# Patient Record
Sex: FEMALE | Race: WHITE | NOT HISPANIC OR LATINO | Employment: OTHER | URBAN - NONMETROPOLITAN AREA
[De-identification: names, ages, dates, MRNs, and addresses within clinical notes are randomized per-mention and may not be internally consistent; named-entity substitution may affect disease eponyms.]

---

## 2017-02-21 ENCOUNTER — OFFICE VISIT (OUTPATIENT)
Dept: CARDIOLOGY | Facility: CLINIC | Age: 77
End: 2017-02-21

## 2017-02-21 VITALS
HEART RATE: 73 BPM | WEIGHT: 175.7 LBS | SYSTOLIC BLOOD PRESSURE: 148 MMHG | DIASTOLIC BLOOD PRESSURE: 81 MMHG | HEIGHT: 64 IN | OXYGEN SATURATION: 97 % | BODY MASS INDEX: 30 KG/M2

## 2017-02-21 DIAGNOSIS — I20.8 ANGINA EFFORT: ICD-10-CM

## 2017-02-21 DIAGNOSIS — I48.0 PAROXYSMAL ATRIAL FIBRILLATION (HCC): Primary | ICD-10-CM

## 2017-02-21 DIAGNOSIS — I25.118 CORONARY ARTERY DISEASE OF NATIVE ARTERY OF NATIVE HEART WITH STABLE ANGINA PECTORIS (HCC): ICD-10-CM

## 2017-02-21 DIAGNOSIS — R07.2 PRECORDIAL PAIN: ICD-10-CM

## 2017-02-21 DIAGNOSIS — I10 ESSENTIAL HYPERTENSION: ICD-10-CM

## 2017-02-21 PROCEDURE — 99214 OFFICE O/P EST MOD 30 MIN: CPT | Performed by: INTERNAL MEDICINE

## 2017-02-21 NOTE — PROGRESS NOTES
Subjective   Anette Dunham is a 76 y.o. female.     Chief Complaint   Patient presents with   • Follow-up   • Atrial Fibrillation       History of Present Illness     Mrs. Dunham is a pleasant 76-year-old woman who presents for follow-up of atrial fibrillation. She apparently had been followed for many years by cardiology for an irregular heart rhythm but had never been formally diagnosed with atrial fibrillation. She stopped seeing her cardiologist some years ago. She had been started on Eliquis and was diagnosed with atrial fibrillation. She seemed to be significantly symptomatic from the atrial fibrillation and as such, she was also placed on amiodarone with an anticipation of DC cardioversion.  She then reverted to sinus rhythm. Having done so, she stated that most of her symptoms improved dramatically.  She states that overall she continues to have her palpitations but no sustained arrhythmias.  She does not monitor her blood pressure and heart rate on a daily basis.  She does note that over the past 6 months she's had an increasingly difficult time with physical activity.  Specifically, she notes that if for example she walks up stairs she becomes relatively dyspneic.  She denies any clear angina or anginal-like symptoms.  However, she does note that over the past month she believes that she's had a flu where she has recurrent episodes of a vague tightness sensation in her chest that is not related to physical activity and seems to be relatively persistent.  She does state that she was told long time ago that she did have a blockage in the bottom of her heart.     The following portions of the patient's history were reviewed and updated as appropriate: allergies, current medications, past family history, past medical history, past social history, past surgical history and problem list.    Review of Systems   Constitutional: Positive for appetite change. Negative for activity change and fatigue.   HENT:  Negative for congestion and tinnitus.    Eyes: Negative for visual disturbance.   Respiratory: Positive for cough, chest tightness and shortness of breath.    Cardiovascular: Positive for chest pain and palpitations. Negative for leg swelling.   Gastrointestinal: Negative for blood in stool, nausea and vomiting.   Endocrine: Positive for cold intolerance. Negative for heat intolerance and polyuria.   Genitourinary: Negative for dysuria.   Musculoskeletal: Negative for myalgias and neck pain.   Skin: Negative for rash.   Neurological: Positive for weakness and light-headedness. Negative for dizziness and syncope.   Hematological: Bruises/bleeds easily.   Psychiatric/Behavioral: Positive for confusion and sleep disturbance.       Objective   Physical Exam   Constitutional: She is oriented to person, place, and time. She appears well-developed and well-nourished. No distress.   HENT:   Head: Normocephalic and atraumatic.   Nose: Nose normal.   Mouth/Throat: Oropharynx is clear and moist.   Eyes: Conjunctivae and EOM are normal. Right eye exhibits no discharge. Left eye exhibits no discharge. No scleral icterus.   Neck: Normal range of motion. No hepatojugular reflux and no JVD present. Carotid bruit is not present. No tracheal deviation present.   Cardiovascular: Normal rate, regular rhythm, S1 normal, S2 normal and intact distal pulses.  PMI is not displaced.  Exam reveals no gallop, no S3, no S4 and no friction rub.    No murmur heard.  Pulmonary/Chest: Effort normal and breath sounds normal. No accessory muscle usage. No respiratory distress. She has no wheezes. She has no rales. She exhibits no tenderness.   Abdominal: Soft. Bowel sounds are normal. She exhibits no distension. There is no tenderness.   Musculoskeletal: Normal range of motion. She exhibits no edema or tenderness.       Vascular Status -  Her exam exhibits no right foot edema. Her exam exhibits no left foot edema.  Neurological: She is alert and  oriented to person, place, and time. No cranial nerve deficit. Coordination normal.   Skin: Skin is warm and dry. She is not diaphoretic.   Nursing note and vitals reviewed.      Procedures    Assessment/Plan     Atrial Fibrillation At this point in time she has converted to sinus rhythm and seems to be significantly better. Additionally, she has tolerated her medications without difficulty. For now, I will continue her amiodarone at 200 mg daily. I have asked her to maintain a blood pressure and heart rate diary.  I have also asked her to obtain liver function tests as well as TSH and CBC.    Chest Pain/CAD?  In regard to her questionable history of exertional fatigue and vague chest tightness as well as her questionable history of known coronary artery disease I am concerned that she may be having anginal symptoms.  As such, I have asked her to obtain a stress Cardiolite test.      Hypertension. For now I have continued her medication as is. I have however asked her to maintain a blood pressure diary. I do not suspect that her blood pressure or heart rate are contributing to her episodes of dizziness    In short, it is been a pleasure to participate in Mrs. Dunham's care, I look forward to seeing her again in 6 weeks.

## 2017-03-07 ENCOUNTER — HOSPITAL ENCOUNTER (OUTPATIENT)
Dept: NUCLEAR MEDICINE | Facility: HOSPITAL | Age: 77
Discharge: HOME | End: 2017-03-07
Attending: INTERNAL MEDICINE

## 2017-03-07 ENCOUNTER — HOSPITAL ENCOUNTER (OUTPATIENT)
Dept: CARDIOLOGY | Facility: HOSPITAL | Age: 77
Discharge: HOME | End: 2017-03-07
Attending: INTERNAL MEDICINE

## 2017-03-07 DIAGNOSIS — I25.118 CORONARY ARTERY DISEASE OF NATIVE ARTERY OF NATIVE HEART WITH STABLE ANGINA PECTORIS (HCC): ICD-10-CM

## 2017-03-07 DIAGNOSIS — I20.8 ANGINA EFFORT: ICD-10-CM

## 2017-03-07 DIAGNOSIS — I48.0 PAROXYSMAL ATRIAL FIBRILLATION (HCC): ICD-10-CM

## 2017-03-07 LAB
BH CV NUCLEAR PRIOR STUDY: 3
BH CV STRESS BP STAGE 1: NORMAL
BH CV STRESS DURATION MIN STAGE 1: 4
BH CV STRESS DURATION SEC STAGE 1: 30
BH CV STRESS GRADE STAGE 1: 10
BH CV STRESS HR STAGE 1: 136
BH CV STRESS METS STAGE 1: 5
BH CV STRESS PROTOCOL 1: NORMAL
BH CV STRESS RECOVERY BP: NORMAL MMHG
BH CV STRESS RECOVERY HR: 79 BPM
BH CV STRESS SPEED STAGE 1: 1.7
BH CV STRESS STAGE 1: 1
LV EF NUC BP: 81 %
MAXIMAL PREDICTED HEART RATE: 143 BPM
PERCENT MAX PREDICTED HR: 95.1 %
STRESS BASELINE BP: NORMAL MMHG
STRESS BASELINE HR: 66 BPM
STRESS PERCENT HR: 112 %
STRESS POST ESTIMATED WORKLOAD: 4.6 METS
STRESS POST EXERCISE DUR MIN: 4 MIN
STRESS POST EXERCISE DUR SEC: 30 SEC
STRESS POST PEAK BP: NORMAL MMHG
STRESS POST PEAK HR: 136 BPM
STRESS TARGET HR: 122 BPM

## 2017-03-07 PROCEDURE — A9500 TC99M SESTAMIBI: HCPCS | Performed by: INTERNAL MEDICINE

## 2017-03-07 PROCEDURE — 93017 CV STRESS TEST TRACING ONLY: CPT

## 2017-03-07 PROCEDURE — 0 TECHNETIUM SESTAMIBI: Performed by: INTERNAL MEDICINE

## 2017-03-07 PROCEDURE — 78452 HT MUSCLE IMAGE SPECT MULT: CPT

## 2017-03-07 PROCEDURE — 78452 HT MUSCLE IMAGE SPECT MULT: CPT | Performed by: INTERNAL MEDICINE

## 2017-03-07 PROCEDURE — 93018 CV STRESS TEST I&R ONLY: CPT | Performed by: INTERNAL MEDICINE

## 2017-03-07 RX ADMIN — Medication 1 DOSE: at 08:00

## 2017-03-07 RX ADMIN — Medication 1 DOSE: at 09:30

## 2017-03-14 ENCOUNTER — TELEPHONE (OUTPATIENT)
Dept: CARDIOLOGY | Facility: CLINIC | Age: 77
End: 2017-03-14

## 2017-03-14 NOTE — TELEPHONE ENCOUNTER
----- Message from Dipak Lr MD sent at 3/10/2017  7:02 PM EST -----  Let her know her stress test was normal    sdf

## 2017-03-20 ENCOUNTER — TELEPHONE (OUTPATIENT)
Dept: CARDIOLOGY | Facility: CLINIC | Age: 77
End: 2017-03-20

## 2017-03-23 ENCOUNTER — TELEPHONE (OUTPATIENT)
Dept: CARDIOLOGY | Facility: CLINIC | Age: 77
End: 2017-03-23

## 2017-03-23 NOTE — TELEPHONE ENCOUNTER
----- Message from Dipak Lr MD sent at 3/10/2017  7:02 PM EST -----  Let her know her stress test was normal    sdf      PT RETURNED MY PHONE CALL.  I MADE HER AWARE THAT PER DR. LR HER STRESS TEST WAS NORMAL.    PT AWARE & UNDERSTANDS.   CSMA

## 2017-05-10 RX ORDER — AMIODARONE HYDROCHLORIDE 200 MG/1
200 TABLET ORAL 2 TIMES DAILY
Qty: 60 TABLET | Refills: 5 | Status: SHIPPED | OUTPATIENT
Start: 2017-05-10 | End: 2017-12-15

## 2017-05-30 ENCOUNTER — TELEPHONE (OUTPATIENT)
Dept: CARDIOLOGY | Facility: CLINIC | Age: 77
End: 2017-05-30

## 2017-05-31 ENCOUNTER — LAB (OUTPATIENT)
Dept: LAB | Facility: HOSPITAL | Age: 77
End: 2017-05-31
Attending: INTERNAL MEDICINE

## 2017-05-31 ENCOUNTER — APPOINTMENT (OUTPATIENT)
Dept: LAB | Facility: HOSPITAL | Age: 77
End: 2017-05-31
Attending: INTERNAL MEDICINE

## 2017-05-31 ENCOUNTER — OFFICE VISIT (OUTPATIENT)
Dept: CARDIOLOGY | Facility: CLINIC | Age: 77
End: 2017-05-31

## 2017-05-31 VITALS
SYSTOLIC BLOOD PRESSURE: 134 MMHG | BODY MASS INDEX: 30.05 KG/M2 | WEIGHT: 176 LBS | HEART RATE: 72 BPM | HEIGHT: 64 IN | DIASTOLIC BLOOD PRESSURE: 78 MMHG | OXYGEN SATURATION: 96 %

## 2017-05-31 DIAGNOSIS — I25.118 CORONARY ARTERY DISEASE OF NATIVE ARTERY OF NATIVE HEART WITH STABLE ANGINA PECTORIS (HCC): ICD-10-CM

## 2017-05-31 DIAGNOSIS — I10 ESSENTIAL HYPERTENSION: ICD-10-CM

## 2017-05-31 DIAGNOSIS — I48.0 PAROXYSMAL A-FIB (HCC): Primary | ICD-10-CM

## 2017-05-31 DIAGNOSIS — I20.8 ANGINA EFFORT: ICD-10-CM

## 2017-05-31 DIAGNOSIS — R07.2 PRECORDIAL PAIN: ICD-10-CM

## 2017-05-31 DIAGNOSIS — I48.0 PAROXYSMAL ATRIAL FIBRILLATION (HCC): ICD-10-CM

## 2017-05-31 LAB
ALBUMIN SERPL-MCNC: 4.2 G/DL (ref 3.4–4.8)
ALBUMIN/GLOB SERPL: 1 G/DL (ref 1.5–2.5)
ALP SERPL-CCNC: 57 U/L (ref 35–104)
ALT SERPL W P-5'-P-CCNC: 15 U/L (ref 10–36)
ANION GAP SERPL CALCULATED.3IONS-SCNC: 8.4 MMOL/L (ref 3.6–11.2)
AST SERPL-CCNC: 25 U/L (ref 10–30)
BILIRUB SERPL-MCNC: 0.3 MG/DL (ref 0.2–1.8)
BUN BLD-MCNC: 21 MG/DL (ref 7–21)
BUN/CREAT SERPL: 18.9 (ref 7–25)
CALCIUM SPEC-SCNC: 9.5 MG/DL (ref 7.7–10)
CHLORIDE SERPL-SCNC: 107 MMOL/L (ref 99–112)
CO2 SERPL-SCNC: 27.6 MMOL/L (ref 24.3–31.9)
CREAT BLD-MCNC: 1.11 MG/DL (ref 0.43–1.29)
DEPRECATED FTI SERPL-MCNC: 2.9 TBI
DEPRECATED RDW RBC AUTO: 46.2 FL (ref 37–54)
ERYTHROCYTE [DISTWIDTH] IN BLOOD BY AUTOMATED COUNT: 14.1 % (ref 11.5–14.5)
GFR SERPL CREATININE-BSD FRML MDRD: 48 ML/MIN/1.73
GLOBULIN UR ELPH-MCNC: 4.2 GM/DL
GLUCOSE BLD-MCNC: 100 MG/DL (ref 70–110)
HCT VFR BLD AUTO: 41.7 % (ref 37–47)
HGB BLD-MCNC: 13.7 G/DL (ref 12–16)
MCH RBC QN AUTO: 30.6 PG (ref 27–33)
MCHC RBC AUTO-ENTMCNC: 32.9 G/DL (ref 33–37)
MCV RBC AUTO: 93.3 FL (ref 80–94)
OSMOLALITY SERPL CALC.SUM OF ELEC: 288 MOSM/KG (ref 273–305)
PLATELET # BLD AUTO: 238 10*3/MM3 (ref 130–400)
PMV BLD AUTO: 10.4 FL (ref 6–10)
POTASSIUM BLD-SCNC: 4.3 MMOL/L (ref 3.5–5.3)
PROT SERPL-MCNC: 8.4 G/DL (ref 6–8)
RBC # BLD AUTO: 4.47 10*6/MM3 (ref 4.2–5.4)
SODIUM BLD-SCNC: 143 MMOL/L (ref 135–153)
T3RU NFR SERPL: 34.2 % (ref 22.5–37)
T4 SERPL-MCNC: 8.6 MCG/DL (ref 4.5–10.9)
TSH SERPL DL<=0.05 MIU/L-ACNC: 3.37 MIU/ML (ref 0.55–4.78)
WBC NRBC COR # BLD: 7.18 10*3/MM3 (ref 4.5–12.5)

## 2017-05-31 PROCEDURE — 99214 OFFICE O/P EST MOD 30 MIN: CPT | Performed by: INTERNAL MEDICINE

## 2017-05-31 PROCEDURE — 84479 ASSAY OF THYROID (T3 OR T4): CPT | Performed by: INTERNAL MEDICINE

## 2017-05-31 PROCEDURE — 80053 COMPREHEN METABOLIC PANEL: CPT | Performed by: INTERNAL MEDICINE

## 2017-05-31 PROCEDURE — 84443 ASSAY THYROID STIM HORMONE: CPT | Performed by: INTERNAL MEDICINE

## 2017-05-31 PROCEDURE — 84436 ASSAY OF TOTAL THYROXINE: CPT | Performed by: INTERNAL MEDICINE

## 2017-05-31 PROCEDURE — 36415 COLL VENOUS BLD VENIPUNCTURE: CPT

## 2017-05-31 PROCEDURE — 85027 COMPLETE CBC AUTOMATED: CPT | Performed by: INTERNAL MEDICINE

## 2017-06-07 ENCOUNTER — TELEPHONE (OUTPATIENT)
Dept: CARDIOLOGY | Facility: CLINIC | Age: 77
End: 2017-06-07

## 2017-06-07 NOTE — TELEPHONE ENCOUNTER
----- Message from Dipak Lr MD sent at 6/5/2017  4:58 PM EDT -----  Let her know her thyroid panel was normal    sdf      CALLED PT TO MAKE HER AWARE THAT PER DR. LR HER THYROID PANEL & CBC WAS NORMAL.    PT AWARE & UNDERSTANDS.    CSMA

## 2017-11-27 ENCOUNTER — OFFICE VISIT (OUTPATIENT)
Dept: CARDIOLOGY | Facility: CLINIC | Age: 77
End: 2017-11-27

## 2017-11-27 VITALS
DIASTOLIC BLOOD PRESSURE: 84 MMHG | SYSTOLIC BLOOD PRESSURE: 122 MMHG | OXYGEN SATURATION: 94 % | HEART RATE: 91 BPM | BODY MASS INDEX: 27.79 KG/M2 | HEIGHT: 64 IN | WEIGHT: 162.8 LBS

## 2017-11-27 DIAGNOSIS — I48.0 PAROXYSMAL ATRIAL FIBRILLATION (HCC): ICD-10-CM

## 2017-11-27 DIAGNOSIS — I10 ESSENTIAL HYPERTENSION: Primary | ICD-10-CM

## 2017-11-27 DIAGNOSIS — R07.2 PRECORDIAL PAIN: ICD-10-CM

## 2017-11-27 PROCEDURE — 99213 OFFICE O/P EST LOW 20 MIN: CPT | Performed by: INTERNAL MEDICINE

## 2017-11-27 RX ORDER — ALBUTEROL SULFATE 2.5 MG/3ML
2.5 SOLUTION RESPIRATORY (INHALATION) EVERY 4 HOURS PRN
COMMUNITY
End: 2018-05-27

## 2017-11-27 RX ORDER — ALENDRONATE SODIUM 70 MG/1
70 TABLET ORAL
COMMUNITY

## 2017-11-27 NOTE — PROGRESS NOTES
[facility] CARDIOLOGY  [address]  [place] [address]  Phone: [phone]  Fax: [phone]    11/27/2017    Chief Complaint: Routine followup    History:   Anette Dunham is a 77 y.o. female seen in followup,  Occasional palpitatons. Particularly at night feels like racing. Taking amiodarone and Eliquis without side effects. Nml nulcear ST and no chest pain.  LVEF 50-70% on echo and stress.    Past Medical History:   Diagnosis Date   • Arrhythmia        Past Social History:  Social History     Social History   • Marital status:      Spouse name: N/A   • Number of children: N/A   • Years of education: N/A     Social History Main Topics   • Smoking status: Current Every Day Smoker     Packs/day: 0.25     Years: 15.00     Types: Cigarettes   • Smokeless tobacco: Current User     Types: Snuff      Comment: snuff for 60 years    • Alcohol use No   • Drug use: No   • Sexual activity: Defer     Other Topics Concern   • None     Social History Narrative       Past Family History:  Family History   Problem Relation Age of Onset   • Heart disease Father        Review of Systems:   Please see HPI  Constitution: No chills, no rigors, no unexplained weight loss or weight gain  Eyes:  No diplopia, no blurred vision, no loss of vision, conjunctiva is pink and sclera is anicteric  ENT:  No tinnitus, no otorrhea, no epistaxis, no sore throat   Respiratory: No cough, no hemoptysis  Cardiovascular: see HPI  Gastrointestinal: No nausea, no vomiting, no hematemesis, no diarrhea or constipation, no melena  Genitourinary: No frequency of dysuria no hematuria  Integument: No pruritis and  no skin rash  Hematologic / Lymphatic: No excessive bleeding, easy bruising, fatigue, lymphadenopathy and petechiae  Musculoskeletal: No joint pain, joint stiffness, joint swelling, muscle pain, muscle weakness and neck pain  Neurological: No dizziness, headaches, light headedness, seizures and  vertigo  Endocrine: No frequent urination and nocturia, temperature intolerance, weight gain, unintended and weight loss, unintended      Current Outpatient Prescriptions on File Prior to Visit   Medication Sig Dispense Refill   • amiodarone (PACERONE) 200 MG tablet Take 1 tablet by mouth 2 (Two) Times a Day. (Patient taking differently: Take 200 mg by mouth Daily.) 60 tablet 5   • apixaban (ELIQUIS) 5 MG tablet tablet Take 1 tablet by mouth 2 (two) times a day. 60 tablet 5   • gabapentin (NEURONTIN) 300 MG capsule Take 600 mg by mouth 3 (Three) Times a Day.     • levothyroxine (SYNTHROID, LEVOTHROID) 25 MCG tablet Take 25 mcg by mouth daily.     • LORazepam (ATIVAN) 1 MG tablet Take 1 mg by mouth every 8 (eight) hours as needed for anxiety (1/2-1 TID PRN).     • ranitidine (ZANTAC) 300 MG capsule Take 300 mg by mouth 2 (two) times a day.     • meclizine (ANTIVERT) 12.5 MG tablet Take 12.5 mg by mouth 3 (three) times a day as needed for dizziness.       No current facility-administered medications on file prior to visit.        No Known Allergies    Objective:  Vitals:    11/27/17 1106   BP: 122/84   Pulse: 91   SpO2: 94%     Comfortable NAD  PERRL, conjunctiva clear  Neck supple, no JVD or thyromegaly appreciated  S1/S2 RRR, no m/r/g  Lungs CTA B, normal effort  Abdomen S/NT/ND (+) BS, no HSM appreciated  Extremities warm, no clubbing, cyanosis, or edema  Normal gait  No visible or palpable skin lesions  A/Ox4, mood and affect appropriate  Pulse exam: Leif's:    DATA:       Results for orders placed during the hospital encounter of 08/17/16   Adult transthoracic echo complete    Narrative · All left ventricular wall segments contract normally.  · Left ventricular function is normal. Estimated EF = 50%.  · Left atrial cavity size is mild-to-moderately dilated.          Results for orders placed during the hospital encounter of 03/07/17   Stress Test With Myocardial Perfusion (1 Day)    Narrative · Findings  consistent with an abnormal ECG stress test.  · Left ventricular ejection fraction is hyperdynamic (Calculated EF >   70%).  · Myocardial perfusion imaging indicates a normal myocardial perfusion   study with no evidence of ischemia.  · Impressions are consistent with a low risk study.          Results for orders placed during the hospital encounter of 03/07/17   Stress Test With Myocardial Perfusion (1 Day)    Narrative · Findings consistent with an abnormal ECG stress test.  · Left ventricular ejection fraction is hyperdynamic (Calculated EF >   70%).  · Myocardial perfusion imaging indicates a normal myocardial perfusion   study with no evidence of ischemia.  · Impressions are consistent with a low risk study.                 A/P:Afib: appears to have returned and is fairly asymtpomatic. Amiodarone does not appear to be preserving NSR. Will stop. Continue Eliquis.  Start lopressor 25mg BID.    F/u 2 weeks.    Thank you for allowing me to participate in the care of Anette Dunham. Feel free to contact me directly with any further questions or concerns.

## 2017-12-15 ENCOUNTER — OFFICE VISIT (OUTPATIENT)
Dept: CARDIOLOGY | Facility: CLINIC | Age: 77
End: 2017-12-15

## 2017-12-15 VITALS
HEART RATE: 65 BPM | OXYGEN SATURATION: 92 % | SYSTOLIC BLOOD PRESSURE: 129 MMHG | BODY MASS INDEX: 27.76 KG/M2 | HEIGHT: 64 IN | DIASTOLIC BLOOD PRESSURE: 73 MMHG | WEIGHT: 162.6 LBS

## 2017-12-15 DIAGNOSIS — I48.0 PAROXYSMAL ATRIAL FIBRILLATION (HCC): ICD-10-CM

## 2017-12-15 DIAGNOSIS — I10 ESSENTIAL HYPERTENSION: Primary | ICD-10-CM

## 2017-12-15 PROCEDURE — 99212 OFFICE O/P EST SF 10 MIN: CPT | Performed by: INTERNAL MEDICINE

## 2017-12-15 RX ORDER — GABAPENTIN 600 MG/1
600 TABLET ORAL 3 TIMES DAILY
Status: ON HOLD | COMMUNITY
End: 2018-09-05

## 2017-12-15 NOTE — PROGRESS NOTES
[facility] CARDIOLOGY  [address]  [place] [address]  Phone: [phone]  Fax: [phone]    12/15/2017    Chief Complaint: Routine followup of pAF. Stopped amiodarone and started metoprolol 25 bid. Inititally felt lethargic and SOB and now feels adjusted to it and feels better.  Still taking Eliquis. Some palpitations at night but able to sleep.    History:   Anette Dunham is a 77 y.o. female seen in followup,     Past Medical History:   Diagnosis Date   • Arrhythmia        Past Social History:  Social History     Social History   • Marital status:      Spouse name: N/A   • Number of children: N/A   • Years of education: N/A     Social History Main Topics   • Smoking status: Current Every Day Smoker     Packs/day: 0.25     Years: 15.00     Types: Cigarettes   • Smokeless tobacco: Current User     Types: Snuff      Comment: snuff for 60 years    • Alcohol use No   • Drug use: No   • Sexual activity: Defer     Other Topics Concern   • None     Social History Narrative       Past Family History:  Family History   Problem Relation Age of Onset   • Heart disease Father        Review of Systems:   Please see HPI  Constitution: No chills, no rigors, no unexplained weight loss or weight gain  Eyes:  No diplopia, no blurred vision, no loss of vision, conjunctiva is pink and sclera is anicteric  ENT:  No tinnitus, no otorrhea, no epistaxis, no sore throat   Respiratory: No cough, no hemoptysis  Cardiovascular: see HPI  Gastrointestinal: No nausea, no vomiting, no hematemesis, no diarrhea or constipation, no melena  Genitourinary: No frequency of dysuria no hematuria  Integument: No pruritis and  no skin rash  Hematologic / Lymphatic: No excessive bleeding, easy bruising, fatigue, lymphadenopathy and petechiae  Musculoskeletal: No joint pain, joint stiffness, joint swelling, muscle pain, muscle weakness and neck pain  Neurological: No dizziness, headaches, light headedness,  seizures and vertigo  Endocrine: No frequent urination and nocturia, temperature intolerance, weight gain, unintended and weight loss, unintended      Current Outpatient Prescriptions on File Prior to Visit   Medication Sig Dispense Refill   • albuterol (PROVENTIL) (2.5 MG/3ML) 0.083% nebulizer solution Take 2.5 mg by nebulization Every 4 (Four) Hours As Needed for Wheezing.     • alendronate (FOSAMAX) 70 MG tablet Take 70 mg by mouth Every 7 (Seven) Days.     • amiodarone (PACERONE) 200 MG tablet Take 1 tablet by mouth 2 (Two) Times a Day. (Patient taking differently: Take 200 mg by mouth Daily.) 60 tablet 5   • apixaban (ELIQUIS) 5 MG tablet tablet Take 1 tablet by mouth 2 (two) times a day. 60 tablet 5   • levothyroxine (SYNTHROID, LEVOTHROID) 25 MCG tablet Take 25 mcg by mouth daily.     • LORazepam (ATIVAN) 1 MG tablet Take 1 mg by mouth every 8 (eight) hours as needed for anxiety (1/2-1 TID PRN).     • metoprolol tartrate (LOPRESSOR) 25 MG tablet Take 1 tablet by mouth 2 (Two) Times a Day. 180 tablet 3   • ranitidine (ZANTAC) 300 MG capsule Take 300 mg by mouth 2 (two) times a day.     • meclizine (ANTIVERT) 12.5 MG tablet Take 12.5 mg by mouth 3 (three) times a day as needed for dizziness.     • [DISCONTINUED] gabapentin (NEURONTIN) 300 MG capsule Take 600 mg by mouth 3 (Three) Times a Day.       No current facility-administered medications on file prior to visit.        No Known Allergies    Objective:  Vitals:    12/15/17 1113   BP: 129/73   Pulse: 65   SpO2: 92%     Comfortable NAD  PERRL, conjunctiva clear  Neck supple, no JVD or thyromegaly appreciated  S1/S2 RRR, no m/r/g  Lungs CTA B, normal effort  Abdomen S/NT/ND (+) BS, no HSM appreciated  Extremities warm, no clubbing, cyanosis, or edema  Normal gait  No visible or palpable skin lesions  A/Ox4, mood and affect appropriate  Pulse exam: Leif's:    DATA:       Results for orders placed during the hospital encounter of 08/17/16   Adult transthoracic  echo complete    Narrative · All left ventricular wall segments contract normally.  · Left ventricular function is normal. Estimated EF = 50%.  · Left atrial cavity size is mild-to-moderately dilated.          Results for orders placed during the hospital encounter of 03/07/17   Stress Test With Myocardial Perfusion (1 Day)    Narrative · Findings consistent with an abnormal ECG stress test.  · Left ventricular ejection fraction is hyperdynamic (Calculated EF >   70%).  · Myocardial perfusion imaging indicates a normal myocardial perfusion   study with no evidence of ischemia.  · Impressions are consistent with a low risk study.          Results for orders placed during the hospital encounter of 03/07/17   Stress Test With Myocardial Perfusion (1 Day)    Narrative · Findings consistent with an abnormal ECG stress test.  · Left ventricular ejection fraction is hyperdynamic (Calculated EF >   70%).  · Myocardial perfusion imaging indicates a normal myocardial perfusion   study with no evidence of ischemia.  · Impressions are consistent with a low risk study.                 A/P:pAF: feels regular and possible back in NSR HR 60. Continue ELiquis. F/u 4 weeks.    Thank you for allowing me to participate in the care of Anette Dunham. Feel free to contact me directly with any further questions or concerns.

## 2018-01-19 ENCOUNTER — OFFICE VISIT (OUTPATIENT)
Dept: CARDIOLOGY | Facility: CLINIC | Age: 78
End: 2018-01-19

## 2018-01-19 VITALS
OXYGEN SATURATION: 95 % | HEART RATE: 60 BPM | HEIGHT: 64 IN | WEIGHT: 160.4 LBS | DIASTOLIC BLOOD PRESSURE: 81 MMHG | SYSTOLIC BLOOD PRESSURE: 154 MMHG | BODY MASS INDEX: 27.39 KG/M2

## 2018-01-19 DIAGNOSIS — I48.0 PAROXYSMAL ATRIAL FIBRILLATION (HCC): ICD-10-CM

## 2018-01-19 DIAGNOSIS — I10 ESSENTIAL HYPERTENSION: Primary | ICD-10-CM

## 2018-01-19 PROCEDURE — 99213 OFFICE O/P EST LOW 20 MIN: CPT | Performed by: INTERNAL MEDICINE

## 2018-01-19 NOTE — PROGRESS NOTES
[facility] CARDIOLOGY  [address]  [place] [address]  Phone: [phone]  Fax: [phone]    01/19/2018    Chief Complaint: Routine followup    History:   Anette Dunham is a 77 y.o. female seen in followup, no new symptoms. Occasionally heart palpitations. Noticed mostly when in bed. Not losing sleep.  Taking medications well. No bleeding. Occ bruising.    Past Medical History:   Diagnosis Date   • Arrhythmia        Past Social History:  Social History     Social History   • Marital status:      Spouse name: N/A   • Number of children: N/A   • Years of education: N/A     Social History Main Topics   • Smoking status: Current Every Day Smoker     Packs/day: 0.25     Years: 15.00     Types: Cigarettes   • Smokeless tobacco: Current User     Types: Snuff      Comment: snuff for 60 years    • Alcohol use No   • Drug use: No   • Sexual activity: Defer     Other Topics Concern   • None     Social History Narrative       Past Family History:  Family History   Problem Relation Age of Onset   • Heart disease Father        Review of Systems:   Please see HPI  Constitution: No chills, no rigors, no unexplained weight loss or weight gain  Eyes:  No diplopia, no blurred vision, no loss of vision, conjunctiva is pink and sclera is anicteric  ENT:  No tinnitus, no otorrhea, no epistaxis, no sore throat   Respiratory: No cough, no hemoptysis  Cardiovascular: see HPI  Gastrointestinal: No nausea, no vomiting, no hematemesis, no diarrhea or constipation, no melena  Genitourinary: No frequency of dysuria no hematuria  Integument: No pruritis and  no skin rash  Hematologic / Lymphatic: No excessive bleeding, easy bruising, fatigue, lymphadenopathy and petechiae  Musculoskeletal: No joint pain, joint stiffness, joint swelling, muscle pain, muscle weakness and neck pain  Neurological: No dizziness, headaches, light headedness, seizures and vertigo  Endocrine: No frequent urination and  nocturia, temperature intolerance, weight gain, unintended and weight loss, unintended      Current Outpatient Prescriptions on File Prior to Visit   Medication Sig Dispense Refill   • albuterol (PROVENTIL) (2.5 MG/3ML) 0.083% nebulizer solution Take 2.5 mg by nebulization Every 4 (Four) Hours As Needed for Wheezing.     • alendronate (FOSAMAX) 70 MG tablet Take 70 mg by mouth Every 7 (Seven) Days.     • apixaban (ELIQUIS) 5 MG tablet tablet Take 1 tablet by mouth 2 (two) times a day. 60 tablet 5   • gabapentin (NEURONTIN) 600 MG tablet Take 600 mg by mouth 3 (Three) Times a Day.     • levothyroxine (SYNTHROID, LEVOTHROID) 25 MCG tablet Take 25 mcg by mouth daily.     • LORazepam (ATIVAN) 1 MG tablet Take 1 mg by mouth every 8 (eight) hours as needed for anxiety (1/2-1 TID PRN).     • metoprolol tartrate (LOPRESSOR) 25 MG tablet Take 1 tablet by mouth 2 (Two) Times a Day. 180 tablet 3   • ranitidine (ZANTAC) 300 MG capsule Take 300 mg by mouth 2 (two) times a day.     • meclizine (ANTIVERT) 12.5 MG tablet Take 12.5 mg by mouth 3 (three) times a day as needed for dizziness.       No current facility-administered medications on file prior to visit.        No Known Allergies    Objective:  Vitals:    01/19/18 1108   BP: 154/81   Pulse: 60   SpO2: 95%     Comfortable NAD  PERRL, conjunctiva clear  Neck supple, no JVD or thyromegaly appreciated  Extremities warm, no clubbing, cyanosis, or edema  Normal gait  No visible or palpable skin lesions  A/Ox4, mood and affect appropriate      DATA:       Results for orders placed during the hospital encounter of 08/17/16   Adult transthoracic echo complete    Narrative · All left ventricular wall segments contract normally.  · Left ventricular function is normal. Estimated EF = 50%.  · Left atrial cavity size is mild-to-moderately dilated.          Results for orders placed during the hospital encounter of 03/07/17   Stress Test With Myocardial Perfusion (1 Day)    Narrative ·  Findings consistent with an abnormal ECG stress test.  · Left ventricular ejection fraction is hyperdynamic (Calculated EF >   70%).  · Myocardial perfusion imaging indicates a normal myocardial perfusion   study with no evidence of ischemia.  · Impressions are consistent with a low risk study.          Results for orders placed during the hospital encounter of 03/07/17   Stress Test With Myocardial Perfusion (1 Day)    Narrative · Findings consistent with an abnormal ECG stress test.  · Left ventricular ejection fraction is hyperdynamic (Calculated EF >   70%).  · Myocardial perfusion imaging indicates a normal myocardial perfusion   study with no evidence of ischemia.  · Impressions are consistent with a low risk study.               I personally viewed and interpreted the patient's EKG:      A/P: pAF: Continue Eliquis and beta blocker. BP is isolated elevated was normal last visit. Will follow.    F/u 3-4 months.    Thank you for allowing me to participate in the care of Anette Dunham. Feel free to contact me directly with any further questions or concerns.

## 2018-04-20 ENCOUNTER — OFFICE VISIT (OUTPATIENT)
Dept: CARDIOLOGY | Facility: CLINIC | Age: 78
End: 2018-04-20

## 2018-04-20 VITALS
BODY MASS INDEX: 26.75 KG/M2 | HEIGHT: 63 IN | SYSTOLIC BLOOD PRESSURE: 156 MMHG | WEIGHT: 151 LBS | HEART RATE: 65 BPM | DIASTOLIC BLOOD PRESSURE: 80 MMHG | OXYGEN SATURATION: 96 %

## 2018-04-20 DIAGNOSIS — I10 ESSENTIAL HYPERTENSION: Primary | ICD-10-CM

## 2018-04-20 DIAGNOSIS — I48.0 PAROXYSMAL ATRIAL FIBRILLATION (HCC): ICD-10-CM

## 2018-04-20 PROCEDURE — 99213 OFFICE O/P EST LOW 20 MIN: CPT | Performed by: INTERNAL MEDICINE

## 2018-04-20 RX ORDER — PANTOPRAZOLE SODIUM 40 MG/1
40 TABLET, DELAYED RELEASE ORAL DAILY
COMMUNITY
End: 2018-05-27

## 2018-04-20 RX ORDER — FEXOFENADINE HCL 180 MG/1
180 TABLET ORAL DAILY
COMMUNITY

## 2018-04-20 RX ORDER — AMLODIPINE BESYLATE 5 MG/1
2.5 TABLET ORAL DAILY
Qty: 30 TABLET | Refills: 11 | Status: SHIPPED | OUTPATIENT
Start: 2018-04-20 | End: 2018-04-20 | Stop reason: SDUPTHER

## 2018-04-20 RX ORDER — AMLODIPINE BESYLATE 2.5 MG/1
2.5 TABLET ORAL DAILY
Qty: 30 TABLET | Refills: 11 | Status: SHIPPED | OUTPATIENT
Start: 2018-04-20 | End: 2018-06-08 | Stop reason: HOSPADM

## 2018-04-20 NOTE — PROGRESS NOTES
[facility] CARDIOLOGY  [address]  [place] [address]  Phone: [phone]  Fax: [phone]    04/20/2018    Chief Complaint: Routine followup of , Afib    History:   Anette Dunham is a 78 y.o. female seen in followup, pt seen in follow up for palpitations and PAF.  Recently had thyroid replacement increased. Pt some more tiredness. No chest discomfort but some SOB.  Nml ST and echo last year.          A/P: pAF: Continue Eliquis and beta blocker. BP is isolated elevated was normal last visit. Will follow.    Past Medical History:   Diagnosis Date   • Arrhythmia        Past Social History:  Social History     Social History   • Marital status:      Social History Main Topics   • Smoking status: Former Smoker     Packs/day: 0.25     Years: 15.00     Types: Cigarettes   • Smokeless tobacco: Current User     Types: Snuff      Comment: snuff for 60 years    • Alcohol use No   • Drug use: No   • Sexual activity: Defer     Other Topics Concern   • Not on file       Past Family History:  Family History   Problem Relation Age of Onset   • Heart disease Father        Review of Systems:   Please see HPI  Constitution: No chills, no rigors, no unexplained weight loss or weight gain  Eyes:  No diplopia, no blurred vision, no loss of vision, conjunctiva is pink and sclera is anicteric  ENT:  No tinnitus, no otorrhea, no epistaxis, no sore throat   Respiratory: No cough, no hemoptysis  Cardiovascular: see HPI  Gastrointestinal: No nausea, no vomiting, no hematemesis, no diarrhea or constipation, no melena  Genitourinary: No frequency of dysuria no hematuria  Integument: No pruritis and  no skin rash  Hematologic / Lymphatic: No excessive bleeding, easy bruising, fatigue, lymphadenopathy and petechiae  Musculoskeletal: No joint pain, joint stiffness, joint swelling, muscle pain, muscle weakness and neck pain  Neurological: No dizziness, headaches, light headedness, seizures and  vertigo  Endocrine: No frequent urination and nocturia, temperature intolerance, weight gain, unintended and weight loss, unintended      Current Outpatient Prescriptions on File Prior to Visit   Medication Sig Dispense Refill   • albuterol (PROVENTIL) (2.5 MG/3ML) 0.083% nebulizer solution Take 2.5 mg by nebulization Every 4 (Four) Hours As Needed for Wheezing.     • alendronate (FOSAMAX) 70 MG tablet Take 70 mg by mouth Every 7 (Seven) Days.     • apixaban (ELIQUIS) 5 MG tablet tablet Take 1 tablet by mouth 2 (two) times a day. 60 tablet 5   • gabapentin (NEURONTIN) 600 MG tablet Take 600 mg by mouth 3 (Three) Times a Day.     • levothyroxine (SYNTHROID, LEVOTHROID) 25 MCG tablet Take 50 mcg by mouth Daily.     • LORazepam (ATIVAN) 1 MG tablet Take 1 mg by mouth every 8 (eight) hours as needed for anxiety (1/2-1 TID PRN).     • meclizine (ANTIVERT) 12.5 MG tablet Take 12.5 mg by mouth 3 (three) times a day as needed for dizziness.     • metoprolol tartrate (LOPRESSOR) 25 MG tablet Take 1 tablet by mouth 2 (Two) Times a Day. 180 tablet 3   • ranitidine (ZANTAC) 300 MG capsule Take 300 mg by mouth 2 (two) times a day.       No current facility-administered medications on file prior to visit.        No Known Allergies    Objective:  Vitals:    04/20/18 1040   BP: 156/80   Pulse: 65   SpO2: 96%     Comfortable NAD  PERRL, conjunctiva clear  Neck supple, no JVD or thyromegaly appreciated  S1/S2 RRR, no m/r/g  Lungs CTA B, normal effort  Abdomen S/NT/ND (+) BS, no HSM appreciated  Extremities warm, no clubbing, cyanosis, or edema  Normal gait  No visible or palpable skin lesions  A/Ox4, mood and affect appropriate  Pulse exam: Leif's:    DATA:       Results for orders placed during the hospital encounter of 08/17/16   Adult transthoracic echo complete    Narrative · All left ventricular wall segments contract normally.  · Left ventricular function is normal. Estimated EF = 50%.  · Left atrial cavity size is  mild-to-moderately dilated.          Results for orders placed during the hospital encounter of 03/07/17   Stress Test With Myocardial Perfusion (1 Day)    Narrative · Findings consistent with an abnormal ECG stress test.  · Left ventricular ejection fraction is hyperdynamic (Calculated EF >   70%).  · Myocardial perfusion imaging indicates a normal myocardial perfusion   study with no evidence of ischemia.  · Impressions are consistent with a low risk study.          Results for orders placed during the hospital encounter of 03/07/17   Stress Test With Myocardial Perfusion (1 Day)    Narrative · Findings consistent with an abnormal ECG stress test.  · Left ventricular ejection fraction is hyperdynamic (Calculated EF >   70%).  · Myocardial perfusion imaging indicates a normal myocardial perfusion   study with no evidence of ischemia.  · Impressions are consistent with a low risk study.                     A/P:  PAF: continue Eliquis and metorpolol. Some palpitations but HR normal today.    HTN: add norvasc 2.5 mg today. Hx LAE possible hypertensive heart disease.    F/u 3 months.    Thank you for allowing me to participate in the care of Anette Dunham. Feel free to contact me directly with any further questions or concerns.

## 2018-05-27 ENCOUNTER — HOSPITAL ENCOUNTER (INPATIENT)
Facility: HOSPITAL | Age: 78
LOS: 5 days | Discharge: NURSING, REHAB OR LONG-TERM CARE | End: 2018-06-01
Attending: FAMILY MEDICINE | Admitting: INTERNAL MEDICINE

## 2018-05-27 ENCOUNTER — APPOINTMENT (OUTPATIENT)
Dept: CT IMAGING | Facility: HOSPITAL | Age: 78
End: 2018-05-27

## 2018-05-27 ENCOUNTER — APPOINTMENT (OUTPATIENT)
Dept: GENERAL RADIOLOGY | Facility: HOSPITAL | Age: 78
End: 2018-05-27

## 2018-05-27 DIAGNOSIS — S72.002A CLOSED LEFT HIP FRACTURE, INITIAL ENCOUNTER (HCC): Primary | ICD-10-CM

## 2018-05-27 PROBLEM — Z72.0 TOBACCO ABUSE: Status: ACTIVE | Noted: 2018-05-27

## 2018-05-27 LAB
ALBUMIN SERPL-MCNC: 4.3 G/DL (ref 3.4–4.8)
ALBUMIN/GLOB SERPL: 1 G/DL (ref 1.5–2.5)
ALP SERPL-CCNC: 67 U/L (ref 35–104)
ALT SERPL W P-5'-P-CCNC: 22 U/L (ref 10–36)
ANION GAP SERPL CALCULATED.3IONS-SCNC: 4.6 MMOL/L (ref 3.6–11.2)
APTT PPP: 34.6 SECONDS (ref 23.8–36.1)
AST SERPL-CCNC: 27 U/L (ref 10–30)
BASOPHILS # BLD AUTO: 0.04 10*3/MM3 (ref 0–0.3)
BASOPHILS NFR BLD AUTO: 0.6 % (ref 0–2)
BILIRUB SERPL-MCNC: 0.5 MG/DL (ref 0.2–1.8)
BUN BLD-MCNC: 24 MG/DL (ref 7–21)
BUN/CREAT SERPL: 18.5 (ref 7–25)
CALCIUM SPEC-SCNC: 9.1 MG/DL (ref 7.7–10)
CHLORIDE SERPL-SCNC: 103 MMOL/L (ref 99–112)
CO2 SERPL-SCNC: 26.4 MMOL/L (ref 24.3–31.9)
CREAT BLD-MCNC: 1.3 MG/DL (ref 0.43–1.29)
CRP SERPL-MCNC: <0.5 MG/DL (ref 0–0.99)
DEPRECATED RDW RBC AUTO: 45.1 FL (ref 37–54)
EOSINOPHIL # BLD AUTO: 0.34 10*3/MM3 (ref 0–0.7)
EOSINOPHIL NFR BLD AUTO: 4.8 % (ref 0–7)
ERYTHROCYTE [DISTWIDTH] IN BLOOD BY AUTOMATED COUNT: 13.7 % (ref 11.5–14.5)
GFR SERPL CREATININE-BSD FRML MDRD: 40 ML/MIN/1.73
GLOBULIN UR ELPH-MCNC: 4.2 GM/DL
GLUCOSE BLD-MCNC: 111 MG/DL (ref 70–110)
GLUCOSE BLDC GLUCOMTR-MCNC: 112 MG/DL (ref 70–130)
GLUCOSE BLDC GLUCOMTR-MCNC: 113 MG/DL (ref 70–130)
GLUCOSE BLDC GLUCOMTR-MCNC: 118 MG/DL (ref 70–130)
GLUCOSE BLDC GLUCOMTR-MCNC: 129 MG/DL (ref 70–130)
HCT VFR BLD AUTO: 38.5 % (ref 37–47)
HGB BLD-MCNC: 12.8 G/DL (ref 12–16)
HOLD SPECIMEN: NORMAL
HOLD SPECIMEN: NORMAL
IMM GRANULOCYTES # BLD: 0.03 10*3/MM3 (ref 0–0.03)
IMM GRANULOCYTES NFR BLD: 0.4 % (ref 0–0.5)
INR PPP: 1.2 (ref 0.9–1.1)
LYMPHOCYTES # BLD AUTO: 1.27 10*3/MM3 (ref 1–3)
LYMPHOCYTES NFR BLD AUTO: 17.8 % (ref 16–46)
MCH RBC QN AUTO: 31.3 PG (ref 27–33)
MCHC RBC AUTO-ENTMCNC: 33.2 G/DL (ref 33–37)
MCV RBC AUTO: 94.1 FL (ref 80–94)
MONOCYTES # BLD AUTO: 0.84 10*3/MM3 (ref 0.1–0.9)
MONOCYTES NFR BLD AUTO: 11.8 % (ref 0–12)
NEUTROPHILS # BLD AUTO: 4.6 10*3/MM3 (ref 1.4–6.5)
NEUTROPHILS NFR BLD AUTO: 64.6 % (ref 40–75)
OSMOLALITY SERPL CALC.SUM OF ELEC: 273 MOSM/KG (ref 273–305)
PLATELET # BLD AUTO: 260 10*3/MM3 (ref 130–400)
PMV BLD AUTO: 10.6 FL (ref 6–10)
POTASSIUM BLD-SCNC: 4.1 MMOL/L (ref 3.5–5.3)
PROT SERPL-MCNC: 8.5 G/DL (ref 6–8)
PROTHROMBIN TIME: 15.5 SECONDS (ref 11–15.4)
RBC # BLD AUTO: 4.09 10*6/MM3 (ref 4.2–5.4)
SODIUM BLD-SCNC: 134 MMOL/L (ref 135–153)
WBC NRBC COR # BLD: 7.12 10*3/MM3 (ref 4.5–12.5)
WHOLE BLOOD HOLD SPECIMEN: NORMAL
WHOLE BLOOD HOLD SPECIMEN: NORMAL

## 2018-05-27 PROCEDURE — 25010000002 MORPHINE PER 10 MG: Performed by: FAMILY MEDICINE

## 2018-05-27 PROCEDURE — 25010000002 MORPHINE PER 10 MG: Performed by: INTERNAL MEDICINE

## 2018-05-27 PROCEDURE — 85730 THROMBOPLASTIN TIME PARTIAL: CPT | Performed by: NURSE PRACTITIONER

## 2018-05-27 PROCEDURE — 85610 PROTHROMBIN TIME: CPT | Performed by: NURSE PRACTITIONER

## 2018-05-27 PROCEDURE — 25010000002 ONDANSETRON PER 1 MG: Performed by: FAMILY MEDICINE

## 2018-05-27 PROCEDURE — 86140 C-REACTIVE PROTEIN: CPT | Performed by: INTERNAL MEDICINE

## 2018-05-27 PROCEDURE — 99284 EMERGENCY DEPT VISIT MOD MDM: CPT

## 2018-05-27 PROCEDURE — 73560 X-RAY EXAM OF KNEE 1 OR 2: CPT

## 2018-05-27 PROCEDURE — 71045 X-RAY EXAM CHEST 1 VIEW: CPT | Performed by: RADIOLOGY

## 2018-05-27 PROCEDURE — 70450 CT HEAD/BRAIN W/O DYE: CPT

## 2018-05-27 PROCEDURE — 85025 COMPLETE CBC W/AUTO DIFF WBC: CPT | Performed by: NURSE PRACTITIONER

## 2018-05-27 PROCEDURE — 99223 1ST HOSP IP/OBS HIGH 75: CPT | Performed by: PHYSICIAN ASSISTANT

## 2018-05-27 PROCEDURE — 80053 COMPREHEN METABOLIC PANEL: CPT | Performed by: NURSE PRACTITIONER

## 2018-05-27 PROCEDURE — 70450 CT HEAD/BRAIN W/O DYE: CPT | Performed by: RADIOLOGY

## 2018-05-27 PROCEDURE — 25010000002 HYDROMORPHONE PER 4 MG

## 2018-05-27 PROCEDURE — 73560 X-RAY EXAM OF KNEE 1 OR 2: CPT | Performed by: RADIOLOGY

## 2018-05-27 PROCEDURE — 82962 GLUCOSE BLOOD TEST: CPT

## 2018-05-27 PROCEDURE — 71045 X-RAY EXAM CHEST 1 VIEW: CPT

## 2018-05-27 PROCEDURE — 93010 ELECTROCARDIOGRAM REPORT: CPT | Performed by: INTERNAL MEDICINE

## 2018-05-27 PROCEDURE — 93005 ELECTROCARDIOGRAM TRACING: CPT | Performed by: NURSE PRACTITIONER

## 2018-05-27 PROCEDURE — 73502 X-RAY EXAM HIP UNI 2-3 VIEWS: CPT | Performed by: RADIOLOGY

## 2018-05-27 PROCEDURE — 73502 X-RAY EXAM HIP UNI 2-3 VIEWS: CPT

## 2018-05-27 PROCEDURE — 25010000002 KETOROLAC TROMETHAMINE PER 15 MG: Performed by: INTERNAL MEDICINE

## 2018-05-27 RX ORDER — MORPHINE SULFATE 2 MG/ML
2 INJECTION, SOLUTION INTRAMUSCULAR; INTRAVENOUS EVERY 4 HOURS PRN
Status: DISCONTINUED | OUTPATIENT
Start: 2018-05-27 | End: 2018-06-01 | Stop reason: HOSPADM

## 2018-05-27 RX ORDER — ONDANSETRON 2 MG/ML
4 INJECTION INTRAMUSCULAR; INTRAVENOUS ONCE
Status: COMPLETED | OUTPATIENT
Start: 2018-05-27 | End: 2018-05-27

## 2018-05-27 RX ORDER — HYDROMORPHONE HCL 110MG/55ML
PATIENT CONTROLLED ANALGESIA SYRINGE INTRAVENOUS
Status: COMPLETED
Start: 2018-05-27 | End: 2018-05-27

## 2018-05-27 RX ORDER — DOCUSATE SODIUM 100 MG/1
100 CAPSULE, LIQUID FILLED ORAL DAILY
COMMUNITY
End: 2018-06-08 | Stop reason: HOSPADM

## 2018-05-27 RX ORDER — LEVOTHYROXINE SODIUM 0.05 MG/1
50 TABLET ORAL DAILY
COMMUNITY

## 2018-05-27 RX ORDER — NITROGLYCERIN 0.4 MG/1
0.4 TABLET SUBLINGUAL
Status: DISCONTINUED | OUTPATIENT
Start: 2018-05-27 | End: 2018-06-01 | Stop reason: HOSPADM

## 2018-05-27 RX ORDER — SODIUM CHLORIDE 0.9 % (FLUSH) 0.9 %
1-10 SYRINGE (ML) INJECTION AS NEEDED
Status: DISCONTINUED | OUTPATIENT
Start: 2018-05-27 | End: 2018-06-01 | Stop reason: HOSPADM

## 2018-05-27 RX ORDER — CETIRIZINE HYDROCHLORIDE 10 MG/1
5 TABLET ORAL DAILY
Status: DISCONTINUED | OUTPATIENT
Start: 2018-05-27 | End: 2018-06-01 | Stop reason: HOSPADM

## 2018-05-27 RX ORDER — KETOROLAC TROMETHAMINE 30 MG/ML
15 INJECTION, SOLUTION INTRAMUSCULAR; INTRAVENOUS EVERY 6 HOURS PRN
Status: DISCONTINUED | OUTPATIENT
Start: 2018-05-27 | End: 2018-05-27

## 2018-05-27 RX ORDER — KETOROLAC TROMETHAMINE 30 MG/ML
15 INJECTION, SOLUTION INTRAMUSCULAR; INTRAVENOUS EVERY 6 HOURS PRN
Status: COMPLETED | OUTPATIENT
Start: 2018-05-27 | End: 2018-05-29

## 2018-05-27 RX ORDER — GABAPENTIN 300 MG/1
600 CAPSULE ORAL EVERY 8 HOURS SCHEDULED
Status: DISCONTINUED | OUTPATIENT
Start: 2018-05-27 | End: 2018-06-01 | Stop reason: HOSPADM

## 2018-05-27 RX ORDER — LEVOTHYROXINE SODIUM 0.05 MG/1
50 TABLET ORAL DAILY
Status: DISCONTINUED | OUTPATIENT
Start: 2018-05-27 | End: 2018-06-01 | Stop reason: HOSPADM

## 2018-05-27 RX ORDER — OXYCODONE HYDROCHLORIDE AND ACETAMINOPHEN 5; 325 MG/1; MG/1
1 TABLET ORAL EVERY 6 HOURS PRN
Status: DISCONTINUED | OUTPATIENT
Start: 2018-05-27 | End: 2018-06-01 | Stop reason: HOSPADM

## 2018-05-27 RX ORDER — MORPHINE SULFATE 2 MG/ML
2 INJECTION, SOLUTION INTRAMUSCULAR; INTRAVENOUS ONCE
Status: COMPLETED | OUTPATIENT
Start: 2018-05-27 | End: 2018-05-27

## 2018-05-27 RX ORDER — AMLODIPINE BESYLATE 5 MG/1
2.5 TABLET ORAL DAILY
Status: CANCELLED | OUTPATIENT
Start: 2018-05-27

## 2018-05-27 RX ORDER — HYDROMORPHONE HYDROCHLORIDE 1 MG/ML
0.5 INJECTION, SOLUTION INTRAMUSCULAR; INTRAVENOUS; SUBCUTANEOUS
Status: DISCONTINUED | OUTPATIENT
Start: 2018-05-27 | End: 2018-06-01 | Stop reason: HOSPADM

## 2018-05-27 RX ORDER — SODIUM CHLORIDE 0.9 % (FLUSH) 0.9 %
10 SYRINGE (ML) INJECTION AS NEEDED
Status: DISCONTINUED | OUTPATIENT
Start: 2018-05-27 | End: 2018-06-01 | Stop reason: HOSPADM

## 2018-05-27 RX ORDER — DOCUSATE SODIUM 100 MG/1
100 CAPSULE, LIQUID FILLED ORAL DAILY
Status: DISCONTINUED | OUTPATIENT
Start: 2018-05-27 | End: 2018-06-01 | Stop reason: HOSPADM

## 2018-05-27 RX ORDER — FAMOTIDINE 20 MG/1
40 TABLET, FILM COATED ORAL DAILY
Status: DISCONTINUED | OUTPATIENT
Start: 2018-05-27 | End: 2018-06-01 | Stop reason: HOSPADM

## 2018-05-27 RX ORDER — SODIUM CHLORIDE 9 MG/ML
75 INJECTION, SOLUTION INTRAVENOUS CONTINUOUS
Status: DISCONTINUED | OUTPATIENT
Start: 2018-05-27 | End: 2018-05-31

## 2018-05-27 RX ORDER — LORAZEPAM 1 MG/1
1 TABLET ORAL EVERY 8 HOURS PRN
Status: DISCONTINUED | OUTPATIENT
Start: 2018-05-27 | End: 2018-06-01 | Stop reason: HOSPADM

## 2018-05-27 RX ORDER — SENNA PLUS 8.6 MG/1
1 TABLET ORAL DAILY
Status: DISCONTINUED | OUTPATIENT
Start: 2018-05-27 | End: 2018-06-01 | Stop reason: HOSPADM

## 2018-05-27 RX ADMIN — GABAPENTIN 600 MG: 300 CAPSULE ORAL at 13:49

## 2018-05-27 RX ADMIN — MORPHINE SULFATE 2 MG: 2 INJECTION, SOLUTION INTRAMUSCULAR; INTRAVENOUS at 04:41

## 2018-05-27 RX ADMIN — METOPROLOL TARTRATE 25 MG: 25 TABLET, FILM COATED ORAL at 20:46

## 2018-05-27 RX ADMIN — GABAPENTIN 600 MG: 300 CAPSULE ORAL at 20:46

## 2018-05-27 RX ADMIN — Medication 1 TABLET: at 13:48

## 2018-05-27 RX ADMIN — MORPHINE SULFATE 2 MG: 2 INJECTION, SOLUTION INTRAMUSCULAR; INTRAVENOUS at 20:46

## 2018-05-27 RX ADMIN — HYDROMORPHONE HYDROCHLORIDE 0.5 MG: 2 INJECTION INTRAMUSCULAR; INTRAVENOUS; SUBCUTANEOUS at 22:59

## 2018-05-27 RX ADMIN — KETOROLAC TROMETHAMINE 15 MG: 30 INJECTION, SOLUTION INTRAMUSCULAR; INTRAVENOUS at 15:56

## 2018-05-27 RX ADMIN — SODIUM CHLORIDE 75 ML/HR: 9 INJECTION, SOLUTION INTRAVENOUS at 11:20

## 2018-05-27 RX ADMIN — DOCUSATE SODIUM 100 MG: 100 CAPSULE, LIQUID FILLED ORAL at 11:18

## 2018-05-27 RX ADMIN — MORPHINE SULFATE 3 MG: 4 INJECTION, SOLUTION INTRAMUSCULAR; INTRAVENOUS at 05:52

## 2018-05-27 RX ADMIN — METOPROLOL TARTRATE 25 MG: 25 TABLET, FILM COATED ORAL at 11:18

## 2018-05-27 RX ADMIN — NICOTINE 1 PATCH: 7 PATCH, EXTENDED RELEASE TRANSDERMAL at 11:19

## 2018-05-27 RX ADMIN — OXYCODONE HYDROCHLORIDE AND ACETAMINOPHEN 1 TABLET: 5; 325 TABLET ORAL at 11:13

## 2018-05-27 RX ADMIN — Medication 1 TABLET: at 20:46

## 2018-05-27 RX ADMIN — MORPHINE SULFATE 2 MG: 2 INJECTION, SOLUTION INTRAMUSCULAR; INTRAVENOUS at 13:42

## 2018-05-27 RX ADMIN — CETIRIZINE HYDROCHLORIDE 5 MG: 10 TABLET ORAL at 11:19

## 2018-05-27 RX ADMIN — SODIUM CHLORIDE 75 ML/HR: 9 INJECTION, SOLUTION INTRAVENOUS at 22:59

## 2018-05-27 RX ADMIN — FAMOTIDINE 40 MG: 20 TABLET, FILM COATED ORAL at 11:16

## 2018-05-27 RX ADMIN — GABAPENTIN 600 MG: 300 CAPSULE ORAL at 11:19

## 2018-05-27 RX ADMIN — LEVOTHYROXINE SODIUM 50 MCG: 50 TABLET ORAL at 11:18

## 2018-05-27 RX ADMIN — ONDANSETRON 4 MG: 2 INJECTION, SOLUTION INTRAMUSCULAR; INTRAVENOUS at 04:41

## 2018-05-27 RX ADMIN — OXYCODONE HYDROCHLORIDE AND ACETAMINOPHEN 1 TABLET: 5; 325 TABLET ORAL at 17:34

## 2018-05-27 RX ADMIN — SENNOSIDES 1 TABLET: 8.6 TABLET, FILM COATED ORAL at 11:15

## 2018-05-28 LAB
ANION GAP SERPL CALCULATED.3IONS-SCNC: 3.1 MMOL/L (ref 3.6–11.2)
BASOPHILS # BLD AUTO: 0.03 10*3/MM3 (ref 0–0.3)
BASOPHILS NFR BLD AUTO: 0.4 % (ref 0–2)
BUN BLD-MCNC: 15 MG/DL (ref 7–21)
BUN/CREAT SERPL: 13.6 (ref 7–25)
CALCIUM SPEC-SCNC: 8.6 MG/DL (ref 7.7–10)
CHLORIDE SERPL-SCNC: 107 MMOL/L (ref 99–112)
CO2 SERPL-SCNC: 24.9 MMOL/L (ref 24.3–31.9)
CREAT BLD-MCNC: 1.1 MG/DL (ref 0.43–1.29)
DEPRECATED RDW RBC AUTO: 44.1 FL (ref 37–54)
EOSINOPHIL # BLD AUTO: 0.55 10*3/MM3 (ref 0–0.7)
EOSINOPHIL NFR BLD AUTO: 7.2 % (ref 0–7)
ERYTHROCYTE [DISTWIDTH] IN BLOOD BY AUTOMATED COUNT: 13.4 % (ref 11.5–14.5)
GFR SERPL CREATININE-BSD FRML MDRD: 48 ML/MIN/1.73
GLUCOSE BLD-MCNC: 97 MG/DL (ref 70–110)
GLUCOSE BLDC GLUCOMTR-MCNC: 107 MG/DL (ref 70–130)
GLUCOSE BLDC GLUCOMTR-MCNC: 159 MG/DL (ref 70–130)
GLUCOSE BLDC GLUCOMTR-MCNC: 96 MG/DL (ref 70–130)
HCT VFR BLD AUTO: 35.1 % (ref 37–47)
HGB BLD-MCNC: 11.6 G/DL (ref 12–16)
IMM GRANULOCYTES # BLD: 0.01 10*3/MM3 (ref 0–0.03)
IMM GRANULOCYTES NFR BLD: 0.1 % (ref 0–0.5)
LYMPHOCYTES # BLD AUTO: 1.9 10*3/MM3 (ref 1–3)
LYMPHOCYTES NFR BLD AUTO: 24.7 % (ref 16–46)
MCH RBC QN AUTO: 31.2 PG (ref 27–33)
MCHC RBC AUTO-ENTMCNC: 33 G/DL (ref 33–37)
MCV RBC AUTO: 94.4 FL (ref 80–94)
MONOCYTES # BLD AUTO: 0.87 10*3/MM3 (ref 0.1–0.9)
MONOCYTES NFR BLD AUTO: 11.3 % (ref 0–12)
NEUTROPHILS # BLD AUTO: 4.33 10*3/MM3 (ref 1.4–6.5)
NEUTROPHILS NFR BLD AUTO: 56.3 % (ref 40–75)
OSMOLALITY SERPL CALC.SUM OF ELEC: 270.8 MOSM/KG (ref 273–305)
PLATELET # BLD AUTO: 195 10*3/MM3 (ref 130–400)
PMV BLD AUTO: 10.1 FL (ref 6–10)
POTASSIUM BLD-SCNC: 4 MMOL/L (ref 3.5–5.3)
RBC # BLD AUTO: 3.72 10*6/MM3 (ref 4.2–5.4)
SODIUM BLD-SCNC: 135 MMOL/L (ref 135–153)
WBC NRBC COR # BLD: 7.69 10*3/MM3 (ref 4.5–12.5)

## 2018-05-28 PROCEDURE — 80048 BASIC METABOLIC PNL TOTAL CA: CPT | Performed by: INTERNAL MEDICINE

## 2018-05-28 PROCEDURE — 82962 GLUCOSE BLOOD TEST: CPT

## 2018-05-28 PROCEDURE — 25010000002 MORPHINE PER 10 MG: Performed by: INTERNAL MEDICINE

## 2018-05-28 PROCEDURE — 94799 UNLISTED PULMONARY SVC/PX: CPT

## 2018-05-28 PROCEDURE — 25010000002 KETOROLAC TROMETHAMINE PER 15 MG: Performed by: INTERNAL MEDICINE

## 2018-05-28 PROCEDURE — 99232 SBSQ HOSP IP/OBS MODERATE 35: CPT | Performed by: INTERNAL MEDICINE

## 2018-05-28 PROCEDURE — 25010000002 HYDROMORPHONE PER 4 MG

## 2018-05-28 PROCEDURE — 85025 COMPLETE CBC W/AUTO DIFF WBC: CPT | Performed by: INTERNAL MEDICINE

## 2018-05-28 RX ORDER — HYDROMORPHONE HCL 110MG/55ML
PATIENT CONTROLLED ANALGESIA SYRINGE INTRAVENOUS
Status: COMPLETED
Start: 2018-05-28 | End: 2018-05-28

## 2018-05-28 RX ORDER — HYDROMORPHONE HCL 110MG/55ML
PATIENT CONTROLLED ANALGESIA SYRINGE INTRAVENOUS
Status: DISCONTINUED
Start: 2018-05-28 | End: 2018-05-28 | Stop reason: WASHOUT

## 2018-05-28 RX ADMIN — METOPROLOL TARTRATE 25 MG: 25 TABLET, FILM COATED ORAL at 20:50

## 2018-05-28 RX ADMIN — SENNOSIDES 1 TABLET: 8.6 TABLET, FILM COATED ORAL at 08:39

## 2018-05-28 RX ADMIN — HYDROMORPHONE HYDROCHLORIDE 2 MG: 2 INJECTION INTRAMUSCULAR; INTRAVENOUS; SUBCUTANEOUS at 11:02

## 2018-05-28 RX ADMIN — OXYCODONE HYDROCHLORIDE AND ACETAMINOPHEN 1 TABLET: 5; 325 TABLET ORAL at 20:50

## 2018-05-28 RX ADMIN — Medication 1 TABLET: at 20:50

## 2018-05-28 RX ADMIN — FAMOTIDINE 40 MG: 20 TABLET, FILM COATED ORAL at 08:39

## 2018-05-28 RX ADMIN — METOPROLOL TARTRATE 25 MG: 25 TABLET, FILM COATED ORAL at 08:39

## 2018-05-28 RX ADMIN — GABAPENTIN 600 MG: 300 CAPSULE ORAL at 20:50

## 2018-05-28 RX ADMIN — MORPHINE SULFATE 2 MG: 2 INJECTION, SOLUTION INTRAMUSCULAR; INTRAVENOUS at 04:20

## 2018-05-28 RX ADMIN — OXYCODONE HYDROCHLORIDE AND ACETAMINOPHEN 1 TABLET: 5; 325 TABLET ORAL at 14:53

## 2018-05-28 RX ADMIN — DOCUSATE SODIUM 100 MG: 100 CAPSULE, LIQUID FILLED ORAL at 08:39

## 2018-05-28 RX ADMIN — CETIRIZINE HYDROCHLORIDE 5 MG: 10 TABLET ORAL at 08:39

## 2018-05-28 RX ADMIN — Medication 1 TABLET: at 08:40

## 2018-05-28 RX ADMIN — GABAPENTIN 600 MG: 300 CAPSULE ORAL at 13:16

## 2018-05-28 RX ADMIN — HYDROMORPHONE HYDROCHLORIDE 0.5 MG: 2 INJECTION INTRAMUSCULAR; INTRAVENOUS; SUBCUTANEOUS at 05:42

## 2018-05-28 RX ADMIN — SODIUM CHLORIDE 75 ML/HR: 9 INJECTION, SOLUTION INTRAVENOUS at 11:04

## 2018-05-28 RX ADMIN — LORAZEPAM 1 MG: 1 TABLET ORAL at 20:50

## 2018-05-28 RX ADMIN — NICOTINE 1 PATCH: 7 PATCH, EXTENDED RELEASE TRANSDERMAL at 09:00

## 2018-05-28 RX ADMIN — KETOROLAC TROMETHAMINE 15 MG: 30 INJECTION, SOLUTION INTRAMUSCULAR; INTRAVENOUS at 18:20

## 2018-05-28 RX ADMIN — KETOROLAC TROMETHAMINE 15 MG: 30 INJECTION, SOLUTION INTRAMUSCULAR; INTRAVENOUS at 08:40

## 2018-05-28 RX ADMIN — LEVOTHYROXINE SODIUM 50 MCG: 50 TABLET ORAL at 08:39

## 2018-05-29 ENCOUNTER — ANESTHESIA EVENT (OUTPATIENT)
Dept: PERIOP | Facility: HOSPITAL | Age: 78
End: 2018-05-29

## 2018-05-29 ENCOUNTER — APPOINTMENT (OUTPATIENT)
Dept: GENERAL RADIOLOGY | Facility: HOSPITAL | Age: 78
End: 2018-05-29

## 2018-05-29 ENCOUNTER — ANESTHESIA (OUTPATIENT)
Dept: PERIOP | Facility: HOSPITAL | Age: 78
End: 2018-05-29

## 2018-05-29 PROCEDURE — 99232 SBSQ HOSP IP/OBS MODERATE 35: CPT | Performed by: INTERNAL MEDICINE

## 2018-05-29 PROCEDURE — 25010000002 HYDROMORPHONE PER 4 MG: Performed by: NURSE ANESTHETIST, CERTIFIED REGISTERED

## 2018-05-29 PROCEDURE — 72170 X-RAY EXAM OF PELVIS: CPT

## 2018-05-29 PROCEDURE — C1776 JOINT DEVICE (IMPLANTABLE): HCPCS | Performed by: ORTHOPAEDIC SURGERY

## 2018-05-29 PROCEDURE — 25010000002 FENTANYL CITRATE (PF) 100 MCG/2ML SOLUTION: Performed by: NURSE ANESTHETIST, CERTIFIED REGISTERED

## 2018-05-29 PROCEDURE — 87040 BLOOD CULTURE FOR BACTERIA: CPT | Performed by: PHYSICIAN ASSISTANT

## 2018-05-29 PROCEDURE — 0SRS0JA REPLACEMENT OF LEFT HIP JOINT, FEMORAL SURFACE WITH SYNTHETIC SUBSTITUTE, UNCEMENTED, OPEN APPROACH: ICD-10-PCS | Performed by: ORTHOPAEDIC SURGERY

## 2018-05-29 PROCEDURE — 25010000002 ONDANSETRON PER 1 MG: Performed by: NURSE ANESTHETIST, CERTIFIED REGISTERED

## 2018-05-29 PROCEDURE — 25010000002 CEFTRIAXONE: Performed by: INTERNAL MEDICINE

## 2018-05-29 PROCEDURE — 25010000002 PROPOFOL 10 MG/ML EMULSION: Performed by: NURSE ANESTHETIST, CERTIFIED REGISTERED

## 2018-05-29 PROCEDURE — 25010000002 KETOROLAC TROMETHAMINE PER 15 MG: Performed by: INTERNAL MEDICINE

## 2018-05-29 PROCEDURE — 25010000002 MORPHINE PER 10 MG: Performed by: NURSE ANESTHETIST, CERTIFIED REGISTERED

## 2018-05-29 PROCEDURE — 72170 X-RAY EXAM OF PELVIS: CPT | Performed by: RADIOLOGY

## 2018-05-29 PROCEDURE — 25010000002 MORPHINE PER 10 MG: Performed by: INTERNAL MEDICINE

## 2018-05-29 PROCEDURE — 94799 UNLISTED PULMONARY SVC/PX: CPT

## 2018-05-29 DEVICE — SELF CENTERING BI-POLAR HEAD 28MM ID 47MM OD
Type: IMPLANTABLE DEVICE | Site: HIP | Status: FUNCTIONAL
Brand: SELF CENTERING

## 2018-05-29 DEVICE — TRI-LOCK BPS FEMORAL STEM 12/14 TAPER TRI-LOCK BPS W/GRIPTION SIZE 4 STD 103MM
Type: IMPLANTABLE DEVICE | Site: HIP | Status: FUNCTIONAL
Brand: TRI-LOCK GRIPTION

## 2018-05-29 DEVICE — PRT HIP BIPOL PRIM DEPUY 9525109/9525107: Type: IMPLANTABLE DEVICE | Site: HIP | Status: FUNCTIONAL

## 2018-05-29 DEVICE — ARTICUL/EZE FEMORAL HEAD DIAMETER 28MM +5 12/14 TAPER
Type: IMPLANTABLE DEVICE | Site: HIP | Status: FUNCTIONAL
Brand: ARTICUL/EZE

## 2018-05-29 RX ORDER — ONDANSETRON 2 MG/ML
4 INJECTION INTRAMUSCULAR; INTRAVENOUS ONCE AS NEEDED
Status: DISCONTINUED | OUTPATIENT
Start: 2018-05-29 | End: 2018-05-29 | Stop reason: HOSPADM

## 2018-05-29 RX ORDER — IPRATROPIUM BROMIDE AND ALBUTEROL SULFATE 2.5; .5 MG/3ML; MG/3ML
3 SOLUTION RESPIRATORY (INHALATION) ONCE AS NEEDED
Status: DISCONTINUED | OUTPATIENT
Start: 2018-05-29 | End: 2018-05-29 | Stop reason: HOSPADM

## 2018-05-29 RX ORDER — SODIUM CHLORIDE 0.9 % (FLUSH) 0.9 %
1-10 SYRINGE (ML) INJECTION AS NEEDED
Status: DISCONTINUED | OUTPATIENT
Start: 2018-05-29 | End: 2018-05-29 | Stop reason: HOSPADM

## 2018-05-29 RX ORDER — ONDANSETRON 2 MG/ML
INJECTION INTRAMUSCULAR; INTRAVENOUS AS NEEDED
Status: DISCONTINUED | OUTPATIENT
Start: 2018-05-29 | End: 2018-05-29 | Stop reason: SURG

## 2018-05-29 RX ORDER — SODIUM CHLORIDE, SODIUM LACTATE, POTASSIUM CHLORIDE, CALCIUM CHLORIDE 600; 310; 30; 20 MG/100ML; MG/100ML; MG/100ML; MG/100ML
125 INJECTION, SOLUTION INTRAVENOUS CONTINUOUS
Status: DISCONTINUED | OUTPATIENT
Start: 2018-05-29 | End: 2018-05-29

## 2018-05-29 RX ORDER — FENTANYL CITRATE 50 UG/ML
INJECTION, SOLUTION INTRAMUSCULAR; INTRAVENOUS AS NEEDED
Status: DISCONTINUED | OUTPATIENT
Start: 2018-05-29 | End: 2018-05-29 | Stop reason: SURG

## 2018-05-29 RX ORDER — FENTANYL CITRATE 50 UG/ML
100 INJECTION, SOLUTION INTRAMUSCULAR; INTRAVENOUS
Status: DISCONTINUED | OUTPATIENT
Start: 2018-05-29 | End: 2018-05-31

## 2018-05-29 RX ORDER — FAMOTIDINE 10 MG/ML
INJECTION, SOLUTION INTRAVENOUS AS NEEDED
Status: DISCONTINUED | OUTPATIENT
Start: 2018-05-29 | End: 2018-05-29 | Stop reason: SURG

## 2018-05-29 RX ORDER — MORPHINE SULFATE 1 MG/ML
INJECTION, SOLUTION EPIDURAL; INTRATHECAL; INTRAVENOUS AS NEEDED
Status: DISCONTINUED | OUTPATIENT
Start: 2018-05-29 | End: 2018-05-29 | Stop reason: SURG

## 2018-05-29 RX ORDER — HYDROMORPHONE HCL 110MG/55ML
PATIENT CONTROLLED ANALGESIA SYRINGE INTRAVENOUS AS NEEDED
Status: DISCONTINUED | OUTPATIENT
Start: 2018-05-29 | End: 2018-05-29 | Stop reason: SURG

## 2018-05-29 RX ORDER — ACETAMINOPHEN 325 MG/1
650 TABLET ORAL ONCE
Status: COMPLETED | OUTPATIENT
Start: 2018-05-29 | End: 2018-05-29

## 2018-05-29 RX ORDER — MAGNESIUM HYDROXIDE 1200 MG/15ML
LIQUID ORAL AS NEEDED
Status: DISCONTINUED | OUTPATIENT
Start: 2018-05-29 | End: 2018-05-29 | Stop reason: HOSPADM

## 2018-05-29 RX ORDER — PROPOFOL 10 MG/ML
VIAL (ML) INTRAVENOUS AS NEEDED
Status: DISCONTINUED | OUTPATIENT
Start: 2018-05-29 | End: 2018-05-29 | Stop reason: SURG

## 2018-05-29 RX ADMIN — LORAZEPAM 1 MG: 1 TABLET ORAL at 19:37

## 2018-05-29 RX ADMIN — HYDROMORPHONE HYDROCHLORIDE 1 MG: 2 INJECTION, SOLUTION INTRAMUSCULAR; INTRAVENOUS; SUBCUTANEOUS at 13:40

## 2018-05-29 RX ADMIN — DOXYCYCLINE 100 MG: 100 INJECTION, POWDER, LYOPHILIZED, FOR SOLUTION INTRAVENOUS at 20:53

## 2018-05-29 RX ADMIN — LEVOTHYROXINE SODIUM 50 MCG: 50 TABLET ORAL at 08:36

## 2018-05-29 RX ADMIN — SENNOSIDES 1 TABLET: 8.6 TABLET, FILM COATED ORAL at 14:37

## 2018-05-29 RX ADMIN — SODIUM CHLORIDE 75 ML/HR: 9 INJECTION, SOLUTION INTRAVENOUS at 03:00

## 2018-05-29 RX ADMIN — DOCUSATE SODIUM 100 MG: 100 CAPSULE, LIQUID FILLED ORAL at 14:37

## 2018-05-29 RX ADMIN — SODIUM CHLORIDE, POTASSIUM CHLORIDE, SODIUM LACTATE AND CALCIUM CHLORIDE: 600; 310; 30; 20 INJECTION, SOLUTION INTRAVENOUS at 11:35

## 2018-05-29 RX ADMIN — Medication 1 TABLET: at 08:34

## 2018-05-29 RX ADMIN — PROPOFOL 70 MG: 10 INJECTION, EMULSION INTRAVENOUS at 11:43

## 2018-05-29 RX ADMIN — METOPROLOL TARTRATE 25 MG: 25 TABLET, FILM COATED ORAL at 08:36

## 2018-05-29 RX ADMIN — FENTANYL CITRATE 50 MCG: 50 INJECTION INTRAMUSCULAR; INTRAVENOUS at 11:52

## 2018-05-29 RX ADMIN — ACETAMINOPHEN 650 MG: 325 TABLET, FILM COATED ORAL at 19:37

## 2018-05-29 RX ADMIN — Medication 10 ML: at 19:38

## 2018-05-29 RX ADMIN — ONDANSETRON 4 MG: 2 INJECTION, SOLUTION INTRAMUSCULAR; INTRAVENOUS at 13:17

## 2018-05-29 RX ADMIN — MORPHINE SULFATE 3 MG: 1 INJECTION, SOLUTION EPIDURAL; INTRATHECAL; INTRAVENOUS at 12:18

## 2018-05-29 RX ADMIN — MORPHINE SULFATE 1 MG: 1 INJECTION, SOLUTION EPIDURAL; INTRATHECAL; INTRAVENOUS at 12:28

## 2018-05-29 RX ADMIN — MORPHINE SULFATE 2 MG: 2 INJECTION, SOLUTION INTRAMUSCULAR; INTRAVENOUS at 19:38

## 2018-05-29 RX ADMIN — MORPHINE SULFATE 2 MG: 2 INJECTION, SOLUTION INTRAMUSCULAR; INTRAVENOUS at 06:26

## 2018-05-29 RX ADMIN — Medication 1 TABLET: at 17:23

## 2018-05-29 RX ADMIN — GABAPENTIN 600 MG: 300 CAPSULE ORAL at 14:39

## 2018-05-29 RX ADMIN — CETIRIZINE HYDROCHLORIDE 5 MG: 10 TABLET ORAL at 14:38

## 2018-05-29 RX ADMIN — FENTANYL CITRATE 100 MCG: 50 INJECTION INTRAMUSCULAR; INTRAVENOUS at 13:45

## 2018-05-29 RX ADMIN — SODIUM CHLORIDE, POTASSIUM CHLORIDE, SODIUM LACTATE AND CALCIUM CHLORIDE: 600; 310; 30; 20 INJECTION, SOLUTION INTRAVENOUS at 13:17

## 2018-05-29 RX ADMIN — CEFTRIAXONE 1 G: 1 INJECTION, POWDER, FOR SOLUTION INTRAMUSCULAR; INTRAVENOUS at 20:50

## 2018-05-29 RX ADMIN — FAMOTIDINE 20 MG: 10 INJECTION, SOLUTION INTRAVENOUS at 13:17

## 2018-05-29 RX ADMIN — FAMOTIDINE 40 MG: 20 TABLET, FILM COATED ORAL at 14:38

## 2018-05-29 RX ADMIN — FENTANYL CITRATE 50 MCG: 50 INJECTION INTRAMUSCULAR; INTRAVENOUS at 12:01

## 2018-05-29 RX ADMIN — KETOROLAC TROMETHAMINE 15 MG: 30 INJECTION, SOLUTION INTRAMUSCULAR; INTRAVENOUS at 08:43

## 2018-05-29 RX ADMIN — NICOTINE 1 PATCH: 7 PATCH, EXTENDED RELEASE TRANSDERMAL at 14:39

## 2018-05-29 RX ADMIN — CEFAZOLIN 1 G: 1 INJECTION, POWDER, FOR SOLUTION INTRAMUSCULAR; INTRAVENOUS; PARENTERAL at 11:41

## 2018-05-29 NOTE — ANESTHESIA PROCEDURE NOTES
Airway  Urgency: elective    Date/Time: 5/29/2018 12:01 PM  Airway not difficult    General Information and Staff    Patient location during procedure: OR  Anesthesiologist: JONATHAN RAMIREZ  CRNA: MAYRA FULLER    Indications and Patient Condition  Indications for airway management: airway protection    Preoxygenated: yes  Mask difficulty assessment: 0 - not attempted    Final Airway Details  Final airway type: supraglottic airway      Successful airway: unique  Size 4    Number of attempts at approach: 1    Additional Comments  No trauma or change to dentition

## 2018-05-29 NOTE — ANESTHESIA PREPROCEDURE EVALUATION
Anesthesia Evaluation     Patient summary reviewed and Nursing notes reviewed   no history of anesthetic complications:  NPO Solid Status: > 8 hours  NPO Liquid Status: > 8 hours           Airway   Mallampati: II  TM distance: >3 FB  Neck ROM: full  no difficulty expected  Dental - normal exam   (+) edentulous    Pulmonary - normal exam   (+) a smoker Former,   (-) asthma  Cardiovascular - normal exam  Exercise tolerance: good (4-7 METS)    NYHA Classification: II  PT is on anticoagulation therapy  Patient on routine beta blocker and Beta blocker given within 24 hours of surgery    (+) hypertension, CAD, dysrhythmias Atrial Fib,   (-) past MI, angina      Neuro/Psych  (+) psychiatric history Anxiety,     (-) seizures  GI/Hepatic/Renal/Endo    (+)  GERD,  hypothyroidism,   (-) liver disease, no renal disease, diabetes    Musculoskeletal (-) negative ROS    Abdominal  - normal exam    Bowel sounds: normal.   Substance History - negative use     OB/GYN negative ob/gyn ROS         Other - negative ROS                     Anesthesia Plan    ASA 3     general     intravenous induction   Anesthetic plan and risks discussed with patient.  Use of blood products discussed with patient  Consented to blood products.

## 2018-05-29 NOTE — ANESTHESIA POSTPROCEDURE EVALUATION
Patient: Anette Dunham    Procedure Summary     Date:  05/29/18 Room / Location:  [facility] OR 01 / [facility] OR    Anesthesia Start:  1139 Anesthesia Stop:  1324    Procedure:  LEFT HIP BIPOLAR HEMIARTHROPLASTY (Left Hip) Diagnosis:       Closed left hip fracture, initial encounter      (Closed left hip fracture, initial encounter [S72.002A])    Surgeon:  Trevon Arriaga MD Provider:  Martin Doshi MD    Anesthesia Type:  general ASA Status:  3          Anesthesia Type: general  Last vitals  BP   140/84 (05/29/18 1325)   Temp   98 °F (36.7 °C) (05/29/18 1325)   Pulse   68 (05/29/18 1325)   Resp   12 (05/29/18 1325)     SpO2   97 % (05/29/18 1325)     Post Anesthesia Care and Evaluation    Patient location during evaluation: PACU  Patient participation: complete - patient participated  Level of consciousness: awake and alert  Pain score: 1  Pain management: adequate  Airway patency: patent  Anesthetic complications: No anesthetic complications  PONV Status: controlled  Cardiovascular status: acceptable  Respiratory status: acceptable  Hydration status: acceptable

## 2018-05-30 ENCOUNTER — APPOINTMENT (OUTPATIENT)
Dept: GENERAL RADIOLOGY | Facility: HOSPITAL | Age: 78
End: 2018-05-30

## 2018-05-30 LAB
ALBUMIN SERPL-MCNC: 3 G/DL (ref 3.4–4.8)
ALBUMIN/GLOB SERPL: 1 G/DL (ref 1.5–2.5)
ALP SERPL-CCNC: 49 U/L (ref 35–104)
ALT SERPL W P-5'-P-CCNC: 19 U/L (ref 10–36)
ANION GAP SERPL CALCULATED.3IONS-SCNC: 5 MMOL/L (ref 3.6–11.2)
AST SERPL-CCNC: 38 U/L (ref 10–30)
BASOPHILS # BLD AUTO: 0.04 10*3/MM3 (ref 0–0.3)
BASOPHILS NFR BLD AUTO: 0.4 % (ref 0–2)
BILIRUB SERPL-MCNC: 0.3 MG/DL (ref 0.2–1.8)
BUN BLD-MCNC: 15 MG/DL (ref 7–21)
BUN/CREAT SERPL: 12.8 (ref 7–25)
CALCIUM SPEC-SCNC: 7.7 MG/DL (ref 7.7–10)
CHLORIDE SERPL-SCNC: 106 MMOL/L (ref 99–112)
CO2 SERPL-SCNC: 25 MMOL/L (ref 24.3–31.9)
CREAT BLD-MCNC: 1.17 MG/DL (ref 0.43–1.29)
DEPRECATED RDW RBC AUTO: 44.8 FL (ref 37–54)
EOSINOPHIL # BLD AUTO: 0.44 10*3/MM3 (ref 0–0.7)
EOSINOPHIL NFR BLD AUTO: 4.3 % (ref 0–7)
ERYTHROCYTE [DISTWIDTH] IN BLOOD BY AUTOMATED COUNT: 13.5 % (ref 11.5–14.5)
GFR SERPL CREATININE-BSD FRML MDRD: 45 ML/MIN/1.73
GLOBULIN UR ELPH-MCNC: 3.1 GM/DL
GLUCOSE BLD-MCNC: 131 MG/DL (ref 70–110)
HCT VFR BLD AUTO: 30.6 % (ref 37–47)
HGB BLD-MCNC: 9.8 G/DL (ref 12–16)
IMM GRANULOCYTES # BLD: 0.02 10*3/MM3 (ref 0–0.03)
IMM GRANULOCYTES NFR BLD: 0.2 % (ref 0–0.5)
LYMPHOCYTES # BLD AUTO: 2.82 10*3/MM3 (ref 1–3)
LYMPHOCYTES NFR BLD AUTO: 27.6 % (ref 16–46)
M PNEUMO IGM SER QL: NEGATIVE
MCH RBC QN AUTO: 30.9 PG (ref 27–33)
MCHC RBC AUTO-ENTMCNC: 32 G/DL (ref 33–37)
MCV RBC AUTO: 96.5 FL (ref 80–94)
MONOCYTES # BLD AUTO: 1.54 10*3/MM3 (ref 0.1–0.9)
MONOCYTES NFR BLD AUTO: 15.1 % (ref 0–12)
NEUTROPHILS # BLD AUTO: 5.37 10*3/MM3 (ref 1.4–6.5)
NEUTROPHILS NFR BLD AUTO: 52.4 % (ref 40–75)
OSMOLALITY SERPL CALC.SUM OF ELEC: 274.6 MOSM/KG (ref 273–305)
PLATELET # BLD AUTO: 182 10*3/MM3 (ref 130–400)
PMV BLD AUTO: 10.5 FL (ref 6–10)
POTASSIUM BLD-SCNC: 4.3 MMOL/L (ref 3.5–5.3)
PROT SERPL-MCNC: 6.1 G/DL (ref 6–8)
RBC # BLD AUTO: 3.17 10*6/MM3 (ref 4.2–5.4)
SODIUM BLD-SCNC: 136 MMOL/L (ref 135–153)
WBC NRBC COR # BLD: 10.23 10*3/MM3 (ref 4.5–12.5)

## 2018-05-30 PROCEDURE — 93010 ELECTROCARDIOGRAM REPORT: CPT | Performed by: INTERNAL MEDICINE

## 2018-05-30 PROCEDURE — G8979 MOBILITY GOAL STATUS: HCPCS

## 2018-05-30 PROCEDURE — 71045 X-RAY EXAM CHEST 1 VIEW: CPT | Performed by: RADIOLOGY

## 2018-05-30 PROCEDURE — 97110 THERAPEUTIC EXERCISES: CPT

## 2018-05-30 PROCEDURE — 93005 ELECTROCARDIOGRAM TRACING: CPT | Performed by: INTERNAL MEDICINE

## 2018-05-30 PROCEDURE — 86738 MYCOPLASMA ANTIBODY: CPT | Performed by: INTERNAL MEDICINE

## 2018-05-30 PROCEDURE — 71045 X-RAY EXAM CHEST 1 VIEW: CPT

## 2018-05-30 PROCEDURE — 99232 SBSQ HOSP IP/OBS MODERATE 35: CPT | Performed by: INTERNAL MEDICINE

## 2018-05-30 PROCEDURE — G8978 MOBILITY CURRENT STATUS: HCPCS

## 2018-05-30 PROCEDURE — 25010000002 CEFTRIAXONE: Performed by: INTERNAL MEDICINE

## 2018-05-30 PROCEDURE — 80053 COMPREHEN METABOLIC PANEL: CPT | Performed by: INTERNAL MEDICINE

## 2018-05-30 PROCEDURE — 97163 PT EVAL HIGH COMPLEX 45 MIN: CPT

## 2018-05-30 PROCEDURE — 97116 GAIT TRAINING THERAPY: CPT

## 2018-05-30 PROCEDURE — 25010000002 MORPHINE PER 10 MG: Performed by: INTERNAL MEDICINE

## 2018-05-30 PROCEDURE — 94799 UNLISTED PULMONARY SVC/PX: CPT

## 2018-05-30 PROCEDURE — 85025 COMPLETE CBC W/AUTO DIFF WBC: CPT | Performed by: INTERNAL MEDICINE

## 2018-05-30 RX ORDER — ACETAMINOPHEN 325 MG/1
650 TABLET ORAL EVERY 6 HOURS PRN
Status: DISCONTINUED | OUTPATIENT
Start: 2018-05-30 | End: 2018-06-01 | Stop reason: HOSPADM

## 2018-05-30 RX ORDER — L.ACID,PARA/B.BIFIDUM/S.THERM 8B CELL
1 CAPSULE ORAL DAILY
Status: DISCONTINUED | OUTPATIENT
Start: 2018-05-30 | End: 2018-06-01 | Stop reason: HOSPADM

## 2018-05-30 RX ADMIN — DOCUSATE SODIUM 100 MG: 100 CAPSULE, LIQUID FILLED ORAL at 09:32

## 2018-05-30 RX ADMIN — CETIRIZINE HYDROCHLORIDE 5 MG: 10 TABLET ORAL at 09:31

## 2018-05-30 RX ADMIN — LEVOTHYROXINE SODIUM 50 MCG: 50 TABLET ORAL at 09:30

## 2018-05-30 RX ADMIN — OXYCODONE HYDROCHLORIDE AND ACETAMINOPHEN 1 TABLET: 5; 325 TABLET ORAL at 09:40

## 2018-05-30 RX ADMIN — OXYCODONE HYDROCHLORIDE AND ACETAMINOPHEN 1 TABLET: 5; 325 TABLET ORAL at 16:09

## 2018-05-30 RX ADMIN — Medication 1 TABLET: at 09:30

## 2018-05-30 RX ADMIN — ACETAMINOPHEN 650 MG: 325 TABLET, FILM COATED ORAL at 20:18

## 2018-05-30 RX ADMIN — DOXYCYCLINE 100 MG: 100 INJECTION, POWDER, LYOPHILIZED, FOR SOLUTION INTRAVENOUS at 09:32

## 2018-05-30 RX ADMIN — ACETAMINOPHEN 650 MG: 325 TABLET, FILM COATED ORAL at 03:02

## 2018-05-30 RX ADMIN — Medication 1 TABLET: at 16:12

## 2018-05-30 RX ADMIN — MORPHINE SULFATE 2 MG: 2 INJECTION, SOLUTION INTRAMUSCULAR; INTRAVENOUS at 00:14

## 2018-05-30 RX ADMIN — METOPROLOL TARTRATE 25 MG: 25 TABLET, FILM COATED ORAL at 09:32

## 2018-05-30 RX ADMIN — GABAPENTIN 600 MG: 300 CAPSULE ORAL at 20:18

## 2018-05-30 RX ADMIN — APIXABAN 5 MG: 5 TABLET, FILM COATED ORAL at 09:32

## 2018-05-30 RX ADMIN — NICOTINE 1 PATCH: 7 PATCH, EXTENDED RELEASE TRANSDERMAL at 09:33

## 2018-05-30 RX ADMIN — Medication 1 CAPSULE: at 16:29

## 2018-05-30 RX ADMIN — APIXABAN 5 MG: 5 TABLET, FILM COATED ORAL at 20:18

## 2018-05-30 RX ADMIN — SODIUM CHLORIDE 75 ML/HR: 9 INJECTION, SOLUTION INTRAVENOUS at 00:14

## 2018-05-30 RX ADMIN — DOXYCYCLINE 100 MG: 100 INJECTION, POWDER, LYOPHILIZED, FOR SOLUTION INTRAVENOUS at 20:17

## 2018-05-30 RX ADMIN — MORPHINE SULFATE 2 MG: 2 INJECTION, SOLUTION INTRAMUSCULAR; INTRAVENOUS at 20:18

## 2018-05-30 RX ADMIN — SODIUM CHLORIDE 75 ML/HR: 9 INJECTION, SOLUTION INTRAVENOUS at 15:35

## 2018-05-30 RX ADMIN — GABAPENTIN 600 MG: 300 CAPSULE ORAL at 05:29

## 2018-05-30 RX ADMIN — CEFTRIAXONE 1 G: 1 INJECTION, POWDER, FOR SOLUTION INTRAMUSCULAR; INTRAVENOUS at 20:18

## 2018-05-30 RX ADMIN — SENNOSIDES 1 TABLET: 8.6 TABLET, FILM COATED ORAL at 09:32

## 2018-05-30 RX ADMIN — GABAPENTIN 600 MG: 300 CAPSULE ORAL at 16:09

## 2018-05-30 RX ADMIN — METOPROLOL TARTRATE 25 MG: 25 TABLET, FILM COATED ORAL at 20:18

## 2018-05-30 RX ADMIN — FAMOTIDINE 40 MG: 20 TABLET, FILM COATED ORAL at 09:32

## 2018-05-31 LAB
ALBUMIN SERPL-MCNC: 2.8 G/DL (ref 3.4–4.8)
ALBUMIN/GLOB SERPL: 0.9 G/DL (ref 1.5–2.5)
ALP SERPL-CCNC: 46 U/L (ref 35–104)
ALT SERPL W P-5'-P-CCNC: 16 U/L (ref 10–36)
ANION GAP SERPL CALCULATED.3IONS-SCNC: 3.3 MMOL/L (ref 3.6–11.2)
AST SERPL-CCNC: 36 U/L (ref 10–30)
BASOPHILS # BLD AUTO: 0.06 10*3/MM3 (ref 0–0.3)
BASOPHILS NFR BLD AUTO: 0.6 % (ref 0–2)
BILIRUB SERPL-MCNC: 0.3 MG/DL (ref 0.2–1.8)
BUN BLD-MCNC: 9 MG/DL (ref 7–21)
BUN/CREAT SERPL: 9.4 (ref 7–25)
CALCIUM SPEC-SCNC: 7.8 MG/DL (ref 7.7–10)
CHLORIDE SERPL-SCNC: 111 MMOL/L (ref 99–112)
CO2 SERPL-SCNC: 23.7 MMOL/L (ref 24.3–31.9)
CREAT BLD-MCNC: 0.96 MG/DL (ref 0.43–1.29)
CRP SERPL-MCNC: 10.48 MG/DL (ref 0–0.99)
DEPRECATED RDW RBC AUTO: 44.3 FL (ref 37–54)
EOSINOPHIL # BLD AUTO: 0.59 10*3/MM3 (ref 0–0.7)
EOSINOPHIL NFR BLD AUTO: 5.9 % (ref 0–7)
ERYTHROCYTE [DISTWIDTH] IN BLOOD BY AUTOMATED COUNT: 13.3 % (ref 11.5–14.5)
GFR SERPL CREATININE-BSD FRML MDRD: 56 ML/MIN/1.73
GLOBULIN UR ELPH-MCNC: 3.1 GM/DL
GLUCOSE BLD-MCNC: 101 MG/DL (ref 70–110)
HCT VFR BLD AUTO: 28 % (ref 37–47)
HGB BLD-MCNC: 8.9 G/DL (ref 12–16)
IMM GRANULOCYTES # BLD: 0.02 10*3/MM3 (ref 0–0.03)
IMM GRANULOCYTES NFR BLD: 0.2 % (ref 0–0.5)
L PNEUMO1 AG UR QL IA: NEGATIVE
LYMPHOCYTES # BLD AUTO: 2.76 10*3/MM3 (ref 1–3)
LYMPHOCYTES NFR BLD AUTO: 27.8 % (ref 16–46)
MCH RBC QN AUTO: 30.7 PG (ref 27–33)
MCHC RBC AUTO-ENTMCNC: 31.8 G/DL (ref 33–37)
MCV RBC AUTO: 96.6 FL (ref 80–94)
MONOCYTES # BLD AUTO: 1.91 10*3/MM3 (ref 0.1–0.9)
MONOCYTES NFR BLD AUTO: 19.2 % (ref 0–12)
NEUTROPHILS # BLD AUTO: 4.59 10*3/MM3 (ref 1.4–6.5)
NEUTROPHILS NFR BLD AUTO: 46.3 % (ref 40–75)
OSMOLALITY SERPL CALC.SUM OF ELEC: 274.5 MOSM/KG (ref 273–305)
PLATELET # BLD AUTO: 191 10*3/MM3 (ref 130–400)
PMV BLD AUTO: 10.7 FL (ref 6–10)
POTASSIUM BLD-SCNC: 3.6 MMOL/L (ref 3.5–5.3)
PROT SERPL-MCNC: 5.9 G/DL (ref 6–8)
RBC # BLD AUTO: 2.9 10*6/MM3 (ref 4.2–5.4)
SODIUM BLD-SCNC: 138 MMOL/L (ref 135–153)
WBC NRBC COR # BLD: 9.93 10*3/MM3 (ref 4.5–12.5)

## 2018-05-31 PROCEDURE — 97530 THERAPEUTIC ACTIVITIES: CPT

## 2018-05-31 PROCEDURE — 94799 UNLISTED PULMONARY SVC/PX: CPT

## 2018-05-31 PROCEDURE — 85025 COMPLETE CBC W/AUTO DIFF WBC: CPT | Performed by: INTERNAL MEDICINE

## 2018-05-31 PROCEDURE — 87899 AGENT NOS ASSAY W/OPTIC: CPT | Performed by: INTERNAL MEDICINE

## 2018-05-31 PROCEDURE — 99232 SBSQ HOSP IP/OBS MODERATE 35: CPT | Performed by: INTERNAL MEDICINE

## 2018-05-31 PROCEDURE — 25010000002 CEFTRIAXONE: Performed by: INTERNAL MEDICINE

## 2018-05-31 PROCEDURE — 80053 COMPREHEN METABOLIC PANEL: CPT | Performed by: INTERNAL MEDICINE

## 2018-05-31 PROCEDURE — 86140 C-REACTIVE PROTEIN: CPT | Performed by: INTERNAL MEDICINE

## 2018-05-31 PROCEDURE — 97116 GAIT TRAINING THERAPY: CPT

## 2018-05-31 RX ORDER — POTASSIUM CHLORIDE 20 MEQ/1
40 TABLET, EXTENDED RELEASE ORAL ONCE
Status: COMPLETED | OUTPATIENT
Start: 2018-05-31 | End: 2018-05-31

## 2018-05-31 RX ADMIN — GABAPENTIN 600 MG: 300 CAPSULE ORAL at 14:19

## 2018-05-31 RX ADMIN — LORAZEPAM 1 MG: 1 TABLET ORAL at 20:01

## 2018-05-31 RX ADMIN — Medication 1 CAPSULE: at 08:58

## 2018-05-31 RX ADMIN — CETIRIZINE HYDROCHLORIDE 5 MG: 10 TABLET ORAL at 08:58

## 2018-05-31 RX ADMIN — METOPROLOL TARTRATE 25 MG: 25 TABLET, FILM COATED ORAL at 08:59

## 2018-05-31 RX ADMIN — SENNOSIDES 1 TABLET: 8.6 TABLET, FILM COATED ORAL at 08:58

## 2018-05-31 RX ADMIN — CEFTRIAXONE 1 G: 1 INJECTION, POWDER, FOR SOLUTION INTRAMUSCULAR; INTRAVENOUS at 20:05

## 2018-05-31 RX ADMIN — GABAPENTIN 600 MG: 300 CAPSULE ORAL at 05:14

## 2018-05-31 RX ADMIN — FAMOTIDINE 40 MG: 20 TABLET, FILM COATED ORAL at 08:59

## 2018-05-31 RX ADMIN — LEVOTHYROXINE SODIUM 50 MCG: 50 TABLET ORAL at 08:58

## 2018-05-31 RX ADMIN — OXYCODONE HYDROCHLORIDE AND ACETAMINOPHEN 1 TABLET: 5; 325 TABLET ORAL at 08:58

## 2018-05-31 RX ADMIN — GABAPENTIN 600 MG: 300 CAPSULE ORAL at 20:01

## 2018-05-31 RX ADMIN — DOXYCYCLINE 100 MG: 100 INJECTION, POWDER, LYOPHILIZED, FOR SOLUTION INTRAVENOUS at 20:07

## 2018-05-31 RX ADMIN — Medication 1 TABLET: at 18:08

## 2018-05-31 RX ADMIN — DOCUSATE SODIUM 100 MG: 100 CAPSULE, LIQUID FILLED ORAL at 08:59

## 2018-05-31 RX ADMIN — SODIUM CHLORIDE 75 ML/HR: 9 INJECTION, SOLUTION INTRAVENOUS at 12:21

## 2018-05-31 RX ADMIN — DOXYCYCLINE 100 MG: 100 INJECTION, POWDER, LYOPHILIZED, FOR SOLUTION INTRAVENOUS at 11:24

## 2018-05-31 RX ADMIN — SODIUM CHLORIDE 75 ML/HR: 9 INJECTION, SOLUTION INTRAVENOUS at 05:16

## 2018-05-31 RX ADMIN — Medication 1 TABLET: at 08:59

## 2018-05-31 RX ADMIN — APIXABAN 5 MG: 5 TABLET, FILM COATED ORAL at 08:58

## 2018-05-31 RX ADMIN — METOPROLOL TARTRATE 25 MG: 25 TABLET, FILM COATED ORAL at 20:01

## 2018-05-31 RX ADMIN — APIXABAN 5 MG: 5 TABLET, FILM COATED ORAL at 20:01

## 2018-05-31 RX ADMIN — NICOTINE 1 PATCH: 7 PATCH, EXTENDED RELEASE TRANSDERMAL at 09:25

## 2018-05-31 RX ADMIN — OXYCODONE HYDROCHLORIDE AND ACETAMINOPHEN 1 TABLET: 5; 325 TABLET ORAL at 20:00

## 2018-05-31 RX ADMIN — POTASSIUM CHLORIDE 40 MEQ: 1500 TABLET, EXTENDED RELEASE ORAL at 18:08

## 2018-05-31 RX ADMIN — LORAZEPAM 1 MG: 1 TABLET ORAL at 08:59

## 2018-06-01 ENCOUNTER — APPOINTMENT (OUTPATIENT)
Dept: GENERAL RADIOLOGY | Facility: HOSPITAL | Age: 78
End: 2018-06-01

## 2018-06-01 ENCOUNTER — HOSPITAL ENCOUNTER (INPATIENT)
Facility: HOSPITAL | Age: 78
LOS: 7 days | Discharge: HOME HEALTH | End: 2018-06-08
Attending: FAMILY MEDICINE | Admitting: FAMILY MEDICINE

## 2018-06-01 VITALS
OXYGEN SATURATION: 95 % | HEART RATE: 90 BPM | DIASTOLIC BLOOD PRESSURE: 74 MMHG | TEMPERATURE: 98.1 F | RESPIRATION RATE: 18 BRPM | HEIGHT: 63 IN | WEIGHT: 166.3 LBS | SYSTOLIC BLOOD PRESSURE: 125 MMHG | BODY MASS INDEX: 29.46 KG/M2

## 2018-06-01 DIAGNOSIS — S72.002D CLOSED FRACTURE OF LEFT HIP WITH ROUTINE HEALING, SUBSEQUENT ENCOUNTER: Primary | ICD-10-CM

## 2018-06-01 PROBLEM — S72.002A CLOSED LEFT HIP FRACTURE (HCC): Status: ACTIVE | Noted: 2018-06-01

## 2018-06-01 LAB
ANION GAP SERPL CALCULATED.3IONS-SCNC: 4.9 MMOL/L (ref 3.6–11.2)
BASOPHILS # BLD AUTO: 0.07 10*3/MM3 (ref 0–0.3)
BASOPHILS NFR BLD AUTO: 0.7 % (ref 0–2)
BUN BLD-MCNC: 13 MG/DL (ref 7–21)
BUN/CREAT SERPL: 15.1 (ref 7–25)
CALCIUM SPEC-SCNC: 7.6 MG/DL (ref 7.7–10)
CHLORIDE SERPL-SCNC: 110 MMOL/L (ref 99–112)
CO2 SERPL-SCNC: 22.1 MMOL/L (ref 24.3–31.9)
CREAT BLD-MCNC: 0.86 MG/DL (ref 0.43–1.29)
CRP SERPL-MCNC: 9.94 MG/DL (ref 0–0.99)
DEPRECATED RDW RBC AUTO: 44.2 FL (ref 37–54)
EOSINOPHIL # BLD AUTO: 0.6 10*3/MM3 (ref 0–0.7)
EOSINOPHIL NFR BLD AUTO: 5.8 % (ref 0–7)
ERYTHROCYTE [DISTWIDTH] IN BLOOD BY AUTOMATED COUNT: 13.2 % (ref 11.5–14.5)
GFR SERPL CREATININE-BSD FRML MDRD: 64 ML/MIN/1.73
GLUCOSE BLD-MCNC: 92 MG/DL (ref 70–110)
HCT VFR BLD AUTO: 28.2 % (ref 37–47)
HGB BLD-MCNC: 9.1 G/DL (ref 12–16)
IMM GRANULOCYTES # BLD: 0.04 10*3/MM3 (ref 0–0.03)
IMM GRANULOCYTES NFR BLD: 0.4 % (ref 0–0.5)
LYMPHOCYTES # BLD AUTO: 3.12 10*3/MM3 (ref 1–3)
LYMPHOCYTES NFR BLD AUTO: 30.4 % (ref 16–46)
MCH RBC QN AUTO: 31.1 PG (ref 27–33)
MCHC RBC AUTO-ENTMCNC: 32.3 G/DL (ref 33–37)
MCV RBC AUTO: 96.2 FL (ref 80–94)
MONOCYTES # BLD AUTO: 1.43 10*3/MM3 (ref 0.1–0.9)
MONOCYTES NFR BLD AUTO: 13.9 % (ref 0–12)
NEUTROPHILS # BLD AUTO: 5.01 10*3/MM3 (ref 1.4–6.5)
NEUTROPHILS NFR BLD AUTO: 48.8 % (ref 40–75)
OSMOLALITY SERPL CALC.SUM OF ELEC: 273.6 MOSM/KG (ref 273–305)
PLATELET # BLD AUTO: 205 10*3/MM3 (ref 130–400)
PMV BLD AUTO: 10.6 FL (ref 6–10)
POTASSIUM BLD-SCNC: 3.9 MMOL/L (ref 3.5–5.3)
RBC # BLD AUTO: 2.93 10*6/MM3 (ref 4.2–5.4)
SODIUM BLD-SCNC: 137 MMOL/L (ref 135–153)
WBC NRBC COR # BLD: 10.27 10*3/MM3 (ref 4.5–12.5)

## 2018-06-01 PROCEDURE — 99239 HOSP IP/OBS DSCHRG MGMT >30: CPT | Performed by: INTERNAL MEDICINE

## 2018-06-01 PROCEDURE — 71045 X-RAY EXAM CHEST 1 VIEW: CPT | Performed by: RADIOLOGY

## 2018-06-01 PROCEDURE — 86140 C-REACTIVE PROTEIN: CPT | Performed by: INTERNAL MEDICINE

## 2018-06-01 PROCEDURE — 80048 BASIC METABOLIC PNL TOTAL CA: CPT | Performed by: INTERNAL MEDICINE

## 2018-06-01 PROCEDURE — 25010000002 CEFTRIAXONE: Performed by: FAMILY MEDICINE

## 2018-06-01 PROCEDURE — 71045 X-RAY EXAM CHEST 1 VIEW: CPT

## 2018-06-01 PROCEDURE — 85025 COMPLETE CBC W/AUTO DIFF WBC: CPT | Performed by: INTERNAL MEDICINE

## 2018-06-01 PROCEDURE — 94799 UNLISTED PULMONARY SVC/PX: CPT

## 2018-06-01 RX ORDER — ONDANSETRON 2 MG/ML
4 INJECTION INTRAMUSCULAR; INTRAVENOUS EVERY 6 HOURS PRN
Status: DISCONTINUED | OUTPATIENT
Start: 2018-06-01 | End: 2018-06-08 | Stop reason: HOSPADM

## 2018-06-01 RX ORDER — ACETAMINOPHEN 325 MG/1
650 TABLET ORAL EVERY 6 HOURS PRN
Status: DISCONTINUED | OUTPATIENT
Start: 2018-06-01 | End: 2018-06-08 | Stop reason: HOSPADM

## 2018-06-01 RX ORDER — BISACODYL 10 MG
10 SUPPOSITORY, RECTAL RECTAL DAILY PRN
Status: DISCONTINUED | OUTPATIENT
Start: 2018-06-01 | End: 2018-06-08 | Stop reason: HOSPADM

## 2018-06-01 RX ORDER — OXYCODONE HYDROCHLORIDE AND ACETAMINOPHEN 5; 325 MG/1; MG/1
1 TABLET ORAL EVERY 6 HOURS PRN
Status: DISCONTINUED | OUTPATIENT
Start: 2018-06-01 | End: 2018-06-08 | Stop reason: HOSPADM

## 2018-06-01 RX ORDER — L.ACID,PARA/B.BIFIDUM/S.THERM 8B CELL
1 CAPSULE ORAL DAILY
Status: DISCONTINUED | OUTPATIENT
Start: 2018-06-02 | End: 2018-06-08 | Stop reason: HOSPADM

## 2018-06-01 RX ORDER — ACETAMINOPHEN 325 MG/1
650 TABLET ORAL EVERY 4 HOURS PRN
Status: DISCONTINUED | OUTPATIENT
Start: 2018-06-01 | End: 2018-06-08 | Stop reason: HOSPADM

## 2018-06-01 RX ORDER — GABAPENTIN 300 MG/1
600 CAPSULE ORAL EVERY 8 HOURS SCHEDULED
Status: DISCONTINUED | OUTPATIENT
Start: 2018-06-01 | End: 2018-06-08 | Stop reason: HOSPADM

## 2018-06-01 RX ORDER — LORAZEPAM 1 MG/1
1 TABLET ORAL EVERY 8 HOURS PRN
Status: CANCELLED | OUTPATIENT
Start: 2018-06-01

## 2018-06-01 RX ORDER — NITROGLYCERIN 0.4 MG/1
0.4 TABLET SUBLINGUAL
Status: DISCONTINUED | OUTPATIENT
Start: 2018-06-01 | End: 2018-06-08 | Stop reason: HOSPADM

## 2018-06-01 RX ORDER — SODIUM CHLORIDE 0.9 % (FLUSH) 0.9 %
1-10 SYRINGE (ML) INJECTION AS NEEDED
Status: CANCELLED | OUTPATIENT
Start: 2018-06-01

## 2018-06-01 RX ORDER — DOCUSATE SODIUM 100 MG/1
100 CAPSULE, LIQUID FILLED ORAL DAILY
Status: DISCONTINUED | OUTPATIENT
Start: 2018-06-02 | End: 2018-06-04

## 2018-06-01 RX ORDER — ACETAMINOPHEN 325 MG/1
650 TABLET ORAL EVERY 6 HOURS PRN
Status: CANCELLED | OUTPATIENT
Start: 2018-06-01

## 2018-06-01 RX ORDER — L.ACID,PARA/B.BIFIDUM/S.THERM 8B CELL
1 CAPSULE ORAL DAILY
Status: CANCELLED | OUTPATIENT
Start: 2018-06-01

## 2018-06-01 RX ORDER — LEVOTHYROXINE SODIUM 0.05 MG/1
50 TABLET ORAL DAILY
Status: DISCONTINUED | OUTPATIENT
Start: 2018-06-02 | End: 2018-06-08 | Stop reason: HOSPADM

## 2018-06-01 RX ORDER — NITROGLYCERIN 0.4 MG/1
0.4 TABLET SUBLINGUAL
Status: CANCELLED | OUTPATIENT
Start: 2018-06-01

## 2018-06-01 RX ORDER — CETIRIZINE HYDROCHLORIDE 10 MG/1
5 TABLET ORAL DAILY
Status: DISCONTINUED | OUTPATIENT
Start: 2018-06-02 | End: 2018-06-08 | Stop reason: HOSPADM

## 2018-06-01 RX ORDER — ONDANSETRON 4 MG/1
4 TABLET, ORALLY DISINTEGRATING ORAL EVERY 6 HOURS PRN
Status: DISCONTINUED | OUTPATIENT
Start: 2018-06-01 | End: 2018-06-08 | Stop reason: HOSPADM

## 2018-06-01 RX ORDER — SENNA PLUS 8.6 MG/1
1 TABLET ORAL DAILY
Status: CANCELLED | OUTPATIENT
Start: 2018-06-01

## 2018-06-01 RX ORDER — CETIRIZINE HYDROCHLORIDE 10 MG/1
5 TABLET ORAL DAILY
Status: CANCELLED | OUTPATIENT
Start: 2018-06-01

## 2018-06-01 RX ORDER — SODIUM CHLORIDE 0.9 % (FLUSH) 0.9 %
10 SYRINGE (ML) INJECTION AS NEEDED
Status: CANCELLED | OUTPATIENT
Start: 2018-06-01

## 2018-06-01 RX ORDER — LORAZEPAM 1 MG/1
1 TABLET ORAL EVERY 8 HOURS PRN
Status: DISCONTINUED | OUTPATIENT
Start: 2018-06-01 | End: 2018-06-08 | Stop reason: HOSPADM

## 2018-06-01 RX ORDER — OXYCODONE HYDROCHLORIDE AND ACETAMINOPHEN 5; 325 MG/1; MG/1
1 TABLET ORAL EVERY 6 HOURS PRN
Status: CANCELLED | OUTPATIENT
Start: 2018-06-01 | End: 2018-06-06

## 2018-06-01 RX ORDER — DOCUSATE SODIUM 100 MG/1
100 CAPSULE, LIQUID FILLED ORAL DAILY
Status: CANCELLED | OUTPATIENT
Start: 2018-06-01

## 2018-06-01 RX ORDER — SENNA PLUS 8.6 MG/1
1 TABLET ORAL DAILY
Status: DISCONTINUED | OUTPATIENT
Start: 2018-06-02 | End: 2018-06-04

## 2018-06-01 RX ORDER — SODIUM CHLORIDE 0.9 % (FLUSH) 0.9 %
1-10 SYRINGE (ML) INJECTION AS NEEDED
Status: DISCONTINUED | OUTPATIENT
Start: 2018-06-01 | End: 2018-06-08 | Stop reason: HOSPADM

## 2018-06-01 RX ORDER — ONDANSETRON 4 MG/1
4 TABLET, FILM COATED ORAL EVERY 6 HOURS PRN
Status: DISCONTINUED | OUTPATIENT
Start: 2018-06-01 | End: 2018-06-08 | Stop reason: HOSPADM

## 2018-06-01 RX ORDER — FAMOTIDINE 20 MG/1
40 TABLET, FILM COATED ORAL DAILY
Status: DISCONTINUED | OUTPATIENT
Start: 2018-06-02 | End: 2018-06-08 | Stop reason: HOSPADM

## 2018-06-01 RX ORDER — SODIUM CHLORIDE 0.9 % (FLUSH) 0.9 %
10 SYRINGE (ML) INJECTION AS NEEDED
Status: DISCONTINUED | OUTPATIENT
Start: 2018-06-01 | End: 2018-06-08 | Stop reason: HOSPADM

## 2018-06-01 RX ORDER — FAMOTIDINE 20 MG/1
40 TABLET, FILM COATED ORAL DAILY
Status: CANCELLED | OUTPATIENT
Start: 2018-06-01

## 2018-06-01 RX ORDER — GABAPENTIN 300 MG/1
600 CAPSULE ORAL EVERY 8 HOURS SCHEDULED
Status: CANCELLED | OUTPATIENT
Start: 2018-06-01

## 2018-06-01 RX ORDER — LEVOTHYROXINE SODIUM 0.05 MG/1
50 TABLET ORAL DAILY
Status: CANCELLED | OUTPATIENT
Start: 2018-06-01

## 2018-06-01 RX ADMIN — OXYCODONE HYDROCHLORIDE AND ACETAMINOPHEN 1 TABLET: 5; 325 TABLET ORAL at 21:06

## 2018-06-01 RX ADMIN — DOXYCYCLINE 100 MG: 100 INJECTION, POWDER, LYOPHILIZED, FOR SOLUTION INTRAVENOUS at 21:06

## 2018-06-01 RX ADMIN — NICOTINE 1 PATCH: 7 PATCH, EXTENDED RELEASE TRANSDERMAL at 08:46

## 2018-06-01 RX ADMIN — APIXABAN 5 MG: 5 TABLET, FILM COATED ORAL at 08:45

## 2018-06-01 RX ADMIN — FAMOTIDINE 40 MG: 20 TABLET, FILM COATED ORAL at 08:44

## 2018-06-01 RX ADMIN — APIXABAN 5 MG: 5 TABLET, FILM COATED ORAL at 20:28

## 2018-06-01 RX ADMIN — SENNOSIDES 1 TABLET: 8.6 TABLET, FILM COATED ORAL at 08:44

## 2018-06-01 RX ADMIN — OXYCODONE HYDROCHLORIDE AND ACETAMINOPHEN 1 TABLET: 5; 325 TABLET ORAL at 14:10

## 2018-06-01 RX ADMIN — Medication 1 TABLET: at 08:44

## 2018-06-01 RX ADMIN — Medication 1 CAPSULE: at 08:45

## 2018-06-01 RX ADMIN — DOCUSATE SODIUM 100 MG: 100 CAPSULE, LIQUID FILLED ORAL at 08:44

## 2018-06-01 RX ADMIN — GABAPENTIN 600 MG: 300 CAPSULE ORAL at 21:06

## 2018-06-01 RX ADMIN — Medication 1 TABLET: at 17:26

## 2018-06-01 RX ADMIN — CEFTRIAXONE 1 G: 1 INJECTION, POWDER, FOR SOLUTION INTRAMUSCULAR; INTRAVENOUS at 20:28

## 2018-06-01 RX ADMIN — GABAPENTIN 600 MG: 300 CAPSULE ORAL at 05:40

## 2018-06-01 RX ADMIN — LORAZEPAM 1 MG: 1 TABLET ORAL at 21:07

## 2018-06-01 RX ADMIN — METOPROLOL TARTRATE 25 MG: 25 TABLET, FILM COATED ORAL at 08:44

## 2018-06-01 RX ADMIN — CETIRIZINE HYDROCHLORIDE 5 MG: 10 TABLET ORAL at 08:44

## 2018-06-01 RX ADMIN — METOPROLOL TARTRATE 25 MG: 25 TABLET, FILM COATED ORAL at 20:28

## 2018-06-01 RX ADMIN — LEVOTHYROXINE SODIUM 50 MCG: 50 TABLET ORAL at 08:44

## 2018-06-01 RX ADMIN — GABAPENTIN 600 MG: 300 CAPSULE ORAL at 14:05

## 2018-06-01 RX ADMIN — OXYCODONE HYDROCHLORIDE AND ACETAMINOPHEN 1 TABLET: 5; 325 TABLET ORAL at 05:40

## 2018-06-01 RX ADMIN — DOXYCYCLINE 100 MG: 100 INJECTION, POWDER, LYOPHILIZED, FOR SOLUTION INTRAVENOUS at 08:45

## 2018-06-01 NOTE — PROGRESS NOTES
Patient Assessment Instrument  Quality Indicators - Admission    Section B. Hearing, Speech Vision  Expression of Ideas and Wants: Exhibits some difficulty with expressing needs  and ideas (e.g., some words or finishing thoughts) or speech is not clear.  Understanding Verbal Content: Usually Understands: Understands most  conversations, but misses some part/intent of message. Requires cues at times to  understand.    Section C. Cognitive Patterns      Section [id number]. Prior Functioning      Section DD5986. Prior Device Use      Section PS8504. Self Care Performance      Section LC7707. Self Care Discharge Goals      Section FT1800. Mobility Performance      Section EZ7004. Mobility Discharge Goals      Section H. Bladder and Bowel  Bladder Continence: Stress incontinence only  Bowel Continence: Always continent (no documented incontinence).    Section I. Active Diagnosis      Section J. Health Conditions      Section K. Swallowing/Nutritional Status      Section M. Skin Conditions      Section O. Special Treatments, Procedures, and Programs      OPTIONAL BRANCH FOR TRACKING FALLS  Fall(s) During Shift:    Signed by: Adriana Carr, Nurse

## 2018-06-01 NOTE — PROGRESS NOTES
Discharge Planning Assessment  [facility]     Patient Name: Anette Dunham  MRN: [id number]  Today's Date: 6/1/2018    Admit Date: 5/27/2018          Discharge Needs Assessment    No documentation.           Discharge Plan     Row Name 06/01/18 1002       Plan    Final Discharge Disposition Code 62 - inpatient rehab facility    Final Note Pt discharged to [facility] on this date        Destination     No service coordination in this encounter.      Durable Medical Equipment     No service coordination in this encounter.      Dialysis/Infusion     No service coordination in this encounter.      Home Medical Care     No service coordination in this encounter.      Social Care     No service coordination in this encounter.        Expected Discharge Date and Time     Expected Discharge Date Expected Discharge Time    Jun 1, 2018               Demographic Summary    No documentation.           Functional Status    No documentation.           Psychosocial    No documentation.           Abuse/Neglect    No documentation.           Legal    No documentation.           Substance Abuse    No documentation.           Patient Forms    No documentation.         Audrey Bravo

## 2018-06-01 NOTE — SIGNIFICANT NOTE
18 1615   Living Environment   Lives With child(willy), adult   Name(s) of Who Lives With Patient Eduardo Dunham-son   Current Living Arrangements home/apartment/condo   Primary Care Provided by self   Provides Primary Care For no one   Family Caregiver if Needed child(willy), adult   Family Caregiver Names Eduardo Dunham and daughters Vielka Patel, Peri Chaparro, Lacy Soto.     Quality of Family Relationships involved;helpful;supportive   Able to Return to Prior Arrangements yes   Living Arrangement Comments SS spoke to pt who states being a .  Pt lives with son Eduardo Dunham.  Pt has six children living and two children are .  Children are Eduardo Dunham, Vielka Patel, Peri Chaparro, Gonsalo Dunham, Lacy Soto and Benoit Dunham.  Pt receives Social Security income, Medicare A,B,D, [employer] and food stamps.  Pt does not have POA or advance directive.      Resource/Environmental Concerns   Resource/Environmental Concerns none   Transition Planning   Patient/Family Anticipates Transition to home with family   Patient/Family Anticipated Services at Transition durable medical equipment;home health care   Transportation Anticipated family or friend will provide   Discharge Needs Assessment   Concerns to be Addressed adjustment to diagnosis/illness   Equipment Currently Used at Home cane, straight;shower chair   Outpatient/Agency/Support Group Needs (Pt did not receive home health or outpatient therapy prior to admission.)   Discharge Coordination/Progress Pt plans to return home with son at discharge.   Daughters will assist after work and weekends with cooking, housekeeping and care.  Pt has good family support.  Pt was independent with ADL's and driving prior to admission.  SS will follow and assist with discharge planning.

## 2018-06-01 NOTE — PLAN OF CARE
Problem: Fall Risk (Adult)  Goal: Identify Related Risk Factors and Signs and Symptoms  Outcome: Ongoing (interventions implemented as appropriate)    Goal: Absence of Fall  Outcome: Ongoing (interventions implemented as appropriate)      Problem: Functional Mobility Impairment (IRF) (Adult)  Goal: Optimal/Safe Level of [place] with Mobility  Outcome: Ongoing (interventions implemented as appropriate)      Problem: Fractured Hip (Adult)  Goal: Signs and Symptoms of Listed Potential Problems Will be Absent, Minimized or Managed (Fractured Hip)  Outcome: Ongoing (interventions implemented as appropriate)      Problem: Skin Injury Risk (Adult)  Goal: Identify Related Risk Factors and Signs and Symptoms  Outcome: Ongoing (interventions implemented as appropriate)    Goal: Skin Health and Integrity  Outcome: Ongoing (interventions implemented as appropriate)

## 2018-06-01 NOTE — PLAN OF CARE
Problem: Fall Risk (Adult)  Goal: Identify Related Risk Factors and Signs and Symptoms  Outcome: Ongoing (interventions implemented as appropriate)      Problem: Skin Injury Risk (Adult)  Goal: Identify Related Risk Factors and Signs and Symptoms  Outcome: Ongoing (interventions implemented as appropriate)      Problem: Patient Care Overview  Goal: Individualization and Mutuality  Outcome: Ongoing (interventions implemented as appropriate)

## 2018-06-01 NOTE — H&P
Chief complaint Left hip fracture    Subjective     Patient is a 78 y.o. female presents to acute inpatient rehabilitation in transfer from [facility] where she was admitted with a left hip fracture.  She had a fall in her home and sustained a left femoral neck fracture requiring a left hip hemiarthroplasty on May 29 with Dr. Arriaga.  She has history of paroxysmal atrial fibrillation was monitored on telemetry.  Eliquis was initiated postoperatively for anticoagulation due to paroxysmal atrial fibrillation.  She developed pneumonia postoperatively with cough and is placed on doxycycline and Rocephin.  She had a low-grade fever.  She was monitored for that.  She states her breathing is improved and her cough is less severe.  She denies any fevers or chills.  She states that she is improving somewhat with her mobility but still somewhat limited with her balance and mobility.  At her baseline she is completely functionally independent with no assistive device needs whatsoever.  She lives with a son.  Mini FIM premix and screening assessment based on PT and OT evaluations at the referring facility reveal the following preadmission FIM scores: Eating 5, grooming 4, bathing 2, upper body dressing 5, lower body dressing 1, toileting 2, transfers to bed chair and wheelchair score 2.  Toilet transfer score 2.  Locomotion scores 1.  Bladder management 5, bowel management 5, comprehension 6, expression 7, social interaction 6, problem solving 6 and memory 6.  I have reviewed the preadmission screening evaluation form and the document is current and up-to-date with no change required.     Review of Systems   On review of systems the patient denies the following unless noted above:     Constitutional:  Fevers, chills, weight change, fatigue     Eyes: Vision changes, headache, double vision, loss of vision     ENT: Runny nose, nose bleeds, ringing in ears, pain with swallowing, sore  throat     Cardiovascular: Chest pains, palpitations, PND, orthopnea     Respiratory: Cough, wheezing, SOA, hemoptysis     GI:  Abdominal pain, diarrhea, constipation, change in stool caliber,    Rectal bleeding, vomiting or nausea     : Difficulty voiding, dysuria, hematuria     Musculoskeletal: She continues to have some pain in her left hip and states the pain medication is working pretty well to control it.  No radiation of the pain.  It is limiting her mobility.     Skin: Rash, itching, easy bruisability     Neurological: Unilateral weakness, new onset numbness, speech difficulties     Psychiatric: Sadness, tearfulness, feelings of hopelessness, racing thoughts     Endocrine:  Heat or cold intolerance, mood swings, polydipsia, polyphagia,    recent hypoglycemia      History  Past Medical History:   Diagnosis Date   • Anxiety    • Arrhythmia    • Coronary artery disease    • Disease of thyroid gland    • Dysfunctional gallbladder    • GERD (gastroesophageal reflux disease)    • Hypertension    • Osteoporosis      Past Surgical History:   Procedure Laterality Date   • ABDOMINAL SURGERY     • CHOLECYSTECTOMY OPEN     • HIP HEMIARTHROPLASTY Left 5/29/2018    Procedure: LEFT HIP BIPOLAR HEMIARTHROPLASTY;  Surgeon: Trevon Arriaga MD;  [facility];  Service: Orthopedics     Family History   Problem Relation Age of Onset   • Heart disease Father      Social History   Substance Use Topics   • Smoking status: Current Every Day Smoker     Packs/day: 0.25     Years: 15.00     Types: Cigarettes   • Smokeless tobacco: Current User     Types: Snuff      Comment: snuff for 60 years    • Alcohol use No     Prescriptions Prior to Admission   Medication Sig Dispense Refill Last Dose   • alendronate (FOSAMAX) 70 MG tablet Take 70 mg by mouth Every 7 (Seven) Days. PTA: Pt takes each Wednesday   Unknown at Unknown time   • amLODIPine (NORVASC) 2.5 MG tablet Take 1 tablet by mouth Daily. 30 tablet 11 Unknown at Unknown time    • apixaban (ELIQUIS) 5 MG tablet tablet Take 1 tablet by mouth 2 (two) times a day. 60 tablet 5 Unknown at Unknown time   • docusate sodium (COLACE) 100 MG capsule Take 100 mg by mouth Daily.   Unknown at Unknown time   • fexofenadine (ALLEGRA) 180 MG tablet Take 180 mg by mouth Daily.   Unknown at Unknown time   • gabapentin (NEURONTIN) 600 MG tablet Take 600 mg by mouth 3 (Three) Times a Day.   Unknown at Unknown time   • levothyroxine (SYNTHROID, LEVOTHROID) 50 MCG tablet Take 50 mcg by mouth Daily.   Unknown at Unknown time   • LORazepam (ATIVAN) 1 MG tablet Take 1 mg by mouth Every 8 (Eight) Hours As Needed for Anxiety (1).   Unknown at Unknown time   • metoprolol tartrate (LOPRESSOR) 25 MG tablet Take 1 tablet by mouth 2 (Two) Times a Day. 180 tablet 3 Unknown at Unknown time   • ranitidine (ZANTAC) 300 MG capsule Take 300 mg by mouth 2 (two) times a day.   Unknown at Unknown time   • Sennosides 8.6 MG capsule Take 8.6 mg by mouth Daily.   Unknown at Unknown time     Allergies:  Patient has no known allergies.    Scheduled Meds:  apixaban 5 mg Oral Q12H   calcium carb-cholecalciferol 1 tablet Oral BID With Meals   ceftriaxone 1 g Intravenous Q24H   [START ON 6/2/2018] cetirizine 5 mg Oral Daily   [START ON 6/2/2018] docusate sodium 100 mg Oral Daily   doxycycline 100 mg Intravenous Q12H   [START ON 6/2/2018] famotidine 40 mg Oral Daily   gabapentin 600 mg Oral Q8H   [START ON 6/2/2018] lactobacillus acidophilus 1 capsule Oral Daily   [START ON 6/2/2018] levothyroxine 50 mcg Oral Daily   metoprolol tartrate 25 mg Oral Q12H   [START ON 6/2/2018] nicotine 1 patch Transdermal Daily   [START ON 6/2/2018] senna 1 tablet Oral Daily     Continuous Infusions:   PRN Meds:.•  acetaminophen  •  acetaminophen  •  bisacodyl  •  LORazepam  •  magnesium hydroxide  •  nitroglycerin  •  ondansetron **OR** ondansetron ODT **OR** ondansetron  •  oxyCODONE-acetaminophen  •  sodium chloride  •  sodium chloride          Objective  "    Vital Signs    /56 (BP Location: Right arm, Patient Position: Lying)   Pulse 70   Temp 98.2 °F (36.8 °C) (Oral)   Resp 20   Ht 160 cm (62.99\")   Wt 70.3 kg (155 lb)   SpO2 94%   BMI 27.46 kg/m²         Physical Exam:   General Appearance: alert, pleasant, appears stated age, interactive and   cooperative   Head: normocephalic, without obvious abnormality and atraumatic   Eyes: lids and lashes normal, conjunctivae and sclerae normal, no icterus, no   pallor, corneas clear and PERRLA   Ears: ears appear intact with no abnormalities noted   Nose: nares normal, septum midline, mucosa normal and no drainage   Throat: no oral lesions, no thrush, oral mucosa moist and oopharynx normal   Neck: no adenopathy, supple, trachea midline, no thyromegaly, no carotid bruit   and no JVD   Back: no kyphosis present, no scoliosis present, no skin lesions, erythema, or   scars, no tenderness to percussion or palpation and range of motion normal   Lungs: clear to auscultation, respirations regular, respirations even and    respirations unlabored. No accessory muscle use.    Heart:: regular rhythm & normal rate, normal S1, S2, no murmur, no gallop, no   rub and no click.  Chest wall with no abnormalities observed. PMI nondisplaced   Abdomen: normal bowel sounds in all quadrants, no masses, no hepatomegaly,   no splenomegaly, soft non-tender, no guarding and no rebound tenderness   Extremities: no edema, no cyanosis, no redness, no tenderness, no clubbing   Musculoskeletal: joints with full range of motion and joints, no effusion.  Pedal   pulses palpable and equal bilaterally   Skin: no bleeding, bruising or rash and no lesions noted   Lymph Nodes: no palpable adenopathy   Neurologic: Mental Status orientated to person, place, time and situation.    Speech is intelligible, Nonlabored.  Alertness alert and awake and mood/affect   normal, Cranial Nerves 2 - 12 grossly intact as examined   Sensory intact in BLE and " BUE.   Motor strength  LUE is 5/5 proximally, 5/5 distally      RUE is 5/5 proximally, 5/5 distally      LLE is 3/5 @ hip flexors, 3/5 quads as well as       4/5 dorsiflexion / plantar flexion      RLE is 5/5 @ hip flexors, quads as well as        dosriflexion / plantar flexion   Reflexes: Right:  2+ biceps, 2+ brachioradialis      2+ patella, 2+ achilles     Left: 2+ biceps, 2+ brachioradialis      2+ patella, 2+ achilles    Results Review:   Lab Results (last 24 hours)     ** No results found for the last 24 hours. **        Imaging Results (last 24 hours)     ** No results found for the last 24 hours. **          Assessment/Plan     Left femoral neck fracture status post hemiarthroplasty  Paroxysmal atrial fibrillation  Pneumonia  Hypertension  Postoperative anemia with hemoglobin of 9.1    This patient will be admitted to acute inpatient rehabilitation.  The patient is going to require intensive treatment with physical therapy and occupational therapy.  The patient will require PT and OT treatments at least 90 minutes daily per modality 5 days per week.  Patient will require physical therapy for lower extremity strengthening, balance, endurance, progressing gait, functional mobility and improving safety.  The patient will require occupational therapy treatment for ADL retraining, functional transfers, functional mobility and self-care training.    Eliquis twice a day for anticoagulation    Rocephin and doxycycline due to pneumonia.    I'm going to discontinue nicotine patch but she is not asked he smoked since January    Metoprolol for rate control and blood pressure management    At this point his plans disposition is to home with family to assist with intent to 14 days at a supervision to contact guard assist level of functioning      Samuel Duane Kreis, MD  06/01/18  11:26 AM

## 2018-06-01 NOTE — DISCHARGE SUMMARY
Date of Admission: 5/27/2018    Date of Discharge: 6/1/2018     PCP: JUNIE Wahl    Admission Diagnosis:   Please see admission H&P    Discharge Diagnosis:   Left hip fracture s/p mechanical fall  PAF, currently in sinus rhythm  Acute renal insufficiency  Sepsis   Bilateral pneumonia, community acquired  Essential HTN  Anemia  Tobacco abuse    Procedures Performed:  5/29/18: Left hip bipolar hemiarthroplasty      Consults:   Consults     No orders found from 4/28/2018 to 5/28/2018.            History of Present Illness:  Anette Dunham is a 78 y.o. female who presented to [facility] ED with CC of left hip pain s/p a fall at home. Please see admission H&P for complete details.     In the ED, workup revealed a lift hip fracture, mild hyponatremia, acute renal insufficiency and possible pneumonia on CXR.        Hospital Course  Anette Dunham was admitted to the telemetry floor for further evaluation and treatment. She was started on maintenance IVF's. Her home Eliquis was held in anticipation of possible surgery. Antibiotics were held upon admission as she was afebrile with normal WBC. Orthopedic surgery was consulted for further input.     Once Eliquis had been held for 48 hours she was taken to the OR where she underwent left hip bipolar hemiarthroplasty. She tolerated the procedure well and returned to the telemetry floor. Orthopedic surgery resumed her home Eliquis POD#1. She was monitored on telemetry and maintained sinus rhythm. Her H&H trended downward postoperatively but she did not require a transfusion. Her renal function improved and IVF's were stopped. Postoperatively, she did develop a fever. Repeat CXR again revealed bilateral airspace disease and she was placed on IV antibiotics including Rocephin and Doxycycline. Her fever trend improved, WBC remained stable and CRP began improving as well. Repeat CXR on the day of discharge appeared much improved.     PT/OT were consulted postoperatively and began  working with her daily. She had been independent at home prior to admission and felt to be a good candidate for inpatient rehab. Initially she was resistant to this option and preferred to return home with home health. However, after further discussion she became agreeable and  worked to arrange placement. Ms. Dunham was seen and examined with HARRY Thomas on 6/1/18. She remained hemodynamically stable with stable routine AM labs. Placement was arranged for inpatient rehab at [facility] and arrangements were made for discharge.         Condition on Discharge:  Stable    Vital Signs  Vitals:    06/01/18 0615   BP: 125/74   Pulse: 90   Resp: 18   Temp: 98.1 °F (36.7 °C)   SpO2: 95%       Physical Exam:  General:    Awake, alert, in no acute distress   Heart:      Normal S1 and S2. Regular rate and rhythm. No significant murmur, rubs or gallops appreciated.   Lungs:     Respirations regular, even and unlabored. Lungs clear to auscultation B/L. No wheezes, rales or rhonchi.   Abdomen:   Soft and nontender. No guarding, rebound tenderness or  organomegaly noted. Bowel sounds present x 4.   Extremities:  No clubbing, cyanosis or edema noted. Moves UE and LE equally B/L.     Discharge Disposition:   Inpatient rehab at [facility]      Discharge Medications:   Anette Dunham   Home Medication Instructions BAR:[id number]    Printed on:06/01/18 0813   Medication Information                      alendronate (FOSAMAX) 70 MG tablet  Take 70 mg by mouth Every 7 (Seven) Days. PTA: Pt takes each Wednesday             amLODIPine (NORVASC) 2.5 MG tablet  Take 1 tablet by mouth Daily.             apixaban (ELIQUIS) 5 MG tablet tablet  Take 1 tablet by mouth 2 (two) times a day.             docusate sodium (COLACE) 100 MG capsule  Take 100 mg by mouth Daily.             fexofenadine (ALLEGRA) 180 MG tablet  Take 180 mg by mouth Daily.             gabapentin (NEURONTIN) 600 MG tablet  Take 600 mg by mouth 3 (Three) Times a Day.              levothyroxine (SYNTHROID, LEVOTHROID) 50 MCG tablet  Take 50 mcg by mouth Daily.             LORazepam (ATIVAN) 1 MG tablet  Take 1 mg by mouth Every 8 (Eight) Hours As Needed for Anxiety (1).             metoprolol tartrate (LOPRESSOR) 25 MG tablet  Take 1 tablet by mouth 2 (Two) Times a Day.             ranitidine (ZANTAC) 300 MG capsule  Take 300 mg by mouth 2 (two) times a day.             Sennosides 8.6 MG capsule  Take 8.6 mg by mouth Daily.                   Discharge Diet:   Dietary Orders     Start     Ordered    05/29/18 1420  Diet Regular; Cardiac  Diet Effective Now     Question Answer Comment   Diet Texture / Consistency Regular    Common Modifiers Cardiac        05/29/18 1419          Activity at Discharge:  activity as tolerated    Follow-up Appointments:  Follow-up Information     JUNIE Wahl .    Specialty:  Family Medicine  Contact information:  [address]  [address] [address] [phone]                       Test Results Pending at Discharge:  None     Robert Rivas DO  06/01/18  8:13 AM      Time: Greater than 30 minutes spent on this discharge.

## 2018-06-02 LAB
ALBUMIN SERPL-MCNC: 3.2 G/DL (ref 3.4–4.8)
ALBUMIN/GLOB SERPL: 1 G/DL (ref 1.5–2.5)
ALP SERPL-CCNC: 50 U/L (ref 35–104)
ALT SERPL W P-5'-P-CCNC: 21 U/L (ref 10–36)
ANION GAP SERPL CALCULATED.3IONS-SCNC: 7.3 MMOL/L (ref 3.6–11.2)
AST SERPL-CCNC: 33 U/L (ref 10–30)
BASOPHILS # BLD AUTO: 0.06 10*3/MM3 (ref 0–0.3)
BASOPHILS NFR BLD AUTO: 0.7 % (ref 0–2)
BILIRUB SERPL-MCNC: 0.5 MG/DL (ref 0.2–1.8)
BUN BLD-MCNC: 12 MG/DL (ref 7–21)
BUN/CREAT SERPL: 14.3 (ref 7–25)
CALCIUM SPEC-SCNC: 8.8 MG/DL (ref 7.7–10)
CHLORIDE SERPL-SCNC: 109 MMOL/L (ref 99–112)
CO2 SERPL-SCNC: 22.7 MMOL/L (ref 24.3–31.9)
CREAT BLD-MCNC: 0.84 MG/DL (ref 0.43–1.29)
DEPRECATED RDW RBC AUTO: 43.2 FL (ref 37–54)
EOSINOPHIL # BLD AUTO: 0.82 10*3/MM3 (ref 0–0.7)
EOSINOPHIL NFR BLD AUTO: 9.4 % (ref 0–7)
ERYTHROCYTE [DISTWIDTH] IN BLOOD BY AUTOMATED COUNT: 13.1 % (ref 11.5–14.5)
GFR SERPL CREATININE-BSD FRML MDRD: 65 ML/MIN/1.73
GLOBULIN UR ELPH-MCNC: 3.3 GM/DL
GLUCOSE BLD-MCNC: 88 MG/DL (ref 70–110)
HCT VFR BLD AUTO: 28.2 % (ref 37–47)
HGB BLD-MCNC: 9.3 G/DL (ref 12–16)
IMM GRANULOCYTES # BLD: 0.03 10*3/MM3 (ref 0–0.03)
IMM GRANULOCYTES NFR BLD: 0.3 % (ref 0–0.5)
LYMPHOCYTES # BLD AUTO: 2.61 10*3/MM3 (ref 1–3)
LYMPHOCYTES NFR BLD AUTO: 29.9 % (ref 16–46)
MCH RBC QN AUTO: 31.3 PG (ref 27–33)
MCHC RBC AUTO-ENTMCNC: 33 G/DL (ref 33–37)
MCV RBC AUTO: 94.9 FL (ref 80–94)
MONOCYTES # BLD AUTO: 1.21 10*3/MM3 (ref 0.1–0.9)
MONOCYTES NFR BLD AUTO: 13.8 % (ref 0–12)
NEUTROPHILS # BLD AUTO: 4.01 10*3/MM3 (ref 1.4–6.5)
NEUTROPHILS NFR BLD AUTO: 45.9 % (ref 40–75)
OSMOLALITY SERPL CALC.SUM OF ELEC: 276.7 MOSM/KG (ref 273–305)
PLATELET # BLD AUTO: 255 10*3/MM3 (ref 130–400)
PMV BLD AUTO: 10.3 FL (ref 6–10)
POTASSIUM BLD-SCNC: 3.7 MMOL/L (ref 3.5–5.3)
PROT SERPL-MCNC: 6.5 G/DL (ref 6–8)
RBC # BLD AUTO: 2.97 10*6/MM3 (ref 4.2–5.4)
SODIUM BLD-SCNC: 139 MMOL/L (ref 135–153)
WBC NRBC COR # BLD: 8.74 10*3/MM3 (ref 4.5–12.5)

## 2018-06-02 PROCEDURE — 97167 OT EVAL HIGH COMPLEX 60 MIN: CPT

## 2018-06-02 PROCEDURE — 97162 PT EVAL MOD COMPLEX 30 MIN: CPT

## 2018-06-02 PROCEDURE — 85025 COMPLETE CBC W/AUTO DIFF WBC: CPT | Performed by: FAMILY MEDICINE

## 2018-06-02 PROCEDURE — 97530 THERAPEUTIC ACTIVITIES: CPT

## 2018-06-02 PROCEDURE — 80053 COMPREHEN METABOLIC PANEL: CPT | Performed by: FAMILY MEDICINE

## 2018-06-02 PROCEDURE — 25010000002 CEFTRIAXONE: Performed by: FAMILY MEDICINE

## 2018-06-02 PROCEDURE — 97110 THERAPEUTIC EXERCISES: CPT

## 2018-06-02 PROCEDURE — 97116 GAIT TRAINING THERAPY: CPT

## 2018-06-02 RX ADMIN — OXYCODONE HYDROCHLORIDE AND ACETAMINOPHEN 1 TABLET: 5; 325 TABLET ORAL at 11:52

## 2018-06-02 RX ADMIN — OXYCODONE HYDROCHLORIDE AND ACETAMINOPHEN 1 TABLET: 5; 325 TABLET ORAL at 04:46

## 2018-06-02 RX ADMIN — GABAPENTIN 600 MG: 300 CAPSULE ORAL at 05:00

## 2018-06-02 RX ADMIN — FAMOTIDINE 40 MG: 20 TABLET, FILM COATED ORAL at 08:21

## 2018-06-02 RX ADMIN — GABAPENTIN 600 MG: 300 CAPSULE ORAL at 14:02

## 2018-06-02 RX ADMIN — LORAZEPAM 1 MG: 1 TABLET ORAL at 21:07

## 2018-06-02 RX ADMIN — MAGNESIUM HYDROXIDE 10 ML: 2400 SUSPENSION ORAL at 17:31

## 2018-06-02 RX ADMIN — APIXABAN 5 MG: 5 TABLET, FILM COATED ORAL at 20:15

## 2018-06-02 RX ADMIN — LEVOTHYROXINE SODIUM 50 MCG: 50 TABLET ORAL at 08:22

## 2018-06-02 RX ADMIN — Medication 1 TABLET: at 08:21

## 2018-06-02 RX ADMIN — Medication 1 CAPSULE: at 08:21

## 2018-06-02 RX ADMIN — APIXABAN 5 MG: 5 TABLET, FILM COATED ORAL at 08:21

## 2018-06-02 RX ADMIN — DOXYCYCLINE 100 MG: 100 INJECTION, POWDER, LYOPHILIZED, FOR SOLUTION INTRAVENOUS at 21:07

## 2018-06-02 RX ADMIN — SENNOSIDES 1 TABLET: 8.6 TABLET, FILM COATED ORAL at 08:21

## 2018-06-02 RX ADMIN — METOPROLOL TARTRATE 25 MG: 25 TABLET, FILM COATED ORAL at 08:21

## 2018-06-02 RX ADMIN — CETIRIZINE HYDROCHLORIDE 5 MG: 10 TABLET ORAL at 08:21

## 2018-06-02 RX ADMIN — Medication 1 TABLET: at 17:31

## 2018-06-02 RX ADMIN — NICOTINE 1 PATCH: 7 PATCH TRANSDERMAL at 11:52

## 2018-06-02 RX ADMIN — CEFTRIAXONE 1 G: 1 INJECTION, POWDER, FOR SOLUTION INTRAMUSCULAR; INTRAVENOUS at 20:15

## 2018-06-02 RX ADMIN — METOPROLOL TARTRATE 25 MG: 25 TABLET, FILM COATED ORAL at 20:15

## 2018-06-02 RX ADMIN — BISACODYL 10 MG: 10 SUPPOSITORY RECTAL at 22:35

## 2018-06-02 RX ADMIN — OXYCODONE HYDROCHLORIDE AND ACETAMINOPHEN 1 TABLET: 5; 325 TABLET ORAL at 21:08

## 2018-06-02 RX ADMIN — DOXYCYCLINE 100 MG: 100 INJECTION, POWDER, LYOPHILIZED, FOR SOLUTION INTRAVENOUS at 08:20

## 2018-06-02 RX ADMIN — DOCUSATE SODIUM 100 MG: 100 CAPSULE, LIQUID FILLED ORAL at 08:21

## 2018-06-02 RX ADMIN — GABAPENTIN 600 MG: 300 CAPSULE ORAL at 21:07

## 2018-06-02 NOTE — PROGRESS NOTES
Inpatient Rehabilitation Functional Measures Assessment    Functional Measures  DONNY Eating:  DONNY Grooming:  DONNY Bathing:  DONNY Upper Body Dressing:  DONNY Lower Body Dressing:  DONNY Toileting:    DONNY Bladder Management  Level of Assistance:  Frequency/Number of Accidents this Shift:    DONNY Bowel Management  Level of Assistance:  Frequency/Number of Accidents this Shift:    DONNY Bed/Chair/Wheelchair Transfer:  DONNY Toilet Transfer:  DONNY Tub/Shower Transfer:    Previously Documented Mode of Locomotion at Discharge:  DONNY Expected Mode of Locomotion at Discharge:  DONNY Walk/Wheelchair:  DONNY Stairs:    DONNY Comprehension:  Both ( auditory and visual) modes of comprehension are used  equally. Comprehension Score = 6, Modified [place].  Patient comprehends  complex/abstract information in their primary language, requiring: Additional  time.  DONNY Expression:  Both ( vocal and non-vocal) modes of expression are used  equally. Expression Score = 6,  Modified Independent. Patient expresses  complex/abstract information in their primary language, requiring:  DONNY Social Interaction:  Social Interaction Score = 6, Modified Independent.  Patient is modified independent for social interaction, requiring:  DONNY Problem Solving:  Problem Solving Score = 6, Modified [place].  Patient  makes appropriate decisions in order to solve complex problems, but requires  extra time.  DONNY Memory:  Memory Score = 6, Modified [place].  Patient is modified  independent for memory, requiring: Requires additional time.    Therapy Mode Minutes  Occupational Therapy:  Physical Therapy:  Speech Language Pathology:    Discharge Functional Goals:    Signed by: Nurse Sallie

## 2018-06-02 NOTE — PLAN OF CARE
Problem: Patient Care Overview  Goal: Plan of Care Review  Outcome: Ongoing (interventions implemented as appropriate)      Problem: Fall Risk (Adult)  Goal: Identify Related Risk Factors and Signs and Symptoms  Outcome: Ongoing (interventions implemented as appropriate)    Goal: Absence of Fall  Outcome: Ongoing (interventions implemented as appropriate)      Problem: Functional Mobility Impairment (IRF) (Adult)  Goal: Optimal/Safe Level of [place] with Mobility  Outcome: Ongoing (interventions implemented as appropriate)      Problem: Fractured Hip (Adult)  Goal: Signs and Symptoms of Listed Potential Problems Will be Absent, Minimized or Managed (Fractured Hip)  Outcome: Ongoing (interventions implemented as appropriate)      Problem: Skin Injury Risk (Adult)  Goal: Identify Related Risk Factors and Signs and Symptoms  Outcome: Ongoing (interventions implemented as appropriate)    Goal: Skin Health and Integrity  Outcome: Ongoing (interventions implemented as appropriate)

## 2018-06-02 NOTE — PROGRESS NOTES
Inpatient Rehabilitation Functional Measures Assessment and Plan of Care    Plan of Care  Self Care    [OT] Dressing (Lower)(Active)  Current Status(06/02/2018): MaxA  Weekly Goal(06/12/2018): ModA  Discharge Goal: [place]    [OT] Toileting(Active)  Current Status(06/02/2018): ModA  Weekly Goal(06/12/2018): [place]  Discharge Goal: SBA    Functional Measures  DONNY Eating:  Eating Score = 6. Patient is modified independent for eating,  requiring:  DONNY Grooming: Grooming Score = 5. Patient is supervision/set-up for grooming,  requiring: Initial preparation. No assistive devices were required.  DONNY Bathing:  Patient took sponge bath. Patient requires no physical assistance  for washing, rinsing, and drying the right arm. Patient requires no physical  assistance for washing, rinsing, and drying the left arm. Patient requires no  physical assistance for washing, rinsing, and drying the chest. Patient requires  no physical assistance for washing, rinsing, and drying the abdomen. Patient  requires no physical assistance for washing, rinsing, and drying the perineal  area. Patient requires moderate assistance for washing, rinsing, or drying the  buttocks. Patient requires no physical assistance for washing, rinsing, and  drying the right upper leg. Patient requires no physical assistance for washing,  rinsing, and drying the left upper leg. Patient requires moderate assistance for  washing, rinsing, or drying the right lower leg, including the foot. Patient  requires maximal assistance for washing, rinsing, or drying the left lower leg,  including the foot. Patient performs 70 % of bathing tasks. Bathing Score = 3,  Moderate Assistance. Patient requires the following assistive device(s): Grab  bar/arm rest to maintain balance.  DONNY Upper Body Dressing:  Upper Body Dressing Score = 5. Patient is supervision  for upper body dressing, requiring: Gathering/setting out clothes. Stand by  assistance. No assistive devices were  required.  DONNY Lower Body Dressing:  Gathering clothes was not observed for this patient.  Wearing underwear or an undergarment was not observed for this patient. Patient  requires moderate/considerable physical assistance for threading the right leg  through the pants/skirt. Patient requires maximal/significant physical  assistance for holding clothing and/or threading the left leg through the  pants/skirt. Patient requires minimal/incidental physical assistance for pulling  pants/skirt over hips and adjusting fasteners. Patient requires  moderate/considerable physical assistance for donning and/or doffing right sock.  Patient requires total assistance for holding clothing and/or donning and/or  doffing left sock. Donning and/or doffing right shoe was not observed for this  patient. Donning and/or doffing left shoe was not observed for this patient.  Patient performs 40 % of lower body dressing tasks. Lower Body Dressing Score =  2, Maximal Assistance. No assistive devices were required.  DONNY Toileting:  Patient requires no physical assistance for adjusting clothing  before using a toilet, commode, bedpan, or urinal. Patient requires minimal  assistance for hygiene. Patient requires minimal assistance for adjusting  clothing after using a toilet, commode, bedpan, or urinal. Patient performs 50 %  of toileting tasks. Toileting Score = 3, Moderate Assistance. Patient requires  the following assistive device(s): Arm rest of a specialized seat. Grab bar.    DONNY Bladder Management  Level of Assistance:  Frequency/Number of Accidents this Shift:    DONNY Bowel Management  Level of Assistance:  Frequency/Number of Accidents this Shift:    DONNY Bed/Chair/Wheelchair Transfer:  Bed/chair/wheelchair Transfer Score = 4.  Patient performs 75% or more of effort and minimal assistance (little/incidental  help/lifting of one limb/steadying) for transferring to and from the  bed/chair/wheelchair, requiring: Patient requires the  following assistive  device(s): Walker.  DONNY Toilet Transfer:  Toilet Transfer Score = 4.  Patient performs 75% or more  of effort and minimal assistance (little/incidental help/steadying) for  transferring to and from the toilet/commode, requiring: Patient requires the  following assistive device(s): Safety frame/over the toilet. Grab bars.  DONNY Tub/Shower Transfer:  Activity was not observed.    Previously Documented Mode of Locomotion at Discharge:  [facility] Expected Mode of Locomotion at Discharge:  [facility] Walk/Wheelchair:  [facility] Stairs:    [facility] Comprehension:  [facility] Expression:  [facility] Social Interaction:  [facility] Problem Solving:  DONNY Memory:    Therapy Mode Minutes  Occupational Therapy: Individual: 90 minutes.  Physical Therapy:  Speech Language Pathology:    Discharge Functional Goals:  Eatin  Groomin  Bathing; 4  Upper Body Dressin  Lower Body Dressin  Toiletin  Tub/Shower: 4  Toilet Transfers: 5    Signed by: Trini Castro Occupational Therapist

## 2018-06-02 NOTE — THERAPY EVALUATION
Inpatient Rehabilitation - Physical Therapy Initial Evaluation        [facility]     Patient Name: Anette Dunham  : 1940  MRN: [id number]    Today's Date: 2018                    Admit Date: 2018      Visit Dx: No diagnosis found.    Patient Active Problem List   Diagnosis   • Essential hypertension   • Chest pain   • Paroxysmal atrial fibrillation   • Closed left hip fracture, initial encounter   • Tobacco abuse   • Closed left hip fracture       Past Medical History:   Diagnosis Date   • Anxiety    • Arrhythmia    • Coronary artery disease    • Disease of thyroid gland    • Dysfunctional gallbladder    • GERD (gastroesophageal reflux disease)    • Hypertension    • Osteoporosis        Past Surgical History:   Procedure Laterality Date   • ABDOMINAL SURGERY     • CHOLECYSTECTOMY OPEN     • HIP HEMIARTHROPLASTY Left 2018    Procedure: LEFT HIP BIPOLAR HEMIARTHROPLASTY;  Surgeon: Trevon Arriaga MD;  Location: [facility];  Service: Orthopedics          PT ASSESSMENT (last 72 hours)      IRF Physical Therapy Evaluation     Row Name 18 1036          Evaluation/Treatment Time and Intent    Subjective Information no complaints   agrees to therapy  -LB     Document Type evaluation  -LB     Mode of Treatment physical therapy  -LB     Patient/Family Observations She was in the w/c. Had an IV for part of treatment.  -LB     Row Name 18 1036          Relationship/Environment    Lives With child(willy), adult   son  -     Row Name 18 1036          Cognition/Psychosocial- PT/OT    Affect/Mental Status (Cognitive) WFL  -LB     Follows Commands (Cognition) WFL  -LB     Personal Safety Interventions gait belt;nonskid shoes/slippers when out of bed  -LB     Row Name 18 1036          Mobility    Left Lower Extremity (Weight-bearing Status) weight-bearing as tolerated (WBAT)  -     Row Name 18 1036          Bed Mobility Assessment/Treatment    Bed Mobility Assessment/Treatment  supine-sit;sit-supine;supine-sit-supine  -LB     Supine-Sit-Supine [place] (Bed Mobility) minimum assist (75% patient effort);moderate assist (50% patient effort);verbal cues;nonverbal cues (demo/gesture)   assist to lift left leg  -LB     Row Name 06/02/18 1036          Transfer Assessment/Treatment    Transfer Assessment/Treatment bed-chair transfer;chair-bed transfer;sit-stand transfer;stand-sit transfer  -LB     Bed-Chair [place] (Transfers) contact guard  -LB     Chair-Bed [place] (Transfers) contact guard  -LB     Assistive Device (Bed-Chair Transfers) walker, front-wheeled  -LB     Sit-Stand [place] (Transfers) contact guard  -LB     Stand-Sit [place] (Transfers) contact guard  -LB     Row Name 06/02/18 1036          Chair-Bed Transfer    Assistive Device (Chair-Bed Transfers) walker, front-wheeled  -LB     Row Name 06/02/18 1036          Sit-Stand Transfer    Assistive Device (Sit-Stand Transfers) walker, front-wheeled  -LB     Row Name 06/02/18 1036          Stand-Sit Transfer    Assistive Device (Stand-Sit Transfers) walker, front-wheeled  -LB     Row Name 06/02/18 1036          Gait/Stairs Assessment/Training    [place] Level (Gait) contact guard  -LB     Assistive Device (Gait) walker, front-wheeled  -LB     Distance in Feet (Gait) 50' 80'  -LB     Pattern (Gait) step-to  -LB     Deviations/Abnormal Patterns (Gait) antalgic;gait speed decreased;stride length decreased  -LB     Row Name 06/02/18 1036          Safety Issues, Functional Mobility    Impairments Affecting Function (Mobility) balance;endurance/activity tolerance;pain;strength  -LB     Comment, Safety Issues/Impairments (Mobility) fall risk, left hip precautions  -LB     Row Name 06/02/18 1036          General ROM    GENERAL ROM COMMENTS LLE full AAROM  -LB     Row Name 06/02/18 1036          MMT (Manual Muscle Testing)    Comment, General Manual Muscle Testing (MMT) Assessment LLE 2-3/5  -LB     Row Name 06/02/18  1036          Pain Scale: Numbers Pre/Post-Treatment    Pain Location - Side Left  -LB     Pain Location hip  -LB     Row Name 06/02/18 1036          Pain Scale: FACES Pre/Post-Treatment    Pain: FACES Scale, Pretreatment 4-->hurts little more  -LB     Pain: FACES Scale, Post-Treatment 4-->hurts little more  -LB     Row Name 06/02/18 1036          Therapeutic Exercise    Therapeutic Exercise seated, lower extremities;standing, lower extremities;supine, lower extremities  -LB     Row Name 06/02/18 1036          Lower Extremity Standing Therapeutic Exercise    Performed, Standing Lower Extremity (Therapeutic Exercise) heel/toe raises;mini-squats  -LB     Device, Standing Lower Extremity (Therapeutic Exercise) parallel bars  -LB     Expected Outcomes, Standing Lower Extremity (Therapeutic Exercise) improve functional tolerance, household activity;improve performance, gait skills;improve performance, transfer skills  -LB     Restrictions, Standing Lower Extremity (Therapeutic Exercise) left hip precautions  -LB     Row Name 06/02/18 1036          Lower Extremity Seated Therapeutic Exercise    Performed, Seated Lower Extremity (Therapeutic Exercise) hip flexion/extension;hip abduction/adduction;knee flexion/extension;ankle dorsiflexion/plantarflexion  -LB     Expected Outcomes, Seated Lower Extremity (Therapeutic Exercise) improve functional tolerance, household activity;improve performance, gait skills;improve performance, transfer skills  -LB     Restrictions, Seated Lower Extremity (Therapeutic Exercise) left hip precautions  -LB     Sets/Reps Detail, Seated Lower Extremity (Therapeutic Exercise) 15 reps  -LB     Row Name 06/02/18 1036          Lower Extremity Supine Therapeutic Exercise    Performed, Supine Lower Extremity (Therapeutic Exercise) heel slides;ankle pumps;gluteal sets;quadriceps sets;SAQ (short arc quad) over bolster;hip abduction/adduction  -LB     Expected Outcomes, Supine Lower Extremity (Therapeutic  Exercise) improve functional tolerance, household activity;improve performance, gait skills;improve performance, transfer skills  -LB     Restrictions, Supine Lower Extremity (Therapeutic Exercise) left hip precautions  -LB     Sets/Reps Detail, Supine Lower Extremity (Therapeutic Exercise) 15 reps  -LB     Row Name             Wound 05/27/18 0425 Right posterior;lower arm skin tear    Wound - Properties Group Date first assessed: 05/27/18  -TF Time first assessed: 0425  -TF Present On Admission : yes  -TF Side: Right  -TF Orientation: posterior;lower  -TF Location: arm  -TF Type: skin tear  -TF    Row Name             Wound 05/29/18 1222 Left hip incision    Wound - Properties Group Date first assessed: 05/29/18  -CE Time first assessed: 1222  -CE Side: Left  -CE Location: hip  -CE Type: incision  -CE    Row Name 06/02/18 1036          PT Clinical Impression    General Observations of Patient s/p L THR due to a fall  -LB     Patient's Goals For Discharge return home  -LB     Plan For Care Reviewed: Physical Therapy patient voices agreement with PT plan for care  -LB     Rehab Potential/Prognosis (PT Eval) good, to achieve stated therapy goals  -LB     Frequency of Treatment (PT Eval) 5 times per week  -LB     Estimated Length of Stay, Weeks (PT Eval) 1 week;2 weeks  -LB     Problem List (PT Eval) strength decreased;impaired balance;pain  -LB     Existing Precautions/Restrictions fall;left;hip   left hip precautions  -LB     Anticipated Equipment Needs at Discharge (PT Eval) front wheeled walker  -LB     Expected Discharge Disposition (PT Eval) home with home health care  -LB     Row Name 06/02/18 1036          IRF PT Goals    Bed Mobility Goal Selection (PT-IRF) bed mobility, PT goal 1;bed mobility, PT goal 2  -LB     Transfer Goal Selection (PT-IRF) transfers, PT goal 1  -LB     Gait (Walking Locomotion) Goal Selection (PT-IRF) gait, PT goal 1  -LB     Row Name 06/02/18 1036          Bed Mobility Goal 1 (PT-IRF)     Activity/Assistive Device (Bed Mobility Goal 1, PT-IRF) sit to supine/supine to sit  -LB     [place] (Bed Mobility Goal 1, PT-IRF) contact guard assist  -LB     Time Frame (Bed Mobility Goal 1, PT-IRF) short term goal (STG);1 week  -LB     Row Name 06/02/18 1036          Bed Mobility Goal 2 (PT-IRF)    Activity/Assistive Device (Bed Mobility Goal 2, PT-IRF) sit to supine/supine to sit  -LB     [place] (Bed Mobility Goal 2, PT-IRF) supervision required  -LB     Time Frame (Bed Mobility Goal 2, PT-IRF) long term goal (LTG);by discharge  -LB     Row Name 06/02/18 1036          Transfer Goal 1 (PT-IRF)    Activity/Assistive Device (Transfer Goal 1, PT-IRF) sit-to-stand/stand-to-sit;bed-to-chair/chair-to-bed  -LB     [place] (Transfer Goal 1, PT-IRF) supervision required  -LB     Time Frame (Transfer Goal 1, PT-IRF) by discharge  -LB     Row Name 06/02/18 1036          Gait/Walking Locomotion Goal 1 (PT-IRF)    Activity/Assistive Device (Gait/Walking Locomotion Goal 1, PT-IRF) walker, rolling  -LB     Gait/Walking Locomotion Distance Goal 1 (PT-IRF) 300  -LB     [place] (Gait/Walking Locomotion Goal 1, PT-IRF) supervision required  -LB     Time Frame (Gait/Walking Locomotion Goal 1, PT-IRF) by discharge  -LB     Row Name 06/02/18 1036          Positioning and Restraints    Pre-Treatment Position sitting in chair/recliner  -LB     In Wheelchair call light within reach;encouraged to call for assist  -LB       User Key  (r) = Recorded By, (t) = Taken By, (c) = Cosigned By    Initials Name Provider Type    LB Noris Wooten, PT Physical Therapist    THO Vigil, RN Registered Nurse    TF Bisi Moreau, HARRY Registered Nurse                  PT Recommendation and Plan    Planned Therapy Interventions (PT Eval): balance training, bed mobility training, gait training, patient/family education, strengthening, transfer training  Frequency of Treatment (PT Eval): 5 times per  week    Anticipated Equipment Needs at Discharge (PT Eval): front wheeled walker                 Outcome Measures     Row Name 05/31/18 1400 05/30/18 1600 05/30/18 1100       How much difficulty does the patient currently have...    Turning from your back to your side while in flat bed without using bedrails? 3  -CT 3  -KB 3  -CT    Standing up from a chair using your arms (e.g., wheelchair, bedside chair)? 3  -CT 3  -KB 3  -CT    Moving from lying on back to sitting on the side of a flat bed without bedrails? 3  -CT 3  -KB 3  -CT       How much help from another person do you currently need...    Moving to and from a bed to a chair (including a wheelchair)? 3  -CT 3  -KB 3  -CT    Climbing 3-5 steps with a railing? 2  -CT 2  -KB 2  -CT    To walk in hospital room? 2  -CT 2  -KB 2  -CT    AM-PAC 6 Clicks Score 16  -CT 16  -KB 16  -CT       Functional Assessment    Outcome Measure Options AM-PAC 6 Clicks Basic Mobility (PT)  -CT AM-PAC 6 Clicks Basic Mobility (PT)  -KB AM-PAC 6 Clicks Basic Mobility (PT)  -CT      User Key  (r) = Recorded By, (t) = Taken By, (c) = Cosigned By    Initials Name Provider Type    CT Genet Li PT Physical Therapist    JOSIANE Avilez PTA Physical Therapy Assistant               Time Calculation:           PT Charges     Row Name 06/02/18 1052             Time Calculation    Start Time 0910  -LB      Stop Time 1040  -LB      Time Calculation (min) 90 min  -LB      PT Received On 06/02/18  -LB      PT Goal Re-Cert Due Date 06/08/18  -LB        User Key  (r) = Recorded By, (t) = Taken By, (c) = Cosigned By    Initials Name Provider Type    LB Noris Wooten, LATONIA Physical Therapist            Therapy Charges for Today     Code Description Service Date Service Provider Modifiers Qty    79646462631 HC PT EVAL MOD COMPLEXITY 1 6/2/2018 Noris Wooten, PT GP 1    81504478930 HC GAIT TRAINING EA 15 MIN 6/2/2018 Noris Wooten, PT GP 1    88653144320 HC PT THER PROC EA 15  MIN 6/2/2018 Noris Wooten, PT GP 4    [id number]  PT THER SUPP EA 15 MIN 6/2/2018 Noris Wooten, PT GP 2                   Noris Wooten, PT  6/2/2018

## 2018-06-02 NOTE — PLAN OF CARE
Problem: Patient Care Overview  Goal: Plan of Care Review  Outcome: Ongoing (interventions implemented as appropriate)      Problem: Fall Risk (Adult)  Goal: Identify Related Risk Factors and Signs and Symptoms  Outcome: Ongoing (interventions implemented as appropriate)    Goal: Absence of Fall  Outcome: Ongoing (interventions implemented as appropriate)      Problem: Functional Mobility Impairment (IRF) (Adult)  Goal: Optimal/Safe Level of [place] with Mobility  Outcome: Ongoing (interventions implemented as appropriate)      Problem: Fractured Hip (Adult)  Goal: Signs and Symptoms of Listed Potential Problems Will be Absent, Minimized or Managed (Fractured Hip)  Outcome: Ongoing (interventions implemented as appropriate)      Problem: Skin Injury Risk (Adult)  Goal: Identify Related Risk Factors and Signs and Symptoms  Outcome: Ongoing (interventions implemented as appropriate)    Goal: Skin Health and Integrity  Outcome: Ongoing (interventions implemented as appropriate)

## 2018-06-02 NOTE — PROGRESS NOTES
Inpatient Rehabilitation Functional Measures Assessment    Functional Measures  DONNY Eating:  DONNY Grooming: Grooming Score = 5. Patient is supervision/set-up for grooming,  requiring: Stand by assistance. Setting out grooming equipment. Initial  preparation. No assistive devices were required.  DONNY Bathing:  Patient took sponge bath. Patient requires no physical assistance  for washing, rinsing, and drying the right arm. Patient requires no physical  assistance for washing, rinsing, and drying the left arm. Patient requires no  physical assistance for washing, rinsing, and drying the chest. Patient requires  no physical assistance for washing, rinsing, and drying the abdomen. Patient  requires no physical assistance for washing, rinsing, and drying the perineal  area. Patient requires total assistance for washing, rinsing, or drying the  buttocks. Patient requires no physical assistance for washing, rinsing, and  drying the right upper leg. Patient requires no physical assistance for washing,  rinsing, and drying the left upper leg. Patient requires total assistance for  washing, rinsing, or drying the right lower leg, including the foot. Patient  requires total assistance for washing, rinsing, or drying the left lower leg,  including the foot. Patient performs 70 % of bathing tasks. Bathing Score = 3,  Moderate Assistance. Patient requires the following assistive device(s): Grab  bar/arm rest to maintain balance.  DONNY Upper Body Dressing:  Upper Body Dressing was not observed this shift  because patient dressed/undressed in pajamas/gown only. .  DONNY Lower Body Dressing:  Lower Body Dressing was not observed this shift  because patient dressed/undressed in pajamas/gown only.  DONNY Toileting:  Toileting Score = 4.  Patient requires minimal assistance for  toileting, such as steadying for balance while cleansing or adjusting clothes.  Patient requires the following assistive device(s): Grab bar.    DONNY Bladder  Management  Level of Assistance:  Bladder Score = 1. Patient performs less than 25% of tasks  and requires total assist or assist of two for use of bedpan in bladder  management.  Frequency/Number of Accidents this Shift:  Bladder accidents this shift:  0 .  Patient has not had an accident, but used a device/medication this shift  requiring: bedpan .    DONNY Bowel Management  Level of Assistance: Activity was not observed.  Frequency/Number of Accidents this Shift:    DONNY Bed/Chair/Wheelchair Transfer:  Activity was not observed.  DONNY Toilet Transfer:  Toilet Transfer Score = 3.  Patient performs 50-74% of  effort and requires moderate assistance (some lifting) for transferring to and  from the toilet/commode. Patient requires the following assistive device(s):  Walker. Grab bars. Safety frame/over the toilet.  DONNY Tub/Shower Transfer:  Activity was not observed.    Previously Documented Mode of Locomotion at Discharge:  [facility] Expected Mode of Locomotion at Discharge:  DONNY Walk/Wheelchair:  DONNY Stairs:    [facility] Comprehension:  DONNY Expression:  [facility] Social Interaction:  [facility] Problem Solving:  DONNY Memory:    Therapy Mode Minutes  Occupational Therapy:  Physical Therapy:  Speech Language Pathology:    Discharge Functional Goals:    Signed by: PARTH Leon

## 2018-06-02 NOTE — PAYOR COMM NOTE
"CONTACT:  TANO LIM RN, BSN  UTILIZATION MANAGEMENT DEPT.  [facility]  [address] [facility], [address]  PHONE:  [phone]  FAX: [phone]    PATIENT DISCHARGED TO REHAB ON 6/1/18. STILL AWAITING INPATIENT AUTHORIZATION. MEDICARE AB PRIMARY, WELLCARE SECONDARY.    Claudette De Santiago  (78 y.o. Female)     Date of Birth Social Security Number Address Home Phone MRN    [id number]  [id number]  [facility] [address] [phone] [id number]    [place] Marital Status          Unknown        Admission Date Admission Type Admitting Provider Attending Provider Department, Room/Bed    5/27/18 Emergency Beni Santana MD  [address], [address]/    Discharge Date Discharge Disposition Discharge Destination        6/1/2018 Rehab Facility or Unit ([facility] [place])              Attending Provider:  (none)   Allergies:  No Known Allergies    Isolation:  None   Infection:  None   Code Status:  FULL    Ht:  160 cm (63\")   Wt:  75.4 kg (166 lb 4.8 oz)    Admission Cmt:  None   Principal Problem:  Closed left hip fracture, initial encounter [S72.002A]                 Active Insurance as of 5/27/2018     Primary Coverage     Payor Plan Insurance Group Employer/Plan Group    MEDICARE MEDICARE A & B      Payor Plan Address Payor Plan Phone Number Effective From Effective To    [address] [phone] 3/1/2004     [facility] [id number]       Subscriber Name Subscriber Birth Date Member ID       CLAUDETTE DE SANTIAGO 1940 [id number]           Secondary Coverage     Payor Plan Insurance Group Employer/Plan Group    [facility] MEDICAID      Payor Plan Address Payor Plan Phone Number Effective From Effective To    [address] [address] [phone] 8/16/2016     [facility] [id number]       Subscriber Name Subscriber Birth Date Member ID       CLAUDETTE DE SANTIAGO 1940 [id number]                 Emergency Contacts      (Rel.) Home Phone Work Phone Mobile Phone    Vielka Patel " (Daughter) [phone] -- --               History & Physical      NISA Bingham at 2018  8:35 AM     Attestation signed by Anirudh Collins MD at 2018 11:04 AM    AKHIL: Due to the patient's history of fragility fracture, there is reason to suspect that she might have age-related osteoporosis. She will probably need a Dexa scan as outpatient for confirmation. Meanwhile, we will commence vitamin D/calcium/and possibly a biphosphonate therapy, if available.                        [facility] HOSPITALIST HISTORY AND PHYSICAL    Patient Identification:  Name:  Anette Dunham  Age:  78 y.o.  Sex:  female  :  1940  MRN:  [id number]   Visit Number:  [id number]  Primary Care Physician:  JUNIE Wahl  Subjective     Chief complaint:   Chief Complaint   Patient presents with   • Fall     History of presenting illness:   Patient is a 78 y.o. female with past medical history significant for paroxsymal atrial fibrillation, chronic anticoagulation with Eliquis, and essential hypertension. She presented to the emergency department of [facility] on 18 after suffering a fall in her home. She awoke in the middle of the night and got up to the go the restroom. When she was in the restroom she lost balance and fell backwards. Denies any dizziness, near syncope, or actual syncopal episode. She is unsure if she hit her head or not, but reports some mild tenderness of the back of her head. No unilateral weakness, slurred speech, or altered mental status. No bleeding issues noted. She developed left hip/leg pain and therefore presented to the ED for further evaluation and therapy. She states that she falls fairly frequently and feels that it is secondary to poor balance. She has a walker at home, but doesn't use it. She denies any other recent issues. No fever, chills, chest pains or discomfort, nausea, vomiting, or pedal edema. She has some complaints of seasonal  allergies with postnasal drip and slight cough. She also has chronic constipation.     Upon arrival to the ED, vitals were /68, pulse 72, RR 18, SpO2 96%, and temperature 98.3F. X-ray revealed a closed left hip fracture. EKG revealed a sinus rhythm with a 1st AV block and possible old septal infarct. Patient was admitted for pain management and orthopedic evaluation.     Patient has been admitted to the telemetry unit of [facility].   ---------------------------------------------------------------------------------------------------------------------  Review of Systems   Constitutional: Negative for chills, diaphoresis, fatigue and fever.   HENT: Positive for congestion, postnasal drip and rhinorrhea. Negative for sneezing.    Respiratory: Positive for cough. Negative for shortness of breath.    Cardiovascular: Negative for chest pain, palpitations and leg swelling.   Gastrointestinal: Negative for abdominal distention, abdominal pain, anal bleeding, blood in stool, constipation, diarrhea, nausea and vomiting.   Genitourinary: Positive for frequency and urgency. Negative for difficulty urinating and hematuria.   Musculoskeletal:        Left hip pain.    Skin: Negative for color change, pallor, rash and wound.   Neurological: Negative for dizziness, syncope, weakness and headaches.        Difficulty with balance.    Psychiatric/Behavioral: Negative for behavioral problems and confusion.   --------------------------------------------------------------------------------------------------------------------   Past Medical History:   Diagnosis Date   • Anxiety    • Arrhythmia    • Coronary artery disease    • Disease of thyroid gland    • GERD (gastroesophageal reflux disease)    • Hypertension    • Osteoporosis      History reviewed. No pertinent surgical history.  Family History   Problem Relation Age of Onset   • Heart disease Father      Social History     Social History   • Marital status:      Social History  Main Topics   • Smoking status: Current Every Day Smoker     Packs/day: 0.25     Years: 15.00     Types: Cigarettes   • Smokeless tobacco: Current User     Types: Snuff      Comment: snuff for 60 years    • Alcohol use No   • Drug use: No   • Sexual activity: Defer     Other Topics Concern   • Not on file   ---------------------------------------------------------------------------------------------------------------------  Allergies:  Patient has no known allergies.  ---------------------------------------------------------------------------------------------------------------------  Prior to Admission Medications     Prescriptions Last Dose Informant Patient Reported? Taking?    alendronate (FOSAMAX) 70 MG tablet 5/23/2018 Medication Bottle Yes Yes    Take 70 mg by mouth Every 7 (Seven) Days. PTA: Pt takes each Wednesday    amLODIPine (NORVASC) 2.5 MG tablet 5/26/2018 Medication Bottle No Yes    Take 1 tablet by mouth Daily.    apixaban (ELIQUIS) 5 MG tablet tablet 5/26/2018 Medication Bottle No Yes    Take 1 tablet by mouth 2 (two) times a day.    docusate sodium (COLACE) 100 MG capsule 5/26/2018 Medication Bottle Yes Yes    Take 100 mg by mouth Daily.    fexofenadine (ALLEGRA) 180 MG tablet 5/26/2018 Medication Bottle Yes Yes    Take 180 mg by mouth Daily.    gabapentin (NEURONTIN) 600 MG tablet 5/26/2018 Medication Bottle Yes Yes    Take 600 mg by mouth 3 (Three) Times a Day.    levothyroxine (SYNTHROID, LEVOTHROID) 50 MCG tablet 5/26/2018 Medication Bottle Yes Yes    Take 50 mcg by mouth Daily.    LORazepam (ATIVAN) 1 MG tablet 5/26/2018 Medication Bottle Yes Yes    Take 1 mg by mouth Every 8 (Eight) Hours As Needed for Anxiety (1).    metoprolol tartrate (LOPRESSOR) 25 MG tablet 5/26/2018 Medication Bottle No Yes    Take 1 tablet by mouth 2 (Two) Times a Day.    ranitidine (ZANTAC) 300 MG capsule 5/26/2018 Medication Bottle Yes Yes    Take 300 mg by mouth 2 (two) times a day.    Sennosides 8.6 MG capsule  5/26/2018 Medication Bottle Yes Yes    Take 8.6 mg by mouth Daily.      ---------------------------------------------------------------------------------------------------------------------  Objective     \[facility]\"" Meds:    cetirizine 5 mg Oral Daily   docusate sodium 100 mg Oral Daily   famotidine 40 mg Oral Daily   gabapentin 600 mg Oral Q8H   levothyroxine 50 mcg Oral Daily   metoprolol tartrate 25 mg Oral Q12H   senna 1 tablet Oral Daily   --------------------------------------------------------------------------------------------------------------------- Vital Signs:  Temp:  [98.1 °F (36.7 °C)-98.3 °F (36.8 °C)] 98.1 °F (36.7 °C)  Heart Rate:  [72-96] 96  Resp:  [18] 18  BP: (107-147)/(45-68) 107/45  No data found.    SpO2 Percentage    05/27/18 0422 05/27/18 0634   SpO2: 96% 97%     SpO2:  [96 %-97 %] 97 %  on   ;   Device (Oxygen Therapy): room air    Body mass index is 27.46 kg/m².  Wt Readings from Last 3 Encounters:   05/27/18 70.3 kg (155 lb)   04/20/18 68.5 kg (151 lb)   01/19/18 72.8 kg (160 lb 6.4 oz)    --------------------------------------------------------------------------------------------------------------------   Physical Exam:  Constitutional:  Well-developed and well-nourished.  No respiratory distress. Resting comfortably.   HENT:  Head: Normocephalic and atraumatic.  Mouth:  Moist mucous membranes.    Eyes:  Conjunctivae and EOM are normal.  Pupils are equal, round, and reactive to light.  No scleral icterus.  Neck:  Neck supple.  No JVD present.    Cardiovascular:  Normal rate, regular rhythm and normal heart sounds with no murmur.  Pulmonary/Chest:  No respiratory distress, no wheezes, no crackles, with normal breath sounds and good air movement.  Abdominal:  Soft.  Bowel sounds are normal.  No distension and no tenderness.   Musculoskeletal: Left lower extremity is externally rotated. She is able to move left foot and toes. No abnormality of the right lower extremity  appreciated.   Neurological:  Alert and oriented to person, place, and time.  No cranial nerve deficit.  No tongue deviation.  No facial droop.  No slurred speech. Moving all 4 extremities.   Skin:  Skin is warm and dry.  No rash noted.  No pallor.   Psychiatric:  Normal mood and affect.  Behavior is normal.  Judgment and thought content normal.   Peripheral vascular:  No edema and good pedal pulses bilaterally.   Genitourinary: No aranda catheter.   ---------------------------------------------------------------------------------------------------------------------  EKG:  Pending cardiology interpretation. Per my read, sinus rhythm at 72 BPM with a 1st degree AV block and possible old septal infarct. QTc 446ms.      Telemetry: Sinus rhythm in the 80s with frequent PACs.     I have personally reviewed the EKG/Telemetry strip  ---------------------------------------------------------------------------------------------------------------------              Results from last 7 days  Lab Units 05/27/18  0449   WBC 10*3/mm3 7.12   HEMOGLOBIN g/dL 12.8   HEMATOCRIT % 38.5   MCV fL 94.1*   MCHC g/dL 33.2   PLATELETS 10*3/mm3 260   INR  1.20*       Results from last 7 days  Lab Units 05/27/18  0449   SODIUM mmol/L 134*   POTASSIUM mmol/L 4.1   CHLORIDE mmol/L 103   CO2 mmol/L 26.4   BUN mg/dL 24*   CREATININE mg/dL 1.30*   EGFR IF NONAFRICN AM mL/min/1.73 40*   CALCIUM mg/dL 9.1   GLUCOSE mg/dL 111*   ALBUMIN g/dL 4.30   BILIRUBIN mg/dL 0.5   ALK PHOS U/L 67   AST (SGOT) U/L 27   ALT (SGPT) U/L 22   Estimated Creatinine Clearance: 33 mL/min (A) (by C-G formula based on SCr of 1.3 mg/dL (H)).     Glucose   Date/Time Value Ref Range Status   05/27/2018 0627 113 70 - 130 mg/dL Final     Lab Results   Component Value Date    TSH 3.373 05/31/2017   I have personally reviewed the above laboratory results.    ---------------------------------------------------------------------------------------------------------------------  Imaging Results (last 7 days)     Procedure Component Value Units Date/Time    CT Head Without Contrast [[id number]] Collected:  05/27/18 0926     Updated:  05/27/18 0928    Narrative:          EXAMINATION: CT HEAD WO CONTRAST-      CLINICAL INDICATION:     fall, eliquis     TECHNIQUE: Contiguous axial CT images of the head were obtained without  contrast administration.      Radiation dose reduction techniques were utilized per ALARA protocol.  Automated exposure control was initiated through either or StyleShare or  DoseRight software packages by  protocol.       1147.5 mGy.cm     COMPARISON:  None.       FINDINGS: Generalized cerebral atrophy is present. There is no mass  effect, midline displacement, or hydrocephalus. There are patchy areas  of decreased density within the periventricular white matter which  likely reflect chronic small vessel ischemic changes. There is no CT  evidence of acute infarct or hemorrhage. Bone windows reveal no osseous  abnormalities or fractures.        Impression:       Atrophy and chronic small vessel ischemic change, but there  are no acute intracranial abnormalities identified.         This report was finalized on 5/27/2018 9:26 AM by Dr. Sae Montoya MD.       XR Hip With or Without Pelvis 2 - 3 View Left [[id number]] Collected:  05/27/18 0925     Updated:  05/27/18 0928    Narrative:       EXAMINATION: XR HIP W OR WO PELVIS 2-3 VIEW LEFT-      CLINICAL INDICATION:  left hip pain, fall      TECHNIQUE: X-rays of the     hip in two views. One frontal view of the  pelvis.     COMPARISON: None      FINDINGS: Moderately angulated left femoral neck subcapital fracture.       Impression:       Moderately angulated left femoral neck subcapital fracture.     This report was finalized on 5/27/2018 9:25 AM by Dr. Sae Montoya MD.       XR Chest 1 View  [[id number]] Collected:  05/27/18 0924     Updated:  05/27/18 0927    Narrative:       EXAMINATION: XR CHEST 1 VW-      CLINICAL INDICATION:     presurgical; S72.002A-Fracture of unspecified  part of neck of left femur, initial encounter for closed fracture     TECHNIQUE:  XR CHEST 1 VW-      COMPARISON: NONE      FINDINGS:   PATCHY BILATERAL AIRSPACE DISEASE.  Heart and mediastinum contours are unremarkable.  No pleural effusion.  No pneumothorax.   Bony and soft tissue structures are unremarkable.       Impression:       PATCHY BILATERAL AIRSPACE DISEASE.     This report was finalized on 5/27/2018 9:25 AM by Dr. Sae Montoya MD.         I have personally reviewed the above radiology results.   ---------------------------------------------------------------------------------------------------------------------  Assessment & Plan    -Acute closed left hip fracture  -Paroxsymal atrial fibrillation, following with Dr. Sellers as an outpatient, currently in a sinus rhythm   -Chronic anticoagulation with Eliquis  -Acute renal insufficiency with creatinine 1.30, up from 1.11 in 05/2017, recent baseline unknown  -Patchy airspace disease noted on CXR with no previous available for comparison   -Mild hyponatremia   -Essential hypertension, controlled  -Tobacco abuse (0.25ppd smoker and snuff)    Hold Eliquis in anticipation of surgery. Her last dose was at about 9:30 PM last evening. Eliquis will be held for 48 hours prior to surgery, which will likely be Tuesday, 05/29/18. Consult orthopedics for surgical correction of the left hip fracture. Will give her a nicotine patch. Will go ahead and let her have a diet today. Patchy bilateral airspace disease noted on CXR. No prior CXR available for comparison. WBC count normal. She does complain of some productive cough. Check CRP.     Hold nephrotoxic medications. Will give light fluids and monitor renal function and sodium closely. Repeat BMP in AM.     DVT Prophylaxis: Patient  had Eliquis last evening at 9:30PM. Place marielena/scud on the right lower extremity for now.     CODE STATUS: FULL, discussed with the patient with family members at bedside.     The patient is considered to be a high risk patient due to: advanced age, anticoagulation.     I have discussed the patient's assessment and plan with patient and family at bedside.    NISA Huynh acting as scribe for Dr. Anirudh Collins MD.  05/27/18  9:38 AM  ---------------------------------------------------------------------------------------------------------------------       Electronically signed by Anirudh Collins MD at 5/27/2018 11:04 AM          Emergency Department Notes      Kaitlin Whitehead RN at 5/27/2018  4:26 AM        Orange falls risk band applied to Pt left arm at this time.      Kaitlin Whitehead RN  05/27/18 0428      Electronically signed by Kaitlin Whitehead RN at 5/27/2018  4:28 AM     Bisi Moreau RN at 5/27/2018  4:37 AM        Fall risk band applied to left wrist     Bisi Moreau RN  05/27/18 0438      Electronically signed by Bisi Moreau RN at 5/27/2018  4:38 AM     JUNIE Ahn at 5/27/2018  4:45 AM     Attestation signed by Annamaria Higgins DO at 5/27/2018  7:28 PM          For this patient encounter, I reviewed the NP or PA documentation, treatment plan, and medical decision making. Annamaria Higgins DO 5/27/2018 7:28 PM                  Subjective     History provided by:  Patient   used: No    Fall   Mechanism of injury: fall    Injury location:  Pelvis  Pelvic injury location:  L hip  Incident location:  Home  Time since incident:  30 minutes  Arrived directly from scene: yes    Fall:     Fall occurred:  In the bathroom    Impact surface:  Hard floor    Point of impact:  Buttocks  Suspicion of alcohol use: no    Suspicion of drug use: no    Tetanus status:  Unknown  Associated symptoms: no abdominal pain, no blindness, no difficulty  breathing, no headaches, no nausea, no neck pain and no seizures    Risk factors: anticoagulation therapy        Review of Systems   Constitutional: Negative.    HENT: Negative.    Eyes: Negative.  Negative for blindness.   Respiratory: Negative.    Cardiovascular: Negative.    Gastrointestinal: Negative.  Negative for abdominal pain and nausea.   Endocrine: Negative.    Genitourinary: Negative.    Musculoskeletal: Positive for gait problem. Negative for neck pain.   Skin: Negative.    Allergic/Immunologic: Negative.    Neurological: Negative for seizures and headaches.   Hematological: Negative.    Psychiatric/Behavioral: Negative.    All other systems reviewed and are negative.      Past Medical History:   Diagnosis Date   • Anxiety    • Arrhythmia    • Coronary artery disease    • Disease of thyroid gland    • GERD (gastroesophageal reflux disease)    • Hypertension    • Osteoporosis        No Known Allergies    History reviewed. No pertinent surgical history.    Family History   Problem Relation Age of Onset   • Heart disease Father        Social History     Social History   • Marital status:      Social History Main Topics   • Smoking status: Former Smoker     Packs/day: 0.25     Years: 15.00     Types: Cigarettes   • Smokeless tobacco: Current User     Types: Snuff      Comment: snuff for 60 years    • Alcohol use No   • Drug use: No   • Sexual activity: Defer     Other Topics Concern   • Not on file           Objective   Physical Exam   Constitutional: She is oriented to person, place, and time. She appears well-developed and well-nourished.   HENT:   Head: Normocephalic and atraumatic.   Eyes: EOM are normal. Pupils are equal, round, and reactive to light.   Neck: Normal range of motion. Neck supple.   Cardiovascular: Normal rate, regular rhythm, normal heart sounds and intact distal pulses.    Pulmonary/Chest: Effort normal and breath sounds normal.   Abdominal: Soft. Bowel sounds are normal.    Musculoskeletal: She exhibits tenderness.   Left hip pain and limited ROM    Neurological: She is alert and oriented to person, place, and time.   Skin: Skin is warm and dry. Capillary refill takes less than 2 seconds.   Psychiatric: She has a normal mood and affect. Her behavior is normal. Judgment and thought content normal.   Nursing note and vitals reviewed.      Procedures          ED Course  ED Course as of May 27 0554   Sun May 27, 2018   0535 Femoral neck fracture, left XR Hip With or Without Pelvis 2 - 3 View Left [KK]   0541 Unenhanced head CT exam demonstrates no acute extra axial or intracerebral hemorrhage.  CT Head Without Contrast [KK]      ED Course User Index  [KK] Venita Robbins, APRN                  MDM  Number of Diagnoses or Management Options  Closed left hip fracture, initial encounter: new and requires workup     Amount and/or Complexity of Data Reviewed  Clinical lab tests: ordered and reviewed  Tests in the medicine section of CPT®:  reviewed and ordered    Risk of Complications, Morbidity, and/or Mortality  Presenting problems: moderate  Diagnostic procedures: moderate  Management options: moderate    Patient Progress  Patient progress: improved        Final diagnoses:   Closed left hip fracture, initial encounter            Venita Robbins, JUNIE  05/27/18 0553       Venita Robbins, JUNIE  05/27/18 0554      Electronically signed by Annamaria Higgins DO at 5/27/2018  7:28 PM     Yusuf Farr at 5/27/2018  5:42 AM        ekg performed by tech @ 0541. Given to Dr. Ronaldo Farr  05/27/18 0543      Electronically signed by Yusuf Farr at 5/27/2018  5:43 AM       Discharge Summary     No notes of this type exist for this encounter.

## 2018-06-02 NOTE — THERAPY EVALUATION
Inpatient Rehabilitation - Occupational Therapy Initial Evaluation     Wang     Patient Name: Anette Dunham  : 1940  MRN: [id number]    Today's Date: 2018                 Admit Date: 2018       No diagnosis found.    Patient Active Problem List   Diagnosis   • Essential hypertension   • Chest pain   • Paroxysmal atrial fibrillation   • Closed left hip fracture, initial encounter   • Tobacco abuse   • Closed left hip fracture       Past Medical History:   Diagnosis Date   • Anxiety    • Arrhythmia    • Coronary artery disease    • Disease of thyroid gland    • Dysfunctional gallbladder    • GERD (gastroesophageal reflux disease)    • Hypertension    • Osteoporosis        Past Surgical History:   Procedure Laterality Date   • ABDOMINAL SURGERY     • CHOLECYSTECTOMY OPEN     • HIP HEMIARTHROPLASTY Left 2018    Procedure: LEFT HIP BIPOLAR HEMIARTHROPLASTY;  Surgeon: Trevon Arriaga MD;  Location: [facility];  Service: Orthopedics            IRF OT ASSESSMENT FLOWSHEET (last 72 hours)      IRF Occupational Therapy Evaluation     Row Name 18 0939                   Evaluation/Treatment Time and Intent    Subjective Information no complaints   agreeable to therapy. Pt tolerated therapy well w/rest breaks.  -        Document Type evaluation;therapy note (daily note)  -        Mode of Treatment occupational therapy  -        Row Name 18 0939                   Cognition/Psychosocial- PT/OT    Affect/Mental Status (Cognitive) WFL  -        Orientation Status (Cognition) oriented x 4  -        Follows Commands (Cognition) WFL  -        Row Name 18 0939                   Mobility    Left Lower Extremity (Weight-bearing Status) weight-bearing as tolerated (WBAT)  -        Row Name 18 0939                   Transfer Assessment/Treatment    Transfer Assessment/Treatment --  -        Bed-Chair [place] (Transfers) minimum assist (75% patient effort);verbal cues  -         Assistive Device (Bed-Chair Transfers) walker, front-wheeled  -        [place] Level (Toilet Transfer) minimum assist (75% patient effort);verbal cues  -        Assistive Device (Toilet Transfer) grab bars/safety frame  -        Row Name 06/02/18 0939                   Safety Issues, Functional Mobility    Safety Issues Affecting Function (Mobility) safety precaution awareness  -        Comment, Safety Issues/Impairments (Mobility) Precautions:  Fall risk, THP, decreased skin integrity  -        Row Name 06/02/18 0939                   Bathing Assessment/Treatment    Comment (Bathing) ModA  -        Row Name 06/02/18 0939                   Upper Body Dressing Assessment/Treatment    Comment (Upper Body Dressing) set up/ supervision  -        Row Name 06/02/18 0939                   Lower Body Dressing Assessment/Treatment    Comment (Lower Body Dressing) MaxA  -        Row Name 06/02/18 0939                   Grooming Assessment/Treatment    Comment (Grooming) set up  -        Row Name 06/02/18 0939                   Toileting Assessment/Treatment    Assistive Device Use (Toileting) grab bar/safety frame  -        Comment (Toileting) ModA  -        Row Name 06/02/18 0939                   Self-Feeding Assessment/Treatment    [place] Level (Self-Feeding) conditional independence  -        Row Name 06/02/18 0939                   General ROM    GENERAL ROM COMMENTS BUE AROM- WFL  -        Row Name 06/02/18 0939                   MMT (Manual Muscle Testing)    Comment, General Manual Muscle Testing (MMT) Assessment BUE - 4-/5 to 4/5  -        Row Name 06/02/18 0939                   Vision Assessment/Intervention    Visual Impairment/Limitations corrective lenses full time  -        Row Name 06/02/18 0939                                         Row Name 06/02/18 0939                   Upper Extremity Seated Therapeutic Exercise    Device, Seated Upper Extremity (Therapeutic  Exercise) restorator/arm bike   3 mins X 3, 0 resistance;  dowel ex-10 reps X 2, 0 lbs  -        Exercise Type, Seated Upper Extremity (Therapeutic Exercise) AROM (active range of motion)   BUE ther ex/act, bilat coord ex  -        Expected Outcomes, Seated Upper Extremity (Therapeutic Exercise) improve functional tolerance, self-care activity;improve performance, BADLs  -        Row Name 06/02/18 0954                   OT Clinical Impression    General Observations of Patient left displaced femoral neck fx s/p hemiarthroplasty  -        Patient's Goals For Discharge return home;return to all previous roles/activities  -        Rehab Potential/Prognosis: Occupational Therapy good, to achieve stated therapy goals  -        Frequency of Treatment (OT Eval) 5 times per week  -        Estimated Length of Stay (OT Eval) --   5-7 days  -        Problem List: Occupational Therapy strength decreased;impaired balance   impaired ADL's, fxal mobility -        Existing Precautions/Restrictions fall;hip   decreased skin integrity  -        Planned Therapy Interventions (OT Eval) activity tolerance training;adaptive equipment training;BADL retraining;functional balance retraining;IADL retraining;occupation/activity based interventions;patient/caregiver education/training;strengthening exercise;transfer/mobility retraining  -        Row Name 06/02/18 8660                   IRF OT Goals    Transfer Goal Selection (OT-IRF) transfers, OT goal 1  -        Bathing Goal Selection (OT-IRF) bathing, OT goal 1  -        LB Dressing Goal Selection (OT-IRF) LB dressing, OT goal 1;LB dressing, OT goal 2  -        Toileting Goal Selection (OT-IRF) toileting, OT goal 1;toileting, OT goal 2  -        Additional Documentation Transfer Goal Selection (OT-IRF) (Row);Toileting Goal Selection (OT-IRF) (Row)  -        Row Name 06/02/18 1457                   Transfer Goal 1 (OT-IRF)    Activity/Assistive Device  (Transfer Goal 1, OT-IRF) toilet  -CJ        [place] Level (Transfer Goal 1, OT-IRF) standby assist  -CJ        Time Frame (Transfer Goal 1, OT-IRF) long term goal (LTG);by discharge  -        Row Name 06/02/18 0939                   Bathing Goal 1 (OT-IRF)    [place] Level (Bathing Goal 1, OT-IRF) minimum assist (75% or more patient effort)  -CJ        Time Frame (Bathing Goal 1, OT-IRF) long term goal (LTG);by discharge  -        Row Name 06/02/18 0939                   LB Dressing Goal 1 (OT-IRF)    [place] (LB Dressing Goal 1, OT-IRF) minimum assist (75% or more patient effort)  -CJ        Time Frame (LB Dressing Goal 1, OT-IRF) short term goal (STG);5 - 7 days  -        Row Name 06/02/18 0939                   LB Dressing Goal 2 (OT-IRF)    [place] (LB Dressing Goal 2, OT-IRF) contact guard assist  -CJ        Time Frame (LB Dressing Goal 2, OT-IRF) long term goal (LTG);by discharge  -        Row Name 06/02/18 0939                   Toileting Goal 1 (OT-IRF)    [place] Level (Toileting Goal 1, OT-IRF) minimum assist (75% or more patient effort)  -CJ        Time Frame (Toileting Goal 1, OT-IRF) short term goal (STG);5 - 7 days  -        Row Name 06/02/18 0939                   Toileting Goal 2 (OT-IRF)    [place] Level (Toileting Goal 2, OT-IRF) standby assist  -CJ        Time Frame (Toileting Goal 2, OT-IRF) long term goal (LTG);by discharge  -        Row Name 06/02/18 0939                   Positioning and Restraints    Post Treatment Position wheelchair  -CJ        In Wheelchair with PT  -CJ          User Key  (r) = Recorded By, (t) = Taken By, (c) = Cosigned By    Initials Name Effective Dates     Trini Castro, OT 04/03/18 -              Occupational Therapy Education     Title: PT OT SLP Therapies (Active)     Topic: Occupational Therapy (Active)     Point: ADL training (Done)     Description: Instruct learner(s) on proper safety adaptation and remediation  techniques during self care or transfers.   Instruct in proper use of assistive devices.   Learning Progress Summary     Learner Status Readiness Method Response Comment Documented by    Patient Done Acceptance MAI GREEN JOVAN,NR   06/02/18 0953          Point: Precautions (Done)     Description: Instruct learner(s) on prescribed precautions during self-care and functional transfers.   Learning Progress Summary     Learner Status Readiness Method Response Comment Documented by    Patient Done Acceptance MAI GREEN,NR   06/02/18 0953                      User Key     Initials Effective Dates Name Provider Type Discipline     04/03/18 -  Trini Castro OT Occupational Therapist OT                    OT Recommendation and Plan    Planned Therapy Interventions (OT Eval): activity tolerance training, adaptive equipment training, BADL retraining, functional balance retraining, IADL retraining, occupation/activity based interventions, patient/caregiver education/training, strengthening exercise, transfer/mobility retraining                    Outcome Measures     Row Name 05/31/18 1400 05/30/18 1600 05/30/18 1100       How much difficulty does the patient currently have...    Turning from your back to your side while in flat bed without using bedrails? 3  -CT 3  -KB 3  -CT    Standing up from a chair using your arms (e.g., wheelchair, bedside chair)? 3  -CT 3  -KB 3  -CT    Moving from lying on back to sitting on the side of a flat bed without bedrails? 3  -CT 3  -KB 3  -CT       How much help from another person do you currently need...    Moving to and from a bed to a chair (including a wheelchair)? 3  -CT 3  -KB 3  -CT    Climbing 3-5 steps with a railing? 2  -CT 2  -KB 2  -CT    To walk in hospital room? 2  -CT 2  -KB 2  -CT    AM-PAC 6 Clicks Score 16  -CT 16  -KB 16  -CT       Functional Assessment    Outcome Measure Options AM-PAC 6 Clicks Basic Mobility (PT)  -CT AM-PAC 6 Clicks Basic Mobility (PT)  -KB AM-PAC 6 Clicks  Basic Mobility (PT)  -CT      User Key  (r) = Recorded By, (t) = Taken By, (c) = Cosigned By    Initials Name Provider Type    CT Genet Li, PT Physical Therapist    JOSIANE Avilez PTA Physical Therapy Assistant            Time Calculation:     OT Start Time: 0740  OT Stop Time: 0910  OT Time Calculation (min): 90 min  OT Non-Billable Time (min): 45 min      Therapy Charges for Today     Code Description Service Date Service Provider Modifiers Qty    41057328870 HC OT EVAL HIGH COMPLEXITY 3 2018 Trini Castro, OT GO 1    58859927454 HC OT THERAPEUTIC ACT EA 15 MIN 2018 Trini Castro, OT GO 3                   Trini Castro OT  2018 and Inpatient Rehabilitation - Occupational Therapy Treatment Note     Wang     Patient Name: Anette Dunham  : 1940  MRN: [id number]    Today's Date: 2018                 Admit Date: 2018      Visit Dx:  No diagnosis found.    Patient Active Problem List   Diagnosis   • Essential hypertension   • Chest pain   • Paroxysmal atrial fibrillation   • Closed left hip fracture, initial encounter   • Tobacco abuse   • Closed left hip fracture         Therapy Treatment        Wound 18 0425 Right posterior;lower arm skin tear (Active)   Dressing Appearance intact;dry 2018  7:10 PM   Closure Adhesive bandage;Liquid skin adhesive 2018  7:10 PM   Base dressing in place, unable to visualize 2018  7:10 PM       Wound 18 1222 Left hip incision (Active)   Dressing Appearance dry;intact;no drainage 2018  7:10 PM   Drainage Amount none 2018  7:10 PM         OT Recommendation and Plan    Planned Therapy Interventions (OT Eval): activity tolerance training, adaptive equipment training, BADL retraining, functional balance retraining, IADL retraining, occupation/activity based interventions, patient/caregiver education/training, strengthening exercise, transfer/mobility retraining                  OT IRF GOALS     Row Name 18  0939             Transfer Goal 1 (OT-IRF)    Activity/Assistive Device (Transfer Goal 1, OT-IRF) toilet  -CJ      [place] Level (Transfer Goal 1, OT-IRF) standby assist  -CJ      Time Frame (Transfer Goal 1, OT-IRF) long term goal (LTG);by discharge  -CJ         Bathing Goal 1 (OT-IRF)    [place] Level (Bathing Goal 1, OT-IRF) minimum assist (75% or more patient effort)  -CJ      Time Frame (Bathing Goal 1, OT-IRF) long term goal (LTG);by discharge  -CJ         LB Dressing Goal 1 (OT-IRF)    [place] (LB Dressing Goal 1, OT-IRF) minimum assist (75% or more patient effort)  -CJ      Time Frame (LB Dressing Goal 1, OT-IRF) short term goal (STG);5 - 7 days  -CJ         LB Dressing Goal 2 (OT-IRF)    [place] (LB Dressing Goal 2, OT-IRF) contact guard assist  -CJ      Time Frame (LB Dressing Goal 2, OT-IRF) long term goal (LTG);by discharge  -CJ         Toileting Goal 1 (OT-IRF)    [place] Level (Toileting Goal 1, OT-IRF) minimum assist (75% or more patient effort)  -CJ      Time Frame (Toileting Goal 1, OT-IRF) short term goal (STG);5 - 7 days  -CJ         Toileting Goal 2 (OT-IRF)    [place] Level (Toileting Goal 2, OT-IRF) standby assist  -CJ      Time Frame (Toileting Goal 2, OT-IRF) long term goal (LTG);by discharge  -CJ        User Key  (r) = Recorded By, (t) = Taken By, (c) = Cosigned By    Initials Name Provider Type    YULY Castro OT Occupational Therapist                Outcome Measures     Row Name 05/31/18 1400 05/30/18 1600 05/30/18 1100       How much difficulty does the patient currently have...    Turning from your back to your side while in flat bed without using bedrails? 3  -CT 3  -KB 3  -CT    Standing up from a chair using your arms (e.g., wheelchair, bedside chair)? 3  -CT 3  -KB 3  -CT    Moving from lying on back to sitting on the side of a flat bed without bedrails? 3  -CT 3  -KB 3  -CT       How much help from another person do you currently need...    Moving  to and from a bed to a chair (including a wheelchair)? 3  -CT 3  -KB 3  -CT    Climbing 3-5 steps with a railing? 2  -CT 2  -KB 2  -CT    To walk in hospital room? 2  -CT 2  -KB 2  -CT    AM-PAC 6 Clicks Score 16  -CT 16  -KB 16  -CT       Functional Assessment    Outcome Measure Options AM-PAC 6 Clicks Basic Mobility (PT)  -CT AM-PAC 6 Clicks Basic Mobility (PT)  -KB AM-PAC 6 Clicks Basic Mobility (PT)  -CT      User Key  (r) = Recorded By, (t) = Taken By, (c) = Cosigned By    Initials Name Provider Type    CT Genet Li, PT Physical Therapist    JOSIANE Avilez PTA Physical Therapy Assistant             Time Calculation:           Time Calculation- OT     Row Name 06/02/18 0953             Time Calculation- OT    OT Start Time 0740  -      OT Stop Time 0910  -      OT Time Calculation (min) 90 min  -      Total Timed Code Minutes- OT 90 minute(s)  -      OT Non-Billable Time (min) 45 min  -        User Key  (r) = Recorded By, (t) = Taken By, (c) = Cosigned By    Initials Name Provider Type     Trini Castro OT Occupational Therapist             Therapy Charges for Today     Code Description Service Date Service Provider Modifiers Qty    62487044996  OT EVAL HIGH COMPLEXITY 3 6/2/2018 Trini Castro OT GO 1    [id number]  OT THERAPEUTIC ACT EA 15 MIN 6/2/2018 Trini Castro OT GO 3                   Trini Castro OT  6/2/2018

## 2018-06-02 NOTE — PLAN OF CARE
Problem: Fall Risk (Adult)  Goal: Identify Related Risk Factors and Signs and Symptoms  Outcome: Ongoing (interventions implemented as appropriate)    Goal: Absence of Fall  Outcome: Ongoing (interventions implemented as appropriate)      Problem: Functional Mobility Impairment (IRF) (Adult)  Goal: Optimal/Safe Level of [place] with Mobility  Outcome: Ongoing (interventions implemented as appropriate)      Problem: Fractured Hip (Adult)  Goal: Signs and Symptoms of Listed Potential Problems Will be Absent, Minimized or Managed (Fractured Hip)  Outcome: Ongoing (interventions implemented as appropriate)      Problem: Skin Injury Risk (Adult)  Goal: Identify Related Risk Factors and Signs and Symptoms  Outcome: Ongoing (interventions implemented as appropriate)    Goal: Skin Health and Integrity  Outcome: Ongoing (interventions implemented as appropriate)

## 2018-06-02 NOTE — PROGRESS NOTES
Inpatient Rehabilitation Functional Measures Assessment    Functional Measures  DONNY Eating:  Eating Score = 5. Patient is supervision/set-up for eating,  requiring: Opening containers. No assistive devices were required.  DONNY Grooming: Grooming Score = 7.  Patient is completely independent for  grooming.  There are no activity limitations.  DONNY Bathing:  Activity was not observed.  DONNY Upper Body Dressing:  Patient requires moderate/considerable physical  assistance for gathering clothes. Wearing a bra or undershirt was not observed  for this patient. Patient requires moderate/considerable physical assistance for  threading the right arm through the garment (shirt/sweater). Patient requires  moderate/considerable physical assistance for threading the left arm through the  garment (shirt/sweater). Patient requires moderate/considerable physical  assistance for pulling an over-head-garment over head or pulling  front-fastening-garment around back. Patient requires moderate/considerable  physical assistance for pulling an over-head-garment down the trunk or  adjusting/fastening together a front-fastening-garment. Patient performs 60 % of  upper body dressing tasks. Upper Body Dressing Score = 3, Moderate Assistance.  No assistive devices were required.  DONNY Lower Body Dressing:  Patient requires moderate/considerable physical  assistance for gathering clothes. Wearing underwear or an undergarment was not  observed for this patient. Patient requires moderate/considerable physical  assistance for threading the right leg through the pants/skirt. Patient requires  moderate/considerable physical assistance for threading the left leg through the  pants/skirt. Patient requires moderate/considerable physical assistance for  pulling pants/skirt over hips and adjusting fasteners. Patient requires  moderate/considerable physical assistance for donning and/or doffing right sock.  Patient requires moderate/considerable physical  assistance for donning and/or  doffing left sock. Patient requires moderate/considerable physical assistance  for donning and/or doffing right shoe. Patient requires moderate/considerable  physical assistance for donning and/or doffing left shoe. Patient performs 50 %  of lower body dressing tasks. Lower Body Dressing Score = 3, Moderate  Assistance. No assistive devices were required.  DONNY Toileting:  Patient requires moderate assistance for adjusting clothing  before using a toilet, commode, bedpan, or urinal. Patient requires moderate  assistance for hygiene. Patient requires moderate assistance for adjusting  clothing after using a toilet, commode, bedpan, or urinal. Patient performs 0 -  24% of toileting tasks.  Toileting Score = 1, Total Assistance. No assistive  devices were required.    DONNY Bladder Management  Level of Assistance:  Bladder Score = 3. Patient performs 50-74% of tasks and  requires moderate assistance for bladder management. [place] provides some assist  to roll or bridge to place OR remove bedpan.  Frequency/Number of Accidents this Shift:  Bladder accidents this shift:  0 .  Patient has not had an accident, but used a device/medication this shift  requiring: bedpan .    DONNY Bowel Management  Level of Assistance: Activity was not observed.  Frequency/Number of Accidents this Shift: Bowel accidents this shift: 0 .  Patient has not had an accident this shift.    DONNY Bed/Chair/Wheelchair Transfer:  Bed/chair/wheelchair Transfer Score = 3.  Patient performs 50-74% of effort and requires moderate assistance (some  lifting) for transferring to and from the bed/chair/wheelchair. Patient requires  the following assistive device(s): Elevated head of bed. Elevated bed/surface.    DONNY Toilet Transfer:  Toilet Transfer Score = 3.  Patient performs 50-74% of  effort and requires moderate assistance (some lifting) for transferring to and  from the toilet/commode. Patient requires the following assistive  device(s):  Grab bars. Safety frame/over the toilet. Specialized seat.  DONNY Tub/Shower Transfer:  Activity was not observed.    Previously Documented Mode of Locomotion at Discharge:  [facility] Expected Mode of Locomotion at Discharge:  [facility] Walk/Wheelchair:  DONNY Stairs:    DONNY Comprehension:  DONNY Expression:  DONNY Social Interaction:  [facility] Problem Solving:  [facility] Memory:    Therapy Mode Minutes  Occupational Therapy:  Physical Therapy:  Speech Language Pathology:    Discharge Functional Goals:    Signed by: PARTH Haines

## 2018-06-02 NOTE — PROGRESS NOTES
Inpatient Rehabilitation Functional Measures Assessment and Plan of Care    Plan of Care      Functional Measures  DONNY Eating:  DONNY Grooming:  DONNY Bathing:  DONNY Upper Body Dressing:  DONNY Lower Body Dressing:  DONNY Toileting:    DONNY Bladder Management  Level of Assistance:  Frequency/Number of Accidents this Shift:    DONNY Bowel Management  Level of Assistance:  Frequency/Number of Accidents this Shift:    DONNY Bed/Chair/Wheelchair Transfer:  Bed/chair/wheelchair Transfer Score = 3.  Patient performs 50-74% of effort and requires moderate assistance (some  lifting) for transferring to and from the bed/chair/wheelchair. Patient requires  the following assistive device(s): Walker.  DONNY Toilet Transfer:  DONNY Tub/Shower Transfer:    Previously Documented Mode of Locomotion at Discharge:  DONNY Expected Mode of Locomotion at Discharge: The expected mode of most  frequently used locomotion, at discharge, is expected to be walking.  DONNY Walk/Wheelchair:  WHEELCHAIR OBSERVATION   Wheelchair did not occur.    WALK OBSERVATION   Walk Distance Scale = 2.  Distance walked is 50 -149 feet. Walk Score = 2.  Patient performs 75% or more of effort and requires minimal assistance.  Incidental assistance, contact guard or steadying was provided. Patient walked a  distance of 80 feet. Patient requires the following assistive device(s): Rolling  walker. CGA  DONNY Stairs:  Stairs did not occur.    DONNY Comprehension:  DONNY Expression:  DONNY Social Interaction:  DONNY Problem Solving:  DONNY Memory:    Therapy Mode Minutes  Occupational Therapy:  Physical Therapy: Individual: 90 minutes.  Speech Language Pathology:    Discharge Functional Goals:  Bed, Chair, Wheelchair Transfers: 5  Walk: 5    Signed by: Noris Wooten, Physical Therapist

## 2018-06-02 NOTE — PROGRESS NOTES
Patient Assessment Instrument  Quality Indicators - Admission    Section B. Hearing, Speech Vision      Section C. Cognitive Patterns      Section [id number]. Prior Functioning      Section [id number]. Prior Device Use      Section [id number]. Self Care Performance      Section CF4732. Self Care Discharge Goals      Section HY2607. Mobility Performance     Roll Left and Right: [place] does less than half the effort. [place] lifts, holds  or supports trunk or limbs but provides less than half the effort.   Sit to Lying: [place] does less than half the effort. [place] lifts, holds or  supports trunk or limbs but provides less than half the effort.   Lying to Sitting on Side of Bed: [place] does less than half the effort. [place]  lifts, holds or supports trunk or limbs but provides less than half the effort.   Sit to Stand: [place] provides verbal cues or touching/steadying assistance as  patient completes activity.   Chair/Bed to Chair Transfer: [place] provides verbal cues or touching/steadying  assistance as patient completes activity.   Toilet Transfer Not attempted due to medical or safety concerns.   Car Transfer: Not attempted due to medical or safety concerns.    Uses Wheelchair/Scooter: No    Patient Walks: Yes   Walk 10 Feet: [place] provides verbal cues or touching/steadying assistance as  patient completes activity.   Walk 50 Feet With 2 Turns: [place] provides verbal cues or touching/steadying  assistance as patient completes activity.   Walk 150 Feet: Not attempted due to medical or safety concerns.   Walking 10 Feet on Uneven Surfaces: Not attempted due to medical or safety  concerns.   1 Step Over Curb or Up/Down Stair: Not attempted due to medical or safety  concerns.   4 Steps Up and Down, With/Without Rail: Not attempted due to medical or safety  concerns.   12 Steps Up and Down, With/Without Rail: Not attempted due to medical or safety  concerns.   Picking up an Object: Not attempted due to medical or safety  concerns.    Section AT9085. Mobility Discharge Goals     Sit to Stand: [place] provides verbal cues or touching/steadying assistance as  patient completes activity.   Chair/Bed to Chair Transfer: [place] provides verbal cues or touching/steadying  assistance as patient completes activity.   Walk Discharge Goals:   Walk 150 Feet: [place] provides verbal cues or touching/steadying assistance as  patient completes activity.    Section H. Bladder and Bowel      Section I. Active Diagnosis      Section J. Health Conditions      Section K. Swallowing/Nutritional Status      Section M. Skin Conditions      Section O. Special Treatments, Procedures, and Programs      OPTIONAL BRANCH FOR TRACKING FALLS  Fall(s) During Shift:    Signed by: Noris Wooten, Physical Therapist

## 2018-06-02 NOTE — PROGRESS NOTES
Rehabilitation Nursing  Inpatient Rehabilitation Plan of Care Note    Plan of Care  Copy from POC  Safety    Potential for Injury (Active)  Current Status (6/1/2018 10:30:00 AM): potential for falls  Weekly Goal: no falls this week  Discharge Goal: no falls by discharge    Body Systems    Integumentary (Active)  Current Status (6/1/2018 10:30:00 AM): potential for skin breakdown  Weekly Goal: no skin breakdown this week  Discharge Goal: no skin breakdown by discharge    Signed by: Adriana Carr, Nurse

## 2018-06-02 NOTE — PROGRESS NOTES
Inpatient Rehabilitation Functional Measures Assessment    Functional Measures  DONNY Eating:  DONNY Grooming:  DONNY Bathing:  DONNY Upper Body Dressing:  DONNY Lower Body Dressing:  DONNY Toileting:    DONNY Bladder Management  Level of Assistance:  Frequency/Number of Accidents this Shift:    DONNY Bowel Management  Level of Assistance:  Frequency/Number of Accidents this Shift:    DONNY Bed/Chair/Wheelchair Transfer:  DONNY Toilet Transfer:  DONNY Tub/Shower Transfer:    Previously Documented Mode of Locomotion at Discharge:  DONNY Expected Mode of Locomotion at Discharge:  DONNY Walk/Wheelchair:  DONNY Stairs:    DONNY Comprehension:  Both ( auditory and visual) modes of comprehension are used  equally. Comprehension Score = 6, Modified [place].  Patient comprehends  complex/abstract information in their primary language, requiring: Additional  time.  DONNY Expression:  Vocal expression is the usual mode. Expression Score = 6,  Modified Independent.  Patient expresses complex/abstract information in their  primary language, requiring: Additional time.  DONNY Social Interaction:  Social Interaction Score = 6, Modified Independent.  Patient is modified independent for social interaction, requiring: Requires  additional time.  DONNY Problem Solving:  Problem Solving Score = 6, Modified [place].  Patient  makes appropriate decisions in order to solve complex problems, but requires  extra time.  DONNY Memory:  Memory Score = 6, Modified [place].  Patient is modified  independent for memory, requiring: Requires additional time.    Therapy Mode Minutes  Occupational Therapy:  Physical Therapy:  Speech Language Pathology:    Discharge Functional Goals:    Signed by: Adriana Carr, Nurse

## 2018-06-02 NOTE — PROGRESS NOTES
Patient Assessment Instrument  Quality Indicators - Admission    Section B. Hearing, Speech Vision      Section C. Cognitive Patterns      Section [id number]. Prior Functioning      Section [id number]. Prior Device Use      Section [id number]. Self Care Performance     Eating: Patient completed the activities by him/herself with no assistance from  a helper.   Oral Hygiene: [place] sets up or cleans up; patient completes activity. [place]  assists only prior to or following the activity.   Toileting Hygiene: [place] does less than half the effort. [place] lifts, holds  or supports trunk or limbs but provides less than half the effort.   Shower/Bathe Self: [place] does less than half the effort. [place] lifts, holds  or supports trunk or limbs but provides less than half the effort.   Upper Body Dressing: [place] provides verbal cues or touching/steadying  assistance as patient completes activity.   Lower Body Dressing: [place] does more than half the effort. [place] lifts or  holds trunk or limbs and provides more than half the effort.   Putting On/Taking Off Footwear: [place] does more than half the effort. [place]  lifts or holds trunk or limbs and provides more than half the effort.    Section FL7279. Self Care Discharge Goals     Eating: Patient completed the activities by him/herself with no assistance from  a helper.   Oral Hygiene: [place] sets up or cleans up; patient completes activity. [place]  assists only prior to or following the activity.   Toileting Hygiene: [place] provides verbal cues or touching/steadying assistance  as patient completes activity.   Shower/Bathe Self: [place] provides verbal cues or touching/steadying assistance  as patient completes activity.   Upper Body Dressing: [place] sets up or cleans up; patient completes activity.  [place] assists only prior to or following the activity.   Lower Body Dressing: [place] provides verbal cues or touching/steadying  assistance as patient completes activity.   Putting  On/Taking Off Footwear: [place] provides verbal cues or  touching/steadying assistance as patient completes activity.    Section CN5584. Mobility Performance      Section RH9127. Mobility Discharge Goals      Section H. Bladder and Bowel      Section I. Active Diagnosis      Section J. Health Conditions      Section K. Swallowing/Nutritional Status      Section M. Skin Conditions      Section O. Special Treatments, Procedures, and Programs      OPTIONAL BRANCH FOR TRACKING FALLS  Fall(s) During Shift:    Signed by: Trini Castro, Occupational Therapist

## 2018-06-02 NOTE — PROGRESS NOTES
Inpatient Rehabilitation Functional Measures Assessment    Functional Measures  DONNY Eating:  DONNY Grooming:  DONNY Bathing:  DONNY Upper Body Dressing:  DONNY Lower Body Dressing:  DONNY Toileting:    DONNY Bladder Management  Level of Assistance:  Frequency/Number of Accidents this Shift:    DONNY Bowel Management  Level of Assistance:  Frequency/Number of Accidents this Shift:    DONNY Bed/Chair/Wheelchair Transfer:  DONNY Toilet Transfer:  DONNY Tub/Shower Transfer:    Previously Documented Mode of Locomotion at Discharge:  DONNY Expected Mode of Locomotion at Discharge:  DONNY Walk/Wheelchair:  DONNY Stairs:    DONNY Comprehension:  Both ( auditory and visual) modes of comprehension are used  equally. Comprehension Score = 6, Modified [place].  Patient comprehends  complex/abstract information in their primary language, requiring: Glasses.  Additional time.  DONNY Expression:  Both ( vocal and non-vocal) modes of expression are used  equally. Expression Score = 6,  Modified Independent. Patient expresses  complex/abstract information in their primary language, requiring: Additional  time.  DONNY Social Interaction:  Social Interaction Score = 6, Modified [place].  Patient is modified independent for social interaction, requiring: Requires  additional time.  DONNY Problem Solving:  Problem Solving Score = 6, Modified [place].  Patient  makes appropriate decisions in order to solve complex problems, but requires  extra time.  DONNY Memory:  Memory Score = 6, Modified [place].  Patient is modified  independent for memory, requiring: Requires additional time.    Therapy Mode Minutes  Occupational Therapy:  Physical Therapy:  Speech Language Pathology:    Discharge Functional Goals:    Signed by: Nurse Sallie

## 2018-06-03 LAB
BACTERIA SPEC AEROBE CULT: NORMAL
BACTERIA SPEC AEROBE CULT: NORMAL

## 2018-06-03 PROCEDURE — 94799 UNLISTED PULMONARY SVC/PX: CPT

## 2018-06-03 PROCEDURE — 25010000002 CEFTRIAXONE: Performed by: FAMILY MEDICINE

## 2018-06-03 RX ADMIN — DOXYCYCLINE 100 MG: 100 INJECTION, POWDER, LYOPHILIZED, FOR SOLUTION INTRAVENOUS at 08:01

## 2018-06-03 RX ADMIN — FAMOTIDINE 40 MG: 20 TABLET, FILM COATED ORAL at 08:00

## 2018-06-03 RX ADMIN — Medication 1 TABLET: at 08:01

## 2018-06-03 RX ADMIN — CEFTRIAXONE 1 G: 1 INJECTION, POWDER, FOR SOLUTION INTRAMUSCULAR; INTRAVENOUS at 20:02

## 2018-06-03 RX ADMIN — OXYCODONE HYDROCHLORIDE AND ACETAMINOPHEN 1 TABLET: 5; 325 TABLET ORAL at 18:13

## 2018-06-03 RX ADMIN — APIXABAN 5 MG: 5 TABLET, FILM COATED ORAL at 08:00

## 2018-06-03 RX ADMIN — APIXABAN 5 MG: 5 TABLET, FILM COATED ORAL at 20:03

## 2018-06-03 RX ADMIN — GABAPENTIN 600 MG: 300 CAPSULE ORAL at 21:11

## 2018-06-03 RX ADMIN — LEVOTHYROXINE SODIUM 50 MCG: 50 TABLET ORAL at 08:00

## 2018-06-03 RX ADMIN — MAGNESIUM HYDROXIDE 10 ML: 2400 SUSPENSION ORAL at 17:10

## 2018-06-03 RX ADMIN — OXYCODONE HYDROCHLORIDE AND ACETAMINOPHEN 1 TABLET: 5; 325 TABLET ORAL at 05:58

## 2018-06-03 RX ADMIN — NICOTINE 1 PATCH: 7 PATCH TRANSDERMAL at 08:01

## 2018-06-03 RX ADMIN — ACETAMINOPHEN 650 MG: 325 TABLET, FILM COATED ORAL at 21:10

## 2018-06-03 RX ADMIN — DOCUSATE SODIUM 100 MG: 100 CAPSULE, LIQUID FILLED ORAL at 08:00

## 2018-06-03 RX ADMIN — METOPROLOL TARTRATE 25 MG: 25 TABLET, FILM COATED ORAL at 08:00

## 2018-06-03 RX ADMIN — GABAPENTIN 600 MG: 300 CAPSULE ORAL at 05:58

## 2018-06-03 RX ADMIN — CETIRIZINE HYDROCHLORIDE 5 MG: 10 TABLET ORAL at 08:00

## 2018-06-03 RX ADMIN — SENNOSIDES 1 TABLET: 8.6 TABLET, FILM COATED ORAL at 08:00

## 2018-06-03 RX ADMIN — DOXYCYCLINE 100 MG: 100 INJECTION, POWDER, LYOPHILIZED, FOR SOLUTION INTRAVENOUS at 21:10

## 2018-06-03 RX ADMIN — Medication 1 CAPSULE: at 08:00

## 2018-06-03 RX ADMIN — LORAZEPAM 1 MG: 1 TABLET ORAL at 21:11

## 2018-06-03 RX ADMIN — GABAPENTIN 600 MG: 300 CAPSULE ORAL at 15:39

## 2018-06-03 RX ADMIN — Medication 1 TABLET: at 17:03

## 2018-06-03 RX ADMIN — METOPROLOL TARTRATE 25 MG: 25 TABLET, FILM COATED ORAL at 20:02

## 2018-06-03 NOTE — PROGRESS NOTES
Inpatient Rehabilitation Functional Measures Assessment    Functional Measures  DONNY Eating:  Eating Score = 5. Patient is supervision/set-up for eating,  requiring: No assistive devices were required.  DONNY Grooming:  DONNY Bathing:  DONNY Upper Body Dressing:  DONNY Lower Body Dressing:  DONNY Toileting:  Patient requires moderate assistance for adjusting clothing  before using a toilet, commode, bedpan, or urinal. Patient requires moderate  assistance for hygiene. Patient requires moderate assistance for adjusting  clothing after using a toilet, commode, bedpan, or urinal. Patient performs 0 -  24% of toileting tasks.  Toileting Score = 1, Total Assistance. Patient requires  the following assistive device(s): Grab bar.    DONNY Bladder Management  Level of Assistance:  Bladder Score = 5.  Patient is supervision/set-up for  bladder management, requiring: No assistive devices were required.  Frequency/Number of Accidents this Shift:  Bladder accidents this shift:  0 .  Patient has not had an accident, but used a device/medication this shift  requiring: bedpan .    DONNY Bowel Management  Level of Assistance: Bowel Score = 7.  Patient is completely independent for  bowel management.  Patient did not have bowel movement.  No  medication/intervention was provided.  Frequency/Number of Accidents this Shift: Bowel accidents this shift: 0 .  Patient has not had an accident this shift.    DONNY Bed/Chair/Wheelchair Transfer:  Bed/chair/wheelchair Transfer Score = 3.  Patient performs 50-74% of effort and requires moderate assistance (some  lifting) for transferring to and from the bed/chair/wheelchair. No assistive  devices were required.  DONNY Toilet Transfer:  Activity was not observed.  DONNY Tub/Shower Transfer:  Activity was not observed.    Previously Documented Mode of Locomotion at Discharge:  DONNY Expected Mode of Locomotion at Discharge:  DONNY Walk/Wheelchair:  DONNY Stairs:    DONNY Comprehension:  DONNY Expression:  DONNY Social  Interaction:  DONNY Problem Solving:  DONNY Memory:    Therapy Mode Minutes  Occupational Therapy:  Physical Therapy:  Speech Language Pathology:    Discharge Functional Goals:    Signed by: PARTH Penn

## 2018-06-03 NOTE — PLAN OF CARE
Problem: Patient Care Overview  Goal: Plan of Care Review  Outcome: Ongoing (interventions implemented as appropriate)      Problem: Fall Risk (Adult)  Goal: Identify Related Risk Factors and Signs and Symptoms  Outcome: Ongoing (interventions implemented as appropriate)   06/02/18 1957 06/03/18 0921   Fall Risk (Adult)   Related Risk Factors (Fall Risk) --  age-related changes;fatigue/slow reaction;gait/mobility problems;history of falls;slippery/uneven surfaces;environment unfamiliar   Signs and Symptoms (Fall Risk) presence of risk factors --      Goal: Absence of Fall  Outcome: Ongoing (interventions implemented as appropriate)      Problem: Functional Mobility Impairment (IRF) (Adult)  Goal: Optimal/Safe Level of [place] with Mobility  Outcome: Ongoing (interventions implemented as appropriate)      Problem: Skin Injury Risk (Adult)  Goal: Identify Related Risk Factors and Signs and Symptoms  Outcome: Ongoing (interventions implemented as appropriate)   06/03/18 0921   Skin Injury Risk (Adult)   Related Risk Factors (Skin Injury Risk) advanced age;mobility impaired;hospitalization prolonged     Goal: Skin Health and Integrity  Outcome: Ongoing (interventions implemented as appropriate)

## 2018-06-03 NOTE — PROGRESS NOTES
Inpatient Rehabilitation Functional Measures Assessment    Functional Measures  DONNY Eating:  DONNY Grooming: Grooming Score = 5. Patient is supervision/set-up for grooming,  requiring: Setting out grooming equipment. Initial preparation. No assistive  devices were required.  DONNY Bathing:  Patient took sponge bath. Bathing Score = 5.  Patient is  supervision/set-up for bathing, requiring: Setting out bathing equipment.  Preparing the water. Preparing washing materials. No assistive devices were  required.  DONNY Upper Body Dressing:  Upper Body Dressing was not observed this shift  because patient dressed/undressed in pajamas/gown only. .  DONNY Lower Body Dressing:  Lower Body Dressing was not observed this shift  because patient dressed/undressed in pajamas/gown only.  DONNY Toileting:  Toileting Score = 4.  Patient requires minimal assistance for  toileting, such as steadying for balance while cleansing or adjusting clothes.  Patient requires the following assistive device(s): Grab bar.    DONNY Bladder Management  Level of Assistance:  Bladder Score = 1. Patient performs less than 25% of tasks  and requires total assist or assist of two for use of bedpan in bladder  management.  Frequency/Number of Accidents this Shift:  Bladder accidents this shift:  0 .  Patient has not had an accident, but used a device/medication this shift  requiring: bedpan .    DONNY Bowel Management  Level of Assistance: Bowel Score = 5.  Patient is supervision/set-up for bowel  management, requiring: Stand by assistance. Patient requires the following  assistive device(s):  Bedpan.  Frequency/Number of Accidents this Shift: Bowel accidents this shift: 0 .  Patient has not had an accident, but used a device/medication this shift  requiring: bedpan .    DONNY Bed/Chair/Wheelchair Transfer:  Bed/chair/wheelchair Transfer Score = 3.  Patient performs 50-74% of effort and requires moderate assistance (some  lifting)  for transferring to and from the  bed/chair/wheelchair, including  assist lifting both legs. Patient requires the following assistive device(s):  Bed rails. Grab bars. Arm rest.  DONNY Toilet Transfer:  Toilet Transfer Score = 3.  Patient performs 50-74% of  effort and requires moderate assistance (some lifting) for transferring to and  from the toilet/commode. Patient requires the following assistive device(s):  Walker. Grab bars. Safety frame/over the toilet.  DONNY Tub/Shower Transfer:  Activity was not observed.    Previously Documented Mode of Locomotion at Discharge:  DONNY Expected Mode of Locomotion at Discharge:  DONNY Walk/Wheelchair:  DONNY Stairs:    DONNY Comprehension:  DONNY Expression:  DONNY Social Interaction:  DONNY Problem Solving:  DONNY Memory:    Therapy Mode Minutes  Occupational Therapy:  Physical Therapy:  Speech Language Pathology:    Discharge Functional Goals:    Signed by: PARTH Leon

## 2018-06-03 NOTE — PROGRESS NOTES
Inpatient Rehabilitation Functional Measures Assessment    Functional Measures  DONNY Eating:  DONNY Grooming:  DONNY Bathing:  DONNY Upper Body Dressing:  DONNY Lower Body Dressing:  DONNY Toileting:    DONNY Bladder Management  Level of Assistance:  Frequency/Number of Accidents this Shift:    DONNY Bowel Management  Level of Assistance:  Frequency/Number of Accidents this Shift:    DONNY Bed/Chair/Wheelchair Transfer:  DONNY Toilet Transfer:  DONNY Tub/Shower Transfer:    Previously Documented Mode of Locomotion at Discharge:  DONNY Expected Mode of Locomotion at Discharge:  DONNY Walk/Wheelchair:  DONNY Stairs:    DONNY Comprehension:  Auditory comprehension is the usual mode. Comprehension  Score = 6, Modified [place].  Patient comprehends complex/abstract  information in their primary language, requiring: Additional time.  DONNY Expression:  Vocal expression is the usual mode. Expression Score = 6,  Modified [place].  Patient expresses complex/abstract information in their  primary language, requiring: Additional time.  DONNY Social Interaction:  Social Interaction Score = 6, Modified [place].  Patient is modified independent for social interaction, requiring: Requires  additional time.  DONNY Problem Solving:  Problem Solving Score = 6, Modified [place].  Patient  makes appropriate decisions in order to solve complex problems, but requires  extra time.  DONNY Memory:  Memory Score = 6, Modified [place].  Patient is modified  independent for memory, requiring: Requires additional time.    Therapy Mode Minutes  Occupational Therapy:  Physical Therapy:  Speech Language Pathology:    Discharge Functional Goals:    Signed by: Jessie Alvarez RN

## 2018-06-03 NOTE — PROGRESS NOTES
Physical Medicine and Rehabilitation  Inpatient Rehabilitation Interdisciplinary Plan of Care    Demographics            Age: 78Y            Gender: Female    Admission Date: 6/1/2018 9:48:00 AM  Rehabilitation Diagnosis:    Plan of Care    Updated (if changes indicated)  No changes to plan.    Based on the patient's medical and functional status, their prognosis and  expected level of functional improvement is: Good    Interdisciplinary Problem/Goals/Status  Mobility    [PT] Bed/Chair/Wheelchair (Active)  Current Status (6/2/2018 12:00:00 AM): min-mod A bed mob  Weekly Goal: CGA  Discharge Goal: Sup    [PT] Walk (Active)  Current Status (6/2/2018 12:00:00 AM): amb 80' RW CGA  Weekly Goal: amb 150' RW SBA  Discharge Goal: amb 300' RW Sup    Self Care    [OT] Dressing (Lower) (Active)  Current Status (6/2/2018 7:40:00 AM): MaxA  Weekly Goal: ModA  Discharge Goal: [place]    [OT] Toileting (Active)  Current Status (6/2/2018 7:40:00 AM): ModA  Weekly Goal: [place]  Discharge Goal: SBA    Safety    [RN] Potential for Injury (Active)  Current Status (6/1/2018 10:30:00 AM): potential for falls  Weekly Goal: no falls this week  Discharge Goal: no falls by discharge    Body Systems    [RN] Integumentary (Active)  Current Status (6/1/2018 10:30:00 AM): potential for skin breakdown  Weekly Goal: no skin breakdown this week  Discharge Goal: no skin breakdown by discharge    Medical Problems    Left femoral neck fracture status post hemiarthroplasty  Paroxysmal atrial fibrillation  Pneumonia  Hypertension  Postoperative anemia with hemoglobin of 9.1  ?  ?  Eliquis twice a day for anticoagulation  ?  Rocephin and doxycycline due to pneumonia.  ?  I'm going to discontinue nicotine patch but she is not asked he smoked since  January  ?  Metoprolol for rate control and blood pressure management    Comments:    Signed by: Gerard Gallegos, Physician

## 2018-06-04 PROCEDURE — 97535 SELF CARE MNGMENT TRAINING: CPT

## 2018-06-04 PROCEDURE — 97110 THERAPEUTIC EXERCISES: CPT

## 2018-06-04 PROCEDURE — 25010000002 CEFTRIAXONE: Performed by: FAMILY MEDICINE

## 2018-06-04 PROCEDURE — 94799 UNLISTED PULMONARY SVC/PX: CPT

## 2018-06-04 PROCEDURE — 97116 GAIT TRAINING THERAPY: CPT

## 2018-06-04 PROCEDURE — 97530 THERAPEUTIC ACTIVITIES: CPT

## 2018-06-04 RX ORDER — SENNA AND DOCUSATE SODIUM 50; 8.6 MG/1; MG/1
2 TABLET, FILM COATED ORAL 2 TIMES DAILY
Status: DISCONTINUED | OUTPATIENT
Start: 2018-06-04 | End: 2018-06-08 | Stop reason: HOSPADM

## 2018-06-04 RX ADMIN — DOXYCYCLINE 100 MG: 100 INJECTION, POWDER, LYOPHILIZED, FOR SOLUTION INTRAVENOUS at 08:36

## 2018-06-04 RX ADMIN — OXYCODONE HYDROCHLORIDE AND ACETAMINOPHEN 1 TABLET: 5; 325 TABLET ORAL at 11:57

## 2018-06-04 RX ADMIN — DOCUSATE SODIUM AND SENNOSIDES 2 TABLET: 8.6; 5 TABLET, FILM COATED ORAL at 20:11

## 2018-06-04 RX ADMIN — DOXYCYCLINE 100 MG: 100 INJECTION, POWDER, LYOPHILIZED, FOR SOLUTION INTRAVENOUS at 20:34

## 2018-06-04 RX ADMIN — OXYCODONE HYDROCHLORIDE AND ACETAMINOPHEN 1 TABLET: 5; 325 TABLET ORAL at 20:10

## 2018-06-04 RX ADMIN — FAMOTIDINE 40 MG: 20 TABLET, FILM COATED ORAL at 08:32

## 2018-06-04 RX ADMIN — GABAPENTIN 600 MG: 300 CAPSULE ORAL at 13:38

## 2018-06-04 RX ADMIN — CEFTRIAXONE 1 G: 1 INJECTION, POWDER, FOR SOLUTION INTRAMUSCULAR; INTRAVENOUS at 19:34

## 2018-06-04 RX ADMIN — Medication 1 TABLET: at 08:32

## 2018-06-04 RX ADMIN — GABAPENTIN 600 MG: 300 CAPSULE ORAL at 06:12

## 2018-06-04 RX ADMIN — APIXABAN 5 MG: 5 TABLET, FILM COATED ORAL at 20:11

## 2018-06-04 RX ADMIN — METOPROLOL TARTRATE 25 MG: 25 TABLET, FILM COATED ORAL at 08:32

## 2018-06-04 RX ADMIN — Medication 1 TABLET: at 17:05

## 2018-06-04 RX ADMIN — SENNOSIDES 1 TABLET: 8.6 TABLET, FILM COATED ORAL at 08:31

## 2018-06-04 RX ADMIN — DOCUSATE SODIUM 100 MG: 100 CAPSULE, LIQUID FILLED ORAL at 08:32

## 2018-06-04 RX ADMIN — LEVOTHYROXINE SODIUM 50 MCG: 50 TABLET ORAL at 08:32

## 2018-06-04 RX ADMIN — OXYCODONE HYDROCHLORIDE AND ACETAMINOPHEN 1 TABLET: 5; 325 TABLET ORAL at 06:12

## 2018-06-04 RX ADMIN — GABAPENTIN 600 MG: 300 CAPSULE ORAL at 21:09

## 2018-06-04 RX ADMIN — APIXABAN 5 MG: 5 TABLET, FILM COATED ORAL at 08:32

## 2018-06-04 RX ADMIN — LORAZEPAM 1 MG: 1 TABLET ORAL at 20:10

## 2018-06-04 RX ADMIN — CETIRIZINE HYDROCHLORIDE 5 MG: 10 TABLET ORAL at 08:32

## 2018-06-04 RX ADMIN — NICOTINE 1 PATCH: 7 PATCH TRANSDERMAL at 08:33

## 2018-06-04 RX ADMIN — METOPROLOL TARTRATE 25 MG: 25 TABLET, FILM COATED ORAL at 20:10

## 2018-06-04 RX ADMIN — Medication 1 CAPSULE: at 08:32

## 2018-06-04 NOTE — PLAN OF CARE
Problem: Patient Care Overview  Goal: Plan of Care Review  Outcome: Ongoing (interventions implemented as appropriate)      Problem: Fall Risk (Adult)  Goal: Absence of Fall  Outcome: Ongoing (interventions implemented as appropriate)      Problem: Functional Mobility Impairment (IRF) (Adult)  Goal: Optimal/Safe Level of [place] with Mobility  Outcome: Ongoing (interventions implemented as appropriate)      Problem: Fractured Hip (Adult)  Goal: Signs and Symptoms of Listed Potential Problems Will be Absent, Minimized or Managed (Fractured Hip)  Outcome: Ongoing (interventions implemented as appropriate)      Problem: Skin Injury Risk (Adult)  Goal: Skin Health and Integrity  Outcome: Ongoing (interventions implemented as appropriate)

## 2018-06-04 NOTE — PLAN OF CARE
Problem: Patient Care Overview  Goal: Plan of Care Review  Outcome: Ongoing (interventions implemented as appropriate)   06/04/18 0921   Patient Care Overview   IRF Plan of Care Review progress ongoing, continue   Progress, Functional Goals demonstrating adequate progress   Coping/Psychosocial   Plan of Care Reviewed With patient       Problem: Fall Risk (Adult)  Goal: Absence of Fall  Outcome: Ongoing (interventions implemented as appropriate)      Problem: Functional Mobility Impairment (IRF) (Adult)  Goal: Optimal/Safe Level of [place] with Mobility  Outcome: Ongoing (interventions implemented as appropriate)      Problem: Fractured Hip (Adult)  Goal: Signs and Symptoms of Listed Potential Problems Will be Absent, Minimized or Managed (Fractured Hip)  Outcome: Ongoing (interventions implemented as appropriate)      Problem: Skin Injury Risk (Adult)  Goal: Skin Health and Integrity  Outcome: Ongoing (interventions implemented as appropriate)

## 2018-06-04 NOTE — PROGRESS NOTES
Inpatient Rehabilitation Functional Measures Assessment and Plan of Care    Plan of Care      Functional Measures  DONNY Eating:  DONNY Grooming:  DONNY Bathing:  DONNY Upper Body Dressing:  DONNY Lower Body Dressing:  DONNY Toileting:    DONNY Bladder Management  Level of Assistance:  Frequency/Number of Accidents this Shift:    DONNY Bowel Management  Level of Assistance:  Frequency/Number of Accidents this Shift:    DONNY Bed/Chair/Wheelchair Transfer:  Bed/chair/wheelchair Transfer Score = 4.  Patient performs 75% or more of effort and minimal assistance (little/incidental  help/lifting of one limb/steadying) for transferring to and from the  bed/chair/wheelchair, requiring: Contact guard. Patient requires the following  assistive device(s): Walker.  DONNY Toilet Transfer:  DONNY Tub/Shower Transfer:    Previously Documented Mode of Locomotion at Discharge:  DONNY Expected Mode of Locomotion at Discharge:  DONNY Walk/Wheelchair:  WHEELCHAIR OBSERVATION   Wheelchair did not occur.    WALK OBSERVATION   Walk Distance Scale = 2.  Distance walked is 50 -149 feet. Walk Score = 2.  Patient performs 75% or more of effort and requires minimal assistance.  Incidental assistance, contact guard or steadying was provided. Patient walked a  distance of 90 feet. Patient requires the following assistive device(s): Rolling  walker.  DONNY Stairs:  Stairs did not occur.    DONNY Comprehension:  DONNY Expression:  DONNY Social Interaction:  DONNY Problem Solving:  DONNY Memory:    Therapy Mode Minutes  Occupational Therapy:  Physical Therapy: Individual: 90 minutes.  Speech Language Pathology:    Discharge Functional Goals:    Signed by: Noris Wooten, Physical Therapist

## 2018-06-04 NOTE — PROGRESS NOTES
Inpatient Rehabilitation Functional Measures Assessment and Plan of Care    Plan of Care    Safety    Potential for Injury (Active)  Current Status (6/1/2018 10:30:00 AM): potential for falls  Weekly Goal: no falls this week  Discharge Goal: no falls by discharge    Body Systems    Integumentary (Active)  Current Status (6/1/2018 10:30:00 AM): potential for skin breakdown  Weekly Goal: no skin breakdown this week  Discharge Goal: no skin breakdown by discharge  Functional Measures  DONNY Eating:  DONNY Grooming:  DONNY Bathing:  DONNY Upper Body Dressing:  DONNY Lower Body Dressing:  DONNY Toileting:    DONNY Bladder Management  Level of Assistance:  Frequency/Number of Accidents this Shift:    DONNY Bowel Management  Level of Assistance:  Frequency/Number of Accidents this Shift:    DONNY Bed/Chair/Wheelchair Transfer:  DONNY Toilet Transfer:  DONNY Tub/Shower Transfer:    Previously Documented Mode of Locomotion at Discharge:  DONNY Expected Mode of Locomotion at Discharge:  DONNY Walk/Wheelchair:  DONNY Stairs:    DONNY Comprehension:  Auditory comprehension is the usual mode. Comprehension  Score = 6, Modified [place].  Patient comprehends complex/abstract  information in their primary language, requiring: Additional time.  DONNY Expression:  Vocal expression is the usual mode. Expression Score = 6,  Modified Independent.  Patient expresses complex/abstract information in their  primary language, requiring: Additional time.  DONNY Social Interaction:  Social Interaction Score = 6, Modified Independent.  Patient is modified independent for social interaction, requiring: Requires  additional time.  DONNY Problem Solving:  Problem Solving Score = 6, Modified [place].  Patient  makes appropriate decisions in order to solve complex problems, but requires  extra time.  DONNY Memory:  Memory Score = 6, Modified [place].  Patient is modified  independent for memory, requiring: Requires additional time.    Therapy Mode Minutes  Occupational  Therapy:  Physical Therapy:  Speech Language Pathology:    Discharge Functional Goals:    Signed by: Michelle Arevalo RN

## 2018-06-04 NOTE — THERAPY TREATMENT NOTE
Inpatient Rehabilitation - Physical Therapy Treatment Note  KATIE Piña     Patient Name: Anette Dunham  : 1940  MRN: [id number]    Today's Date: 2018                 Admit Date: 2018      Visit Dx:    No diagnosis found.    Patient Active Problem List   Diagnosis   • Essential hypertension   • Chest pain   • Paroxysmal atrial fibrillation   • Closed left hip fracture, initial encounter   • Tobacco abuse   • Closed left hip fracture       Therapy Treatment          IRF Treatment Summary     Row Name 18 1228             Evaluation/Treatment Time and Intent    Subjective Information --   agrees to therapy  -LB      Document Type therapy note (daily note)  -LB      Mode of Treatment physical therapy  -LB      Recorded by [LB] Noris Wooten PT      Row Name 18 1228             Cognition/Psychosocial- PT/OT    Affect/Mental Status (Cognitive) WFL  -LB      Follows Commands (Cognition) WFL  -LB      Recorded by [LB] Noris Wooten PT      Row Name 18 1228             Mobility    Left Lower Extremity (Weight-bearing Status) weight-bearing as tolerated (WBAT)  -LB      Recorded by [LB] Noris Wooten PT      Row Name 18 1228             Bed Mobility Assessment/Treatment    Bed Mobility Assessment/Treatment supine-sit;sit-supine;supine-sit-supine  -LB      Supine-Sit-Supine [place] (Bed Mobility) verbal cues;nonverbal cues (demo/gesture);contact guard  -LB      Recorded by [LB] Noris Wooten PT      Row Name 18 1228             Transfer Assessment/Treatment    Transfer Assessment/Treatment bed-chair transfer;chair-bed transfer;sit-stand transfer;stand-sit transfer  -LB      Bed-Chair [place] (Transfers) contact guard  -LB      Chair-Bed [place] (Transfers) contact guard  -LB      Assistive Device (Bed-Chair Transfers) walker, front-wheeled  -LB      Sit-Stand [place] (Transfers) contact guard  -LB      Stand-Sit [place]  (Transfers) contact guard  -LB      Recorded by [LB] Noris Wooten, PT      Row Name 06/04/18 1228             Chair-Bed Transfer    Assistive Device (Chair-Bed Transfers) walker, front-wheeled  -LB      Recorded by [LB] Noris Wooten, PT      Row Name 06/04/18 1228             Sit-Stand Transfer    Assistive Device (Sit-Stand Transfers) walker, front-wheeled  -LB      Recorded by [LB] Noris Wooten, PT      Row Name 06/04/18 1228             Stand-Sit Transfer    Assistive Device (Stand-Sit Transfers) walker, front-wheeled  -LB      Recorded by [LB] Noris Wooten, PT      Row Name 06/04/18 1228             Gait/Stairs Assessment/Training    [place] Level (Gait) contact guard  -LB      Assistive Device (Gait) walker, front-wheeled  -LB      Distance in Feet (Gait) 80' 70' 90' 60'  -LB      Pattern (Gait) step-to  -LB      Deviations/Abnormal Patterns (Gait) antalgic;gait speed decreased;stride length decreased  -LB      Recorded by [LB] Noris Wooten, PT      Row Name 06/04/18 1228             Safety Issues, Functional Mobility    Impairments Affecting Function (Mobility) balance;endurance/activity tolerance;pain;strength  -LB      Comment, Safety Issues/Impairments (Mobility) fall risk, left hip precautions  -LB      Recorded by [LB] Noris Wooten, PT      Row Name 06/04/18 1228             Pain Assessment    Additional Documentation Pain Scale: FACES Pre/Post-Treatment (Group);Pain Scale: Numbers Pre/Post-Treatment (Group)  -LB      Recorded by [LB] Noris Wooten, PT      Row Name 06/04/18 1228             Pain Scale: Numbers Pre/Post-Treatment    Pain Location - Side Left  -LB      Pain Location hip  -LB      Recorded by [LB] Noris Wooten, PT      Row Name 06/04/18 1228             Pain Scale: FACES Pre/Post-Treatment    Pain: FACES Scale, Pretreatment 4-->hurts little more  -LB      Pain: FACES Scale, Post-Treatment 4-->hurts little more  -LB       Recorded by [LB] Noris Wooten PT      Row Name 06/04/18 1228             Lower Extremity Seated Therapeutic Exercise    Performed, Seated Lower Extremity (Therapeutic Exercise) hip flexion/extension;hip abduction/adduction;knee flexion/extension;ankle dorsiflexion/plantarflexion  -LB      Sets/Reps Detail, Seated Lower Extremity (Therapeutic Exercise) 20 reps  -LB      Recorded by [LB] Noris Wooten PT      Row Name 06/04/18 1228             Lower Extremity Supine Therapeutic Exercise    Performed, Supine Lower Extremity (Therapeutic Exercise) heel slides;ankle pumps;gluteal sets;quadriceps sets;SAQ (short arc quad) over bolster;hip abduction/adduction  -LB      Sets/Reps Detail, Supine Lower Extremity (Therapeutic Exercise) 20 reps 2 sets  -LB      Recorded by [LB] Noris Wooten PT      Row Name 06/04/18 1228             Positioning and Restraints    Pre-Treatment Position sitting in chair/recliner  -LB      In Wheelchair --   with rec. therapy  -LB      Recorded by [LB] Noris Wooten PT        User Key  (r) = Recorded By, (t) = Taken By, (c) = Cosigned By    Initials Name Effective Dates    LB Noris Wooten PT 03/14/16 -           Wound 05/27/18 0425 Right posterior;lower arm skin tear (Active)   Dressing Appearance intact;dry 6/3/2018  7:05 PM   Closure Open to air 6/4/2018  8:00 AM   Base scab;dry 6/3/2018  7:05 PM   Drainage Amount none 6/3/2018  7:05 PM       Wound 05/29/18 1222 Left hip incision (Active)   Dressing Appearance dry;intact;no drainage 6/4/2018  8:00 AM   Closure Liquid skin adhesive 6/3/2018  7:05 PM   Drainage Amount none 6/3/2018  7:05 PM       Physical Therapy Education     Title: PT OT SLP Therapies (Active)     Topic: Physical Therapy (Done)     Point: Mobility training (Done)    Learning Progress Summary     Learner Status Readiness Method Response Comment Documented by    Patient Done Acceptance EALICIA NR LB 06/04/18 1232     Done Acceptance ALICIA GREEN  VU,NR  LB 06/02/18 1052          Point: Home exercise program (Done)    Learning Progress Summary     Learner Status Readiness Method Response Comment Documented by    Patient Done Acceptance E,TB VU,NR  LB 06/04/18 1232     Done Acceptance E,TB VU,NR  LB 06/02/18 1052          Point: Body mechanics (Done)    Learning Progress Summary     Learner Status Readiness Method Response Comment Documented by    Patient Done Acceptance E,TB VU,NR  LB 06/04/18 1232     Done Acceptance E,TB VU,NR  LB 06/02/18 1052          Point: Precautions (Done)    Learning Progress Summary     Learner Status Readiness Method Response Comment Documented by    Patient Done Acceptance E,TB VU,NR  LB 06/04/18 1232     Done Acceptance E,TB VU,NR  LB 06/02/18 1052                      User Key     Initials Effective Dates Name Provider Type Discipline     03/14/16 -  Noris Wooten PT Physical Therapist PT                  PT Recommendation and Plan    Anticipated Equipment Needs at Discharge (PT Eval): front wheeled walker  Planned Therapy Interventions (PT Eval): balance training, bed mobility training, gait training, patient/family education, strengthening, transfer training  Frequency of Treatment (PT Eval): 5 times per week    Plan of Care Reviewed With: patient  IRF Plan of Care Review: progress ongoing, continue  Progress, Functional Goals: demonstrating adequate progress                   Time Calculation:           PT Charges     Row Name 06/04/18 1233             Time Calculation    Start Time 0915  -LB      Stop Time 1045  -LB      Time Calculation (min) 90 min  -LB      PT Received On 06/04/18  -LB        User Key  (r) = Recorded By, (t) = Taken By, (c) = Cosigned By    Initials Name Provider Type     Noris Wooten, LATONIA Physical Therapist            Therapy Charges for Today     Code Description Service Date Service Provider Modifiers Qty    68258417270 HC GAIT TRAINING EA 15 MIN 6/4/2018 Noris Wooten, PT GP  2    15968062027 HC PT THER PROC EA 15 MIN 6/4/2018 Noris Wooten, PT GP 4    [id number] HC PT THER SUPP EA 15 MIN 6/4/2018 Noris Wooten, PT GP 2                   Noris Wooten, PT  6/4/2018

## 2018-06-04 NOTE — PROGRESS NOTES
Patient Assessment Instrument  Quality Indicators - Admission    Section B. Hearing, Speech Vision      Section C. Cognitive Patterns      Section [id number]. Prior Functioning      Section QI7401. Prior Device Use  Patient does not use manual or motorized wheelchair or scooter, mechanical lift,  walker, or an orthotic/prosthesis.    Section [id number]. Self Care Performance      Section PO2021. Self Care Discharge Goals      Section YU0625. Mobility Performance      Section XS4047. Mobility Discharge Goals      Section H. Bladder and Bowel      Section I. Active Diagnosis      Section J. Health Conditions      Section K. Swallowing/Nutritional Status      Section M. Skin Conditions      Section O. Special Treatments, Procedures, and Programs      OPTIONAL BRANCH FOR TRACKING FALLS  Fall(s) During Shift:    Signed by: PAYTON Jameson

## 2018-06-04 NOTE — PROGRESS NOTES
[facility] CM[facility] Coordinator  Inpatient Rehabilitation Admission    Ethnic Group: White.  Marital Status:  Marital Status: .    IRF Admission Date:  06/01/2018  Admission Class: Initial Rehab.  Admit From:  [facility]    Pre-Hospital Living: Home. Pre-Hospital Living  With: (2) Family/Relatives.    Payment Sources: Primary: Medicare Fee for Service  Secondary: Not Listed.  Impairment Group: 08.11 Status Post Unilateral Hip Fracture  Date of Onset of Impairment: 05/27/2018    Etiologic Diagnosis Code(s):  Rank Code      Description  1    S72.002A  Fracture of unspecified part of neck of left                 femur, initial encounter for closed fracture    Comorbidities:      Height on Admission: 63 inches.  Weight on Admission: 155 pounds.    Are there any arthritis conditions recorded for Impairment Group, Etiologic  Diagnosis, or Comorbid Conditions that meet all of the regulatory requirements  for IRF classification (in 42 .29(b)(2)(x), (xi), and xii))?    DONNY Bladder Accidents:  6 - Accidents.    Patient used medications/device this  shift.  6/1/2018 10:44:00 AM  Bladder Score = 1.  Five (5) or more  bladder accidents.  DONNY Bowel Accident: 0 -Accidents.  Patient used medications/device 4 days prior  to admission. Patient used medications/device this shift.  6/1/2018 10:44:00 AM  Bowel Score = 6. Patient has no accidents, but uses a device/medications.  medication    Signed by: Karlie Patel, Supervisor

## 2018-06-04 NOTE — PROGRESS NOTES
Inpatient Rehabilitation Functional Measures Assessment and Plan of Care    Plan of Care  Self Care    [OT] Dressing (Lower)(Active)  Current Status(06/04/2018): [place]  Weekly Goal(06/12/2018): CGA  Discharge Goal: SBA    [OT] Toileting(Active)  Current Status(06/04/2018): CGA  Weekly Goal(06/12/2018): SBA  Discharge Goal: SBA    Performed Intervention(s)  ADL re-trng, AE trng, ther ex/act, pt / family education    Functional Measures  DONNY Eating:  DONNY Grooming: Grooming Score = 5. Patient is supervision/set-up for grooming,  requiring: Initial preparation. No assistive devices were required.  DONNY Bathing:  Patient took sponge bath. Patient requires no physical assistance  for washing, rinsing, and drying the right arm. Patient requires no physical  assistance for washing, rinsing, and drying the left arm. Patient requires no  physical assistance for washing, rinsing, and drying the chest. Patient requires  no physical assistance for washing, rinsing, and drying the abdomen. Patient  requires no physical assistance for washing, rinsing, and drying the perineal  area. Patient requires no physical assistance for washing, rinsing, and drying  the buttocks. Patient requires no physical assistance for washing, rinsing, and  drying the right upper leg. Patient requires no physical assistance for washing,  rinsing, and drying the left upper leg. Patient requires no physical assistance  for washing, rinsing, and drying the right lower leg, including the foot.  Patient requires minimal assistance for washing, rinsing, or drying the left  lower leg, including the foot. Patient performs 90 % of bathing tasks. Bathing  Score = 4, Minimal Assistance. Patient requires the following assistive  device(s): Long handled sponge. Grab bar/arm rest to maintain balance.  DONNY Upper Body Dressing:  Upper Body Dressing Score = 5. Patient is supervision  for upper body dressing, requiring: Gathering/setting out clothes. No assistive  devices  were required.  [facility] Lower Body Dressing:  Gathering clothes was not observed for this patient.  Wearing underwear or an undergarment was not observed for this patient. Patient  requires no physical assistance for donning and/or doffing pants/skirt threading  right leg. Patient requires no physical assistance for donning and/or doffing  pants/skirt threading left leg. Patient requires no physical assistance for  donning and/or doffing pants/skirt over hips and adjusting fastener. Patient  requires no physical assistance for donning and/or doffing right sock. Patient  requires minimal/incidental physical assistance for donning and/or doffing left  sock. Donning and/or doffing right shoe was not observed for this patient.  Donning and/or doffing left shoe was not observed for this patient. Patient  performs 95 % of lower body dressing tasks. Lower Body Dressing Score = 4,  Minimal Assistance. Patient requires the following assistive device(s): Reacher.  Sock aid.  DONNY Toileting:  Toileting Score = 4.  Patient requires minimal assistance for  toileting, such as steadying for balance while cleansing or adjusting clothes.  Patient requires the following assistive device(s): Arm rest of a specialized  seat. Grab bar.    DONNY Bladder Management  Level of Assistance:  Frequency/Number of Accidents this Shift:    DONNY Bowel Management  Level of Assistance:  Frequency/Number of Accidents this Shift:    DONNY Bed/Chair/Wheelchair Transfer:  [facility] Toilet Transfer:  Toilet Transfer Score = 4.  Patient performs 75% or more  of effort and minimal assistance (little/incidental help/steadying) for  transferring to and from the toilet/commode, requiring: Contact guard. Patient  requires the following assistive device(s): Safety frame/over the toilet. Grab  bars.  DONNY Tub/Shower Transfer:    Previously Documented Mode of Locomotion at Discharge:  [facility] Expected Mode of Locomotion at Discharge:  [facility] Walk/Wheelchair:  [facility] Stairs:    [facility]  Comprehension:  DONNY Expression:  DONNY Social Interaction:  DONNY Problem Solving:  DONNY Memory:    Therapy Mode Minutes  Occupational Therapy: Individual: 90 minutes.  Physical Therapy:  Speech Language Pathology:    Discharge Functional Goals:    Signed by: Trini Castro Occupational Therapist

## 2018-06-04 NOTE — PROGRESS NOTES
Patient Assessment Instrument  Quality Indicators - Admission    Section B. Hearing, Speech Vision      Section C. Cognitive Patterns  Brief Interview for Mental Status (BIMS) was conducted.  Repetition of Three Words: Three words  Temporal Orientation Year: Correct  Temporal Orientation Month: Accurate within 5 days  Temporal Orientation Day of Week: Correct  Recall Socks: Yes, no cue required  Recall Blue: Yes, no cue required  Recall Bed: Yes, no cue required  BIMS SUMMARY SCORE: 15 Cognitively intact Patient was able to complete the Brief  Interview for Mental Status    Section [id number]. Prior Functioning    Self Care: Patient completed the activities by him/herself, with or without an  assistive device, with no assistance from a helper.  Indoor Mobility: Patient completed the activities by him/herself, with or  without an assistive device, with no assistance from a helper.  Stairs: Patient completed the activities by him/herself, with or without an  assistive device, with no assistance from a helper.  Functional Cognition: Patient completed the activities by him/herself, with or  without an assistive device, with no assistance from a helper.    Section [id number]. Prior Device Use      Section OO2897. Self Care Performance      Section XE9533. Self Care Discharge Goals      Section EZ8850. Mobility Performance      Section ZT1093. Mobility Discharge Goals      Section H. Bladder and Bowel      Section I. Active Diagnosis  Comorbidities and Co-existing Conditions:   Patient does not have PAD, PVD, or  Diabetes Mellitus    Section J. Health Conditions  Patient has had two or more falls, or a fall with injury, in the past year.  Patient has had major surgery during the 100 days prior to admission.    Section K. Swallowing/Nutritional Status  Regular food (solids and liquids swallowed safely without supervision or  modified food or liquid consistency).    Section M. Skin Conditions  Unhealed Pressure Ulcer(s) at Stage 1  or Higher on Admission:  No.    Section O. Special Treatments, Procedures, and Programs  Patient did not receive total parenteral nutrition treatment at the time of  admission.    OPTIONAL BRANCH FOR TRACKING FALLS  Fall(s) During Shift: No falls.    Signed by: Karlie Patel, Supervisor

## 2018-06-04 NOTE — PROGRESS NOTES
Inpatient Rehabilitation Functional Measures Assessment    Functional Measures  DONNY Eating:  DONNY Grooming:  DONNY Bathing:  DONNY Upper Body Dressing:  DONNY Lower Body Dressing:  DONNY Toileting:  Toileting Score = 4.  Patient requires minimal assistance for  toileting, such as steadying for balance while cleansing or adjusting clothes.  Patient requires the following assistive device(s): Grab bar.    DONNY Bladder Management  Level of Assistance:  Bladder Score = 1. Patient performs less than 25% of tasks  and requires total assist or assist of two for use of bedpan in bladder  management.  Frequency/Number of Accidents this Shift:  Bladder accidents this shift:  1 .    DONNY Bowel Management  Level of Assistance: Bowel Score = 5.  Patient is supervision/set-up for bowel  management, requiring: Stand by assistance. No assistive devices were required.    Frequency/Number of Accidents this Shift: Bowel accidents this shift: 0 .  Patient has not had an accident this shift.    DONNY Bed/Chair/Wheelchair Transfer:  Activity was not observed.  DONNY Toilet Transfer:  Toilet Transfer Score = 3.  Patient performs 50-74% of  effort and requires moderate assistance (some lifting) for transferring to and  from the toilet/commode. Patient requires the following assistive device(s):  Grab bars. Safety frame/over the toilet.  DONNY Tub/Shower Transfer:  Activity was not observed.    Previously Documented Mode of Locomotion at Discharge:  DONNY Expected Mode of Locomotion at Discharge:  DONNY Walk/Wheelchair:  DONNY Stairs:    DONNY Comprehension:  DONNY Expression:  DONNY Social Interaction:  DONNY Problem Solving:  DONNY Memory:    Therapy Mode Minutes  Occupational Therapy:  Physical Therapy:  Speech Language Pathology:    Discharge Functional Goals:    Signed by: PARTH Leon

## 2018-06-04 NOTE — PROGRESS NOTES
Rehabilitation Nursing  Inpatient Rehabilitation Functional Measures Assessment and Plan of Care    Plan of Care/Interventions  Copy from Alysa    Potential for Injury (Active)  Current Status (6/1/2018 10:30:00 AM): potential for falls  Weekly Goal: no falls this week  Discharge Goal: no falls by discharge    Body Systems    Integumentary (Active)  Current Status (6/1/2018 10:30:00 AM): potential for skin breakdown  Weekly Goal: no skin breakdown this week  Discharge Goal: no skin breakdown by discharge    Functional Measures  DONNY Eating:  DONNY Grooming:  DONNY Bathing:  DONNY Upper Body Dressing:  DONNY Lower Body Dressing:  DONNY Toileting:    DONNY Bladder Management  Level of Assistance:  Frequency/Number of Accidents this Shift:    DONNY Bowel Management  Level of Assistance:  Frequency/Number of Accidents this Shift:    DONNY Bed/Chair/Wheelchair Transfer:  DONNY Toilet Transfer:  DONNY Tub/Shower Transfer:    Previously Documented Mode of Locomotion at Discharge:  DONNY Expected Mode of Locomotion at Discharge:  DONNY Walk/Wheelchair:  DONNY Stairs:    DONNY Comprehension:  Both ( auditory and visual) modes of comprehension are used  equally. Comprehension Score = 6, Modified [place].  Patient comprehends  complex/abstract information in their primary language, requiring: Additional  time.  DONNY Expression:  Both ( vocal and non-vocal) modes of expression are used  equally. Expression Score = 6,  Modified Independent. Patient expresses  complex/abstract information in their primary language, requiring:  DONNY Social Interaction:  Social Interaction Score = 6, Modified Independent.  Patient is modified independent for social interaction, requiring: Requires  additional time.  DONNY Problem Solving:  Problem Solving Score = 6, Modified [place].  Patient  makes appropriate decisions in order to solve complex problems, but requires  extra time.  DONNY Memory:  Memory Score = 6, Modified [place].  Patient is  modified  independent for memory, requiring: Requires additional time.    Signed by: Destiney Pedroza, Nurse

## 2018-06-04 NOTE — PLAN OF CARE
Problem: Patient Care Overview  Goal: Plan of Care Review   06/04/18 1416   Patient Care Overview   IRF Plan of Care Review progress ongoing, continue   Coping/Psychosocial   Plan of Care Reviewed With patient   OTHER   Outcome Summary Pt tolerated tx sessions well with rest breaks. Continue POC.

## 2018-06-04 NOTE — PROGRESS NOTES
"     LOS: 3 days     Chief Complaint:  Left hip fracture    Subjective     Interval History:     She complains of constipation.  He doesn't feel like she is having good bowel movements.  No abdominal discomfort.  She also complains of tingling      Objective     Vital Signs  /60   Pulse 95   Temp 99.8 °F (37.7 °C) (Oral)   Resp 16   Ht 160 cm (62.99\")   Wt 70.3 kg (155 lb)   SpO2 95%   BMI 27.46 kg/m²   Intake & Output (last day)       06/03 0701 - 06/04 0700 06/04 0701 - 06/05 0700    P.O. 1080 480    IV Piggyback 200     Total Intake(mL/kg) 1280 (18.2) 480 (6.8)    Net +1280 +480          Unmeasured Urine Occurrence 8 x 5 x    Unmeasured Stool Occurrence 2 x 1 x            Physical Exam:     General Appearance:    Alert, cooperative, in no acute distress   Head:    Normocephalic, without obvious abnormality, atraumatic   Eyes:            Lids and lashes normal, conjunctivae and sclerae normal, no   icterus, no pallor, corneas clear, PERRLA   Ears:    Ears appear intact with no abnormalities noted       Neck:   No adenopathy, supple, trachea midline, no thyromegaly, no   carotid bruit, no JVD   Lungs:     Clear to auscultation,respirations regular, even and                  unlabored    Heart:    Regular rhythm and normal rate, normal S1 and S2, no            murmur, no gallop, no rub, no click   Chest Wall:    No abnormalities observed   Abdomen:     Normal bowel sounds, no masses, no organomegaly, soft        non-tender, non-distended, no guarding, no rebound                tenderness   Extremities:   Moves all extremities With left hip flexor strength 3 out of 5, no edema, no cyanosis, no redness   Pulses:   Pulses palpable and equal bilaterally   Skin:   No bleeding, bruising or rash       Neurologic:   Cranial nerves 2 - 12 grossly intact, sensation intact, DTR       present and equal bilaterally        Results Review:    Lab Results   Component Value Date    WBC 8.74 06/02/2018    HGB 9.3 (L) " 06/02/2018    HCT 28.2 (L) 06/02/2018    MCV 94.9 (H) 06/02/2018     06/02/2018       Lab Results   Component Value Date    GLUCOSE 88 06/02/2018    BUN 12 06/02/2018    CREATININE 0.84 06/02/2018    EGFRIFNONA 65 06/02/2018    BCR 14.3 06/02/2018     06/02/2018    K 3.7 06/02/2018     06/02/2018    CO2 22.7 (L) 06/02/2018    CALCIUM 8.8 06/02/2018    ALBUMIN 3.20 (L) 06/02/2018    LABIL2 1.0 (L) 06/02/2018    AST 33 (H) 06/02/2018    ALT 21 06/02/2018     Lab Results   Component Value Date    INR 1.20 (H) 05/27/2018       No results found for: POCGLU       Medication Review:     Current Facility-Administered Medications:   •  acetaminophen (TYLENOL) tablet 650 mg, 650 mg, Oral, Q4H PRN, Samuel Duane Kreis, MD, 650 mg at 06/03/18 2110  •  acetaminophen (TYLENOL) tablet 650 mg, 650 mg, Oral, Q6H PRN, Samuel Duane Kreis, MD  •  apixaban (ELIQUIS) tablet 5 mg, 5 mg, Oral, Q12H, Samuel Duane Kreis, MD, 5 mg at 06/04/18 0832  •  bisacodyl (DULCOLAX) suppository 10 mg, 10 mg, Rectal, Daily PRN, Samuel Duane Kreis, MD, 10 mg at 06/02/18 2235  •  calcium carb-cholecalciferol 600-800 MG-UNIT tablet 1 tablet, 1 tablet, Oral, BID With Meals, Samuel Duane Kreis, MD, 1 tablet at 06/04/18 0832  •  cefTRIAXone (ROCEPHIN) 1 g/100 mL 0.9% NS (MBP), 1 g, Intravenous, Q24H, Samuel Duane Kreis, MD, 1 g at 06/03/18 2002  •  cetirizine (zyrTEC) tablet 5 mg, 5 mg, Oral, Daily, Samuel Duane Kreis, MD, 5 mg at 06/04/18 0832  •  docusate sodium (COLACE) capsule 100 mg, 100 mg, Oral, Daily, Samuel Duane Kreis, MD, 100 mg at 06/04/18 0832  •  doxycycline (VIBRAMYCIN) 100 mg/100 mL 0.9% NS MBP, 100 mg, Intravenous, Q12H, Samuel Duane Kreis, MD, 100 mg at 06/04/18 0836  •  famotidine (PEPCID) tablet 40 mg, 40 mg, Oral, Daily, Samuel Duane Kreis, MD, 40 mg at 06/04/18 0832  •  gabapentin (NEURONTIN) capsule 600 mg, 600 mg, Oral, Q8H, Samuel Duane Kreis, MD, 600 mg at 06/04/18 1338  •  lactobacillus acidophilus (RISAQUAD)  capsule 1 capsule, 1 capsule, Oral, Daily, Samuel Duane Kreis, MD, 1 capsule at 06/04/18 0832  •  levothyroxine (SYNTHROID, LEVOTHROID) tablet 50 mcg, 50 mcg, Oral, Daily, Samuel Duane Kreis, MD, 50 mcg at 06/04/18 0832  •  LORazepam (ATIVAN) tablet 1 mg, 1 mg, Oral, Q8H PRN, Samuel Duane Kreis, MD, 1 mg at 06/03/18 2111  •  magnesium hydroxide (MILK OF MAGNESIA) suspension 2400 mg/10mL 10 mL, 10 mL, Oral, Daily PRN, Samuel Duane Kreis, MD, 10 mL at 06/03/18 1710  •  metoprolol tartrate (LOPRESSOR) tablet 25 mg, 25 mg, Oral, Q12H, Samuel Duane Kreis, MD, 25 mg at 06/04/18 0832  •  nicotine (NICODERM CQ) 7 MG/24HR patch 1 patch, 1 patch, Transdermal, Daily, Samuel Duane Kreis, MD, 1 patch at 06/04/18 0833  •  nitroglycerin (NITROSTAT) SL tablet 0.4 mg, 0.4 mg, Sublingual, Q5 Min PRN, Samuel Duane Kreis, MD  •  ondansetron (ZOFRAN) tablet 4 mg, 4 mg, Oral, Q6H PRN **OR** ondansetron ODT (ZOFRAN-ODT) disintegrating tablet 4 mg, 4 mg, Oral, Q6H PRN **OR** ondansetron (ZOFRAN) injection 4 mg, 4 mg, Intravenous, Q6H PRN, Samuel Duane Kreis, MD  •  oxyCODONE-acetaminophen (PERCOCET) 5-325 MG per tablet 1 tablet, 1 tablet, Oral, Q6H PRN, Samuel Duane Kreis, MD, 1 tablet at 06/04/18 1157  •  senna (SENOKOT) tablet 1 tablet, 1 tablet, Oral, Daily, Samuel Duane Kreis, MD, 1 tablet at 06/04/18 0831  •  sodium chloride 0.9 % flush 1-10 mL, 1-10 mL, Intravenous, PRN, Samuel Duane Kreis, MD  •  sodium chloride 0.9 % flush 10 mL, 10 mL, Intravenous, PRN, Samuel Duane Kreis, MD      Assessment/Plan     Left femoral neck fracture status post hemiarthroplasty  Paroxysmal atrial fibrillation  Pneumonia  Hypertension  Postoperative anemia with hemoglobin of 9.1     PT / OT ongoing     Eliquis twice a day for anticoagulation     Rocephin and doxycycline due to pneumonia.     I'm going to discontinue nicotine patch but she is not asked he smoked since January     Metoprolol for rate control and blood pressure management    CBC, BMP, CRP  in AM      Samuel Duane Kreis, MD  06/04/18  2:04 PM

## 2018-06-04 NOTE — PROGRESS NOTES
Inpatient Rehabilitation Functional Measures Assessment    Functional Measures  DONNY Eating:  Eating Score = 5. Patient is supervision/set-up for eating,  requiring: Opening containers. No assistive devices were required.  DONNY Grooming:  DONNY Bathing:  DONNY Upper Body Dressing:  DONNY Lower Body Dressing:  DONNY Toileting:  Toileting Score = 5.  Patient is supervision/set-up for  toileting, requiring: Patient requires the following assistive device(s): Grab  bar.    DONNY Bladder Management  Level of Assistance:  Bladder Score = 5.  Patient is supervision/set-up for  bladder management, requiring: No assistive devices were required.  Frequency/Number of Accidents this Shift:  Bladder accidents this shift:  0 .  Patient has not had an accident this shift.    DONNY Bowel Management  Level of Assistance: Bowel Score = 7.  Patient is completely independent for  bowel management.  Patient did not have bowel movement.  No  medication/intervention was provided.  Frequency/Number of Accidents this Shift: Bowel accidents this shift: 0 .  Patient has not had an accident this shift.    DONNY Bed/Chair/Wheelchair Transfer:  Bed/chair/wheelchair Transfer Score = 3.  Patient performs 50-74% of effort and requires moderate assistance (some  lifting) for transferring to and from the bed/chair/wheelchair. No assistive  devices were required.  DONNY Toilet Transfer:  Toilet Transfer Score = 3.  Patient performs 50-74% of  effort and requires moderate assistance (some lifting) for transferring to and  from the toilet/commode. Patient requires the following assistive device(s):  Grab bars.  DONNY Tub/Shower Transfer:  Activity was not observed.    Previously Documented Mode of Locomotion at Discharge:  [facility] Expected Mode of Locomotion at Discharge:  DONNY Walk/Wheelchair:  DONNY Stairs:    DONNY Comprehension:  DONNY Expression:  DONNY Social Interaction:  DONNY Problem Solving:  DONNY Memory:    Therapy Mode Minutes  Occupational Therapy:  Physical Therapy:  Speech  Language Pathology:    Discharge Functional Goals:    Signed by: PARTH Penn

## 2018-06-04 NOTE — THERAPY TREATMENT NOTE
Inpatient Rehabilitation - Occupational Therapy Treatment Note    KATIE Piña     Patient Name: Anette Dunham  : 1940  MRN: [id number]    Today's Date: 2018                 Admit Date: 2018      Visit Dx:  No diagnosis found.    Patient Active Problem List   Diagnosis   • Essential hypertension   • Chest pain   • Paroxysmal atrial fibrillation   • Closed left hip fracture, initial encounter   • Tobacco abuse   • Closed left hip fracture         Therapy Treatment          IRF Treatment Summary     Row Name 18 1403 18 1228          Evaluation/Treatment Time and Intent    Subjective Information no complaints   agreeable to therapy  -CJ --   agrees to therapy  -LB     Document Type therapy note (daily note)  -CJ therapy note (daily note)  -LB     Mode of Treatment occupational therapy  -CJ physical therapy  -LB     Recorded by [CJ] Trini Castro OT [LB] Noris Wooten PT     Row Name 18 1403 18 1228          Cognition/Psychosocial- PT/OT    Affect/Mental Status (Cognitive) WFL  -CJ WFL  -LB     Follows Commands (Cognition) WFL  -CJ WFL  -LB     Recorded by [CJ] Trini Castro OT [LB] Noris Wooten, PT     Row Name 18 1228             Mobility    Left Lower Extremity (Weight-bearing Status) weight-bearing as tolerated (WBAT)  -CJ      Recorded by [CJ] Trini Castro OT      Row Name 18 1228             Bed Mobility Assessment/Treatment    Bed Mobility Assessment/Treatment supine-sit;sit-supine;supine-sit-supine  -LB      Supine-Sit-Supine [place] (Bed Mobility) verbal cues;nonverbal cues (demo/gesture);contact guard  -LB      Recorded by [LB] Noris Wooten PT      Row Name 18 1403 18 1228          Transfer Assessment/Treatment    Transfer Assessment/Treatment  -- bed-chair transfer;chair-bed transfer;sit-stand transfer;stand-sit transfer  -LB     Bed-Chair [place] (Transfers)  -- contact guard  -LB     Chair-Bed  [place] (Transfers)  -- contact guard  -LB     Assistive Device (Bed-Chair Transfers)  -- walker, front-wheeled  -LB     Sit-Stand [place] (Transfers)  -- contact guard  -LB     Stand-Sit [place] (Transfers)  -- contact guard  -LB     [place] Level (Toilet Transfer) contact guard;verbal cues  -CJ  --     Assistive Device (Toilet Transfer) grab bars/safety frame  -CJ  --     Recorded by [CJ] Trini Castro, OT [LB] Noris Wooten, PT     Row Name 06/04/18 1228             Chair-Bed Transfer    Assistive Device (Chair-Bed Transfers) walker, front-wheeled  -LB      Recorded by [LB] Noris Wooten, PT      Row Name 06/04/18 1228             Sit-Stand Transfer    Assistive Device (Sit-Stand Transfers) walker, front-wheeled  -LB      Recorded by [LB] Noris Wooten, PT      Row Name 06/04/18 1228             Stand-Sit Transfer    Assistive Device (Stand-Sit Transfers) walker, front-wheeled  -LB      Recorded by [LB] Noris Wooten, PT      Row Name 06/04/18 1228             Gait/Stairs Assessment/Training    [place] Level (Gait) contact guard  -LB      Assistive Device (Gait) walker, front-wheeled  -LB      Distance in Feet (Gait) 80' 70' 90' 60'  -LB      Pattern (Gait) step-to  -LB      Deviations/Abnormal Patterns (Gait) antalgic;gait speed decreased;stride length decreased  -LB      Recorded by [LB] Noris Wooten, PT      Row Name 06/04/18 1403 06/04/18 1228          Safety Issues, Functional Mobility    Safety Issues Affecting Function (Mobility) safety precaution awareness  -CJ  --     Impairments Affecting Function (Mobility)  -- balance;endurance/activity tolerance;pain;strength  -LB     Comment, Safety Issues/Impairments (Mobility) Precautions:  Fall risk, THP, decreased skin integrity  -CJ fall risk, left hip precautions  -LB     Recorded by [CJ] Trini Castro, OT [LB] Noris Wooten, PT     Row Name 06/04/18 1403             Bathing  Assessment/Treatment    Bathing [place] Level minimum assist (75% patient effort);verbal cues  -CJ      Assistive Device (Bathing) long-handled sponge  -CJ      Recorded by [CJ] Trini Castro, OT      Row Name 06/04/18 1403             Upper Body Dressing Assessment/Treatment    Upper Body Dressing Task set up assistance  -CJ      Recorded by [CJ] Trini Castro, OT      Row Name 06/04/18 1403             Lower Body Dressing Assessment/Treatment    Lower Body Dressing [place] Level minimum assist (75% patient effort);verbal cues  -CJ      Lower Body Dressing Dressing Device reacher;sock-aid  -CJ      Recorded by [CJ] Trini Castro, OT      Row Name 06/04/18 1403             Grooming Assessment/Treatment    Grooming [place] Level set up  -CJ      Recorded by [CJ] Trini Castro, OT      Row Name 06/04/18 1403             Toileting Assessment/Treatment    Toileting [place] Level contact guard assist;verbal cues  -CJ      Assistive Device Use (Toileting) grab bar/safety frame  -CJ      Recorded by [CJ] Trini Castro, OT      Row Name 06/04/18 1228             Pain Assessment    Additional Documentation Pain Scale: FACES Pre/Post-Treatment (Group);Pain Scale: Numbers Pre/Post-Treatment (Group)  -LB      Recorded by [LB] Noris Wooten, PT      Row Name 06/04/18 1228             Pain Scale: Numbers Pre/Post-Treatment    Pain Location - Side Left  -LB      Pain Location hip  -LB      Recorded by [LB] Noris Wooten, PT      Row Name 06/04/18 1228             Pain Scale: FACES Pre/Post-Treatment    Pain: FACES Scale, Pretreatment 4-->hurts little more  -LB      Pain: FACES Scale, Post-Treatment 4-->hurts little more  -LB      Recorded by [LB] Noris Wooten, PT      Row Name 06/04/18 1403             Upper Extremity Seated Therapeutic Exercise    Device, Seated Upper Extremity (Therapeutic Exercise) restorator/arm bike   3 mins X 4, 0 resistance;  dowel ex-20 reps X 2, 0 lbs   -CJ      Exercise Type, Seated Upper Extremity (Therapeutic Exercise) AROM (active range of motion)   BUE ther ex/act, bilat coord ex  -CJ      Recorded by [CJ] Trini Castro OT      Row Name 06/04/18 1228             Lower Extremity Seated Therapeutic Exercise    Performed, Seated Lower Extremity (Therapeutic Exercise) hip flexion/extension;hip abduction/adduction;knee flexion/extension;ankle dorsiflexion/plantarflexion  -LB      Sets/Reps Detail, Seated Lower Extremity (Therapeutic Exercise) 20 reps  -LB      Recorded by [LB] Noris Wooten PT      Row Name 06/04/18 1228             Lower Extremity Supine Therapeutic Exercise    Performed, Supine Lower Extremity (Therapeutic Exercise) heel slides;ankle pumps;gluteal sets;quadriceps sets;SAQ (short arc quad) over bolster;hip abduction/adduction  -LB      Sets/Reps Detail, Supine Lower Extremity (Therapeutic Exercise) 20 reps 2 sets  -LB      Recorded by [LB] Noris Wooten PT      Row Name 06/04/18 1403 06/04/18 1228          Positioning and Restraints    Pre-Treatment Position  -- sitting in chair/recliner  -LB     Post Treatment Position wheelchair  -CJ  --     In Wheelchair with PT;sitting;call light within reach;encouraged to call for assist   with PT- post first tx;  in room- post second tx  -CJ --   with rec. therapy  -LB     Recorded by [CJ] Trini Castro, OT [LB] Noris Wooten PT       User Key  (r) = Recorded By, (t) = Taken By, (c) = Cosigned By    Initials Name Effective Dates    LB Noris Wooten, PT 03/14/16 -     CJ Trini Castro OT 04/03/18 -           Wound 05/27/18 0425 Right posterior;lower arm skin tear (Active)   Dressing Appearance intact;dry 6/3/2018  7:05 PM   Closure Open to air 6/4/2018  8:00 AM   Base scab;dry 6/3/2018  7:05 PM   Drainage Amount none 6/3/2018  7:05 PM       Wound 05/29/18 1222 Left hip incision (Active)   Dressing Appearance dry;intact;no drainage 6/4/2018  8:00 AM   Closure Liquid skin  adhesive 6/3/2018  7:05 PM   Drainage Amount none 6/3/2018  7:05 PM         OT Recommendation and Plan    Planned Therapy Interventions (OT Eval): activity tolerance training, adaptive equipment training, BADL retraining, functional balance retraining, IADL retraining, occupation/activity based interventions, patient/caregiver education/training, strengthening exercise, transfer/mobility retraining    Plan of Care Review  Plan of Care Reviewed With: patient  Plan of Care Reviewed With: patient  IRF Plan of Care Review: progress ongoing, continue  Outcome Summary: Pt tolerated tx sessions well with rest breaks.  Continue POC.          OT IRF GOALS     Row Name 06/02/18 0939             Transfer Goal 1 (OT-IRF)    Activity/Assistive Device (Transfer Goal 1, OT-IRF) toilet  -CJ      [place] Level (Transfer Goal 1, OT-IRF) standby assist  -CJ      Time Frame (Transfer Goal 1, OT-IRF) long term goal (LTG);by discharge  -CJ         Bathing Goal 1 (OT-IRF)    [place] Level (Bathing Goal 1, OT-IRF) minimum assist (75% or more patient effort)  -CJ      Time Frame (Bathing Goal 1, OT-IRF) long term goal (LTG);by discharge  -CJ         LB Dressing Goal 1 (OT-IRF)    [place] (LB Dressing Goal 1, OT-IRF) minimum assist (75% or more patient effort)  -CJ      Time Frame (LB Dressing Goal 1, OT-IRF) short term goal (STG);5 - 7 days  -CJ         LB Dressing Goal 2 (OT-IRF)    [place] (LB Dressing Goal 2, OT-IRF) contact guard assist  -CJ      Time Frame (LB Dressing Goal 2, OT-IRF) long term goal (LTG);by discharge  -CJ         Toileting Goal 1 (OT-IRF)    [place] Level (Toileting Goal 1, OT-IRF) minimum assist (75% or more patient effort)  -CJ      Time Frame (Toileting Goal 1, OT-IRF) short term goal (STG);5 - 7 days  -CJ         Toileting Goal 2 (OT-IRF)    [place] Level (Toileting Goal 2, OT-IRF) standby assist  -CJ      Time Frame (Toileting Goal 2, OT-IRF) long term goal (LTG);by discharge  -CJ         User Key  (r) = Recorded By, (t) = Taken By, (c) = Cosigned By    Initials Name Provider Type     Trini Castro OT Occupational Therapist                 Time Calculation:           Time Calculation- OT     Row Name 06/04/18 1417 06/04/18 0755          Time Calculation- OT    OT Start Time 1125  - 0755  -     OT Stop Time 1135  - 0915  -     OT Time Calculation (min) 10 min  - 80 min  -     Total Timed Code Minutes- OT 10 minute(s)  - 80 minute(s)  -     OT Non-Billable Time (min)  -- 20 min  -       User Key  (r) = Recorded By, (t) = Taken By, (c) = Cosigned By    Initials Name Provider Type     Trini Castro OT Occupational Therapist             Therapy Charges for Today     Code Description Service Date Service Provider Modifiers Qty    [id number]  OT SELF CARE/MGMT/TRAIN EA 15 MIN 6/4/2018 Trini Castro OT GO 3    [id number]  OT THERAPEUTIC ACT EA 15 MIN 6/4/2018 Trini Castro OT GO 3                   Trini Castro OT  6/4/2018

## 2018-06-04 NOTE — PLAN OF CARE
Problem: Patient Care Overview  Goal: Plan of Care Review   06/04/18 1232   Patient Care Overview   IRF Plan of Care Review progress ongoing, continue   Progress, Functional Goals demonstrating adequate progress   Coping/Psychosocial   Plan of Care Reviewed With patient

## 2018-06-04 NOTE — PROGRESS NOTES
Case Management  Inpatient Rehabilitation Plan of Care and Discharge Plan Note    Rehabilitation Diagnosis:  s/p left hip hemiarthroplasty  Date of Onset:  5-27-18    Medical Summary:  s/p left hip hemiarthroplasty, left displaced femoral neck  fracture  PMH:  anemia, afib, HTN, anxiety, CAD, osteoporosis, hypothyroidism,  cholecystectomy    Plan of Care      Expected Intensity:  Average of 3 hours of therapy 5 days/week.  Interdisciplinary Team:  Interdisciplinary Team: Medical Supervision and 24 Hour Rehabilitation Nursing.,  Physical Therapy:, Occupational Therapy:, Social Work, Therapeutic Recreation.  Physical Therapy Intensity/Duration: PT 1.5 hours per day/5 days per week  Occupational Therapy Intensity/Duration: OT 1.5 hours per day/5 days per week  Estimated Length of Stay/Anticipated Discharge Date: 10-14 days  Anticipated Discharge Destination:  Anticipated discharge destination from inpatient rehabilitation is community  discharge with assistance. Pt plans to return home with son at discharge.  Daughters and son will assist pt at home.      Based on the patient's medical and functional status, their prognosis and  expected level of functional improvement is:  Good    Signed by: PAYTON Jameson

## 2018-06-05 LAB
ANION GAP SERPL CALCULATED.3IONS-SCNC: 3.6 MMOL/L (ref 3.6–11.2)
BASOPHILS # BLD AUTO: 0.07 10*3/MM3 (ref 0–0.3)
BASOPHILS NFR BLD AUTO: 0.9 % (ref 0–2)
BUN BLD-MCNC: 23 MG/DL (ref 7–21)
BUN/CREAT SERPL: 25.8 (ref 7–25)
CALCIUM SPEC-SCNC: 8.3 MG/DL (ref 7.7–10)
CHLORIDE SERPL-SCNC: 108 MMOL/L (ref 99–112)
CO2 SERPL-SCNC: 25.4 MMOL/L (ref 24.3–31.9)
CREAT BLD-MCNC: 0.89 MG/DL (ref 0.43–1.29)
CRP SERPL-MCNC: 2.34 MG/DL (ref 0–0.99)
DEPRECATED RDW RBC AUTO: 44.7 FL (ref 37–54)
EOSINOPHIL # BLD AUTO: 0.79 10*3/MM3 (ref 0–0.7)
EOSINOPHIL NFR BLD AUTO: 9.9 % (ref 0–7)
ERYTHROCYTE [DISTWIDTH] IN BLOOD BY AUTOMATED COUNT: 13.7 % (ref 11.5–14.5)
GFR SERPL CREATININE-BSD FRML MDRD: 61 ML/MIN/1.73
GLUCOSE BLD-MCNC: 84 MG/DL (ref 70–110)
HCT VFR BLD AUTO: 28.1 % (ref 37–47)
HGB BLD-MCNC: 9.1 G/DL (ref 12–16)
IMM GRANULOCYTES # BLD: 0.09 10*3/MM3 (ref 0–0.03)
IMM GRANULOCYTES NFR BLD: 1.1 % (ref 0–0.5)
LYMPHOCYTES # BLD AUTO: 2.27 10*3/MM3 (ref 1–3)
LYMPHOCYTES NFR BLD AUTO: 28.6 % (ref 16–46)
MCH RBC QN AUTO: 31.1 PG (ref 27–33)
MCHC RBC AUTO-ENTMCNC: 32.4 G/DL (ref 33–37)
MCV RBC AUTO: 95.9 FL (ref 80–94)
MONOCYTES # BLD AUTO: 1.2 10*3/MM3 (ref 0.1–0.9)
MONOCYTES NFR BLD AUTO: 15.1 % (ref 0–12)
NEUTROPHILS # BLD AUTO: 3.52 10*3/MM3 (ref 1.4–6.5)
NEUTROPHILS NFR BLD AUTO: 44.4 % (ref 40–75)
OSMOLALITY SERPL CALC.SUM OF ELEC: 276.7 MOSM/KG (ref 273–305)
PLATELET # BLD AUTO: 338 10*3/MM3 (ref 130–400)
PMV BLD AUTO: 10.4 FL (ref 6–10)
POTASSIUM BLD-SCNC: 4.3 MMOL/L (ref 3.5–5.3)
RBC # BLD AUTO: 2.93 10*6/MM3 (ref 4.2–5.4)
SODIUM BLD-SCNC: 137 MMOL/L (ref 135–153)
WBC NRBC COR # BLD: 7.94 10*3/MM3 (ref 4.5–12.5)

## 2018-06-05 PROCEDURE — 85025 COMPLETE CBC W/AUTO DIFF WBC: CPT | Performed by: FAMILY MEDICINE

## 2018-06-05 PROCEDURE — 80048 BASIC METABOLIC PNL TOTAL CA: CPT | Performed by: FAMILY MEDICINE

## 2018-06-05 PROCEDURE — 86140 C-REACTIVE PROTEIN: CPT | Performed by: FAMILY MEDICINE

## 2018-06-05 PROCEDURE — 97530 THERAPEUTIC ACTIVITIES: CPT

## 2018-06-05 PROCEDURE — 97116 GAIT TRAINING THERAPY: CPT

## 2018-06-05 PROCEDURE — 97110 THERAPEUTIC EXERCISES: CPT

## 2018-06-05 PROCEDURE — 94799 UNLISTED PULMONARY SVC/PX: CPT

## 2018-06-05 RX ORDER — ROPINIROLE 1 MG/1
1 TABLET, FILM COATED ORAL NIGHTLY
Status: DISCONTINUED | OUTPATIENT
Start: 2018-06-05 | End: 2018-06-08 | Stop reason: HOSPADM

## 2018-06-05 RX ORDER — DOXYCYCLINE 100 MG/1
100 CAPSULE ORAL EVERY 12 HOURS SCHEDULED
Status: DISCONTINUED | OUTPATIENT
Start: 2018-06-05 | End: 2018-06-08 | Stop reason: HOSPADM

## 2018-06-05 RX ADMIN — DOXYCYCLINE 100 MG: 100 CAPSULE ORAL at 21:08

## 2018-06-05 RX ADMIN — OXYCODONE HYDROCHLORIDE AND ACETAMINOPHEN 1 TABLET: 5; 325 TABLET ORAL at 08:42

## 2018-06-05 RX ADMIN — GABAPENTIN 600 MG: 300 CAPSULE ORAL at 05:07

## 2018-06-05 RX ADMIN — Medication 1 TABLET: at 08:43

## 2018-06-05 RX ADMIN — METOPROLOL TARTRATE 25 MG: 25 TABLET, FILM COATED ORAL at 08:43

## 2018-06-05 RX ADMIN — LEVOTHYROXINE SODIUM 50 MCG: 50 TABLET ORAL at 08:43

## 2018-06-05 RX ADMIN — LORAZEPAM 1 MG: 1 TABLET ORAL at 21:08

## 2018-06-05 RX ADMIN — DOCUSATE SODIUM AND SENNOSIDES 2 TABLET: 8.6; 5 TABLET, FILM COATED ORAL at 21:08

## 2018-06-05 RX ADMIN — METOPROLOL TARTRATE 25 MG: 25 TABLET, FILM COATED ORAL at 21:08

## 2018-06-05 RX ADMIN — DOCUSATE SODIUM AND SENNOSIDES 2 TABLET: 8.6; 5 TABLET, FILM COATED ORAL at 08:42

## 2018-06-05 RX ADMIN — OXYCODONE HYDROCHLORIDE AND ACETAMINOPHEN 1 TABLET: 5; 325 TABLET ORAL at 13:43

## 2018-06-05 RX ADMIN — CETIRIZINE HYDROCHLORIDE 5 MG: 10 TABLET ORAL at 08:43

## 2018-06-05 RX ADMIN — APIXABAN 5 MG: 5 TABLET, FILM COATED ORAL at 08:43

## 2018-06-05 RX ADMIN — DOXYCYCLINE 100 MG: 100 CAPSULE ORAL at 11:40

## 2018-06-05 RX ADMIN — APIXABAN 5 MG: 5 TABLET, FILM COATED ORAL at 21:08

## 2018-06-05 RX ADMIN — Medication 1 TABLET: at 17:15

## 2018-06-05 RX ADMIN — GABAPENTIN 600 MG: 300 CAPSULE ORAL at 13:43

## 2018-06-05 RX ADMIN — OXYCODONE HYDROCHLORIDE AND ACETAMINOPHEN 1 TABLET: 5; 325 TABLET ORAL at 03:08

## 2018-06-05 RX ADMIN — ROPINIROLE HYDROCHLORIDE 1 MG: 1 TABLET, FILM COATED ORAL at 21:08

## 2018-06-05 RX ADMIN — NICOTINE 1 PATCH: 7 PATCH TRANSDERMAL at 08:43

## 2018-06-05 RX ADMIN — FAMOTIDINE 40 MG: 20 TABLET, FILM COATED ORAL at 08:43

## 2018-06-05 RX ADMIN — GABAPENTIN 600 MG: 300 CAPSULE ORAL at 21:08

## 2018-06-05 RX ADMIN — Medication 1 CAPSULE: at 08:42

## 2018-06-05 NOTE — PROGRESS NOTES
[facility] CM[facility] Coordinator  Inpatient Rehabilitation Admission    Ethnic Group:  Marital Status:    IRF Admission Date:  06/01/2018  Admission Class:  Admit From:    Pre-Hospital Living:    Payment Sources:  Impairment Group:  Date of Onset of Impairment:    Etiologic Diagnosis Code(s):  Rank Code      Description    1    S72.002A  Fracture of unspecified part of neck of left                 femur, initial encounter for closed fracture  1    S72.002A  Fracture of unspecified part of neck of left                 femur, initial encounter for closed fracture    Comorbidities:  Rank Code      Description    1    F41.9     Anxiety disorder, unspecified  2    I25.10    Atherosclerotic heart disease of native                 coronary artery without angina pectoris  3    K21.9     Gastro-esophageal reflux disease without                 esophagitis  4    I10       Essential (primary) hypertension  5    M81.0     Age-related osteoporosis without current                 pathological fracture  6    Z90.49    Acquired absence of other specified parts of                 digestive tract  7    F17.210   Nicotine dependence, cigarettes, uncomplicated  8    Z79.01    Long term (current) use of anticoagulants  9    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  No    DONNY Bladder Accidents:   - Accidents.    DONNY Bowel Accident:  -Accidents.    Signed by: Chasidy Glass, Supervisor

## 2018-06-05 NOTE — PLAN OF CARE
Problem: Patient Care Overview  Goal: Plan of Care Review  Outcome: Ongoing (interventions implemented as appropriate)      Problem: Fall Risk (Adult)  Goal: Absence of Fall  Outcome: Ongoing (interventions implemented as appropriate)      Problem: Functional Mobility Impairment (IRF) (Adult)  Goal: Optimal/Safe Level of [place] with Mobility  Outcome: Ongoing (interventions implemented as appropriate)      Problem: Fractured Hip (Adult)  Goal: Signs and Symptoms of Listed Potential Problems Will be Absent, Minimized or Managed (Fractured Hip)  Outcome: Ongoing (interventions implemented as appropriate)      Problem: Skin Injury Risk (Adult)  Goal: Skin Health and Integrity  Outcome: Ongoing (interventions implemented as appropriate)      Problem: Wound (Includes Pressure Injury) (Adult)  Goal: Signs and Symptoms of Listed Potential Problems Will be Absent, Minimized or Managed (Wound)  Outcome: Ongoing (interventions implemented as appropriate)

## 2018-06-05 NOTE — PROGRESS NOTES
Inpatient Rehabilitation Plan of Care Note    Plan of Care    Signed by: Noris Wooten, Physical Therapist

## 2018-06-05 NOTE — PLAN OF CARE
Problem: Patient Care Overview  Goal: Plan of Care Review  Outcome: Ongoing (interventions implemented as appropriate)   06/05/18 0914   Patient Care Overview   IRF Plan of Care Review progress ongoing, continue   Progress, Functional Goals demonstrating adequate progress   Coping/Psychosocial   Plan of Care Reviewed With patient       Problem: Fall Risk (Adult)  Goal: Absence of Fall  Outcome: Ongoing (interventions implemented as appropriate)      Problem: Functional Mobility Impairment (IRF) (Adult)  Goal: Optimal/Safe Level of [place] with Mobility  Outcome: Ongoing (interventions implemented as appropriate)      Problem: Fractured Hip (Adult)  Goal: Signs and Symptoms of Listed Potential Problems Will be Absent, Minimized or Managed (Fractured Hip)  Outcome: Ongoing (interventions implemented as appropriate)      Problem: Skin Injury Risk (Adult)  Goal: Skin Health and Integrity  Outcome: Ongoing (interventions implemented as appropriate)      Problem: Wound (Includes Pressure Injury) (Adult)  Goal: Signs and Symptoms of Listed Potential Problems Will be Absent, Minimized or Managed (Wound)  Outcome: Ongoing (interventions implemented as appropriate)

## 2018-06-05 NOTE — PROGRESS NOTES
Inpatient Rehabilitation Functional Measures Assessment    Functional Measures  DONNY Eating:  DONNY Grooming:  DONNY Bathing:  DONNY Upper Body Dressing:  DONNY Lower Body Dressing:  DONNY Toileting:    DONNY Bladder Management  Level of Assistance:  Frequency/Number of Accidents this Shift:    DONNY Bowel Management  Level of Assistance:  Frequency/Number of Accidents this Shift:    DONNY Bed/Chair/Wheelchair Transfer:  DONNY Toilet Transfer:  DONNY Tub/Shower Transfer:    Previously Documented Mode of Locomotion at Discharge:  DONNY Expected Mode of Locomotion at Discharge:  DONNY Walk/Wheelchair:  DONNY Stairs:    DONNY Comprehension:  Auditory comprehension is the usual mode. Comprehension  Score = 6, Modified [place].  Patient comprehends complex/abstract  information in their primary language, requiring:  DONNY Expression:  Vocal expression is the usual mode. Expression Score = 6,  Modified Independent.  Patient expresses complex/abstract information in their  primary language, requiring:  DONNY Social Interaction:  Social Interaction Score = 6, Modified Independent.  Patient is modified independent for social interaction, requiring:  DONNY Problem Solving:  Problem Solving Score = 6, Modified [place].  Patient  makes appropriate decisions in order to solve complex problems, but requires  extra time.  DONNY Memory:  Memory Score = 6, Modified [place].  Patient is modified  independent for memory, requiring:    Therapy Mode Minutes  Occupational Therapy:  Physical Therapy:  Speech Language Pathology:    Discharge Functional Goals:    Signed by: Nurse Luan

## 2018-06-05 NOTE — PROGRESS NOTES
Inpatient Rehabilitation Functional Measures Assessment    Functional Measures  DONNY Eating:  DONNY Grooming: Grooming Score = 5. Patient is supervision/set-up for grooming,  requiring: Setting out grooming equipment. Stand by assistance. No assistive  devices were required.  DONNY Bathing:  DONNY Upper Body Dressing:  DONNY Lower Body Dressing:  DONNY Toileting:    DONNY Bladder Management  Level of Assistance:  Frequency/Number of Accidents this Shift:    DONNY Bowel Management  Level of Assistance:  Frequency/Number of Accidents this Shift:    DONNY Bed/Chair/Wheelchair Transfer:  DONNY Toilet Transfer:  DONNY Tub/Shower Transfer:    Previously Documented Mode of Locomotion at Discharge:  [facility] Expected Mode of Locomotion at Discharge:  DONNY Walk/Wheelchair:  DONNY Stairs:    DONNY Comprehension:  DONNY Expression:  DONNY Social Interaction:  DONNY Problem Solving:  DONNY Memory:    Therapy Mode Minutes  Occupational Therapy: Individual: 90 minutes.  Physical Therapy:  Speech Language Pathology:    Discharge Functional Goals:    Signed by: Bernarda Velasquez OT

## 2018-06-05 NOTE — THERAPY TREATMENT NOTE
Inpatient Rehabilitation - Occupational Therapy Treatment Note     [facility]     Patient Name: Anette Dunham  : 1940  MRN: [id number]    Today's Date: 2018                 Admit Date: 2018      Visit Dx:  No diagnosis found.    Patient Active Problem List   Diagnosis   • Essential hypertension   • Chest pain   • Paroxysmal atrial fibrillation   • Closed left hip fracture, initial encounter   • Tobacco abuse   • Closed left hip fracture         Therapy Treatment          IRF Treatment Summary     Row Name 18 1215             Evaluation/Treatment Time and Intent    Subjective Information no complaints  -      Document Type therapy note (daily note)  -      Mode of Treatment occupational therapy;individual therapy  -      Patient/Family Observations Pt agreeable to treatment at this time.  -KH      Recorded by [MASTER] Bernarda Velasquez OT      Row Name 18 1215             Cognition/Psychosocial- PT/OT    Affect/Mental Status (Cognitive) WFL  -      Orientation Status (Cognition) oriented x 4  -KH      Follows Commands (Cognition) WFL  -      Personal Safety Interventions fall prevention program maintained;gait belt;nonskid shoes/slippers when out of bed  -KH      Recorded by [KH] Bernarda Velasquez OT      Row Name 18 1215             Upper Extremity Seated Therapeutic Exercise    Device, Seated Upper Extremity (Therapeutic Exercise) restorator/arm bike   3 mins x 3; 0 resistance; BUE Wrist rolls x 2  -KH      Exercise Type, Seated Upper Extremity (Therapeutic Exercise) AROM (active range of motion)   BUE TherEx/Act; BUE Coordination; BUE FMC/GMC  -KH      Expected Outcomes, Seated Upper Extremity (Therapeutic Exercise) improve functional tolerance, self-care activity;improve performance, BADLs  -KH      Recorded by [KH] Bernarda Velasquez OT      Row Name 18 1215             Positioning and Restraints    Pre-Treatment Position sitting in  chair/recliner  -KH      Post Treatment Position wheelchair  -KH      In Wheelchair sitting;with PT  -KH      Recorded by [KH] Bernarda Velasquez OT        User Key  (r) = Recorded By, (t) = Taken By, (c) = Cosigned By    Initials Name Effective Dates    KH Bernarda Velasquez, OT 04/17/18 -           Wound 05/27/18 0425 Right posterior;lower arm skin tear (Active)   Closure Open to air 6/5/2018  7:30 AM   Base dry;scab 6/4/2018  7:05 PM   Drainage Amount none 6/4/2018  7:05 PM   Care, Wound other (see comments) 6/4/2018  7:05 PM   Dressing Care, Wound open to air 6/4/2018  7:05 PM       Wound 05/29/18 1222 Left hip incision (Active)   Dressing Appearance dry;intact 6/5/2018  7:30 AM   Drainage Amount none 6/4/2018  7:05 PM   Care, Wound other (see comments) 6/4/2018  7:05 PM         OT Recommendation and Plan         Plan of Care Review  Plan of Care Reviewed With: patient  Plan of Care Reviewed With: patient  IRF Plan of Care Review: progress ongoing, continue  Progress, Functional Goals: demonstrating adequate progress  Outcome Summary: Pt tolerated treatment well this date. Continues to demo improvement in activity tolerance and endurance at this time. Skilled OT to continue current POC.          OT IRF GOALS     Row Name 06/02/18 0939             Transfer Goal 1 (OT-IRF)    Activity/Assistive Device (Transfer Goal 1, OT-IRF) toilet  -CJ      [place] Level (Transfer Goal 1, OT-IRF) standby assist  -CJ      Time Frame (Transfer Goal 1, OT-IRF) long term goal (LTG);by discharge  -CJ         Bathing Goal 1 (OT-IRF)    [place] Level (Bathing Goal 1, OT-IRF) minimum assist (75% or more patient effort)  -CJ      Time Frame (Bathing Goal 1, OT-IRF) long term goal (LTG);by discharge  -CJ         LB Dressing Goal 1 (OT-IRF)    [place] (LB Dressing Goal 1, OT-IRF) minimum assist (75% or more patient effort)  -CJ      Time Frame (LB Dressing Goal 1, OT-IRF) short term goal (STG);5 - 7 days  -CJ          LB Dressing Goal 2 (OT-IRF)    [place] (LB Dressing Goal 2, OT-IRF) contact guard assist  -CJ      Time Frame (LB Dressing Goal 2, OT-IRF) long term goal (LTG);by discharge  -CJ         Toileting Goal 1 (OT-IRF)    [place] Level (Toileting Goal 1, OT-IRF) minimum assist (75% or more patient effort)  -CJ      Time Frame (Toileting Goal 1, OT-IRF) short term goal (STG);5 - 7 days  -CJ         Toileting Goal 2 (OT-IRF)    [place] Level (Toileting Goal 2, OT-IRF) standby assist  -CJ      Time Frame (Toileting Goal 2, OT-IRF) long term goal (LTG);by discharge  -CJ        User Key  (r) = Recorded By, (t) = Taken By, (c) = Cosigned By    Initials Name Provider Type    YULY Castro OT Occupational Therapist                 Time Calculation:           Time Calculation- OT     Row Name 06/05/18 0745             Time Calculation- OT    OT Start Time 0745  -      OT Stop Time 0915  -      OT Time Calculation (min) 90 min  -      Total Timed Code Minutes- OT 90 minute(s)  -      OT Non-Billable Time (min) 10 min  -        User Key  (r) = Recorded By, (t) = Taken By, (c) = Cosigned By    Initials Name Provider Type     Bernarda Velasquez, OT Occupational Therapist             Therapy Charges for Today     Code Description Service Date Service Provider Modifiers Qty    72079002516 HC OT THER SUPP EA 15 MIN 6/5/2018 Bernarda Velasquez OT GO 1    73532993362 HC OT THERAPEUTIC ACT EA 15 MIN 6/5/2018 Bernarda Velasquez OT GO 6                   Bernarda Velasquez OT  6/5/2018

## 2018-06-05 NOTE — PROGRESS NOTES
Rehabilitation Nursing  Inpatient Rehabilitation Plan of Care Note    Plan of Care  Copy from POCSafety    Potential for Injury (Active)  Current Status (6/1/2018 10:30:00 AM): potential for falls  Weekly Goal: no falls this week  Discharge Goal: no falls by discharge    Body Systems    Integumentary (Active)  Current Status (6/1/2018 10:30:00 AM): potential for skin breakdown  Weekly Goal: no skin breakdown this week  Discharge Goal: no skin breakdown by discharge    Signed by: Xin John Nurse

## 2018-06-05 NOTE — PROGRESS NOTES
Rehabilitation Nursing  Inpatient Rehabilitation Plan of Care Note    Plan of Care  Copy from POCSafety    Potential for Injury (Active)  Current Status (6/1/2018 10:30:00 AM): potential for falls  Weekly Goal: no falls this week  Discharge Goal: no falls by discharge    Body Systems    Integumentary (Active)  Current Status (6/1/2018 10:30:00 AM): potential for skin breakdown  Weekly Goal: no skin breakdown this week  Discharge Goal: no skin breakdown by discharge    Signed by: Tiffany Adams Nurse

## 2018-06-05 NOTE — PROGRESS NOTES
"     LOS: 4 days     Chief Complaint:  Left hip fracture    Subjective     Interval History:     This patient is seen this morning by me.  She is doing better overall.  Her breathing is good.  No productive cough, fevers, chills.  She feels like her mobility is showing continuous and significant improvement.  Her endurance, mobility and ambulation are improving.  She continues on IV doxycycline and Rocephin.  CRP is down to 2.34.  White blood count today is 7.9 with a hemoglobin of 9.1.      Objective     Vital Signs  /67 (BP Location: Right arm, Patient Position: Sitting)   Pulse 91   Temp 99.2 °F (37.3 °C) (Oral)   Resp 18   Ht 160 cm (62.99\")   Wt 70.3 kg (155 lb)   SpO2 95%   BMI 27.46 kg/m²   Intake & Output (last day)       06/04 0701 - 06/05 0700 06/05 0701 - 06/06 0700    P.O. 1440 240    IV Piggyback 200     Total Intake(mL/kg) 1640 (23.3) 240 (3.4)    Net +1640 +240          Unmeasured Urine Occurrence 14 x 1 x    Unmeasured Stool Occurrence 3 x             Physical Exam:     General Appearance:    Alert, cooperative, in no acute distress   Head:    Normocephalic, without obvious abnormality, atraumatic   Eyes:            Lids and lashes normal, conjunctivae and sclerae normal, no   icterus, no pallor, corneas clear, PERRLA   Ears:    Ears appear intact with no abnormalities noted       Neck:   No adenopathy, supple, trachea midline, no thyromegaly, no   carotid bruit, no JVD   Lungs:     Clear to auscultation,respirations regular, even and                  unlabored    Heart:    Regular rhythm and normal rate, normal S1 and S2, no            murmur, no gallop, no rub, no click   Chest Wall:    No abnormalities observed   Abdomen:     Normal bowel sounds, no masses, no organomegaly, soft        non-tender, non-distended, no guarding, no rebound                tenderness   Extremities:   Moves all extremities With left hip flexor strength 3 out of 5, no edema, no cyanosis, no redness   Pulses:  "  Pulses palpable and equal bilaterally   Skin:   No bleeding, bruising or rash       Neurologic:   Cranial nerves 2 - 12 grossly intact, sensation intact, DTR       present and equal bilaterally        Results Review:    Lab Results   Component Value Date    WBC 7.94 06/05/2018    HGB 9.1 (L) 06/05/2018    HCT 28.1 (L) 06/05/2018    MCV 95.9 (H) 06/05/2018     06/05/2018       Lab Results   Component Value Date    GLUCOSE 84 06/05/2018    BUN 23 (H) 06/05/2018    CREATININE 0.89 06/05/2018    EGFRIFNONA 61 06/05/2018    BCR 25.8 (H) 06/05/2018     06/05/2018    K 4.3 06/05/2018     06/05/2018    CO2 25.4 06/05/2018    CALCIUM 8.3 06/05/2018    ALBUMIN 3.20 (L) 06/02/2018    LABIL2 1.0 (L) 06/02/2018    AST 33 (H) 06/02/2018    ALT 21 06/02/2018     Lab Results   Component Value Date    INR 1.20 (H) 05/27/2018       No results found for: POCGLU       Medication Review:     Current Facility-Administered Medications:   •  acetaminophen (TYLENOL) tablet 650 mg, 650 mg, Oral, Q4H PRN, Samuel Duane Kreis, MD, 650 mg at 06/03/18 2110  •  acetaminophen (TYLENOL) tablet 650 mg, 650 mg, Oral, Q6H PRN, Samuel Duane Kreis, MD  •  apixaban (ELIQUIS) tablet 5 mg, 5 mg, Oral, Q12H, Samuel Duane Kreis, MD, 5 mg at 06/05/18 0843  •  bisacodyl (DULCOLAX) suppository 10 mg, 10 mg, Rectal, Daily PRN, Samuel Duane Kreis, MD, 10 mg at 06/02/18 2235  •  calcium carb-cholecalciferol 600-800 MG-UNIT tablet 1 tablet, 1 tablet, Oral, BID With Meals, Samuel Duane Kreis, MD, 1 tablet at 06/05/18 0843  •  cefTRIAXone (ROCEPHIN) 1 g/100 mL 0.9% NS (MBP), 1 g, Intravenous, Q24H, Samuel Duane Kreis, MD, Last Rate: 200 mL/hr at 06/04/18 1934, 1 g at 06/04/18 1934  •  cetirizine (zyrTEC) tablet 5 mg, 5 mg, Oral, Daily, Samuel Duane Kreis, MD, 5 mg at 06/05/18 0843  •  doxycycline (VIBRAMYCIN) 100 mg/100 mL 0.9% NS MBP, 100 mg, Intravenous, Q12H, Samuel Duane Kreis, MD, Last Rate: 100 mL/hr at 06/04/18 2034, 100 mg at 06/04/18  2034  •  famotidine (PEPCID) tablet 40 mg, 40 mg, Oral, Daily, Samuel Duane Kreis, MD, 40 mg at 06/05/18 0843  •  gabapentin (NEURONTIN) capsule 600 mg, 600 mg, Oral, Q8H, Samuel Duane Kreis, MD, 600 mg at 06/05/18 0507  •  lactobacillus acidophilus (RISAQUAD) capsule 1 capsule, 1 capsule, Oral, Daily, Samuel Duane Kreis, MD, 1 capsule at 06/05/18 0842  •  levothyroxine (SYNTHROID, LEVOTHROID) tablet 50 mcg, 50 mcg, Oral, Daily, Samuel Duane Kreis, MD, 50 mcg at 06/05/18 0843  •  LORazepam (ATIVAN) tablet 1 mg, 1 mg, Oral, Q8H PRN, Samuel Duane Kreis, MD, 1 mg at 06/04/18 2010  •  magnesium hydroxide (MILK OF MAGNESIA) suspension 2400 mg/10mL 10 mL, 10 mL, Oral, Daily PRN, Samuel Duane Kreis, MD, 10 mL at 06/03/18 1710  •  metoprolol tartrate (LOPRESSOR) tablet 25 mg, 25 mg, Oral, Q12H, Samuel Duane Kreis, MD, 25 mg at 06/05/18 0843  •  nicotine (NICODERM CQ) 7 MG/24HR patch 1 patch, 1 patch, Transdermal, Daily, Samuel Duane Kreis, MD, 1 patch at 06/05/18 0843  •  nitroglycerin (NITROSTAT) SL tablet 0.4 mg, 0.4 mg, Sublingual, Q5 Min PRN, Samuel Duane Kreis, MD  •  ondansetron (ZOFRAN) tablet 4 mg, 4 mg, Oral, Q6H PRN **OR** ondansetron ODT (ZOFRAN-ODT) disintegrating tablet 4 mg, 4 mg, Oral, Q6H PRN **OR** ondansetron (ZOFRAN) injection 4 mg, 4 mg, Intravenous, Q6H PRN, Samuel Duane Kreis, MD  •  oxyCODONE-acetaminophen (PERCOCET) 5-325 MG per tablet 1 tablet, 1 tablet, Oral, Q6H PRN, Samuel Duane Kreis, MD, 1 tablet at 06/05/18 0842  •  rOPINIRole (REQUIP) tablet 1 mg, 1 mg, Oral, Nightly, Samuel Duane Kreis, MD  •  sennosides-docusate sodium (SENOKOT-S) 8.6-50 MG tablet 2 tablet, 2 tablet, Oral, BID, Samuel Duane Kreis, MD, 2 tablet at 06/05/18 0842  •  sodium chloride 0.9 % flush 1-10 mL, 1-10 mL, Intravenous, PRN, Samuel Duane Kreis, MD  •  sodium chloride 0.9 % flush 10 mL, 10 mL, Intravenous, PRN, Samuel Duane Kreis, MD      Assessment/Plan     Left femoral neck fracture status post  hemiarthroplasty  Paroxysmal atrial fibrillation  Pneumonia  Hypertension  Postoperative anemia with hemoglobin stable     PT / OT ongoing     Eliquis twice a day for anticoagulation     Discontinue IV Rocephin and doxycycline and placed on by mouth doxycycline ×5 days     I'm going to discontinue nicotine patch but she is not asked he smoked since January     Metoprolol for rate control and blood pressure management        Samuel Duane Kreis, MD  06/05/18  10:29 AM

## 2018-06-05 NOTE — PROGRESS NOTES
Inpatient Rehabilitation Functional Measures Assessment    Functional Measures  DONNY Eating:  DONNY Grooming:  DONNY Bathing:  DONNY Upper Body Dressing:  DONNY Lower Body Dressing:  DONNY Toileting:    DONNY Bladder Management  Level of Assistance:  Frequency/Number of Accidents this Shift:    DONNY Bowel Management  Level of Assistance:  Frequency/Number of Accidents this Shift:    DONNY Bed/Chair/Wheelchair Transfer:  DONNY Toilet Transfer:  DONNY Tub/Shower Transfer:    Previously Documented Mode of Locomotion at Discharge:  DONNY Expected Mode of Locomotion at Discharge:  DONNY Walk/Wheelchair:  DONNY Stairs:    DONNY Comprehension:  Auditory comprehension is the usual mode. Comprehension  Score = 6, Modified [place].  Patient comprehends complex/abstract  information in their primary language, requiring: Additional time.  DONNY Expression:  Vocal expression is the usual mode. Expression Score = 6,  Modified Independent.  Patient expresses complex/abstract information in their  primary language, requiring: Additional time.  DONNY Social Interaction:  Social Interaction Score = 6, Modified Independent.  Patient is modified independent for social interaction, requiring: Requires  additional time.  DONNY Problem Solving:  Problem Solving Score = 6, Modified [place].  Patient  makes appropriate decisions in order to solve complex problems, but requires  extra time.  DONNY Memory:  Memory Score = 6, Modified [place].  Patient is modified  independent for memory, requiring: Requires additional time.    Therapy Mode Minutes  Occupational Therapy:  Physical Therapy:  Speech Language Pathology:    Discharge Functional Goals:    Signed by: Michelle Arevalo RN

## 2018-06-05 NOTE — SIGNIFICANT NOTE
06/05/18 1030   Plan   Plan Team conference held today.  Spoke to pt about how she is progressing in therapy, plans for discharge on 6-8-18 and recommendations for home health, rolling walker and bedside commode.  Pt plans to return home with son Eduardo Dunham at discharge.  Daughters will assist pt at home.  [facility] and [place] preferred.  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  Pt says she will not get paid for last week and this week due to hip fx.  Pt says there is nothing that can be done about this issue.  Left message for daughter Vielka [phone].   Patient/Family in Agreement with Plan yes

## 2018-06-05 NOTE — PROGRESS NOTES
Inpatient Rehabilitation Functional Measures Assessment    Functional Measures  DONNY Eating:  DONNY Grooming: Grooming Score = 5. Patient is supervision/set-up for grooming,  requiring: Setting out grooming equipment. Initial preparation. No assistive  devices were required.  DONNY Bathing:  Patient took sponge bath. Bathing Score = 5.  Patient is  supervision/set-up for bathing, requiring: Preparing the water. Preparing  washing materials. Setting out bathing equipment. No assistive devices were  required.  DONNY Upper Body Dressing:  Upper Body Dressing was not observed this shift  because patient dressed/undressed in pajamas/gown only. .  DONNY Lower Body Dressing:  Lower Body Dressing was not observed this shift  because patient dressed/undressed in pajamas/gown only.  DONNY Toileting:    DONNY Bladder Management  Level of Assistance:  Bladder Score = 5.  Patient is supervision/set-up for  bladder management, requiring: No assistive devices were required.  Frequency/Number of Accidents this Shift:  Bladder accidents this shift:  0 .  Patient has not had an accident this shift.    DONNY Bowel Management  Level of Assistance: Bowel Score = 7.  Patient is completely independent for  bowel management.  Patient did not have bowel movement.  No  medication/intervention was provided.  Frequency/Number of Accidents this Shift: Bowel accidents this shift: 0 .  Patient has not had an accident this shift.    DONNY Bed/Chair/Wheelchair Transfer:  Bed/chair/wheelchair Transfer Score = 4.  Patient performs 75% or more of effort and minimal assistance (little/incidental  help/lifting of one limb/steadying) for transferring to and from the  bed/chair/wheelchair, requiring: Steadying. Patient requires the following  assistive device(s): Seating system of wheelchair. Grab bars.  DONNY Toilet Transfer:  Toilet Transfer Score = 4.  Patient performs 75% or more  of effort and minimal assistance (little/incidental help/steadying) for  transferring to  and from the toilet/commode, requiring: Steadying. Patient  requires the following assistive device(s): Safety frame/over the toilet. Grab  bars.  DONNY Tub/Shower Transfer:    Previously Documented Mode of Locomotion at Discharge:  [facility] Expected Mode of Locomotion at Discharge:  [facility] Walk/Wheelchair:  DONNY Stairs:    DONNY Comprehension:  DONNY Expression:  DONNY Social Interaction:  [facility] Problem Solving:  DONNY Memory:    Therapy Mode Minutes  Occupational Therapy:  Physical Therapy:  Speech Language Pathology:    Discharge Functional Goals:    Signed by: PARTH Mcdonald

## 2018-06-05 NOTE — PLAN OF CARE
Problem: Patient Care Overview  Goal: Plan of Care Review  Outcome: Ongoing (interventions implemented as appropriate)      Problem: Fall Risk (Adult)  Goal: Identify Related Risk Factors and Signs and Symptoms  Outcome: Outcome(s) achieved Date Met: 06/04/18    Goal: Absence of Fall  Outcome: Ongoing (interventions implemented as appropriate)      Problem: Functional Mobility Impairment (IRF) (Adult)  Goal: Optimal/Safe Level of [place] with Mobility  Outcome: Ongoing (interventions implemented as appropriate)      Problem: Skin Injury Risk (Adult)  Goal: Identify Related Risk Factors and Signs and Symptoms  Outcome: Outcome(s) achieved Date Met: 06/04/18    Goal: Skin Health and Integrity  Outcome: Ongoing (interventions implemented as appropriate)      Problem: Wound (Includes Pressure Injury) (Adult)  Goal: Signs and Symptoms of Listed Potential Problems Will be Absent, Minimized or Managed (Wound)  Outcome: Ongoing (interventions implemented as appropriate)

## 2018-06-05 NOTE — PLAN OF CARE
Problem: Patient Care Overview  Goal: Plan of Care Review  Outcome: Ongoing (interventions implemented as appropriate)   06/05/18 0712   Patient Care Overview   IRF Plan of Care Review progress ongoing, continue   Progress, Functional Goals demonstrating adequate progress   Coping/Psychosocial   Plan of Care Reviewed With patient   OTHER   Outcome Summary Pt tolerated treatment well this date. Continues to demo improvement in activity tolerance and endurance at this time. Skilled OT to continue current POC.

## 2018-06-05 NOTE — PROGRESS NOTES
Inpatient Rehabilitation Functional Measures Assessment    Functional Measures  DONNY Eating:  DONNY Grooming:  DONNY Bathing:  DONNY Upper Body Dressing:  DONNY Lower Body Dressing:  DONNY Toileting:    DONNY Bladder Management  Level of Assistance:  Frequency/Number of Accidents this Shift:    DONNY Bowel Management  Level of Assistance:  Frequency/Number of Accidents this Shift:    DONNY Bed/Chair/Wheelchair Transfer:  Bed/chair/wheelchair Transfer Score = 4.  Patient performs 75% or more of effort and minimal assistance (little/incidental  help/lifting of one limb/steadying) for transferring to and from the  bed/chair/wheelchair, requiring: Contact guard. Patient requires the following  assistive device(s): Walker.  DONNY Toilet Transfer:  DONNY Tub/Shower Transfer:    Previously Documented Mode of Locomotion at Discharge:  DONNY Expected Mode of Locomotion at Discharge:  DONNY Walk/Wheelchair:  WHEELCHAIR OBSERVATION   Wheelchair did not occur.    WALK OBSERVATION   Walk Distance Scale = 3.  Distance walked is greater than 150 feet. Walk Score  = 5.  Patient requires supervision or set up for walking. Patient walked a  distance of  340 feet. Patient requires the following assistive device(s):  Rolling walker.  DONNY Stairs:  Stairs did not occur.    DONNY Comprehension:  DONNY Expression:  DONNY Social Interaction:  DONNY Problem Solving:  DONNY Memory:    Therapy Mode Minutes  Occupational Therapy:  Physical Therapy: Individual: 90 minutes.  Speech Language Pathology:    Discharge Functional Goals:    Signed by: Noris Wooten Physical Therapist

## 2018-06-05 NOTE — PROGRESS NOTES
Inpatient Rehabilitation Functional Measures Assessment    Functional Measures  DONNY Eating:  DONNY Grooming:  DONNY Bathing:  DONNY Upper Body Dressing:  DONNY Lower Body Dressing:  DONNY Toileting:    DONNY Bladder Management  Level of Assistance:  Frequency/Number of Accidents this Shift:    DONNY Bowel Management  Level of Assistance:  Frequency/Number of Accidents this Shift:    DONNY Bed/Chair/Wheelchair Transfer:  DONNY Toilet Transfer:  DONNY Tub/Shower Transfer:    Previously Documented Mode of Locomotion at Discharge:  DONNY Expected Mode of Locomotion at Discharge:  DONNY Walk/Wheelchair:  DONNY Stairs:    DONNY Comprehension:  Both ( auditory and visual) modes of comprehension are used  equally. Comprehension Score = 6, Modified [place].  Patient comprehends  complex/abstract information in their primary language, requiring: Glasses.  DONNY Expression:  Vocal expression is the usual mode. Expression Score = 7,  Independent.  Patient expresses complex/abstract information in their primary  language.  Patient is completely independent for vocal expression.  There are no  activity limitations.  DONNY Social Interaction:  Social Interaction Score = 7, Independent. Patient is  completely independent for social interaction.  There are no activity  limitations.  DONNY Problem Solving:  Problem Solving Score = 7, Independent.  Patient makes  appropriate decisions in order to solve complex problems.  Patient is completely  independent for problem solving.  There are no activity limitations.  DONNY Memory:  Memory Score = 7, Independent.  Patient is completely independent  for memory.  There are no activity limitations.    Therapy Mode Minutes  Occupational Therapy:  Physical Therapy:  Speech Language Pathology:    Discharge Functional Goals:    Signed by: Nurse Paxton

## 2018-06-05 NOTE — SIGNIFICANT NOTE
06/05/18 1130   Plan   Plan SS received call from daughter Vielka Patel.  Informed daughter how pt is doing in therapy and plans for discharge home on 6-8-18 with home health services, rolling walker and bedside commode.  Daughter states she will be staying with pt to provide assistance and other daughters will also be helping mostly on the weekend.  Pt will have 24 hour help at home from family.  Daughter will come to rehab for pt's discharge after her MD appointments; she will provide transportation for pt.     Patient/Family in Agreement with Plan yes

## 2018-06-05 NOTE — PLAN OF CARE
Problem: Patient Care Overview  Goal: Plan of Care Review   06/05/18 1653   Patient Care Overview   IRF Plan of Care Review progress ongoing, continue   Progress, Functional Goals demonstrating adequate progress   Coping/Psychosocial   Plan of Care Reviewed With patient

## 2018-06-06 PROCEDURE — 94799 UNLISTED PULMONARY SVC/PX: CPT

## 2018-06-06 PROCEDURE — 97116 GAIT TRAINING THERAPY: CPT

## 2018-06-06 PROCEDURE — 97535 SELF CARE MNGMENT TRAINING: CPT

## 2018-06-06 PROCEDURE — 97110 THERAPEUTIC EXERCISES: CPT

## 2018-06-06 PROCEDURE — 97530 THERAPEUTIC ACTIVITIES: CPT

## 2018-06-06 RX ADMIN — LORAZEPAM 1 MG: 1 TABLET ORAL at 20:44

## 2018-06-06 RX ADMIN — NICOTINE 1 PATCH: 7 PATCH TRANSDERMAL at 08:59

## 2018-06-06 RX ADMIN — Medication 1 TABLET: at 17:23

## 2018-06-06 RX ADMIN — OXYCODONE HYDROCHLORIDE AND ACETAMINOPHEN 1 TABLET: 5; 325 TABLET ORAL at 08:58

## 2018-06-06 RX ADMIN — Medication 1 CAPSULE: at 09:00

## 2018-06-06 RX ADMIN — APIXABAN 5 MG: 5 TABLET, FILM COATED ORAL at 20:43

## 2018-06-06 RX ADMIN — DOCUSATE SODIUM AND SENNOSIDES 2 TABLET: 8.6; 5 TABLET, FILM COATED ORAL at 08:55

## 2018-06-06 RX ADMIN — DOCUSATE SODIUM AND SENNOSIDES 2 TABLET: 8.6; 5 TABLET, FILM COATED ORAL at 20:43

## 2018-06-06 RX ADMIN — APIXABAN 5 MG: 5 TABLET, FILM COATED ORAL at 08:57

## 2018-06-06 RX ADMIN — MAGNESIUM HYDROXIDE 10 ML: 2400 SUSPENSION ORAL at 17:24

## 2018-06-06 RX ADMIN — DOXYCYCLINE 100 MG: 100 CAPSULE ORAL at 08:57

## 2018-06-06 RX ADMIN — GABAPENTIN 600 MG: 300 CAPSULE ORAL at 13:02

## 2018-06-06 RX ADMIN — CETIRIZINE HYDROCHLORIDE 5 MG: 10 TABLET ORAL at 08:56

## 2018-06-06 RX ADMIN — GABAPENTIN 600 MG: 300 CAPSULE ORAL at 21:01

## 2018-06-06 RX ADMIN — METOPROLOL TARTRATE 25 MG: 25 TABLET, FILM COATED ORAL at 08:55

## 2018-06-06 RX ADMIN — Medication 1 TABLET: at 08:56

## 2018-06-06 RX ADMIN — DOXYCYCLINE 100 MG: 100 CAPSULE ORAL at 20:44

## 2018-06-06 RX ADMIN — LEVOTHYROXINE SODIUM 50 MCG: 50 TABLET ORAL at 08:56

## 2018-06-06 RX ADMIN — METOPROLOL TARTRATE 25 MG: 25 TABLET, FILM COATED ORAL at 20:43

## 2018-06-06 RX ADMIN — GABAPENTIN 600 MG: 300 CAPSULE ORAL at 06:00

## 2018-06-06 RX ADMIN — ROPINIROLE HYDROCHLORIDE 1 MG: 1 TABLET, FILM COATED ORAL at 20:44

## 2018-06-06 RX ADMIN — FAMOTIDINE 40 MG: 20 TABLET, FILM COATED ORAL at 08:55

## 2018-06-06 NOTE — DISCHARGE INSTR - OTHER ORDERS
[facility] [phone] for PT/OT 2-3 x week x 8 weeks and nursing evaluation.    [facility] [phone] for rolling walker and bedside commode.

## 2018-06-06 NOTE — PROGRESS NOTES
Inpatient Rehabilitation Functional Measures Assessment    Functional Measures  DONNY Eating:  DONNY Grooming:  DONNY Bathing:  DONNY Upper Body Dressing:  DONNY Lower Body Dressing:  DONNY Toileting:    DONNY Bladder Management  Level of Assistance:  Frequency/Number of Accidents this Shift:    DONNY Bowel Management  Level of Assistance:  Frequency/Number of Accidents this Shift:    DONNY Bed/Chair/Wheelchair Transfer:  DONNY Toilet Transfer:  DONNY Tub/Shower Transfer:    Previously Documented Mode of Locomotion at Discharge:  DONNY Expected Mode of Locomotion at Discharge:  DONNY Walk/Wheelchair:  DONNY Stairs:    DONNY Comprehension:  Both ( auditory and visual) modes of comprehension are used  equally. Comprehension Score = 6, Modified [place].  Patient comprehends  complex/abstract information in their primary language, requiring: Additional  time.  DONNY Expression:  Vocal expression is the usual mode. Expression Score = 6,  Modified Independent.  Patient expresses complex/abstract information in their  primary language, requiring: Additional time.  DONNY Social Interaction:  Social Interaction Score = 6, Modified Independent.  Patient is modified independent for social interaction, requiring: Requires  additional time.  DONNY Problem Solving:  Problem Solving Score = 6, Modified [place].  Patient  makes appropriate decisions in order to solve complex problems, but requires  extra time.  DONNY Memory:  Memory Score = 6, Modified [place].  Patient is modified  independent for memory, requiring: Requires additional time.    Therapy Mode Minutes  Occupational Therapy:  Physical Therapy:  Speech Language Pathology:    Discharge Functional Goals:    Signed by: Adriana Carr, Nurse

## 2018-06-06 NOTE — THERAPY TREATMENT NOTE
Inpatient Rehabilitation - Occupational Therapy Treatment Note     [facility]     Patient Name: Anette Dunham  : 1940  MRN: [id number]    Today's Date: 2018                 Admit Date: 2018      Visit Dx:  No diagnosis found.    Patient Active Problem List   Diagnosis   • Essential hypertension   • Chest pain   • Paroxysmal atrial fibrillation   • Closed left hip fracture, initial encounter   • Tobacco abuse   • Closed left hip fracture         Therapy Treatment          IRF Treatment Summary     Row Name 18 1417             Evaluation/Treatment Time and Intent    Subjective Information no complaints   agreeable to therapy  -CJ      Document Type therapy note (daily note)  -CJ      Mode of Treatment occupational therapy  -CJ      Recorded by [CJ] Trini Castro OT      Row Name 18 141             Cognition/Psychosocial- PT/OT    Affect/Mental Status (Cognitive) WFL  -CJ      Recorded by [CJ] Trini Castro OT      Row Name 18 141             Transfer Assessment/Treatment    Bed-Chair [place] (Transfers) stand by assist;contact guard;verbal cues  -CJ      Assistive Device (Bed-Chair Transfers) walker, front-wheeled  -CJ      [place] Level (Toilet Transfer) stand by assist;contact guard;verbal cues  -CJ      Assistive Device (Toilet Transfer) grab bars/safety frame  -CJ      Recorded by [CJ] Trini Castro OT      Row Name 18 141             Safety Issues, Functional Mobility    Safety Issues Affecting Function (Mobility) safety precaution awareness  -CJ      Comment, Safety Issues/Impairments (Mobility) Precautions:  Fall risk, THP, decreased skin integrity  -CJ      Recorded by [CJ] Trini Castro OT      Row Name 18 141             Bathing Assessment/Treatment    Bathing [place] Level minimum assist (75% patient effort);verbal cues  -CJ      Assistive Device (Bathing) long-handled sponge  -CJ      Recorded by [CJ] RON Jensen  Name 06/06/18 1417             Upper Body Dressing Assessment/Treatment    Upper Body Dressing Task set up assistance  -CJ      Recorded by [CJ] Trini Castro OT      Row Name 06/06/18 1417             Lower Body Dressing Assessment/Treatment    Lower Body Dressing [place] Level contact guard assist;verbal cues  -CJ      Lower Body Dressing Dressing Device reacher;sock-aid  -CJ      Recorded by [CJ] Trini Castro OT      Row Name 06/06/18 1417             Grooming Assessment/Treatment    Grooming [place] Level set up  -CJ      Recorded by [CJ] Trini Castro OT      Row Name 06/06/18 1417             Toileting Assessment/Treatment    Toileting [place] Level contact guard assist;verbal cues  -CJ      Assistive Device Use (Toileting) grab bar/safety frame  -CJ      Recorded by [CJ] Trini Castro OT      Row Name 06/06/18 1417             Upper Extremity Seated Therapeutic Exercise    Device, Seated Upper Extremity (Therapeutic Exercise) restorator/arm bike   4 mins X 4, 0 resistance  -CJ      Exercise Type, Seated Upper Extremity (Therapeutic Exercise) AROM (active range of motion)   BUE ther ex/act, bilat coord ex, hand exerciser  -CJ      Expected Outcomes, Seated Upper Extremity (Therapeutic Exercise) improve functional tolerance, self-care activity;improve performance, BADLs  -CJ      Recorded by [CJ] Trini Castro OT      Row Name 06/06/18 1417             Positioning and Restraints    Post Treatment Position wheelchair  -CJ      In Wheelchair with PT  -CJ      Recorded by [CJ] Trini Castro OT        User Key  (r) = Recorded By, (t) = Taken By, (c) = Cosigned By    Initials Name Effective Dates    CJ Trini Castro OT 04/03/18 -           Wound 05/27/18 0425 Right posterior;lower arm skin tear (Active)   Dressing Appearance intact;dry 6/6/2018  7:30 AM       Wound 05/29/18 1222 Left hip incision (Active)   Dressing Appearance dry;intact 6/6/2018  7:30 AM   Closure Liquid skin  adhesive 6/6/2018  7:30 AM   Drainage Amount none 6/5/2018  8:00 PM         OT Recommendation and Plan    Planned Therapy Interventions (OT Eval): activity tolerance training, adaptive equipment training, BADL retraining, functional balance retraining, IADL retraining, occupation/activity based interventions, patient/caregiver education/training, strengthening exercise, transfer/mobility retraining    Plan of Care Review  Plan of Care Reviewed With: patient  Plan of Care Reviewed With: patient  IRF Plan of Care Review: progress ongoing, continue  Outcome Summary: Pt tolerated tx session well with rest breaks.  Continue POC.          OT IRF GOALS     Row Name 06/02/18 0939             Transfer Goal 1 (OT-IRF)    Activity/Assistive Device (Transfer Goal 1, OT-IRF) toilet  -CJ      [place] Level (Transfer Goal 1, OT-IRF) standby assist  -CJ      Time Frame (Transfer Goal 1, OT-IRF) long term goal (LTG);by discharge  -CJ         Bathing Goal 1 (OT-IRF)    [place] Level (Bathing Goal 1, OT-IRF) minimum assist (75% or more patient effort)  -CJ      Time Frame (Bathing Goal 1, OT-IRF) long term goal (LTG);by discharge  -CJ         LB Dressing Goal 1 (OT-IRF)    [place] (LB Dressing Goal 1, OT-IRF) minimum assist (75% or more patient effort)  -CJ      Time Frame (LB Dressing Goal 1, OT-IRF) short term goal (STG);5 - 7 days  -CJ         LB Dressing Goal 2 (OT-IRF)    [place] (LB Dressing Goal 2, OT-IRF) contact guard assist  -CJ      Time Frame (LB Dressing Goal 2, OT-IRF) long term goal (LTG);by discharge  -CJ         Toileting Goal 1 (OT-IRF)    [place] Level (Toileting Goal 1, OT-IRF) minimum assist (75% or more patient effort)  -CJ      Time Frame (Toileting Goal 1, OT-IRF) short term goal (STG);5 - 7 days  -CJ         Toileting Goal 2 (OT-IRF)    [place] Level (Toileting Goal 2, OT-IRF) standby assist  -CJ      Time Frame (Toileting Goal 2, OT-IRF) long term goal (LTG);by discharge  -CJ         User Key  (r) = Recorded By, (t) = Taken By, (c) = Cosigned By    Initials Name Provider Type     Trini Castro OT Occupational Therapist                 Time Calculation:           Time Calculation- OT     Row Name 06/06/18 1423             Time Calculation- OT    OT Start Time 0750  -      OT Stop Time 0920  -      OT Time Calculation (min) 90 min  -      Total Timed Code Minutes- OT 90 minute(s)  -      OT Non-Billable Time (min) 20 min  -        User Key  (r) = Recorded By, (t) = Taken By, (c) = Cosigned By    Initials Name Provider Type     Trini Castro OT Occupational Therapist             Therapy Charges for Today     Code Description Service Date Service Provider Modifiers Qty    [id number]  OT SELF CARE/MGMT/TRAIN EA 15 MIN 6/6/2018 Trini Castro OT GO 3    [id number]  OT THERAPEUTIC ACT EA 15 MIN 6/6/2018 Trini Castro OT GO 3                   Trini Castro OT  6/6/2018

## 2018-06-06 NOTE — SIGNIFICANT NOTE
06/06/18 1444   Plan   Plan SS contacted [facility] [phone] per preference per Paulette with discharge on 6-8-18 and orders for rolling walker and bedside commode.  Faxed face sheet, H&P, PT/OT notes and DWO to [phone].  DME will be delivered to rehab on 6-8-18 after 10:00 am.  Contacted [facility] [phone] per preference per Micaela with referral, discharge on 6-8-18 and orders for Pt 2-3 x wk x 8 wks for strengthening, endurance, gait training, transfer training, balance, therapeutic exercise, bed mobility, home safety, OT 2-3 x wk x 8 wks for ADL re-training, home safety, functional mobility, home management and nursing evaluation.  Faxed HH referral with orders, face sheet, H&P, MD progress notes, labs, PT and OT notes to  [phone].  HH to be contacted at discharge.

## 2018-06-06 NOTE — PLAN OF CARE
Problem: Patient Care Overview  Goal: Plan of Care Review   06/06/18 1709   Patient Care Overview   IRF Plan of Care Review progress ongoing, continue   Progress, Functional Goals demonstrating adequate progress   Coping/Psychosocial   Plan of Care Reviewed With patient   OTHER   Outcome Summary continue POC

## 2018-06-06 NOTE — PLAN OF CARE
Problem: Patient Care Overview  Goal: Plan of Care Review  Outcome: Ongoing (interventions implemented as appropriate)      Problem: Functional Mobility Impairment (IRF) (Adult)  Goal: Optimal/Safe Level of [place] with Mobility  Outcome: Ongoing (interventions implemented as appropriate)      Problem: Fractured Hip (Adult)  Goal: Signs and Symptoms of Listed Potential Problems Will be Absent, Minimized or Managed (Fractured Hip)  Outcome: Ongoing (interventions implemented as appropriate)      Problem: Skin Injury Risk (Adult)  Goal: Skin Health and Integrity  Outcome: Ongoing (interventions implemented as appropriate)      Problem: Wound (Includes Pressure Injury) (Adult)  Goal: Signs and Symptoms of Listed Potential Problems Will be Absent, Minimized or Managed (Wound)  Outcome: Ongoing (interventions implemented as appropriate)

## 2018-06-06 NOTE — PROGRESS NOTES
Inpatient Rehabilitation Functional Measures Assessment    Functional Measures  DONNY Eating:  DONNY Grooming: Grooming Score = 5. Patient is supervision/set-up for grooming,  requiring: Initial preparation. No assistive devices were required.  DONNY Bathing:  Patient took sponge bath. Patient requires no physical assistance  for washing, rinsing, and drying the right arm. Patient requires no physical  assistance for washing, rinsing, and drying the left arm. Patient requires no  physical assistance for washing, rinsing, and drying the chest. Patient requires  no physical assistance for washing, rinsing, and drying the abdomen. Patient  requires no physical assistance for washing, rinsing, and drying the perineal  area. Patient requires no physical assistance for washing, rinsing, and drying  the buttocks. Patient requires no physical assistance for washing, rinsing, and  drying the right upper leg. Patient requires no physical assistance for washing,  rinsing, and drying the left upper leg. Patient requires no physical assistance  for washing, rinsing, and drying the right lower leg, including the foot.  Patient requires minimal assistance for washing, rinsing, or drying the left  lower leg, including the foot. Patient performs 90 % of bathing tasks. Bathing  Score = 4, Minimal Assistance. Patient requires the following assistive  device(s): Long handled sponge. Grab bar/arm rest to maintain balance.  DONNY Upper Body Dressing:  Upper Body Dressing Score = 5. Patient is supervision  for upper body dressing, requiring: Gathering/setting out clothes. No assistive  devices were required.  DONNY Lower Body Dressing:  Lower Body Dressing Score = 4. Patient requires  minimal assistance for lower body dressing, requiring incidental help only (such  as task initiation or assist with buttons/zips/snaps). Patient requires the  following assistive device(s): Reacher. Sock aid.  DONNY Toileting:  Toileting Score = 4.  Patient requires  minimal assistance for  toileting, such as steadying for balance while cleansing or adjusting clothes.  Patient requires the following assistive device(s): Arm rest of a specialized  seat. Grab bar.    DONNY Bladder Management  Level of Assistance:  Frequency/Number of Accidents this Shift:    DONNY Bowel Management  Level of Assistance:  Frequency/Number of Accidents this Shift:    DONNY Bed/Chair/Wheelchair Transfer:  Bed/chair/wheelchair Transfer Score = 4.  Patient performs 75% or more of effort and minimal assistance (little/incidental  help/lifting of one limb/steadying) for transferring to and from the  bed/chair/wheelchair, requiring: Contact guard. Patient requires the following  assistive device(s): Walker.  DONNY Toilet Transfer:  Toilet Transfer Score = 4.  Patient performs 75% or more  of effort and minimal assistance (little/incidental help/steadying) for  transferring to and from the toilet/commode, requiring: Contact guard. Patient  requires the following assistive device(s): Safety frame/over the toilet. Grab  bars.  DONNY Tub/Shower Transfer:    Previously Documented Mode of Locomotion at Discharge:  DONNY Expected Mode of Locomotion at Discharge:  DONNY Walk/Wheelchair:  DONNY Stairs:    DONNY Comprehension:  DONNY Expression:  DONNY Social Interaction:  DONNY Problem Solving:  DONNY Memory:    Therapy Mode Minutes  Occupational Therapy: Individual: 90 minutes.  Physical Therapy:  Speech Language Pathology:    Discharge Functional Goals:    Signed by: Trini Castro Occupational Therapist

## 2018-06-06 NOTE — PROGRESS NOTES
Inpatient Rehabilitation Functional Measures Assessment    Functional Measures  DONNY Eating:  DONNY Grooming:  DONNY Bathing:  DONNY Upper Body Dressing:  DONNY Lower Body Dressing:  DONNY Toileting:    DONNY Bladder Management  Level of Assistance:  Frequency/Number of Accidents this Shift:    DONNY Bowel Management  Level of Assistance:  Frequency/Number of Accidents this Shift:    DONNY Bed/Chair/Wheelchair Transfer:  DONNY Toilet Transfer:  DONNY Tub/Shower Transfer:    Previously Documented Mode of Locomotion at Discharge:  DONNY Expected Mode of Locomotion at Discharge:  DONNY Walk/Wheelchair:  DONNY Stairs:    DONNY Comprehension:  Auditory comprehension is the usual mode. Comprehension  Score = 6, Modified [place].  Patient comprehends complex/abstract  information in their primary language, requiring:  DONNY Expression:  Vocal expression is the usual mode. Expression Score = 6,  Modified [place].  Patient expresses complex/abstract information in their  primary language, requiring:  DONNY Social Interaction:  Social Interaction Score = 6, Modified [place].  Patient is modified independent for social interaction, requiring:  DONNY Problem Solving:  Problem Solving Score = 6, Modified [place].  Patient  makes appropriate decisions in order to solve complex problems, but requires  extra time.  DONNY Memory:  Memory Score = 6, Modified [place].  Patient is modified  independent for memory, requiring:    Therapy Mode Minutes  Occupational Therapy:  Physical Therapy:  Speech Language Pathology:    Discharge Functional Goals:    Signed by: Nurse Luan

## 2018-06-06 NOTE — PROGRESS NOTES
Inpatient Rehabilitation Functional Measures Assessment    Functional Measures  DONNY Eating:  Eating Score = 5. Patient is supervision/set-up for eating,  requiring: Opening containers. No assistive devices were required.  DONNY Grooming:  DONNY Bathing:  DONNY Upper Body Dressing:  DONNY Lower Body Dressing:  DONNY Toileting:  Toileting Score = 4.  Patient requires minimal assistance for  toileting, such as steadying for balance while cleansing or adjusting clothes.  Patient requires the following assistive device(s): Grab bar. Arm rest of a  specialized seat.    DONNY Bladder Management  Level of Assistance:  Bladder Score = 5.  Patient is supervision/set-up for  bladder management, requiring: Setting out equipment. Stand by assistance.  Patient requires the following assistive device(s):  Urinal. Absorbent pads.  Frequency/Number of Accidents this Shift:  Bladder accidents this shift:  0 .  Patient has not had an accident, but used a device/medication this shift  requiring: pads . 1    DONNY Bowel Management  Level of Assistance:  Frequency/Number of Accidents this Shift: Bowel accidents this shift: 0 .  Patient has not had an accident this shift. 0    DONNY Bed/Chair/Wheelchair Transfer:  Bed/chair/wheelchair Transfer Score = 3.  Patient performs 50-74% of effort and requires moderate assistance (some  lifting) for transferring to and from the bed/chair/wheelchair. Patient requires  the following assistive device(s): Walker. Bed rails. Elevated bed/surface. Arm  rest. Seating system of wheelchair.  DONNY Toilet Transfer:  Toilet Transfer Score = 3.  Patient performs 50-74% of  effort and requires moderate assistance (some lifting) for transferring to and  from the toilet/commode. Patient requires the following assistive device(s):  Walker. Grab bars. Safety frame/over the toilet. Specialized seat.  DONNY Tub/Shower Transfer:    Previously Documented Mode of Locomotion at Discharge:  [facility] Expected Mode of Locomotion at Discharge:  [facility]  Walk/Wheelchair:  DONNY Stairs:    DONNY Comprehension:  DONNY Expression:  DONNY Social Interaction:  DONNY Problem Solving:  DONNY Memory:    Therapy Mode Minutes  Occupational Therapy:  Physical Therapy:  Speech Language Pathology:    Discharge Functional Goals:    Signed by: PARTH Barakat

## 2018-06-06 NOTE — PLAN OF CARE
Problem: Patient Care Overview  Goal: Plan of Care Review   06/06/18 1422   Patient Care Overview   IRF Plan of Care Review progress ongoing, continue   Coping/Psychosocial   Plan of Care Reviewed With patient   OTHER   Outcome Summary Pt tolerated tx session well with rest breaks. Continue POC.

## 2018-06-06 NOTE — PAYOR COMM NOTE
"  [facility]  NPI: [id number]    Utilization Review   Contact:Lizzy Kellogg MSN, APRN, FNP- BC  Phone: [phone]  Fax: [phone]    [employer]/Attn: Ellyn  Inpatient Auth Req/secondary to medicare  Second request   REF: [id number]  DX: S72.002A  Claudette De Santiago  (78 y.o. Female)     Date of Birth Social Security Number Address Home Phone MRN    [id number]  [id number]  [facility] [address] [phone] [id number]    [employer] Marital Status          Unknown        Admission Date Admission Type Admitting Provider Attending Provider Department, Room/Bed    5/27/18 Emergency Robert Rivas DO  [address], [address]/    Discharge Date Discharge Disposition Discharge Destination        6/1/2018 Rehab Facility or Unit (DC - External)              Attending Provider:  (none)   Allergies:  No Known Allergies    Isolation:  None   Infection:  None   Code Status:  FULL    Ht:  160 cm (63\")   Wt:  75.4 kg (166 lb 4.8 oz)    Admission Cmt:  None   Principal Problem:  Closed left hip fracture, initial encounter [S72.002A]                 Active Insurance as of 5/27/2018     Primary Coverage     Payor Plan Insurance Group Employer/Plan Group    MEDICARE MEDICARE A & B      Payor Plan Address Payor Plan Phone Number Effective From Effective To    [address] [phone] 3/1/2004     [facility] [id number]       Subscriber Name Subscriber Birth Date Member ID       CLAUDETTE DE SANTIAGO 1940 [id number]           Secondary Coverage     Payor Plan Insurance Group Employer/Plan Group    [employer] [place] [employer] [place]ICA[place]      Payor Plan Address Payor Plan Phone Number Effective From Effective To    [address] [address] [phone] 8/16/2016     [facility] [id number]       Subscriber Name Subscriber Birth Date Member ID       CLAUDETTE DE SANTIAGO 1940 [id number]                 Emergency Contacts      (Rel.) Home Phone Work Phone Mobile Phone    Vielka Patel (Daughter) [phone] -- -- "

## 2018-06-06 NOTE — PROGRESS NOTES
Inpatient Rehabilitation Functional Measures Assessment    Functional Measures  DONNY Eating:  DONNY Grooming:  DONNY Bathing:  DONNY Upper Body Dressing:  DONNY Lower Body Dressing:  DONNY Toileting:    DONNY Bladder Management  Level of Assistance:  Frequency/Number of Accidents this Shift:    DONNY Bowel Management  Level of Assistance:  Frequency/Number of Accidents this Shift:    DONNY Bed/Chair/Wheelchair Transfer:  Bed/chair/wheelchair Transfer Score = 5.  Patient is supervision/set-up for transferring to and from the  bed/chair/wheelchair, requiring: Stand by assistance. Patient requires the  following assistive device(s): Walker.  DONNY Toilet Transfer:  Toilet Transfer did not occur. .  DONNY Tub/Shower Transfer:  Activity was not observed.    Previously Documented Mode of Locomotion at Discharge: Walk  DONNY Expected Mode of Locomotion at Discharge: No change to the current  documented expected, most frequently used mode of locomotion at discharge  DONNY Walk/Wheelchair:  WHEELCHAIR OBSERVATION   Wheelchair did not occur.    WALK OBSERVATION   Walk Distance Scale = 3.  Distance walked is greater than 150 feet. Walk Score  = 5.  Patient requires supervision or set up for walking. Stand by assistance.  Verbal cuing, prompting, or instructing for: Patient walked a distance of  170  feet. Patient requires the following assistive device(s): Rolling walker.  DONNY Stairs:  Stairs Score = 4.  Incidental help/contact guard/steadying was  provided. Patient performs 75% or more of effort and requires minimal  assistance. Patient negotiated 15 stairs. Patient requires the following  assistive device(s): Handrail(s).    DONNY Comprehension:  DONNY Expression:  DONNY Social Interaction:  DONNY Problem Solving:  DONNY Memory:    Therapy Mode Minutes  Occupational Therapy:  Physical Therapy: Individual: 90 minutes.  Speech Language Pathology:    Discharge Functional Goals:    Signed by: Lisa Lopez Physical Therapist

## 2018-06-06 NOTE — PROGRESS NOTES
Inpatient Rehabilitation Functional Measures Assessment    Functional Measures  DONNY Eating:  DONNY Grooming:  DONNY Bathing:  DONNY Upper Body Dressing:  DONNY Lower Body Dressing:  DONNY Toileting:  Toileting Score = 4.  Patient requires minimal assistance for  toileting, such as steadying for balance while cleansing or adjusting clothes.  Patient requires the following assistive device(s): Grab bar. Arm rest of a  specialized seat.    DONNY Bladder Management  Level of Assistance:  Bladder Score = 5.  Patient is supervision/set-up for  bladder management, requiring: No assistive devices were required.  Frequency/Number of Accidents this Shift:    DONNY Bowel Management  Level of Assistance: Bowel Score = 7.  Patient is completely independent for  bowel management.  Patient did not have bowel movement.  No  medication/intervention was provided.  Frequency/Number of Accidents this Shift:    DONNY Bed/Chair/Wheelchair Transfer:  Bed/chair/wheelchair Transfer Score = 4.  Patient performs 75% or more of effort and minimal assistance (little/incidental  help/lifting of one limb/steadying) for transferring to and from the  bed/chair/wheelchair, requiring: Steadying. Patient requires the following  assistive device(s): Seating system of wheelchair. Grab bars.  DONNY Toilet Transfer:  Toilet Transfer Score = 4.  Patient performs 75% or more  of effort and minimal assistance (little/incidental help/steadying) for  transferring to and from the toilet/commode, requiring: Steadying. Patient  requires the following assistive device(s): Safety frame/over the toilet. Grab  bars.  DONNY Tub/Shower Transfer:    Previously Documented Mode of Locomotion at Discharge:  DONNY Expected Mode of Locomotion at Discharge:  DONNY Walk/Wheelchair:  DONNY Stairs:    DONNY Comprehension:  DONNY Expression:  DONNY Social Interaction:  DONNY Problem Solving:  DONNY Memory:    Therapy Mode Minutes  Occupational Therapy:  Physical Therapy:  Speech Language Pathology:    Discharge  Functional Goals:    Signed by: Neema Richardson, SRNA

## 2018-06-06 NOTE — THERAPY TREATMENT NOTE
Inpatient Rehabilitation - Physical Therapy Treatment Note   [facility]     Patient Name: Anette Dunham  : 1940  MRN: [id number]    Today's Date: 2018                 Admit Date: 2018      Visit Dx:    No diagnosis found.    Patient Active Problem List   Diagnosis   • Essential hypertension   • Chest pain   • Paroxysmal atrial fibrillation   • Closed left hip fracture, initial encounter   • Tobacco abuse   • Closed left hip fracture       Therapy Treatment          IRF Treatment Summary     Row Name 18 1417             Evaluation/Treatment Time and Intent    Subjective Information no complaints   agreeable to therapy  -      Document Type therapy note (daily note)  -      Mode of Treatment occupational therapy  -CJ      Recorded by [CJ] Trini Castro OT      Row Name 18 1417             Cognition/Psychosocial- PT/OT    Affect/Mental Status (Cognitive) WFL  -CJ      Recorded by [CJ] Trini Castro OT      Row Name 18 1417             Transfer Assessment/Treatment    Bed-Chair [place] (Transfers) stand by assist;contact guard;verbal cues  -      Assistive Device (Bed-Chair Transfers) walker, front-wheeled  -CJ      [place] Level (Toilet Transfer) stand by assist;contact guard;verbal cues  -      Assistive Device (Toilet Transfer) grab bars/safety frame  -CJ      Recorded by [CJ] Trini Castro OT      Row Name 18 1648             Gait/Stairs Assessment/Training    Negotiation (Stairs) stairs independence;stairs assistive device;handrail location;number of steps;ascending technique;descending technique;maintains weight-bearing status  -AG      [place] Level (Stairs) verbal cues;nonverbal cues (demo/gesture);contact guard  -AG      Handrail Location (Stairs) both sides  -AG      Number of Steps (Stairs) 15  -AG      Ascending Technique (Stairs) step-to-step   combination of step-over, step-to  -AG      Descending Technique (Stairs) step-to-step  -AG       Stairs, Safety Issues balance decreased during turns;sequencing ability decreased  -AG      Stairs, Impairments strength decreased;impaired balance;pain  -AG      Recorded by [AG] Lisa Loepz, PT      Row Name 06/06/18 1417             Safety Issues, Functional Mobility    Safety Issues Affecting Function (Mobility) safety precaution awareness  -CJ      Comment, Safety Issues/Impairments (Mobility) Precautions:  Fall risk, THP, decreased skin integrity  -CJ      Recorded by [CJ] Trini Castro, OT      Row Name 06/06/18 1417             Bathing Assessment/Treatment    Bathing [place] Level minimum assist (75% patient effort);verbal cues  -CJ      Assistive Device (Bathing) long-handled sponge  -CJ      Recorded by [CJ] Trini Castro, OT      Row Name 06/06/18 1417             Upper Body Dressing Assessment/Treatment    Upper Body Dressing Task set up assistance  -CJ      Recorded by [CJ] Trini Castro, OT      Row Name 06/06/18 1417             Lower Body Dressing Assessment/Treatment    Lower Body Dressing [place] contact guard assist;verbal cues  -CJ      Lower Body Dressing Dressing Device reacher;sock-aid  -CJ      Recorded by [CJ] Trini Castro, OT      Row Name 06/06/18 1417             Grooming Assessment/Treatment    Grooming [place] set up  -CJ      Recorded by [CJ] Trini Castro, OT      Row Name 06/06/18 1417             Toileting Assessment/Treatment    Toileting [place] contact guard assist;verbal cues  -CJ      Assistive Device Use (Toileting) grab bar/safety frame  -CJ      Recorded by [CJ] Trini Castro, OT      Row Name 06/06/18 1417             Upper Extremity Seated Therapeutic Exercise    Device, Seated Upper Extremity (Therapeutic Exercise) restorator/arm bike   4 mins X 4, 0 resistance  -CJ      Exercise Type, Seated Upper Extremity (Therapeutic Exercise) AROM (active range of motion)   BUE ther ex/act, bilat coord ex, hand exerciser  -CJ       Expected Outcomes, Seated Upper Extremity (Therapeutic Exercise) improve functional tolerance, self-care activity;improve performance, BADLs  -CJ      Recorded by [CJ] Trini Castro, OT      Row Name 06/06/18 1417             Positioning and Restraints    Post Treatment Position wheelchair  -CJ      In Wheelchair with PT  -CJ      Recorded by [CJ] Trini Castro, OT      Row Name 06/06/18 1648             Daily Summary of Progress (PT)    Functional Goal Overall Progress: Physical Therapy progressing toward functional goals as expected  -AG      Recorded by [AG] Lisa Lopez, PT        User Key  (r) = Recorded By, (t) = Taken By, (c) = Cosigned By    Initials Name Effective Dates    AG Lisa Lopez, PT 04/03/18 -     CJ Trini Castro, OT 04/03/18 -           Wound 05/27/18 0425 Right posterior;lower arm skin tear (Active)   Dressing Appearance intact;dry 6/6/2018  7:30 AM       Wound 05/29/18 1222 Left hip incision (Active)   Dressing Appearance dry;intact 6/6/2018  7:30 AM   Closure Liquid skin adhesive 6/6/2018  7:30 AM   Drainage Amount none 6/5/2018  8:00 PM       Physical Therapy Education     Title: PT OT SLP Therapies (Active)     Topic: Physical Therapy (Done)     Point: Mobility training (Done)    Learning Progress Summary     Learner Status Readiness Method Response Comment Documented by    Patient Done Acceptance E,D VU,NR  AG 06/06/18 1707     Done Acceptance E,TB VU,NR  LB 06/05/18 1652     Done Acceptance E,TB VU,NR  LB 06/04/18 1232     Done Acceptance E,TB VU,NR  LB 06/02/18 1052          Point: Home exercise program (Done)    Learning Progress Summary     Learner Status Readiness Method Response Comment Documented by    Patient Done Acceptance E,D VU,NR  AG 06/06/18 1707     Done Acceptance E,TB VU,NR  LB 06/05/18 1652     Done Acceptance E,TB VU,NR  LB 06/04/18 1232     Done Acceptance E,TB VU,NR  LB 06/02/18 1052          Point: Body mechanics (Done)    Learning Progress Summary      Learner Status Readiness Method Response Comment Documented by    Patient Done Acceptance E,D VU,NR  AG 06/06/18 1707     Done Acceptance E,TB VU,NR  LB 06/05/18 1652     Done Acceptance E,TB VU,NR  LB 06/04/18 1232     Done Acceptance E,TB VU,NR  LB 06/02/18 1052          Point: Precautions (Done)    Learning Progress Summary     Learner Status Readiness Method Response Comment Documented by    Patient Done Acceptance E,D VU,NR  AG 06/06/18 1707     Done Acceptance E,TB VU,NR  LB 06/05/18 1652     Done Acceptance E,TB VU,NR  LB 06/04/18 1232     Done Acceptance E,TB VU,NR  LB 06/02/18 1052                      User Key     Initials Effective Dates Name Provider Type Discipline     03/14/16 -  Noris Wooten, PT Physical Therapist PT     04/03/18 -  Lisa Lpoez, PT Physical Therapist PT                  PT Recommendation and Plan         Plan of Care Reviewed With: patient  Daily Summary of Progress (PT)  Functional Goal Overall Progress: Physical Therapy: progressing toward functional goals as expected  IRF Plan of Care Review: progress ongoing, continue  Progress, Functional Goals: demonstrating adequate progress  Outcome Summary: continue POC                   Time Calculation:           PT Charges     Row Name 06/06/18 1709 06/06/18 1708          Time Calculation    Start Time 1535  -AG 0920  -     Stop Time 1620  -AG 1005  -AG     Time Calculation (min) 45 min  -AG 45 min  -AG     PT Received On  -- 06/06/18  -     PT - Next Appointment  -- 06/07/18  -     PT Goal Re-Cert Due Date  -- 06/08/18  -       User Key  (r) = Recorded By, (t) = Taken By, (c) = Cosigned By    Initials Name Provider Type    AG Lisa Lopez, PT Physical Therapist            Therapy Charges for Today     Code Description Service Date Service Provider Modifiers Qty    57228770787 HC GAIT TRAINING EA 15 MIN 6/6/2018 Lisa Lopez, PT GP 2    00443483107 HC PT THER PROC EA 15 MIN 6/6/2018 Lisa Lopez, PT GP 4     [id number]  PT THER SUPP EA 15 MIN 6/6/2018 Lisa Lopez, PT GP 2                   Lisa Lopez, PT  6/6/2018

## 2018-06-06 NOTE — PLAN OF CARE
Problem: Fall Risk (Adult)  Goal: Absence of Fall  Outcome: Ongoing (interventions implemented as appropriate)   06/06/18 0756   Fall Risk (Adult)   Absence of Fall making progress toward outcome       Problem: Functional Mobility Impairment (IRF) (Adult)  Goal: Optimal/Safe Level of [place] with Mobility  Outcome: Ongoing (interventions implemented as appropriate)      Problem: Fractured Hip (Adult)  Goal: Signs and Symptoms of Listed Potential Problems Will be Absent, Minimized or Managed (Fractured Hip)  Outcome: Ongoing (interventions implemented as appropriate)      Problem: Skin Injury Risk (Adult)  Goal: Skin Health and Integrity  Outcome: Ongoing (interventions implemented as appropriate)      Problem: Wound (Includes Pressure Injury) (Adult)  Goal: Signs and Symptoms of Listed Potential Problems Will be Absent, Minimized or Managed (Wound)  Outcome: Ongoing (interventions implemented as appropriate)

## 2018-06-07 PROCEDURE — 94799 UNLISTED PULMONARY SVC/PX: CPT

## 2018-06-07 PROCEDURE — 97535 SELF CARE MNGMENT TRAINING: CPT

## 2018-06-07 PROCEDURE — 97116 GAIT TRAINING THERAPY: CPT

## 2018-06-07 PROCEDURE — 97110 THERAPEUTIC EXERCISES: CPT

## 2018-06-07 PROCEDURE — 97530 THERAPEUTIC ACTIVITIES: CPT

## 2018-06-07 RX ADMIN — OXYCODONE HYDROCHLORIDE AND ACETAMINOPHEN 1 TABLET: 5; 325 TABLET ORAL at 09:00

## 2018-06-07 RX ADMIN — DOCUSATE SODIUM AND SENNOSIDES 2 TABLET: 8.6; 5 TABLET, FILM COATED ORAL at 09:00

## 2018-06-07 RX ADMIN — OXYCODONE HYDROCHLORIDE AND ACETAMINOPHEN 1 TABLET: 5; 325 TABLET ORAL at 17:13

## 2018-06-07 RX ADMIN — Medication 1 TABLET: at 09:00

## 2018-06-07 RX ADMIN — METOPROLOL TARTRATE 25 MG: 25 TABLET, FILM COATED ORAL at 21:10

## 2018-06-07 RX ADMIN — DOXYCYCLINE 100 MG: 100 CAPSULE ORAL at 09:00

## 2018-06-07 RX ADMIN — CETIRIZINE HYDROCHLORIDE 5 MG: 10 TABLET ORAL at 09:00

## 2018-06-07 RX ADMIN — FAMOTIDINE 40 MG: 20 TABLET, FILM COATED ORAL at 09:00

## 2018-06-07 RX ADMIN — GABAPENTIN 600 MG: 300 CAPSULE ORAL at 13:43

## 2018-06-07 RX ADMIN — LEVOTHYROXINE SODIUM 50 MCG: 50 TABLET ORAL at 09:00

## 2018-06-07 RX ADMIN — Medication 1 TABLET: at 17:09

## 2018-06-07 RX ADMIN — DOXYCYCLINE 100 MG: 100 CAPSULE ORAL at 21:10

## 2018-06-07 RX ADMIN — MAGNESIUM HYDROXIDE 10 ML: 2400 SUSPENSION ORAL at 21:09

## 2018-06-07 RX ADMIN — METOPROLOL TARTRATE 25 MG: 25 TABLET, FILM COATED ORAL at 09:00

## 2018-06-07 RX ADMIN — Medication 1 CAPSULE: at 09:00

## 2018-06-07 RX ADMIN — GABAPENTIN 600 MG: 300 CAPSULE ORAL at 05:17

## 2018-06-07 RX ADMIN — APIXABAN 5 MG: 5 TABLET, FILM COATED ORAL at 21:10

## 2018-06-07 RX ADMIN — APIXABAN 5 MG: 5 TABLET, FILM COATED ORAL at 09:00

## 2018-06-07 RX ADMIN — NICOTINE 1 PATCH: 7 PATCH TRANSDERMAL at 09:00

## 2018-06-07 RX ADMIN — DOCUSATE SODIUM AND SENNOSIDES 2 TABLET: 8.6; 5 TABLET, FILM COATED ORAL at 21:10

## 2018-06-07 RX ADMIN — GABAPENTIN 600 MG: 300 CAPSULE ORAL at 21:10

## 2018-06-07 RX ADMIN — ROPINIROLE HYDROCHLORIDE 1 MG: 1 TABLET, FILM COATED ORAL at 21:10

## 2018-06-07 RX ADMIN — LORAZEPAM 1 MG: 1 TABLET ORAL at 21:10

## 2018-06-07 NOTE — PLAN OF CARE
Problem: Functional Mobility Impairment (IRF) (Adult)  Goal: Optimal/Safe Level of [place] with Mobility  Outcome: Ongoing (interventions implemented as appropriate)   06/07/18 0748   Functional Mobility Impairment (IRF) (Adult)   Optimal/Safe Level of [place] with Mobility progressing to functional independence       Problem: Fractured Hip (Adult)  Goal: Signs and Symptoms of Listed Potential Problems Will be Absent, Minimized or Managed (Fractured Hip)  Outcome: Ongoing (interventions implemented as appropriate)      Problem: Skin Injury Risk (Adult)  Goal: Skin Health and Integrity  Outcome: Ongoing (interventions implemented as appropriate)      Problem: Wound (Includes Pressure Injury) (Adult)  Goal: Signs and Symptoms of Listed Potential Problems Will be Absent, Minimized or Managed (Wound)  Outcome: Ongoing (interventions implemented as appropriate)

## 2018-06-07 NOTE — THERAPY TREATMENT NOTE
Inpatient Rehabilitation - Physical Therapy Treatment Note  KATIE Piña     Patient Name: Anette Dunham  : 1940  MRN: [id number]    Today's Date: 2018                 Admit Date: 2018      Visit Dx:    No diagnosis found.    Patient Active Problem List   Diagnosis   • Essential hypertension   • Chest pain   • Paroxysmal atrial fibrillation   • Closed left hip fracture, initial encounter   • Tobacco abuse   • Closed left hip fracture       Therapy Treatment          IRF Treatment Summary     Row Name 18 1704 18 140          Evaluation/Treatment Time and Intent    Subjective Information  -- no complaints   agreeable to therapy  -CJ     Document Type therapy note (daily note)  -LB therapy note (daily note)  -CJ     Mode of Treatment physical therapy  -LB occupational therapy  -CJ     Recorded by [LB] Noris Wooten, PT [CJ] Trini Castro OT     Row Name 18 1704 18 140          Cognition/Psychosocial- PT/OT    Affect/Mental Status (Cognitive) WFL  -LB WFL  -CJ     Follows Commands (Cognition) WFL  -LB  --     Recorded by [LB] Noris Wooten, PT [CJ] Trini Castro, OT     Row Name 18 170             Mobility    Left Lower Extremity (Weight-bearing Status) weight-bearing as tolerated (WBAT)  -LB      Recorded by [LB] Noris Wooten PT      Row Name 18 170             Bed Mobility Assessment/Treatment    Bed Mobility Assessment/Treatment supine-sit;sit-supine;supine-sit-supine  -LB      Supine-Sit-Supine [place] (Bed Mobility) verbal cues;nonverbal cues (demo/gesture)   SBA  -LB      Recorded by [LB] Noris Wooten PT      Row Name 18 1704 18 1405          Transfer Assessment/Treatment    Transfer Assessment/Treatment bed-chair transfer;chair-bed transfer;sit-stand transfer;stand-sit transfer  -LB  --     Bed-Chair [place] (Transfers) stand by assist  -LB stand by assist;verbal cues  -CJ     Chair-Bed [place]  (Transfers) stand by assist  -LB stand by assist;verbal cues  -CJ     Assistive Device (Bed-Chair Transfers) walker, front-wheeled  -LB walker, front-wheeled  -CJ     Sit-Stand [place] (Transfers) stand by assist  -LB  --     Stand-Sit [place] (Transfers) stand by assist  -LB  --     [place] Level (Toilet Transfer)  -- stand by assist;verbal cues  -CJ     Assistive Device (Toilet Transfer)  -- grab bars/safety frame  -CJ     Recorded by [LB] Noris Wooten, PT [CJ] Trini Castro, OT     Row Name 06/07/18 1704 06/07/18 1405          Chair-Bed Transfer    Assistive Device (Chair-Bed Transfers) walker, front-wheeled  -LB walker, front-wheeled  -CJ     Recorded by [LB] Noris Wooten, PT [CJ] Trini Castro, OT     Row Name 06/07/18 1704             Sit-Stand Transfer    Assistive Device (Sit-Stand Transfers) walker, front-wheeled  -LB      Recorded by [LB] Noris Wooten, PT      Row Name 06/07/18 1704             Stand-Sit Transfer    Assistive Device (Stand-Sit Transfers) walker, front-wheeled  -LB      Recorded by [LB] Noris Wooten, PT      Row Name 06/07/18 1704             Gait/Stairs Assessment/Training    [place] Level (Gait) stand by assist  -LB      Assistive Device (Gait) walker, front-wheeled  -LB      Distance in Feet (Gait) am-340', pm-170' x2  -LB      Deviations/Abnormal Patterns (Gait) antalgic;gait speed decreased;stride length decreased  -LB      Bilateral Gait Deviations forward flexed posture  -LB      Recorded by [LB] Noris Wooten, PT      Row Name 06/07/18 1704 06/07/18 1405          Safety Issues, Functional Mobility    Safety Issues Affecting Function (Mobility)  -- safety precaution awareness  -CJ     Impairments Affecting Function (Mobility) balance;endurance/activity tolerance;pain;strength  -LB  --     Comment, Safety Issues/Impairments (Mobility) fall risk, left hip precautions  -LB Precautions:  Fall risk, THP, decreased skin  inegrity  -CJ     Recorded by [LB] Noris Wooten, PT [CJ] Trini Castro, OT     Row Name 06/07/18 1405             Bathing Assessment/Treatment    Bathing [place] Level contact guard assist;verbal cues  -CJ      Assistive Device (Bathing) long-handled sponge  -CJ      Recorded by [CJ] Trini Castro, OT      Row Name 06/07/18 1405             Upper Body Dressing Assessment/Treatment    Upper Body Dressing Task set up assistance  -CJ      Recorded by [CJ] Trini Castro, OT      Row Name 06/07/18 1405             Lower Body Dressing Assessment/Treatment    Lower Body Dressing [place] Level contact guard assist;minimum assist (75% patient effort);verbal cues;don;pants/bottoms;socks;shoes/slippers  -CJ      Lower Body Dressing Dressing Device reacher;sock-aid  -CJ      Recorded by [CJ] Trini Castro, OT      Row Name 06/07/18 1405             Grooming Assessment/Treatment    Grooming [place] Level set up  -CJ      Recorded by [CJ] Trini Castro, OT      Row Name 06/07/18 1405             Toileting Assessment/Treatment    Toileting [place] Level standby assist;verbal cues  -CJ      Assistive Device Use (Toileting) grab bar/safety frame  -CJ      Recorded by [CJ] Trini Castro, OT      Row Name 06/07/18 1704             Pain Scale: Numbers Pre/Post-Treatment    Pain Location - Side Left  -LB      Pain Location hip  -LB      Pre/Post Treatment Pain Comment repositioned, rest  -LB      Recorded by [LB] Noris Wooten, PT      Row Name 06/07/18 1704             Pain Scale: FACES Pre/Post-Treatment    Pain: FACES Scale, Pretreatment 4-->hurts little more  -LB      Pain: FACES Scale, Post-Treatment 4-->hurts little more  -LB      Recorded by [LB] Noris Wooten, PT      Row Name 06/07/18 1704             Therapeutic Exercise    Therapeutic Exercise aerobic exercise  -LB      Recorded by [LB] Noris Wooten, PT      Row Name 06/07/18 1405             Upper Extremity Seated  Therapeutic Exercise    Device, Seated Upper Extremity (Therapeutic Exercise) restorator/arm bike   4 mins X 4, 0 resistance;  rickshaw-10 lbs, 20 reps X 5  -CJ      Exercise Type, Seated Upper Extremity (Therapeutic Exercise) AROM (active range of motion)   BUE ther ex/act, bilat coord ex, dowel ex-20 reps X 2, 0 lbs  -CJ      Expected Outcomes, Seated Upper Extremity (Therapeutic Exercise) improve functional tolerance, self-care activity;improve performance, BADLs  -CJ      Recorded by [CJ] Trini Castro OT      Row Name 06/07/18 1704             Aerobic Exercise Activity    Exercise Performed (Aerobic, Therapeutic Exercise) recumbent stationary bike  -LB      Expected Outcome (Aerobic, Therapeutic Exercise) improve functional tolerance, household activity;improve functional stability;improve performance, gait skills;improve performance, transfer skills;strengthen, facilitate independent active range of motion  -LB      Restrictions (Aerobic, Therapeutic Exercise) left hip precautions. she was positioned to maintain hip precautions.  -LB      Time (Aerobic, Therapeutic Exercise) 6   level 1.0  -LB      Recorded by [LB] Noris Wooten PT      Row Name 06/07/18 1704             Lower Extremity Seated Therapeutic Exercise    Performed, Seated Lower Extremity (Therapeutic Exercise) hip flexion/extension;hip abduction/adduction;knee flexion/extension;ankle dorsiflexion/plantarflexion  -LB      Device, Seated Lower Extremity (Therapeutic Exercise) elastic bands/tubing;small ball   2# ankle weights  -LB      Restrictions, Seated Lower Extremity (Therapeutic Exercise) left hip precautions  -LB      Sets/Reps Detail, Seated Lower Extremity (Therapeutic Exercise) 25 reps, 2 sets  -LB      Recorded by [LB] Noris Wooten PT      Row Name 06/07/18 1704             Lower Extremity Supine Therapeutic Exercise    Performed, Supine Lower Extremity (Therapeutic Exercise) heel slides;ankle pumps;gluteal  sets;quadriceps sets;SAQ (short arc quad) over bolster;hip abduction/adduction  -LB      Restrictions, Supine Lower Extremity (Therapeutic Exercise) left hip precautions  -LB      Sets/Reps Detail, Supine Lower Extremity (Therapeutic Exercise) 25 reps, 2 sets  -LB      Recorded by [LB] Noris Wooten PT      Row Name 06/07/18 1704 06/07/18 1405          Positioning and Restraints    Pre-Treatment Position sitting in chair/recliner  -LB  --     Post Treatment Position  -- wheelchair  -CJ     In Wheelchair call light within reach;encouraged to call for assist  -LB with PT;sitting;call light within reach;encouraged to call for assist   with PT- post am tx;  in room- post pm tx  -CJ     Recorded by [LB] Noris Wooten PT [CJ] Trini Castro OT       User Key  (r) = Recorded By, (t) = Taken By, (c) = Cosigned By    Initials Name Effective Dates    LB Noris Wooten, LATONIA 03/14/16 -     CJ Trini Castro OT 04/03/18 -           Wound 05/27/18 0425 Right posterior;lower arm skin tear (Active)   Dressing Appearance intact;dry 6/7/2018  7:20 AM   Drainage Amount none 6/6/2018  7:45 PM   Dressing Care, Wound dressing changed 6/6/2018  7:45 PM       Wound 05/29/18 1222 Left hip incision (Active)   Dressing Appearance dry;intact 6/7/2018  7:20 AM   Closure Liquid skin adhesive 6/7/2018  7:20 AM   Drainage Amount none 6/7/2018  7:20 AM       Physical Therapy Education     Title: PT OT SLP Therapies (Active)     Topic: Physical Therapy (Done)     Point: Mobility training (Done)    Learning Progress Summary     Learner Status Readiness Method Response Comment Documented by    Patient Done Acceptance E,ALICIA VU,NR  LB 06/07/18 1709     Done Acceptance E,MAI VUNR  AG 06/06/18 1707     Done Acceptance ALICIA GREEN VU,NR  LB 06/05/18 1652     Done Acceptance ETB VU,NR  LB 06/04/18 1232     Done Acceptance EALICIA VU,NR  LB 06/02/18 1052          Point: Home exercise program (Done)    Learning Progress Summary     Learner  Status Readiness Method Response Comment Documented by    Patient Done Acceptance E,TB VU,NR  LB 06/07/18 1709     Done Acceptance E,D VU,NR  AG 06/06/18 1707     Done Acceptance E,TB VU,NR  LB 06/05/18 1652     Done Acceptance E,TB VU,NR  LB 06/04/18 1232     Done Acceptance E,TB VU,NR  LB 06/02/18 1052          Point: Body mechanics (Done)    Learning Progress Summary     Learner Status Readiness Method Response Comment Documented by    Patient Done Acceptance E,TB VU,NR  LB 06/07/18 1709     Done Acceptance E,D VU,NR  AG 06/06/18 1707     Done Acceptance E,TB VU,NR  LB 06/05/18 1652     Done Acceptance E,TB VU,NR  LB 06/04/18 1232     Done Acceptance E,TB VU,NR  LB 06/02/18 1052          Point: Precautions (Done)    Learning Progress Summary     Learner Status Readiness Method Response Comment Documented by    Patient Done Acceptance E,TB VU,NR  LB 06/07/18 1709     Done Acceptance E,D VU,NR  AG 06/06/18 1707     Done Acceptance E,TB VU,NR   06/05/18 1652     Done Acceptance E,TB VU,NR   06/04/18 1232     Done Acceptance E,TB VU,NR   06/02/18 1052                      User Key     Initials Effective Dates Name Provider Type Discipline     03/14/16 -  Noris Wooten, PT Physical Therapist PT     04/03/18 -  Lisa Lopez, PT Physical Therapist PT                  PT Recommendation and Plan    Anticipated Equipment Needs at Discharge (PT Eval): front wheeled walker  Planned Therapy Interventions (PT Eval): balance training, bed mobility training, gait training, patient/family education, strengthening, transfer training  Frequency of Treatment (PT Eval): 5 times per week    Plan of Care Reviewed With: patient  IRF Plan of Care Review: discharge pending  Progress, Functional Goals: preparing for discharge                   Time Calculation:           PT Charges     Row Name 06/07/18 1712             Time Calculation    Start Time 0915  -LB      Stop Time 1045  -LB      Time Calculation (min) 90 min   -LB      PT Received On 06/07/18  -LB        User Key  (r) = Recorded By, (t) = Taken By, (c) = Cosigned By    Initials Name Provider Type    LATHA Wooten, PT Physical Therapist            Therapy Charges for Today     Code Description Service Date Service Provider Modifiers Qty    [id number]  GAIT TRAINING EA 15 MIN 6/7/2018 Noris Wooten, PT GP 2    [id number]  PT THER PROC EA 15 MIN 6/7/2018 Noris Wooten, PT GP 4    [id number]  PT THER SUPP EA 15 MIN 6/7/2018 Noris Wooten, PT GP 2                   Noris Wooten, PT  6/7/2018

## 2018-06-07 NOTE — PROGRESS NOTES
Inpatient Rehabilitation Functional Measures Assessment    Functional Measures  DONNY Eating:  DONNY Grooming:  DONNY Bathing:  DONNY Upper Body Dressing:  DONNY Lower Body Dressing:  DONNY Toileting:  Toileting Score = 4.  Patient requires minimal assistance for  toileting, such as steadying for balance while cleansing or adjusting clothes.  Patient requires the following assistive device(s): Grab bar.    DONNY Bladder Management  Level of Assistance:  Bladder Score = 5.  Patient is supervision/set-up for  bladder management, requiring: No assistive devices were required.  Frequency/Number of Accidents this Shift:  Bladder accidents this shift:  0 .  Patient has not had an accident this shift.    DONNY Bowel Management  Level of Assistance: Bowel Score = 7.  Patient is completely independent for  bowel management.  Patient did not have bowel movement.  No  medication/intervention was provided.  Frequency/Number of Accidents this Shift: Bowel accidents this shift: 0 .  Patient has not had an accident this shift.    DONNY Bed/Chair/Wheelchair Transfer:  Bed/chair/wheelchair Transfer Score = 4.  Patient performs 75% or more of effort and minimal assistance (little/incidental  help/lifting of one limb/steadying) for transferring to and from the  bed/chair/wheelchair, requiring: Steadying. Patient requires the following  assistive device(s): Seating system of wheelchair. Grab bars.  DONNY Toilet Transfer:  Toilet Transfer Score = 4.  Patient performs 75% or more  of effort and minimal assistance (little/incidental help/steadying) for  transferring to and from the toilet/commode, requiring: Steadying. Patient  requires the following assistive device(s): Safety frame/over the toilet. Grab  bars.  DONNY Tub/Shower Transfer:    Previously Documented Mode of Locomotion at Discharge:  DONNY Expected Mode of Locomotion at Discharge:  DONNY Walk/Wheelchair:  DONNY Stairs:    DONNY Comprehension:  DONNY Expression:  DONNY Social Interaction:  DONNY Problem  Solving:  DONNY Memory:    Therapy Mode Minutes  Occupational Therapy:  Physical Therapy:  Speech Language Pathology:    Discharge Functional Goals:    Signed by: PARTH Mcdonald

## 2018-06-07 NOTE — PROGRESS NOTES
Inpatient Rehabilitation Functional Measures Assessment    Functional Measures  DONNY Eating:  Eating Score = 5. Patient is supervision/set-up for eating,  requiring: Opening containers. No assistive devices were required.  DONNY Grooming:  DONNY Bathing:  DONNY Upper Body Dressing:  DONNY Lower Body Dressing:  DONNY Toileting:  Toileting Score = 4.  Patient requires minimal assistance for  toileting, such as steadying for balance while cleansing or adjusting clothes.  Patient requires the following assistive device(s): Grab bar. Arm rest of a  specialized seat.    DONNY Bladder Management  Level of Assistance:  Bladder Score = 5.  Patient is supervision/set-up for  bladder management, requiring: Setting out equipment. Patient requires the  following assistive device(s):  Absorbent pads.  Frequency/Number of Accidents this Shift:  Bladder accidents this shift:  1 . 1    DONNY Bowel Management  Level of Assistance:  Frequency/Number of Accidents this Shift: Bowel accidents this shift: 0 .  Patient has not had an accident this shift. 0    DONNY Bed/Chair/Wheelchair Transfer:  Bed/chair/wheelchair Transfer Score = 3.  Patient performs 50-74% of effort and requires moderate assistance (some  lifting) for transferring to and from the bed/chair/wheelchair. Patient requires  the following assistive device(s): Walker. Bed rails. Elevated bed/surface. Arm  rest. Seating system of wheelchair.  DONNY Toilet Transfer:  Toilet Transfer Score = 3.  Patient performs 50-74% of  effort and requires moderate assistance (some lifting) for transferring to and  from the toilet/commode. Patient requires the following assistive device(s):  Walker. Grab bars. Safety frame/over the toilet. Specialized seat.  DONNY Tub/Shower Transfer:    Previously Documented Mode of Locomotion at Discharge:  DONNY Expected Mode of Locomotion at Discharge:  DONNY Walk/Wheelchair:  DONNY Stairs:    DONNY Comprehension:  DONNY Expression:  DONNY Social Interaction:  DONNY Problem Solving:  DONNY  Memory:    Therapy Mode Minutes  Occupational Therapy:  Physical Therapy:  Speech Language Pathology:    Discharge Functional Goals:    Signed by: PARTH Barakat

## 2018-06-07 NOTE — PROGRESS NOTES
Inpatient Rehabilitation Functional Measures Assessment and Plan of Care    Plan of Care   Safety    Potential for Injury (Active)  Current Status (6/1/2018 10:30:00 AM): potential for falls  Weekly Goal: no falls this week  Discharge Goal: no falls by discharge    Body Systems    Integumentary (Active)  Current Status (6/1/2018 10:30:00 AM): potential for skin breakdown  Weekly Goal: no skin breakdown this week  Discharge Goal: no skin breakdown by discharge    Functional Measures  DONNY Eating:  DONNY Grooming:  DONNY Bathing:  DONNY Upper Body Dressing:  DONNY Lower Body Dressing:  DONNY Toileting:    DONNY Bladder Management  Level of Assistance:  Frequency/Number of Accidents this Shift:    DONNY Bowel Management  Level of Assistance:  Frequency/Number of Accidents this Shift:    DONNY Bed/Chair/Wheelchair Transfer:  DONNY Toilet Transfer:  DONNY Tub/Shower Transfer:    Previously Documented Mode of Locomotion at Discharge:  DONNY Expected Mode of Locomotion at Discharge:  DONNY Walk/Wheelchair:  DONNY Stairs:    DONNY Comprehension:  Both ( auditory and visual) modes of comprehension are used  equally. Comprehension Score = 6, Modified [place].  Patient comprehends  complex/abstract information in their primary language, requiring: Additional  time.  DONNY Expression:  Both ( vocal and non-vocal) modes of expression are used  equally. Expression Score = 6,  Modified Independent. Patient expresses  complex/abstract information in their primary language, requiring: Additional  time.  DONNY Social Interaction:  Social Interaction Score = 6, Modified Independent.  Patient is modified independent for social interaction, requiring: Requires  additional time.  DONNY Problem Solving:  Problem Solving Score = 6, Modified [place].  Patient  makes appropriate decisions in order to solve complex problems, but requires  extra time.  DONNY Memory:  Memory Score = 6, Modified [place].  Patient is modified  independent for memory, requiring:  Requires additional time.    Therapy Mode Minutes  Occupational Therapy:  Physical Therapy:  Speech Language Pathology:    Discharge Functional Goals:    Signed by: Nurse Sallie

## 2018-06-07 NOTE — THERAPY TREATMENT NOTE
Inpatient Rehabilitation - Occupational Therapy Treatment Note     [facility]     Patient Name: Anette Dunham  : 1940  MRN: [id number]    Today's Date: 2018                 Admit Date: 2018      Visit Dx:  No diagnosis found.    Patient Active Problem List   Diagnosis   • Essential hypertension   • Chest pain   • Paroxysmal atrial fibrillation   • Closed left hip fracture, initial encounter   • Tobacco abuse   • Closed left hip fracture         Therapy Treatment          IRF Treatment Summary     Row Name 18 1405             Evaluation/Treatment Time and Intent    Subjective Information no complaints   agreeable to therapy  -CJ      Document Type therapy note (daily note)  -CJ      Mode of Treatment occupational therapy  -CJ      Recorded by [CJ] Trini Castro OT      Row Name 18 1405             Cognition/Psychosocial- PT/OT    Affect/Mental Status (Cognitive) WFL  -CJ      Recorded by [CJ] Trini Castro OT      Row Name 18 1405             Transfer Assessment/Treatment    Bed-Chair [place] (Transfers) stand by assist;verbal cues  -CJ      Chair-Bed [place] (Transfers) stand by assist;verbal cues  -CJ      Assistive Device (Bed-Chair Transfers) walker, front-wheeled  -CJ      [place] Level (Toilet Transfer) stand by assist;verbal cues  -CJ      Assistive Device (Toilet Transfer) grab bars/safety frame  -CJ      Recorded by [CJ] Trini Castro OT      Row Name 18 1405             Chair-Bed Transfer    Assistive Device (Chair-Bed Transfers) walker, front-wheeled  -CJ      Recorded by [CJ] Trini Castro OT      Row Name 18 1405             Safety Issues, Functional Mobility    Safety Issues Affecting Function (Mobility) safety precaution awareness  -CJ      Comment, Safety Issues/Impairments (Mobility) Precautions:  Fall risk, THP, decreased skin inegrity  -CJ      Recorded by [CJ] Trini Castro OT      Row Name 18 1405             Bathing  Assessment/Treatment    Bathing [place] contact guard assist;verbal cues  -CJ      Assistive Device (Bathing) long-handled sponge  -CJ      Recorded by [CJ] Trini Castro, OT      Row Name 06/07/18 1405             Upper Body Dressing Assessment/Treatment    Upper Body Dressing Task set up assistance  -CJ      Recorded by [CJ] Trini Castro, OT      Row Name 06/07/18 1405             Lower Body Dressing Assessment/Treatment    Lower Body Dressing [place] contact guard assist;minimum assist (75% patient effort);verbal cues;don;pants/bottoms;socks;shoes/slippers  -CJ      Lower Body Dressing Dressing Device reacher;sock-aid  -CJ      Recorded by [CJ] Trini Castro, OT      Row Name 06/07/18 1405             Grooming Assessment/Treatment    Grooming [place] set up  -CJ      Recorded by [CJ] Trini Castro, OT      Row Name 06/07/18 1405             Toileting Assessment/Treatment    Toileting [place] standby assist;verbal cues  -CJ      Assistive Device Use (Toileting) grab bar/safety frame  -CJ      Recorded by [CJ] Trini Castro, OT      Row Name 06/07/18 1405             Upper Extremity Seated Therapeutic Exercise    Device, Seated Upper Extremity (Therapeutic Exercise) restorator/arm bike   4 mins X 4, 0 resistance;  rickshaw-10 lbs, 20 reps X 5  -CJ      Exercise Type, Seated Upper Extremity (Therapeutic Exercise) AROM (active range of motion)   BUE ther ex/act, bilat coord ex, dowel ex-20 reps X 2, 0 lbs  -CJ      Expected Outcomes, Seated Upper Extremity (Therapeutic Exercise) improve functional tolerance, self-care activity;improve performance, BADLs  -CJ      Recorded by [CJ] Trini Castro, OT      Row Name 06/07/18 1405             Positioning and Restraints    Post Treatment Position wheelchair  -CJ      In Wheelchair with PT;sitting;call light within reach;encouraged to call for assist   with PT- post am tx;  in room- post pm tx  -CJ      Recorded by  [CJ] Trini Castro OT        User Key  (r) = Recorded By, (t) = Taken By, (c) = Cosigned By    Initials Name Effective Dates    CJ Trini Castro, OT 04/03/18 -           Wound 05/27/18 0425 Right posterior;lower arm skin tear (Active)   Dressing Appearance intact;dry 6/7/2018  7:20 AM   Drainage Amount none 6/6/2018  7:45 PM   Dressing Care, Wound dressing changed 6/6/2018  7:45 PM       Wound 05/29/18 1222 Left hip incision (Active)   Dressing Appearance dry;intact 6/7/2018  7:20 AM   Closure Liquid skin adhesive 6/7/2018  7:20 AM   Drainage Amount none 6/7/2018  7:20 AM         OT Recommendation and Plan    Planned Therapy Interventions (OT Eval): activity tolerance training, adaptive equipment training, BADL retraining, functional balance retraining, IADL retraining, occupation/activity based interventions, patient/caregiver education/training, strengthening exercise, transfer/mobility retraining    Plan of Care Review  Plan of Care Reviewed With: patient  Plan of Care Reviewed With: patient  IRF Plan of Care Review: progress ongoing, continue  Outcome Summary: Pt tolerated tx sessions well with rest breaks.  Continue POC.          OT IRF GOALS     Row Name 06/02/18 0939             Transfer Goal 1 (OT-IRF)    Activity/Assistive Device (Transfer Goal 1, OT-IRF) toilet  -CJ      [place] Level (Transfer Goal 1, OT-IRF) standby assist  -CJ      Time Frame (Transfer Goal 1, OT-IRF) long term goal (LTG);by discharge  -CJ         Bathing Goal 1 (OT-IRF)    [place] Level (Bathing Goal 1, OT-IRF) minimum assist (75% or more patient effort)  -CJ      Time Frame (Bathing Goal 1, OT-IRF) long term goal (LTG);by discharge  -         LB Dressing Goal 1 (OT-IRF)    [place] (LB Dressing Goal 1, OT-IRF) minimum assist (75% or more patient effort)  -CJ      Time Frame (LB Dressing Goal 1, OT-IRF) short term goal (STG);5 - 7 days  -         LB Dressing Goal 2 (OT-IRF)    [place] (LB Dressing Goal 2,  OT-IRF) contact guard assist  -CJ      Time Frame (LB Dressing Goal 2, OT-IRF) long term goal (LTG);by discharge  -CJ         Toileting Goal 1 (OT-IRF)    [place] Level (Toileting Goal 1, OT-IRF) minimum assist (75% or more patient effort)  -CJ      Time Frame (Toileting Goal 1, OT-IRF) short term goal (STG);5 - 7 days  -CJ         Toileting Goal 2 (OT-IRF)    [place] Level (Toileting Goal 2, OT-IRF) standby assist  -CJ      Time Frame (Toileting Goal 2, OT-IRF) long term goal (LTG);by discharge  -        User Key  (r) = Recorded By, (t) = Taken By, (c) = Cosigned By    Initials Name Provider Type    YULY Castro OT Occupational Therapist                 Time Calculation:           Time Calculation- OT     Row Name 06/07/18 1414 06/07/18 0805          Time Calculation- OT    OT Start Time 1300  - 0805  -     OT Stop Time 1330  - 0915  -     OT Time Calculation (min) 30 min  - 70 min  -     Total Timed Code Minutes- OT 30 minute(s)  -CJ 70 minute(s)  -     OT Non-Billable Time (min)  -- 20 min  -       User Key  (r) = Recorded By, (t) = Taken By, (c) = Cosigned By    Initials Name Provider Type    YULY Castro OT Occupational Therapist             Therapy Charges for Today     Code Description Service Date Service Provider Modifiers Qty    70856193011 HC OT SELF CARE/MGMT/TRAIN EA 15 MIN 6/6/2018 Trini Castro OT GO 3    95521989761 HC OT THERAPEUTIC ACT EA 15 MIN 6/6/2018 Trini Castro OT GO 3    96220289465 HC OT SELF CARE/MGMT/TRAIN EA 15 MIN 6/7/2018 Trini Castro OT GO 3    93683541377 HC OT THERAPEUTIC ACT EA 15 MIN 6/7/2018 Trini Castro OT GO 4                   Trini Castro OT  6/7/2018

## 2018-06-07 NOTE — PLAN OF CARE
Problem: Patient Care Overview  Goal: Plan of Care Review   06/07/18 1413   Patient Care Overview   IRF Plan of Care Review progress ongoing, continue   Coping/Psychosocial   Plan of Care Reviewed With patient   OTHER   Outcome Summary Pt tolerated tx sessions well with rest breaks. Continue POC.

## 2018-06-07 NOTE — DISCHARGE INSTR - APPOINTMENTS
MS. DE SANTIAGO HAS AN APPOINTMENT TO SEE TANA RICE ON: 06-19 @ 10:00  [phone]    MS. DE SANTIAGO HAS AN APPOINTMENT TO SEE DR. ENGEL ON: 06-14 @ 2:00  [phone]

## 2018-06-07 NOTE — PROGRESS NOTES
Inpatient Rehabilitation Functional Measures Assessment    Functional Measures  DONNY Eating:  DONNY Grooming:  DONNY Bathing:  DONNY Upper Body Dressing:  DONNY Lower Body Dressing:  DONNY Toileting:    DONNY Bladder Management  Level of Assistance:  Frequency/Number of Accidents this Shift:    DONNY Bowel Management  Level of Assistance:  Frequency/Number of Accidents this Shift:    DONNY Bed/Chair/Wheelchair Transfer:  Bed/chair/wheelchair Transfer Score = 5.  Patient is supervision/set-up for transferring to and from the  bed/chair/wheelchair, requiring: Patient requires the following assistive  device(s): Walker.  DONNY Toilet Transfer:  DONNY Tub/Shower Transfer:    Previously Documented Mode of Locomotion at Discharge:  DONNY Expected Mode of Locomotion at Discharge:  DONNY Walk/Wheelchair:  WHEELCHAIR OBSERVATION   Wheelchair did not occur.    WALK OBSERVATION   Walk Distance Scale = 3.  Distance walked is greater than 150 feet. Walk Score  = 5.  Patient requires supervision or set up for walking. Patient walked a  distance of  340 feet. Patient requires the following assistive device(s):  Rolling walker.  DONNY Stairs:  Stairs did not occur.    DONNY Comprehension:  DONNY Expression:  DONNY Social Interaction:  DONNY Problem Solving:  DONNY Memory:    Therapy Mode Minutes  Occupational Therapy:  Physical Therapy: Individual: 90 minutes.  Speech Language Pathology:    Discharge Functional Goals:    Signed by: Noris Wooten, Physical Therapist

## 2018-06-07 NOTE — PLAN OF CARE
Problem: Patient Care Overview  Goal: Individualization and Mutuality  Outcome: Ongoing (interventions implemented as appropriate)      Problem: Fractured Hip (Adult)  Goal: Signs and Symptoms of Listed Potential Problems Will be Absent, Minimized or Managed (Fractured Hip)  Outcome: Ongoing (interventions implemented as appropriate)      Problem: Skin Injury Risk (Adult)  Goal: Skin Health and Integrity  Outcome: Ongoing (interventions implemented as appropriate)      Problem: Wound (Includes Pressure Injury) (Adult)  Goal: Signs and Symptoms of Listed Potential Problems Will be Absent, Minimized or Managed (Wound)  Outcome: Ongoing (interventions implemented as appropriate)

## 2018-06-07 NOTE — PROGRESS NOTES
Inpatient Rehabilitation Functional Measures Assessment    Functional Measures  DONNY Eating:  DONNY Grooming: Grooming Score = 5. Patient is supervision/set-up for grooming,  requiring: Initial preparation. No assistive devices were required.  DONNY Bathing:  Patient took sponge bath. Bathing Score = 4.  Patient requires  minimal assistance for bathing, requiring steadying for balance only. Patient  requires the following assistive device(s): Grab bar/arm rest to maintain  balance.  DONNY Upper Body Dressing:  Upper Body Dressing Score = 5. Patient is supervision  for upper body dressing, requiring: Gathering/setting out clothes. No assistive  devices were required.  DONNY Lower Body Dressing:  Gathering clothes was not observed for this patient.  Wearing underwear or an undergarment was not observed for this patient. Patient  requires no physical assistance for donning and/or doffing pants/skirt threading  right leg. Patient requires no physical assistance for donning and/or doffing  pants/skirt threading left leg. Patient requires no physical assistance for  donning and/or doffing pants/skirt over hips and adjusting fastener. Patient  requires no physical assistance for donning and/or doffing right sock. Patient  requires no physical assistance for donning and/or doffing left sock. Patient  requires no physical assistance for donning and/or doffing right shoe. Patient  requires minimal/incidental physical assistance for donning and/or doffing left  shoe. Patient performs 96.43 % of lower body dressing tasks. Lower Body Dressing  Score = 4, Minimal Assistance. Patient requires the following assistive  device(s): Reacher. Sock aid.  DONNY Toileting:  Toileting Score = 5.  Patient is supervision/set-up for  toileting, requiring: Stand by assistance. Verbal cuing, prompting, or  instructing. Patient requires the following assistive device(s): Arm rest of a  specialized seat. Grab bar.    DONNY Bladder Management  Level of  Assistance:  Frequency/Number of Accidents this Shift:    DONNY Bowel Management  Level of Assistance:  Frequency/Number of Accidents this Shift:    DONNY Bed/Chair/Wheelchair Transfer:  Bed/chair/wheelchair Transfer Score = 5.  Patient is supervision/set-up for transferring to and from the  bed/chair/wheelchair, requiring: Set up (positioning equipment, lock brakes  and/or adjust foot rest). Stand by assistance. Verbal cuing, prompting, or  instructing. Patient requires the following assistive device(s): Walker.  DONNY Toilet Transfer:  Toilet Transfer Score = 5.  Patient is supervision/set-up  for transferring to and from the toilet/commode, requiring: Set up (positioning  equipment, lock brakes and/or adjust foot rests). Stand by assistance. Verbal  cuing, prompting, or instructing. Patient requires the following assistive  device(s): Safety frame/over the toilet. Grab bars.  DONNY Tub/Shower Transfer:    Previously Documented Mode of Locomotion at Discharge:  DONNY Expected Mode of Locomotion at Discharge:  DONNY Walk/Wheelchair:  DONNY Stairs:    DONNY Comprehension:  DONNY Expression:  DONNY Social Interaction:  DONNY Problem Solving:  DONNY Memory:    Therapy Mode Minutes  Occupational Therapy: Individual: 100 minutes.  Physical Therapy:  Speech Language Pathology:    Discharge Functional Goals:    Signed by: Trini Castro Occupational Therapist

## 2018-06-07 NOTE — PROGRESS NOTES
Inpatient Rehabilitation Functional Measures Assessment    Functional Measures  DONNY Eating:  DONNY Grooming:  DONNY Bathing:  DONNY Upper Body Dressing:  DONNY Lower Body Dressing:  DONNY Toileting:    DONNY Bladder Management  Level of Assistance:  Frequency/Number of Accidents this Shift:    DONNY Bowel Management  Level of Assistance:  Frequency/Number of Accidents this Shift:    DONNY Bed/Chair/Wheelchair Transfer:  DONNY Toilet Transfer:  DONNY Tub/Shower Transfer:    Previously Documented Mode of Locomotion at Discharge:  DONNY Expected Mode of Locomotion at Discharge:  DONNY Walk/Wheelchair:  DONNY Stairs:    DONNY Comprehension:  Both ( auditory and visual) modes of comprehension are used  equally. Comprehension Score = 6, Modified [place].  Patient comprehends  complex/abstract information in their primary language, requiring: Additional  time.  DONNY Expression:  Vocal expression is the usual mode. Expression Score = 6,  Modified [place].  Patient expresses complex/abstract information in their  primary language, requiring: Additional time.  DONNY Social Interaction:  Social Interaction Score = 6, Modified Independent.  Patient is modified independent for social interaction, requiring: Requires  additional time.  DONNY Problem Solving:  Problem Solving Score = 6, Modified [place].  Patient  makes appropriate decisions in order to solve complex problems, but requires  extra time.  DONNY Memory:  Memory Score = 6, Modified [place].  Patient is modified  independent for memory, requiring: Requires additional time.    Therapy Mode Minutes  Occupational Therapy:  Physical Therapy:  Speech Language Pathology:    Discharge Functional Goals:    Signed by: Adriana Carr, Nurse

## 2018-06-07 NOTE — PLAN OF CARE
Problem: Patient Care Overview  Goal: Plan of Care Review   06/07/18 1711   Patient Care Overview   IRF Plan of Care Review discharge pending   Progress, Functional Goals preparing for discharge   Coping/Psychosocial   Plan of Care Reviewed With patient

## 2018-06-08 VITALS
OXYGEN SATURATION: 96 % | HEIGHT: 63 IN | DIASTOLIC BLOOD PRESSURE: 73 MMHG | TEMPERATURE: 97.6 F | WEIGHT: 155 LBS | SYSTOLIC BLOOD PRESSURE: 128 MMHG | HEART RATE: 81 BPM | RESPIRATION RATE: 16 BRPM | BODY MASS INDEX: 27.46 KG/M2

## 2018-06-08 PROCEDURE — 97110 THERAPEUTIC EXERCISES: CPT

## 2018-06-08 PROCEDURE — 97535 SELF CARE MNGMENT TRAINING: CPT

## 2018-06-08 PROCEDURE — 97530 THERAPEUTIC ACTIVITIES: CPT

## 2018-06-08 PROCEDURE — 94799 UNLISTED PULMONARY SVC/PX: CPT

## 2018-06-08 RX ORDER — SENNA AND DOCUSATE SODIUM 50; 8.6 MG/1; MG/1
2 TABLET, FILM COATED ORAL 2 TIMES DAILY
Qty: 120 TABLET | Refills: 0 | Status: SHIPPED | OUTPATIENT
Start: 2018-06-08

## 2018-06-08 RX ORDER — ROPINIROLE 1 MG/1
1 TABLET, FILM COATED ORAL NIGHTLY
Qty: 30 TABLET | Refills: 0 | Status: ON HOLD | OUTPATIENT
Start: 2018-06-08 | End: 2018-09-05

## 2018-06-08 RX ORDER — DOXYCYCLINE 100 MG/1
100 CAPSULE ORAL EVERY 12 HOURS SCHEDULED
Qty: 3 CAPSULE | Refills: 0 | Status: SHIPPED | OUTPATIENT
Start: 2018-06-08 | End: 2018-06-10

## 2018-06-08 RX ORDER — OXYCODONE HYDROCHLORIDE AND ACETAMINOPHEN 5; 325 MG/1; MG/1
1 TABLET ORAL EVERY 8 HOURS PRN
Qty: 15 TABLET | Refills: 0 | Status: SHIPPED | OUTPATIENT
Start: 2018-06-08 | End: 2018-06-09

## 2018-06-08 RX ADMIN — FAMOTIDINE 40 MG: 20 TABLET, FILM COATED ORAL at 09:07

## 2018-06-08 RX ADMIN — Medication 1 TABLET: at 09:08

## 2018-06-08 RX ADMIN — GABAPENTIN 600 MG: 300 CAPSULE ORAL at 06:04

## 2018-06-08 RX ADMIN — OXYCODONE HYDROCHLORIDE AND ACETAMINOPHEN 1 TABLET: 5; 325 TABLET ORAL at 06:04

## 2018-06-08 RX ADMIN — CETIRIZINE HYDROCHLORIDE 5 MG: 10 TABLET ORAL at 09:08

## 2018-06-08 RX ADMIN — NICOTINE 1 PATCH: 7 PATCH TRANSDERMAL at 09:08

## 2018-06-08 RX ADMIN — Medication 1 CAPSULE: at 09:07

## 2018-06-08 RX ADMIN — LEVOTHYROXINE SODIUM 50 MCG: 50 TABLET ORAL at 09:07

## 2018-06-08 RX ADMIN — DOXYCYCLINE 100 MG: 100 CAPSULE ORAL at 09:08

## 2018-06-08 RX ADMIN — DOCUSATE SODIUM AND SENNOSIDES 2 TABLET: 8.6; 5 TABLET, FILM COATED ORAL at 09:09

## 2018-06-08 RX ADMIN — APIXABAN 5 MG: 5 TABLET, FILM COATED ORAL at 09:08

## 2018-06-08 RX ADMIN — METOPROLOL TARTRATE 25 MG: 25 TABLET, FILM COATED ORAL at 09:07

## 2018-06-08 NOTE — PROGRESS NOTES
Inpatient Rehabilitation Functional Measures Assessment    Functional Measures  DONNY Eating:  DONNY Grooming:  DONNY Bathing:  DONNY Upper Body Dressing:  DONNY Lower Body Dressing:  DONNY Toileting:    DONNY Bladder Management  Level of Assistance:  Frequency/Number of Accidents this Shift:    DONNY Bowel Management  Level of Assistance:  Frequency/Number of Accidents this Shift:    DONNY Bed/Chair/Wheelchair Transfer:  DONNY Toilet Transfer:  DONNY Tub/Shower Transfer:    Previously Documented Mode of Locomotion at Discharge:  DONNY Expected Mode of Locomotion at Discharge:  DONNY Walk/Wheelchair:  DONNY Stairs:    DONNY Comprehension:  Both ( auditory and visual) modes of comprehension are used  equally. Comprehension Score = 6, Modified [place].  Patient comprehends  complex/abstract information in their primary language, requiring: Additional  time.  DONNY Expression:  Vocal expression is the usual mode. Expression Score = 6,  Modified [place].  Patient expresses complex/abstract information in their  primary language, requiring: Additional time.  DONNY Social Interaction:  Social Interaction Score = 6, Modified [place].  Patient is modified independent for social interaction, requiring: Requires  additional time.  DONNY Problem Solving:  Problem Solving Score = 6, Modified [place].  Patient  makes appropriate decisions in order to solve complex problems, but requires  extra time.  DONNY Memory:  Memory Score = 6, Modified [place].  Patient is modified  independent for memory, requiring: Requires additional time.    Therapy Mode Minutes  Occupational Therapy:  Physical Therapy:  Speech Language Pathology:    Discharge Functional Goals:    Signed by: Adriana Carr, Nurse

## 2018-06-08 NOTE — THERAPY DISCHARGE NOTE
Inpatient Rehabilitation - Occupational Therapy Treatment Note/Discharge   [facility]     Patient Name: Anette Dunham  : 1940  MRN: [id number]  Today's Date: 2018               Admit Date: 2018    Visit Dx:     ICD-10-CM ICD-9-CM   1. Closed fracture of left hip with routine healing, subsequent encounter S72.002D V54.13     Patient Active Problem List   Diagnosis   • Essential hypertension   • Chest pain   • Paroxysmal atrial fibrillation   • Closed left hip fracture, initial encounter   • Tobacco abuse   • Closed left hip fracture       Therapy Treatment        IRF Treatment Summary     Row Name 18 1318             Evaluation/Treatment Time and Intent    Subjective Information no complaints  -CJ      Document Type discharge evaluation/summary  -CJ      Mode of Treatment occupational therapy  -CJ      Patient/Family Observations Education completed with pt and family re:  ADL status, safety, AE, DME, THP, home program, 24 hr supervision, precautions and D/C concerns.  Verbalized understanding.  -CJ      Recorded by [CJ] Trini Castro OT      Row Name 18 1318             Cognition/Psychosocial- PT/OT    Affect/Mental Status (Cognitive) WFL  -CJ      Orientation Status (Cognition) oriented x 3  -CJ      Follows Commands (Cognition) WFL  -CJ      Recorded by [CJ] Trini Castro OT      Row Name 18 1318             Transfer Assessment/Treatment    [place] Level (Toilet Transfer) stand by assist;verbal cues  -CJ      Assistive Device (Toilet Transfer) grab bars/safety frame  -CJ      Recorded by [CJ] Trini Castro OT      Row Name 18 1318             Safety Issues, Functional Mobility    Safety Issues Affecting Function (Mobility) safety precaution awareness  -CJ      Comment, Safety Issues/Impairments (Mobility) Precautions:  Fall risk, THP, decreased skin integrity  -CJ      Recorded by [CJ] Trini Castro OT      Row Name 18 1318             Bathing  Assessment/Treatment    Assistive Device (Bathing) long-handled sponge  -CJ      Comment (Bathing) CGA  -CJ      Recorded by [CJ] Trini Castro, OT      Row Name 06/08/18 1318             Upper Body Dressing Assessment/Treatment    Comment (Upper Body Dressing) set up  -CJ      Recorded by [CJ] Trini Castro, OT      Row Name 06/08/18 1318             Lower Body Dressing Assessment/Treatment    Lower Body Dressing Dressing Device reacher;sock-aid  -CJ      Comment (Lower Body Dressing) CGA/[place]  -CJ      Recorded by [CJ] Trini Castro, OT      Row Name 06/08/18 1318             Grooming Assessment/Treatment    Comment (Grooming) set up  -CJ      Recorded by [CJ] Trini Castro, OT      Row Name 06/08/18 1318             Toileting Assessment/Treatment    Assistive Device Use (Toileting) grab bar/safety frame  -CJ      Comment (Toileting) SBA  -CJ      Recorded by [CJ] Trini Castro, OT      Row Name 06/08/18 1318             Self-Feeding Assessment/Treatment    [place] Level (Self-Feeding) conditional independence  -CJ      Recorded by [CJ] Trini Castro, OT      Row Name 06/08/18 1318             General ROM    GENERAL ROM COMMENTS BUE AROM- WFL  -CJ      Recorded by [CJ] Trini Castro, OT      Row Name 06/08/18 1318             MMT (Manual Muscle Testing)    Comment, General Manual Muscle Testing (MMT) Assessment BUE - 4/5 to 4+/5  -CJ      Recorded by [CJ] Trini Castro, OT      Row Name 06/08/18 1318             Vision Assessment/Intervention    Visual Impairment/Limitations corrective lenses full time  -CJ      Recorded by [CJ] Trini Castro, OT      Row Name 06/08/18 1318             Positioning and Restraints    Post Treatment Position wheelchair  -CJ      In Wheelchair sitting;call light within reach;encouraged to call for assist;with family/caregiver  -CJ      Recorded by [CJ] Trini Castro OT        User Key  (r) = Recorded By, (t) = Taken By, (c) = Cosigned By    Initials Name  Effective Dates    CJ Trini Castro, OT 04/03/18 -           Wound 05/29/18 1222 Left hip incision (Active)   Dressing Appearance dry;intact;no drainage 6/8/2018  7:30 AM   Closure Liquid skin adhesive 6/8/2018  7:30 AM   Drainage Amount none 6/8/2018  7:30 AM               OT IRF GOALS     Row Name 06/08/18 1241 06/02/18 0939          Transfer Goal 1 (OT-IRF)    Activity/Assistive Device (Transfer Goal 1, OT-IRF)  -- toilet  -CJ     [place] Level (Transfer Goal 1, OT-IRF)  -- standby assist  -CJ     Time Frame (Transfer Goal 1, OT-IRF)  -- long term goal (LTG);by discharge  -CJ     Progress/Outcomes (Transfer Goal 1, OT-IRF) goal met  -CJ  --        Bathing Goal 1 (OT-IRF)    [place] Level (Bathing Goal 1, OT-IRF)  -- minimum assist (75% or more patient effort)  -CJ     Time Frame (Bathing Goal 1, OT-IRF)  -- long term goal (LTG);by discharge  -CJ     Progress/Outcomes (Bathing Goal 1, OT-IRF) goal met  -CJ  --        LB Dressing Goal 1 (OT-IRF)    [place] (LB Dressing Goal 1, OT-IRF)  -- minimum assist (75% or more patient effort)  -CJ     Time Frame (LB Dressing Goal 1, OT-IRF)  -- short term goal (STG);5 - 7 days  -CJ     Progress/Outcomes (LB Dressing Goal 1, OT-IRF) goal met  -CJ  --        LB Dressing Goal 2 (OT-IRF)    [place] (LB Dressing Goal 2, OT-IRF)  -- contact guard assist  -CJ     Time Frame (LB Dressing Goal 2, OT-IRF)  -- long term goal (LTG);by discharge  -CJ     Progress/Outcomes (LB Dressing Goal 2, OT-IRF) goal partially met  -CJ  --        Toileting Goal 1 (OT-IRF)    [place] Level (Toileting Goal 1, OT-IRF)  -- minimum assist (75% or more patient effort)  -CJ     Time Frame (Toileting Goal 1, OT-IRF)  -- short term goal (STG);5 - 7 days  -CJ     Progress/Outcomes (Toileting Goal 1, OT-IRF) goal met  -CJ  --        Toileting Goal 2 (OT-IRF)    [place] Level (Toileting Goal 2, OT-IRF)  -- standby assist  -CJ     Time Frame (Toileting Goal 2, OT-IRF)  -- long term  goal (LTG);by discharge  -     Progress/Outcomes (Toileting Goal 2, OT-IRF) goal met  -  --       User Key  (r) = Recorded By, (t) = Taken By, (c) = Cosigned By    Initials Name Provider Type    YULY Castro OT Occupational Therapist          Occupational Therapy Education     Title: PT OT SLP Therapies (Done)     Topic: Occupational Therapy (Resolved)     Point: ADL training (Resolved)     Description: Instruct learner(s) on proper safety adaptation and remediation techniques during self care or transfers.   Instruct in proper use of assistive devices.   Learning Progress Summary     Learner Status Readiness Method Response Comment Documented by    Patient Done Acceptance E,D,H VU   06/08/18 1240     Done Acceptance E,D VU,NR   06/07/18 1413     Done Acceptance E,D VU,NR   06/06/18 1422     Done Acceptance E VU   06/05/18 1219     Done Acceptance E,D VU,NR   06/04/18 1415     Done Acceptance E,D VU,NR   06/02/18 0953    Family Done Acceptance E,D,H VU   06/08/18 1240          Point: Home exercise program (Resolved)     Description: Instruct learner(s) on appropriate technique for monitoring, assisting and/or progressing therapeutic exercises/activities.   Learning Progress Summary     Learner Status Readiness Method Response Comment Documented by    Patient Done Acceptance E,D,H VU   06/08/18 1240    Family Done Acceptance E,D,H VU   06/08/18 1240          Point: Precautions (Resolved)     Description: Instruct learner(s) on prescribed precautions during self-care and functional transfers.   Learning Progress Summary     Learner Status Readiness Method Response Comment Documented by    Patient Done Acceptance E,D,H VU   06/08/18 1240     Done Acceptance E,D VU,NR   06/07/18 1413     Done Acceptance E,D VU,NR   06/06/18 1422     Done Acceptance E VU   06/05/18 1219     Done Acceptance E,D VU,NR   06/04/18 1415     Done Acceptance E,D VU,NR   06/02/18 0953    Family Done  Acceptance E,D,H VU   06/08/18 1240                      User Key     Initials Effective Dates Name Provider Type Discipline     04/03/18 -  Trini Castro, OT Occupational Therapist OT     04/17/18 -  Bernarda Velasquez OT Occupational Therapist OT                  OT Recommendation and Plan  Anticipated Discharge Disposition (OT): home with home health, home with 24/7 care  Planned Therapy Interventions (OT Eval): activity tolerance training, adaptive equipment training, BADL retraining, functional balance retraining, IADL retraining, occupation/activity based interventions, patient/caregiver education/training, strengthening exercise, transfer/mobility retraining  Plan of Care Review  Plan of Care Reviewed With: patient           Time Calculation:          Time Calculation- OT     Row Name 06/08/18 1326             Time Calculation- OT    Total Timed Code Minutes- OT 25 minute(s)  -      OT Non-Billable Time (min) 35 min  -        User Key  (r) = Recorded By, (t) = Taken By, (c) = Cosigned By    Initials Name Provider Type     Trini Castro OT Occupational Therapist          Therapy Charges for Today     Code Description Service Date Service Provider Modifiers Qty    89775233507 HC OT SELF CARE/MGMT/TRAIN EA 15 MIN 6/7/2018 Trini Castro OT GO 3    11633027221 HC OT THERAPEUTIC ACT EA 15 MIN 6/7/2018 Trini Castro OT GO 4    82608664639 HC OT SELF CARE/MGMT/TRAIN EA 15 MIN 6/8/2018 Trini Castro OT GO 2               OT Discharge Summary  Anticipated Discharge Disposition (OT): home with home health, home with 24/7 care  Reason for Discharge: Discharge from facility  Discharge Destination: Home with home health, Home with assist    Trini Castro OT  6/8/2018

## 2018-06-08 NOTE — PROGRESS NOTES
Inpatient Rehabilitation Functional Measures Assessment    Functional Measures  DONNY Eating:  Eating Score = 6. Patient is modified independent for eating,  requiring:  DONNY Grooming: Grooming Score = 5. Patient is supervision/set-up for grooming,  requiring: Initial preparation. No assistive devices were required.  DONNY Bathing:  Patient took sponge bath. Bathing Score = 4.  Patient requires  minimal assistance for bathing, requiring steadying for balance only. Patient  requires the following assistive device(s): Grab bar/arm rest to maintain  balance.  DONNY Upper Body Dressing:  Upper Body Dressing Score = 5. Patient is supervision  for upper body dressing, requiring: Gathering/setting out clothes. No assistive  devices were required.  DONNY Lower Body Dressing:  Gathering clothes was not observed for this patient.  Wearing underwear or an undergarment was not observed for this patient. Patient  requires no physical assistance for donning and/or doffing pants/skirt threading  right leg. Patient requires no physical assistance for donning and/or doffing  pants/skirt threading left leg. Patient requires no physical assistance for  donning and/or doffing pants/skirt over hips and adjusting fastener. Patient  requires no physical assistance for donning and/or doffing right sock. Patient  requires no physical assistance for donning and/or doffing left sock. Patient  requires no physical assistance for donning and/or doffing right shoe. Patient  requires minimal/incidental physical assistance for donning and/or doffing left  shoe. Patient performs 96.43 % of lower body dressing tasks. Lower Body Dressing  Score = 4, Minimal Assistance. No assistive devices were required.  DONNY Toileting:  Toileting Score = 5.  Patient is supervision/set-up for  toileting, requiring: Stand by assistance. Verbal cuing, prompting, or  instructing. Patient requires the following assistive device(s): Arm rest of a  specialized seat. Grab  bar.    DONNY Bladder Management  Level of Assistance:  Frequency/Number of Accidents this Shift:    DONNY Bowel Management  Level of Assistance:  Frequency/Number of Accidents this Shift:    DONNY Bed/Chair/Wheelchair Transfer:  DONNY Toilet Transfer:  Toilet Transfer Score = 5.  Patient is supervision/set-up  for transferring to and from the toilet/commode, requiring: Set up (positioning  equipment, lock brakes and/or adjust foot rests). Stand by assistance. Verbal  cuing, prompting, or instructing. Patient requires the following assistive  device(s): Safety frame/over the toilet. Grab bars.  DONNY Tub/Shower Transfer:  Shower Transfer Score = 4. Patient performs 75% or  more of effort and minimal assistance (little/incidental help/lifting of one  limb/steadying) for transferring to and from the shower, requiring: Contact  guard. Patient requires the following assistive device(s): Shower chair. Grab  bars.    Previously Documented Mode of Locomotion at Discharge:  DONNY Expected Mode of Locomotion at Discharge:  DONNY Walk/Wheelchair:  DONNY Stairs:    DONNY Comprehension:  DONNY Expression:  DONNY Social Interaction:  DONNY Problem Solving:  DONNY Memory:    Therapy Mode Minutes  Occupational Therapy: Individual: 25 minutes.  Physical Therapy:  Speech Language Pathology:    Discharge Functional Goals:    Signed by: Trini Castro Occupational Therapist

## 2018-06-08 NOTE — PROGRESS NOTES
Patient Assessment Instrument  Quality Indicators - Discharge    Section GG. Self-Care Performance      Section GG. Mobility Performance     Roll Left and Right: [place] provides verbal cues or touching/steadying  assistance as patient completes activity.   Sit to Lying: [place] provides verbal cues or touching/steadying assistance as  patient completes activity.   Lying to Sitting on Side of Bed: [place] provides verbal cues or  touching/steadying assistance as patient completes activity.   Sit to Stand: [place] provides verbal cues or touching/steadying assistance as  patient completes activity.   Chair/Bed to Chair Transfer: [place] provides verbal cues or touching/steadying  assistance as patient completes activity.   Toilet Transfer [place] provides verbal cues or touching/steadying assistance as  patient completes activity.   Car Transfer: Not attempted due to medical or safety concerns.    Patient Walks:  Yes   Walk 10 Feet: [place] provides verbal cues or touching/steadying assistance as  patient completes activity.   Walk 50 Feet With 2 Turns: [place] provides verbal cues or touching/steadying  assistance as patient completes activity.   Walk 150 Feet: [place] provides verbal cues or touching/steadying assistance as  patient completes activity.   Walking 10 Feet on Uneven Surfaces: Not attempted due to medical or safety  concerns.   1 Step Over Curb or Up/Down Stair: Not attempted due to medical or safety  concerns.   4 Steps Up and Down, With/Without Rail: Not attempted due to medical or safety  concerns.   12 Steps Up and Down, With/Without Rail: Not attempted due to medical or safety  concerns.   Picking up an Object: Not attempted due to medical or safety concerns.     Uses Wheelchair/Scooter: No    Section J. Health Conditions Discharge      Section M. Skin Conditions Discharge      . Current Number of Unhealed Pressure Ulcers      . Worsening in Pressure Ulcer Status Since Admission      .  Healed Pressure Ulcer(s)  (Voluntary)      Section O. Special Treatments, Procedures, and Programs Discharge  . Influenza Vaccine - Discharge      Date Influenza Vaccine Received (enter date ONLY if received in this unit;  otherwise, skip this entry)      OPTIONAL BRANCH FOR UNPLANNED DISCHARGES  Unplanned Discharge:    Signed by: Anastasiya Toussaint PTA

## 2018-06-08 NOTE — PROGRESS NOTES
Patient Assessment Instrument  Quality Indicators - Discharge    Section GG. Self-Care Performance      Section GG. Mobility Performance      Section J. Health Conditions Discharge      Section M. Skin Conditions Discharge      . Current Number of Unhealed Pressure Ulcers      . Worsening in Pressure Ulcer Status Since Admission      . Healed Pressure Ulcer(s)  (Voluntary)      Section O. Special Treatments, Procedures, and Programs Discharge  . Influenza Vaccine - Discharge  Received in this facility for this year's influenza vaccination season:  No.  Influenza Vaccine Not Received Due To: Received outside of this facility.    Date Influenza Vaccine Received (enter date ONLY if received in this unit;  otherwise, skip this entry)      OPTIONAL BRANCH FOR UNPLANNED DISCHARGES  Unplanned Discharge: No    Signed by: Karlie Patel, Supervisor

## 2018-06-08 NOTE — SIGNIFICANT NOTE
06/08/18 1431   PT Discharge Summary   Reason for Discharge Discharge from facility   Outcomes Achieved Able to achieve all goals within established timeline   Discharge Destination Home with assist;Home with home health

## 2018-06-08 NOTE — SIGNIFICANT NOTE
06/08/18 1205   Plan   Plan Contacted [facility]  [phone] per Theresa Caruso about discharge.  HH will start care on 6-9-18.  Faxed discharge summary with face to face to  [phone].  Spoke to pt, son, and daughter about discharge today with  services.  Pt will return home with son at discharge.  Daughter and family will provide 24 hour assistance/supervision.  Daughter will provide transportation home.     Patient/Family in Agreement with Plan yes

## 2018-06-08 NOTE — PROGRESS NOTES
Patient Assessment Instrument  Quality Indicators - Discharge    Section GG. Self-Care Performance      Section GG. Mobility Performance      Section J. Health Conditions Discharge  Fall(s) Since Admission:  No    Section M. Skin Conditions Discharge  Unhealed Pressure Ulcer(s) at Stage 1 or Higher:  No    . Current Number of Unhealed Pressure Ulcers      . Worsening in Pressure Ulcer Status Since Admission      . Healed Pressure Ulcer(s)  (Voluntary)    Number of Healed Stage 1: 0  Number of Healed Stage 2: 0  Number of Healed Stage 3: 0  Number of Healed Stage 4: 0    Section O. Special Treatments, Procedures, and Programs Discharge  . Influenza Vaccine - Discharge      Date Influenza Vaccine Received (enter date ONLY if received in this unit;  otherwise, skip this entry)      OPTIONAL BRANCH FOR UNPLANNED DISCHARGES  Unplanned Discharge:    Signed by: Adriana Carr Nurse

## 2018-06-08 NOTE — PROGRESS NOTES
Patient Assessment Instrument  Quality Indicators - Discharge    Section GG. Self-Care Performance     Eating: Patient completed the activities by him/herself with no assistance from  a helper.   Oral Hygiene: [place] sets up or cleans up; patient completes activity. [place]  assists only prior to or following the activity.   Toileting Hygiene: : [place] provides verbal cues or touching/steadying  assistance as patient completes activity.   Shower/Bathe Self: [place] provides verbal cues or touching/steadying assistance  as patient completes activity.   Upper Body Dressing: [place] sets up or cleans up; patient completes activity.  [place] assists only prior to or following the activity.   Lower Body Dressing: [place] provides verbal cues or touching/steadying  assistance as patient completes activity.   Putting On/Taking Off Footwear: [place] provides verbal cues or  touching/steadying assistance as patient completes activity.    Section GG. Mobility Performance      Section J. Health Conditions Discharge      Section M. Skin Conditions Discharge      . Current Number of Unhealed Pressure Ulcers      . Worsening in Pressure Ulcer Status Since Admission      . Healed Pressure Ulcer(s)  (Voluntary)      Section O. Special Treatments, Procedures, and Programs Discharge  . Influenza Vaccine - Discharge      Date Influenza Vaccine Received (enter date ONLY if received in this unit;  otherwise, skip this entry)      OPTIONAL BRANCH FOR UNPLANNED DISCHARGES  Unplanned Discharge:    Signed by: Trini Castro Occupational Therapist

## 2018-06-08 NOTE — PLAN OF CARE
Problem: Patient Care Overview  Goal: Plan of Care Review  Outcome: Outcome(s) achieved Date Met: 06/08/18 06/08/18 1430   Patient Care Overview   IRF Plan of Care Review discharged   Coping/Psychosocial   Plan of Care Reviewed With patient;family   OTHER   Outcome Summary Patient discharged from physical rehab this date & education completed as documented in flowsheet.

## 2018-06-08 NOTE — THERAPY DISCHARGE NOTE
Inpatient Rehabilitation - Physical Therapy Treatment Note/Discharge  KATIE Piña     Patient Name: Anette Dunham  : 1940  MRN: [id number]  Today's Date: 2018             Admit Date: 2018    Visit Dx:    ICD-10-CM ICD-9-CM   1. Closed fracture of left hip with routine healing, subsequent encounter S72.002D V54.13     Patient Active Problem List   Diagnosis   • Essential hypertension   • Chest pain   • Paroxysmal atrial fibrillation   • Closed left hip fracture, initial encounter   • Tobacco abuse   • Closed left hip fracture       Physical Therapy Education     Title: PT OT SLP Therapies (Resolved)     Topic: Physical Therapy (Resolved)     Point: Mobility training (Resolved)    Learning Progress Summary     Learner Status Readiness Method Response Comment Documented by    Patient Done Acceptance E,D,H VU  LL 18 1428     Done Acceptance E,TB VU,NR   18 1709     Done Acceptance E,D VU,NR   18 1707     Done Acceptance E,TB VU,NR   18 1652     Done Acceptance E,TB VU,NR   18 1232     Done Acceptance E,TB VU,NR   18 1052    Family Done Acceptance E,D,H VU   18 1428          Point: Home exercise program (Resolved)    Learning Progress Summary     Learner Status Readiness Method Response Comment Documented by    Patient Done Acceptance E,D,H VU  LL 18 1428     Done Acceptance E,TB VU,NR   18 1709     Done Acceptance E,D VU,NR  AG 18 1707     Done Acceptance E,TB VU,NR   18 1652     Done Acceptance E,TB VU,NR   18 1232     Done Acceptance E,TB VU,NR   18 1052    Family Done Acceptance E,D,H VU  LL 18 1428          Point: Body mechanics (Resolved)    Learning Progress Summary     Learner Status Readiness Method Response Comment Documented by    Patient Done Acceptance E,D,H VU  LL 18 1428     Done Acceptance E,TB VU,NR   18 1709     Done Acceptance E,D VU,NR   18 1707     Done  Acceptance E,TB VU,NR  LB 06/05/18 1652     Done Acceptance E,TB VU,NR  LB 06/04/18 1232     Done Acceptance E,TB VU,NR  LB 06/02/18 1052    Family Done Acceptance E,D,H VU  LL 06/08/18 1428          Point: Precautions (Resolved)    Learning Progress Summary     Learner Status Readiness Method Response Comment Documented by    Patient Done Acceptance E,D,H VU  LL 06/08/18 1428     Done Acceptance E,TB VU,NR  LB 06/07/18 1709     Done Acceptance E,D VU,NR  AG 06/06/18 1707     Done Acceptance E,TB VU,NR  LB 06/05/18 1652     Done Acceptance E,TB VU,NR  LB 06/04/18 1232     Done Acceptance E,TB VU,NR  LB 06/02/18 1052    Family Done Acceptance E,D,H VU  LL 06/08/18 1428                      User Key     Initials Effective Dates Name Provider Type Discipline     03/14/16 -  Noris Wooten, PT Physical Therapist PT     04/03/18 -  Lisa Lopez, PT Physical Therapist PT     05/02/16 -  Anastasiya Toussaint PTA Physical Therapy Assistant PT                    PT IRF GOALS     Row Name 06/08/18 1400             Bed Mobility Goal 1 (PT-IRF)    Progress/Outcomes (Bed Mobility Goal 1, PT-IRF) goal met  -LL         Bed Mobility Goal 2 (PT-IRF)    Progress/Outcomes (Bed Mobility Goal 2, PT-IRF) goal met  -LL         Transfer Goal 1 (PT-IRF)    Progress/Outcomes (Transfer Goal 1, PT-IRF) goal met  -LL         Gait/Walking Locomotion Goal 1 (PT-IRF)    Progress/Outcomes (Gait/Walking Locomotion Goal 1, PT-IRF) goal met  -LL        User Key  (r) = Recorded By, (t) = Taken By, (c) = Cosigned By    Initials Name Provider Type    LL Anastasiya Toussaint PTA Physical Therapy Assistant          Therapy Treatment          IRF Treatment Summary     Row Name 06/08/18 1400 06/08/18 1318          Evaluation/Treatment Time and Intent    Subjective Information no complaints  -LL no complaints  -CJ     Document Type discharge treatment  -LL discharge evaluation/summary  -CJ     Mode of Treatment physical therapy  -LL occupational therapy   -CJ     Patient/Family Observations Patient discharged from physical rehab to home with assist of family & home health referral.  Education completed with patient, son & daughter regarding HEP, home safety, patient/caregiver safety, proper use of gait belt, adjustment of RW & hip precautions.  Written materials issued & no questions/concerns voiced.    - Education completed with pt and family re:  ADL status, safety, AE, DME, THP, home program, 24 hr supervision, precautions and D/C concerns.  Verbalized understanding.  -CJ     Recorded by [LL] Anastasiya Toussaint PTA [CJ] Trini Castro, OT     Row Name 06/08/18 1400 06/08/18 1318          Cognition/Psychosocial- PT/OT    Affect/Mental Status (Cognitive) WFL  -LL WFL  -CJ     Orientation Status (Cognition) oriented x 3  -LL oriented x 3  -CJ     Follows Commands (Cognition) WFL  -LL WFL  -CJ     Personal Safety Interventions fall prevention program maintained;gait belt;nonskid shoes/slippers when out of bed  -  --     Recorded by [LL] Anastasiya Toussaint PTA [CJ] Trini Castro, OT     Row Name 06/08/18 1318             Transfer Assessment/Treatment    [place] Level (Toilet Transfer) stand by assist;verbal cues  -      Assistive Device (Toilet Transfer) grab bars/safety frame  -CJ      Recorded by [CJ] Trini Castro OT      Row Name 06/08/18 1400 06/08/18 1318          Safety Issues, Functional Mobility    Safety Issues Affecting Function (Mobility) safety precaution awareness  - safety precaution awareness  -     Comment, Safety Issues/Impairments (Mobility) Precautions: Fall risk, THP, decreased skin integrity  - Precautions:  Fall risk, THP, decreased skin integrity  -CJ     Recorded by [LL] Anastasiya Toussaint PTA [CJ] Trini Castro, OT     Row Name 06/08/18 1318             Bathing Assessment/Treatment    Assistive Device (Bathing) long-handled sponge  -CJ      Comment (Bathing) CGA  -CJ      Recorded by [CJ] Trini Castro OT      Row Name  06/08/18 1318             Upper Body Dressing Assessment/Treatment    Comment (Upper Body Dressing) set up  -CJ      Recorded by [CJ] Trini Castro, OT      Row Name 06/08/18 1318             Lower Body Dressing Assessment/Treatment    Lower Body Dressing Dressing Device reacher;sock-aid  -CJ      Comment (Lower Body Dressing) CGA/[place]  -CJ      Recorded by [CJ] Trini Castro, OT      Row Name 06/08/18 1318             Grooming Assessment/Treatment    Comment (Grooming) set up  -CJ      Recorded by [CJ] Trini Castro, OT      Row Name 06/08/18 1318             Toileting Assessment/Treatment    Assistive Device Use (Toileting) grab bar/safety frame  -CJ      Comment (Toileting) SBA  -CJ      Recorded by [CJ] Trini Castro, OT      Row Name 06/08/18 1318             Self-Feeding Assessment/Treatment    [place] Level (Self-Feeding) conditional independence  -CJ      Recorded by [CJ] Trini Castro, OT      Row Name 06/08/18 1318             General ROM    GENERAL ROM COMMENTS BUE AROM- WFL  -CJ      Recorded by [CJ] Trini Castro, OT      Row Name 06/08/18 1318             MMT (Manual Muscle Testing)    Comment, General Manual Muscle Testing (MMT) Assessment BUE - 4/5 to 4+/5  -CJ      Recorded by [CJ] Trini Castro, OT      Row Name 06/08/18 1318             Vision Assessment/Intervention    Visual Impairment/Limitations corrective lenses full time  -CJ      Recorded by [CJ] Trini Castro, OT      Row Name 06/08/18 1400             Lower Extremity Seated Therapeutic Exercise    Comment, Seated Lower Extremity (Therapeutic Exercise) HEP reviewed & written materials issued; theraband issued  -LL      Recorded by [LL] Anastasiya Toussaint PTA      Row Name 06/08/18 1400 06/08/18 1318          Positioning and Restraints    Pre-Treatment Position --   WC in room with family present  -LL  --     Post Treatment Position wheelchair  -LL wheelchair  -CJ     In Wheelchair sitting;call light within  reach;encouraged to call for assist;with family/caregiver  -LL sitting;call light within reach;encouraged to call for assist;with family/caregiver  -CJ     Recorded by [LL] Anastasiya Toussaint PTA [CJ] Trini Castro OT       User Key  (r) = Recorded By, (t) = Taken By, (c) = Cosigned By    Initials Name Effective Dates     Trini Castro OT 04/03/18 -     LL Anastasiya Toussaint PTA 05/02/16 -           PT Recommendation and Plan  Planned Therapy Interventions (PT Eval): balance training, bed mobility training, gait training, patient/family education, strengthening, transfer training  Plan of Care Reviewed With: patient, family         Time Calculation:         PT Charges     Row Name 06/08/18 1431             Time Calculation    Start Time --   25 min session  -LL        User Key  (r) = Recorded By, (t) = Taken By, (c) = Cosigned By    Initials Name Provider Type     Anastasiya Toussaint PTA Physical Therapy Assistant          Therapy Charges for Today     Code Description Service Date Service Provider Modifiers Qty    28365503600  PT THERAPEUTIC ACT EA 15 MIN 6/8/2018 Anastasiya Toussaint PTA GP 1    80032312233 HC PT THER PROC EA 15 MIN 6/8/2018 Anastasiya Toussaint PTA GP 1               PT Discharge Summary  Reason for Discharge: Discharge from facility  Outcomes Achieved: Able to achieve all goals within established timeline  Discharge Destination: Home with assist, Home with home health    Robin Toussaint PTA  6/8/2018

## 2018-06-08 NOTE — PROGRESS NOTES
Inpatient Rehabilitation Functional Measures Assessment    Functional Measures  DONNY Eating:  DONNY Grooming:  DONNY Bathing:  DONNY Upper Body Dressing:  DONNY Lower Body Dressing:  DONNY Toileting:    DONNY Bladder Management  Level of Assistance:  Frequency/Number of Accidents this Shift:    DONNY Bowel Management  Level of Assistance:  Frequency/Number of Accidents this Shift:    DONNY Bed/Chair/Wheelchair Transfer:  Bed/chair/wheelchair Transfer did not occur.  .  DONNY Toilet Transfer:  DONNY Tub/Shower Transfer:    Previously Documented Mode of Locomotion at Discharge:  DONNY Expected Mode of Locomotion at Discharge:  DONNY Walk/Wheelchair:  WHEELCHAIR OBSERVATION   Wheelchair did not occur.    WALK OBSERVATION   Walking did not occur.  DONNY Stairs:  Stairs did not occur.    DONNY Comprehension:  DONNY Expression:  DONNY Social Interaction:  DONNY Problem Solving:  DONNY Memory:    Therapy Mode Minutes  Occupational Therapy:  Physical Therapy: Individual: 25 minutes.  Speech Language Pathology:    Discharge Functional Goals:    Signed by: Anastasiya Toussaint PTA

## 2018-06-08 NOTE — PROGRESS NOTES
Rehabilitation Nursing  Inpatient Rehabilitation Plan of Care Note    Plan of Care  Copy from POC  Safety    Potential for Injury (Resolved)  Current Status (6/1/2018 10:30:00 AM): potential for falls  Weekly Goal: no falls this week  Discharge Goal: no falls by discharge    Body Systems    Integumentary (Resolved)  Current Status (6/1/2018 10:30:00 AM): potential for skin breakdown  Weekly Goal: no skin breakdown this week  Discharge Goal: no skin breakdown by discharge    Signed by: Adriana Carr, Nurse

## 2018-06-08 NOTE — DISCHARGE SUMMARY
Date of Admission: 6/1/2018  Date of Discharge:  6/8/2018    Discharge Diagnosis:   Left femoral neck fracture status post hemiarthroplasty  Paroxysmal atrial fibrillation  Pneumonia  Hypertension  Postoperative anemia with hemoglobin stable    Hospital Course  Patient is a 78 y.o. female presented with diagnoses as listed above.  During the hospitalization she participated well with PT and OT.  Heart rate was well-controlled throughout the hospital course and by time of discharge she was doing better and felt appropriate for discharge to home    She will require home health services at discharge with physical therapy, occupational therapy and nursing    DME of discharge include rolling walker and bedside commode.  She is unable to use regular toilet due to hip fracture and for safety purposes requires bedside commode.      Pertinent Test Results:   Lab Results   Component Value Date    WBC 7.94 06/05/2018    HGB 9.1 (L) 06/05/2018    HCT 28.1 (L) 06/05/2018    MCV 95.9 (H) 06/05/2018     06/05/2018     Lab Results   Component Value Date    GLUCOSE 84 06/05/2018    CALCIUM 8.3 06/05/2018     06/05/2018    K 4.3 06/05/2018    CO2 25.4 06/05/2018     06/05/2018    BUN 23 (H) 06/05/2018    CREATININE 0.89 06/05/2018    EGFRIFNONA 61 06/05/2018    BCR 25.8 (H) 06/05/2018    ANIONGAP 3.6 06/05/2018     Lab Results   Component Value Date    ALT 21 06/02/2018    AST 33 (H) 06/02/2018       Lab Results   Component Value Date    INR 1.20 (H) 05/27/2018                                                    Physical Exam:     General Appearance:    Alert, cooperative, in no acute distress   Head:    Normocephalic, without obvious abnormality, atraumatic   Eyes:            Lids and lashes normal, conjunctivae and sclerae normal, no   icterus, no pallor, corneas clear, PERRLA   Ears:    Ears appear intact with no abnormalities noted   Throat:   No oral lesions, no thrush, oral mucosa moist   Neck:   No  adenopathy, supple, trachea midline, no thyromegaly, no   carotid bruit, no JVD   Back:     No kyphosis present, no scoliosis present, no skin lesions,      erythema or scars, no tenderness to percussion or                   palpation,   range of motion normal   Lungs:     Clear to auscultation,respirations regular, even and                  unlabored    Heart:    Regular rhythm and normal rate, normal S1 and S2, no            murmur, no gallop, no rub, no click   Chest Wall:    No abnormalities observed   Abdomen:     Normal bowel sounds, no masses, no organomegaly, soft        non-tender, non-distended, no guarding, no rebound                tenderness   Rectal:   Deferred   Extremities:   Moves all extremities well, no edema, no cyanosis, no             redness   Pulses:   Pulses palpable and equal bilaterally   Skin:   No bleeding, bruising or rash   Lymph nodes:   No palpable adenopathy   Neurologic:   Cranial nerves 2 - 12 grossly intact, sensation intact, DTR       present and equal bilaterally       Discharge Disposition  Home or Self Care    Discharge Medications   Anette Dunham   [place] Medication Instructions BAR:[id number]    Printed on:06/08/18 4593   Medication Information                      alendronate (FOSAMAX) 70 MG tablet  Take 70 mg by mouth Every 7 (Seven) Days. PTA: Pt takes each Wednesday             apixaban (ELIQUIS) 5 MG tablet tablet  Take 1 tablet by mouth 2 (two) times a day.             calcium carb-cholecalciferol 600-800 MG-UNIT tablet  Take 1 tablet by mouth 2 (Two) Times a Day With Meals.             doxycycline (MONODOX) 100 MG capsule  Take 1 capsule by mouth Every 12 (Twelve) Hours for 3 doses.             fexofenadine (ALLEGRA) 180 MG tablet  Take 180 mg by mouth Daily.             gabapentin (NEURONTIN) 600 MG tablet  Take 600 mg by mouth 3 (Three) Times a Day.             levothyroxine (SYNTHROID, LEVOTHROID) 50 MCG tablet  Take 50 mcg by mouth Daily.             LORazepam  (ATIVAN) 1 MG tablet  Take 1 mg by mouth Every 8 (Eight) Hours As Needed for Anxiety (1).             metoprolol tartrate (LOPRESSOR) 25 MG tablet  Take 1 tablet by mouth 2 (Two) Times a Day.             oxyCODONE-acetaminophen (PERCOCET) 5-325 MG per tablet  Take 1 tablet by mouth Every 8 (Eight) Hours As Needed for Moderate Pain  for up to 1 day.             ranitidine (ZANTAC) 300 MG capsule  Take 300 mg by mouth 2 (two) times a day.             rOPINIRole (REQUIP) 1 MG tablet  Take 1 tablet by mouth Every Night. Take 1 hour before bedtime.             sennosides-docusate sodium (SENOKOT-S) 8.6-50 MG tablet  Take 2 tablets by mouth 2 (Two) Times a Day.                   Discharge Diet:    Diet Orders (active)     Start     Ordered    06/02/18 1800  Dietary Nutrition Supplements Ensure Enlive  2 Times Daily     Comments:  Lunch and dinner    06/02/18 1344    06/01/18 1126  Diet Regular; Cardiac  Diet Effective Now      06/01/18 1125          Follow-up Appointments  Your Scheduled Appointments     MS. DE SANTIAGO HAS AN APPOINTMENT TO SEE TANA RICE ON: 06-19 @ 10:00  [phone]    MS. DE SANTIAGO HAS AN APPOINTMENT TO SEE DR. ENGEL ON: 06-14 @ 2:00  [phone]               Additional Instructions for the Follow-ups that You Need to Schedule     Ambulatory Referral to Home Health    As directed      Face to Face Visit Date:  6/8/2018    Follow-up Provider for Plan of Care?:  I treated the patient in an acute care facility and will not continue treatment after discharge.    Follow-up Provider:  TANA RICE [[id number]]    Reason/Clinical Findings:  left hip fracture    Describe mobility limitations that make leaving home difficult:  impaired gait, balance and mobility    Nursing/Therapeutic Services Requested:  Physical Therapy Occupational Therapy Skilled Nursing    Skilled nursing orders:  Other    Frequency:  1 Week 1                Samuel Duane Kreis, MD  06/08/18  11:35 AM

## 2018-06-08 NOTE — SIGNIFICANT NOTE
06/08/18 1205   Plan   Plan Contacted [facility]  [phone] per Theresa Caruso about discharge.   will start care on 6-9-18.  Faxed discharge summary with face to face to  [phone].  Spoke to pt, son, and daughter about discharge today with  services.  Pt will return home with son at discharge.  Daughter and family will provide 24 hour assistance/supervision.  Daughter will provide transportation home.     Patient/Family in Agreement with Plan yes   Final Discharge Disposition Code 06 - home with home health care

## 2018-06-08 NOTE — PROGRESS NOTES
Inpatient Rehabilitation Functional Measures Assessment    Functional Measures  DONNY Eating:  Eating Score = 5. Patient is supervision/set-up for eating,  requiring: Opening containers. No assistive devices were required.  DONNY Grooming:  DONNY Bathing:  DONNY Upper Body Dressing:  Patient requires total assistance for gathering  clothes. Patient requires moderate/considerable physical assistance for  threading the right arm through the undergarment. Patient requires  moderate/considerable physical assistance for threading the left arm through the  undergarment. Patient requires moderate/considerable physical assistance for  adjusting undergarment around body and fastening or placing undershirt overhead.  Patient requires moderate/considerable physical assistance for pulling the  undergarment into place or pulling undershirt down over the trunk. Patient  requires moderate/considerable physical assistance for threading the right arm  through the garment (shirt/sweater). Patient requires moderate/considerable  physical assistance for threading the left arm through the garment  (shirt/sweater). Patient requires moderate/considerable physical assistance for  pulling an over-head-garment over head or pulling front-fastening-garment around  back. Patient requires moderate/considerable physical assistance for pulling an  over-head-garment down the trunk or adjusting/fastening together a  front-fastening-garment. Patient performs 55.56 % of upper body dressing tasks.  Upper Body Dressing Score = 3, Moderate Assistance. No assistive devices were  required.  DONNY Lower Body Dressing:  Patient requires total assistance for gathering  clothes. Patient requires maximal/significant physical assistance for holding  clothing and/or threading the right leg through the undergarment. Patient  requires maximal/significant physical assistance for holding clothing and/or  threading the left leg through the undergarment. Patient  requires  moderate/considerable physical assistance for pulling undergarment over hips and  adjusting fasteners. Patient requires moderate/considerable physical assistance  for threading the right leg through the pants/skirt. Patient requires  maximal/significant physical assistance for holding clothing and/or threading  the left leg through the pants/skirt. Patient requires moderate/considerable  physical assistance for pulling pants/skirt over hips and adjusting fasteners.  Patient requires maximal/significant physical assistance for holding clothing  and/or donning and/or doffing right sock. Patient requires maximal/significant  physical assistance for holding clothing and/or donning and/or doffing left  sock. Donning and/or doffing right shoe was not observed for this patient.  Donning and/or doffing left shoe was not observed for this patient. Patient  performs 30.56 % of lower body dressing tasks. Lower Body Dressing Score = 2,  Maximal Assistance. Patient requires the following assistive device(s): Reacher.  Sock aid.  DONNY Toileting:  Toileting Score = 4.  Patient requires minimal assistance for  toileting, such as steadying for balance while cleansing or adjusting clothes.  Patient requires the following assistive device(s): Grab bar. Arm rest of a  specialized seat.    DONNY Bladder Management  Level of Assistance:  Bladder Score = 5.  Patient is supervision/set-up for  bladder management, requiring: Setting out equipment. Stand by assistance.  Patient requires the following assistive device(s):  Absorbent pads.  Frequency/Number of Accidents this Shift:  Bladder accidents this shift:  1 . 1    DONNY Bowel Management  Level of Assistance:  Frequency/Number of Accidents this Shift: Bowel accidents this shift: 0 .  Patient has not had an accident this shift.    DONNY Bed/Chair/Wheelchair Transfer:  Bed/chair/wheelchair Transfer Score = 3.  Patient performs 50-74% of effort and requires moderate assistance  (some  lifting) for transferring to and from the bed/chair/wheelchair. Patient requires  the following assistive device(s): Walker. Bed rails. Elevated bed/surface. Arm  rest. Seating system of wheelchair.  DONNY Toilet Transfer:  Toilet Transfer Score = 3.  Patient performs 50-74% of  effort and requires moderate assistance (some lifting) for transferring to and  from the toilet/commode. Patient requires the following assistive device(s):  Walker. Grab bars. Safety frame/over the toilet. Specialized seat.  DONNY Tub/Shower Transfer:    Previously Documented Mode of Locomotion at Discharge:  DONNY Expected Mode of Locomotion at Discharge:  DONNY Walk/Wheelchair:  DONNY Stairs:    DONNY Comprehension:  DONNY Expression:  DONNY Social Interaction:  DONNY Problem Solving:  DONNY Memory:    Therapy Mode Minutes  Occupational Therapy:  Physical Therapy:  Speech Language Pathology:    Discharge Functional Goals:    Signed by: PARTH Barakat

## 2018-06-08 NOTE — PROGRESS NOTES
Inpatient Rehabilitation Functional Measures Assessment    Functional Measures  DONNY Eating:  DONNY Grooming:  DONNY Bathing:  DONNY Upper Body Dressing:  DONNY Lower Body Dressing:  DONNY Toileting:  Toileting Score = 5.  Patient is supervision/set-up for  toileting, requiring: Stand by assistance. Patient requires the following  assistive device(s): Grab bar.    DONNY Bladder Management  Level of Assistance:  Bladder Score = 5.  Patient is supervision/set-up for  bladder management, requiring: Stand by assistance. No assistive devices were  required.  Frequency/Number of Accidents this Shift:  Bladder accidents this shift:  0 .  Patient has not had an accident this shift.    DONNY Bowel Management  Level of Assistance: Activity was not observed.  Frequency/Number of Accidents this Shift:    DONNY Bed/Chair/Wheelchair Transfer:  Bed/chair/wheelchair Transfer Score = 5.  Patient is supervision/set-up for transferring to and from the  bed/chair/wheelchair, requiring: Stand by assistance. Set up (positioning  equipment, lock brakes and/or adjust foot rest). Patient requires the following  assistive device(s): Bed rails. Grab bars. Arm rest.  DONNY Toilet Transfer:  Toilet Transfer Score = 5.  Patient is supervision/set-up  for transferring to and from the toilet/commode, requiring: Stand by assistance.  Patient requires the following assistive device(s): Walker. Grab bars. Safety  frame/over the toilet.  DONNY Tub/Shower Transfer:  Activity was not observed.    Previously Documented Mode of Locomotion at Discharge:  DONNY Expected Mode of Locomotion at Discharge:  DONNY Walk/Wheelchair:  DONNY Stairs:    DONNY Comprehension:  DONNY Expression:  DONNY Social Interaction:  DONNY Problem Solving:  DONNY Memory:    Therapy Mode Minutes  Occupational Therapy:  Physical Therapy:  Speech Language Pathology:    Discharge Functional Goals:    Signed by: PARTH Leon

## 2018-06-08 NOTE — PROGRESS NOTES
Case Management  Inpatient Rehabilitation Team Conference    Conference Date/Time: 6/5/2018 5:43:21 AM    Team Conference Attendees:  MD Mone Mckeon SW Jessica Bill, RN,   HARRY Gutierrez, PT  Smiley Lemons OT    Demographics            Age: 78Y            Gender: Female    Admission Date: 6/1/2018 9:48:00 AM  Rehabilitation Diagnosis:  s/p left hip hemiarthroplasty  Comorbidities:      Plan of Care  Anticipated Discharge Date/Estimated Length of Stay: 10-14 days  Anticipated Discharge Destination: Community discharge with assistance  Discharge Plan : Pt plans to return home with son at discharge.  Daughters and  son will assist pt at home.  Medical Necessity Expected Level Rationale: Good  Intensity and Duration: an average of 3 hours/5 days per week  Medical Supervision and 24 Hour Rehab Nursing: x  Physical Therapy: x  PT Intensity/Duration: PT 1.5 hours per day/5 days per week  Occupational Therapy: x  OT Intensity/Duration: OT 1.5 hours per day/5 days per week  Social Work: x  Therapeutic Recreation: x  Updated (if changes indicated)    Anticipated Discharge Date/Estimated Length of Stay:   6-8-18      Discharge Plan of Care: Home Health Services Physical Therapy strengthening,  endurance, gait training, transfer training, balance, therapeutic exercise, bed  mobility, home safety 2-3 times per week for 8 weeks Occupational Therapy ADL  re-training, home safety, functional mobility,  home management 2-3 times per  week for 8 weeks Nursing   evaluation   Ambulatory: walker with wheels Commodes: Bedside commode    Based on the patient's medical and functional status, their prognosis and  expected level of functional improvement is: Good      Interdisciplinary Problem/Goals/Status  Body Systems    [RN] Integumentary(Active)  Current Status(06/01/2018): potential for skin breakdown  Weekly Goal(06/29/2018): no skin breakdown this week  Discharge Goal: no skin  breakdown by discharge        Mobility    [PT] Bed/Chair/Wheelchair(Active)  Current Status(06/05/2018): CGA  Weekly Goal(06/12/2018): CGA  Discharge Goal: Sup    [PT] Walk(Active)  Current Status(06/05/2018): amb 90' RW CGA  Weekly Goal(06/12/2018): amb 150' RW SBA  Discharge Goal: amb 300' RW Sup        Safety    [RN] Potential for Injury(Active)  Current Status(06/01/2018): potential for falls  Weekly Goal(06/29/2018): no falls this week  Discharge Goal: no falls by discharge        Self Care    [OT] Dressing (Lower)(Active)  Current Status(06/04/2018): Nestor  Weekly Goal(06/12/2018): CGA  Discharge Goal: SBA    [OT] Toileting(Active)  Current Status(06/04/2018): CGA  Weekly Goal(06/12/2018): SBA  Discharge Goal: SBA    Comments: Pt plans to return home with family providing assistance.    Signed by: PAYTON Jameson    Physician CoSigned By: Gerard Gallegos 06/08/2018 12:36:56

## 2018-06-13 NOTE — PROGRESS NOTES
PPS CMG Coordinator  Inpatient Rehabilitation Discharge    Mode of Locomotion: Walking.    Discharge Against Medical Advice:  No.  Discharge Information  Patient Discharged Alive:  Yes  Discharge Destination/Living Setting: Home with Home Health Services  Diagnosis for Interruption/Death:    Impairment Group: 08.11 Status Post Unilateral Hip Fracture    Comorbidities: Rank Code      Description      1    F41.9     Anxiety disorder, unspecified  2    I25.10    Atherosclerotic heart disease of native                 coronary artery without angina pectoris  3    K21.9     Gastro-esophageal reflux disease without                 esophagitis  4    I10       Essential (primary) hypertension  5    M81.0     Age-related osteoporosis without current                 pathological fracture  6    Z90.49    Acquired absence of other specified parts of                 digestive tract  7    F17.210   Nicotine dependence, cigarettes, uncomplicated  8    Z79.01    Long term (current) use of anticoagulants  9    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  Patient used medications/device this  shift.  6/6/2018 9:38:00 AM  Bladder Score = 3.  Three (3) bladder accidents.  DONNY Bowel Accident: 0  - Accidents.  Patient used medications/device this shift.   6/3/2018 4:26:00 AM  Bowel Score = 6. Patient has no accidents, but uses a device/medications.    Signed by: Chasidy Glass, Supervisor

## 2018-09-05 ENCOUNTER — APPOINTMENT (OUTPATIENT)
Dept: GENERAL RADIOLOGY | Facility: HOSPITAL | Age: 78
End: 2018-09-05

## 2018-09-05 ENCOUNTER — APPOINTMENT (OUTPATIENT)
Dept: ULTRASOUND IMAGING | Facility: HOSPITAL | Age: 78
End: 2018-09-05

## 2018-09-05 ENCOUNTER — HOSPITAL ENCOUNTER (INPATIENT)
Facility: HOSPITAL | Age: 78
LOS: 5 days | Discharge: HOME | End: 2018-09-10
Attending: EMERGENCY MEDICINE | Admitting: INTERNAL MEDICINE

## 2018-09-05 ENCOUNTER — APPOINTMENT (OUTPATIENT)
Dept: CT IMAGING | Facility: HOSPITAL | Age: 78
End: 2018-09-05

## 2018-09-05 DIAGNOSIS — I48.91 ATRIAL FIBRILLATION WITH RVR (HCC): Primary | ICD-10-CM

## 2018-09-05 DIAGNOSIS — J69.0 ASPIRATION PNEUMONIA OF BOTH LUNGS, UNSPECIFIED ASPIRATION PNEUMONIA TYPE, UNSPECIFIED PART OF LUNG (HCC): ICD-10-CM

## 2018-09-05 DIAGNOSIS — I10 ESSENTIAL HYPERTENSION: ICD-10-CM

## 2018-09-05 DIAGNOSIS — R53.1 WEAKNESS: ICD-10-CM

## 2018-09-05 DIAGNOSIS — R53.1 GENERALIZED WEAKNESS: ICD-10-CM

## 2018-09-05 DIAGNOSIS — J18.9 PNEUMONIA OF BOTH LOWER LOBES DUE TO INFECTIOUS ORGANISM: ICD-10-CM

## 2018-09-05 DIAGNOSIS — R79.89 ELEVATED LFTS: ICD-10-CM

## 2018-09-05 LAB
A-A DO2: 41 MMHG (ref 0–300)
ALBUMIN SERPL-MCNC: 3.6 G/DL (ref 3.4–4.8)
ALBUMIN/GLOB SERPL: 0.8 G/DL (ref 1.5–2.5)
ALP SERPL-CCNC: 72 U/L (ref 35–104)
ALT SERPL W P-5'-P-CCNC: 39 U/L (ref 10–36)
ANION GAP SERPL CALCULATED.3IONS-SCNC: 10.6 MMOL/L (ref 3.6–11.2)
ARTERIAL PATENCY WRIST A: ABNORMAL
AST SERPL-CCNC: 57 U/L (ref 10–30)
ATMOSPHERIC PRESS: 732 MMHG
BACTERIA UR QL AUTO: ABNORMAL /HPF
BASE EXCESS BLDA CALC-SCNC: -2.3 MMOL/L
BASOPHILS # BLD AUTO: 0.1 10*3/MM3 (ref 0–0.3)
BASOPHILS NFR BLD AUTO: 1.4 % (ref 0–2)
BDY SITE: ABNORMAL
BILIRUB SERPL-MCNC: 0.3 MG/DL (ref 0.2–1.8)
BILIRUB UR QL STRIP: ABNORMAL
BODY TEMPERATURE: 98.6 C
BUN BLD-MCNC: 20 MG/DL (ref 7–21)
BUN/CREAT SERPL: 14.5 (ref 7–25)
CALCIUM SPEC-SCNC: 9.1 MG/DL (ref 7.7–10)
CHLORIDE SERPL-SCNC: 104 MMOL/L (ref 99–112)
CK MB SERPL-CCNC: <0.18 NG/ML (ref 0–5)
CK MB SERPL-CCNC: <0.18 NG/ML (ref 0–5)
CK MB SERPL-RTO: NORMAL % (ref 0–3)
CK MB SERPL-RTO: NORMAL % (ref 0–3)
CK SERPL-CCNC: 23 U/L (ref 24–173)
CK SERPL-CCNC: 25 U/L (ref 24–173)
CLARITY UR: CLEAR
CO2 SERPL-SCNC: 18.4 MMOL/L (ref 24.3–31.9)
COHGB MFR BLD: 1.3 % (ref 0–5)
COLOR UR: ABNORMAL
CORTIS SERPL-MCNC: 32 MCG/DL
CREAT BLD-MCNC: 1.38 MG/DL (ref 0.43–1.29)
CRP SERPL-MCNC: 4.88 MG/DL (ref 0–0.99)
D-LACTATE SERPL-SCNC: 1.4 MMOL/L (ref 0.5–2)
DEPRECATED RDW RBC AUTO: 42.9 FL (ref 37–54)
EOSINOPHIL # BLD AUTO: 0 10*3/MM3 (ref 0–0.7)
EOSINOPHIL NFR BLD AUTO: 0 % (ref 0–7)
ERYTHROCYTE [DISTWIDTH] IN BLOOD BY AUTOMATED COUNT: 13.4 % (ref 11.5–14.5)
FLUAV AG NPH QL: NEGATIVE
FLUBV AG NPH QL IA: NEGATIVE
GFR SERPL CREATININE-BSD FRML MDRD: 37 ML/MIN/1.73
GLOBULIN UR ELPH-MCNC: 4.3 GM/DL
GLUCOSE BLD-MCNC: 138 MG/DL (ref 70–110)
GLUCOSE UR STRIP-MCNC: NEGATIVE MG/DL
HAV IGM SERPL QL IA: NORMAL
HBV CORE IGM SERPL QL IA: NORMAL
HBV SURFACE AG SERPL QL IA: NORMAL
HCO3 BLDA-SCNC: 19.5 MMOL/L (ref 22–26)
HCT VFR BLD AUTO: 37.4 % (ref 37–47)
HCT VFR BLD CALC: 38 % (ref 37–47)
HCV AB SER DONR QL: NORMAL
HGB BLD-MCNC: 12.6 G/DL (ref 12–16)
HGB BLDA-MCNC: 13 G/DL (ref 12–16)
HGB UR QL STRIP.AUTO: NEGATIVE
HOLD SPECIMEN: NORMAL
HOLD SPECIMEN: NORMAL
HOROWITZ INDEX BLD+IHG-RTO: 21 %
HYALINE CASTS UR QL AUTO: ABNORMAL /LPF
IMM GRANULOCYTES # BLD: 0.02 10*3/MM3 (ref 0–0.03)
IMM GRANULOCYTES NFR BLD: 0.3 % (ref 0–0.5)
KETONES UR QL STRIP: ABNORMAL
LEUKOCYTE ESTERASE UR QL STRIP.AUTO: ABNORMAL
LYMPHOCYTES # BLD AUTO: 2.62 10*3/MM3 (ref 1–3)
LYMPHOCYTES NFR BLD AUTO: 36.4 % (ref 16–46)
MCH RBC QN AUTO: 29.1 PG (ref 27–33)
MCHC RBC AUTO-ENTMCNC: 33.7 G/DL (ref 33–37)
MCV RBC AUTO: 86.4 FL (ref 80–94)
METHGB BLD QL: 0.4 % (ref 0–3)
MODALITY: ABNORMAL
MONOCYTES # BLD AUTO: 0.98 10*3/MM3 (ref 0.1–0.9)
MONOCYTES NFR BLD AUTO: 13.6 % (ref 0–12)
NEUTROPHILS # BLD AUTO: 3.47 10*3/MM3 (ref 1.4–6.5)
NEUTROPHILS NFR BLD AUTO: 48.3 % (ref 40–75)
NITRITE UR QL STRIP: NEGATIVE
OSMOLALITY SERPL CALC.SUM OF ELEC: 271.2 MOSM/KG (ref 273–305)
OXYHGB MFR BLDV: 92.9 % (ref 85–100)
PCO2 BLDA: 25.7 MM HG (ref 35–45)
PH BLDA: 7.5 PH UNITS (ref 7.35–7.45)
PH UR STRIP.AUTO: 5.5 [PH] (ref 5–8)
PLATELET # BLD AUTO: 184 10*3/MM3 (ref 130–400)
PMV BLD AUTO: 12 FL (ref 6–10)
PO2 BLDA: 72.1 MM HG (ref 80–100)
POTASSIUM BLD-SCNC: 3.8 MMOL/L (ref 3.5–5.3)
PROT SERPL-MCNC: 7.9 G/DL (ref 6–8)
PROT UR QL STRIP: ABNORMAL
RBC # BLD AUTO: 4.33 10*6/MM3 (ref 4.2–5.4)
RBC # UR: ABNORMAL /HPF
REF LAB TEST METHOD: ABNORMAL
SAO2 % BLDCOA: 94.5 % (ref 90–100)
SODIUM BLD-SCNC: 133 MMOL/L (ref 135–153)
SP GR UR STRIP: 1.02 (ref 1–1.03)
SQUAMOUS #/AREA URNS HPF: ABNORMAL /HPF
TROPONIN I SERPL-MCNC: 0.01 NG/ML
TROPONIN I SERPL-MCNC: 0.02 NG/ML
TROPONIN I SERPL-MCNC: 0.02 NG/ML
TSH SERPL DL<=0.05 MIU/L-ACNC: 0.96 MIU/ML (ref 0.55–4.78)
TSH SERPL DL<=0.05 MIU/L-ACNC: 1.46 MIU/ML (ref 0.55–4.78)
UROBILINOGEN UR QL STRIP: ABNORMAL
WBC NRBC COR # BLD: 7.19 10*3/MM3 (ref 4.5–12.5)
WBC UR QL AUTO: ABNORMAL /HPF
WHOLE BLOOD HOLD SPECIMEN: NORMAL
WHOLE BLOOD HOLD SPECIMEN: NORMAL

## 2018-09-05 PROCEDURE — 93005 ELECTROCARDIOGRAM TRACING: CPT | Performed by: EMERGENCY MEDICINE

## 2018-09-05 PROCEDURE — 80053 COMPREHEN METABOLIC PANEL: CPT | Performed by: EMERGENCY MEDICINE

## 2018-09-05 PROCEDURE — 99285 EMERGENCY DEPT VISIT HI MDM: CPT

## 2018-09-05 PROCEDURE — 36600 WITHDRAWAL OF ARTERIAL BLOOD: CPT | Performed by: EMERGENCY MEDICINE

## 2018-09-05 PROCEDURE — 82375 ASSAY CARBOXYHB QUANT: CPT | Performed by: EMERGENCY MEDICINE

## 2018-09-05 PROCEDURE — 86738 MYCOPLASMA ANTIBODY: CPT | Performed by: INTERNAL MEDICINE

## 2018-09-05 PROCEDURE — 85025 COMPLETE CBC W/AUTO DIFF WBC: CPT | Performed by: EMERGENCY MEDICINE

## 2018-09-05 PROCEDURE — 99222 1ST HOSP IP/OBS MODERATE 55: CPT | Performed by: NURSE PRACTITIONER

## 2018-09-05 PROCEDURE — 80074 ACUTE HEPATITIS PANEL: CPT | Performed by: PHYSICIAN ASSISTANT

## 2018-09-05 PROCEDURE — 84443 ASSAY THYROID STIM HORMONE: CPT | Performed by: PHYSICIAN ASSISTANT

## 2018-09-05 PROCEDURE — 82805 BLOOD GASES W/O2 SATURATION: CPT | Performed by: EMERGENCY MEDICINE

## 2018-09-05 PROCEDURE — 94640 AIRWAY INHALATION TREATMENT: CPT

## 2018-09-05 PROCEDURE — 71045 X-RAY EXAM CHEST 1 VIEW: CPT | Performed by: RADIOLOGY

## 2018-09-05 PROCEDURE — 84484 ASSAY OF TROPONIN QUANT: CPT | Performed by: EMERGENCY MEDICINE

## 2018-09-05 PROCEDURE — 83605 ASSAY OF LACTIC ACID: CPT | Performed by: PHYSICIAN ASSISTANT

## 2018-09-05 PROCEDURE — 71045 X-RAY EXAM CHEST 1 VIEW: CPT

## 2018-09-05 PROCEDURE — 82553 CREATINE MB FRACTION: CPT | Performed by: PHYSICIAN ASSISTANT

## 2018-09-05 PROCEDURE — 87804 INFLUENZA ASSAY W/OPTIC: CPT | Performed by: EMERGENCY MEDICINE

## 2018-09-05 PROCEDURE — 93010 ELECTROCARDIOGRAM REPORT: CPT | Performed by: INTERNAL MEDICINE

## 2018-09-05 PROCEDURE — 82550 ASSAY OF CK (CPK): CPT | Performed by: PHYSICIAN ASSISTANT

## 2018-09-05 PROCEDURE — 86140 C-REACTIVE PROTEIN: CPT | Performed by: PHYSICIAN ASSISTANT

## 2018-09-05 PROCEDURE — 82533 TOTAL CORTISOL: CPT | Performed by: PHYSICIAN ASSISTANT

## 2018-09-05 PROCEDURE — 84443 ASSAY THYROID STIM HORMONE: CPT | Performed by: INTERNAL MEDICINE

## 2018-09-05 PROCEDURE — 94799 UNLISTED PULMONARY SVC/PX: CPT

## 2018-09-05 PROCEDURE — P9612 CATHETERIZE FOR URINE SPEC: HCPCS

## 2018-09-05 PROCEDURE — 70450 CT HEAD/BRAIN W/O DYE: CPT | Performed by: RADIOLOGY

## 2018-09-05 PROCEDURE — 81001 URINALYSIS AUTO W/SCOPE: CPT | Performed by: EMERGENCY MEDICINE

## 2018-09-05 PROCEDURE — 70450 CT HEAD/BRAIN W/O DYE: CPT

## 2018-09-05 PROCEDURE — 84484 ASSAY OF TROPONIN QUANT: CPT | Performed by: PHYSICIAN ASSISTANT

## 2018-09-05 PROCEDURE — 83050 HGB METHEMOGLOBIN QUAN: CPT | Performed by: EMERGENCY MEDICINE

## 2018-09-05 PROCEDURE — 87040 BLOOD CULTURE FOR BACTERIA: CPT | Performed by: EMERGENCY MEDICINE

## 2018-09-05 PROCEDURE — 99223 1ST HOSP IP/OBS HIGH 75: CPT | Performed by: INTERNAL MEDICINE

## 2018-09-05 RX ORDER — ALBUTEROL SULFATE 2.5 MG/3ML
2.5 SOLUTION RESPIRATORY (INHALATION) EVERY 6 HOURS PRN
Status: CANCELLED | OUTPATIENT
Start: 2018-09-05

## 2018-09-05 RX ORDER — SENNA AND DOCUSATE SODIUM 50; 8.6 MG/1; MG/1
2 TABLET, FILM COATED ORAL 2 TIMES DAILY
Status: DISCONTINUED | OUTPATIENT
Start: 2018-09-05 | End: 2018-09-10 | Stop reason: HOSPADM

## 2018-09-05 RX ORDER — TRAMADOL HYDROCHLORIDE 50 MG/1
50 TABLET ORAL EVERY 8 HOURS PRN
Status: CANCELLED | OUTPATIENT
Start: 2018-09-05

## 2018-09-05 RX ORDER — SODIUM CHLORIDE 9 MG/ML
75 INJECTION, SOLUTION INTRAVENOUS CONTINUOUS
Status: DISCONTINUED | OUTPATIENT
Start: 2018-09-05 | End: 2018-09-08

## 2018-09-05 RX ORDER — FAMOTIDINE 20 MG/1
40 TABLET, FILM COATED ORAL DAILY
Status: DISCONTINUED | OUTPATIENT
Start: 2018-09-05 | End: 2018-09-10 | Stop reason: HOSPADM

## 2018-09-05 RX ORDER — FAMOTIDINE 20 MG/1
40 TABLET, FILM COATED ORAL 2 TIMES DAILY
Status: CANCELLED | OUTPATIENT
Start: 2018-09-05

## 2018-09-05 RX ORDER — IBUPROFEN 800 MG/1
800 TABLET ORAL 2 TIMES DAILY PRN
COMMUNITY
End: 2018-09-10 | Stop reason: HOSPADM

## 2018-09-05 RX ORDER — LORAZEPAM 1 MG/1
1 TABLET ORAL EVERY 8 HOURS PRN
Status: DISCONTINUED | OUTPATIENT
Start: 2018-09-05 | End: 2018-09-10 | Stop reason: HOSPADM

## 2018-09-05 RX ORDER — CETIRIZINE HYDROCHLORIDE 10 MG/1
5 TABLET ORAL DAILY
Status: DISCONTINUED | OUTPATIENT
Start: 2018-09-05 | End: 2018-09-10 | Stop reason: HOSPADM

## 2018-09-05 RX ORDER — SODIUM CHLORIDE 0.9 % (FLUSH) 0.9 %
1-10 SYRINGE (ML) INJECTION AS NEEDED
Status: DISCONTINUED | OUTPATIENT
Start: 2018-09-05 | End: 2018-09-10 | Stop reason: HOSPADM

## 2018-09-05 RX ORDER — IBUPROFEN 800 MG/1
800 TABLET ORAL 2 TIMES DAILY PRN
Status: CANCELLED | OUTPATIENT
Start: 2018-09-05

## 2018-09-05 RX ORDER — LEVOTHYROXINE SODIUM 0.05 MG/1
50 TABLET ORAL DAILY
Status: DISCONTINUED | OUTPATIENT
Start: 2018-09-06 | End: 2018-09-10 | Stop reason: HOSPADM

## 2018-09-05 RX ORDER — GABAPENTIN 400 MG/1
400 CAPSULE ORAL 2 TIMES DAILY
Status: DISCONTINUED | OUTPATIENT
Start: 2018-09-05 | End: 2018-09-10 | Stop reason: HOSPADM

## 2018-09-05 RX ORDER — ALBUTEROL SULFATE 90 UG/1
2 AEROSOL, METERED RESPIRATORY (INHALATION) EVERY 6 HOURS PRN
COMMUNITY

## 2018-09-05 RX ORDER — SENNA AND DOCUSATE SODIUM 50; 8.6 MG/1; MG/1
2 TABLET, FILM COATED ORAL 2 TIMES DAILY
Status: CANCELLED | OUTPATIENT
Start: 2018-09-05

## 2018-09-05 RX ORDER — ROPINIROLE 1 MG/1
1 TABLET, FILM COATED ORAL NIGHTLY PRN
Status: DISCONTINUED | OUTPATIENT
Start: 2018-09-05 | End: 2018-09-10 | Stop reason: HOSPADM

## 2018-09-05 RX ORDER — LEVOTHYROXINE SODIUM 0.05 MG/1
50 TABLET ORAL DAILY
Status: CANCELLED | OUTPATIENT
Start: 2018-09-06

## 2018-09-05 RX ORDER — CETIRIZINE HYDROCHLORIDE 10 MG/1
10 TABLET ORAL DAILY
Status: CANCELLED | OUTPATIENT
Start: 2018-09-05

## 2018-09-05 RX ORDER — SODIUM CHLORIDE 0.9 % (FLUSH) 0.9 %
10 SYRINGE (ML) INJECTION AS NEEDED
Status: DISCONTINUED | OUTPATIENT
Start: 2018-09-05 | End: 2018-09-10 | Stop reason: HOSPADM

## 2018-09-05 RX ORDER — ROPINIROLE 1 MG/1
1 TABLET, FILM COATED ORAL NIGHTLY PRN
Status: CANCELLED | OUTPATIENT
Start: 2018-09-05

## 2018-09-05 RX ORDER — ALBUTEROL SULFATE 2.5 MG/3ML
2.5 SOLUTION RESPIRATORY (INHALATION) EVERY 6 HOURS PRN
Status: DISCONTINUED | OUTPATIENT
Start: 2018-09-05 | End: 2018-09-10 | Stop reason: HOSPADM

## 2018-09-05 RX ORDER — TRAMADOL HYDROCHLORIDE 50 MG/1
50 TABLET ORAL EVERY 8 HOURS PRN
COMMUNITY

## 2018-09-05 RX ORDER — ROPINIROLE 1 MG/1
1 TABLET, FILM COATED ORAL NIGHTLY PRN
COMMUNITY

## 2018-09-05 RX ORDER — GABAPENTIN 400 MG/1
CAPSULE ORAL
Status: COMPLETED
Start: 2018-09-05 | End: 2018-09-06

## 2018-09-05 RX ORDER — GABAPENTIN 400 MG/1
800 CAPSULE ORAL EVERY 8 HOURS SCHEDULED
Status: CANCELLED | OUTPATIENT
Start: 2018-09-05

## 2018-09-05 RX ORDER — TRAMADOL HYDROCHLORIDE 50 MG/1
50 TABLET ORAL EVERY 8 HOURS PRN
Status: DISCONTINUED | OUTPATIENT
Start: 2018-09-05 | End: 2018-09-10 | Stop reason: HOSPADM

## 2018-09-05 RX ORDER — LORAZEPAM 1 MG/1
1 TABLET ORAL EVERY 8 HOURS PRN
Status: CANCELLED | OUTPATIENT
Start: 2018-09-05

## 2018-09-05 RX ORDER — GABAPENTIN 800 MG/1
800 TABLET ORAL 3 TIMES DAILY
COMMUNITY
End: 2018-09-10 | Stop reason: HOSPADM

## 2018-09-05 RX ADMIN — FAMOTIDINE 40 MG: 20 TABLET, FILM COATED ORAL at 20:20

## 2018-09-05 RX ADMIN — APIXABAN 5 MG: 5 TABLET, FILM COATED ORAL at 20:19

## 2018-09-05 RX ADMIN — SENNOSIDES AND DOCUSATE SODIUM 2 TABLET: 8.6; 5 TABLET ORAL at 20:19

## 2018-09-05 RX ADMIN — DILTIAZEM HYDROCHLORIDE 5 MG/HR: 5 INJECTION INTRAVENOUS at 23:47

## 2018-09-05 RX ADMIN — SODIUM CHLORIDE 75 ML/HR: 9 INJECTION, SOLUTION INTRAVENOUS at 16:49

## 2018-09-05 RX ADMIN — GABAPENTIN 400 MG: 400 CAPSULE ORAL at 20:21

## 2018-09-05 RX ADMIN — SODIUM CHLORIDE 500 ML: 9 INJECTION, SOLUTION INTRAVENOUS at 11:04

## 2018-09-05 RX ADMIN — METOPROLOL TARTRATE 25 MG: 25 TABLET, FILM COATED ORAL at 20:19

## 2018-09-05 RX ADMIN — Medication 1 TABLET: at 20:20

## 2018-09-05 RX ADMIN — ALBUTEROL SULFATE 2.5 MG: 2.5 SOLUTION RESPIRATORY (INHALATION) at 18:53

## 2018-09-05 RX ADMIN — CETIRIZINE HYDROCHLORIDE 5 MG: 10 TABLET, FILM COATED ORAL at 20:20

## 2018-09-05 RX ADMIN — DILTIAZEM HYDROCHLORIDE 5 MG/HR: 5 INJECTION INTRAVENOUS at 10:09

## 2018-09-05 RX ADMIN — SODIUM CHLORIDE 500 ML: 9 INJECTION, SOLUTION INTRAVENOUS at 15:11

## 2018-09-05 RX ADMIN — DILTIAZEM HYDROCHLORIDE 5 MG/HR: 5 INJECTION INTRAVENOUS at 16:48

## 2018-09-05 NOTE — PLAN OF CARE
Problem: Patient Care Overview  Goal: Plan of Care Review  Outcome: Ongoing (interventions implemented as appropriate)    Goal: Individualization and Mutuality  Outcome: Ongoing (interventions implemented as appropriate)    Goal: Discharge Needs Assessment  Outcome: Ongoing (interventions implemented as appropriate)    Goal: Interprofessional Rounds/Family Conf  Outcome: Ongoing (interventions implemented as appropriate)      Problem: Fall Risk (Adult)  Goal: Identify Related Risk Factors and Signs and Symptoms  Outcome: Ongoing (interventions implemented as appropriate)    Goal: Absence of Fall  Outcome: Ongoing (interventions implemented as appropriate)      Problem: Skin Injury Risk (Adult)  Goal: Identify Related Risk Factors and Signs and Symptoms  Outcome: Ongoing (interventions implemented as appropriate)    Goal: Skin Health and Integrity  Outcome: Ongoing (interventions implemented as appropriate)      Problem: Arrhythmia/Dysrhythmia (Symptomatic) (Adult)  Goal: Signs and Symptoms of Listed Potential Problems Will be Absent, Minimized or Managed (Arrhythmia/Dysrhythmia)  Outcome: Ongoing (interventions implemented as appropriate)

## 2018-09-05 NOTE — H&P
"    [facility] Medicine Services  History & Physical    Patient Identification:  Name:  Anette Dunham  Age:  78 y.o.  Sex:  female  :  1940  MRN:  [id number]   Visit Number:  [id number]  Primary Care Physician:  Juliann Santana APRN    I have seen the patient in conjunction with Ely Tijerina PA-C and I agree with the following statements:    Subjective      Assisted by: Adriana MCDONALD      Chief complaint: Weakness, palpitations    History of presenting illness:      Anette Dunham is a 78 y.o. female presented to the ED at this facility on this date with weakness and recent falls.  She was found upon presentation to be in atrial fibrillation with RVR.  She has known history of atrial fibrillation with chronic anticoagulation with Eliquis.  She takes Metoprolol 25 mg BID for her heart rate.  She did not take her dose this a.m.  She states yesterday she lost her balance and fell through a walker.  The only pain she is having from this fall is a very mild intensity \"soreness\" in character when she pushes on her chest.  She's had no associated cough or fever.  She reports that she was weak and her \"daughter's baby come to the hospital\".  Son-in-law who is here states that she was having trouble getting up and she was unstable in her gait.  She reports she has been feeling weak since Saturday (18) throughout her body, though worse in her lower extremities. She has sustained a few falls since that time but denies syncopal episodes or bruising.  She reports she has felt her heart intermittent beating rapidly since that time.  She denies chest pain during my examination.  She reports some dyspnea with heart beating out of her chest.  In the ED, she was found to have atrial fibrillation with RVR in the 150s with initially borderline blood pressures in the low 100s to high 90s systolic and 50s-70s diastolic.  She received a 20mg bolus of cardizem followed by cardizem gtt at 5mg/hr with " improvement of rate into the 90-100s during my bedside evaluation.   Mrs. Dunham denies any recent fever, chills, dysuria, or hematuria. She did cough once during examination but reports it to be new and only since getting cold in her ED room.   She denies nausea, vomiting, diarrhea.  She has been admitted to PCU for further evaluation and treatment.     Mrs. Dunham's primary cardiologist, per her account, is Dr. Sellers.      ---------------------------------------------------------------------------------------------------------------------   Review of Systems   Constitutional: Positive for fatigue. Negative for activity change, appetite change, chills and fever.   HENT: Negative for congestion and drooling.    Eyes: Negative for pain and redness.   Respiratory: Positive for shortness of breath. Negative for cough and wheezing.    Cardiovascular: Positive for palpitations. Negative for chest pain and leg swelling.   Gastrointestinal: Negative for abdominal distention, diarrhea and nausea.   Endocrine: Negative for cold intolerance and heat intolerance.   Genitourinary: Negative for difficulty urinating and dyspareunia.   Musculoskeletal: Negative for arthralgias and gait problem.   Skin: Negative for color change and pallor.   Allergic/Immunologic: Negative for environmental allergies and food allergies.   Neurological: Negative for dizziness and headaches.   Psychiatric/Behavioral: Negative for agitation and confusion.      ---------------------------------------------------------------------------------------------------------------------   Past Medical History:   Diagnosis Date   • Anxiety    • Arrhythmia    • Coronary artery disease    • Disease of thyroid gland    • Dysfunctional gallbladder    • GERD (gastroesophageal reflux disease)    • Hypertension    • Osteoporosis      Past Surgical History:   Procedure Laterality Date   • ABDOMINAL SURGERY     • CHOLECYSTECTOMY OPEN     • HIP HEMIARTHROPLASTY Left  5/29/2018    Procedure: LEFT HIP BIPOLAR HEMIARTHROPLASTY;  Surgeon: Trevon Arriaga MD;  Location: [facility];  Service: Orthopedics     Family History   Problem Relation Age of Onset   • Heart disease Father      Social History     Social History   • Marital status:      Social History Main Topics   • Smoking status: Current Every Day Smoker     Packs/day: 0.25     Years: 15.00     Types: Cigarettes   • Smokeless tobacco: Current User     Types: Snuff      Comment: snuff for 60 years    • Alcohol use No   • Drug use: No   • Sexual activity: Defer     Other Topics Concern   • Not on file     ---------------------------------------------------------------------------------------------------------------------   Allergies:  Patient has no known allergies.  ---------------------------------------------------------------------------------------------------------------------   Home medications:    Medications below are reported home medications pulling from within the system; at this time, these medications have not been reconciled unless otherwise specified and are in the verification process for further verifcation as current home medications.    (Not in a hospital admission)    Hospital Scheduled Meds:    sodium chloride 500 mL Intravenous Once       diltiaZEM 5 mg/hr Last Rate: 5 mg/hr (09/05/18 1401)       Current listed hospital scheduled medications may not yet reflect those currently placed in orders that are signed and held awaiting patient's arrival to floor.   ---------------------------------------------------------------------------------------------------------------------     Objective     Vital Signs:  Temp:  [98.4 °F (36.9 °C)] 98.4 °F (36.9 °C)  Heart Rate:  [] 102  Resp:  [8-18] 16  BP: ()/(55-77) 103/65  1    09/05/18  0930   Weight: 68.5 kg (151 lb)     Body mass index is 25.92  kg/m².  ---------------------------------------------------------------------------------------------------------------------       Physical Exam    Physical Exam:    Constitutional: Awake, alert, well-nourished, well-developed, nontoxic  HEENT: Normocephalic, atraumatic. PERRLA, EOMI, sclerae anicteric, conjunctivae without injection, mucous membranes moist, no oropharyngeal erythema appreciated.    Neck: Supple. No JVD appreciated.  No carotid bruits heard  Pulmonary: Clear to auscultation bilaterally, nonlabored respirations, no wheezing appreciated.   CV: Mildly tachycardic Irregularly, irregular rhythm. Normal s1/s2 with no murmur appreciated.  Current rate in the low 100s  Abdominal: Soft, No distension or tenderness appreciated. Bowel sounds appreciated in all four quadrants, no guarding or rebound  Musculoskeletal: No erythema or swelling to joints of upper and lower extremities   Extremities: No clubbing, cyanosis, or edema  Vascular: 3+ DP/PT pulses bilaterally, warm extremities  Skin: Skin is warm and dry. No truncal or extremity rash on limited exam  Neuro: Alert and oriented to person, place, and time. Strength symmetric in all extremities, speech clear, sensation intact to fine touch throughout.  No slurred speech.  No facial droop.    Psych: Appropriate mood and affect.  Judgement and though content appropriate.       ---------------------------------------------------------------------------------------------------------------------  EKG:          Telemetry:  Afib in the 90s-100s    I have personally looked at both the EKG and the telemetry strips.  JJ traore RVR, reviewed ---------------------------------------------------------------------------------------------------------------------     Results from last 7 days  Lab Units 09/05/18  1010   WBC 10*3/mm3 7.19   HEMOGLOBIN g/dL 12.6   HEMATOCRIT % 37.4   MCV fL 86.4   MCHC g/dL 33.7   PLATELETS 10*3/mm3 184       Results from last 7 days  Lab Units  09/05/18  1055   PH, ARTERIAL pH units 7.497*   PO2 ART mm Hg 72.1*   PCO2, ARTERIAL mm Hg 25.7*   HCO3 ART mmol/L 19.5*       Results from last 7 days  Lab Units 09/05/18  1010   SODIUM mmol/L 133*   POTASSIUM mmol/L 3.8   CHLORIDE mmol/L 104   CO2 mmol/L 18.4*   BUN mg/dL 20   CREATININE mg/dL 1.38*   EGFR IF NONAFRICN AM mL/min/1.73 37*   CALCIUM mg/dL 9.1   GLUCOSE mg/dL 138*   ALBUMIN g/dL 3.60   BILIRUBIN mg/dL 0.3   ALK PHOS U/L 72   AST (SGOT) U/L 57*   ALT (SGPT) U/L 39*   Estimated Creatinine Clearance: 31.4 mL/min (A) (by C-G formula based on SCr of 1.38 mg/dL (H)).  No results found for: AMMONIA    Results from last 7 days  Lab Units 09/05/18  1010   TROPONIN I ng/mL 0.020         No results found for: HGBA1C  Lab Results   Component Value Date    TSH 3.373 05/31/2017     No results found for: PREGTESTUR, PREGSERUM, HCG, HCGQUANT  Pain Management Panel     There is no flowsheet data to display.                        ---------------------------------------------------------------------------------------------------------------------  Imaging Results (last 7 days)     Procedure Component Value Units Date/Time    XR Chest 1 View [[id number]] Collected:  09/05/18 1134     Updated:  09/05/18 1204    Narrative:       EXAMINATION:  XR CHEST 1 VW-      CLINICAL INDICATION:     Short of breath     TECHNIQUE:  XR CHEST 1 VW-       COMPARISON: 6/1/2018      FINDINGS:   Coarsened interstitial markings.   Heart size is stable.   No pneumothorax.   Probably tiny pleural effusions.   Bony and soft tissue structures are unremarkable.            Impression:       Stable radiographic appearance of the chest.     This report was finalized on 9/5/2018 11:35 AM by Dr. Sae Montoya MD.             Cultures:       Last echocardiogram:  Results for orders placed during the hospital encounter of 08/17/16   Adult transthoracic echo complete    Narrative · All left ventricular wall segments contract normally.  · Left ventricular  function is normal. Estimated EF = 50%.  · Left atrial cavity size is mild-to-moderately dilated.          CHADS-VASc Risk Assessment            4       Total Score        1 Hypertension    2 Age >/= 75    1 Sex: Female              Chest x-ray have reviewed and show some chronic type findings but nothing acute, CT without acute findings as well.  This is pending radiology report.  ---------------------------------------------------------------------------------------------------------------------  Assessment / Plan       Assessment and Plan:    -Atrial fibrillation with RVR:  Cardizem gtt has been ordered with titration and holding for monitoring of systolic BP.  Given borderline blood pressures in ED with fall to the 80s/50s during my examination, I did order another 500ml bolus fo NS to be followed by NS at 75cc/hr. I discussed 500ml bolus with ED RN Mary.  Last EF was 50% in 2016 per review of prior echocardiogram report. Cardiology consultation has been placed in addition to new echocardiogram.  Serial cardiac isoenzymes have been ordered.  TSH is ordered and pending.  [id number] a minimum of 4.   Continue home Eliquis for now with fall precautions.  There was a thought per old note by  possibility of cardioversion but she was started at that point on amiodarone and did convert to sinus without cardioversion.  Patient also has a stress test that is in our records March 2017 without significant findings.    -Hypotension, possibly iatrogenic in combination with a rapid rate:  Initially blood pressures were borderline low and then worsened following Cardizem bolus and gtt.   Repeat bolus of IV NS followed by continuous NS has been ordered as outlined above. Will closely monitor.  WBC unremarkable. Blood cultures obtained in ED. Will add lactic acid and C-RP.  Urinalysis and CXR are unremarkable.  Random cortisol level has been added.  Mrs. Dnuham tells me that prior blood pressure medications (she  is unsure of which medication) have been discontinued int he past 2/2 to hypotension.  Upon review of prior admission for hip fracture and following discharge from rehab, this appears to be low dose Amlodipine that was previously discontinued.       -Acute kidney injury: Will continue to hydrate and follow creatinine. Will hold nephrotoxic medications. Mild hypotension could likely further worsen GRISELDA.     -Dehydration: Will continue to hydrate.      -Recent falls:  Possibly 2/2 to dehydration and atrial fibrillation with RVR; however, Mrs. Dunham was also experiencing falls in June 2018 when admitted with mechanical fall with hip fracture. CT head has been ordered in addition to US carotid for further evaluation.   Fall precautions have been ordered. Home medication regimen does previously contain sedating medication of Ativan 1mg TID.      -Hypo osmolar hyponatremia: NS as previously outlined within this document.  Will follow sodium leves daily for now unless she has a significant drop.      -Mild transaminitis: Acute hepatitis panel added.  Will continue home atorvastatin upon reconciliation availability.      -Falls and functional decline, once patient is improved with her blood pressure atrial fibrillation we'll begin physical therapy again.  Patient had a CT of her head done because she is on anticoagulant and with this fall.  Pending official radiology report I see significant atrophy but no acute disease.    -Hypothyroidism, adequately replaced     -DVT prophylaxis:Eliquis will serve  -GI prophylaxis: Will add Pepcid 20mg daily.   -Activity: Fall precautions.  Bedrest at present 2/2 to Afib with RVR and borderline hypotension    -Expected length of stay: INPATIENT status due to the need for care which can only be reasonably provided in an hospital setting such as aggressive/expedited ancillary services and/or consultation services, the necessity for IV medications, close physician monitoring and/or the  possible need for procedures.  In such, I feel patient’s risk for adverse outcomes and need for care warrant INPATIENT evaluation and predict the patient’s care encounter to likely last beyond 2 midnights.    (Cortisol level 32 not suggestive of adrenal insufficiency)      Mrs. Dunham is high risk 2/2 to atrial fibrillation with RVR requiring cardizem gtt with close monitoring     Ely Tijerina PA-C  09/05/18  3:21 PM  ---------------------------------------------------------------------------------------------------------------------

## 2018-09-05 NOTE — ED PROVIDER NOTES
Subjective   78-year-old white female presents with weakness.  Patient describes generalized weakness and leg weakness.  She states that she's had 3 falls in the last week.  She hit her chest on her walker and her last fall 2 days ago and complains of some right-sided anterior chest soreness.  She says her heart has been racing over the past 4 days.  She has a history of atrial fibrillation and is on file course.  She says she has been compliant with her meds.  She also describes loss of appetite, shortness of breath and is unclear whether she's had a subjective fever.  She denied any nausea, vomiting, diaphoresis, dysuria or other complaints.            Review of Systems   All other systems reviewed and are negative.      Past Medical History:   Diagnosis Date   • Anxiety    • Arrhythmia    • Coronary artery disease    • Disease of thyroid gland    • Dysfunctional gallbladder    • GERD (gastroesophageal reflux disease)    • Hypertension    • Osteoporosis        No Known Allergies    Past Surgical History:   Procedure Laterality Date   • ABDOMINAL SURGERY     • CHOLECYSTECTOMY OPEN     • HIP HEMIARTHROPLASTY Left 5/29/2018    Procedure: LEFT HIP BIPOLAR HEMIARTHROPLASTY;  Surgeon: Trevon Arriaga MD;  Location: [facility];  Service: Orthopedics       Family History   Problem Relation Age of Onset   • Heart disease Father        Social History     Social History   • Marital status:      Social History Main Topics   • Smoking status: Current Every Day Smoker     Packs/day: 0.25     Years: 15.00     Types: Cigarettes   • Smokeless tobacco: Current User     Types: Snuff      Comment: snuff for 60 years    • Alcohol use No   • Drug use: No   • Sexual activity: Defer     Other Topics Concern   • Not on file           Objective   Physical Exam   Constitutional: She is oriented to person, place, and time. She appears well-developed and well-nourished.   HENT:   Head: Normocephalic and atraumatic.   Cardiovascular:  Normal rate, regular rhythm and normal heart sounds.  Exam reveals no gallop and no friction rub.    No murmur heard.  Pulmonary/Chest: Effort normal and breath sounds normal. No respiratory distress. She has no wheezes. She has no rales. She exhibits tenderness (Mild right pectoral.).   Abdominal: Soft. Bowel sounds are normal. She exhibits distension. There is tenderness.   Musculoskeletal: Normal range of motion. She exhibits no edema.   Neurological: She is alert and oriented to person, place, and time. She has normal strength.   Skin: Skin is warm and dry.   Psychiatric: She has a normal mood and affect.   Nursing note and vitals reviewed.      Procedures  Results for orders placed or performed during the hospital encounter of 09/05/18   Influenza Antigen, Rapid - Swab, Nasopharynx   Result Value Ref Range    Influenza A Ag, EIA Negative Negative    Influenza B Ag, EIA Negative Negative   Comprehensive Metabolic Panel   Result Value Ref Range    Glucose 138 (H) 70 - 110 mg/dL    BUN 20 7 - 21 mg/dL    Creatinine 1.38 (H) 0.43 - 1.29 mg/dL    Sodium 133 (L) 135 - 153 mmol/L    Potassium 3.8 3.5 - 5.3 mmol/L    Chloride 104 99 - 112 mmol/L    CO2 18.4 (L) 24.3 - 31.9 mmol/L    Calcium 9.1 7.7 - 10.0 mg/dL    Total Protein 7.9 6.0 - 8.0 g/dL    Albumin 3.60 3.40 - 4.80 g/dL    ALT (SGPT) 39 (H) 10 - 36 U/L    AST (SGOT) 57 (H) 10 - 30 U/L    Alkaline Phosphatase 72 35 - 104 U/L    Total Bilirubin 0.3 0.2 - 1.8 mg/dL    eGFR Non African Amer 37 (L) >60 mL/min/1.73    Globulin 4.3 gm/dL    A/G Ratio 0.8 (L) 1.5 - 2.5 g/dL    BUN/Creatinine Ratio 14.5 7.0 - 25.0    Anion Gap 10.6 3.6 - 11.2 mmol/L   Urinalysis With Culture If Indicated - Urine, Clean Catch   Result Value Ref Range    Color, UA Dark Yellow (A) Yellow, Straw    Appearance, UA Clear Clear    pH, UA 5.5 5.0 - 8.0    Specific Gravity, UA 1.019 1.005 - 1.030    Glucose, UA Negative Negative    Ketones, UA 15 mg/dL (1+) (A) Negative    Bilirubin, UA Small  (1+) (A) Negative    Blood, UA Negative Negative    Protein, UA 30 mg/dL (1+) (A) Negative    Leuk Esterase, UA Trace (A) Negative    Nitrite, UA Negative Negative    Urobilinogen, UA 0.2 E.U./dL 0.2 - 1.0 E.U./dL   Blood Gas, Arterial   Result Value Ref Range    Site Arterial: left brachial     Leif's Test N/A     pH, Arterial 7.497 (H) 7.350 - 7.450 pH units    pCO2, Arterial 25.7 (C) 35.0 - 45.0 mm Hg    pO2, Arterial 72.1 (L) 80.0 - 100.0 mm Hg    HCO3, Arterial 19.5 (C) 22.0 - 26.0 mmol/L    Base Excess, Arterial -2.3 mmol/L    O2 Saturation, Arterial 94.5 90.0 - 100.0 %    Hemoglobin, Blood Gas 13.0 12 - 16 g/dL    Hematocrit, Blood Gas 38.0 37.0 - 47.0 %    Oxyhemoglobin 92.9 85 - 100 %    Methemoglobin 0.40 0.00 - 3.00 %    Carboxyhemoglobin 1.3 0 - 5 %    A-a Gradiant 41.0 0.0 - 300.0 mmHg    Temperature 98.6 C    Barometric Pressure for Blood Gas 732 mmHg    Modality Room Air     FIO2 21 %   Troponin   Result Value Ref Range    Troponin I 0.020 <=0.040 ng/mL   CBC Auto Differential   Result Value Ref Range    WBC 7.19 4.50 - 12.50 10*3/mm3    RBC 4.33 4.20 - 5.40 10*6/mm3    Hemoglobin 12.6 12.0 - 16.0 g/dL    Hematocrit 37.4 37.0 - 47.0 %    MCV 86.4 80.0 - 94.0 fL    MCH 29.1 27.0 - 33.0 pg    MCHC 33.7 33.0 - 37.0 g/dL    RDW 13.4 11.5 - 14.5 %    RDW-SD 42.9 37.0 - 54.0 fl    MPV 12.0 (H) 6.0 - 10.0 fL    Platelets 184 130 - 400 10*3/mm3    Neutrophil % 48.3 40.0 - 75.0 %    Lymphocyte % 36.4 16.0 - 46.0 %    Monocyte % 13.6 (H) 0.0 - 12.0 %    Eosinophil % 0.0 0.0 - 7.0 %    Basophil % 1.4 0.0 - 2.0 %    Immature Grans % 0.3 0.0 - 0.5 %    Neutrophils, Absolute 3.47 1.40 - 6.50 10*3/mm3    Lymphocytes, Absolute 2.62 1.00 - 3.00 10*3/mm3    Monocytes, Absolute 0.98 (H) 0.10 - 0.90 10*3/mm3    Eosinophils, Absolute 0.00 0.00 - 0.70 10*3/mm3    Basophils, Absolute 0.10 0.00 - 0.30 10*3/mm3    Immature Grans, Absolute 0.02 0.00 - 0.03 10*3/mm3   Osmolality, Calculated   Result Value Ref Range     Osmolality Calc 271.2 (L) 273.0 - 305.0 mOsm/kg   Urinalysis, Microscopic Only - Urine, Clean Catch   Result Value Ref Range    RBC, UA 3-5 (A) None Seen, 0-2 /HPF    WBC, UA 0-2 None Seen, 0-2 /HPF    Bacteria, UA None Seen None Seen /HPF    Squamous Epithelial Cells, UA 0-2 None Seen, 0-2 /HPF    Hyaline Casts, UA 7-12 None Seen /LPF    Methodology Automated Microscopy    Light Blue Top   Result Value Ref Range    Extra Tube hold for add-on    Green Top (Gel)   Result Value Ref Range    Extra Tube Hold for add-ons.    Lavender Top   Result Value Ref Range    Extra Tube hold for add-on    Gold Top - SST   Result Value Ref Range    Extra Tube Hold for add-ons.      Xr Chest 1 View    Result Date: 9/5/2018  Narrative: EXAMINATION:  XR CHEST 1 VW-  CLINICAL INDICATION:     Short of breath  TECHNIQUE:  XR CHEST 1 VW-   COMPARISON: 6/1/2018  FINDINGS: Coarsened interstitial markings. Heart size is stable. No pneumothorax. Probably tiny pleural effusions. Bony and soft tissue structures are unremarkable.         Impression: Stable radiographic appearance of the chest.  This report was finalized on 9/5/2018 11:35 AM by Dr. Sae Montoya MD.               ED Course  ED Course as of Sep 05 1423   Wed Sep 05, 2018   1339 Atrial fibrillation with RVR.  Rate 150.  Left axis deviation.  Q waves in lead V1 and V2.  This elevation 1 mm in lead V3, probably rate dependent. ECG 12 Lead [BC]   1414 Discussed with Dr. Miranda.  Patient admitted, see orders.  [BC]      ED Course User Index  [BC] Enrique Martin MD                  MDM  Number of Diagnoses or Management Options  Atrial fibrillation with RVR (CMS/HCC):   Generalized weakness:      Amount and/or Complexity of Data Reviewed  Clinical lab tests: reviewed  Tests in the radiology section of CPT®: reviewed  Tests in the medicine section of CPT®: reviewed  Decide to obtain previous medical records or to obtain history from someone other than the patient: yes    Risk of  Complications, Morbidity, and/or Mortality  Presenting problems: high  Diagnostic procedures: moderate  Management options: high          Final diagnoses:   Atrial fibrillation with RVR (CMS/HCC)   Generalized weakness            Enrique Martin MD  09/05/18 6319

## 2018-09-05 NOTE — CONSULTS
"Inpatient Cardiology Consult  Consult performed by: AMOS LOPEZ  Consult ordered by: TANO HERNANDEZ        Date of Admit: 9/5/2018  Date of Consult: 09/05/18  Referred by:  NISA Goldman  Anette Dunham  1940    Consulting Physician: Amos Lopez MD    Cardiology consultation    Reason for consultation:  Atrial fibrillation with RVR      History of Present Illness    Subjective     Chief Complaint   Patient presents with   • Extremity Weakness   • Fall       Anette Dunham is a 78 y.o. female with past medical history significant for paroxysmal atrial fibrillation, essential hypertension, and tobacco use.  She presented to the ED on 9/5/2018 with complaints of generalized weakness and recent falls.  Upon presentation she was noted to be in atrial fibrillation with RVR, initial heart rate was in the 150s with borderline blood pressures in the low 100s to high 90s systolically and 50s to 70s diastolically.  She is chronically anticoagulated on Eliquis.  Rate control with metoprolol 25 mg twice a day.  She was initially started on a Cardizem drip after Cardizem bolus of 20 mg.  She reported that she had been feeling weak since 9/1/2018 and sustained a few falls during that time.  She denies syncope or near syncopal episode.  She denies any trauma.  She was admitted for further evaluation and management.  Cardiology was consulted due to the atrial fibrillation with RVR.    Ms. Dunham is resting comfortably in bed.  She has just returned from CT of the head.  Her family is at bedside.  She reports that she has had a \"couple of weeks\" of generalized weakness.  She reports some palpitations during that time.  Apparently she has also complained of sharp chest pain in the last 2 or 3 days according to her family.  Ms. Dunham does not recall how long the chest pain lasted, nor does she remember if it radiated to her arm or neck.  She cannot recall if she had any associated shortness of " air, palpitations or nausea.  She does report that she has not had much of an appetite in the last several days and family members state that she has not been eating well.    Cardiac risk factors:hypertension and smoking age    Last Echo: 8/17/2016  Interpretation Summary     · All left ventricular wall segments contract normally.  · Left ventricular function is normal. Estimated EF = 50%.  · Left atrial cavity size is mild-to-moderately dilated.         Last Stress: 3/7/2017  Interpretation Summary     · Findings consistent with an abnormal ECG stress test.  · Left ventricular ejection fraction is hyperdynamic (Calculated EF > 70%).  · Myocardial perfusion imaging indicates a normal myocardial perfusion study with no evidence of ischemia.  · Impressions are consistent with a low risk study.       Past Medical History:   Diagnosis Date   • Anxiety    • Arrhythmia    • Atrial fibrillation (CMS/HCC)    • Coronary artery disease    • Disease of thyroid gland    • Dysfunctional gallbladder    • GERD (gastroesophageal reflux disease)    • Hypertension    • Osteoporosis      Past Surgical History:   Procedure Laterality Date   • ABDOMINAL SURGERY     • CHOLECYSTECTOMY OPEN     • HIP HEMIARTHROPLASTY Left 5/29/2018    Procedure: LEFT HIP BIPOLAR HEMIARTHROPLASTY;  Surgeon: Trevon Arriaga MD;  Location: [facility];  Service: Orthopedics     Family History   Problem Relation Age of Onset   • Heart disease Father      Social History   Substance Use Topics   • Smoking status: Current Every Day Smoker     Packs/day: 0.25     Years: 15.00     Types: Cigarettes   • Smokeless tobacco: Current User     Types: Snuff      Comment: snuff for 60 years    • Alcohol use No     Prescriptions Prior to Admission   Medication Sig Dispense Refill Last Dose   • alendronate (FOSAMAX) 70 MG tablet Take 70 mg by mouth Every 7 (Seven) Days. Prior to [facility] Admission, Patient was on: takes on wednesdays 8/29/2018 at Unknown time   • apixaban  (ELIQUIS) 5 MG tablet tablet Take 1 tablet by mouth 2 (two) times a day. 60 tablet 5 9/4/2018 at Unknown time   • calcium carb-cholecalciferol 600-800 MG-UNIT tablet Take 1 tablet by mouth 2 (Two) Times a Day With Meals. 60 tablet 0 9/4/2018 at Unknown time   • fexofenadine (ALLEGRA) 180 MG tablet Take 180 mg by mouth Daily.   Past Month at Unknown time   • gabapentin (NEURONTIN) 800 MG tablet Take 800 mg by mouth 3 (Three) Times a Day.   9/4/2018 at Unknown time   • levothyroxine (SYNTHROID, LEVOTHROID) 50 MCG tablet Take 50 mcg by mouth Daily.   9/5/2018 at 0700   • LORazepam (ATIVAN) 1 MG tablet Take 1 mg by mouth Every 8 (Eight) Hours As Needed for Anxiety (1).   9/4/2018 at Unknown time   • metoprolol tartrate (LOPRESSOR) 25 MG tablet Take 1 tablet by mouth 2 (Two) Times a Day. 180 tablet 3 9/4/2018 at Unknown time   • ranitidine (ZANTAC) 300 MG capsule Take 300 mg by mouth 2 (two) times a day.   9/4/2018 at Unknown time   • sennosides-docusate sodium (SENOKOT-S) 8.6-50 MG tablet Take 2 tablets by mouth 2 (Two) Times a Day. 120 tablet 0 9/4/2018 at Unknown time   • albuterol (PROVENTIL HFA;VENTOLIN HFA) 108 (90 Base) MCG/ACT inhaler Inhale 2 puffs Every 6 (Six) Hours As Needed for Wheezing.   Unknown at Unknown time   • ibuprofen (ADVIL,MOTRIN) 800 MG tablet Take 800 mg by mouth 2 (Two) Times a Day As Needed for Mild Pain .   Unknown at Unknown time   • rOPINIRole (REQUIP) 1 MG tablet Take 1 mg by mouth At Night As Needed (restless leg). Take 1 hour before bedtime.   Unknown at Unknown time   • traMADol (ULTRAM) 50 MG tablet Take 50 mg by mouth Every 8 (Eight) Hours As Needed for Moderate Pain .   Unknown at Unknown time     Allergies:  Patient has no known allergies.    Review of Systems   Constitutional: Negative for fatigue.   Respiratory: Negative for shortness of breath.    Cardiovascular: Positive for palpitations. Negative for chest pain and leg swelling.   Gastrointestinal: Negative for blood in stool.    Neurological: Negative for dizziness, syncope, weakness and light-headedness.   Hematological: Does not bruise/bleed easily.         Objective      Vital Signs  Temp:  [97.9 °F (36.6 °C)-98.4 °F (36.9 °C)] 97.9 °F (36.6 °C)  Heart Rate:  [] 101  Resp:  [8-18] 16  BP: ()/(55-77) 111/74  Body mass index is 24.84 kg/m².    Intake/Output Summary (Last 24 hours) at 09/05/18 1735  Last data filed at 09/05/18 1648   Gross per 24 hour   Intake             2500 ml   Output                0 ml   Net             2500 ml       Physical Exam   Constitutional: She appears cachectic. She is cooperative.   HENT:   Head: Normocephalic and atraumatic.   Eyes: Pupils are equal, round, and reactive to light.   Neck: No JVD present.   Cardiovascular: Intact distal pulses.  An irregularly irregular rhythm present. Tachycardia present.  Exam reveals no gallop and no friction rub.    No murmur heard.  Pulses:       Dorsalis pedis pulses are 2+ on the right side, and 2+ on the left side.        Posterior tibial pulses are 1+ on the right side, and 1+ on the left side.   No lower extremity edema.   Pulmonary/Chest: Effort normal. No respiratory distress. She has decreased breath sounds (bases). She has no wheezes. She has no rales.   Abdominal: Soft. She exhibits no mass. There is no tenderness. No hernia.   Neurological: She is alert.   Skin: Skin is warm and dry.   Psychiatric: She has a normal mood and affect.       Telemetry:  Afib 110s    Results Review:   I reviewed the patient's new clinical results.    Results from last 7 days  Lab Units 09/05/18  1555 09/05/18  1010   CK TOTAL U/L 25  --    TROPONIN I ng/mL 0.012 0.020   CKMB ng/mL <0.18  --        Results from last 7 days  Lab Units 09/05/18  1010   WBC 10*3/mm3 7.19   HEMOGLOBIN g/dL 12.6   PLATELETS 10*3/mm3 184       Results from last 7 days  Lab Units 09/05/18  1010   SODIUM mmol/L 133*   POTASSIUM mmol/L 3.8   CHLORIDE mmol/L 104   CO2 mmol/L 18.4*   BUN mg/dL 20    CREATININE mg/dL 1.38*   CALCIUM mg/dL 9.1   GLUCOSE mg/dL 138*   ALT (SGPT) U/L 39*   AST (SGOT) U/L 57*     Lab Results   Component Value Date    INR 1.20 (H) 05/27/2018     No results found for: MG  Lab Results   Component Value Date    TSH 0.963 09/05/2018      No results found for: BNP     EKG:         Imaging Results (last 72 hours)     Procedure Component Value Units Date/Time    XR Chest 1 View [[id number]] Collected:  09/05/18 1134     Updated:  09/05/18 1204    Narrative:       EXAMINATION:  XR CHEST 1 VW-      CLINICAL INDICATION:     Short of breath     TECHNIQUE:  XR CHEST 1 VW-       COMPARISON: 6/1/2018      FINDINGS:   Coarsened interstitial markings.   Heart size is stable.   No pneumothorax.   Probably tiny pleural effusions.   Bony and soft tissue structures are unremarkable.            Impression:       Stable radiographic appearance of the chest.     This report was finalized on 9/5/2018 11:35 AM by Dr. Sae Montoya MD.           Assessment/Plan:  1. Chest pain in the setting of generalized weakness and palpitations.  EKG has no new ischemic changes with heart rate of >150.    2. Atrial fibrillation with RVR, CHADsVASc is at least 4 for hypertension, age and sex.  Patient is anticoagulated on Eliquis 5 mg twice a day.     3. Hypotension, continue to monitor.  4. Acute kidney injury, currently being rehydrated  5. Recent falls, workup per hospitalist group.  Would recommend PT eval for assessment of gait.  May need to decrease dose of Eliquis      Thank you very much for asking us to be involved in this patient's care.  We will follow along with you.    JUNIE Shelby   09/05/18  5:32 PM      Pt was seen and examined by JUNIE Painting  Presented with Weakness, fatigue, recurrent falls, brought to the hospital by Family, found to be in Afib with RVR.   Has hx of Ps Afib on rate control with BB and Eliquis for OAC Chadsvasc of 4.   Currently on Cardizem GTT with rate in 100-130's. If  in persistent Afib and hypotension will need CV tomorrow.   Echo ordered, last Echo showed normal LV EF and LA of 4.2 cm.   Recurrent non syncopal falls, PT eval   Consider Decreasing Eliquis to 2.5 bid on D/C.   Will F/u.

## 2018-09-05 NOTE — ED NOTES
Falls bracelet in place instructed pt to call for assistance with ambulation       Elena Worrell RN  09/05/18 0947

## 2018-09-05 NOTE — ED NOTES
Placed pt on bedpan.  Pt asks to give her a minute because she is not able to go at the moment.  Call light within reach and informed to ring the call light if she is finished before I can come back.      Kim Hester  09/05/18 1112

## 2018-09-06 ENCOUNTER — ANCILLARY PROCEDURE (OUTPATIENT)
Dept: SPEECH THERAPY | Facility: HOSPITAL | Age: 78
End: 2018-09-06
Attending: INTERNAL MEDICINE

## 2018-09-06 ENCOUNTER — APPOINTMENT (OUTPATIENT)
Dept: GENERAL RADIOLOGY | Facility: HOSPITAL | Age: 78
End: 2018-09-06

## 2018-09-06 ENCOUNTER — APPOINTMENT (OUTPATIENT)
Dept: ULTRASOUND IMAGING | Facility: HOSPITAL | Age: 78
End: 2018-09-06

## 2018-09-06 ENCOUNTER — APPOINTMENT (OUTPATIENT)
Dept: CARDIOLOGY | Facility: HOSPITAL | Age: 78
End: 2018-09-06

## 2018-09-06 LAB
ALBUMIN SERPL-MCNC: 3.3 G/DL (ref 3.4–4.8)
ALBUMIN/GLOB SERPL: 0.8 G/DL (ref 1.5–2.5)
ALP SERPL-CCNC: 58 U/L (ref 35–104)
ALT SERPL W P-5'-P-CCNC: 38 U/L (ref 10–36)
ANION GAP SERPL CALCULATED.3IONS-SCNC: 7.7 MMOL/L (ref 3.6–11.2)
AST SERPL-CCNC: 67 U/L (ref 10–30)
BACTERIA UR QL AUTO: ABNORMAL /HPF
BASOPHILS # BLD AUTO: 0.09 10*3/MM3 (ref 0–0.3)
BASOPHILS NFR BLD AUTO: 1.1 % (ref 0–2)
BH CV ECHO MEAS - % IVS THICK: 36.4 %
BH CV ECHO MEAS - % LVPW THICK: 46.7 %
BH CV ECHO MEAS - ACS: 1.8 CM
BH CV ECHO MEAS - AO MAX PG (FULL): 4.7 MMHG
BH CV ECHO MEAS - AO MAX PG: 8.1 MMHG
BH CV ECHO MEAS - AO MEAN PG (FULL): 2.8 MMHG
BH CV ECHO MEAS - AO MEAN PG: 4.4 MMHG
BH CV ECHO MEAS - AO ROOT AREA (BSA CORRECTED): 2.1
BH CV ECHO MEAS - AO ROOT AREA: 11 CM^2
BH CV ECHO MEAS - AO ROOT DIAM: 3.7 CM
BH CV ECHO MEAS - AO V2 MAX: 142.5 CM/SEC
BH CV ECHO MEAS - AO V2 MEAN: 97.7 CM/SEC
BH CV ECHO MEAS - AO V2 VTI: 27.5 CM
BH CV ECHO MEAS - AVA(I,A): 3.5 CM^2
BH CV ECHO MEAS - AVA(I,D): 3.5 CM^2
BH CV ECHO MEAS - AVA(V,A): 4 CM^2
BH CV ECHO MEAS - AVA(V,D): 4 CM^2
BH CV ECHO MEAS - BSA(HAYCOCK): 1.8 M^2
BH CV ECHO MEAS - BSA: 1.8 M^2
BH CV ECHO MEAS - BZI_BMI: 28 KILOGRAMS/M^2
BH CV ECHO MEAS - BZI_METRIC_HEIGHT: 162.6 CM
BH CV ECHO MEAS - BZI_METRIC_WEIGHT: 73.9 KG
BH CV ECHO MEAS - EDV(CUBED): 62.1 ML
BH CV ECHO MEAS - EDV(TEICH): 68.3 ML
BH CV ECHO MEAS - EF(CUBED): 69.1 %
BH CV ECHO MEAS - EF(TEICH): 61.3 %
BH CV ECHO MEAS - ESV(CUBED): 19.2 ML
BH CV ECHO MEAS - ESV(TEICH): 26.4 ML
BH CV ECHO MEAS - FS: 32.4 %
BH CV ECHO MEAS - IVS/LVPW: 1.1
BH CV ECHO MEAS - IVSD: 1.2 CM
BH CV ECHO MEAS - IVSS: 1.6 CM
BH CV ECHO MEAS - LV MASS(C)D: 154 GRAMS
BH CV ECHO MEAS - LV MASS(C)DI: 85.9 GRAMS/M^2
BH CV ECHO MEAS - LV MASS(C)S: 159 GRAMS
BH CV ECHO MEAS - LV MASS(C)SI: 88.7 GRAMS/M^2
BH CV ECHO MEAS - LV MAX PG: 3.4 MMHG
BH CV ECHO MEAS - LV MEAN PG: 1.6 MMHG
BH CV ECHO MEAS - LV V1 MAX: 92.8 CM/SEC
BH CV ECHO MEAS - LV V1 MEAN: 56.8 CM/SEC
BH CV ECHO MEAS - LV V1 VTI: 15.6 CM
BH CV ECHO MEAS - LVIDD: 4 CM
BH CV ECHO MEAS - LVIDS: 2.7 CM
BH CV ECHO MEAS - LVOT AREA (M): 6.2 CM^2
BH CV ECHO MEAS - LVOT AREA: 6.1 CM^2
BH CV ECHO MEAS - LVOT DIAM: 2.8 CM
BH CV ECHO MEAS - LVPWD: 1.1 CM
BH CV ECHO MEAS - LVPWS: 1.6 CM
BH CV ECHO MEAS - MV A MAX VEL: 48.4 CM/SEC
BH CV ECHO MEAS - MV DEC SLOPE: 596.5 CM/SEC^2
BH CV ECHO MEAS - MV E MAX VEL: 111.6 CM/SEC
BH CV ECHO MEAS - MV E/A: 2.3
BH CV ECHO MEAS - MV P1/2T MAX VEL: 110.6 CM/SEC
BH CV ECHO MEAS - MV P1/2T: 54.3 MSEC
BH CV ECHO MEAS - MVA P1/2T LCG: 2 CM^2
BH CV ECHO MEAS - MVA(P1/2T): 4.1 CM^2
BH CV ECHO MEAS - PA ACC SLOPE: 1207 CM/SEC^2
BH CV ECHO MEAS - PA ACC TIME: 0.08 SEC
BH CV ECHO MEAS - PA PR(ACCEL): 42.6 MMHG
BH CV ECHO MEAS - RAP SYSTOLE: 10 MMHG
BH CV ECHO MEAS - RVDD: 2.9 CM
BH CV ECHO MEAS - RVSP: 38.9 MMHG
BH CV ECHO MEAS - SI(AO): 168.6 ML/M^2
BH CV ECHO MEAS - SI(CUBED): 23.9 ML/M^2
BH CV ECHO MEAS - SI(LVOT): 53.3 ML/M^2
BH CV ECHO MEAS - SI(TEICH): 23.4 ML/M^2
BH CV ECHO MEAS - SV(AO): 302.4 ML
BH CV ECHO MEAS - SV(CUBED): 42.9 ML
BH CV ECHO MEAS - SV(LVOT): 95.6 ML
BH CV ECHO MEAS - SV(TEICH): 41.9 ML
BH CV ECHO MEAS - TR MAX VEL: 268.8 CM/SEC
BILIRUB SERPL-MCNC: 0.4 MG/DL (ref 0.2–1.8)
BILIRUB UR QL STRIP: NEGATIVE
BUN BLD-MCNC: 14 MG/DL (ref 7–21)
BUN/CREAT SERPL: 13.9 (ref 7–25)
CALCIUM SPEC-SCNC: 8.1 MG/DL (ref 7.7–10)
CHLORIDE SERPL-SCNC: 110 MMOL/L (ref 99–112)
CK MB SERPL-CCNC: 1.63 NG/ML (ref 0–5)
CK MB SERPL-RTO: 1.3 % (ref 0–3)
CK SERPL-CCNC: 123 U/L (ref 24–173)
CLARITY UR: ABNORMAL
CO2 SERPL-SCNC: 18.3 MMOL/L (ref 24.3–31.9)
COLOR UR: YELLOW
CREAT BLD-MCNC: 1.01 MG/DL (ref 0.43–1.29)
CRP SERPL-MCNC: 4.68 MG/DL (ref 0–0.99)
D-LACTATE SERPL-SCNC: 1.2 MMOL/L (ref 0.5–2)
DEPRECATED RDW RBC AUTO: 42.9 FL (ref 37–54)
EOSINOPHIL # BLD AUTO: 0.01 10*3/MM3 (ref 0–0.7)
EOSINOPHIL NFR BLD AUTO: 0.1 % (ref 0–7)
ERYTHROCYTE [DISTWIDTH] IN BLOOD BY AUTOMATED COUNT: 13.6 % (ref 11.5–14.5)
GFR SERPL CREATININE-BSD FRML MDRD: 53 ML/MIN/1.73
GLOBULIN UR ELPH-MCNC: 3.9 GM/DL
GLUCOSE BLD-MCNC: 128 MG/DL (ref 70–110)
GLUCOSE UR STRIP-MCNC: NEGATIVE MG/DL
HCT VFR BLD AUTO: 35.3 % (ref 37–47)
HGB BLD-MCNC: 11.9 G/DL (ref 12–16)
HGB UR QL STRIP.AUTO: ABNORMAL
HYALINE CASTS UR QL AUTO: ABNORMAL /LPF
IMM GRANULOCYTES # BLD: 0.03 10*3/MM3 (ref 0–0.03)
IMM GRANULOCYTES NFR BLD: 0.4 % (ref 0–0.5)
KETONES UR QL STRIP: ABNORMAL
L PNEUMO1 AG UR QL IA: NEGATIVE
LEUKOCYTE ESTERASE UR QL STRIP.AUTO: ABNORMAL
LYMPHOCYTES # BLD AUTO: 2.63 10*3/MM3 (ref 1–3)
LYMPHOCYTES NFR BLD AUTO: 31.6 % (ref 16–46)
M PNEUMO IGM SER QL: NEGATIVE
MAXIMAL PREDICTED HEART RATE: 141 BPM
MCH RBC QN AUTO: 29.2 PG (ref 27–33)
MCHC RBC AUTO-ENTMCNC: 33.7 G/DL (ref 33–37)
MCV RBC AUTO: 86.7 FL (ref 80–94)
MONOCYTES # BLD AUTO: 0.89 10*3/MM3 (ref 0.1–0.9)
MONOCYTES NFR BLD AUTO: 10.7 % (ref 0–12)
NEUTROPHILS # BLD AUTO: 4.67 10*3/MM3 (ref 1.4–6.5)
NEUTROPHILS NFR BLD AUTO: 56.1 % (ref 40–75)
NITRITE UR QL STRIP: NEGATIVE
NRBC BLD MANUAL-RTO: 0 /100 WBC (ref 0–0)
OSMOLALITY SERPL CALC.SUM OF ELEC: 274.1 MOSM/KG (ref 273–305)
PH UR STRIP.AUTO: <=5 [PH] (ref 5–8)
PLATELET # BLD AUTO: 171 10*3/MM3 (ref 130–400)
PMV BLD AUTO: 11 FL (ref 6–10)
POTASSIUM BLD-SCNC: 3 MMOL/L (ref 3.5–5.3)
PROT SERPL-MCNC: 7.2 G/DL (ref 6–8)
PROT UR QL STRIP: ABNORMAL
RBC # BLD AUTO: 4.07 10*6/MM3 (ref 4.2–5.4)
RBC # UR: ABNORMAL /HPF
REF LAB TEST METHOD: ABNORMAL
SODIUM BLD-SCNC: 136 MMOL/L (ref 135–153)
SP GR UR STRIP: 1.02 (ref 1–1.03)
SQUAMOUS #/AREA URNS HPF: ABNORMAL /HPF
STRESS TARGET HR: 120 BPM
TROPONIN I SERPL-MCNC: 0.02 NG/ML
UROBILINOGEN UR QL STRIP: ABNORMAL
WBC NRBC COR # BLD: 8.32 10*3/MM3 (ref 4.5–12.5)
WBC UR QL AUTO: ABNORMAL /HPF

## 2018-09-06 PROCEDURE — 94799 UNLISTED PULMONARY SVC/PX: CPT

## 2018-09-06 PROCEDURE — 82550 ASSAY OF CK (CPK): CPT | Performed by: PHYSICIAN ASSISTANT

## 2018-09-06 PROCEDURE — G8997 SWALLOW GOAL STATUS: HCPCS | Performed by: SPEECH-LANGUAGE PATHOLOGIST

## 2018-09-06 PROCEDURE — 99232 SBSQ HOSP IP/OBS MODERATE 35: CPT | Performed by: INTERNAL MEDICINE

## 2018-09-06 PROCEDURE — 81001 URINALYSIS AUTO W/SCOPE: CPT | Performed by: PHYSICIAN ASSISTANT

## 2018-09-06 PROCEDURE — 93306 TTE W/DOPPLER COMPLETE: CPT

## 2018-09-06 PROCEDURE — 84484 ASSAY OF TROPONIN QUANT: CPT | Performed by: PHYSICIAN ASSISTANT

## 2018-09-06 PROCEDURE — 25010000002 VANCOMYCIN PER 500 MG: Performed by: INTERNAL MEDICINE

## 2018-09-06 PROCEDURE — G8996 SWALLOW CURRENT STATUS: HCPCS | Performed by: SPEECH-LANGUAGE PATHOLOGIST

## 2018-09-06 PROCEDURE — 87040 BLOOD CULTURE FOR BACTERIA: CPT | Performed by: PHYSICIAN ASSISTANT

## 2018-09-06 PROCEDURE — 93880 EXTRACRANIAL BILAT STUDY: CPT | Performed by: RADIOLOGY

## 2018-09-06 PROCEDURE — 25010000002 PIPERACILLIN-TAZOBACTAM: Performed by: INTERNAL MEDICINE

## 2018-09-06 PROCEDURE — 93306 TTE W/DOPPLER COMPLETE: CPT | Performed by: INTERNAL MEDICINE

## 2018-09-06 PROCEDURE — 80053 COMPREHEN METABOLIC PANEL: CPT | Performed by: INTERNAL MEDICINE

## 2018-09-06 PROCEDURE — 92612 ENDOSCOPY SWALLOW (FEES) VID: CPT | Performed by: SPEECH-LANGUAGE PATHOLOGIST

## 2018-09-06 PROCEDURE — 71045 X-RAY EXAM CHEST 1 VIEW: CPT | Performed by: RADIOLOGY

## 2018-09-06 PROCEDURE — 99232 SBSQ HOSP IP/OBS MODERATE 35: CPT | Performed by: NURSE PRACTITIONER

## 2018-09-06 PROCEDURE — 99233 SBSQ HOSP IP/OBS HIGH 50: CPT | Performed by: INTERNAL MEDICINE

## 2018-09-06 PROCEDURE — 93880 EXTRACRANIAL BILAT STUDY: CPT

## 2018-09-06 PROCEDURE — 87899 AGENT NOS ASSAY W/OPTIC: CPT | Performed by: INTERNAL MEDICINE

## 2018-09-06 PROCEDURE — 71045 X-RAY EXAM CHEST 1 VIEW: CPT

## 2018-09-06 PROCEDURE — 85025 COMPLETE CBC W/AUTO DIFF WBC: CPT | Performed by: INTERNAL MEDICINE

## 2018-09-06 PROCEDURE — 82553 CREATINE MB FRACTION: CPT | Performed by: PHYSICIAN ASSISTANT

## 2018-09-06 PROCEDURE — 83605 ASSAY OF LACTIC ACID: CPT | Performed by: PHYSICIAN ASSISTANT

## 2018-09-06 RX ORDER — MAGNESIUM SULFATE HEPTAHYDRATE 40 MG/ML
2 INJECTION, SOLUTION INTRAVENOUS AS NEEDED
Status: DISCONTINUED | OUTPATIENT
Start: 2018-09-06 | End: 2018-09-10 | Stop reason: HOSPADM

## 2018-09-06 RX ORDER — POTASSIUM CHLORIDE 7.45 MG/ML
10 INJECTION INTRAVENOUS
Status: DISCONTINUED | OUTPATIENT
Start: 2018-09-06 | End: 2018-09-10 | Stop reason: HOSPADM

## 2018-09-06 RX ORDER — ACETAMINOPHEN 325 MG/1
650 TABLET ORAL EVERY 6 HOURS PRN
Status: DISCONTINUED | OUTPATIENT
Start: 2018-09-06 | End: 2018-09-10 | Stop reason: HOSPADM

## 2018-09-06 RX ORDER — POTASSIUM CHLORIDE 20 MEQ/1
40 TABLET, EXTENDED RELEASE ORAL EVERY 4 HOURS
Status: COMPLETED | OUTPATIENT
Start: 2018-09-06 | End: 2018-09-07

## 2018-09-06 RX ORDER — POTASSIUM CHLORIDE 750 MG/1
40 CAPSULE, EXTENDED RELEASE ORAL AS NEEDED
Status: DISCONTINUED | OUTPATIENT
Start: 2018-09-06 | End: 2018-09-10 | Stop reason: HOSPADM

## 2018-09-06 RX ORDER — POTASSIUM CHLORIDE 1.5 G/1.77G
40 POWDER, FOR SOLUTION ORAL AS NEEDED
Status: DISCONTINUED | OUTPATIENT
Start: 2018-09-06 | End: 2018-09-10 | Stop reason: HOSPADM

## 2018-09-06 RX ORDER — MAGNESIUM SULFATE HEPTAHYDRATE 40 MG/ML
4 INJECTION, SOLUTION INTRAVENOUS AS NEEDED
Status: DISCONTINUED | OUTPATIENT
Start: 2018-09-06 | End: 2018-09-10 | Stop reason: HOSPADM

## 2018-09-06 RX ORDER — DILTIAZEM HYDROCHLORIDE 120 MG/1
120 CAPSULE, COATED, EXTENDED RELEASE ORAL
Status: DISCONTINUED | OUTPATIENT
Start: 2018-09-06 | End: 2018-09-07

## 2018-09-06 RX ORDER — L.ACID,PARA/B.BIFIDUM/S.THERM 8B CELL
1 CAPSULE ORAL DAILY
Status: DISCONTINUED | OUTPATIENT
Start: 2018-09-06 | End: 2018-09-10 | Stop reason: HOSPADM

## 2018-09-06 RX ADMIN — SENNOSIDES AND DOCUSATE SODIUM 2 TABLET: 8.6; 5 TABLET ORAL at 20:20

## 2018-09-06 RX ADMIN — METOPROLOL TARTRATE 25 MG: 25 TABLET, FILM COATED ORAL at 10:20

## 2018-09-06 RX ADMIN — POTASSIUM CHLORIDE 40 MEQ: 1500 TABLET, EXTENDED RELEASE ORAL at 20:20

## 2018-09-06 RX ADMIN — Medication 1 TABLET: at 17:40

## 2018-09-06 RX ADMIN — APIXABAN 5 MG: 5 TABLET, FILM COATED ORAL at 10:18

## 2018-09-06 RX ADMIN — LEVOTHYROXINE SODIUM 50 MCG: 50 TABLET ORAL at 10:19

## 2018-09-06 RX ADMIN — Medication 1 TABLET: at 10:19

## 2018-09-06 RX ADMIN — SODIUM CHLORIDE 75 ML/HR: 9 INJECTION, SOLUTION INTRAVENOUS at 11:02

## 2018-09-06 RX ADMIN — GABAPENTIN 400 MG: 400 CAPSULE ORAL at 10:18

## 2018-09-06 RX ADMIN — PIPERACILLIN SODIUM,TAZOBACTAM SODIUM 3.38 G: 3; .375 INJECTION, POWDER, FOR SOLUTION INTRAVENOUS at 16:47

## 2018-09-06 RX ADMIN — VANCOMYCIN HYDROCHLORIDE 1250 MG: 5 INJECTION, POWDER, LYOPHILIZED, FOR SOLUTION INTRAVENOUS at 11:12

## 2018-09-06 RX ADMIN — ALBUTEROL SULFATE 2.5 MG: 2.5 SOLUTION RESPIRATORY (INHALATION) at 19:19

## 2018-09-06 RX ADMIN — DOXYCYCLINE 100 MG: 100 INJECTION, POWDER, LYOPHILIZED, FOR SOLUTION INTRAVENOUS at 16:46

## 2018-09-06 RX ADMIN — POTASSIUM CHLORIDE 40 MEQ: 1500 TABLET, EXTENDED RELEASE ORAL at 17:40

## 2018-09-06 RX ADMIN — ACETAMINOPHEN 650 MG: 325 TABLET ORAL at 16:45

## 2018-09-06 RX ADMIN — CETIRIZINE HYDROCHLORIDE 5 MG: 10 TABLET, FILM COATED ORAL at 10:22

## 2018-09-06 RX ADMIN — PIPERACILLIN SODIUM,TAZOBACTAM SODIUM 3.38 G: 3; .375 INJECTION, POWDER, FOR SOLUTION INTRAVENOUS at 10:29

## 2018-09-06 RX ADMIN — FAMOTIDINE 40 MG: 20 TABLET, FILM COATED ORAL at 10:21

## 2018-09-06 RX ADMIN — METOPROLOL TARTRATE 25 MG: 25 TABLET, FILM COATED ORAL at 20:20

## 2018-09-06 RX ADMIN — SENNOSIDES AND DOCUSATE SODIUM 2 TABLET: 8.6; 5 TABLET ORAL at 10:29

## 2018-09-06 RX ADMIN — GABAPENTIN 400 MG: 400 CAPSULE ORAL at 20:20

## 2018-09-06 RX ADMIN — DILTIAZEM HYDROCHLORIDE 120 MG: 120 CAPSULE, COATED, EXTENDED RELEASE ORAL at 20:21

## 2018-09-06 RX ADMIN — APIXABAN 5 MG: 5 TABLET, FILM COATED ORAL at 20:22

## 2018-09-06 RX ADMIN — Medication 1 CAPSULE: at 11:11

## 2018-09-06 NOTE — MBS/VFSS/FEES
Acute Care - Speech Language Pathology   Swallow Initial Evaluation  Wang   FLEXIBLE ENDOSCOPIC EVALUATION OF SWALLOWING     Patient Name: Anette Dunham  : 1940  MRN: [id number]  Today's Date: 2018      Admit Date: 2018   Anette Dunham is seen on P218/1P to participate in an instrumental FEES to assess safety/efficacy of swallowing fnx, determine safest/least restrictive diet tolerance. Pt is agreeable to participate. She is currently NPO pending results of this evaluation. Pt's family member present at bedside throughout this evaluation.      Social History     Social History   • Marital status:      Spouse name: N/A   • Number of children: N/A   • Years of education: N/A     Occupational History   • Not on file.     Social History Main Topics   • Smoking status: Current Every Day Smoker     Packs/day: 0.25     Years: 15.00     Types: Cigarettes   • Smokeless tobacco: Current User     Types: Snuff      Comment: snuff for 60 years    • Alcohol use No   • Drug use: No   • Sexual activity: Defer     Other Topics Concern   • Not on file     Social History Narrative   • No narrative on file        Chest xray on 18 revealed bibasilar atelectasis, minimal airspace disease, per radiology report.     Diet Orders (active)     Start     Ordered    18 1438  Diet Soft Texture; Chopped; Nectar / Syrup Thick; Cardiac  Diet Effective Now     Comments:  Meds whole in puree  Send buttermilk w/all trays    18 1438          Currently observed on ra tolerating well w/o complications.     Risks and benefits of this procedure are explained w/ pt agreeing to participate.    Chart review, d/w Linda MCDONALD, reveal no contraindications for this procedure, proceed per protocol.    Pt is positioned upright and centered in bed to accept presentations of  puree, honey thick, nectar thick, and thin liquids via spoon, cup, and straw. Pt is able to self feed.     Facial/oral structures are symmetrical upon  "observation. Oral mucosa are moist, pink and clean. Secretions are clear, thin, and well controlled. OROM/DARRIUS is wfl to imitate oral postures. Gag elicited via passing of the endoscope. Volitional cough is intact, clear in quality, non-productive. Vocal quality is adequate in intensity, clear in quality w/ intelligible speech.     Endoscope is entered via the R nare w/o complications. Nasal mucosa are moist, pink, and clean. Secretions are clear, thin, controlled. Endoscope passes easily into the hypopharynx.     Upon entry into the hypopharynx, epiglottic resting posture is midline. Pharyngeal and laryngeal mucosa/structures are moist, pink, and clean. No pooling. No significant edema or erythema. Cued sustained breath hold and vowel prolongation reveal bilateral VF adduction. No other mucosal abnormalities.     Upon po presentations, adequate bolus anticipation w/ good labial seal for bolus clearance. Bolus formation, manipulation and mastication are mildly impaired w/ increased oral prep time. Transit is timely w/o significant oral residue. Tongue base retraction and velopharyngeal seal are incomplete w/ premature spillage of thin liquids to the bilateral pyriforms. No laryngeal penetration or aspiration evidenced before the swallow.     Pharyngeal swallow is mildly delayed w/ weak hyolaryngeal elevation and full epilgottic inversion. Pharyngeal contraction is mildly impaired w/ diffuse residue primarily w/ thin liquids. Deep laryngeal penetration w/ thin liquids via cup. Silent aspiration during the swallow. Cued cough is effective to clear aspirated material. Silent aspiration after the swallow 2/2 residue of thin liquids. \"Pt states I felt that get stuck\". No other laryngeal penetration or aspiration evidenced during or after the swallow.     Endoscope is removed w/o complications. Bed is returned to lowest floor position w/ call bell within reach. RN's, Linda and Adriana is aware of pt status. Vitals remain " stable across this evaluation.       Visit Dx:     ICD-10-CM ICD-9-CM   1. Atrial fibrillation with RVR (CMS/HCC) I48.91 427.31   2. Generalized weakness R53.1 780.79     Patient Active Problem List   Diagnosis   • Essential hypertension   • Chest pain   • Paroxysmal atrial fibrillation (CMS/HCC)   • Closed left hip fracture, initial encounter (CMS/[facility])   • Tobacco abuse   • Closed left hip fracture (CMS/HCC)   • Atrial fibrillation with RVR (CMS/HCC)     Past Medical History:   Diagnosis Date   • Anxiety    • Arrhythmia    • Atrial fibrillation (CMS/HCC)    • Coronary artery disease    • Disease of thyroid gland    • Dysfunctional gallbladder    • GERD (gastroesophageal reflux disease)    • Hypertension    • Osteoporosis      Past Surgical History:   Procedure Laterality Date   • ABDOMINAL SURGERY     • CHOLECYSTECTOMY OPEN     • HIP HEMIARTHROPLASTY Left 5/29/2018    Procedure: LEFT HIP BIPOLAR HEMIARTHROPLASTY;  Surgeon: Trevon Arriaga MD;  Location: [facility];  Service: Orthopedics           EDUCATION  The patient has been educated in the following areas:   Dysphagia (Swallowing Impairment) Oral Care/Hydration Modified Diet Instruction.    SLP Recommendation and Plan    Impression: Ms. Dunham presents w/ mildly impaired oropharyngeal swallowing deficits w/ silent aspiration during and after the swallow w/ thin liquids presentations. Mildly increased oral prep time. Diffuse pharyngeal residue primarily w/ thin liquids. Ms. Dunham is felt to most benefit from initiation of least restrictive modified po diet of mechanical soft, chopped, w/ NECTAR thick liquids only. Universal aspiration precautions. SHAKA precautions. Oral care protocol. Meds whole in puree or given via non-oral method.     Recommmendations:  1. Mechanical soft, chopped, w/ NECTAR thick liquids only, Cardiac modifiers per MD.  2. Meds whole in puree or given via non-oral method.   3. Upright and centered for all po intake.  4. Universal aspiration  precautions  5. SHAKA precautions  6. Oral care protocol  7. No ice cream, jello, or milkshakes.  8. No thin water or ice chips.  9. Formal dysphagia tx      SLP to f/u for formal dysphagia tx     D/w pt and pt's family member results and recommendations w/ verbal agreement. Reviewed video from FEES. Signs posted above bed for family/staff reference.    D/w RNLinda, results and recommendations w/ verbal agreement.     Thank you for allowing me to participate in the care of your patient-  CODY Lyon, CCC-SLP     Progress: no change  Outcome Summary: FEES completed this pm. Silent aspiration w/ thin liquids during the swallow and after the swallow via cup presentation. Initiate modified po diet of mechanical soft, chopped w/ NECTAR THICK LIQUIDS only             Time Calculation:         Time Calculation- SLP     Row Name 09/06/18 1501             Time Calculation- SLP    SLP - Next Appointment 09/07/18  -        User Key  (r) = Recorded By, (t) = Taken By, (c) = Cosigned By    Initials Name Provider Type     Radha Braun MS CCC-SLP Speech Therapist          Therapy Charges for Today     Code Description Service Date Service Provider Modifiers Qty    01482738513 HC ST SWALLOWING CURRENT STATUS 9/6/2018 Radha Braun MS CCC-SLP GN, CI 1    80844933590 HC ST SWALLOWING PROJECTED 9/6/2018 Radha Braun MS CCC-SLP GN, CH 1    13096620997 HC ST FIBEROPTIC ENDO EVAL SWALL 8 9/6/2018 Radha Braun MS CCC-SLP GN 1          SLP G-Codes  SLP NOMS Used?: Yes  Functional Limitations: Swallowing  Swallow Current Status (): At least 1 percent but less than 20 percent impaired, limited or restricted  Swallow Goal Status (): 0 percent impaired, limited or restricted    Radha Braun MS CCC-SLP  9/6/2018

## 2018-09-06 NOTE — PROGRESS NOTES
Discharge Planning Assessment   Wang     Patient Name: Anette Dunham  MRN: [id number]  Today's Date: 9/6/2018    Admit Date: 9/5/2018          Discharge Needs Assessment     Row Name 09/06/18 120       Living Environment    Lives With child(willy), adult    Name(s) of Who Lives With Patient Lives with her son, Rajinder.    Current Living Arrangements home/apartment/condo    Primary Care Provided by self    Provides Primary Care For no one    Family Caregiver if Needed child(willy), adult    Family Caregiver Names Her children assist as needed.    Able to Return to Prior Arrangements yes       Resource/Environmental Concerns    Resource/Environmental Concerns none    Transportation Concerns car, none       Transition Planning    Patient/Family Anticipates Transition to home with family    Transportation Anticipated family or friend will provide       Discharge Needs Assessment    Readmission Within the Last 30 Days no previous admission in last 30 days    Concerns to be Addressed --   Denies need for Home Health.    Equipment Currently Used at Home walker, rolling   She has a rolling walker from a previous hip fx.     Anticipated Changes Related to Illness none    Equipment Needed After Discharge wheelchair, manual   Requests a  wheelchair.    Outpatient/Agency/Support Group Needs outpatient therapy   States she may benefit from PT at home. Denies Home Health need.            Discharge Plan     Row Name 09/06/18 1210       Plan    Plan Patient came to ED with c/o fall at home and weakness. Dx: A Fib with RVR, Hypotension and recent falls. She is on Eliquis at home. New Dx: Sepsis. Temp 103.1. Receiving IV ATB's. ID and SLP pending. She is on Cardizem gtt. ECHO pending. Alert and oriented. /77. Sitting up in chair. Daughter present. Per Pharmacy cost of Eliquis is $3.60/mo. @ [facility]. Denies co-pay issues. Juliann Santana is her PCP. States she might benefit from home PT and a Wheelchair at discharge, SS can  arrange with MD order. She plans to return home at discharge with assist of her family. CM will follow and assist as needed.    Patient/Family in Agreement with Plan yes        Destination     No service coordination in this encounter.      Durable Medical Equipment     No service coordination in this encounter.      Dialysis/Infusion     No service coordination in this encounter.      Home Medical Care     No service coordination in this encounter.      Social Care     No service coordination in this encounter.                Demographic Summary     Row Name 09/06/18 1206       General Information    Admission Type inpatient    Arrived From home    Referral Source admission list    Reason for Consult discharge planning    Preferred Language English     Used During This Interaction no            Functional Status     Row Name 09/06/18 1207       Functional Status    Usual Activity Tolerance good    Current Activity Tolerance moderate       Functional Status, IADL    IADL Comments Independent with IADL's.       Mental Status    General Appearance WDL WDL       Mental Status Summary    Recent Changes in Mental Status/Cognitive Functioning no changes   Alert and oriented.            Psychosocial    No documentation.           Abuse/Neglect    No documentation.           Legal    No documentation.           Substance Abuse    No documentation.           Patient Forms    No documentation.         Marimar Blas RN

## 2018-09-06 NOTE — PROGRESS NOTES
Chief complaint:  Atrial fibrillation    Admitting History: Anette Dunham is a 78 y.o. female with past medical history significant for paroxysmal atrial fibrillation, essential hypertension, and tobacco use.  She presented to the ED on 9/5/2018 with complaints of generalized weakness and recent falls.  Upon presentation she was noted to be in atrial fibrillation with RVR, initial heart rate was in the 150s with borderline blood pressures in the low 100s to high 90s systolically and 50s to 70s diastolically.  She is chronically anticoagulated on Eliquis.  Rate control with metoprolol 25 mg twice a day.  She was initially started on a Cardizem drip after Cardizem bolus of 20 mg.  She reported that she had been feeling weak since 9/1/2018 and sustained a few falls during that time.  She denies syncope or near syncopal episode.  She denies any trauma.  She was admitted for further evaluation and management.  Cardiology was consulted due to the atrial fibrillation with RVR.    Interval History: Pt is resting comfortably.  She denies current chest pain, palpitations.  She reports that she wants to eat.      Physical Exam     Telemetry:  Atrial fib 80s and 90s    Patient Vitals for the past 24 hrs:   BP Temp Temp src Pulse Resp SpO2 Height Weight   09/06/18 1019 105/77 - - 110 - - - -   09/06/18 0902 104/68 - - 88 - 98 % - -   09/06/18 0802 97/63 - - 91 - 99 % - -   09/06/18 0800 - (!) 103.1 °F (39.5 °C) Rectal - - - - -   09/06/18 0740 - (!) 103.1 °F (39.5 °C) Rectal - - - - -   09/06/18 0702 120/77 - - 101 - 90 % - -   09/06/18 0650 114/71 - - 105 18 99 % - -   09/06/18 0630 - - - 92 18 100 % - -   09/06/18 0620 120/64 - - 102 - 100 % - -   09/06/18 0505 117/60 - - 98 20 100 % - -   09/06/18 0450 119/63 - - 111 - 96 % - -   09/06/18 0405 112/77 - - 114 20 99 % - -   09/06/18 0400 - 98.5 °F (36.9 °C) Oral - - - - 74.1 kg (163 lb 4.8 oz)   09/06/18 0305 120/72 - - 96 22 95 % - -   09/06/18 0205 112/53 - - 98 22 95 % - -  "  09/06/18 0105 111/59 - - 96 25 100 % - -   09/06/18 0000 108/68 98.6 °F (37 °C) Oral 84 22 99 % - -   09/05/18 2305 119/71 - - 86 22 96 % - -   09/05/18 2200 104/70 - - 68 22 98 % - -   09/05/18 2019 112/85 - - 104 20 98 % - -   09/05/18 1952 - 98.7 °F (37.1 °C) Oral - - - - -   09/05/18 1940 113/67 - - 114 20 100 % - -   09/05/18 1903 - - - 112 18 100 % - -   09/05/18 1853 - - - 111 16 100 % - -   09/05/18 1706 132/85 - - 112 - - - -   09/05/18 1600 - 97.9 °F (36.6 °C) Oral - - - - -   09/05/18 1548 111/74 - - 101 - 99 % - -   09/05/18 1541 - - - - - - 162.6 cm (64\") 65.6 kg (144 lb 11.2 oz)   09/05/18 1527 105/64 97.8 °F (36.6 °C) Oral 105 18 98 % - -   09/05/18 1514 - - - 101 18 98 % - -   09/05/18 1501 103/65 - - 102 16 97 % - -   09/05/18 1446 98/59 - - 102 16 97 % - -   09/05/18 1416 100/73 - - 112 - 97 % - -   09/05/18 1401 97/70 - - 120 - 96 % - -   09/05/18 1346 102/74 - - 105 18 96 % - -   09/05/18 1331 111/77 - - 102 16 99 % - -   09/05/18 1317 111/72 - - 101 16 97 % - -   09/05/18 1311 106/72 - - 101 16 97 % - -   09/05/18 1301 95/69 - - 106 16 96 % - -   09/05/18 1251 101/59 - - 106 16 97 % - -   09/05/18 1241 101/68 - - 102 16 98 % - -   09/05/18 1231 91/55 - - 114 18 97 % - -   09/05/18 1221 103/77 - - 112 16 98 % - -   09/05/18 1211 91/61 - - 98 16 99 % - -   09/05/18 1201 95/57 - - 107 16 97 % - -         Results from last 7 days  Lab Units 09/06/18  1054 09/05/18  1010   WBC 10*3/mm3 8.32 7.19   HEMOGLOBIN g/dL 11.9* 12.6   PLATELETS 10*3/mm3 171 184       Results from last 7 days  Lab Units 09/06/18  1054 09/05/18  1010   SODIUM mmol/L 136 133*   POTASSIUM mmol/L 3.0* 3.8   CHLORIDE mmol/L 110 104   CO2 mmol/L 18.3* 18.4*   BUN mg/dL 14 20   CREATININE mg/dL 1.01 1.38*   CALCIUM mg/dL 8.1 9.1   GLUCOSE mg/dL 128* 138*       Results from last 7 days  Lab Units 09/06/18  0253 09/05/18  2057 09/05/18  1555 09/05/18  1010   CK TOTAL U/L 123 23* 25  --    TROPONIN I ng/mL 0.023 0.016 0.012 0.020 "   CKMB ng/mL 1.63 <0.18 <0.18  --      No results found for: BNP          Current Facility-Administered Medications:   •  albuterol (PROVENTIL) nebulizer solution 0.083% 2.5 mg/3mL, 2.5 mg, Nebulization, Q6H PRN, Dmitry Miranda MD, 2.5 mg at 09/05/18 1853  •  apixaban (ELIQUIS) tablet 5 mg, 5 mg, Oral, Q12H, Dmitry Miranda MD, 5 mg at 09/06/18 1018  •  calcium carb-cholecalciferol 600-800 MG-UNIT tablet 1 tablet, 1 tablet, Oral, BID With Meals, Dmitry Miranda MD, 1 tablet at 09/06/18 1019  •  cetirizine (zyrTEC) tablet 5 mg, 5 mg, Oral, Daily, Dmitry Miranda MD, 5 mg at 09/06/18 1022  •  diltiaZEM (CARDIZEM) 125 mg in sodium chloride 0.9 % 125 mL (1 mg/mL) infusion, 5-15 mg/hr, Intravenous, Titrated, Ely Tijerina PA-C, Last Rate: 5 mL/hr at 09/05/18 2347, 5 mg/hr at 09/05/18 2347  •  famotidine (PEPCID) tablet 40 mg, 40 mg, Oral, Daily, Dmitry Miranda MD, 40 mg at 09/06/18 1021  •  gabapentin (NEURONTIN) capsule 400 mg, 400 mg, Oral, BID, Dmitry Miranda MD, 400 mg at 09/06/18 1018  •  lactobacillus acidophilus (RISAQUAD) capsule 1 capsule, 1 capsule, Oral, Daily, Dmitry Miranda MD, 1 capsule at 09/06/18 1111  •  levothyroxine (SYNTHROID, LEVOTHROID) tablet 50 mcg, 50 mcg, Oral, Daily, Dmitry Miranda MD, 50 mcg at 09/06/18 1019  •  LORazepam (ATIVAN) tablet 1 mg, 1 mg, Oral, Q8H PRN, Dmitry Miranda MD  •  metoprolol tartrate (LOPRESSOR) tablet 25 mg, 25 mg, Oral, Q12H, Dmitry Miranda MD, 25 mg at 09/06/18 1020  •  Pharmacy to dose vancomycin, , Does not apply, Continuous PRN, Ely Tijerina PA-C  •  Pharmacy to Dose Zosyn, , Does not apply, Continuous PRN, Ely Tijerina PA-C  •  piperacillin-tazobactam (ZOSYN) 3.375 g/100 mL 0.9% NS IVPB (mbp), 3.375 g, Intravenous, Q8H, Dmitry Miranda MD  •  rOPINIRole (REQUIP) tablet 1 mg, 1 mg, Oral, Nightly PRN, Dmitry Miranda MD  •  sennosides-docusate sodium (SENOKOT-S) 8.6-50 MG tablet 2 tablet, 2 tablet, Oral, BID, Dmitry Miranda MD, 2  tablet at 09/06/18 1029  •  sodium chloride 0.9 % flush 1-10 mL, 1-10 mL, Intravenous, PRN, Ely Tijerina PA-C  •  Insert peripheral IV, , , Once **AND** sodium chloride 0.9 % flush 10 mL, 10 mL, Intravenous, PRN, Enrique Martin MD  •  sodium chloride 0.9 % infusion, 75 mL/hr, Intravenous, Continuous, Ely Tijerina PA-C, Last Rate: 75 mL/hr at 09/06/18 1102, 75 mL/hr at 09/06/18 1102  •  traMADol (ULTRAM) tablet 50 mg, 50 mg, Oral, Q8H PRN, Dmitry Miranda MD  •  vancomycin (VANCOCIN) 1,250 mg in sodium chloride 0.9 % 250 mL IVPB, 1,250 mg, Intravenous, Once, Dmitry Miranda MD, 1,250 mg at 09/06/18 1112  •  [START ON 9/7/2018] vancomycin (VANCOCIN) 750 mg in sodium chloride 0.9 % 250 mL IVPB, 750 mg, Intravenous, Q24H, Dmitry Miranda MD    Impression and Plan:    1.  Atrial fibrillation with controlled ventricular rate on Cardizem drip.  Will transition to oral Cardizem after swallowing eval has been completed.  CHADsVASc is at least 4 for hypertension, age, and sex.  Patient is anticoagulated on Eliquis 5 mg twice a day.  Will reevaluate for possible decrease in dose due to frequent falls at discharge.  Cardioversion has been canceled due to patient's fever.  We'll continue to treat atrial fib medically.  2.  Hypotension.  Suspect that as sepsis resolves patient's hypotension will also resolve.  We will continue to monitor this.      I have discussed the HPI, labs/tests, medications, physical exam and plan of care with Dr. Lucien Herman.      JUNIE Painting    I have examined and seen the patient personally and performed a face-to-face diagnostic evaluation. The plan of care was reviewed and developed with JUNIE Painting.  I have recommended and/or modify the above history of present illness, physical examination and assessment/plan to reflect my findings and impressions.  Essential elements of Plan was discussed with APRN above.  I agree with the findings and assessment/plan as  documented for Anette Herman MD, [facility]  Interventional Cardiology      09/07/18 8:49 PM

## 2018-09-06 NOTE — PLAN OF CARE
Problem: Patient Care Overview  Goal: Plan of Care Review  Outcome: Ongoing (interventions implemented as appropriate)   09/06/18 0800 09/06/18 3275   Plan of Care Review   Progress --  no change   OTHER   Outcome Summary --  FEES completed this pm. Silent aspiration w/ thin liquids during the swallow and after the swallow via cup presentation. Initiate modified po diet of mechanical soft, chopped w/ NECTAR THICK LIQUIDS only   Coping/Psychosocial   Plan of Care Reviewed With patient;family --

## 2018-09-06 NOTE — PAYOR COMM NOTE
"    NPI: [id number]    Utilization Review   Contact:Lizzy Kellogg MSN, APRN, NP-C  Phone: [phone]  Fax: [phone]    [employer]/Attn: Ellyn  Higinio Auth Req/secondary to medicare  REF: [id number]  DX: I48.91  Claudette De Santiago  (78 y.o. Female)     Date of Birth Social Security Number Address Home Phone MRN    [id number]  [id number]  [facility] [address] [phone] [id number]    Zoroastrian Marital Status          Unknown        Admission Date Admission Type Admitting Provider Attending Provider Department, Room/Bed    9/5/18 Emergency Dmitry Miranda MD Heinss, Karl F, MD [facility] PROGRESS CARE, P218/1P    Discharge Date Discharge Disposition Discharge Destination                       Attending Provider:  Dmitry Miranda MD    Allergies:  No Known Allergies    Isolation:  None   Infection:  None   Code Status:  CPR    Ht:  162.6 cm (64\")   Wt:  74.1 kg (163 lb 4.8 oz)    Admission Cmt:  None   Principal Problem:  None                Active Insurance as of 9/5/2018     Primary Coverage     Payor Plan Insurance Group Employer/Plan Group    MEDICARE MEDICARE A & B      Payor Plan Address Payor Plan Phone Number Effective From Effective To    [address] [phone] 3/1/2004     [facility] [address]       Subscriber Name Subscriber Birth Date Member ID       CLAUDETTE DE SANTIAGO 1940 [id number]           Secondary Coverage     Payor Plan Insurance Group Employer/Plan Group    [employer] [place] [employer] [place]ICAID      Payor Plan Address Payor Plan Phone Number Effective From Effective To    [address] [address] [phone] 8/16/2016     [facility] [address]       Subscriber Name Subscriber Birth Date Member ID       CLAUDETTE DE SANTIAGO 1940 [id number]                 Emergency Contacts      (Rel.) Home Phone Work Phone Mobile Phone    Vielka Patel (Daughter) [phone] -- --            "

## 2018-09-06 NOTE — PLAN OF CARE
Problem: Eating/Swallowing Impairment (IRF) (Adult)  Intervention: Promote Optimal Eating and Swallowing Henry   09/06/18 1458   Eating/Swallowing Management, Strategies/Techniques   Monitoring/Assistance Required (Eating/Swallowing) upright for 30 min following eating   Modified Food and Liquid Recommendations (Eating/Swallowing) nectar thick liquids;level 2, dysphagia mechanically altered diet   Strategies to Enhance Eating/Swallowing upright sitting position for eating       Goal: Optimal/Safe Level of Indep, Swallowing  Outcome: Ongoing (interventions implemented as appropriate)   09/06/18 1458   Eating/Swallowing Impairment (IRF) (Adult)   Optimal/Safe Level of Indep, Swallowing other (see comments)

## 2018-09-06 NOTE — PROGRESS NOTES
Discharge Planning Assessment  KATIE Piña     Patient Name: Anette Dunham  MRN: [id number]  Today's Date: 9/6/2018    Admit Date: 9/5/2018       Discharge Plan     Row Name 09/06/18 1333       Plan    Plan SS received consult for pt requesting a wheelchair at discharge. SS will need an order prior to discharge and SS will arrange.  SS will follow.              Lisa Stokes

## 2018-09-06 NOTE — SIGNIFICANT NOTE
Consult received. Chart reviewed. Pt has significant h/o HBP, chest pain, closed fx of hip, tobacco abuse, anxiety, arrhythmia, coronary artery disease, dysfunctional gallbladder, GERD, hypertension, osteoporosis. D/w Dr. Miranda, who reports pt had recent PNA in last 3 months w/ developing R PNA during this hospitalization. D/w pt, who reports intermittent throat clearing during po intake w/ increased fatigue during meal times. Per pt report and d/w MD about, pt is felt to most benefit from instrumental swallowing evaluation to determine least restrictive diet, assess swallowing fnx. D/w pt MBS vs. FEES, risks and benefits of each procedure. Pt elects to participate in instrumental FEES this pm. D/w RNLinda, who verbalizes agreement an understanding. Plan for FEES this pm w/ further recs pending.    Thank you-  Radha Braun M.S., CCC-SLP

## 2018-09-06 NOTE — PHARMACY PATIENT ASSISTANCE
Pharmacy looked into cost for home medication of Eliquis.  According to her pharmacy at [place], she has a copay of $3.60    Thank You;  Prachi Cardona [facility]  09/06/18  10:35 AM

## 2018-09-06 NOTE — PROGRESS NOTES
Kinetics :  Vancomycin / zosyn  Day 1    The patient has been evaluated for vancomycin therapy for sepsis.  Will load with vancomycin 1250mg x 1 and follow with 750mg q 24 hrs in anticipation of therapeutic levels and monitor with you.      Zosyn therapy will be started as 3.375gm q 8 hrs as the extended infusion protocol.

## 2018-09-06 NOTE — PROGRESS NOTES
Assisted By: Adriana MCDONALD, 2 sisters and a son in law present    CC: Sepsis    Interview History/HPI: Patient began running an elevated temperature to 103 this morning this was confirmed by rectal temperature.  Patient was also tachycardic, she met sepsis criteria, lactic acid is normal.  Patient has had some cough to the night, she does not think she has breaks.  She states she is having some mild intensity pain in her right hip she relates to just lying in bed.  She states that she is having no nausea no vomiting, no diarrhea no dysuria.  No palpitations currently and she has not really been up to know she still has the disequilibrium she had on presentation.    Vitals:    09/06/18 0902   BP: 104/68   Pulse: 88   Resp:    Temp:    SpO2: 98%       Intake/Output Summary (Last 24 hours) at 09/06/18 1020  Last data filed at 09/05/18 2037   Gross per 24 hour   Intake             2500 ml   Output              300 ml   Net             2200 ml       EXAM: Well-developed pleasant in no respiratory distress, saturation is adequate on room air.  Hearing is intact, pupils are equal round react to light, sclera nonicteric face symmetric normocephalic atraumatic.  Neck is supple.  No JVD.  Lungs have bilateral breath sounds crackles the right base posterior otherwise lungs are clear.  Heart is a mildly tachycardic irregularly irregular rhythm without definite murmur.  No edema.  No increased respiratory effort.  She can speak in full sentences without difficulty.  Abdomen soft benign bowel sounds are active no organomegaly aspiration.  Strength is symmetric, sclera nonicteric, she has adequate distal pulses no clubbing or cyanosis skin warm and dry she is alert and neurologically nonfocal mood is good    Tele: A. fib with rapid ventricular rate    LABS:   Lab Results (last 48 hours)     Procedure Component Value Units Date/Time    C-reactive Protein [[id number]]  (Abnormal) Collected:  09/05/18 1555    Specimen:  Blood Updated:   09/06/18 0904     C-Reactive Protein 4.68 (H) mg/dL     Urinalysis With Culture If Indicated - Urine, Clean Catch [[id number]]  (Abnormal) Collected:  09/06/18 0821    Specimen:  Urine from Urine, Clean Catch Updated:  09/06/18 0837     Color, UA Yellow     Appearance, UA Cloudy (A)     pH, UA <=5.0     Specific Gravity, UA 1.016     Glucose, UA Negative     Ketones, UA Trace (A)     Bilirubin, UA Negative     Blood, UA Moderate (2+) (A)     Protein, UA Trace (A)     Leuk Esterase, UA Small (1+) (A)     Nitrite, UA Negative     Urobilinogen, UA 0.2 E.U./dL    Urinalysis, Microscopic Only - Urine, Clean Catch [[id number]]  (Abnormal) Collected:  09/06/18 0821    Specimen:  Urine from Urine, Clean Catch Updated:  09/06/18 0837     RBC, UA 21-30 (A) /HPF      WBC, UA 3-5 (A) /HPF      Bacteria, UA 2+ (A) /HPF      Squamous Epithelial Cells, UA 3-6 (A) /HPF      Hyaline Casts, UA None Seen /LPF      Methodology Automated Microscopy    Lactic Acid, Plasma [[id number]]  (Normal) Collected:  09/06/18 0759    Specimen:  Blood Updated:  09/06/18 0833     Lactate 1.2 mmol/L     Blood Culture - Blood, [[id number]] Collected:  09/06/18 0759    Specimen:  Blood from Arm, Left Updated:  09/06/18 0817    CK-MB Index [[id number]]  (Normal) Collected:  09/06/18 0253    Specimen:  Blood Updated:  09/06/18 0345     CK-MB Index 1.3 %     Troponin [[id number]]  (Normal) Collected:  09/06/18 0253    Specimen:  Blood Updated:  09/06/18 0345     Troponin I 0.023 ng/mL     Narrative:       Ultra Troponin I Reference Range:         <=0.039 ng/mL: Negative    0.04-0.779 ng/mL: Indeterminate Range. Suspicious of MI.  Clinical correlation required.       >=0.78  ng/mL: Consistent with myocardial injury.  Clinical correlation required.    CK Total & CKMB [[id number]]  (Normal) Collected:  09/06/18 0253    Specimen:  Blood Updated:  09/06/18 0345     CKMB 1.63 ng/mL      Creatine Kinase 123 U/L     Blood Culture - Blood, [[id number]]  (Normal) Collected:   09/05/18 1044    Specimen:  Blood from Arm, Left Updated:  09/05/18 2345     Blood Culture No growth at less than 24 hours    Blood Culture - Blood, [[id number]]  (Normal) Collected:  09/05/18 1054    Specimen:  Blood from Arm, Right Updated:  09/05/18 2345     Blood Culture No growth at less than 24 hours    Troponin [[id number]]  (Normal) Collected:  09/05/18 2057    Specimen:  Blood Updated:  09/05/18 2157     Troponin I 0.016 ng/mL     Narrative:       Ultra Troponin I Reference Range:         <=0.039 ng/mL: Negative    0.04-0.779 ng/mL: Indeterminate Range. Suspicious of MI.  Clinical correlation required.       >=0.78  ng/mL: Consistent with myocardial injury.  Clinical correlation required.    CK Total & CKMB [[id number]]  (Abnormal) Collected:  09/05/18 2057    Specimen:  Blood Updated:  09/05/18 2157     CKMB <0.18 ng/mL      Creatine Kinase 23 (L) U/L     CK-MB Index [[id number]] Collected:  09/05/18 2057    Specimen:  Blood Updated:  09/05/18 2157     CK-MB Index -- %      Comment: Unable to calculate.       Troponin [[id number]]  (Normal) Collected:  09/05/18 1555    Specimen:  Blood Updated:  09/05/18 1714     Troponin I 0.012 ng/mL     Narrative:       Ultra Troponin I Reference Range:         <=0.039 ng/mL: Negative    0.04-0.779 ng/mL: Indeterminate Range. Suspicious of MI.  Clinical correlation required.       >=0.78  ng/mL: Consistent with myocardial injury.  Clinical correlation required.    CK Total & CKMB [[id number]]  (Normal) Collected:  09/05/18 1555    Specimen:  Blood Updated:  09/05/18 1714     CKMB <0.18 ng/mL      Creatine Kinase 25 U/L     TSH [[id number]]  (Normal) Collected:  09/05/18 1555    Specimen:  Blood Updated:  09/05/18 1714     TSH 0.963 mIU/mL     Cortisol [[id number]] Collected:  09/05/18 1555    Specimen:  Blood Updated:  09/05/18 1714     Cortisol 32.00 mcg/dL     CK-MB Index [[id number]] Collected:  09/05/18 1555    Specimen:  Blood Updated:  09/05/18 1714     CK-MB Index -- %       Comment: Unable to calculate.       Lactic Acid, Plasma [[id number]]  (Normal) Collected:  09/05/18 1555    Specimen:  Blood Updated:  09/05/18 1700     Lactate 1.4 mmol/L     Hepatitis Panel, Acute [[id number]]  (Normal) Collected:  09/05/18 1010    Specimen:  Blood Updated:  09/05/18 1642     Hepatitis B Surface Ag Non-Reactive     Hep A IgM Non-Reactive     Hep B C IgM Non-Reactive     Hepatitis C Ab Non-Reactive    C-reactive Protein [[id number]]  (Abnormal) Collected:  09/05/18 1010    Specimen:  Blood Updated:  09/05/18 1604     C-Reactive Protein 4.88 (H) mg/dL     TSH [[id number]]  (Normal) Collected:  09/05/18 1010    Specimen:  Blood Updated:  09/05/18 1523     TSH 1.458 mIU/mL     Urinalysis With Culture If Indicated - Urine, Clean Catch [[id number]]  (Abnormal) Collected:  09/05/18 1143    Specimen:  Urine from Urine, Catheter In/Out Updated:  09/05/18 1153     Color, UA Dark Yellow (A)     Appearance, UA Clear     pH, UA 5.5     Specific Gravity, UA 1.019     Glucose, UA Negative     Ketones, UA 15 mg/dL (1+) (A)     Bilirubin, UA Small (1+) (A)     Blood, UA Negative     Protein, UA 30 mg/dL (1+) (A)     Leuk Esterase, UA Trace (A)     Nitrite, UA Negative     Urobilinogen, UA 0.2 E.U./dL    Urinalysis, Microscopic Only - Urine, Clean Catch [[id number]]  (Abnormal) Collected:  09/05/18 1143    Specimen:  Urine from Urine, Catheter In/Out Updated:  09/05/18 1153     RBC, UA 3-5 (A) /HPF      WBC, UA 0-2 /HPF      Bacteria, UA None Seen /HPF      Squamous Epithelial Cells, UA 0-2 /HPF      Hyaline Casts, UA 7-12 /LPF      Methodology Automated Microscopy    Influenza Antigen, Rapid - Swab, Nasopharynx [[id number]]  (Normal) Collected:  09/05/18 1106    Specimen:  Swab from Nasopharynx Updated:  09/05/18 1132     Influenza A Ag, EIA Negative     Influenza B Ag, EIA Negative    [place] Draw [[id number]] Collected:  09/05/18 1010    Specimen:  Blood Updated:  09/05/18 1116    Narrative:       The following  orders were created for panel order [place] Draw.  Procedure                               Abnormality         Status                     ---------                               -----------         ------                     Light Blue Top[[id number]]                                   Final result               Green Top (Gel)[[id number]]                                  Final result               Lavender Top[[id number]]                                     Final result               Gold Top - SST[[id number]]                                   Final result                 Please view results for these tests on the individual orders.    Green Top (Gel) [[id number]] Collected:  09/05/18 1010    Specimen:  Blood Updated:  09/05/18 1116     Extra Tube Hold for add-ons.     Comment: Auto resulted.       Gold Top - SST [[id number]] Collected:  09/05/18 1010    Specimen:  Blood Updated:  09/05/18 1116     Extra Tube Hold for add-ons.     Comment: Auto resulted.       Light Blue Top [[id number]] Collected:  09/05/18 1010    Specimen:  Blood Updated:  09/05/18 1116     Extra Tube hold for add-on     Comment: Auto resulted       Lavender Top [[id number]] Collected:  09/05/18 1010    Specimen:  Blood Updated:  09/05/18 1116     Extra Tube hold for add-on     Comment: Auto resulted       Troponin [[id number]]  (Normal) Collected:  09/05/18 1010    Specimen:  Blood Updated:  09/05/18 1115     Troponin I 0.020 ng/mL     Narrative:       Ultra Troponin I Reference Range:         <=0.039 ng/mL: Negative    0.04-0.779 ng/mL: Indeterminate Range. Suspicious of MI.  Clinical correlation required.       >=0.78  ng/mL: Consistent with myocardial injury.  Clinical correlation required.    Comprehensive Metabolic Panel [[id number]]  (Abnormal) Collected:  09/05/18 1010    Specimen:  Blood Updated:  09/05/18 1106     Glucose 138 (H) mg/dL      BUN 20 mg/dL      Creatinine 1.38 (H) mg/dL      Sodium 133 (L) mmol/L      Potassium 3.8 mmol/L       Chloride 104 mmol/L      CO2 18.4 (L) mmol/L      Calcium 9.1 mg/dL      Total Protein 7.9 g/dL      Albumin 3.60 g/dL      ALT (SGPT) 39 (H) U/L      AST (SGOT) 57 (H) U/L      Alkaline Phosphatase 72 U/L      Comment: Note New Reference Ranges        Total Bilirubin 0.3 mg/dL      eGFR Non African Amer 37 (L) mL/min/1.73      Globulin 4.3 gm/dL      A/G Ratio 0.8 (L) g/dL      BUN/Creatinine Ratio 14.5     Anion Gap 10.6 mmol/L     Narrative:       The MDRD GFR formula is only valid for adults with stable renal function between ages 18 and 70.    Osmolality, Calculated [[id number]]  (Abnormal) Collected:  09/05/18 1010    Specimen:  Blood Updated:  09/05/18 1106     Osmolality Calc 271.2 (L) mOsm/kg     Blood Gas, Arterial [[id number]]  (Abnormal) Collected:  09/05/18 1055    Specimen:  Arterial Blood Updated:  09/05/18 1101     Site Arterial: left brachial     Leif's Test N/A     pH, Arterial 7.497 (H) pH units      pCO2, Arterial 25.7 (C) mm Hg      pO2, Arterial 72.1 (L) mm Hg      HCO3, Arterial 19.5 (C) mmol/L      Base Excess, Arterial -2.3 mmol/L      O2 Saturation, Arterial 94.5 %      Hemoglobin, Blood Gas 13.0 g/dL      Hematocrit, Blood Gas 38.0 %      Oxyhemoglobin 92.9 %      Methemoglobin 0.40 %      Carboxyhemoglobin 1.3 %      A-a Gradiant 41.0 mmHg      Temperature 98.6 C      Barometric Pressure for Blood Gas 732 mmHg      Modality Room Air     FIO2 21 %     CBC & Differential [[id number]] Collected:  09/05/18 1010    Specimen:  Blood Updated:  09/05/18 1100    Narrative:       The following orders were created for panel order CBC & Differential.  Procedure                               Abnormality         Status                     ---------                               -----------         ------                     Scan Slide[[id number]]                                                                  CBC Auto Differential[[id number]]        Abnormal            Final result                 Please  view results for these tests on the individual orders.    CBC Auto Differential [[id number]]  (Abnormal) Collected:  09/05/18 1010    Specimen:  Blood Updated:  09/05/18 1100     WBC 7.19 10*3/mm3      RBC 4.33 10*6/mm3      Hemoglobin 12.6 g/dL      Hematocrit 37.4 %      MCV 86.4 fL      MCH 29.1 pg      MCHC 33.7 g/dL      RDW 13.4 %      RDW-SD 42.9 fl      MPV 12.0 (H) fL      Platelets 184 10*3/mm3      Neutrophil % 48.3 %      Lymphocyte % 36.4 %      Monocyte % 13.6 (H) %      Eosinophil % 0.0 %      Basophil % 1.4 %      Immature Grans % 0.3 %      Neutrophils, Absolute 3.47 10*3/mm3      Lymphocytes, Absolute 2.62 10*3/mm3      Monocytes, Absolute 0.98 (H) 10*3/mm3      Eosinophils, Absolute 0.00 10*3/mm3      Basophils, Absolute 0.10 10*3/mm3      Immature Grans, Absolute 0.02 10*3/mm3           Radiology:  Imaging Results (last 72 hours)     Procedure Component Value Units Date/Time    XR Chest 1 View [[id number]] Collected:  09/06/18 1016     Updated:  09/06/18 1019    Narrative:       EXAMINATION:  XR CHEST 1 VW-      CLINICAL INDICATION:     Sepsis; I48.91-Unspecified atrial fibrillation;  R53.1-Weakness     TECHNIQUE:  XR CHEST 1 VW-       COMPARISON: 9/5/2018      FINDINGS:   Bibasilar atelectasis and minimal airspace disease noted.   Heart size is stable.   No pneumothorax.   No pleural effusion.   Bony and soft tissue structures are unremarkable.            Impression:       Bibasilar atelectasis and minimal airspace disease noted.     This report was finalized on 9/6/2018 10:17 AM by Dr. Sae Montoya MD.       CT Head Without Contrast [[id number]] Collected:  09/06/18 0828     Updated:  09/06/18 0830    Narrative:          EXAMINATION: CT HEAD WO CONTRAST-      CLINICAL INDICATION:     weakness, frequent falls; I48.91-Unspecified  atrial fibrillation; R53.1-Weakness     TECHNIQUE: Contiguous axial CT images of the head were obtained without  contrast administration.      Radiation dose reduction  techniques were utilized per ALARA protocol.  Automated exposure control was initiated through either or CareDose or  DoseRight software packages by  protocol.       677.76 mGy.cm     COMPARISON:  None.       FINDINGS: Generalized cerebral atrophy is present. There is no mass  effect, midline displacement, or hydrocephalus. There are patchy areas  of decreased density within the periventricular white matter which  likely reflect chronic small vessel ischemic changes. There is no CT  evidence of acute infarct or hemorrhage. Bone windows reveal no osseous  abnormalities or fractures.        Impression:       Atrophy and chronic small vessel ischemic change, but there  are no acute intracranial abnormalities identified.         This report was finalized on 9/6/2018 8:28 AM by Dr. Sae Montoya MD.       XR Chest 1 View [[id number]] Collected:  09/05/18 1134     Updated:  09/05/18 1204    Narrative:       EXAMINATION:  XR CHEST 1 VW-      CLINICAL INDICATION:     Short of breath     TECHNIQUE:  XR CHEST 1 VW-       COMPARISON: 6/1/2018      FINDINGS:   Coarsened interstitial markings.   Heart size is stable.   No pneumothorax.   Probably tiny pleural effusions.   Bony and soft tissue structures are unremarkable.            Impression:       Stable radiographic appearance of the chest.     This report was finalized on 9/5/2018 11:35 AM by Dr. Sae Montoya MD.           Chest x-ray reviewed, right lower lobe pneumonia to my viewing.    Results for orders placed during the hospital encounter of 08/17/16   Adult transthoracic echo complete    Narrative · All left ventricular wall segments contract normally.  · Left ventricular function is normal. Estimated EF = 50%.  · Left atrial cavity size is mild-to-moderately dilated.            Assessment/Plan:   Sepsis, new problem, lactic acid normal, Vanco and Zosyn, this appears to be pneumonia, atypical screens have been added.  Swallowing evaluation has been ordered.   ID consultation place.    Atrial fibrillation with rapid ventricular rate, she has a history of atrial fibrillation but the recurrence may be related to sepsis.  Patient was being considered for cardioversion but discussed with , because of the sepsis picture this has been canceled and he is going to manage this atrial fibrillation medically.  Currently controlled on diltiazem, she is anticoagulated on Eliquis for stroke prevention.  Echo is pending.    DVT prophylaxis, full anticoagulation will serve for this as well.    Hypothyroidism with adequate replacement    Hypotension, improved    Mildly elevated transaminases, follow, not necessarily new for this patient, hep screen negative    Repeat labs pending for this a.m.    Nutrition, diet will be advanced once a swallowing eval done.    Acute kidney injury, she has been hydrated, repeat labs pending

## 2018-09-06 NOTE — PLAN OF CARE
DYSPHAGIA THERAPY PLAN OF CARE:    Anette Dunham will benefit from formal dysphagia therapy  X3-5 days per week at 15-30 minutes sessions, as functionally tolerated, for 7-10 Days, to address:    Long Term Goal:  1. Pt will accept least restrictive diet tolerance w/o overt s/s aspiration.     Short Term Goals:  1. Pt will perform OROM/DARRIUS exercises x3 sets x10 reps w/ min cues.  2. Pt will perform resistive breathing exercises x3 sets x5 reps w/resistance of 1-6 To improve respiratory support and control.   3. Pt will perform laryngeal adduction/elevation exercises x3 sets x10 reps w/ min cues.   4. Pt will perform Shaker exercises sustained x3 sets x5 reps for 30+ seconds over 3 consecutive sessions.   5. Pt will perform Shaker exercises repetitive x3 sets x12 reps w/ mod cues.   6. Pt will perform compensatory techniques during meals w/ min cues.  7. Pt will participate in instrumental re-evaluation of swallowing fnx in 7-10 days, pending progress towards this poc.    Thank you-  CODY LyonS., CCC-SLP

## 2018-09-06 NOTE — CONSULTS
INFECTIOUS DISEASE CONSULTATION REPORT      Referring Provider: Dr. Miranda  Reason for Consultation: sepsis and apparent rt LL pneumonia new this AM      Principal problem: <principal problem not specified>    Subjective .     History of present illness:    As you well know Dr. Miranda, Ms. Anette Dunham is a 78 y.o. female with past medical history significant for CAD, A fibb, HTN, who presented to [facility] Emergency Department on 9/5/2018 for weakness and recent falls.  Found on presentation to be in atrial fibrillation with RVR.  Patient is on chronic anticoagulation with Eliquis.  Today she was found have a fever up to 103 with right lower lobe pneumonia.  Normal white count, normal lactic acid and elevated CRP of 4.68.  Scheduled for swallow evaluation.  She denies any fever, chills, nausea, vomiting or diarrhea. Family at bedside and case discussed.     Infectious Disease consultation was requested for antimicrobial management.     History taken from: patient chart RN    Case was discussed with patient and nursing staff    Subjective         Review of Systems     Constitutional: See HPI  Eyes: No eye drainage, itching or redness.  HEENT: No mouth sores, dysphagia or nose bleed.  Respiratory: See HPI  Cardiovascular: No chest pain, no palpitations, no orthopnea.  Gastrointestinal: No nausea, vomiting or diarrhea. No abdominal pain, hematemesis or rectal bleeding.  Genitourinary: No dysuria or polyuria.  Hematologic/lymphatic: No lymph node abnormalities, no easy bruising or easy bleeding.  Musculoskeletal: No muscle or joint pain.  Skin: No rash and no itching.  Neurological: No loss of consciousness, no seizure, no headache.  Behavioral/Psych: No depression or suicidal ideation.  Endocrine: No hot flashes.  Immunologic: Negative.    Past Medical History    Past Medical History:   Diagnosis Date   • Anxiety    • Arrhythmia    • Atrial fibrillation (CMS/HCC)    • Coronary artery disease    •  "Disease of thyroid gland    • Dysfunctional gallbladder    • GERD (gastroesophageal reflux disease)    • Hypertension    • Osteoporosis        Past Surgical History    Past Surgical History:   Procedure Laterality Date   • ABDOMINAL SURGERY     • CHOLECYSTECTOMY OPEN     • HIP HEMIARTHROPLASTY Left 5/29/2018    Procedure: LEFT HIP BIPOLAR HEMIARTHROPLASTY;  Surgeon: Trevon Arriaga MD;  Location: [facility];  Service: Orthopedics       Family History    Family History   Problem Relation Age of Onset   • Heart disease Father        Social History    Social History   Substance Use Topics   • Smoking status: Current Every Day Smoker     Packs/day: 0.25     Years: 15.00     Types: Cigarettes   • Smokeless tobacco: Current User     Types: Snuff      Comment: snuff for 60 years    • Alcohol use No       Allergies    Patient has no known allergies.        Objective         Objective     BP 96/64   Pulse 73   Temp (!) 103.1 °F (39.5 °C) (Rectal)   Resp 18   Ht 162.6 cm (64\")   Wt 74.1 kg (163 lb 4.8 oz)   SpO2 98%   BMI 28.03 kg/m²     Temp:  [97.8 °F (36.6 °C)-103.1 °F (39.5 °C)] 103.1 °F (39.5 °C)        Intake/Output Summary (Last 24 hours) at 09/06/18 1353  Last data filed at 09/06/18 1102   Gross per 24 hour   Intake             2500 ml   Output              300 ml   Net             2200 ml         Physical Exam:      General Appearance:    Alert, cooperative, in no acute distress   Head:    Normocephalic, without obvious abnormality, atraumatic   Eyes:            Lids and lashes normal, conjunctivae and sclerae normal, no   icterus, no pallor, corneas clear, PERRLA   Ears:    Ears appear intact with no abnormalities noted   Throat:   No oral lesions, no thrush, oral mucosa moist   Neck:   No adenopathy, supple, trachea midline, no thyromegaly, no   carotid bruit, no JVD   Back:     No tenderness to percussion or palpation, range of motion   normal   Lungs:     Crackles to the right base    Heart:    Regular rhythm " and normal rate, normal S1 and S2, no            murmur, no gallop, no rub, no click   Chest Wall:    No abnormalities observed   Abdomen:     Normal bowel sounds, no masses, no organomegaly, soft        non-tender, non-distended, no guarding, no rebound                tenderness   Rectal:     Deferred   Extremities:   Moves all extremities well, no edema, no cyanosis, no             redness   Pulses:   Pulses palpable and equal bilaterally   Skin:   No bleeding, bruising or rash   Lymph nodes:   No palpable adenopathy   Neurologic:   Awake, alert and oriented x 3. Following commands.       Results:      Results from last 7 days  Lab Units 09/06/18  1054 09/05/18  1010   WBC 10*3/mm3 8.32 7.19     Lab Results   Component Value Date    NEUTROABS 4.67 09/06/2018         Results from last 7 days  Lab Units 09/06/18  1054   CREATININE mg/dL 1.01         Results from last 7 days  Lab Units 09/05/18  1555 09/05/18  1010   CRP mg/dL 4.68* 4.88*       Imaging Results (last 24 hours)     Procedure Component Value Units Date/Time    US Carotid Bilateral [[id number]] Collected:  09/06/18 1318     Updated:  09/06/18 1321    Narrative:       EXAMINATION: US CAROTID BILATERAL-      Technique: Multiple real-time color Doppler images were acquired of  bilateral carotid arteries.     Stenosis measurements if obtained, were performed by the NASCET or  similar method.        CLINICAL INDICATION:     Frequent falls, weakness; I48.91-Unspecified  atrial fibrillation; R53.1-Weakness          COMPARISON:    None     FINDINGS:        RIGHT:  Moderate plaque right carotid bulb and right ICA. No occlusion.  RIGHT CCA PSV: 726 mm/s  RIGHT ICA PSV: -461 mm/s  RIGHT ICA EDV: -107 mm/s.   Right ICA/CCA Ratio: 0.63  Anterograde flow is demonstrated in RIGHT vertebral artery.     LEFT:  Mild plaque. No occlusion.  LEFT CCA PSV: 859 mm/s  LEFT ICA PSV: -924 mm/s  LEFT EDV: -274 mm/s.   Left ICA/CCA Ratio: 1.08  Anterograde flow is demonstrated in  LEFT vertebral artery.          Impression:       1. Mild to moderate plaque right greater than left carotid systems. No  occlusion.  2. No hemodynamically significant stenosis of either RIGHT or LEFT ICA.  3. Antegrade flow noted both vertebral arteries.     This report was finalized on 9/6/2018 1:19 PM by Dr. Luis E Kovacs MD.       XR Chest 1 View [[id number]] Collected:  09/06/18 1016     Updated:  09/06/18 1019    Narrative:       EXAMINATION:  XR CHEST 1 VW-      CLINICAL INDICATION:     Sepsis; I48.91-Unspecified atrial fibrillation;  R53.1-Weakness     TECHNIQUE:  XR CHEST 1 VW-       COMPARISON: 9/5/2018      FINDINGS:   Bibasilar atelectasis and minimal airspace disease noted.   Heart size is stable.   No pneumothorax.   No pleural effusion.   Bony and soft tissue structures are unremarkable.            Impression:       Bibasilar atelectasis and minimal airspace disease noted.     This report was finalized on 9/6/2018 10:17 AM by Dr. Sae Montoya MD.       CT Head Without Contrast [[id number]] Collected:  09/06/18 0828     Updated:  09/06/18 0830    Narrative:          EXAMINATION: CT HEAD WO CONTRAST-      CLINICAL INDICATION:     weakness, frequent falls; I48.91-Unspecified  atrial fibrillation; R53.1-Weakness     TECHNIQUE: Contiguous axial CT images of the head were obtained without  contrast administration.      Radiation dose reduction techniques were utilized per ALARA protocol.  Automated exposure control was initiated through either or [employer] or  DoseRight software packages by  protocol.       677.76 mGy.cm     COMPARISON:  None.       FINDINGS: Generalized cerebral atrophy is present. There is no mass  effect, midline displacement, or hydrocephalus. There are patchy areas  of decreased density within the periventricular white matter which  likely reflect chronic small vessel ischemic changes. There is no CT  evidence of acute infarct or hemorrhage. Bone windows reveal no  osseous  abnormalities or fractures.        Impression:       Atrophy and chronic small vessel ischemic change, but there  are no acute intracranial abnormalities identified.         This report was finalized on 9/6/2018 8:28 AM by Dr. Sae Montoya MD.               Cultures:    Blood Culture   Date Value Ref Range Status   09/05/2018 No growth at 24 hours  Preliminary   09/05/2018 No growth at 24 hours  Preliminary       Results Review:    I have personally reviewed laboratory data, culture results, radiology studies and antimicrobial therapy.    Hospital Medications (active)       Dose Frequency Start End    albuterol (PROVENTIL) nebulizer solution 0.083% 2.5 mg/3mL 2.5 mg Every 6 Hours PRN 9/5/2018     Sig - Route: Take 2.5 mg by nebulization Every 6 (Six) Hours As Needed for Wheezing or Shortness of Air. - Nebulization    apixaban (ELIQUIS) tablet 5 mg 5 mg Every 12 Hours Scheduled 9/5/2018     Sig - Route: Take 1 tablet by mouth Every 12 (Twelve) Hours. - Oral    calcium carb-cholecalciferol 600-800 MG-UNIT tablet 1 tablet 1 tablet 2 Times Daily With Meals 9/5/2018     Sig - Route: Take 1 tablet by mouth 2 (Two) Times a Day With Meals. - Oral    cetirizine (zyrTEC) tablet 5 mg 5 mg Daily 9/5/2018     Sig - Route: Take 0.5 tablets by mouth Daily. - Oral    diltiaZEM (CARDIZEM) 125 mg in sodium chloride 0.9 % 125 mL (1 mg/mL) infusion 5-15 mg/hr Titrated 9/5/2018     Sig - Route: Infuse 5-15 mg/hr into a venous catheter Dose Adjusted By Provider As Needed. - Intravenous    Cosign for Ordering: Accepted by Dmitry Miranda MD on 9/5/2018  5:35 PM    doxycycline (VIBRAMYCIN) 100 mg/100 mL 0.9% NS  mg Every 12 Hours 9/6/2018 9/13/2018    Sig - Route: Infuse 100 mL into a venous catheter Every 12 (Twelve) Hours. - Intravenous    Cosign for Ordering: Required by Evan Winters MD    famotidine (PEPCID) tablet 40 mg 40 mg Daily 9/5/2018     Sig - Route: Take 2 tablets by mouth Daily. - Oral    gabapentin  (NEURONTIN) capsule 400 mg 400 mg 2 Times Daily 9/5/2018     Sig - Route: Take 1 capsule by mouth 2 (Two) Times a Day. - Oral    lactobacillus acidophilus (RISAQUAD) capsule 1 capsule 1 capsule Daily 9/6/2018     Sig - Route: Take 1 capsule by mouth Daily. - Oral    levothyroxine (SYNTHROID, LEVOTHROID) tablet 50 mcg 50 mcg Daily 9/6/2018     Sig - Route: Take 1 tablet by mouth Daily. - Oral    LORazepam (ATIVAN) tablet 1 mg 1 mg Every 8 Hours PRN 9/5/2018     Sig - Route: Take 1 tablet by mouth Every 8 (Eight) Hours As Needed for Anxiety (1). - Oral    metoprolol tartrate (LOPRESSOR) tablet 25 mg 25 mg Every 12 Hours Scheduled 9/5/2018     Sig - Route: Take 1 tablet by mouth Every 12 (Twelve) Hours. - Oral    Pharmacy to dose vancomycin  Continuous PRN 9/6/2018 9/13/2018    Sig - Route: Continuous As Needed for Consult. - Does not apply    Cosign for Ordering: Accepted by Dmitry Miranda MD on 9/6/2018  9:51 AM    Pharmacy to Dose Zosyn  Continuous PRN 9/6/2018 9/13/2018    Sig - Route: Continuous As Needed for Consult. - Does not apply    Cosign for Ordering: Accepted by Dmitry Miranda MD on 9/6/2018  9:51 AM    piperacillin-tazobactam (ZOSYN) 3.375 g/100 mL 0.9% NS IVPB (mbp) 3.375 g Once 9/6/2018 9/6/2018    Sig - Route: Infuse 100 mL into a venous catheter 1 (One) Time. - Intravenous    piperacillin-tazobactam (ZOSYN) 3.375 g/100 mL 0.9% NS IVPB (mbp) 3.375 g Every 8 Hours 9/6/2018 9/13/2018    Sig - Route: Infuse 100 mL into a venous catheter Every 8 (Eight) Hours. - Intravenous    rOPINIRole (REQUIP) tablet 1 mg 1 mg Nightly PRN 9/5/2018     Sig - Route: Take 1 tablet by mouth At Night As Needed (restless leg). - Oral    sennosides-docusate sodium (SENOKOT-S) 8.6-50 MG tablet 2 tablet 2 tablet 2 Times Daily 9/5/2018     Sig - Route: Take 2 tablets by mouth 2 (Two) Times a Day. - Oral    sodium chloride 0.9 % bolus 500 mL 500 mL Once 9/5/2018 9/5/2018    Sig - Route: Infuse 500 mL into a venous catheter 1  "(One) Time. - Intravenous    Cosign for Ordering: Accepted by Dmitry Miranda MD on 9/5/2018  5:35 PM    sodium chloride 0.9 % flush 1-10 mL 1-10 mL As Needed 9/5/2018     Sig - Route: Infuse 1-10 mL into a venous catheter As Needed for Line Care. - Intravenous    Cosign for Ordering: Accepted by Dmitry Miranda MD on 9/5/2018  5:35 PM    sodium chloride 0.9 % flush 10 mL 10 mL As Needed 9/5/2018     Sig - Route: Infuse 10 mL into a venous catheter As Needed for Line Care. - Intravenous    Linked Group 1:  \"And\" Linked Group Details        sodium chloride 0.9 % infusion 75 mL/hr Continuous 9/5/2018     Sig - Route: Infuse 75 mL/hr into a venous catheter Continuous. - Intravenous    Cosign for Ordering: Accepted by Dmitry Miranda MD on 9/5/2018  5:35 PM    traMADol (ULTRAM) tablet 50 mg 50 mg Every 8 Hours PRN 9/5/2018     Sig - Route: Take 1 tablet by mouth Every 8 (Eight) Hours As Needed for Moderate Pain . - Oral    vancomycin (VANCOCIN) 1,250 mg in sodium chloride 0.9 % 250 mL IVPB 1,250 mg Once 9/6/2018 9/6/2018    Sig - Route: Infuse 1,250 mg into a venous catheter 1 (One) Time. - Intravenous    diltiaZEM (CARDIZEM) 125 mg in sodium chloride 0.9 % 125 mL (1 mg/mL) infusion (Discontinued) 5 mg/hr Continuous 9/5/2018 9/5/2018    Sig - Route: Infuse 5 mg/hr into a venous catheter Continuous. - Intravenous    Reason for Discontinue: Duplicate order    vancomycin (VANCOCIN) 750 mg in sodium chloride 0.9 % 250 mL IVPB (Discontinued) 750 mg Every 24 Hours 9/7/2018 9/6/2018    Sig - Route: Infuse 750 mg into a venous catheter Daily. - Intravenous            ASSESSMENT/PLAN           Assessment/Plan     ASSESSMENT:    1.  Pneumonia    PLAN:    For now would recommend to continue Zosyn and Doxycycline initiated at 100 mg IV every 12 hours.  Vancomycin was discontinued as risk outweighs benefit. CRP ordered for a.m.  Will to follow closely and adjust antibiotic therapy appropriately.      Patient's findings and " recommendations were discussed with patient, family and nursing staff    Code Status:   Code Status and Medical Interventions:   Ordered at: 09/05/18 1427     Code Status:    CPR     Medical Interventions (Level of Support Prior to Arrest):    Full       Dayanara Winn PA-C  09/06/18  1:53 PM

## 2018-09-07 LAB
ALBUMIN SERPL-MCNC: 3.2 G/DL (ref 3.4–4.8)
ALBUMIN/GLOB SERPL: 0.8 G/DL (ref 1.5–2.5)
ALP SERPL-CCNC: 56 U/L (ref 35–104)
ALT SERPL W P-5'-P-CCNC: 41 U/L (ref 10–36)
ANION GAP SERPL CALCULATED.3IONS-SCNC: 6.5 MMOL/L (ref 3.6–11.2)
AST SERPL-CCNC: 77 U/L (ref 10–30)
BASOPHILS # BLD MANUAL: 0.12 10*3/MM3 (ref 0–0.3)
BASOPHILS NFR BLD AUTO: 1 % (ref 0–2)
BILIRUB SERPL-MCNC: 0.5 MG/DL (ref 0.2–1.8)
BUN BLD-MCNC: 13 MG/DL (ref 7–21)
BUN/CREAT SERPL: 12 (ref 7–25)
CALCIUM SPEC-SCNC: 8.1 MG/DL (ref 7.7–10)
CHLORIDE SERPL-SCNC: 118 MMOL/L (ref 99–112)
CO2 SERPL-SCNC: 17.5 MMOL/L (ref 24.3–31.9)
CREAT BLD-MCNC: 1.08 MG/DL (ref 0.43–1.29)
CRP SERPL-MCNC: 6.93 MG/DL (ref 0–0.99)
DEPRECATED RDW RBC AUTO: 42.2 FL (ref 37–54)
ERYTHROCYTE [DISTWIDTH] IN BLOOD BY AUTOMATED COUNT: 13.5 % (ref 11.5–14.5)
GFR SERPL CREATININE-BSD FRML MDRD: 49 ML/MIN/1.73
GLOBULIN UR ELPH-MCNC: 3.9 GM/DL
GLUCOSE BLD-MCNC: 124 MG/DL (ref 70–110)
HCT VFR BLD AUTO: 34.4 % (ref 37–47)
HGB BLD-MCNC: 11.6 G/DL (ref 12–16)
LYMPHOCYTES # BLD MANUAL: 1.57 10*3/MM3 (ref 1–3)
LYMPHOCYTES NFR BLD MANUAL: 13 % (ref 16–46)
LYMPHOCYTES NFR BLD MANUAL: 4 % (ref 0–12)
MCH RBC QN AUTO: 29.6 PG (ref 27–33)
MCHC RBC AUTO-ENTMCNC: 33.7 G/DL (ref 33–37)
MCV RBC AUTO: 87.8 FL (ref 80–94)
MONOCYTES # BLD AUTO: 0.48 10*3/MM3 (ref 0.1–0.9)
NEUTROPHILS # BLD AUTO: 9.91 10*3/MM3 (ref 1.4–6.5)
NEUTROPHILS NFR BLD MANUAL: 77 % (ref 40–75)
NEUTS BAND NFR BLD MANUAL: 5 % (ref 0–8)
OSMOLALITY SERPL CALC.SUM OF ELEC: 284.7 MOSM/KG (ref 273–305)
PLAT MORPH BLD: NORMAL
PLATELET # BLD AUTO: 164 10*3/MM3 (ref 130–400)
PMV BLD AUTO: 11.1 FL (ref 6–10)
POTASSIUM BLD-SCNC: 3.7 MMOL/L (ref 3.5–5.3)
PROT SERPL-MCNC: 7.1 G/DL (ref 6–8)
RBC # BLD AUTO: 3.92 10*6/MM3 (ref 4.2–5.4)
RBC MORPH BLD: NORMAL
SCAN SLIDE: NORMAL
SODIUM BLD-SCNC: 142 MMOL/L (ref 135–153)
WBC NRBC COR # BLD: 12.08 10*3/MM3 (ref 4.5–12.5)

## 2018-09-07 PROCEDURE — 25010000002 PIPERACILLIN-TAZOBACTAM: Performed by: INTERNAL MEDICINE

## 2018-09-07 PROCEDURE — 85025 COMPLETE CBC W/AUTO DIFF WBC: CPT | Performed by: INTERNAL MEDICINE

## 2018-09-07 PROCEDURE — 99232 SBSQ HOSP IP/OBS MODERATE 35: CPT | Performed by: NURSE PRACTITIONER

## 2018-09-07 PROCEDURE — 99233 SBSQ HOSP IP/OBS HIGH 50: CPT | Performed by: INTERNAL MEDICINE

## 2018-09-07 PROCEDURE — 85007 BL SMEAR W/DIFF WBC COUNT: CPT | Performed by: INTERNAL MEDICINE

## 2018-09-07 PROCEDURE — 80053 COMPREHEN METABOLIC PANEL: CPT | Performed by: INTERNAL MEDICINE

## 2018-09-07 PROCEDURE — 94799 UNLISTED PULMONARY SVC/PX: CPT

## 2018-09-07 PROCEDURE — 86140 C-REACTIVE PROTEIN: CPT | Performed by: INTERNAL MEDICINE

## 2018-09-07 PROCEDURE — 92526 ORAL FUNCTION THERAPY: CPT | Performed by: SPEECH-LANGUAGE PATHOLOGIST

## 2018-09-07 RX ORDER — SODIUM CHLORIDE 0.9 % (FLUSH) 0.9 %
3 SYRINGE (ML) INJECTION EVERY 8 HOURS
Status: DISCONTINUED | OUTPATIENT
Start: 2018-09-07 | End: 2018-09-10 | Stop reason: HOSPADM

## 2018-09-07 RX ORDER — SODIUM CHLORIDE 0.9 % (FLUSH) 0.9 %
5 SYRINGE (ML) INJECTION AS NEEDED
Status: DISCONTINUED | OUTPATIENT
Start: 2018-09-07 | End: 2018-09-10 | Stop reason: HOSPADM

## 2018-09-07 RX ORDER — DILTIAZEM HYDROCHLORIDE 180 MG/1
180 CAPSULE, COATED, EXTENDED RELEASE ORAL
Status: DISCONTINUED | OUTPATIENT
Start: 2018-09-08 | End: 2018-09-09

## 2018-09-07 RX ADMIN — PIPERACILLIN SODIUM,TAZOBACTAM SODIUM 3.38 G: 3; .375 INJECTION, POWDER, FOR SOLUTION INTRAVENOUS at 08:59

## 2018-09-07 RX ADMIN — SODIUM CHLORIDE 75 ML/HR: 9 INJECTION, SOLUTION INTRAVENOUS at 15:49

## 2018-09-07 RX ADMIN — SENNOSIDES AND DOCUSATE SODIUM 2 TABLET: 8.6; 5 TABLET ORAL at 20:26

## 2018-09-07 RX ADMIN — APIXABAN 5 MG: 5 TABLET, FILM COATED ORAL at 20:25

## 2018-09-07 RX ADMIN — FAMOTIDINE 40 MG: 20 TABLET, FILM COATED ORAL at 09:06

## 2018-09-07 RX ADMIN — CETIRIZINE HYDROCHLORIDE 5 MG: 10 TABLET, FILM COATED ORAL at 09:05

## 2018-09-07 RX ADMIN — GABAPENTIN 400 MG: 400 CAPSULE ORAL at 09:06

## 2018-09-07 RX ADMIN — PIPERACILLIN SODIUM,TAZOBACTAM SODIUM 3.38 G: 3; .375 INJECTION, POWDER, FOR SOLUTION INTRAVENOUS at 15:49

## 2018-09-07 RX ADMIN — DOXYCYCLINE 100 MG: 100 INJECTION, POWDER, LYOPHILIZED, FOR SOLUTION INTRAVENOUS at 15:49

## 2018-09-07 RX ADMIN — Medication 3 ML: at 20:25

## 2018-09-07 RX ADMIN — METOPROLOL TARTRATE 25 MG: 25 TABLET, FILM COATED ORAL at 20:25

## 2018-09-07 RX ADMIN — PIPERACILLIN SODIUM,TAZOBACTAM SODIUM 3.38 G: 3; .375 INJECTION, POWDER, FOR SOLUTION INTRAVENOUS at 00:14

## 2018-09-07 RX ADMIN — DILTIAZEM HYDROCHLORIDE 120 MG: 120 CAPSULE, COATED, EXTENDED RELEASE ORAL at 09:06

## 2018-09-07 RX ADMIN — Medication 1 TABLET: at 09:06

## 2018-09-07 RX ADMIN — SENNOSIDES AND DOCUSATE SODIUM 2 TABLET: 8.6; 5 TABLET ORAL at 09:05

## 2018-09-07 RX ADMIN — Medication 1 TABLET: at 18:16

## 2018-09-07 RX ADMIN — Medication 3 ML: at 15:53

## 2018-09-07 RX ADMIN — METOPROLOL TARTRATE 25 MG: 25 TABLET, FILM COATED ORAL at 09:06

## 2018-09-07 RX ADMIN — APIXABAN 5 MG: 5 TABLET, FILM COATED ORAL at 09:06

## 2018-09-07 RX ADMIN — ACETAMINOPHEN 650 MG: 325 TABLET ORAL at 00:14

## 2018-09-07 RX ADMIN — POTASSIUM CHLORIDE 40 MEQ: 1500 TABLET, EXTENDED RELEASE ORAL at 00:14

## 2018-09-07 RX ADMIN — Medication 1 CAPSULE: at 09:06

## 2018-09-07 RX ADMIN — DOXYCYCLINE 100 MG: 100 INJECTION, POWDER, LYOPHILIZED, FOR SOLUTION INTRAVENOUS at 03:10

## 2018-09-07 RX ADMIN — GABAPENTIN 400 MG: 400 CAPSULE ORAL at 20:26

## 2018-09-07 RX ADMIN — LEVOTHYROXINE SODIUM 50 MCG: 50 TABLET ORAL at 09:06

## 2018-09-07 NOTE — PROGRESS NOTES
Chief complaint:  Atrial fibrillation    Admitting History: Anette Dunham is a 78 y.o. female with past medical history significant for paroxysmal atrial fibrillation, essential hypertension, and tobacco use.  She presented to the ED on 9/5/2018 with complaints of generalized weakness and recent falls.  Upon presentation she was noted to be in atrial fibrillation with RVR, initial heart rate was in the 150s with borderline blood pressures in the low 100s to high 90s systolically and 50s to 70s diastolically.  She is chronically anticoagulated on Eliquis.  Rate control with metoprolol 25 mg twice a day.  She was initially started on a Cardizem drip after Cardizem bolus of 20 mg.  She reported that she had been feeling weak since 9/1/2018 and sustained a few falls during that time.  She denies syncope or near syncopal episode.  She denies any trauma.  She was admitted for further evaluation and management.  Cardiology was consulted due to the atrial fibrillation with RVR.    Interval History: Pt is resting comfortably, family at bedside.  She denies complaint of palpitations, chest pain.  She has not been out of bed today.      Physical Exam   Constitutional: Vital signs are normal. She appears cachectic.   HENT:   Head: Normocephalic and atraumatic.   Eyes: Pupils are equal, round, and reactive to light. Conjunctivae and lids are normal.   Neck: No JVD present. Carotid bruit is not present.   Cardiovascular: Normal rate, normal heart sounds and intact distal pulses.  An irregularly irregular rhythm present. PMI is not displaced.    No lower extremity swelling.     Pulmonary/Chest: Effort normal and breath sounds normal. No respiratory distress.   Abdominal: Soft. Normal appearance. There is no tenderness.   Neurological: She is alert.   Skin: Skin is warm and dry.   Psychiatric: She has a normal mood and affect. Cognition and memory are normal.        Telemetry:  A fib 90-100s    Patient Vitals for the past 24 hrs:    BP Temp Temp src Pulse Resp SpO2 Weight   09/07/18 1802 123/74 - - 95 - 97 % -   09/07/18 1702 116/80 - - 101 - 92 % -   09/07/18 1602 101/59 - - 82 - 99 % -   09/07/18 1502 97/65 - - 69 - 96 % -   09/07/18 1418 98/85 - - 108 26 95 % -   09/07/18 1302 103/56 - - 83 - 98 % -   09/07/18 1247 104/59 - - 83 - 99 % -   09/07/18 1202 104/71 - - 73 - 92 % -   09/07/18 1101 93/65 - - 71 - 97 % -   09/07/18 1016 108/63 - - 93 - 98 % -   09/07/18 1002 101/69 - - 80 - 97 % -   09/07/18 0902 105/86 - - 98 - 97 % -   09/07/18 0817 111/72 - - 110 - 92 % -   09/07/18 0801 107/71 98.2 °F (36.8 °C) Oral 109 - 100 % -   09/07/18 0622 - - - 97 - 95 % -   09/07/18 0600 100/70 - - 94 22 95 % -   09/07/18 0500 104/64 - - 107 - 96 % -   09/07/18 0400 106/68 98.2 °F (36.8 °C) - 102 20 97 % 67.1 kg (148 lb)   09/07/18 0300 104/54 - - 105 18 95 % -   09/07/18 0205 110/67 - - 106 22 94 % -   09/07/18 0100 110/69 - - 98 - 93 % -   09/07/18 0005 106/76 - - 114 20 92 % -   09/07/18 0000 - (!) 103.2 °F (39.6 °C) Oral - - - -   09/06/18 2305 118/63 - - 97 22 94 % -   09/06/18 2210 94/62 - - 82 20 94 % -   09/06/18 2105 98/69 - - 92 20 96 % -   09/06/18 2050 107/60 - - 97 - 96 % -   09/06/18 2020 115/56 - - 111 - - -   09/06/18 2000 113/69 - - (!) 121 22 95 % -   09/06/18 1950 112/72 - - 115 - 96 % -   09/06/18 1935 110/68 - - 118 18 93 % -   09/06/18 1928 - - - 113 18 98 % -   09/06/18 1920 111/66 - - 96 16 99 % -   09/06/18 1919 - - - 96 16 99 % -         Results from last 7 days  Lab Units 09/07/18  0158 09/06/18  1054 09/05/18  1010   WBC 10*3/mm3 12.08 8.32 7.19   HEMOGLOBIN g/dL 11.6* 11.9* 12.6   PLATELETS 10*3/mm3 164 171 184       Results from last 7 days  Lab Units 09/07/18  0158 09/06/18  1054 09/05/18  1010   SODIUM mmol/L 142 136 133*   POTASSIUM mmol/L 3.7 3.0* 3.8   CHLORIDE mmol/L 118* 110 104   CO2 mmol/L 17.5* 18.3* 18.4*   BUN mg/dL 13 14 20   CREATININE mg/dL 1.08 1.01 1.38*   CALCIUM mg/dL 8.1 8.1 9.1   GLUCOSE mg/dL 124* 128*  138*       Results from last 7 days  Lab Units 09/06/18  0253 09/05/18  2057 09/05/18  1555 09/05/18  1010   CK TOTAL U/L 123 23* 25  --    TROPONIN I ng/mL 0.023 0.016 0.012 0.020   CKMB ng/mL 1.63 <0.18 <0.18  --      No results found for: BNP          Current Facility-Administered Medications:   •  acetaminophen (TYLENOL) tablet 650 mg, 650 mg, Oral, Q6H PRN, Dmitry Miranda MD, 650 mg at 09/07/18 0014  •  albuterol (PROVENTIL) nebulizer solution 0.083% 2.5 mg/3mL, 2.5 mg, Nebulization, Q6H PRN, Dmitry Miranda MD, 2.5 mg at 09/06/18 1919  •  apixaban (ELIQUIS) tablet 5 mg, 5 mg, Oral, Q12H, Dmitry Miranda MD, 5 mg at 09/07/18 0906  •  calcium carb-cholecalciferol 600-800 MG-UNIT tablet 1 tablet, 1 tablet, Oral, BID With Meals, Dmitry Miranda MD, 1 tablet at 09/07/18 0906  •  cetirizine (zyrTEC) tablet 5 mg, 5 mg, Oral, Daily, Dmitry Miranda MD, 5 mg at 09/07/18 0905  •  diltiaZEM CD (CARDIZEM CD) 24 hr capsule 120 mg, 120 mg, Oral, Q24H, Celetse Nielsen APRN, 120 mg at 09/07/18 0906  •  doxycycline (VIBRAMYCIN) 100 mg/100 mL 0.9% NS MBP, 100 mg, Intravenous, Q12H, Dayanara Winn PA-C, Last Rate: 100 mL/hr at 09/07/18 0310, 100 mg at 09/07/18 1549  •  famotidine (PEPCID) tablet 40 mg, 40 mg, Oral, Daily, Dmitry Miranda MD, 40 mg at 09/07/18 0906  •  gabapentin (NEURONTIN) capsule 400 mg, 400 mg, Oral, BID, Dimtry Miranda MD, 400 mg at 09/07/18 0906  •  lactobacillus acidophilus (RISAQUAD) capsule 1 capsule, 1 capsule, Oral, Daily, Dmitry Miranda MD, 1 capsule at 09/07/18 0906  •  levothyroxine (SYNTHROID, LEVOTHROID) tablet 50 mcg, 50 mcg, Oral, Daily, Dmitry Miranda MD, 50 mcg at 09/07/18 0906  •  LORazepam (ATIVAN) tablet 1 mg, 1 mg, Oral, Q8H PRN, Dmitry Miranda MD  •  Magnesium Sulfate 2 gram Bolus, followed by 8 gram infusion (total Mg dose 10 grams)- Mg less than or equal to 1mg/dL, 2 g, Intravenous, PRN **OR** Magnesium Sulfate 2 gram / 50mL Infusion (GIVE X 3 BAGS TO EQUAL 6GM TOTAL DOSE) -  Mg 1.1 - 1.5 mg/dl, 2 g, Intravenous, PRN **OR** Magnesium Sulfate 4 gram infusion- Mg 1.6-1.9 mg/dL, 4 g, Intravenous, PRN, Dimtry Miranda MD  •  metoprolol tartrate (LOPRESSOR) tablet 25 mg, 25 mg, Oral, Q12H, Dmitry Miranda MD, 25 mg at 09/07/18 0906  •  Pharmacy to dose vancomycin, , Does not apply, Continuous PRN, Ely Tijerina PA-C  •  Pharmacy to Dose Zosyn, , Does not apply, Continuous PRN, Ely Tijerina PA-C  •  piperacillin-tazobactam (ZOSYN) 3.375 g/100 mL 0.9% NS IVPB (mbp), 3.375 g, Intravenous, Q8H, Dmitry Miranda MD, 3.375 g at 09/07/18 1549  •  potassium chloride (MICRO-K) CR capsule 40 mEq, 40 mEq, Oral, PRN **OR** potassium chloride (KLOR-CON) packet 40 mEq, 40 mEq, Oral, PRN **OR** potassium chloride 10 mEq in 100 mL IVPB, 10 mEq, Intravenous, Q1H PRN, Dmitry Miranda MD  •  rOPINIRole (REQUIP) tablet 1 mg, 1 mg, Oral, Nightly PRN, Dmitry Miranda MD  •  sennosides-docusate sodium (SENOKOT-S) 8.6-50 MG tablet 2 tablet, 2 tablet, Oral, BID, Dmitry Miranda MD, 2 tablet at 09/07/18 0905  •  sodium chloride 0.9 % flush 1-10 mL, 1-10 mL, Intravenous, PRN, Ely Tijerina PA-C  •  Insert peripheral IV, , , Once **AND** sodium chloride 0.9 % flush 10 mL, 10 mL, Intravenous, PRN, Enrique Martin MD  •  sodium chloride 0.9 % flush 3 mL, 3 mL, Intravenous, Q8H, Dmitry Miranda MD, 3 mL at 09/07/18 1553  •  sodium chloride 0.9 % flush 5 mL, 5 mL, Intravenous, PRN, Dmitry Miranda MD  •  sodium chloride 0.9 % infusion, 75 mL/hr, Intravenous, Continuous, Ely Tijerina PA-C, Last Rate: 75 mL/hr at 09/07/18 1549, 75 mL/hr at 09/07/18 1549  •  traMADol (ULTRAM) tablet 50 mg, 50 mg, Oral, Q8H PRN, Dmitry Miranda MD    Impression and Plan:    1. Atrial fibrillation.  Now on oral cardizem. Will increase cardizem to 180 mg.  CHADsVASc is at least 4 for hypertension, age, and sex.  Patient is anticoagulated on Eliquis 5 mg twice a day.    2.  Blood pressure has recovered, pt on  BB.      I have discussed the HPI, labs/tests, medications, physical exam and plan of care with Dr. Lucien Nielsen, APRN    Addendum:

## 2018-09-07 NOTE — PLAN OF CARE
Problem: Skin Injury Risk (Adult)  Goal: Identify Related Risk Factors and Signs and Symptoms   09/06/18 2316   Skin Injury Risk (Adult)   Related Risk Factors (Skin Injury Risk) cognitive impairment;critical care admission;infection;mobility impaired;moisture     Goal: Skin Health and Integrity   09/06/18 2316   Skin Injury Risk (Adult)   Skin Health and Integrity making progress toward outcome       Problem: Arrhythmia/Dysrhythmia (Symptomatic) (Adult)  Goal: Signs and Symptoms of Listed Potential Problems Will be Absent, Minimized or Managed (Arrhythmia/Dysrhythmia)   09/06/18 2316   Goal/Outcome Evaluation   Problems Assessed (Arrhythmia/Dysrhythmia) all   Problems Present (Dysrhythmia) none       Problem: Eating/Swallowing Impairment (IRF) (Adult)  Goal: Optimal/Safe Level of Indep, Swallowing   09/06/18 2316   Eating/Swallowing Impairment (IRF) (Adult)   Optimal/Safe Level of Indep, Swallowing progress more gradual than expected

## 2018-09-07 NOTE — THERAPY TREATMENT NOTE
"Acute Care - Speech Language Pathology   Swallow Treatment Note KATIE Piña     Patient Name: Anette Dunham  : 1940  MRN: [id number]  Today's Date: 2018               Admit Date: 2018    Visit Dx:      ICD-10-CM ICD-9-CM   1. Atrial fibrillation with RVR (CMS/HCC) I48.91 427.31   2. Generalized weakness R53.1 780.79     Patient Active Problem List   Diagnosis   • Essential hypertension   • Chest pain   • Paroxysmal atrial fibrillation (CMS/HCC)   • Closed left hip fracture, initial encounter (CMS/HCC)   • Tobacco abuse   • Closed left hip fracture (CMS/HCC)   • Atrial fibrillation with RVR (CMS/HCC)       Therapy Treatment    Ms. Dunham is seen this am for formal dysphagia tx. She is s/p FEES yesterday pm w/ SLP recs of mechanical soft, chopped w/ NECTAR thick liquids only. Pt is positioned upright and centered in bed, w/ call bell w/in reach. Pt's family members are present at bedside. Pt reports little po intake at dinner last night w/ dislike of \"thickened coffee\". Pt and pt's family re-educated on importance of following diet recommendations. They all verbalize agreement and understanding. Dr. Miranda and RN, Annamaria are present at beginning of tx session. Pt is a/a, cooperative to participate, however noted w/ moderate fatigue effects throughout tx session.     Therapy Treatment / Health Promotion  Long Term Goal:  1. Pt will accept least restrictive diet tolerance w/o overt s/s aspiration.      Short Term Goals:  1. Pt will perform OROM/DARRIUS exercises x3 sets x10 reps w/ min cues.  *pt performs x2 sets x5 reps w/ mod cues and models  2. Pt will perform resistive breathing exercises x3 sets x5 reps w/resistance of 1-6 To improve respiratory support and control.   *pt performs x3 sets x5 reps w/ resistance level of 1x1, noted difficulty w/ exhale. Moderate fatigue effects noted, required rest breaks  3. Pt will perform laryngeal adduction/elevation exercises x3 sets x10 reps w/ min cues.   *pt " "performs x2 sets x5 reps w/ avg sustained phonation of 10.1 seconds pre-resistive breather, pt declines to perform laryngeal elevation/adduction exercises post resistive breather stating \"I'm too tired to do anymore\".  4. Pt will perform Shaker exercises sustained x3 sets x5 reps for 30+ seconds over 3 consecutive sessions.   *not directly addressed 2/2 fatigue  5. Pt will perform Shaker exercises repetitive x3 sets x12 reps w/ mod cues.   *not directly addressed 2/2 fatigue  6. Pt will perform compensatory techniques during meals w/ min cues.  *pt educated on importance of following diet recommendations, upright and centered for all po intake. Pt and pt's family verbalize understanding. Pt is observed w/ trials of nectar thick liquids via straw w/o overt s/s of aspiration. No s/s concerning for silent aspiration.   7. Pt will participate in instrumental re-evaluation of swallowing fnx in 7-10 days, pending progress towards this poc.  *please continue modified po diet of mechanical soft, chopped w/ NECTAR thick liquids only     Thank you-  Radha Braun M.S., CCC-SLP       Vitals/Pain/Safety       Cognition, Communication, Swallow       Outcome Summary   Pt actively participated in formal dysphagia tx. Continue tx per POC. Continue modified po diet.          EDUCATION  The patient has been educated in the following areas:   Dysphagia (Swallowing Impairment) Oral Care/Hydration Modified Diet Instruction.    SLP Recommendation and Plan   SLP to f/u for continued formal dysphagia tx per poc. Continue modified po diet.      Progress: improving  Outcome Summary: Pt actively participated in formal dysphagia tx this am. Continue modified po diet. Continue tx per POC.           Time Calculation:         Time Calculation- SLP     Row Name 09/07/18 1028             Time Calculation- SLP    SLP - Next Appointment 09/10/18  -        User Key  (r) = Recorded By, (t) = Taken By, (c) = Cosigned By    Initials Name Provider Type "    CJ Radha Braun, MS CCC-SLP Speech Therapist          Therapy Charges for Today     Code Description Service Date Service Provider Modifiers Qty    27075012424 HC ST SWALLOWING CURRENT STATUS 9/6/2018 Radha Braun, MS CCC-SLP GN, CI 1    76813840352 HC ST SWALLOWING PROJECTED 9/6/2018 Radha Braun, MS CCC-ROSEANN GN, CH 1    63645702208 HC ST FIBEROPTIC ENDO EVAL SWALL 8 9/6/2018 Radha Braun, MS CCC-SLP GN 1    46275147389 HC ST TREATMENT SWALLOW 3 9/7/2018 Radha Braun MS CCC-SLP GN 1          SLP G-Codes  SLP NOMS Used?: Yes  Functional Limitations: Swallowing  Swallow Current Status (): At least 1 percent but less than 20 percent impaired, limited or restricted  Swallow Goal Status (): 0 percent impaired, limited or restricted      MS SHAGGY Ray  9/7/2018

## 2018-09-07 NOTE — PLAN OF CARE
Problem: Patient Care Overview  Goal: Plan of Care Review  Outcome: Ongoing (interventions implemented as appropriate)   09/07/18 0220 09/07/18 1027   Plan of Care Review   Progress --  improving   OTHER   Outcome Summary --  Pt actively participated in formal dysphagia tx this am. Continue modified po diet. Continue tx per POC.   Coping/Psychosocial   Plan of Care Reviewed With patient;family --        Problem: Eating/Swallowing Impairment (IRF) (Adult)  Goal: Optimal/Safe Level of Indep, Swallowing  Outcome: Ongoing (interventions implemented as appropriate)   09/07/18 1027   Eating/Swallowing Impairment (IRF) (Adult)   Optimal/Safe Level of Indep, Swallowing demonstrating adequate progress

## 2018-09-07 NOTE — PROGRESS NOTES
"  I have personally seen and examined the patient today and discussed overnight interval progress and pertinent issues with nursing staff.    Subjective     The patient spiked a fever overnight with MAXIMUM TEMPERATURE of 103.2 but afebrile this morning.  CRP slightly elevated with normal white count.  Patient reports multiple episodes of watery stool overnight C. difficile toxin PCR ordered.  Patient failed swallow evaluation yesterday and is on nectar thick liquids.  Daughter at bedside case discussed.      History taken from: patient chart family RN      Objective       Vital Signs    BP 93/65   Pulse 71   Temp 98.2 °F (36.8 °C) (Oral)   Resp 22   Ht 162.6 cm (64\")   Wt 67.1 kg (148 lb)   SpO2 97%   BMI 25.40 kg/m²     Temp:  [98.2 °F (36.8 °C)-103.2 °F (39.6 °C)] 98.2 °F (36.8 °C)      Intake/Output Summary (Last 24 hours) at 09/07/18 1146  Last data filed at 09/07/18 0859   Gross per 24 hour   Intake              420 ml   Output                0 ml   Net              420 ml     Intake & Output (last 3 days)       09/04 0701 - 09/05 0700 09/05 0701 - 09/06 0700 09/06 0701 - 09/07 0700 09/07 0701 - 09/08 0700    P.O.   120     I.V. (mL/kg)  1000 (13.5) 1000 (14.9)     IV Piggyback  1500 200 100    Total Intake(mL/kg)  2500 (33.7) 1320 (19.7) 100 (1.5)    Urine (mL/kg/hr)  300      Total Output   300        Net   +2200 +1320 +100            Unmeasured Urine Occurrence  4 x 4 x     Unmeasured Stool Occurrence   2 x         Physical Exam:                 General Appearance:    Alert, cooperative, in no acute distress   Head:    Normocephalic, without obvious abnormality, atraumatic   Eyes:            Lids and lashes normal, conjunctivae and sclerae normal, no   icterus, no pallor, corneas clear, PERRLA   Ears:    Ears appear intact with no abnormalities noted   Throat:   No oral lesions, no thrush, oral mucosa moist   Neck:   No adenopathy, supple, trachea midline, no thyromegaly, no   carotid bruit, no JVD "   Back:     No tenderness to percussion or palpation, range of motion   normal   Lungs:     Crackles to the right base    Heart:    Regular rhythm and normal rate, normal S1 and S2, no            murmur, no gallop, no rub, no click   Chest Wall:    No abnormalities observed   Abdomen:     Normal bowel sounds, no masses, no organomegaly, soft        non-tender, non-distended, no guarding, no rebound                tenderness   Rectal:     Deferred   Extremities:   Moves all extremities well, no edema, no cyanosis, no             redness   Pulses:   Pulses palpable and equal bilaterally   Skin:   No bleeding, bruising or rash   Lymph nodes:   No palpable adenopathy   Neurologic:   Awake, alert and oriented x 3. Following commands.       Results:      Results from last 7 days  Lab Units 09/07/18  0158 09/06/18  1054 09/05/18  1010   WBC 10*3/mm3 12.08 8.32 7.19     Lab Results   Component Value Date    NEUTROABS 9.91 (H) 09/07/2018         Results from last 7 days  Lab Units 09/07/18  0158   CREATININE mg/dL 1.08         Results from last 7 days  Lab Units 09/07/18  0158 09/05/18  1555 09/05/18  1010   CRP mg/dL 6.93* 4.68* 4.88*       Imaging Results (last 24 hours)     Procedure Component Value Units Date/Time    Fiberoptic Endo (fees) [[id number]] Resulted:  09/06/18 1439     Updated:  09/06/18 1439    Narrative:       This procedure was auto-finalized with no dictation required.    US Carotid Bilateral [[id number]] Collected:  09/06/18 1318     Updated:  09/06/18 1321    Narrative:       EXAMINATION: US CAROTID BILATERAL-      Technique: Multiple real-time color Doppler images were acquired of  bilateral carotid arteries.     Stenosis measurements if obtained, were performed by the NASCET or  similar method.        CLINICAL INDICATION:     Frequent falls, weakness; I48.91-Unspecified  atrial fibrillation; R53.1-Weakness          COMPARISON:    None     FINDINGS:        RIGHT:  Moderate plaque right carotid bulb and  right ICA. No occlusion.  RIGHT CCA PSV: 726 mm/s  RIGHT ICA PSV: -461 mm/s  RIGHT ICA EDV: -107 mm/s.   Right ICA/CCA Ratio: 0.63  Anterograde flow is demonstrated in RIGHT vertebral artery.     LEFT:  Mild plaque. No occlusion.  LEFT CCA PSV: 859 mm/s  LEFT ICA PSV: -924 mm/s  LEFT EDV: -274 mm/s.   Left ICA/CCA Ratio: 1.08  Anterograde flow is demonstrated in LEFT vertebral artery.          Impression:       1. Mild to moderate plaque right greater than left carotid systems. No  occlusion.  2. No hemodynamically significant stenosis of either RIGHT or LEFT ICA.  3. Antegrade flow noted both vertebral arteries.     This report was finalized on 9/6/2018 1:19 PM by Dr. Luis E Kovacs MD.               Results Review:    I have personally reviewed laboratory data, culture results, radiology studies and antimicrobial therapy.    Hospital Medications (active)       Dose Frequency Start End    acetaminophen (TYLENOL) tablet 650 mg 650 mg Every 6 Hours PRN 9/6/2018     Sig - Route: Take 2 tablets by mouth Every 6 (Six) Hours As Needed for Mild Pain  or Fever. - Oral    albuterol (PROVENTIL) nebulizer solution 0.083% 2.5 mg/3mL 2.5 mg Every 6 Hours PRN 9/5/2018     Sig - Route: Take 2.5 mg by nebulization Every 6 (Six) Hours As Needed for Wheezing or Shortness of Air. - Nebulization    apixaban (ELIQUIS) tablet 5 mg 5 mg Every 12 Hours Scheduled 9/5/2018     Sig - Route: Take 1 tablet by mouth Every 12 (Twelve) Hours. - Oral    calcium carb-cholecalciferol 600-800 MG-UNIT tablet 1 tablet 1 tablet 2 Times Daily With Meals 9/5/2018     Sig - Route: Take 1 tablet by mouth 2 (Two) Times a Day With Meals. - Oral    cetirizine (zyrTEC) tablet 5 mg 5 mg Daily 9/5/2018     Sig - Route: Take 0.5 tablets by mouth Daily. - Oral    diltiaZEM CD (CARDIZEM CD) 24 hr capsule 120 mg 120 mg Every 24 Hours Scheduled 9/6/2018     Sig - Route: Take 1 capsule by mouth Daily. - Oral    doxycycline (VIBRAMYCIN) 100 mg/100 mL 0.9% NS  mg  "Every 12 Hours 9/6/2018 9/13/2018    Sig - Route: Infuse 100 mL into a venous catheter Every 12 (Twelve) Hours. - Intravenous    Cosign for Ordering: Accepted by Evan Winters MD on 9/7/2018  6:11 AM    famotidine (PEPCID) tablet 40 mg 40 mg Daily 9/5/2018     Sig - Route: Take 2 tablets by mouth Daily. - Oral    gabapentin (NEURONTIN) capsule 400 mg 400 mg 2 Times Daily 9/5/2018     Sig - Route: Take 1 capsule by mouth 2 (Two) Times a Day. - Oral    lactobacillus acidophilus (RISAQUAD) capsule 1 capsule 1 capsule Daily 9/6/2018     Sig - Route: Take 1 capsule by mouth Daily. - Oral    levothyroxine (SYNTHROID, LEVOTHROID) tablet 50 mcg 50 mcg Daily 9/6/2018     Sig - Route: Take 1 tablet by mouth Daily. - Oral    LORazepam (ATIVAN) tablet 1 mg 1 mg Every 8 Hours PRN 9/5/2018     Sig - Route: Take 1 tablet by mouth Every 8 (Eight) Hours As Needed for Anxiety (1). - Oral    Magnesium Sulfate 2 gram / 50mL Infusion (GIVE X 3 BAGS TO EQUAL 6GM TOTAL DOSE) - Mg 1.1 - 1.5 mg/dl 2 g As Needed 9/6/2018     Sig - Route: Infuse 50 mL into a venous catheter As Needed (See Administration Instructions). - Intravenous    Linked Group 1:  \"Or\" Linked Group Details        Magnesium Sulfate 2 gram Bolus, followed by 8 gram infusion (total Mg dose 10 grams)- Mg less than or equal to 1mg/dL 2 g As Needed 9/6/2018     Sig - Route: Infuse 50 mL into a venous catheter As Needed (See Administration Instructions). - Intravenous    Linked Group 1:  \"Or\" Linked Group Details        Magnesium Sulfate 4 gram infusion- Mg 1.6-1.9 mg/dL 4 g As Needed 9/6/2018     Sig - Route: Infuse 100 mL into a venous catheter As Needed (See Administration Instructions). - Intravenous    Linked Group 1:  \"Or\" Linked Group Details        metoprolol tartrate (LOPRESSOR) tablet 25 mg 25 mg Every 12 Hours Scheduled 9/5/2018     Sig - Route: Take 1 tablet by mouth Every 12 (Twelve) Hours. - Oral    Pharmacy to dose vancomycin  Continuous PRN 9/6/2018 " "9/13/2018    Sig - Route: Continuous As Needed for Consult. - Does not apply    Cosign for Ordering: Accepted by Dmitry Miranda MD on 9/6/2018  9:51 AM    Pharmacy to Dose Zosyn  Continuous PRN 9/6/2018 9/13/2018    Sig - Route: Continuous As Needed for Consult. - Does not apply    Cosign for Ordering: Accepted by Dmitry Miranda MD on 9/6/2018  9:51 AM    piperacillin-tazobactam (ZOSYN) 3.375 g/100 mL 0.9% NS IVPB (mbp) 3.375 g Every 8 Hours 9/6/2018 9/13/2018    Sig - Route: Infuse 100 mL into a venous catheter Every 8 (Eight) Hours. - Intravenous    potassium chloride (K-DUR,KLOR-CON) CR tablet 40 mEq 40 mEq Every 4 Hours 9/6/2018 9/7/2018    Sig - Route: Take 2 tablets by mouth Every 4 (Four) Hours. - Oral    potassium chloride (KLOR-CON) packet 40 mEq 40 mEq As Needed 9/6/2018     Sig - Route: Take 40 mEq by mouth As Needed (potassium replacement, see admin instructions). - Oral    Linked Group 2:  \"Or\" Linked Group Details        potassium chloride (MICRO-K) CR capsule 40 mEq 40 mEq As Needed 9/6/2018     Sig - Route: Take 4 capsules by mouth As Needed (potassium replacement.  see admin instructions). - Oral    Linked Group 2:  \"Or\" Linked Group Details        potassium chloride 10 mEq in 100 mL IVPB 10 mEq Every 1 Hour PRN 9/6/2018     Sig - Route: Infuse 100 mL into a venous catheter Every 1 (One) Hour As Needed (potassium protocol PERIPHERAL - see admin instructions). - Intravenous    Linked Group 2:  \"Or\" Linked Group Details        rOPINIRole (REQUIP) tablet 1 mg 1 mg Nightly PRN 9/5/2018     Sig - Route: Take 1 tablet by mouth At Night As Needed (restless leg). - Oral    sennosides-docusate sodium (SENOKOT-S) 8.6-50 MG tablet 2 tablet 2 tablet 2 Times Daily 9/5/2018     Sig - Route: Take 2 tablets by mouth 2 (Two) Times a Day. - Oral    sodium chloride 0.9 % flush 1-10 mL 1-10 mL As Needed 9/5/2018     Sig - Route: Infuse 1-10 mL into a venous catheter As Needed for Line Care. - Intravenous    Cosign " "for Ordering: Accepted by Dmitry Miranda MD on 9/5/2018  5:35 PM    sodium chloride 0.9 % flush 10 mL 10 mL As Needed 9/5/2018     Sig - Route: Infuse 10 mL into a venous catheter As Needed for Line Care. - Intravenous    Linked Group 3:  \"And\" Linked Group Details        sodium chloride 0.9 % flush 3 mL 3 mL Every 8 Hours 9/7/2018     Sig - Route: Infuse 3 mL into a venous catheter Every 8 (Eight) Hours. - Intravenous    sodium chloride 0.9 % flush 5 mL 5 mL As Needed 9/7/2018     Sig - Route: Infuse 5 mL into a venous catheter As Needed for Line Care (After Medication Administration or Blood Draw). - Intravenous    sodium chloride 0.9 % infusion 75 mL/hr Continuous 9/5/2018     Sig - Route: Infuse 75 mL/hr into a venous catheter Continuous. - Intravenous    Cosign for Ordering: Accepted by Dmitry Miranda MD on 9/5/2018  5:35 PM    traMADol (ULTRAM) tablet 50 mg 50 mg Every 8 Hours PRN 9/5/2018     Sig - Route: Take 1 tablet by mouth Every 8 (Eight) Hours As Needed for Moderate Pain . - Oral    vancomycin (VANCOCIN) 1,250 mg in sodium chloride 0.9 % 250 mL IVPB 1,250 mg Once 9/6/2018 9/6/2018    Sig - Route: Infuse 1,250 mg into a venous catheter 1 (One) Time. - Intravenous    diltiaZEM (CARDIZEM) 125 mg in sodium chloride 0.9 % 125 mL (1 mg/mL) infusion (Discontinued) 5-15 mg/hr Titrated 9/5/2018 9/6/2018    Sig - Route: Infuse 5-15 mg/hr into a venous catheter Dose Adjusted By Provider As Needed. - Intravenous    Cosign for Ordering: Accepted by Dmitry Miranda MD on 9/5/2018  5:35 PM    vancomycin (VANCOCIN) 750 mg in sodium chloride 0.9 % 250 mL IVPB (Discontinued) 750 mg Every 24 Hours 9/7/2018 9/6/2018    Sig - Route: Infuse 750 mg into a venous catheter Daily. - Intravenous            Cultures:    Blood Culture   Date Value Ref Range Status   09/06/2018 No growth at 24 hours  Preliminary   09/06/2018 No growth at 24 hours  Preliminary   09/05/2018 No growth at 2 days  Preliminary   09/05/2018 No growth at " 2 days  Preliminary         ASSESSMENT/PLAN           Assessment/Plan     ASSESSMENT:    1.  Pneumonia     PLAN:     The patient spiked a fever overnight with MAXIMUM TEMPERATURE of 103.2 but afebrile this morning.  CRP slightly elevated with normal white count.  Patient reports multiple episodes of watery stool overnight C. difficile toxin PCR ordered.  Patient failed swallow evaluation yesterday and is on nectar thick liquids.  Daughter at bedside case discussed. May need to escalate coverage if patient has worsening clinical status.     For now would recommend to continue Zosyn and Doxycycline.  Vancomycin was discontinued as risk outweighs benefit. CRP ordered for a.m.  Will to follow closely and adjust antibiotic therapy appropriately.    Patient's findings and recommendations were discussed with patient, family and nursing staff    Code Status:   Code Status and Medical Interventions:   Ordered at: 09/05/18 1427     Code Status:    CPR     Medical Interventions (Level of Support Prior to Arrest):    Full       Dayanara Winn PA-C  09/07/18  11:46 AM

## 2018-09-07 NOTE — PROGRESS NOTES
Nutrition Services    Patient Name:  Anette Dunham  YOB: 1940  MRN: [id number]  Admit Date:  9/5/2018    Diet liberalized to regular secondary to advanced age, poor intake and family request - supplement ordered    Electronically signed by:  Theresa Coates RD  09/07/18 12:22 PM

## 2018-09-07 NOTE — PROGRESS NOTES
Assisted By: Annamaria MCDONALD    CC: Follow-up on sepsis    Interview History/HPI: Patient is without complaints and wants to go home.  She states she's not having much cough or phlegm production.  She was found to have aspiration of thin liquids.  She has been placed on nectar thick liquids and although she does not likely she is tolerating these.  She continues to run elevated temperature at times.  Denies any chest pain, no palpitations.  She was transitioned from IV Cardizem to by mouth Cardizem yesterday.      Vitals:    09/07/18 0817   BP: 111/72   Pulse: 110   Resp:    Temp:    SpO2: 92%       Intake/Output Summary (Last 24 hours) at 09/07/18 1037  Last data filed at 09/07/18 0859   Gross per 24 hour   Intake             1420 ml   Output                0 ml   Net             1420 ml       EXAM: Temperature earlier was once again 103.2, she is no distress mood is good skin warm and dry pupils equal round reactive sclera nonicteric speech is normal face symmetric oral mucosa is moist lungs bilateral breath sounds faint crackles bibasilar left slightly greater than right today no rhonchi or wheezing, heart his an irregularly irregular rhythm controlled rate without murmur rub or gallop, abdomen soft benign bowel sounds are active no organomegaly masses appreciated no edema is noted strength is symmetric, no skin rashes, no clubbing or cyanosis no joint effusions    Tele: Atrial fibrillation with a controlled rate in the 90s currently.    LABS:   Lab Results (last 48 hours)     Procedure Component Value Units Date/Time    Blood Culture - Blood, [[id number]]  (Normal) Collected:  09/06/18 0759    Specimen:  Blood from Arm, Left Updated:  09/07/18 0830     Blood Culture No growth at 24 hours    CBC & Differential [[id number]] Collected:  09/07/18 0158    Specimen:  Blood Updated:  09/07/18 0300    Narrative:       The following orders were created for panel order CBC & Differential.  Procedure                                Abnormality         Status                     ---------                               -----------         ------                     Manual Differential[[id number]]          Abnormal            Final result               Scan Slide[[id number]]                                       Final result               CBC Auto Differential[[id number]]        Abnormal            Final result                 Please view results for these tests on the individual orders.    CBC Auto Differential [[id number]]  (Abnormal) Collected:  09/07/18 0158    Specimen:  Blood Updated:  09/07/18 0300     WBC 12.08 10*3/mm3      RBC 3.92 (L) 10*6/mm3      Hemoglobin 11.6 (L) g/dL      Hematocrit 34.4 (L) %      MCV 87.8 fL      MCH 29.6 pg      MCHC 33.7 g/dL      RDW 13.5 %      RDW-SD 42.2 fl      MPV 11.1 (H) fL      Platelets 164 10*3/mm3     Scan Slide [[id number]] Collected:  09/07/18 0158    Specimen:  Blood Updated:  09/07/18 0300     Scan Slide --     Comment: See Manual Differential Results       Manual Differential [[id number]]  (Abnormal) Collected:  09/07/18 0158    Specimen:  Blood Updated:  09/07/18 0300     Neutrophil % 77.0 (H) %      Lymphocyte % 13.0 (L) %      Monocyte % 4.0 %      Basophil % 1.0 %      Bands %  5.0 %      Neutrophils Absolute 9.91 (H) 10*3/mm3      Lymphocytes Absolute 1.57 10*3/mm3      Monocytes Absolute 0.48 10*3/mm3      Basophils Absolute 0.12 10*3/mm3      RBC Morphology Normal     Platelet Morphology Normal    Comprehensive Metabolic Panel [[id number]]  (Abnormal) Collected:  09/07/18 0158    Specimen:  Blood Updated:  09/07/18 0245     Glucose 124 (H) mg/dL      BUN 13 mg/dL      Creatinine 1.08 mg/dL      Sodium 142 mmol/L      Potassium 3.7 mmol/L      Chloride 118 (H) mmol/L      CO2 17.5 (L) mmol/L      Calcium 8.1 mg/dL      Total Protein 7.1 g/dL      Albumin 3.20 (L) g/dL      ALT (SGPT) 41 (H) U/L      AST (SGOT) 77 (H) U/L      Alkaline Phosphatase 56 U/L      Comment: Note New Reference  Ranges        Total Bilirubin 0.5 mg/dL      eGFR Non African Amer 49 (L) mL/min/1.73      Globulin 3.9 gm/dL      A/G Ratio 0.8 (L) g/dL      BUN/Creatinine Ratio 12.0     Anion Gap 6.5 mmol/L     Narrative:       The MDRD GFR formula is only valid for adults with stable renal function between ages 18 and 70.    Osmolality, Calculated [[id number]]  (Normal) Collected:  09/07/18 0158    Specimen:  Blood Updated:  09/07/18 0245     Osmolality Calc 284.7 mOsm/kg     C-reactive Protein [[id number]]  (Abnormal) Collected:  09/07/18 0158    Specimen:  Blood Updated:  09/07/18 0243     C-Reactive Protein 6.93 (H) mg/dL     Blood Culture - Blood, [[id number]]  (Normal) Collected:  09/06/18 0950    Specimen:  Blood from Arm, Left Updated:  09/06/18 2330     Blood Culture No growth at less than 24 hours    Legionella Antigen, Urine - Urine, Urine, Clean Catch [[id number]]  (Normal) Collected:  09/06/18 0821    Specimen:  Urine from Urine, Clean Catch Updated:  09/06/18 1331     LEGIONELLA ANTIGEN, URINE Negative    Narrative:         Presumptive negative for L. pneumophilia serogroup 1 antigen, suggesting no recent or current infection.    Blood Culture - Blood, [[id number]]  (Normal) Collected:  09/05/18 1044    Specimen:  Blood from Arm, Left Updated:  09/06/18 1145     Blood Culture No growth at 24 hours    Blood Culture - Blood, [[id number]]  (Normal) Collected:  09/05/18 1054    Specimen:  Blood from Arm, Right Updated:  09/06/18 1145     Blood Culture No growth at 24 hours    Mycoplasma Pneumoniae Antibody, IgM [[id number]]  (Normal) Collected:  09/05/18 1555    Specimen:  Blood Updated:  09/06/18 1120     Mycoplasma pneumo IgM Negative    Comprehensive Metabolic Panel [[id number]]  (Abnormal) Collected:  09/06/18 1054    Specimen:  Blood Updated:  09/06/18 1120     Glucose 128 (H) mg/dL      BUN 14 mg/dL      Creatinine 1.01 mg/dL      Sodium 136 mmol/L      Potassium 3.0 (L) mmol/L      Chloride 110 mmol/L      CO2 18.3 (L)  mmol/L      Calcium 8.1 mg/dL      Total Protein 7.2 g/dL      Albumin 3.30 (L) g/dL      ALT (SGPT) 38 (H) U/L      AST (SGOT) 67 (H) U/L      Alkaline Phosphatase 58 U/L      Comment: Note New Reference Ranges        Total Bilirubin 0.4 mg/dL      eGFR Non African Amer 53 (L) mL/min/1.73      Globulin 3.9 gm/dL      A/G Ratio 0.8 (L) g/dL      BUN/Creatinine Ratio 13.9     Anion Gap 7.7 mmol/L     Narrative:       The MDRD GFR formula is only valid for adults with stable renal function between ages 18 and 70.    Osmolality, Calculated [[id number]]  (Normal) Collected:  09/06/18 1054    Specimen:  Blood Updated:  09/06/18 1120     Osmolality Calc 274.1 mOsm/kg     CBC & Differential [[id number]] Collected:  09/06/18 1054    Specimen:  Blood Updated:  09/06/18 1113    Narrative:       The following orders were created for panel order CBC & Differential.  Procedure                               Abnormality         Status                     ---------                               -----------         ------                     Scan Slide[[id number]]                                                                  CBC Auto Differential[[id number]]        Abnormal            Final result                 Please view results for these tests on the individual orders.    CBC Auto Differential [[id number]]  (Abnormal) Collected:  09/06/18 1054    Specimen:  Blood Updated:  09/06/18 1113     WBC 8.32 10*3/mm3      RBC 4.07 (L) 10*6/mm3      Hemoglobin 11.9 (L) g/dL      Hematocrit 35.3 (L) %      MCV 86.7 fL      MCH 29.2 pg      MCHC 33.7 g/dL      RDW 13.6 %      RDW-SD 42.9 fl      MPV 11.0 (H) fL      Platelets 171 10*3/mm3      Neutrophil % 56.1 %      Lymphocyte % 31.6 %      Monocyte % 10.7 %      Eosinophil % 0.1 %      Basophil % 1.1 %      Immature Grans % 0.4 %      Neutrophils, Absolute 4.67 10*3/mm3      Lymphocytes, Absolute 2.63 10*3/mm3      Monocytes, Absolute 0.89 10*3/mm3      Eosinophils, Absolute 0.01 10*3/mm3       Basophils, Absolute 0.09 10*3/mm3      Immature Grans, Absolute 0.03 10*3/mm3      nRBC 0.0 /100 WBC     C-reactive Protein [[id number]]  (Abnormal) Collected:  09/05/18 1555    Specimen:  Blood Updated:  09/06/18 0904     C-Reactive Protein 4.68 (H) mg/dL     Urinalysis With Culture If Indicated - Urine, Clean Catch [[id number]]  (Abnormal) Collected:  09/06/18 0821    Specimen:  Urine from Urine, Clean Catch Updated:  09/06/18 0837     Color, UA Yellow     Appearance, UA Cloudy (A)     pH, UA <=5.0     Specific Gravity, UA 1.016     Glucose, UA Negative     Ketones, UA Trace (A)     Bilirubin, UA Negative     Blood, UA Moderate (2+) (A)     Protein, UA Trace (A)     Leuk Esterase, UA Small (1+) (A)     Nitrite, UA Negative     Urobilinogen, UA 0.2 E.U./dL    Urinalysis, Microscopic Only - Urine, Clean Catch [[id number]]  (Abnormal) Collected:  09/06/18 0821    Specimen:  Urine from Urine, Clean Catch Updated:  09/06/18 0837     RBC, UA 21-30 (A) /HPF      WBC, UA 3-5 (A) /HPF      Bacteria, UA 2+ (A) /HPF      Squamous Epithelial Cells, UA 3-6 (A) /HPF      Hyaline Casts, UA None Seen /LPF      Methodology Automated Microscopy    Lactic Acid, Plasma [[id number]]  (Normal) Collected:  09/06/18 0759    Specimen:  Blood Updated:  09/06/18 0833     Lactate 1.2 mmol/L     CK-MB Index [[id number]]  (Normal) Collected:  09/06/18 0253    Specimen:  Blood Updated:  09/06/18 0345     CK-MB Index 1.3 %     Troponin [[id number]]  (Normal) Collected:  09/06/18 0253    Specimen:  Blood Updated:  09/06/18 0345     Troponin I 0.023 ng/mL     Narrative:       Ultra Troponin I Reference Range:         <=0.039 ng/mL: Negative    0.04-0.779 ng/mL: Indeterminate Range. Suspicious of MI.  Clinical correlation required.       >=0.78  ng/mL: Consistent with myocardial injury.  Clinical correlation required.    CK Total & CKMB [[id number]]  (Normal) Collected:  09/06/18 0253    Specimen:  Blood Updated:  09/06/18 0345     CKMB 1.63 ng/mL       Creatine Kinase 123 U/L     Troponin [[id number]]  (Normal) Collected:  09/05/18 2057    Specimen:  Blood Updated:  09/05/18 2157     Troponin I 0.016 ng/mL     Narrative:       Ultra Troponin I Reference Range:         <=0.039 ng/mL: Negative    0.04-0.779 ng/mL: Indeterminate Range. Suspicious of MI.  Clinical correlation required.       >=0.78  ng/mL: Consistent with myocardial injury.  Clinical correlation required.    CK Total & CKMB [[id number]]  (Abnormal) Collected:  09/05/18 2057    Specimen:  Blood Updated:  09/05/18 2157     CKMB <0.18 ng/mL      Creatine Kinase 23 (L) U/L     CK-MB Index [[id number]] Collected:  09/05/18 2057    Specimen:  Blood Updated:  09/05/18 2157     CK-MB Index -- %      Comment: Unable to calculate.       Troponin [[id number]]  (Normal) Collected:  09/05/18 1555    Specimen:  Blood Updated:  09/05/18 1714     Troponin I 0.012 ng/mL     Narrative:       Ultra Troponin I Reference Range:         <=0.039 ng/mL: Negative    0.04-0.779 ng/mL: Indeterminate Range. Suspicious of MI.  Clinical correlation required.       >=0.78  ng/mL: Consistent with myocardial injury.  Clinical correlation required.    CK Total & CKMB [[id number]]  (Normal) Collected:  09/05/18 1555    Specimen:  Blood Updated:  09/05/18 1714     CKMB <0.18 ng/mL      Creatine Kinase 25 U/L     TSH [[id number]]  (Normal) Collected:  09/05/18 1555    Specimen:  Blood Updated:  09/05/18 1714     TSH 0.963 mIU/mL     Cortisol [[id number]] Collected:  09/05/18 1555    Specimen:  Blood Updated:  09/05/18 1714     Cortisol 32.00 mcg/dL     CK-MB Index [[id number]] Collected:  09/05/18 1555    Specimen:  Blood Updated:  09/05/18 1714     CK-MB Index -- %      Comment: Unable to calculate.       Lactic Acid, Plasma [[id number]]  (Normal) Collected:  09/05/18 1555    Specimen:  Blood Updated:  09/05/18 1700     Lactate 1.4 mmol/L     Hepatitis Panel, Acute [[id number]]  (Normal) Collected:  09/05/18 1010    Specimen:  Blood Updated:   09/05/18 1642     Hepatitis B Surface Ag Non-Reactive     Hep A IgM Non-Reactive     Hep B C IgM Non-Reactive     Hepatitis C Ab Non-Reactive    C-reactive Protein [[id number]]  (Abnormal) Collected:  09/05/18 1010    Specimen:  Blood Updated:  09/05/18 1604     C-Reactive Protein 4.88 (H) mg/dL     TSH [[id number]]  (Normal) Collected:  09/05/18 1010    Specimen:  Blood Updated:  09/05/18 1523     TSH 1.458 mIU/mL     Urinalysis With Culture If Indicated - Urine, Clean Catch [[id number]]  (Abnormal) Collected:  09/05/18 1143    Specimen:  Urine from Urine, Catheter In/Out Updated:  09/05/18 1153     Color, UA Dark Yellow (A)     Appearance, UA Clear     pH, UA 5.5     Specific Gravity, UA 1.019     Glucose, UA Negative     Ketones, UA 15 mg/dL (1+) (A)     Bilirubin, UA Small (1+) (A)     Blood, UA Negative     Protein, UA 30 mg/dL (1+) (A)     Leuk Esterase, UA Trace (A)     Nitrite, UA Negative     Urobilinogen, UA 0.2 E.U./dL    Urinalysis, Microscopic Only - Urine, Clean Catch [[id number]]  (Abnormal) Collected:  09/05/18 1143    Specimen:  Urine from Urine, Catheter In/Out Updated:  09/05/18 1153     RBC, UA 3-5 (A) /HPF      WBC, UA 0-2 /HPF      Bacteria, UA None Seen /HPF      Squamous Epithelial Cells, UA 0-2 /HPF      Hyaline Casts, UA 7-12 /LPF      Methodology Automated Microscopy    Influenza Antigen, Rapid - Swab, Nasopharynx [[id number]]  (Normal) Collected:  09/05/18 1106    Specimen:  Swab from Nasopharynx Updated:  09/05/18 1132     Influenza A Ag, EIA Negative     Influenza B Ag, EIA Negative    [place] Draw [[id number]] Collected:  09/05/18 1010    Specimen:  Blood Updated:  09/05/18 1116    Narrative:       The following orders were created for panel order [place] Draw.  Procedure                               Abnormality         Status                     ---------                               -----------         ------                     Light Blue Top[[id number]]                                    Final result               Green Top (Gel)[[id number]]                                  Final result               Lavender Top[[id number]]                                     Final result               Gold Top - SST[[id number]]                                   Final result                 Please view results for these tests on the individual orders.    Green Top (Gel) [[id number]] Collected:  09/05/18 1010    Specimen:  Blood Updated:  09/05/18 1116     Extra Tube Hold for add-ons.     Comment: Auto resulted.       Gold Top - SST [[id number]] Collected:  09/05/18 1010    Specimen:  Blood Updated:  09/05/18 1116     Extra Tube Hold for add-ons.     Comment: Auto resulted.       Light Blue Top [[id number]] Collected:  09/05/18 1010    Specimen:  Blood Updated:  09/05/18 1116     Extra Tube hold for add-on     Comment: Auto resulted       Lavender Top [[id number]] Collected:  09/05/18 1010    Specimen:  Blood Updated:  09/05/18 1116     Extra Tube hold for add-on     Comment: Auto resulted       Troponin [[id number]]  (Normal) Collected:  09/05/18 1010    Specimen:  Blood Updated:  09/05/18 1115     Troponin I 0.020 ng/mL     Narrative:       Ultra Troponin I Reference Range:         <=0.039 ng/mL: Negative    0.04-0.779 ng/mL: Indeterminate Range. Suspicious of MI.  Clinical correlation required.       >=0.78  ng/mL: Consistent with myocardial injury.  Clinical correlation required.    Comprehensive Metabolic Panel [[id number]]  (Abnormal) Collected:  09/05/18 1010    Specimen:  Blood Updated:  09/05/18 1106     Glucose 138 (H) mg/dL      BUN 20 mg/dL      Creatinine 1.38 (H) mg/dL      Sodium 133 (L) mmol/L      Potassium 3.8 mmol/L      Chloride 104 mmol/L      CO2 18.4 (L) mmol/L      Calcium 9.1 mg/dL      Total Protein 7.9 g/dL      Albumin 3.60 g/dL      ALT (SGPT) 39 (H) U/L      AST (SGOT) 57 (H) U/L      Alkaline Phosphatase 72 U/L      Comment: Note New Reference Ranges        Total Bilirubin 0.3 mg/dL      eGFR  Non  Amer 37 (L) mL/min/1.73      Globulin 4.3 gm/dL      A/G Ratio 0.8 (L) g/dL      BUN/Creatinine Ratio 14.5     Anion Gap 10.6 mmol/L     Narrative:       The MDRD GFR formula is only valid for adults with stable renal function between ages 18 and 70.    Osmolality, Calculated [[id number]]  (Abnormal) Collected:  09/05/18 1010    Specimen:  Blood Updated:  09/05/18 1106     Osmolality Calc 271.2 (L) mOsm/kg     Blood Gas, Arterial [[id number]]  (Abnormal) Collected:  09/05/18 1055    Specimen:  Arterial Blood Updated:  09/05/18 1101     Site Arterial: left brachial     Leif's Test N/A     pH, Arterial 7.497 (H) pH units      pCO2, Arterial 25.7 (C) mm Hg      pO2, Arterial 72.1 (L) mm Hg      HCO3, Arterial 19.5 (C) mmol/L      Base Excess, Arterial -2.3 mmol/L      O2 Saturation, Arterial 94.5 %      Hemoglobin, Blood Gas 13.0 g/dL      Hematocrit, Blood Gas 38.0 %      Oxyhemoglobin 92.9 %      Methemoglobin 0.40 %      Carboxyhemoglobin 1.3 %      A-a Gradiant 41.0 mmHg      Temperature 98.6 C      Barometric Pressure for Blood Gas 732 mmHg      Modality Room Air     FIO2 21 %     CBC & Differential [[id number]] Collected:  09/05/18 1010    Specimen:  Blood Updated:  09/05/18 1100    Narrative:       The following orders were created for panel order CBC & Differential.  Procedure                               Abnormality         Status                     ---------                               -----------         ------                     Scan Slide[[id number]]                                                                  CBC Auto Differential[[id number]]        Abnormal            Final result                 Please view results for these tests on the individual orders.    CBC Auto Differential [[id number]]  (Abnormal) Collected:  09/05/18 1010    Specimen:  Blood Updated:  09/05/18 1100     WBC 7.19 10*3/mm3      RBC 4.33 10*6/mm3      Hemoglobin 12.6 g/dL      Hematocrit 37.4 %      MCV 86.4 fL       MCH 29.1 pg      MCHC 33.7 g/dL      RDW 13.4 %      RDW-SD 42.9 fl      MPV 12.0 (H) fL      Platelets 184 10*3/mm3      Neutrophil % 48.3 %      Lymphocyte % 36.4 %      Monocyte % 13.6 (H) %      Eosinophil % 0.0 %      Basophil % 1.4 %      Immature Grans % 0.3 %      Neutrophils, Absolute 3.47 10*3/mm3      Lymphocytes, Absolute 2.62 10*3/mm3      Monocytes, Absolute 0.98 (H) 10*3/mm3      Eosinophils, Absolute 0.00 10*3/mm3      Basophils, Absolute 0.10 10*3/mm3      Immature Grans, Absolute 0.02 10*3/mm3           Radiology:  Imaging Results (last 72 hours)     Procedure Component Value Units Date/Time    Fiberoptic Endo (fees) [[id number]] Resulted:  09/06/18 1439     Updated:  09/06/18 1439    Narrative:       This procedure was auto-finalized with no dictation required.    US Carotid Bilateral [[id number]] Collected:  09/06/18 1318     Updated:  09/06/18 1321    Narrative:       EXAMINATION: US CAROTID BILATERAL-      Technique: Multiple real-time color Doppler images were acquired of  bilateral carotid arteries.     Stenosis measurements if obtained, were performed by the NASCET or  similar method.        CLINICAL INDICATION:     Frequent falls, weakness; I48.91-Unspecified  atrial fibrillation; R53.1-Weakness          COMPARISON:    None     FINDINGS:        RIGHT:  Moderate plaque right carotid bulb and right ICA. No occlusion.  RIGHT CCA PSV: 726 mm/s  RIGHT ICA PSV: -461 mm/s  RIGHT ICA EDV: -107 mm/s.   Right ICA/CCA Ratio: 0.63  Anterograde flow is demonstrated in RIGHT vertebral artery.     LEFT:  Mild plaque. No occlusion.  LEFT CCA PSV: 859 mm/s  LEFT ICA PSV: -924 mm/s  LEFT EDV: -274 mm/s.   Left ICA/CCA Ratio: 1.08  Anterograde flow is demonstrated in LEFT vertebral artery.          Impression:       1. Mild to moderate plaque right greater than left carotid systems. No  occlusion.  2. No hemodynamically significant stenosis of either RIGHT or LEFT ICA.  3. Antegrade flow noted both vertebral  arteries.     This report was finalized on 9/6/2018 1:19 PM by Dr. Luis E Kovacs MD.       XR Chest 1 View [[id number]] Collected:  09/06/18 1016     Updated:  09/06/18 1019    Narrative:       EXAMINATION:  XR CHEST 1 VW-      CLINICAL INDICATION:     Sepsis; I48.91-Unspecified atrial fibrillation;  R53.1-Weakness     TECHNIQUE:  XR CHEST 1 VW-       COMPARISON: 9/5/2018      FINDINGS:   Bibasilar atelectasis and minimal airspace disease noted.   Heart size is stable.   No pneumothorax.   No pleural effusion.   Bony and soft tissue structures are unremarkable.            Impression:       Bibasilar atelectasis and minimal airspace disease noted.     This report was finalized on 9/6/2018 10:17 AM by Dr. Sae Montoya MD.       CT Head Without Contrast [[id number]] Collected:  09/06/18 0828     Updated:  09/06/18 0830    Narrative:          EXAMINATION: CT HEAD WO CONTRAST-      CLINICAL INDICATION:     weakness, frequent falls; I48.91-Unspecified  atrial fibrillation; R53.1-Weakness     TECHNIQUE: Contiguous axial CT images of the head were obtained without  contrast administration.      Radiation dose reduction techniques were utilized per ALARA protocol.  Automated exposure control was initiated through either or [employer] or  DoseRight software packages by  protocol.       677.76 mGy.cm     COMPARISON:  None.       FINDINGS: Generalized cerebral atrophy is present. There is no mass  effect, midline displacement, or hydrocephalus. There are patchy areas  of decreased density within the periventricular white matter which  likely reflect chronic small vessel ischemic changes. There is no CT  evidence of acute infarct or hemorrhage. Bone windows reveal no osseous  abnormalities or fractures.        Impression:       Atrophy and chronic small vessel ischemic change, but there  are no acute intracranial abnormalities identified.         This report was finalized on 9/6/2018 8:28 AM by Dr. Sae Montoya MD.        XR Chest 1 View [[id number]] Collected:  09/05/18 1134     Updated:  09/05/18 1204    Narrative:       EXAMINATION:  XR CHEST 1 VW-      CLINICAL INDICATION:     Short of breath     TECHNIQUE:  XR CHEST 1 VW-       COMPARISON: 6/1/2018      FINDINGS:   Coarsened interstitial markings.   Heart size is stable.   No pneumothorax.   Probably tiny pleural effusions.   Bony and soft tissue structures are unremarkable.            Impression:       Stable radiographic appearance of the chest.     This report was finalized on 9/5/2018 11:35 AM by Dr. Sae Montoya MD.               Results for orders placed during the hospital encounter of 09/05/18   Adult Transthoracic Echo Complete W/ Cont if Necessary Per Protocol    Narrative · Left ventricular systolic function is normal. Estimated EF appears to be   in the range of 51 - 55%  · Mild mitral valve regurgitation is present  · Mild pulmonic valve regurgitation is present.  · Left atrial cavity size is mildly dilated.  · Mild tricuspid valve regurgitation is present. Estimated right   ventricular systolic pressure from tricuspid regurgitation is mildly   elevated (35-45 mmHg).  · The pericardium is normal. There is no evidence of pericardial effusion.  · No significant changes compared to previous study.            Assessment/Plan:   Sepsis, new problem, lactic acid normal, patient now is on Zosyn and doxycycline, vancomycin was stopped by infectious disease, atypical screens are negative, CRP has drifted up slightly, continue to follow.  Diet has been modified, recheck chest x-ray in the a.m.     Atrial fibrillation with rapid ventricular rate, she has a history of atrial fibrillation but the recurrence may be related to sepsis.  She also had significant electrolyte abnormality as, she is now off IV Cardizem and on by mouth Cardizem, rate is controlled at present.  Patient is on Eliquis for stroke prevention.  Echo overall unremarkable    DVT prophylaxis, full  anticoagulation will serve for this as well.     Hypothyroidism with adequate replacement     Hypotension on admission, improved     Mildly elevated transaminases, asymptomatic, possibly related to sepsis.     Hypokalemia, improved    Nutrition, diet was advanced yesterday, nectar thick     Acute kidney injury, improved    Noted patient did have mild plaque only on a carotid Doppler, holding on statin therapy however due to elevated transaminases.

## 2018-09-08 ENCOUNTER — APPOINTMENT (OUTPATIENT)
Dept: GENERAL RADIOLOGY | Facility: HOSPITAL | Age: 78
End: 2018-09-08

## 2018-09-08 LAB
ALBUMIN SERPL-MCNC: 3.1 G/DL (ref 3.4–4.8)
ALBUMIN/GLOB SERPL: 0.8 G/DL (ref 1.5–2.5)
ALP SERPL-CCNC: 55 U/L (ref 35–104)
ALT SERPL W P-5'-P-CCNC: 34 U/L (ref 10–36)
ANION GAP SERPL CALCULATED.3IONS-SCNC: 3.8 MMOL/L (ref 3.6–11.2)
AST SERPL-CCNC: 52 U/L (ref 10–30)
BILIRUB SERPL-MCNC: 0.4 MG/DL (ref 0.2–1.8)
BUN BLD-MCNC: 13 MG/DL (ref 7–21)
BUN/CREAT SERPL: 14.9 (ref 7–25)
CALCIUM SPEC-SCNC: 8.2 MG/DL (ref 7.7–10)
CHLORIDE SERPL-SCNC: 121 MMOL/L (ref 99–112)
CO2 SERPL-SCNC: 20.2 MMOL/L (ref 24.3–31.9)
CREAT BLD-MCNC: 0.87 MG/DL (ref 0.43–1.29)
CRP SERPL-MCNC: 7.58 MG/DL (ref 0–0.99)
DEPRECATED RDW RBC AUTO: 43.7 FL (ref 37–54)
ERYTHROCYTE [DISTWIDTH] IN BLOOD BY AUTOMATED COUNT: 13.8 % (ref 11.5–14.5)
GFR SERPL CREATININE-BSD FRML MDRD: 63 ML/MIN/1.73
GLOBULIN UR ELPH-MCNC: 3.8 GM/DL
GLUCOSE BLD-MCNC: 129 MG/DL (ref 70–110)
HCT VFR BLD AUTO: 33.7 % (ref 37–47)
HGB BLD-MCNC: 11.3 G/DL (ref 12–16)
LYMPHOCYTES # BLD MANUAL: 2.26 10*3/MM3 (ref 1–3)
LYMPHOCYTES NFR BLD MANUAL: 20 % (ref 16–46)
LYMPHOCYTES NFR BLD MANUAL: 4 % (ref 0–12)
MAGNESIUM SERPL-MCNC: 1.7 MG/DL (ref 1.7–2.6)
MCH RBC QN AUTO: 29.4 PG (ref 27–33)
MCHC RBC AUTO-ENTMCNC: 33.5 G/DL (ref 33–37)
MCV RBC AUTO: 87.8 FL (ref 80–94)
MONOCYTES # BLD AUTO: 0.45 10*3/MM3 (ref 0.1–0.9)
NEUTROPHILS # BLD AUTO: 8.6 10*3/MM3 (ref 1.4–6.5)
NEUTROPHILS NFR BLD MANUAL: 75 % (ref 40–75)
NEUTS BAND NFR BLD MANUAL: 1 % (ref 0–8)
OSMOLALITY SERPL CALC.SUM OF ELEC: 290.5 MOSM/KG (ref 273–305)
PHOSPHATE SERPL-MCNC: 1.4 MG/DL (ref 2.7–4.5)
PLAT MORPH BLD: NORMAL
PLATELET # BLD AUTO: 174 10*3/MM3 (ref 130–400)
PMV BLD AUTO: 11.1 FL (ref 6–10)
POTASSIUM BLD-SCNC: 3.5 MMOL/L (ref 3.5–5.3)
POTASSIUM BLD-SCNC: 4.2 MMOL/L (ref 3.5–5.3)
PROT SERPL-MCNC: 6.9 G/DL (ref 6–8)
RBC # BLD AUTO: 3.84 10*6/MM3 (ref 4.2–5.4)
RBC MORPH BLD: NORMAL
SCAN SLIDE: NORMAL
SODIUM BLD-SCNC: 145 MMOL/L (ref 135–153)
WBC NRBC COR # BLD: 11.31 10*3/MM3 (ref 4.5–12.5)

## 2018-09-08 PROCEDURE — 83735 ASSAY OF MAGNESIUM: CPT | Performed by: INTERNAL MEDICINE

## 2018-09-08 PROCEDURE — 71045 X-RAY EXAM CHEST 1 VIEW: CPT

## 2018-09-08 PROCEDURE — 25010000002 CEFEPIME 2 G/NS 100 ML SOLUTION: Performed by: PHYSICIAN ASSISTANT

## 2018-09-08 PROCEDURE — 93010 ELECTROCARDIOGRAM REPORT: CPT | Performed by: INTERNAL MEDICINE

## 2018-09-08 PROCEDURE — 85025 COMPLETE CBC W/AUTO DIFF WBC: CPT | Performed by: INTERNAL MEDICINE

## 2018-09-08 PROCEDURE — 25010000003 POTASSIUM CHLORIDE 10 MEQ/100ML SOLUTION: Performed by: INTERNAL MEDICINE

## 2018-09-08 PROCEDURE — 94799 UNLISTED PULMONARY SVC/PX: CPT

## 2018-09-08 PROCEDURE — 93005 ELECTROCARDIOGRAM TRACING: CPT | Performed by: HOSPITALIST

## 2018-09-08 PROCEDURE — 71045 X-RAY EXAM CHEST 1 VIEW: CPT | Performed by: RADIOLOGY

## 2018-09-08 PROCEDURE — 86140 C-REACTIVE PROTEIN: CPT | Performed by: INTERNAL MEDICINE

## 2018-09-08 PROCEDURE — 84132 ASSAY OF SERUM POTASSIUM: CPT | Performed by: INTERNAL MEDICINE

## 2018-09-08 PROCEDURE — 84100 ASSAY OF PHOSPHORUS: CPT | Performed by: INTERNAL MEDICINE

## 2018-09-08 PROCEDURE — 99232 SBSQ HOSP IP/OBS MODERATE 35: CPT | Performed by: INTERNAL MEDICINE

## 2018-09-08 PROCEDURE — 25010000002 PIPERACILLIN-TAZOBACTAM: Performed by: INTERNAL MEDICINE

## 2018-09-08 PROCEDURE — 80053 COMPREHEN METABOLIC PANEL: CPT | Performed by: INTERNAL MEDICINE

## 2018-09-08 PROCEDURE — 85007 BL SMEAR W/DIFF WBC COUNT: CPT | Performed by: INTERNAL MEDICINE

## 2018-09-08 RX ORDER — MAGNESIUM SULFATE HEPTAHYDRATE 40 MG/ML
4 INJECTION, SOLUTION INTRAVENOUS ONCE
Status: COMPLETED | OUTPATIENT
Start: 2018-09-08 | End: 2018-09-08

## 2018-09-08 RX ORDER — POTASSIUM CHLORIDE 7.45 MG/ML
10 INJECTION INTRAVENOUS
Status: COMPLETED | OUTPATIENT
Start: 2018-09-08 | End: 2018-09-08

## 2018-09-08 RX ORDER — POTASSIUM CHLORIDE 1.5 G/1.77G
40 POWDER, FOR SOLUTION ORAL EVERY 4 HOURS
Status: DISCONTINUED | OUTPATIENT
Start: 2018-09-08 | End: 2018-09-08 | Stop reason: ALTCHOICE

## 2018-09-08 RX ADMIN — POTASSIUM CHLORIDE 10 MEQ: 10 INJECTION, SOLUTION INTRAVENOUS at 09:34

## 2018-09-08 RX ADMIN — Medication 1 TABLET: at 18:17

## 2018-09-08 RX ADMIN — LORAZEPAM 1 MG: 1 TABLET ORAL at 21:10

## 2018-09-08 RX ADMIN — SODIUM CHLORIDE 75 ML/HR: 9 INJECTION, SOLUTION INTRAVENOUS at 06:00

## 2018-09-08 RX ADMIN — LEVOTHYROXINE SODIUM 50 MCG: 50 TABLET ORAL at 08:28

## 2018-09-08 RX ADMIN — FAMOTIDINE 40 MG: 20 TABLET, FILM COATED ORAL at 08:29

## 2018-09-08 RX ADMIN — POTASSIUM CHLORIDE 10 MEQ: 10 INJECTION, SOLUTION INTRAVENOUS at 13:10

## 2018-09-08 RX ADMIN — POTASSIUM CHLORIDE 10 MEQ: 10 INJECTION, SOLUTION INTRAVENOUS at 08:23

## 2018-09-08 RX ADMIN — METOPROLOL TARTRATE 25 MG: 25 TABLET, FILM COATED ORAL at 20:20

## 2018-09-08 RX ADMIN — Medication 1 CAPSULE: at 08:29

## 2018-09-08 RX ADMIN — POTASSIUM CHLORIDE 10 MEQ: 10 INJECTION, SOLUTION INTRAVENOUS at 11:01

## 2018-09-08 RX ADMIN — GABAPENTIN 400 MG: 400 CAPSULE ORAL at 20:20

## 2018-09-08 RX ADMIN — APIXABAN 5 MG: 5 TABLET, FILM COATED ORAL at 20:20

## 2018-09-08 RX ADMIN — GABAPENTIN 400 MG: 400 CAPSULE ORAL at 08:58

## 2018-09-08 RX ADMIN — PIPERACILLIN SODIUM,TAZOBACTAM SODIUM 3.38 G: 3; .375 INJECTION, POWDER, FOR SOLUTION INTRAVENOUS at 08:23

## 2018-09-08 RX ADMIN — SENNOSIDES AND DOCUSATE SODIUM 2 TABLET: 8.6; 5 TABLET ORAL at 20:20

## 2018-09-08 RX ADMIN — DILTIAZEM HYDROCHLORIDE 180 MG: 180 CAPSULE, EXTENDED RELEASE ORAL at 08:30

## 2018-09-08 RX ADMIN — DOXYCYCLINE 100 MG: 100 INJECTION, POWDER, LYOPHILIZED, FOR SOLUTION INTRAVENOUS at 03:42

## 2018-09-08 RX ADMIN — METOPROLOL TARTRATE 25 MG: 25 TABLET, FILM COATED ORAL at 08:29

## 2018-09-08 RX ADMIN — ALBUTEROL SULFATE 2.5 MG: 2.5 SOLUTION RESPIRATORY (INHALATION) at 19:44

## 2018-09-08 RX ADMIN — METRONIDAZOLE 500 MG: 500 INJECTION, SOLUTION INTRAVENOUS at 21:10

## 2018-09-08 RX ADMIN — MAGNESIUM SULFATE HEPTAHYDRATE 4 G: 40 INJECTION, SOLUTION INTRAVENOUS at 06:00

## 2018-09-08 RX ADMIN — PIPERACILLIN SODIUM,TAZOBACTAM SODIUM 3.38 G: 3; .375 INJECTION, POWDER, FOR SOLUTION INTRAVENOUS at 00:30

## 2018-09-08 RX ADMIN — Medication 3 ML: at 03:39

## 2018-09-08 RX ADMIN — CEFEPIME 2 G: 2 INJECTION, POWDER, FOR SOLUTION INTRAVENOUS at 13:53

## 2018-09-08 RX ADMIN — APIXABAN 5 MG: 5 TABLET, FILM COATED ORAL at 08:28

## 2018-09-08 RX ADMIN — LORAZEPAM 1 MG: 1 TABLET ORAL at 00:30

## 2018-09-08 RX ADMIN — METRONIDAZOLE 500 MG: 500 INJECTION, SOLUTION INTRAVENOUS at 13:53

## 2018-09-08 RX ADMIN — CETIRIZINE HYDROCHLORIDE 5 MG: 10 TABLET, FILM COATED ORAL at 08:28

## 2018-09-08 RX ADMIN — Medication 1 TABLET: at 08:29

## 2018-09-08 RX ADMIN — SENNOSIDES AND DOCUSATE SODIUM 2 TABLET: 8.6; 5 TABLET ORAL at 08:29

## 2018-09-08 RX ADMIN — Medication 3 ML: at 13:03

## 2018-09-08 NOTE — PLAN OF CARE
Problem: Patient Care Overview  Goal: Plan of Care Review  Outcome: Ongoing (interventions implemented as appropriate)   09/08/18 0802   Plan of Care Review   Progress improving   Coping/Psychosocial   Plan of Care Reviewed With patient     Goal: Individualization and Mutuality  Outcome: Ongoing (interventions implemented as appropriate)    Goal: Discharge Needs Assessment  Outcome: Ongoing (interventions implemented as appropriate)      Problem: Fall Risk (Adult)  Goal: Identify Related Risk Factors and Signs and Symptoms  Outcome: Ongoing (interventions implemented as appropriate)    Goal: Absence of Fall  Outcome: Ongoing (interventions implemented as appropriate)      Problem: Skin Injury Risk (Adult)  Goal: Identify Related Risk Factors and Signs and Symptoms  Outcome: Ongoing (interventions implemented as appropriate)    Goal: Skin Health and Integrity  Outcome: Ongoing (interventions implemented as appropriate)      Problem: Arrhythmia/Dysrhythmia (Symptomatic) (Adult)  Goal: Signs and Symptoms of Listed Potential Problems Will be Absent, Minimized or Managed (Arrhythmia/Dysrhythmia)  Outcome: Ongoing (interventions implemented as appropriate)      Problem: Eating/Swallowing Impairment (IRF) (Adult)  Goal: Optimal/Safe Level of Indep, Swallowing  Outcome: Ongoing (interventions implemented as appropriate)

## 2018-09-08 NOTE — PLAN OF CARE
Problem: Fall Risk (Adult)  Goal: Identify Related Risk Factors and Signs and Symptoms  Outcome: Ongoing (interventions implemented as appropriate)    Goal: Absence of Fall  Outcome: Ongoing (interventions implemented as appropriate)      Problem: Skin Injury Risk (Adult)  Goal: Skin Health and Integrity  Outcome: Ongoing (interventions implemented as appropriate)      Problem: Eating/Swallowing Impairment (IRF) (Adult)  Goal: Optimal/Safe Level of Indep, Swallowing  Outcome: Ongoing (interventions implemented as appropriate)

## 2018-09-08 NOTE — PROGRESS NOTES
Assisted By: Malorie MCDONALD    CC: Follow-up on atrial fibrillation    Interview History/HPI: Patient is up to bedside chair, she went there independently.  She states she's feeling better, no shortness of breath, she does not want to consider that she is using her fluids.  She denies chest pain, no abdominal pain, she states she is having no shortness of breath.      Vitals:    09/08/18 1002   BP: 118/83   Pulse: 109   Resp:    Temp:    SpO2: 97%       Intake/Output Summary (Last 24 hours) at 09/08/18 1117  Last data filed at 09/08/18 1101   Gross per 24 hour   Intake             1500 ml   Output                0 ml   Net             1500 ml       EXAM: She has not had an elevated temperature since midnight 36 hours ago, sclera nonicteric, lungs have bilateral breath sounds clear anterior, faint crackles at the base posterior no increased respiratory effort she is on room air and doing well.  Heart mildly tachycardic irregular rhythm without murmur rub or gallop, no edema.  Abdomen is soft benign bowel sounds are active, extrusion without edema strength is symmetric skin warm and dry    Tele: Atrial fibrillation with overall controlled rate at times mildly tachycardic, reviewed    LABS:   Lab Results (last 48 hours)     Procedure Component Value Units Date/Time    Blood Culture - Blood, [[id number]]  (Normal) Collected:  09/06/18 0759    Specimen:  Blood from Arm, Left Updated:  09/08/18 0830     Blood Culture No growth at 2 days    CBC & Differential [[id number]] Collected:  09/08/18 0232    Specimen:  Blood Updated:  09/08/18 0321    Narrative:       The following orders were created for panel order CBC & Differential.  Procedure                               Abnormality         Status                     ---------                               -----------         ------                     Manual Differential[[id number]]          Abnormal            Final result               Scan Slide[[id number]]                                        Final result               CBC Auto Differential[[id number]]        Abnormal            Final result                 Please view results for these tests on the individual orders.    CBC Auto Differential [[id number]]  (Abnormal) Collected:  09/08/18 0232    Specimen:  Blood Updated:  09/08/18 0321     WBC 11.31 10*3/mm3      RBC 3.84 (L) 10*6/mm3      Hemoglobin 11.3 (L) g/dL      Hematocrit 33.7 (L) %      MCV 87.8 fL      MCH 29.4 pg      MCHC 33.5 g/dL      RDW 13.8 %      RDW-SD 43.7 fl      MPV 11.1 (H) fL      Platelets 174 10*3/mm3     Scan Slide [[id number]] Collected:  09/08/18 0232    Specimen:  Blood Updated:  09/08/18 0321     Scan Slide --     Comment: See Manual Differential Results       Manual Differential [[id number]]  (Abnormal) Collected:  09/08/18 0232    Specimen:  Blood Updated:  09/08/18 0321     Neutrophil % 75.0 %      Lymphocyte % 20.0 %      Monocyte % 4.0 %      Bands %  1.0 %      Neutrophils Absolute 8.60 (H) 10*3/mm3      Lymphocytes Absolute 2.26 10*3/mm3      Monocytes Absolute 0.45 10*3/mm3      RBC Morphology Normal     Platelet Morphology Normal    Comprehensive Metabolic Panel [[id number]]  (Abnormal) Collected:  09/08/18 0232    Specimen:  Blood Updated:  09/08/18 0316     Glucose 129 (H) mg/dL      BUN 13 mg/dL      Creatinine 0.87 mg/dL      Sodium 145 mmol/L      Potassium 3.5 mmol/L      Chloride 121 (H) mmol/L      CO2 20.2 (L) mmol/L      Calcium 8.2 mg/dL      Total Protein 6.9 g/dL      Albumin 3.10 (L) g/dL      ALT (SGPT) 34 U/L      AST (SGOT) 52 (H) U/L      Alkaline Phosphatase 55 U/L      Comment: Note New Reference Ranges        Total Bilirubin 0.4 mg/dL      eGFR Non African Amer 63 mL/min/1.73      Globulin 3.8 gm/dL      A/G Ratio 0.8 (L) g/dL      BUN/Creatinine Ratio 14.9     Anion Gap 3.8 mmol/L     Narrative:       The MDRD GFR formula is only valid for adults with stable renal function between ages 18 and 70.    Phosphorus [[id number]]   (Abnormal) Collected:  09/08/18 0232    Specimen:  Blood Updated:  09/08/18 0316     Phosphorus 1.4 (L) mg/dL     Osmolality, Calculated [[id number]]  (Normal) Collected:  09/08/18 0232    Specimen:  Blood Updated:  09/08/18 0316     Osmolality Calc 290.5 mOsm/kg     Magnesium [[id number]]  (Normal) Collected:  09/08/18 0232    Specimen:  Blood Updated:  09/08/18 0315     Magnesium 1.7 mg/dL     C-reactive Protein [[id number]]  (Abnormal) Collected:  09/08/18 0232    Specimen:  Blood Updated:  09/08/18 0307     C-Reactive Protein 7.58 (H) mg/dL     Blood Culture - Blood, [[id number]]  (Normal) Collected:  09/05/18 1044    Specimen:  Blood from Arm, Left Updated:  09/07/18 1145     Blood Culture No growth at 2 days    Blood Culture - Blood, [[id number]]  (Normal) Collected:  09/05/18 1054    Specimen:  Blood from Arm, Right Updated:  09/07/18 1145     Blood Culture No growth at 2 days    Blood Culture - Blood, [[id number]]  (Normal) Collected:  09/06/18 0950    Specimen:  Blood from Arm, Left Updated:  09/07/18 1130     Blood Culture No growth at 24 hours    CBC & Differential [[id number]] Collected:  09/07/18 0158    Specimen:  Blood Updated:  09/07/18 0300    Narrative:       The following orders were created for panel order CBC & Differential.  Procedure                               Abnormality         Status                     ---------                               -----------         ------                     Manual Differential[[id number]]          Abnormal            Final result               Scan Slide[[id number]]                                       Final result               CBC Auto Differential[[id number]]        Abnormal            Final result                 Please view results for these tests on the individual orders.    CBC Auto Differential [[id number]]  (Abnormal) Collected:  09/07/18 0158    Specimen:  Blood Updated:  09/07/18 0300     WBC 12.08 10*3/mm3      RBC 3.92 (L) 10*6/mm3      Hemoglobin 11.6  (L) g/dL      Hematocrit 34.4 (L) %      MCV 87.8 fL      MCH 29.6 pg      MCHC 33.7 g/dL      RDW 13.5 %      RDW-SD 42.2 fl      MPV 11.1 (H) fL      Platelets 164 10*3/mm3     Scan Slide [[id number]] Collected:  09/07/18 0158    Specimen:  Blood Updated:  09/07/18 0300     Scan Slide --     Comment: See Manual Differential Results       Manual Differential [[id number]]  (Abnormal) Collected:  09/07/18 0158    Specimen:  Blood Updated:  09/07/18 0300     Neutrophil % 77.0 (H) %      Lymphocyte % 13.0 (L) %      Monocyte % 4.0 %      Basophil % 1.0 %      Bands %  5.0 %      Neutrophils Absolute 9.91 (H) 10*3/mm3      Lymphocytes Absolute 1.57 10*3/mm3      Monocytes Absolute 0.48 10*3/mm3      Basophils Absolute 0.12 10*3/mm3      RBC Morphology Normal     Platelet Morphology Normal    Comprehensive Metabolic Panel [[id number]]  (Abnormal) Collected:  09/07/18 0158    Specimen:  Blood Updated:  09/07/18 0245     Glucose 124 (H) mg/dL      BUN 13 mg/dL      Creatinine 1.08 mg/dL      Sodium 142 mmol/L      Potassium 3.7 mmol/L      Chloride 118 (H) mmol/L      CO2 17.5 (L) mmol/L      Calcium 8.1 mg/dL      Total Protein 7.1 g/dL      Albumin 3.20 (L) g/dL      ALT (SGPT) 41 (H) U/L      AST (SGOT) 77 (H) U/L      Alkaline Phosphatase 56 U/L      Comment: Note New Reference Ranges        Total Bilirubin 0.5 mg/dL      eGFR Non African Amer 49 (L) mL/min/1.73      Globulin 3.9 gm/dL      A/G Ratio 0.8 (L) g/dL      BUN/Creatinine Ratio 12.0     Anion Gap 6.5 mmol/L     Narrative:       The MDRD GFR formula is only valid for adults with stable renal function between ages 18 and 70.    Osmolality, Calculated [[id number]]  (Normal) Collected:  09/07/18 0158    Specimen:  Blood Updated:  09/07/18 0245     Osmolality Calc 284.7 mOsm/kg     C-reactive Protein [[id number]]  (Abnormal) Collected:  09/07/18 0158    Specimen:  Blood Updated:  09/07/18 0243     C-Reactive Protein 6.93 (H) mg/dL     Legionella Antigen, Urine -  Urine, Urine, Clean Catch [[id number]]  (Normal) Collected:  09/06/18 0821    Specimen:  Urine from Urine, Clean Catch Updated:  09/06/18 1331     LEGIONELLA ANTIGEN, URINE Negative    Narrative:         Presumptive negative for L. pneumophilia serogroup 1 antigen, suggesting no recent or current infection.    Mycoplasma Pneumoniae Antibody, IgM [[id number]]  (Normal) Collected:  09/05/18 1555    Specimen:  Blood Updated:  09/06/18 1120     Mycoplasma pneumo IgM Negative    Comprehensive Metabolic Panel [[id number]]  (Abnormal) Collected:  09/06/18 1054    Specimen:  Blood Updated:  09/06/18 1120     Glucose 128 (H) mg/dL      BUN 14 mg/dL      Creatinine 1.01 mg/dL      Sodium 136 mmol/L      Potassium 3.0 (L) mmol/L      Chloride 110 mmol/L      CO2 18.3 (L) mmol/L      Calcium 8.1 mg/dL      Total Protein 7.2 g/dL      Albumin 3.30 (L) g/dL      ALT (SGPT) 38 (H) U/L      AST (SGOT) 67 (H) U/L      Alkaline Phosphatase 58 U/L      Comment: Note New Reference Ranges        Total Bilirubin 0.4 mg/dL      eGFR Non African Amer 53 (L) mL/min/1.73      Globulin 3.9 gm/dL      A/G Ratio 0.8 (L) g/dL      BUN/Creatinine Ratio 13.9     Anion Gap 7.7 mmol/L     Narrative:       The MDRD GFR formula is only valid for adults with stable renal function between ages 18 and 70.    Osmolality, Calculated [[id number]]  (Normal) Collected:  09/06/18 1054    Specimen:  Blood Updated:  09/06/18 1120     Osmolality Calc 274.1 mOsm/kg           Radiology:  Imaging Results (last 72 hours)     Procedure Component Value Units Date/Time    XR Chest AP [[id number]] Collected:  09/08/18 1006     Updated:  09/08/18 1009    Narrative:       EXAMINATION: XR CHEST AP-      CLINICAL INDICATION:     follow up pneumonia; I48.91-Unspecified atrial  fibrillation; R53.1-Weakness     TECHNIQUE:  XR CHEST AP-      COMPARISON: NONE      FINDINGS:   COARSENED INTERSTITIAL MARKINGS THROUGHOUT THE LUNGS. PATCHY BIBASILAR  AIRSPACE DISEASE IS GROSSLY  STABLE.  Heart and mediastinum contours are unremarkable.  No pleural effusion.  No pneumothorax.   Bony and soft tissue structures are unremarkable.       Impression:       COARSENED INTERSTITIAL MARKINGS THROUGHOUT THE LUNGS. PATCHY  BIBASILAR AIRSPACE DISEASE IS GROSSLY STABLE.     This report was finalized on 9/8/2018 10:06 AM by Dr. Sae Montoya MD.       Fiberoptic Endo (fees) [[id number]] Resulted:  09/06/18 1439     Updated:  09/06/18 1439    Narrative:       This procedure was auto-finalized with no dictation required.    US Carotid Bilateral [[id number]] Collected:  09/06/18 1318     Updated:  09/06/18 1321    Narrative:       EXAMINATION: US CAROTID BILATERAL-      Technique: Multiple real-time color Doppler images were acquired of  bilateral carotid arteries.     Stenosis measurements if obtained, were performed by the NASCET or  similar method.        CLINICAL INDICATION:     Frequent falls, weakness; I48.91-Unspecified  atrial fibrillation; R53.1-Weakness          COMPARISON:    None     FINDINGS:        RIGHT:  Moderate plaque right carotid bulb and right ICA. No occlusion.  RIGHT CCA PSV: 726 mm/s  RIGHT ICA PSV: -461 mm/s  RIGHT ICA EDV: -107 mm/s.   Right ICA/CCA Ratio: 0.63  Anterograde flow is demonstrated in RIGHT vertebral artery.     LEFT:  Mild plaque. No occlusion.  LEFT CCA PSV: 859 mm/s  LEFT ICA PSV: -924 mm/s  LEFT EDV: -274 mm/s.   Left ICA/CCA Ratio: 1.08  Anterograde flow is demonstrated in LEFT vertebral artery.          Impression:       1. Mild to moderate plaque right greater than left carotid systems. No  occlusion.  2. No hemodynamically significant stenosis of either RIGHT or LEFT ICA.  3. Antegrade flow noted both vertebral arteries.     This report was finalized on 9/6/2018 1:19 PM by Dr. Luis E Kovacs MD.       XR Chest 1 View [[id number]] Collected:  09/06/18 1016     Updated:  09/06/18 1019    Narrative:       EXAMINATION:  XR CHEST 1 VW-      CLINICAL INDICATION:      Sepsis; I48.91-Unspecified atrial fibrillation;  R53.1-Weakness     TECHNIQUE:  XR CHEST 1 VW-       COMPARISON: 9/5/2018      FINDINGS:   Bibasilar atelectasis and minimal airspace disease noted.   Heart size is stable.   No pneumothorax.   No pleural effusion.   Bony and soft tissue structures are unremarkable.            Impression:       Bibasilar atelectasis and minimal airspace disease noted.     This report was finalized on 9/6/2018 10:17 AM by Dr. Sae Montoya MD.       CT Head Without Contrast [[id number]] Collected:  09/06/18 0828     Updated:  09/06/18 0830    Narrative:          EXAMINATION: CT HEAD WO CONTRAST-      CLINICAL INDICATION:     weakness, frequent falls; I48.91-Unspecified  atrial fibrillation; R53.1-Weakness     TECHNIQUE: Contiguous axial CT images of the head were obtained without  contrast administration.      Radiation dose reduction techniques were utilized per ALARA protocol.  Automated exposure control was initiated through either or [employer] or  DoseRight software packages by  protocol.       677.76 mGy.cm     COMPARISON:  None.       FINDINGS: Generalized cerebral atrophy is present. There is no mass  effect, midline displacement, or hydrocephalus. There are patchy areas  of decreased density within the periventricular white matter which  likely reflect chronic small vessel ischemic changes. There is no CT  evidence of acute infarct or hemorrhage. Bone windows reveal no osseous  abnormalities or fractures.        Impression:       Atrophy and chronic small vessel ischemic change, but there  are no acute intracranial abnormalities identified.         This report was finalized on 9/6/2018 8:28 AM by Dr. Sae Montoya MD.       XR Chest 1 View [[id number]] Collected:  09/05/18 1134     Updated:  09/05/18 1204    Narrative:       EXAMINATION:  XR CHEST 1 VW-      CLINICAL INDICATION:     Short of breath     TECHNIQUE:  XR CHEST 1 VW-       COMPARISON: 6/1/2018      FINDINGS:    Coarsened interstitial markings.   Heart size is stable.   No pneumothorax.   Probably tiny pleural effusions.   Bony and soft tissue structures are unremarkable.            Impression:       Stable radiographic appearance of the chest.     This report was finalized on 9/5/2018 11:35 AM by Dr. Sae Montoya MD.           Chest x-ray image reviewed, still with bibasilar disease but this has not progressed and appears fairly minimal at this time.    Results for orders placed during the hospital encounter of 09/05/18   Adult Transthoracic Echo Complete W/ Cont if Necessary Per Protocol    Narrative · Left ventricular systolic function is normal. Estimated EF appears to be   in the range of 51 - 55%  · Mild mitral valve regurgitation is present  · Mild pulmonic valve regurgitation is present.  · Left atrial cavity size is mildly dilated.  · Mild tricuspid valve regurgitation is present. Estimated right   ventricular systolic pressure from tricuspid regurgitation is mildly   elevated (35-45 mmHg).  · The pericardium is normal. There is no evidence of pericardial effusion.  · No significant changes compared to previous study.            Assessment/Plan:   Sepsis, CRP slightly increased but clinically improving.  Will follow trend, white count normal and no progression by chest x-ray.  She is feeling better.  (Doxycycline and Zosyn, ID following)     Atrial fibrillation with rapid ventricular rate, Cardizem being adjusted by cardiology, overall controlled, on Eliquis for stroke prevention     DVT prophylaxis, full anticoagulation will serve for this as well.     Hypothyroidism with adequate replacement     Mildly elevated transaminases, asymptomatic, possibly related to sepsis resolving,      Nutrition tolerating nectar thick liquids    Oral pharyngeal dysphagia, continue speech therapy strengthening     Acute kidney injury, improved, IV fluids discontinued     Noted patient did have mild plaque only on a carotid  Doppler, holding on statin therapy however due to elevated transaminases, if continues to improve possibly could add statin in the next 24-48 hours.    Will observe in PCU one more day, possibly transfer to the floor tomorrow.

## 2018-09-08 NOTE — PROGRESS NOTES
"  I have personally seen and examined the patient today and discussed overnight interval progress and pertinent issues with nursing staff.    Subjective       Resolved fever today with a normal white count and elevated CRP at 7.58.  Patient reports she is feeling significantly better but continues to have diarrhea.  Nurse reports no issues overnight.    History taken from: patient chart family RN      Objective       Vital Signs    /88   Pulse 89   Temp 98 °F (36.7 °C) (Oral)   Resp 18   Ht 162.6 cm (64\")   Wt 67.1 kg (148 lb)   SpO2 99%   BMI 25.40 kg/m²     Temp:  [98 °F (36.7 °C)-98.2 °F (36.8 °C)] 98 °F (36.7 °C)      Intake/Output Summary (Last 24 hours) at 09/08/18 1154  Last data filed at 09/08/18 1101   Gross per 24 hour   Intake             1500 ml   Output                0 ml   Net             1500 ml     Intake & Output (last 3 days)       09/05 0701 - 09/06 0700 09/06 0701 - 09/07 0700 09/07 0701 - 09/08 0700 09/08 0701 - 09/09 0700    P.O.  120      I.V. (mL/kg) 1000 (13.5) 1000 (14.9) 1000 (14.9)     IV Piggyback 1500 200 300 300    Total Intake(mL/kg) 2500 (33.7) 1320 (19.7) 1300 (19.4) 300 (4.5)    Urine (mL/kg/hr) 300       Total Output 300          Net +2200 +1320 +1300 +300            Unmeasured Urine Occurrence 4 x 4 x 3 x 1 x    Unmeasured Stool Occurrence  2 x 2 x 1 x        Physical Exam:                 General Appearance:    Alert, cooperative, in no acute distress   Head:    Normocephalic, without obvious abnormality, atraumatic   Eyes:            Lids and lashes normal, conjunctivae and sclerae normal, no   icterus, no pallor, corneas clear, PERRLA   Ears:    Ears appear intact with no abnormalities noted   Throat:   No oral lesions, no thrush, oral mucosa moist   Neck:   No adenopathy, supple, trachea midline, no thyromegaly, no   carotid bruit, no JVD   Back:     No tenderness to percussion or palpation, range of motion   normal   Lungs:     Crackles to the right base    " Heart:    Regular rhythm and normal rate, normal S1 and S2, no            murmur, no gallop, no rub, no click   Chest Wall:    No abnormalities observed   Abdomen:     Normal bowel sounds, no masses, no organomegaly, soft        non-tender, non-distended, no guarding, no rebound                tenderness   Rectal:     Deferred   Extremities:   Moves all extremities well, no edema, no cyanosis, no             redness   Pulses:   Pulses palpable and equal bilaterally   Skin:   No bleeding, bruising or rash   Lymph nodes:   No palpable adenopathy   Neurologic:   Awake, alert and oriented x 3. Following commands.       Results:      Results from last 7 days  Lab Units 09/08/18  0232 09/07/18  0158 09/06/18  1054 09/05/18  1010   WBC 10*3/mm3 11.31 12.08 8.32 7.19     Lab Results   Component Value Date    NEUTROABS 8.60 (H) 09/08/2018         Results from last 7 days  Lab Units 09/08/18  0232   CREATININE mg/dL 0.87         Results from last 7 days  Lab Units 09/08/18  0232 09/07/18  0158 09/05/18  1555   CRP mg/dL 7.58* 6.93* 4.68*       Imaging Results (last 24 hours)     Procedure Component Value Units Date/Time    XR Chest AP [[id number]] Collected:  09/08/18 1006     Updated:  09/08/18 1009    Narrative:       EXAMINATION: XR CHEST AP-      CLINICAL INDICATION:     follow up pneumonia; I48.91-Unspecified atrial  fibrillation; R53.1-Weakness     TECHNIQUE:  XR CHEST AP-      COMPARISON: NONE      FINDINGS:   COARSENED INTERSTITIAL MARKINGS THROUGHOUT THE LUNGS. PATCHY BIBASILAR  AIRSPACE DISEASE IS GROSSLY STABLE.  Heart and mediastinum contours are unremarkable.  No pleural effusion.  No pneumothorax.   Bony and soft tissue structures are unremarkable.       Impression:       COARSENED INTERSTITIAL MARKINGS THROUGHOUT THE LUNGS. PATCHY  BIBASILAR AIRSPACE DISEASE IS GROSSLY STABLE.     This report was finalized on 9/8/2018 10:06 AM by Dr. Sae Montoya MD.               Results Review:    I have personally reviewed  laboratory data, culture results, radiology studies and antimicrobial therapy.    Hospital Medications (active)       Dose Frequency Start End    acetaminophen (TYLENOL) tablet 650 mg 650 mg Every 6 Hours PRN 9/6/2018     Sig - Route: Take 2 tablets by mouth Every 6 (Six) Hours As Needed for Mild Pain  or Fever. - Oral    albuterol (PROVENTIL) nebulizer solution 0.083% 2.5 mg/3mL 2.5 mg Every 6 Hours PRN 9/5/2018     Sig - Route: Take 2.5 mg by nebulization Every 6 (Six) Hours As Needed for Wheezing or Shortness of Air. - Nebulization    apixaban (ELIQUIS) tablet 5 mg 5 mg Every 12 Hours Scheduled 9/5/2018     Sig - Route: Take 1 tablet by mouth Every 12 (Twelve) Hours. - Oral    calcium carb-cholecalciferol 600-800 MG-UNIT tablet 1 tablet 1 tablet 2 Times Daily With Meals 9/5/2018     Sig - Route: Take 1 tablet by mouth 2 (Two) Times a Day With Meals. - Oral    cetirizine (zyrTEC) tablet 5 mg 5 mg Daily 9/5/2018     Sig - Route: Take 0.5 tablets by mouth Daily. - Oral    diltiaZEM CD (CARDIZEM CD) 24 hr capsule 120 mg 120 mg Every 24 Hours Scheduled 9/6/2018     Sig - Route: Take 1 capsule by mouth Daily. - Oral    doxycycline (VIBRAMYCIN) 100 mg/100 mL 0.9% NS  mg Every 12 Hours 9/6/2018 9/13/2018    Sig - Route: Infuse 100 mL into a venous catheter Every 12 (Twelve) Hours. - Intravenous    Cosign for Ordering: Accepted by Evan Winters MD on 9/7/2018  6:11 AM    famotidine (PEPCID) tablet 40 mg 40 mg Daily 9/5/2018     Sig - Route: Take 2 tablets by mouth Daily. - Oral    gabapentin (NEURONTIN) capsule 400 mg 400 mg 2 Times Daily 9/5/2018     Sig - Route: Take 1 capsule by mouth 2 (Two) Times a Day. - Oral    lactobacillus acidophilus (RISAQUAD) capsule 1 capsule 1 capsule Daily 9/6/2018     Sig - Route: Take 1 capsule by mouth Daily. - Oral    levothyroxine (SYNTHROID, LEVOTHROID) tablet 50 mcg 50 mcg Daily 9/6/2018     Sig - Route: Take 1 tablet by mouth Daily. - Oral    LORazepam (ATIVAN) tablet  "1 mg 1 mg Every 8 Hours PRN 9/5/2018     Sig - Route: Take 1 tablet by mouth Every 8 (Eight) Hours As Needed for Anxiety (1). - Oral    Magnesium Sulfate 2 gram / 50mL Infusion (GIVE X 3 BAGS TO EQUAL 6GM TOTAL DOSE) - Mg 1.1 - 1.5 mg/dl 2 g As Needed 9/6/2018     Sig - Route: Infuse 50 mL into a venous catheter As Needed (See Administration Instructions). - Intravenous    Linked Group 1:  \"Or\" Linked Group Details        Magnesium Sulfate 2 gram Bolus, followed by 8 gram infusion (total Mg dose 10 grams)- Mg less than or equal to 1mg/dL 2 g As Needed 9/6/2018     Sig - Route: Infuse 50 mL into a venous catheter As Needed (See Administration Instructions). - Intravenous    Linked Group 1:  \"Or\" Linked Group Details        Magnesium Sulfate 4 gram infusion- Mg 1.6-1.9 mg/dL 4 g As Needed 9/6/2018     Sig - Route: Infuse 100 mL into a venous catheter As Needed (See Administration Instructions). - Intravenous    Linked Group 1:  \"Or\" Linked Group Details        metoprolol tartrate (LOPRESSOR) tablet 25 mg 25 mg Every 12 Hours Scheduled 9/5/2018     Sig - Route: Take 1 tablet by mouth Every 12 (Twelve) Hours. - Oral    Pharmacy to dose vancomycin  Continuous PRN 9/6/2018 9/13/2018    Sig - Route: Continuous As Needed for Consult. - Does not apply    Cosign for Ordering: Accepted by Dmitry Miranda MD on 9/6/2018  9:51 AM    Pharmacy to Dose Zosyn  Continuous PRN 9/6/2018 9/13/2018    Sig - Route: Continuous As Needed for Consult. - Does not apply    Cosign for Ordering: Accepted by Dmitry Miranda MD on 9/6/2018  9:51 AM    piperacillin-tazobactam (ZOSYN) 3.375 g/100 mL 0.9% NS IVPB (mbp) 3.375 g Every 8 Hours 9/6/2018 9/13/2018    Sig - Route: Infuse 100 mL into a venous catheter Every 8 (Eight) Hours. - Intravenous    potassium chloride (K-DUR,KLOR-CON) CR tablet 40 mEq 40 mEq Every 4 Hours 9/6/2018 9/7/2018    Sig - Route: Take 2 tablets by mouth Every 4 (Four) Hours. - Oral    potassium chloride (KLOR-CON) packet 40 " "mEq 40 mEq As Needed 9/6/2018     Sig - Route: Take 40 mEq by mouth As Needed (potassium replacement, see admin instructions). - Oral    Linked Group 2:  \"Or\" Linked Group Details        potassium chloride (MICRO-K) CR capsule 40 mEq 40 mEq As Needed 9/6/2018     Sig - Route: Take 4 capsules by mouth As Needed (potassium replacement.  see admin instructions). - Oral    Linked Group 2:  \"Or\" Linked Group Details        potassium chloride 10 mEq in 100 mL IVPB 10 mEq Every 1 Hour PRN 9/6/2018     Sig - Route: Infuse 100 mL into a venous catheter Every 1 (One) Hour As Needed (potassium protocol PERIPHERAL - see admin instructions). - Intravenous    Linked Group 2:  \"Or\" Linked Group Details        rOPINIRole (REQUIP) tablet 1 mg 1 mg Nightly PRN 9/5/2018     Sig - Route: Take 1 tablet by mouth At Night As Needed (restless leg). - Oral    sennosides-docusate sodium (SENOKOT-S) 8.6-50 MG tablet 2 tablet 2 tablet 2 Times Daily 9/5/2018     Sig - Route: Take 2 tablets by mouth 2 (Two) Times a Day. - Oral    sodium chloride 0.9 % flush 1-10 mL 1-10 mL As Needed 9/5/2018     Sig - Route: Infuse 1-10 mL into a venous catheter As Needed for Line Care. - Intravenous    Cosign for Ordering: Accepted by Dmitry Miranda MD on 9/5/2018  5:35 PM    sodium chloride 0.9 % flush 10 mL 10 mL As Needed 9/5/2018     Sig - Route: Infuse 10 mL into a venous catheter As Needed for Line Care. - Intravenous    Linked Group 3:  \"And\" Linked Group Details        sodium chloride 0.9 % flush 3 mL 3 mL Every 8 Hours 9/7/2018     Sig - Route: Infuse 3 mL into a venous catheter Every 8 (Eight) Hours. - Intravenous    sodium chloride 0.9 % flush 5 mL 5 mL As Needed 9/7/2018     Sig - Route: Infuse 5 mL into a venous catheter As Needed for Line Care (After Medication Administration or Blood Draw). - Intravenous    sodium chloride 0.9 % infusion 75 mL/hr Continuous 9/5/2018     Sig - Route: Infuse 75 mL/hr into a venous catheter Continuous. - " Intravenous    Cosign for Ordering: Accepted by Dmitry Miranda MD on 9/5/2018  5:35 PM    traMADol (ULTRAM) tablet 50 mg 50 mg Every 8 Hours PRN 9/5/2018     Sig - Route: Take 1 tablet by mouth Every 8 (Eight) Hours As Needed for Moderate Pain . - Oral    vancomycin (VANCOCIN) 1,250 mg in sodium chloride 0.9 % 250 mL IVPB 1,250 mg Once 9/6/2018 9/6/2018    Sig - Route: Infuse 1,250 mg into a venous catheter 1 (One) Time. - Intravenous    diltiaZEM (CARDIZEM) 125 mg in sodium chloride 0.9 % 125 mL (1 mg/mL) infusion (Discontinued) 5-15 mg/hr Titrated 9/5/2018 9/6/2018    Sig - Route: Infuse 5-15 mg/hr into a venous catheter Dose Adjusted By Provider As Needed. - Intravenous    Cosign for Ordering: Accepted by Dmitry Miranda MD on 9/5/2018  5:35 PM    vancomycin (VANCOCIN) 750 mg in sodium chloride 0.9 % 250 mL IVPB (Discontinued) 750 mg Every 24 Hours 9/7/2018 9/6/2018    Sig - Route: Infuse 750 mg into a venous catheter Daily. - Intravenous            Cultures:    Blood Culture   Date Value Ref Range Status   09/06/2018 No growth at 24 hours  Preliminary   09/06/2018 No growth at 24 hours  Preliminary   09/05/2018 No growth at 2 days  Preliminary   09/05/2018 No growth at 2 days  Preliminary         ASSESSMENT/PLAN           Assessment/Plan     ASSESSMENT:    1.  Pneumonia     PLAN:     Resolved fever today with a normal white count and elevated CRP at 7.58.  Patient reports she is feeling significantly better but continues to have diarrhea.  Nurse reports no issues overnight. Doxycycline discontinued based on clinical improvement and negative atypical's. Zosyn de-escalated to Cefepime 2 g IV q 12 hrs and Flagyl 500 mg IV q 8 hrs. Hope to de-escalate further in am if the patient continues to show improvement with improved CRP level.    Patient reports multiple episodes of watery stool with C. difficile toxin PCR ordered on 9/7/18.  Patient failed swallow evaluation and is on nectar thick liquids.  Daughter at  bedside case discussed.     CRP ordered for a.m.  Will to follow closely and adjust antibiotic therapy appropriately.    Patient's findings and recommendations were discussed with patient, family and nursing staff    Code Status:   Code Status and Medical Interventions:   Ordered at: 09/05/18 1427     Code Status:    CPR     Medical Interventions (Level of Support Prior to Arrest):    Full       Dayanara Winn PA-C  09/08/18  11:54 AM

## 2018-09-08 NOTE — PROGRESS NOTES
Chief complaint:  Atrial fibrillation    Admitting History: Anette Dunham is a 78 y.o. female with past medical history significant for paroxysmal atrial fibrillation, essential hypertension, and tobacco use.  She presented to the ED on 9/5/2018 with complaints of generalized weakness and recent falls.  Upon presentation she was noted to be in atrial fibrillation with RVR, initial heart rate was in the 150s with borderline blood pressures in the low 100s to high 90s systolically and 50s to 70s diastolically.  She is chronically anticoagulated on Eliquis.  Rate control with metoprolol 25 mg twice a day.  She was initially started on a Cardizem drip after Cardizem bolus of 20 mg.  She reported that she had been feeling weak since 9/1/2018 and sustained a few falls during that time.  She denies syncope or near syncopal episode.  She denies any trauma.  She was admitted for further evaluation and management.  Cardiology was consulted due to the atrial fibrillation with RVR.    Interval History:  Patient is resting in bed.  She denies chest pain and palpitations.  She does report diarrhea.    Physical Exam   Constitutional: Vital signs are normal. She appears cachectic.   HENT:   Head: Normocephalic and atraumatic.   Eyes: Pupils are equal, round, and reactive to light. Conjunctivae and lids are normal.   Neck: No JVD present. Carotid bruit is not present.   Cardiovascular: Normal rate, normal heart sounds and intact distal pulses.  An irregularly irregular rhythm present. PMI is not displaced.    No lower extremity edema   Pulmonary/Chest: Effort normal and breath sounds normal. No respiratory distress.   Abdominal: Soft. Normal appearance. There is no tenderness.   Neurological: She is alert.   Skin: Skin is warm and dry.   Psychiatric: She has a normal mood and affect. Cognition and memory are normal.   Vitals reviewed.       Telemetry:  Atrial fibrillation 80s    Patient Vitals for the past 24 hrs:   BP Temp Temp src  Pulse Resp SpO2 Weight   09/08/18 1100 120/88 - - 89 - 99 % -   09/08/18 1002 118/83 - - 109 - 97 % -   09/08/18 0902 110/81 - - 105 - 97 % -   09/08/18 0829 114/83 - - 111 - - -   09/08/18 0803 114/83 98 °F (36.7 °C) Oral 97 - - -   09/08/18 0758 - - - 91 18 97 % -   09/08/18 0700 119/68 - - 93 - 97 % -   09/08/18 0600 117/76 - - 96 16 97 % -   09/08/18 0500 117/69 - - 100 20 97 % -   09/08/18 0431 - 98 °F (36.7 °C) - - - - 67.1 kg (148 lb)   09/08/18 0400 117/64 - - 103 18 97 % -   09/08/18 0300 114/64 - - 110 20 94 % -   09/08/18 0200 117/68 - - 99 19 97 % -   09/08/18 0105 121/82 - - 112 18 99 % -   09/08/18 0000 130/77 - - (!) 126 20 96 % -   09/07/18 2350 - 98.2 °F (36.8 °C) Oral - - - -   09/07/18 2300 113/63 - - 101 20 96 % -   09/07/18 2205 118/61 - - 92 20 100 % -   09/07/18 2100 133/89 - - 115 20 97 % -   09/07/18 2020 - - - 101 - 98 % -   09/07/18 1905 132/77 - - 116 22 97 % -   09/07/18 1845 - - - 101 18 100 % -   09/07/18 1802 123/74 - - 95 - 97 % -   09/07/18 1702 116/80 - - 101 - 92 % -   09/07/18 1602 101/59 - - 82 - 99 % -   09/07/18 1502 97/65 - - 69 - 96 % -   09/07/18 1418 98/85 - - 108 26 95 % -         Results from last 7 days  Lab Units 09/08/18  0232 09/07/18  0158 09/06/18  1054 09/05/18  1010   WBC 10*3/mm3 11.31 12.08 8.32 7.19   HEMOGLOBIN g/dL 11.3* 11.6* 11.9* 12.6   PLATELETS 10*3/mm3 174 164 171 184       Results from last 7 days  Lab Units 09/08/18  0232 09/07/18  0158 09/06/18  1054 09/05/18  1010   SODIUM mmol/L 145 142 136 133*   POTASSIUM mmol/L 3.5 3.7 3.0* 3.8   CHLORIDE mmol/L 121* 118* 110 104   CO2 mmol/L 20.2* 17.5* 18.3* 18.4*   BUN mg/dL 13 13 14 20   CREATININE mg/dL 0.87 1.08 1.01 1.38*   CALCIUM mg/dL 8.2 8.1 8.1 9.1   GLUCOSE mg/dL 129* 124* 128* 138*       Results from last 7 days  Lab Units 09/06/18  0253 09/05/18  2057 09/05/18  1555 09/05/18  1010   CK TOTAL U/L 123 23* 25  --    TROPONIN I ng/mL 0.023 0.016 0.012 0.020   CKMB ng/mL 1.63 <0.18 <0.18  --      No  results found for: BNP          Current Facility-Administered Medications:   •  acetaminophen (TYLENOL) tablet 650 mg, 650 mg, Oral, Q6H PRN, Dmitry Miranda MD, 650 mg at 09/07/18 0014  •  albuterol (PROVENTIL) nebulizer solution 0.083% 2.5 mg/3mL, 2.5 mg, Nebulization, Q6H PRN, Dmitry Miranda MD, 2.5 mg at 09/06/18 1919  •  apixaban (ELIQUIS) tablet 5 mg, 5 mg, Oral, Q12H, Dmitry Miranda MD, 5 mg at 09/08/18 0828  •  calcium carb-cholecalciferol 600-800 MG-UNIT tablet 1 tablet, 1 tablet, Oral, BID With Meals, Dmitry Miranda MD, 1 tablet at 09/08/18 0829  •  cefepime (MAXIPIME) 2 g/100 mL 0.9% NS (mbp), 2 g, Intravenous, Once, Dayanara Winn PA-C, Last Rate: 200 mL/hr at 09/08/18 1353, 2 g at 09/08/18 1353  •  [START ON 9/9/2018] cefepime (MAXIPIME) 2 g/100 mL 0.9% NS (mbp), 2 g, Intravenous, Q12H, Dayanara Winn PA-C  •  cetirizine (zyrTEC) tablet 5 mg, 5 mg, Oral, Daily, Dmitry Miranda MD, 5 mg at 09/08/18 0828  •  diltiaZEM CD (CARDIZEM CD) 24 hr capsule 180 mg, 180 mg, Oral, Q24H, Celeste Nielsen APRN, 180 mg at 09/08/18 0830  •  famotidine (PEPCID) tablet 40 mg, 40 mg, Oral, Daily, Dmitry Miranda MD, 40 mg at 09/08/18 0829  •  gabapentin (NEURONTIN) capsule 400 mg, 400 mg, Oral, BID, Dmitry Miranda MD, 400 mg at 09/08/18 0858  •  lactobacillus acidophilus (RISAQUAD) capsule 1 capsule, 1 capsule, Oral, Daily, Dmitry Miranda MD, 1 capsule at 09/08/18 0829  •  levothyroxine (SYNTHROID, LEVOTHROID) tablet 50 mcg, 50 mcg, Oral, Daily, Dmitry Miranda MD, 50 mcg at 09/08/18 0828  •  LORazepam (ATIVAN) tablet 1 mg, 1 mg, Oral, Q8H PRN, Dmitry Miranda MD, 1 mg at 09/08/18 0030  •  Magnesium Sulfate 2 gram Bolus, followed by 8 gram infusion (total Mg dose 10 grams)- Mg less than or equal to 1mg/dL, 2 g, Intravenous, PRN **OR** Magnesium Sulfate 2 gram / 50mL Infusion (GIVE X 3 BAGS TO EQUAL 6GM TOTAL DOSE) - Mg 1.1 - 1.5 mg/dl, 2 g, Intravenous, PRN **OR** Magnesium Sulfate 4 gram infusion- Mg  1.6-1.9 mg/dL, 4 g, Intravenous, PRN, Dmitry Miranda MD  •  metoprolol tartrate (LOPRESSOR) tablet 25 mg, 25 mg, Oral, Q12H, Dmitry Miranda MD, 25 mg at 09/08/18 0829  •  metroNIDAZOLE (FLAGYL) IVPB 500 mg, 500 mg, Intravenous, Q8H, Dayanara Winn PA-C, 500 mg at 09/08/18 1353  •  Pharmacy to dose vancomycin, , Does not apply, Continuous PRN, Ely Tijerina PA-C  •  Pharmacy to Dose Zosyn, , Does not apply, Continuous PRN, Ely Tijerina PA-C  •  potassium chloride (MICRO-K) CR capsule 40 mEq, 40 mEq, Oral, PRN **OR** potassium chloride (KLOR-CON) packet 40 mEq, 40 mEq, Oral, PRN **OR** potassium chloride 10 mEq in 100 mL IVPB, 10 mEq, Intravenous, Q1H PRN, Dmitry Miranda MD  •  potassium chloride 10 mEq in 100 mL IVPB, 10 mEq, Intravenous, Q1H, Dmitry Miranda MD, Last Rate: 100 mL/hr at 09/08/18 1310, 10 mEq at 09/08/18 1310  •  rOPINIRole (REQUIP) tablet 1 mg, 1 mg, Oral, Nightly PRN, Dmitry Miranda MD  •  sennosides-docusate sodium (SENOKOT-S) 8.6-50 MG tablet 2 tablet, 2 tablet, Oral, BID, Dmitry Miranda MD, 2 tablet at 09/08/18 0829  •  sodium chloride 0.9 % flush 1-10 mL, 1-10 mL, Intravenous, PRN, Ely Tijerina PA-C  •  Insert peripheral IV, , , Once **AND** sodium chloride 0.9 % flush 10 mL, 10 mL, Intravenous, PRN, Enrique Martin MD  •  sodium chloride 0.9 % flush 3 mL, 3 mL, Intravenous, Q8H, Dmitry Miranda MD, 3 mL at 09/08/18 1303  •  sodium chloride 0.9 % flush 5 mL, 5 mL, Intravenous, PRN, Dmitry Miranda MD  •  traMADol (ULTRAM) tablet 50 mg, 50 mg, Oral, Q8H PRN, Dmitry Miranda MD    Impression and Plan:    1.  Atrial fibrillation.  Now on oral Cardizem. CHADsVASc is at least 4 for hypertension, age, and sex.  Patient is anticoagulated on Eliquis 5 mg twice a day. She does have freq falls at home lately from weakness, I did mention about the risk of fall and intracranial bleed being on full dose anticoagulation. Get PT evaluation for assessment of gait and  balance.   If she has risk of fall from PT assessment needs to cut down her Eliquis to 2.5 mg po bid.    2.  Hypotension, resolved.   3.  Pt is stable from a cardiac standpoint.  We will sign off, please call with any questions.        I have discussed the HPI, labs/tests, medications, physical exam and plan of care with Dr. Lucien Herman.    JUNIE Painting    I have examined and seen the patient personally and performed a face-to-face diagnostic evaluation. The plan of care was reviewed and developed with JUNIE Painting.  I have recommended and/or modify the above history of present illness, physical examination and assessment/plan to reflect my findings and impressions.  Essential elements of Plan was discussed with JUNIE above.  I agree with the findings and assessment/plan as documented for Anette Herman MD, [facility]  Interventional Cardiology      09/08/18 8:32 PM

## 2018-09-09 LAB
ALBUMIN SERPL-MCNC: 3 G/DL (ref 3.4–4.8)
ALBUMIN/GLOB SERPL: 0.7 G/DL (ref 1.5–2.5)
ALP SERPL-CCNC: 62 U/L (ref 35–104)
ALT SERPL W P-5'-P-CCNC: 29 U/L (ref 10–36)
ANION GAP SERPL CALCULATED.3IONS-SCNC: 7.6 MMOL/L (ref 3.6–11.2)
AST SERPL-CCNC: 42 U/L (ref 10–30)
BASOPHILS # BLD MANUAL: 0.1 10*3/MM3 (ref 0–0.3)
BASOPHILS NFR BLD AUTO: 1 % (ref 0–2)
BILIRUB SERPL-MCNC: 0.5 MG/DL (ref 0.2–1.8)
BUN BLD-MCNC: 12 MG/DL (ref 7–21)
BUN/CREAT SERPL: 16.7 (ref 7–25)
CALCIUM SPEC-SCNC: 8.3 MG/DL (ref 7.7–10)
CHLORIDE SERPL-SCNC: 116 MMOL/L (ref 99–112)
CO2 SERPL-SCNC: 18.4 MMOL/L (ref 24.3–31.9)
CREAT BLD-MCNC: 0.72 MG/DL (ref 0.43–1.29)
CRP SERPL-MCNC: 4.71 MG/DL (ref 0–0.99)
DEPRECATED RDW RBC AUTO: 45.1 FL (ref 37–54)
ERYTHROCYTE [DISTWIDTH] IN BLOOD BY AUTOMATED COUNT: 14 % (ref 11.5–14.5)
GFR SERPL CREATININE-BSD FRML MDRD: 78 ML/MIN/1.73
GLOBULIN UR ELPH-MCNC: 4.1 GM/DL
GLUCOSE BLD-MCNC: 100 MG/DL (ref 70–110)
HCT VFR BLD AUTO: 34.8 % (ref 37–47)
HGB BLD-MCNC: 11.5 G/DL (ref 12–16)
LYMPHOCYTES # BLD MANUAL: 4.27 10*3/MM3 (ref 1–3)
LYMPHOCYTES NFR BLD MANUAL: 3 % (ref 0–12)
LYMPHOCYTES NFR BLD MANUAL: 41 % (ref 16–46)
MAGNESIUM SERPL-MCNC: 2.1 MG/DL (ref 1.7–2.6)
MCH RBC QN AUTO: 29.3 PG (ref 27–33)
MCHC RBC AUTO-ENTMCNC: 33 G/DL (ref 33–37)
MCV RBC AUTO: 88.8 FL (ref 80–94)
MONOCYTES # BLD AUTO: 0.31 10*3/MM3 (ref 0.1–0.9)
NEUTROPHILS # BLD AUTO: 5.73 10*3/MM3 (ref 1.4–6.5)
NEUTROPHILS NFR BLD MANUAL: 55 % (ref 40–75)
OSMOLALITY SERPL CALC.SUM OF ELEC: 283 MOSM/KG (ref 273–305)
PHOSPHATE SERPL-MCNC: 1.9 MG/DL (ref 2.7–4.5)
PLAT MORPH BLD: NORMAL
PLATELET # BLD AUTO: 190 10*3/MM3 (ref 130–400)
PMV BLD AUTO: 11.4 FL (ref 6–10)
POTASSIUM BLD-SCNC: 3.7 MMOL/L (ref 3.5–5.3)
PROT SERPL-MCNC: 7.1 G/DL (ref 6–8)
RBC # BLD AUTO: 3.92 10*6/MM3 (ref 4.2–5.4)
RBC MORPH BLD: NORMAL
SCAN SLIDE: NORMAL
SODIUM BLD-SCNC: 142 MMOL/L (ref 135–153)
WBC NRBC COR # BLD: 10.42 10*3/MM3 (ref 4.5–12.5)

## 2018-09-09 PROCEDURE — 80053 COMPREHEN METABOLIC PANEL: CPT | Performed by: INTERNAL MEDICINE

## 2018-09-09 PROCEDURE — 83735 ASSAY OF MAGNESIUM: CPT | Performed by: INTERNAL MEDICINE

## 2018-09-09 PROCEDURE — 85007 BL SMEAR W/DIFF WBC COUNT: CPT | Performed by: INTERNAL MEDICINE

## 2018-09-09 PROCEDURE — 84100 ASSAY OF PHOSPHORUS: CPT | Performed by: INTERNAL MEDICINE

## 2018-09-09 PROCEDURE — 25010000002 CEFEPIME 2 G/NS 100 ML SOLUTION: Performed by: PHYSICIAN ASSISTANT

## 2018-09-09 PROCEDURE — 86140 C-REACTIVE PROTEIN: CPT | Performed by: INTERNAL MEDICINE

## 2018-09-09 PROCEDURE — 94799 UNLISTED PULMONARY SVC/PX: CPT

## 2018-09-09 PROCEDURE — 85025 COMPLETE CBC W/AUTO DIFF WBC: CPT | Performed by: INTERNAL MEDICINE

## 2018-09-09 PROCEDURE — 99233 SBSQ HOSP IP/OBS HIGH 50: CPT | Performed by: INTERNAL MEDICINE

## 2018-09-09 RX ORDER — AMOXICILLIN AND CLAVULANATE POTASSIUM 875; 125 MG/1; MG/1
1 TABLET, FILM COATED ORAL EVERY 12 HOURS SCHEDULED
Status: DISCONTINUED | OUTPATIENT
Start: 2018-09-09 | End: 2018-09-10 | Stop reason: HOSPADM

## 2018-09-09 RX ORDER — DILTIAZEM HYDROCHLORIDE 240 MG/1
240 CAPSULE, COATED, EXTENDED RELEASE ORAL
Status: DISCONTINUED | OUTPATIENT
Start: 2018-09-10 | End: 2018-09-10

## 2018-09-09 RX ORDER — ATORVASTATIN CALCIUM 10 MG/1
10 TABLET, FILM COATED ORAL NIGHTLY
Status: DISCONTINUED | OUTPATIENT
Start: 2018-09-09 | End: 2018-09-10 | Stop reason: HOSPADM

## 2018-09-09 RX ADMIN — APIXABAN 5 MG: 5 TABLET, FILM COATED ORAL at 08:53

## 2018-09-09 RX ADMIN — Medication 1 TABLET: at 17:11

## 2018-09-09 RX ADMIN — Medication 3 ML: at 14:34

## 2018-09-09 RX ADMIN — METOPROLOL TARTRATE 25 MG: 25 TABLET, FILM COATED ORAL at 20:22

## 2018-09-09 RX ADMIN — ATORVASTATIN CALCIUM 10 MG: 10 TABLET, FILM COATED ORAL at 20:22

## 2018-09-09 RX ADMIN — AMOXICILLIN AND CLAVULANATE POTASSIUM 1 TABLET: 875; 125 TABLET, FILM COATED ORAL at 14:33

## 2018-09-09 RX ADMIN — Medication 3 ML: at 20:22

## 2018-09-09 RX ADMIN — SENNOSIDES AND DOCUSATE SODIUM 2 TABLET: 8.6; 5 TABLET ORAL at 20:22

## 2018-09-09 RX ADMIN — APIXABAN 5 MG: 5 TABLET, FILM COATED ORAL at 20:21

## 2018-09-09 RX ADMIN — POTASSIUM PHOSPHATE, MONOBASIC AND POTASSIUM PHOSPHATE, DIBASIC 15 MMOL: 224; 236 INJECTION, SOLUTION INTRAVENOUS at 14:34

## 2018-09-09 RX ADMIN — METOPROLOL TARTRATE 25 MG: 25 TABLET, FILM COATED ORAL at 08:53

## 2018-09-09 RX ADMIN — LORAZEPAM 1 MG: 1 TABLET ORAL at 20:21

## 2018-09-09 RX ADMIN — METRONIDAZOLE 500 MG: 500 INJECTION, SOLUTION INTRAVENOUS at 05:29

## 2018-09-09 RX ADMIN — GABAPENTIN 400 MG: 400 CAPSULE ORAL at 08:52

## 2018-09-09 RX ADMIN — Medication 1 CAPSULE: at 08:53

## 2018-09-09 RX ADMIN — Medication 1 TABLET: at 08:53

## 2018-09-09 RX ADMIN — LEVOTHYROXINE SODIUM 50 MCG: 50 TABLET ORAL at 08:53

## 2018-09-09 RX ADMIN — DILTIAZEM HYDROCHLORIDE 180 MG: 180 CAPSULE, EXTENDED RELEASE ORAL at 08:53

## 2018-09-09 RX ADMIN — AMOXICILLIN AND CLAVULANATE POTASSIUM 1 TABLET: 875; 125 TABLET, FILM COATED ORAL at 20:21

## 2018-09-09 RX ADMIN — SENNOSIDES AND DOCUSATE SODIUM 2 TABLET: 8.6; 5 TABLET ORAL at 08:53

## 2018-09-09 RX ADMIN — CETIRIZINE HYDROCHLORIDE 5 MG: 10 TABLET, FILM COATED ORAL at 08:53

## 2018-09-09 RX ADMIN — FAMOTIDINE 40 MG: 20 TABLET, FILM COATED ORAL at 08:53

## 2018-09-09 RX ADMIN — CEFEPIME 2 G: 2 INJECTION, POWDER, FOR SOLUTION INTRAVENOUS at 00:21

## 2018-09-09 RX ADMIN — GABAPENTIN 400 MG: 400 CAPSULE ORAL at 20:21

## 2018-09-09 RX ADMIN — Medication 3 ML: at 05:29

## 2018-09-09 RX ADMIN — Medication 3 ML: at 17:11

## 2018-09-09 NOTE — PLAN OF CARE
Problem: Patient Care Overview  Goal: Plan of Care Review  Outcome: Ongoing (interventions implemented as appropriate)   09/09/18 0977   Plan of Care Review   Progress improving   Coping/Psychosocial   Plan of Care Reviewed With patient     Goal: Individualization and Mutuality  Outcome: Ongoing (interventions implemented as appropriate)    Goal: Discharge Needs Assessment  Outcome: Ongoing (interventions implemented as appropriate)    Goal: Interprofessional Rounds/Family Conf  Outcome: Ongoing (interventions implemented as appropriate)      Problem: Fall Risk (Adult)  Goal: Identify Related Risk Factors and Signs and Symptoms  Outcome: Ongoing (interventions implemented as appropriate)    Goal: Absence of Fall  Outcome: Ongoing (interventions implemented as appropriate)      Problem: Skin Injury Risk (Adult)  Goal: Identify Related Risk Factors and Signs and Symptoms  Outcome: Ongoing (interventions implemented as appropriate)    Goal: Skin Health and Integrity  Outcome: Ongoing (interventions implemented as appropriate)      Problem: Arrhythmia/Dysrhythmia (Symptomatic) (Adult)  Goal: Signs and Symptoms of Listed Potential Problems Will be Absent, Minimized or Managed (Arrhythmia/Dysrhythmia)  Outcome: Ongoing (interventions implemented as appropriate)      Problem: Eating/Swallowing Impairment (IRF) (Adult)  Goal: Optimal/Safe Level of Indep, Swallowing  Outcome: Ongoing (interventions implemented as appropriate)

## 2018-09-09 NOTE — PLAN OF CARE
Problem: Fall Risk (Adult)  Goal: Absence of Fall  Outcome: Ongoing (interventions implemented as appropriate)      Problem: Skin Injury Risk (Adult)  Goal: Skin Health and Integrity  Outcome: Ongoing (interventions implemented as appropriate)      Problem: Eating/Swallowing Impairment (IRF) (Adult)  Goal: Optimal/Safe Level of Indep, Swallowing  Outcome: Ongoing (interventions implemented as appropriate)

## 2018-09-09 NOTE — PROGRESS NOTES
"  Assisted By: Courtney MCDONALD daughter is present    CC: follow up sepsis    Interview History/HPI: Patient states she feels \"great\".  No shortness breath no palpitations but she is having a dry mouth from the thickened liquids.  No nausea no vomiting no chest pain.  Patient states she is ambulating only holding somebody's hand to get to the bathroom.  She has had some intermittent tachycardia as well.      Vitals:    09/09/18 0901   BP: 115/93   Pulse: (!) 130   Resp:    Temp:    SpO2: 95%       Intake/Output Summary (Last 24 hours) at 09/09/18 1126  Last data filed at 09/09/18 0802   Gross per 24 hour   Intake              610 ml   Output                0 ml   Net              610 ml       EXAM: Pleasant well-developed well-nourished she is on room air and appears in no distress, she can speak in full sentences without tachypnea.  Oral mucosa of her mouth is dry but eyes are moist.  Neck is supple no JVD lungs have bilateral breath sounds that are clear today without rhonchi rales or wheezing, heart is tachycardic irregularly irregular rhythm without murmur rub or gallop.  No edema.  No increased respiratory effort.  Abdomen soft benign bowel sounds are active no organomegaly or mass appreciated.  Extremities are without edema strength is symmetric skin warm and dry mood is good, she is appreciative of care.  Temperature is 98.9 respiratory rate is 18    Tele: Atrial fibrillation with a rapid rate, images reviewed    LABS:   Lab Results (last 48 hours)     Procedure Component Value Units Date/Time    Phosphorus [[id number]]  (Abnormal) Collected:  09/09/18 0159    Specimen:  Blood Updated:  09/09/18 1124     Phosphorus 1.9 (L) mg/dL     Blood Culture - Blood, [[id number]]  (Normal) Collected:  09/06/18 0759    Specimen:  Blood from Arm, Left Updated:  09/09/18 0830     Blood Culture No growth at 3 days    CBC & Differential [[id number]] Collected:  09/09/18 0159    Specimen:  Blood Updated:  09/09/18 0309    Narrative:    "    The following orders were created for panel order CBC & Differential.  Procedure                               Abnormality         Status                     ---------                               -----------         ------                     Manual Differential[[id number]]          Abnormal            Final result               Scan Slide[[id number]]                                       Final result               CBC Auto Differential[[id number]]        Abnormal            Final result                 Please view results for these tests on the individual orders.    CBC Auto Differential [[id number]]  (Abnormal) Collected:  09/09/18 0159    Specimen:  Blood Updated:  09/09/18 0309     WBC 10.42 10*3/mm3      RBC 3.92 (L) 10*6/mm3      Hemoglobin 11.5 (L) g/dL      Hematocrit 34.8 (L) %      MCV 88.8 fL      MCH 29.3 pg      MCHC 33.0 g/dL      RDW 14.0 %      RDW-SD 45.1 fl      MPV 11.4 (H) fL      Platelets 190 10*3/mm3     Scan Slide [[id number]] Collected:  09/09/18 0159    Specimen:  Blood Updated:  09/09/18 0309     Scan Slide --     Comment: See Manual Differential Results       Manual Differential [[id number]]  (Abnormal) Collected:  09/09/18 0159    Specimen:  Blood Updated:  09/09/18 0309     Neutrophil % 55.0 %      Lymphocyte % 41.0 %      Monocyte % 3.0 %      Basophil % 1.0 %      Neutrophils Absolute 5.73 10*3/mm3      Lymphocytes Absolute 4.27 (H) 10*3/mm3      Monocytes Absolute 0.31 10*3/mm3      Basophils Absolute 0.10 10*3/mm3      RBC Morphology Normal     Platelet Morphology Normal    C-reactive Protein [[id number]]  (Abnormal) Collected:  09/09/18 0159    Specimen:  Blood Updated:  09/09/18 0258     C-Reactive Protein 4.71 (H) mg/dL     Comprehensive Metabolic Panel [[id number]]  (Abnormal) Collected:  09/09/18 0159    Specimen:  Blood Updated:  09/09/18 0258     Glucose 100 mg/dL      BUN 12 mg/dL      Creatinine 0.72 mg/dL      Sodium 142 mmol/L      Potassium 3.7 mmol/L      Chloride 116 (H)  mmol/L      CO2 18.4 (L) mmol/L      Calcium 8.3 mg/dL      Total Protein 7.1 g/dL      Albumin 3.00 (L) g/dL      ALT (SGPT) 29 U/L      AST (SGOT) 42 (H) U/L      Alkaline Phosphatase 62 U/L      Comment: Note New Reference Ranges        Total Bilirubin 0.5 mg/dL      eGFR Non African Amer 78 mL/min/1.73      Globulin 4.1 gm/dL      A/G Ratio 0.7 (L) g/dL      BUN/Creatinine Ratio 16.7     Anion Gap 7.6 mmol/L     Narrative:       The MDRD GFR formula is only valid for adults with stable renal function between ages 18 and 70.    Magnesium [[id number]]  (Normal) Collected:  09/09/18 0159    Specimen:  Blood Updated:  09/09/18 0258     Magnesium 2.1 mg/dL     Osmolality, Calculated [[id number]]  (Normal) Collected:  09/09/18 0159    Specimen:  Blood Updated:  09/09/18 0258     Osmolality Calc 283.0 mOsm/kg     Potassium [[id number]]  (Normal) Collected:  09/08/18 1529    Specimen:  Blood Updated:  09/08/18 1625     Potassium 4.2 mmol/L     Blood Culture - Blood, [[id number]]  (Normal) Collected:  09/05/18 1044    Specimen:  Blood from Arm, Left Updated:  09/08/18 1145     Blood Culture No growth at 3 days    Blood Culture - Blood, [[id number]]  (Normal) Collected:  09/05/18 1054    Specimen:  Blood from Arm, Right Updated:  09/08/18 1145     Blood Culture No growth at 3 days    Blood Culture - Blood, [[id number]]  (Normal) Collected:  09/06/18 0950    Specimen:  Blood from Arm, Left Updated:  09/08/18 1130     Blood Culture No growth at 2 days    CBC & Differential [[id number]] Collected:  09/08/18 0232    Specimen:  Blood Updated:  09/08/18 0321    Narrative:       The following orders were created for panel order CBC & Differential.  Procedure                               Abnormality         Status                     ---------                               -----------         ------                     Manual Differential[[id number]]          Abnormal            Final result               Scan Slide[[id number]]                                        Final result               CBC Auto Differential[[id number]]        Abnormal            Final result                 Please view results for these tests on the individual orders.    CBC Auto Differential [[id number]]  (Abnormal) Collected:  09/08/18 0232    Specimen:  Blood Updated:  09/08/18 0321     WBC 11.31 10*3/mm3      RBC 3.84 (L) 10*6/mm3      Hemoglobin 11.3 (L) g/dL      Hematocrit 33.7 (L) %      MCV 87.8 fL      MCH 29.4 pg      MCHC 33.5 g/dL      RDW 13.8 %      RDW-SD 43.7 fl      MPV 11.1 (H) fL      Platelets 174 10*3/mm3     Scan Slide [[id number]] Collected:  09/08/18 0232    Specimen:  Blood Updated:  09/08/18 0321     Scan Slide --     Comment: See Manual Differential Results       Manual Differential [[id number]]  (Abnormal) Collected:  09/08/18 0232    Specimen:  Blood Updated:  09/08/18 0321     Neutrophil % 75.0 %      Lymphocyte % 20.0 %      Monocyte % 4.0 %      Bands %  1.0 %      Neutrophils Absolute 8.60 (H) 10*3/mm3      Lymphocytes Absolute 2.26 10*3/mm3      Monocytes Absolute 0.45 10*3/mm3      RBC Morphology Normal     Platelet Morphology Normal    Comprehensive Metabolic Panel [[id number]]  (Abnormal) Collected:  09/08/18 0232    Specimen:  Blood Updated:  09/08/18 0316     Glucose 129 (H) mg/dL      BUN 13 mg/dL      Creatinine 0.87 mg/dL      Sodium 145 mmol/L      Potassium 3.5 mmol/L      Chloride 121 (H) mmol/L      CO2 20.2 (L) mmol/L      Calcium 8.2 mg/dL      Total Protein 6.9 g/dL      Albumin 3.10 (L) g/dL      ALT (SGPT) 34 U/L      AST (SGOT) 52 (H) U/L      Alkaline Phosphatase 55 U/L      Comment: Note New Reference Ranges        Total Bilirubin 0.4 mg/dL      eGFR Non African Amer 63 mL/min/1.73      Globulin 3.8 gm/dL      A/G Ratio 0.8 (L) g/dL      BUN/Creatinine Ratio 14.9     Anion Gap 3.8 mmol/L     Narrative:       The MDRD GFR formula is only valid for adults with stable renal function between ages 18 and 70.    Phosphorus  [[id number]]  (Abnormal) Collected:  09/08/18 0232    Specimen:  Blood Updated:  09/08/18 0316     Phosphorus 1.4 (L) mg/dL     Osmolality, Calculated [[id number]]  (Normal) Collected:  09/08/18 0232    Specimen:  Blood Updated:  09/08/18 0316     Osmolality Calc 290.5 mOsm/kg     Magnesium [[id number]]  (Normal) Collected:  09/08/18 0232    Specimen:  Blood Updated:  09/08/18 0315     Magnesium 1.7 mg/dL     C-reactive Protein [[id number]]  (Abnormal) Collected:  09/08/18 0232    Specimen:  Blood Updated:  09/08/18 0307     C-Reactive Protein 7.58 (H) mg/dL           Radiology:  Imaging Results (last 72 hours)     Procedure Component Value Units Date/Time    XR Chest AP [[id number]] Collected:  09/08/18 1006     Updated:  09/08/18 1009    Narrative:       EXAMINATION: XR CHEST AP-      CLINICAL INDICATION:     follow up pneumonia; I48.91-Unspecified atrial  fibrillation; R53.1-Weakness     TECHNIQUE:  XR CHEST AP-      COMPARISON: NONE      FINDINGS:   COARSENED INTERSTITIAL MARKINGS THROUGHOUT THE LUNGS. PATCHY BIBASILAR  AIRSPACE DISEASE IS GROSSLY STABLE.  Heart and mediastinum contours are unremarkable.  No pleural effusion.  No pneumothorax.   Bony and soft tissue structures are unremarkable.       Impression:       COARSENED INTERSTITIAL MARKINGS THROUGHOUT THE LUNGS. PATCHY  BIBASILAR AIRSPACE DISEASE IS GROSSLY STABLE.     This report was finalized on 9/8/2018 10:06 AM by Dr. Sae Montoya MD.       Fiberoptic Endo (fees) [[id number]] Resulted:  09/06/18 1439     Updated:  09/06/18 1439    Narrative:       This procedure was auto-finalized with no dictation required.    US Carotid Bilateral [[id number]] Collected:  09/06/18 1318     Updated:  09/06/18 1321    Narrative:       EXAMINATION: US CAROTID BILATERAL-      Technique: Multiple real-time color Doppler images were acquired of  bilateral carotid arteries.     Stenosis measurements if obtained, were performed by the NASCET or  similar method.        CLINICAL  INDICATION:     Frequent falls, weakness; I48.91-Unspecified  atrial fibrillation; R53.1-Weakness          COMPARISON:    None     FINDINGS:        RIGHT:  Moderate plaque right carotid bulb and right ICA. No occlusion.  RIGHT CCA PSV: 726 mm/s  RIGHT ICA PSV: -461 mm/s  RIGHT ICA EDV: -107 mm/s.   Right ICA/CCA Ratio: 0.63  Anterograde flow is demonstrated in RIGHT vertebral artery.     LEFT:  Mild plaque. No occlusion.  LEFT CCA PSV: 859 mm/s  LEFT ICA PSV: -924 mm/s  LEFT EDV: -274 mm/s.   Left ICA/CCA Ratio: 1.08  Anterograde flow is demonstrated in LEFT vertebral artery.          Impression:       1. Mild to moderate plaque right greater than left carotid systems. No  occlusion.  2. No hemodynamically significant stenosis of either RIGHT or LEFT ICA.  3. Antegrade flow noted both vertebral arteries.     This report was finalized on 9/6/2018 1:19 PM by Dr. Luis E Kovacs MD.               Results for orders placed during the hospital encounter of 09/05/18   Adult Transthoracic Echo Complete W/ Cont if Necessary Per Protocol    Narrative · Left ventricular systolic function is normal. Estimated EF appears to be   in the range of 51 - 55%  · Mild mitral valve regurgitation is present  · Mild pulmonic valve regurgitation is present.  · Left atrial cavity size is mildly dilated.  · Mild tricuspid valve regurgitation is present. Estimated right   ventricular systolic pressure from tricuspid regurgitation is mildly   elevated (35-45 mmHg).  · The pericardium is normal. There is no evidence of pericardial effusion.  · No significant changes compared to previous study.            Assessment/Plan:   Sepsis, CRP is decreasing, discussed with infectious disease, antibiotics have been changed by mouth.  The fever stays down and lab work acceptable hopefully can discharge tomorrow depending on heart rate.     Atrial fibrillation with rapid ventricular rate, I've increased Cardizem as blood pressure I do feel like will  tolerate this and she is still tachycardic, will follow she is anticoagulated for stroke prevention.     DVT prophylaxis, full anticoagulation will serve for this as well.     Hypothyroidism with adequate replacement     Mildly elevated transaminases, asymptomatic, resolving      Nutrition tolerating nectar thick liquids     Oral pharyngeal dysphagia, continue speech therapy strengthening, speech therapy can reevaluate tomorrow hopefully can upgrade to thin liquids     Acute kidney injury present on admission, improved, IV fluids discontinued     Noted patient did have mild plaque only on a carotid Doppler, since transaminases acceptable, I'm starting low-dose statin.    Hypophosphatemia, supplement by protocol

## 2018-09-09 NOTE — PROGRESS NOTES
"  I have personally seen and examined the patient today and discussed overnight interval progress and pertinent issues with nursing staff.    Subjective       Patient is feeling significantly better today with no fever informed bowel movements.  CRP is showing significant improvement down to 4.71 with a normal white count.  In the setting of such significant improvement from infectious disease standpoint antibiotic therapy was de-escalated to Augmentin 875 milligrams by mouth twice a day ×7 days.    History taken from: patient chart family RN      Objective       Vital Signs    /91   Pulse 103   Temp 98.9 °F (37.2 °C) (Oral)   Resp 18   Ht 162.6 cm (64\")   Wt 66.7 kg (147 lb 1 oz)   SpO2 97%   BMI 25.24 kg/m²     Temp:  [97.7 °F (36.5 °C)-98.9 °F (37.2 °C)] 98.9 °F (37.2 °C)      Intake/Output Summary (Last 24 hours) at 09/09/18 1213  Last data filed at 09/09/18 0802   Gross per 24 hour   Intake              610 ml   Output                0 ml   Net              610 ml     Intake & Output (last 3 days)       09/06 0701 - 09/07 0700 09/07 0701 - 09/08 0700 09/08 0701 - 09/09 0700 09/09 0701 - 09/10 0700    P.O. 120  360 210    I.V. (mL/kg) 1000 (14.9) 1000 (14.9)      IV Piggyback 200 300 700     Total Intake(mL/kg) 1320 (19.7) 1300 (19.4) 1060 (15.9) 210 (3.1)    Net +1320 +1300 +1060 +210            Unmeasured Urine Occurrence 4 x 3 x 5 x 1 x    Unmeasured Stool Occurrence 2 x 2 x 1 x 1 x        Physical Exam:                 General Appearance:    Alert, cooperative, in no acute distress   Head:    Normocephalic, without obvious abnormality, atraumatic   Eyes:            Lids and lashes normal, conjunctivae and sclerae normal, no   icterus, no pallor, corneas clear, PERRLA   Ears:    Ears appear intact with no abnormalities noted   Throat:   No oral lesions, no thrush, oral mucosa moist   Neck:   No adenopathy, supple, trachea midline, no thyromegaly, no   carotid bruit, no JVD   Back:     No " tenderness to percussion or palpation, range of motion   normal   Lungs:     Crackles to the right base    Heart:    Regular rhythm and normal rate, normal S1 and S2, no            murmur, no gallop, no rub, no click   Chest Wall:    No abnormalities observed   Abdomen:     Normal bowel sounds, no masses, no organomegaly, soft        non-tender, non-distended, no guarding, no rebound                tenderness   Rectal:     Deferred   Extremities:   Moves all extremities well, no edema, no cyanosis, no             redness   Pulses:   Pulses palpable and equal bilaterally   Skin:   No bleeding, bruising or rash   Lymph nodes:   No palpable adenopathy   Neurologic:   Awake, alert and oriented x 3. Following commands.       Results:      Results from last 7 days  Lab Units 09/09/18  0159 09/08/18  0232 09/07/18  0158 09/06/18  1054 09/05/18  1010   WBC 10*3/mm3 10.42 11.31 12.08 8.32 7.19     Lab Results   Component Value Date    NEUTROABS 5.73 09/09/2018         Results from last 7 days  Lab Units 09/09/18  0159   CREATININE mg/dL 0.72         Results from last 7 days  Lab Units 09/09/18  0159 09/08/18  0232 09/07/18  0158   CRP mg/dL 4.71* 7.58* 6.93*       Imaging Results (last 24 hours)     ** No results found for the last 24 hours. **            Results Review:    I have personally reviewed laboratory data, culture results, radiology studies and antimicrobial therapy.    Hospital Medications (active)       Dose Frequency Start End    acetaminophen (TYLENOL) tablet 650 mg 650 mg Every 6 Hours PRN 9/6/2018     Sig - Route: Take 2 tablets by mouth Every 6 (Six) Hours As Needed for Mild Pain  or Fever. - Oral    albuterol (PROVENTIL) nebulizer solution 0.083% 2.5 mg/3mL 2.5 mg Every 6 Hours PRN 9/5/2018     Sig - Route: Take 2.5 mg by nebulization Every 6 (Six) Hours As Needed for Wheezing or Shortness of Air. - Nebulization    apixaban (ELIQUIS) tablet 5 mg 5 mg Every 12 Hours Scheduled 9/5/2018     Sig - Route: Take  "1 tablet by mouth Every 12 (Twelve) Hours. - Oral    calcium carb-cholecalciferol 600-800 MG-UNIT tablet 1 tablet 1 tablet 2 Times Daily With Meals 9/5/2018     Sig - Route: Take 1 tablet by mouth 2 (Two) Times a Day With Meals. - Oral    cetirizine (zyrTEC) tablet 5 mg 5 mg Daily 9/5/2018     Sig - Route: Take 0.5 tablets by mouth Daily. - Oral    diltiaZEM CD (CARDIZEM CD) 24 hr capsule 120 mg 120 mg Every 24 Hours Scheduled 9/6/2018     Sig - Route: Take 1 capsule by mouth Daily. - Oral    doxycycline (VIBRAMYCIN) 100 mg/100 mL 0.9% NS  mg Every 12 Hours 9/6/2018 9/13/2018    Sig - Route: Infuse 100 mL into a venous catheter Every 12 (Twelve) Hours. - Intravenous    Cosign for Ordering: Accepted by Evan Winters MD on 9/7/2018  6:11 AM    famotidine (PEPCID) tablet 40 mg 40 mg Daily 9/5/2018     Sig - Route: Take 2 tablets by mouth Daily. - Oral    gabapentin (NEURONTIN) capsule 400 mg 400 mg 2 Times Daily 9/5/2018     Sig - Route: Take 1 capsule by mouth 2 (Two) Times a Day. - Oral    lactobacillus acidophilus (RISAQUAD) capsule 1 capsule 1 capsule Daily 9/6/2018     Sig - Route: Take 1 capsule by mouth Daily. - Oral    levothyroxine (SYNTHROID, LEVOTHROID) tablet 50 mcg 50 mcg Daily 9/6/2018     Sig - Route: Take 1 tablet by mouth Daily. - Oral    LORazepam (ATIVAN) tablet 1 mg 1 mg Every 8 Hours PRN 9/5/2018     Sig - Route: Take 1 tablet by mouth Every 8 (Eight) Hours As Needed for Anxiety (1). - Oral    Magnesium Sulfate 2 gram / 50mL Infusion (GIVE X 3 BAGS TO EQUAL 6GM TOTAL DOSE) - Mg 1.1 - 1.5 mg/dl 2 g As Needed 9/6/2018     Sig - Route: Infuse 50 mL into a venous catheter As Needed (See Administration Instructions). - Intravenous    Linked Group 1:  \"Or\" Linked Group Details        Magnesium Sulfate 2 gram Bolus, followed by 8 gram infusion (total Mg dose 10 grams)- Mg less than or equal to 1mg/dL 2 g As Needed 9/6/2018     Sig - Route: Infuse 50 mL into a venous catheter As Needed (See " "Administration Instructions). - Intravenous    Linked Group 1:  \"Or\" Linked Group Details        Magnesium Sulfate 4 gram infusion- Mg 1.6-1.9 mg/dL 4 g As Needed 9/6/2018     Sig - Route: Infuse 100 mL into a venous catheter As Needed (See Administration Instructions). - Intravenous    Linked Group 1:  \"Or\" Linked Group Details        metoprolol tartrate (LOPRESSOR) tablet 25 mg 25 mg Every 12 Hours Scheduled 9/5/2018     Sig - Route: Take 1 tablet by mouth Every 12 (Twelve) Hours. - Oral    Pharmacy to dose vancomycin  Continuous PRN 9/6/2018 9/13/2018    Sig - Route: Continuous As Needed for Consult. - Does not apply    Cosign for Ordering: Accepted by Dmitry Miranda MD on 9/6/2018  9:51 AM    Pharmacy to Dose Zosyn  Continuous PRN 9/6/2018 9/13/2018    Sig - Route: Continuous As Needed for Consult. - Does not apply    Cosign for Ordering: Accepted by Dmitry Miranda MD on 9/6/2018  9:51 AM    piperacillin-tazobactam (ZOSYN) 3.375 g/100 mL 0.9% NS IVPB (mbp) 3.375 g Every 8 Hours 9/6/2018 9/13/2018    Sig - Route: Infuse 100 mL into a venous catheter Every 8 (Eight) Hours. - Intravenous    potassium chloride (K-DUR,KLOR-CON) CR tablet 40 mEq 40 mEq Every 4 Hours 9/6/2018 9/7/2018    Sig - Route: Take 2 tablets by mouth Every 4 (Four) Hours. - Oral    potassium chloride (KLOR-CON) packet 40 mEq 40 mEq As Needed 9/6/2018     Sig - Route: Take 40 mEq by mouth As Needed (potassium replacement, see admin instructions). - Oral    Linked Group 2:  \"Or\" Linked Group Details        potassium chloride (MICRO-K) CR capsule 40 mEq 40 mEq As Needed 9/6/2018     Sig - Route: Take 4 capsules by mouth As Needed (potassium replacement.  see admin instructions). - Oral    Linked Group 2:  \"Or\" Linked Group Details        potassium chloride 10 mEq in 100 mL IVPB 10 mEq Every 1 Hour PRN 9/6/2018     Sig - Route: Infuse 100 mL into a venous catheter Every 1 (One) Hour As Needed (potassium protocol PERIPHERAL - see admin " "instructions). - Intravenous    Linked Group 2:  \"Or\" Linked Group Details        rOPINIRole (REQUIP) tablet 1 mg 1 mg Nightly PRN 9/5/2018     Sig - Route: Take 1 tablet by mouth At Night As Needed (restless leg). - Oral    sennosides-docusate sodium (SENOKOT-S) 8.6-50 MG tablet 2 tablet 2 tablet 2 Times Daily 9/5/2018     Sig - Route: Take 2 tablets by mouth 2 (Two) Times a Day. - Oral    sodium chloride 0.9 % flush 1-10 mL 1-10 mL As Needed 9/5/2018     Sig - Route: Infuse 1-10 mL into a venous catheter As Needed for Line Care. - Intravenous    Cosign for Ordering: Accepted by Dmitry Miranda MD on 9/5/2018  5:35 PM    sodium chloride 0.9 % flush 10 mL 10 mL As Needed 9/5/2018     Sig - Route: Infuse 10 mL into a venous catheter As Needed for Line Care. - Intravenous    Linked Group 3:  \"And\" Linked Group Details        sodium chloride 0.9 % flush 3 mL 3 mL Every 8 Hours 9/7/2018     Sig - Route: Infuse 3 mL into a venous catheter Every 8 (Eight) Hours. - Intravenous    sodium chloride 0.9 % flush 5 mL 5 mL As Needed 9/7/2018     Sig - Route: Infuse 5 mL into a venous catheter As Needed for Line Care (After Medication Administration or Blood Draw). - Intravenous    sodium chloride 0.9 % infusion 75 mL/hr Continuous 9/5/2018     Sig - Route: Infuse 75 mL/hr into a venous catheter Continuous. - Intravenous    Cosign for Ordering: Accepted by Dmitry Miranda MD on 9/5/2018  5:35 PM    traMADol (ULTRAM) tablet 50 mg 50 mg Every 8 Hours PRN 9/5/2018     Sig - Route: Take 1 tablet by mouth Every 8 (Eight) Hours As Needed for Moderate Pain . - Oral    vancomycin (VANCOCIN) 1,250 mg in sodium chloride 0.9 % 250 mL IVPB 1,250 mg Once 9/6/2018 9/6/2018    Sig - Route: Infuse 1,250 mg into a venous catheter 1 (One) Time. - Intravenous    diltiaZEM (CARDIZEM) 125 mg in sodium chloride 0.9 % 125 mL (1 mg/mL) infusion (Discontinued) 5-15 mg/hr Titrated 9/5/2018 9/6/2018    Sig - Route: Infuse 5-15 mg/hr into a venous catheter " Dose Adjusted By Provider As Needed. - Intravenous    Cosign for Ordering: Accepted by Dmitry Miranda MD on 9/5/2018  5:35 PM    vancomycin (VANCOCIN) 750 mg in sodium chloride 0.9 % 250 mL IVPB (Discontinued) 750 mg Every 24 Hours 9/7/2018 9/6/2018    Sig - Route: Infuse 750 mg into a venous catheter Daily. - Intravenous            Cultures:    Blood Culture   Date Value Ref Range Status   09/06/2018 No growth at 24 hours  Preliminary   09/06/2018 No growth at 24 hours  Preliminary   09/05/2018 No growth at 2 days  Preliminary   09/05/2018 No growth at 2 days  Preliminary         ASSESSMENT/PLAN           Assessment/Plan     ASSESSMENT:    1.  Pneumonia     PLAN:     Patient is feeling significantly better today with no fever informed bowel movements.  CRP is showing significant improvement down to 4.71 with a normal white count.  In the setting of such significant improvement from infectious disease standpoint antibiotic therapy was de-escalated to Augmentin 875 milligrams by mouth twice a day ×7 days.    Patient's findings and recommendations were discussed with patient, family and nursing staff    Code Status:   Code Status and Medical Interventions:   Ordered at: 09/05/18 1427     Code Status:    CPR     Medical Interventions (Level of Support Prior to Arrest):    Full       Dayanara Winn PA-C  09/09/18  12:13 PM

## 2018-09-10 VITALS
SYSTOLIC BLOOD PRESSURE: 118 MMHG | TEMPERATURE: 98.2 F | OXYGEN SATURATION: 99 % | DIASTOLIC BLOOD PRESSURE: 92 MMHG | HEART RATE: 105 BPM | HEIGHT: 64 IN | WEIGHT: 144.6 LBS | RESPIRATION RATE: 18 BRPM | BODY MASS INDEX: 24.69 KG/M2

## 2018-09-10 LAB
ALBUMIN SERPL-MCNC: 3.1 G/DL (ref 3.4–4.8)
ALBUMIN/GLOB SERPL: 0.7 G/DL (ref 1.5–2.5)
ALP SERPL-CCNC: 76 U/L (ref 35–104)
ALT SERPL W P-5'-P-CCNC: 37 U/L (ref 10–36)
ANION GAP SERPL CALCULATED.3IONS-SCNC: 4.9 MMOL/L (ref 3.6–11.2)
AST SERPL-CCNC: 46 U/L (ref 10–30)
BACTERIA SPEC AEROBE CULT: NORMAL
BACTERIA SPEC AEROBE CULT: NORMAL
BASOPHILS # BLD AUTO: 0.13 10*3/MM3 (ref 0–0.3)
BASOPHILS NFR BLD AUTO: 1.5 % (ref 0–2)
BILIRUB SERPL-MCNC: 0.5 MG/DL (ref 0.2–1.8)
BUN BLD-MCNC: 12 MG/DL (ref 7–21)
BUN/CREAT SERPL: 17.4 (ref 7–25)
CALCIUM SPEC-SCNC: 8.6 MG/DL (ref 7.7–10)
CHLORIDE SERPL-SCNC: 113 MMOL/L (ref 99–112)
CO2 SERPL-SCNC: 23.1 MMOL/L (ref 24.3–31.9)
CREAT BLD-MCNC: 0.69 MG/DL (ref 0.43–1.29)
CRP SERPL-MCNC: 3.78 MG/DL (ref 0–0.99)
DEPRECATED RDW RBC AUTO: 43.7 FL (ref 37–54)
EOSINOPHIL # BLD AUTO: 0.23 10*3/MM3 (ref 0–0.7)
EOSINOPHIL NFR BLD AUTO: 2.6 % (ref 0–7)
ERYTHROCYTE [DISTWIDTH] IN BLOOD BY AUTOMATED COUNT: 14 % (ref 11.5–14.5)
GFR SERPL CREATININE-BSD FRML MDRD: 82 ML/MIN/1.73
GLOBULIN UR ELPH-MCNC: 4.4 GM/DL
GLUCOSE BLD-MCNC: 108 MG/DL (ref 70–110)
HCT VFR BLD AUTO: 33.6 % (ref 37–47)
HGB BLD-MCNC: 11.2 G/DL (ref 12–16)
IMM GRANULOCYTES # BLD: 0.05 10*3/MM3 (ref 0–0.03)
IMM GRANULOCYTES NFR BLD: 0.6 % (ref 0–0.5)
LYMPHOCYTES # BLD AUTO: 2.91 10*3/MM3 (ref 1–3)
LYMPHOCYTES NFR BLD AUTO: 32.8 % (ref 16–46)
MCH RBC QN AUTO: 28.7 PG (ref 27–33)
MCHC RBC AUTO-ENTMCNC: 33.3 G/DL (ref 33–37)
MCV RBC AUTO: 86.2 FL (ref 80–94)
MONOCYTES # BLD AUTO: 0.99 10*3/MM3 (ref 0.1–0.9)
MONOCYTES NFR BLD AUTO: 11.2 % (ref 0–12)
NEUTROPHILS # BLD AUTO: 4.56 10*3/MM3 (ref 1.4–6.5)
NEUTROPHILS NFR BLD AUTO: 51.3 % (ref 40–75)
NRBC BLD MANUAL-RTO: 0 /100 WBC (ref 0–0)
OSMOLALITY SERPL CALC.SUM OF ELEC: 281.5 MOSM/KG (ref 273–305)
PHOSPHATE SERPL-MCNC: 2.4 MG/DL (ref 2.7–4.5)
PLATELET # BLD AUTO: 211 10*3/MM3 (ref 130–400)
PMV BLD AUTO: 11.3 FL (ref 6–10)
POTASSIUM BLD-SCNC: 3.6 MMOL/L (ref 3.5–5.3)
PROT SERPL-MCNC: 7.5 G/DL (ref 6–8)
RBC # BLD AUTO: 3.9 10*6/MM3 (ref 4.2–5.4)
SODIUM BLD-SCNC: 141 MMOL/L (ref 135–153)
WBC NRBC COR # BLD: 8.87 10*3/MM3 (ref 4.5–12.5)

## 2018-09-10 PROCEDURE — 80053 COMPREHEN METABOLIC PANEL: CPT | Performed by: INTERNAL MEDICINE

## 2018-09-10 PROCEDURE — 92507 TX SP LANG VOICE COMM INDIV: CPT | Performed by: SPEECH-LANGUAGE PATHOLOGIST

## 2018-09-10 PROCEDURE — 97116 GAIT TRAINING THERAPY: CPT

## 2018-09-10 PROCEDURE — G8980 MOBILITY D/C STATUS: HCPCS

## 2018-09-10 PROCEDURE — 94799 UNLISTED PULMONARY SVC/PX: CPT

## 2018-09-10 PROCEDURE — G8998 SWALLOW D/C STATUS: HCPCS | Performed by: SPEECH-LANGUAGE PATHOLOGIST

## 2018-09-10 PROCEDURE — 99238 HOSP IP/OBS DSCHRG MGMT 30/<: CPT | Performed by: INTERNAL MEDICINE

## 2018-09-10 PROCEDURE — G8978 MOBILITY CURRENT STATUS: HCPCS

## 2018-09-10 PROCEDURE — G8979 MOBILITY GOAL STATUS: HCPCS

## 2018-09-10 PROCEDURE — 86140 C-REACTIVE PROTEIN: CPT | Performed by: PHYSICIAN ASSISTANT

## 2018-09-10 PROCEDURE — 84100 ASSAY OF PHOSPHORUS: CPT | Performed by: INTERNAL MEDICINE

## 2018-09-10 PROCEDURE — 97163 PT EVAL HIGH COMPLEX 45 MIN: CPT

## 2018-09-10 PROCEDURE — 85025 COMPLETE CBC W/AUTO DIFF WBC: CPT | Performed by: INTERNAL MEDICINE

## 2018-09-10 RX ORDER — ATORVASTATIN CALCIUM 10 MG/1
10 TABLET, FILM COATED ORAL NIGHTLY
Qty: 30 TABLET | Refills: 0 | Status: SHIPPED | OUTPATIENT
Start: 2018-09-10

## 2018-09-10 RX ORDER — GABAPENTIN 400 MG/1
400 CAPSULE ORAL 2 TIMES DAILY
Qty: 6 CAPSULE | Refills: 0 | Status: SHIPPED | OUTPATIENT
Start: 2018-09-10

## 2018-09-10 RX ORDER — AMOXICILLIN AND CLAVULANATE POTASSIUM 875; 125 MG/1; MG/1
1 TABLET, FILM COATED ORAL EVERY 12 HOURS SCHEDULED
Qty: 11 TABLET | Refills: 0 | Status: SHIPPED | OUTPATIENT
Start: 2018-09-10 | End: 2018-09-16

## 2018-09-10 RX ORDER — POTASSIUM CHLORIDE 20 MEQ/1
40 TABLET, EXTENDED RELEASE ORAL EVERY 4 HOURS
Status: COMPLETED | OUTPATIENT
Start: 2018-09-10 | End: 2018-09-10

## 2018-09-10 RX ORDER — DILTIAZEM HYDROCHLORIDE 300 MG/1
300 CAPSULE, COATED, EXTENDED RELEASE ORAL
Qty: 30 CAPSULE | Refills: 0 | Status: SHIPPED | OUTPATIENT
Start: 2018-09-11 | End: 2019-09-27 | Stop reason: SDUPTHER

## 2018-09-10 RX ORDER — LORAZEPAM 1 MG/1
1 TABLET ORAL EVERY 8 HOURS PRN
Qty: 9 TABLET | Refills: 0 | Status: SHIPPED | OUTPATIENT
Start: 2018-09-10

## 2018-09-10 RX ORDER — DILTIAZEM HYDROCHLORIDE 300 MG/1
300 CAPSULE, COATED, EXTENDED RELEASE ORAL
Status: DISCONTINUED | OUTPATIENT
Start: 2018-09-11 | End: 2018-09-10 | Stop reason: HOSPADM

## 2018-09-10 RX ORDER — L.ACID,PARA/B.BIFIDUM/S.THERM 8B CELL
1 CAPSULE ORAL DAILY
Qty: 7 CAPSULE | Refills: 0 | Status: SHIPPED | OUTPATIENT
Start: 2018-09-11 | End: 2019-10-25

## 2018-09-10 RX ADMIN — METOPROLOL TARTRATE 25 MG: 25 TABLET, FILM COATED ORAL at 09:06

## 2018-09-10 RX ADMIN — POTASSIUM CHLORIDE 40 MEQ: 1500 TABLET, EXTENDED RELEASE ORAL at 10:15

## 2018-09-10 RX ADMIN — DILTIAZEM HYDROCHLORIDE 240 MG: 240 CAPSULE, COATED, EXTENDED RELEASE ORAL at 09:07

## 2018-09-10 RX ADMIN — Medication 1 CAPSULE: at 09:06

## 2018-09-10 RX ADMIN — FAMOTIDINE 40 MG: 20 TABLET, FILM COATED ORAL at 09:06

## 2018-09-10 RX ADMIN — POTASSIUM CHLORIDE 40 MEQ: 1500 TABLET, EXTENDED RELEASE ORAL at 05:55

## 2018-09-10 RX ADMIN — AMOXICILLIN AND CLAVULANATE POTASSIUM 1 TABLET: 875; 125 TABLET, FILM COATED ORAL at 09:06

## 2018-09-10 RX ADMIN — LEVOTHYROXINE SODIUM 50 MCG: 50 TABLET ORAL at 09:06

## 2018-09-10 RX ADMIN — Medication 3 ML: at 10:15

## 2018-09-10 RX ADMIN — POTASSIUM PHOSPHATE, MONOBASIC AND POTASSIUM PHOSPHATE, DIBASIC 15 MMOL: 224; 236 INJECTION, SOLUTION INTRAVENOUS at 09:07

## 2018-09-10 RX ADMIN — SENNOSIDES AND DOCUSATE SODIUM 2 TABLET: 8.6; 5 TABLET ORAL at 09:06

## 2018-09-10 RX ADMIN — Medication 1 TABLET: at 09:06

## 2018-09-10 RX ADMIN — CETIRIZINE HYDROCHLORIDE 5 MG: 10 TABLET, FILM COATED ORAL at 09:07

## 2018-09-10 RX ADMIN — GABAPENTIN 400 MG: 400 CAPSULE ORAL at 09:06

## 2018-09-10 RX ADMIN — APIXABAN 5 MG: 5 TABLET, FILM COATED ORAL at 09:06

## 2018-09-10 NOTE — DISCHARGE SUMMARY
[facility] HOSPITALIST   DISCHARGE SUMMARY      Name:  Anette Dunham   Age:  78 y.o.  Sex:  female  :  1940  MRN:  [id number]   Visit Number:  [id number]  Primary Care Physician:  Juliann Santana APRN  Date of Discharge:  9/10/2018  Admission Date:  2018      Discharge Diagnosis:     1.  Sepsis, resolved  2.  Pneumonia, present on admission, improving.  3.  Atrial fibrillation with rapid ventricular response.  Now rate controlled.  Medications adjusted. On Eliquis.   4.  Dysphagia, improved after speech therapy.  5.  Hypothyroidism treated  6.  Mild elevation of LFTs, hepatitis testing negative.  Follow as outpatient.  7.  Acute kidney injury present on admission, resolved.  8.  Mild carotid vascular disease noted on carotid Doppler.  Now on Lipitor.        Active Hospital Problems    Diagnosis Date Noted   • Atrial fibrillation with RVR (CMS/HCC) [I48.91] 2018      Resolved Hospital Problems    Diagnosis Date Noted Date Resolved   No resolved problems to display.         Presenting Problem/History of Present Illness:    Atrial fibrillation with RVR (CMS/HCC) [I48.91]         Hospital Course:    Patient was admitted on 2018 with weakness and palpitations.  She was noted to be in atrial fibrillation with RVR.  She takes metoprolol 25 mg twice a day at home.  She had not taken her dose at home.  She apparently had sustained a few falls at home.  She was given a bolus of Cardizem and then started on a Cardizem drip.  Dr. Nino is her primary cardiologist. Lucien Herman MD was consulted regarding the case.  Echocardiogram was done showing normal EF.  Patient was placed on oral Cardizem.  Cardioversion was considered, however patient developed fever and sepsis.  It was felt that she had bilateral pneumonia, she was started on vancomycin and Zosyn and infectious disease was consulted.  Cultures were done which were all negative.  Patient was initially placed on Zosyn and  doxycycline, this was eventually changed to oral Augmentin.  WBC count has improved and CRP has normalized.  Pressure was low, however it has come up now at this point.  Patient has been placed on oral Cardizem CD which was titrated up.  Her heart rate is relatively well controlled in the low 100s.  She is also on Lopressor 25 mg twice a day.    She appears to be stable.  She denies any chest pressure, shortness breath, nausea, vomiting, or pain.  She does not appear to be choking on her food, however speech therapy had seen her during the course of his hospital stay for possible aspiration.  They placed her on thickened liquids.  They're reevaluating her this morning, hopefully she can be changed to thin liquids.  If not, she will need thickener at the pharmacy upon discharge.  Her O2 sat at present is 98% on room air.  Her other vital signs are stable.  Spoke to her and the daughter, answered all questions.  Patient will be discharged home today with close follow-up with her primary care physician in one week, and her cardiologist in 1-2 weeks.  She has mild elevation in her LFTs, hepatitis testing was negative.  Would recommend follow-up in one week.  Liver ultrasound should be done if they continue to be elevated.  She was offered PT, OT, and home health, she refused this.  Return to the hospital if symptoms should return.    Procedures Performed:  Results for orders placed during the hospital encounter of 09/05/18   Adult Transthoracic Echo Complete W/ Cont if Necessary Per Protocol    Narrative · Left ventricular systolic function is normal. Estimated EF appears to be   in the range of 51 - 55%  · Mild mitral valve regurgitation is present  · Mild pulmonic valve regurgitation is present.  · Left atrial cavity size is mildly dilated.  · Mild tricuspid valve regurgitation is present. Estimated right   ventricular systolic pressure from tricuspid regurgitation is mildly   elevated (35-45 mmHg).  · The pericardium  is normal. There is no evidence of pericardial effusion.  · No significant changes compared to previous study.                 Consults:     Consults     Date and Time Order Name Status Description    9/6/2018 0953 Inpatient Infectious Diseases Consult Completed     9/5/2018 1538 Inpatient Cardiology Consult Completed     9/5/2018 1420 Hospitalist (on-call MD unless specified)            Pertinent Test Results:     Lab Results (all)     Procedure Component Value Units Date/Time    Blood Culture - Blood, [[id number]]  (Normal) Collected:  09/06/18 0759    Specimen:  Blood from Arm, Left Updated:  09/10/18 0830     Blood Culture No growth at 4 days    Phosphorus [[id number]]  (Abnormal) Collected:  09/10/18 0119    Specimen:  Blood Updated:  09/10/18 0205     Phosphorus 2.4 (L) mg/dL     C-reactive Protein [[id number]]  (Abnormal) Collected:  09/10/18 0119    Specimen:  Blood Updated:  09/10/18 0159     C-Reactive Protein 3.78 (H) mg/dL     Comprehensive Metabolic Panel [[id number]]  (Abnormal) Collected:  09/10/18 0119    Specimen:  Blood Updated:  09/10/18 0158     Glucose 108 mg/dL      BUN 12 mg/dL      Creatinine 0.69 mg/dL      Sodium 141 mmol/L      Potassium 3.6 mmol/L      Chloride 113 (H) mmol/L      CO2 23.1 (L) mmol/L      Calcium 8.6 mg/dL      Total Protein 7.5 g/dL      Albumin 3.10 (L) g/dL      ALT (SGPT) 37 (H) U/L      AST (SGOT) 46 (H) U/L      Alkaline Phosphatase 76 U/L      Comment: Note New Reference Ranges        Total Bilirubin 0.5 mg/dL      eGFR Non African Amer 82 mL/min/1.73      Globulin 4.4 gm/dL      A/G Ratio 0.7 (L) g/dL      BUN/Creatinine Ratio 17.4     Anion Gap 4.9 mmol/L     Narrative:       The MDRD GFR formula is only valid for adults with stable renal function between ages 18 and 70.    Osmolality, Calculated [[id number]]  (Normal) Collected:  09/10/18 0119    Specimen:  Blood Updated:  09/10/18 0158     Osmolality Calc 281.5 mOsm/kg     CBC & Differential [[id number]] Collected:   09/10/18 0119    Specimen:  Blood Updated:  09/10/18 0149    Narrative:       The following orders were created for panel order CBC & Differential.  Procedure                               Abnormality         Status                     ---------                               -----------         ------                     Scan Slide[[id number]]                                                                  CBC Auto Differential[[id number]]        Abnormal            Final result                 Please view results for these tests on the individual orders.    CBC Auto Differential [[id number]]  (Abnormal) Collected:  09/10/18 0119    Specimen:  Blood Updated:  09/10/18 0149     WBC 8.87 10*3/mm3      RBC 3.90 (L) 10*6/mm3      Hemoglobin 11.2 (L) g/dL      Hematocrit 33.6 (L) %      MCV 86.2 fL      MCH 28.7 pg      MCHC 33.3 g/dL      RDW 14.0 %      RDW-SD 43.7 fl      MPV 11.3 (H) fL      Platelets 211 10*3/mm3      Neutrophil % 51.3 %      Lymphocyte % 32.8 %      Monocyte % 11.2 %      Eosinophil % 2.6 %      Basophil % 1.5 %      Immature Grans % 0.6 (H) %      Neutrophils, Absolute 4.56 10*3/mm3      Lymphocytes, Absolute 2.91 10*3/mm3      Monocytes, Absolute 0.99 (H) 10*3/mm3      Eosinophils, Absolute 0.23 10*3/mm3      Basophils, Absolute 0.13 10*3/mm3      Immature Grans, Absolute 0.05 (H) 10*3/mm3      nRBC 0.0 /100 WBC     Blood Culture - Blood, [[id number]]  (Normal) Collected:  09/05/18 1044    Specimen:  Blood from Arm, Left Updated:  09/09/18 1145     Blood Culture No growth at 4 days    Blood Culture - Blood, [[id number]]  (Normal) Collected:  09/05/18 1054    Specimen:  Blood from Arm, Right Updated:  09/09/18 1145     Blood Culture No growth at 4 days    Blood Culture - Blood, [[id number]]  (Normal) Collected:  09/06/18 0950    Specimen:  Blood from Arm, Left Updated:  09/09/18 1131     Blood Culture No growth at 3 days    Phosphorus [[id number]]  (Abnormal) Collected:  09/09/18 0159    Specimen:   Blood Updated:  09/09/18 1124     Phosphorus 1.9 (L) mg/dL     CBC & Differential [[id number]] Collected:  09/09/18 0159    Specimen:  Blood Updated:  09/09/18 0309    Narrative:       The following orders were created for panel order CBC & Differential.  Procedure                               Abnormality         Status                     ---------                               -----------         ------                     Manual Differential[[id number]]          Abnormal            Final result               Scan Slide[[id number]]                                       Final result               CBC Auto Differential[[id number]]        Abnormal            Final result                 Please view results for these tests on the individual orders.    CBC Auto Differential [[id number]]  (Abnormal) Collected:  09/09/18 0159    Specimen:  Blood Updated:  09/09/18 0309     WBC 10.42 10*3/mm3      RBC 3.92 (L) 10*6/mm3      Hemoglobin 11.5 (L) g/dL      Hematocrit 34.8 (L) %      MCV 88.8 fL      MCH 29.3 pg      MCHC 33.0 g/dL      RDW 14.0 %      RDW-SD 45.1 fl      MPV 11.4 (H) fL      Platelets 190 10*3/mm3     Scan Slide [[id number]] Collected:  09/09/18 0159    Specimen:  Blood Updated:  09/09/18 0309     Scan Slide --     Comment: See Manual Differential Results       Manual Differential [[id number]]  (Abnormal) Collected:  09/09/18 0159    Specimen:  Blood Updated:  09/09/18 0309     Neutrophil % 55.0 %      Lymphocyte % 41.0 %      Monocyte % 3.0 %      Basophil % 1.0 %      Neutrophils Absolute 5.73 10*3/mm3      Lymphocytes Absolute 4.27 (H) 10*3/mm3      Monocytes Absolute 0.31 10*3/mm3      Basophils Absolute 0.10 10*3/mm3      RBC Morphology Normal     Platelet Morphology Normal    C-reactive Protein [[id number]]  (Abnormal) Collected:  09/09/18 0159    Specimen:  Blood Updated:  09/09/18 0258     C-Reactive Protein 4.71 (H) mg/dL     Comprehensive Metabolic Panel [[id number]]  (Abnormal) Collected:  09/09/18 0159     Specimen:  Blood Updated:  09/09/18 0258     Glucose 100 mg/dL      BUN 12 mg/dL      Creatinine 0.72 mg/dL      Sodium 142 mmol/L      Potassium 3.7 mmol/L      Chloride 116 (H) mmol/L      CO2 18.4 (L) mmol/L      Calcium 8.3 mg/dL      Total Protein 7.1 g/dL      Albumin 3.00 (L) g/dL      ALT (SGPT) 29 U/L      AST (SGOT) 42 (H) U/L      Alkaline Phosphatase 62 U/L      Comment: Note New Reference Ranges        Total Bilirubin 0.5 mg/dL      eGFR Non African Amer 78 mL/min/1.73      Globulin 4.1 gm/dL      A/G Ratio 0.7 (L) g/dL      BUN/Creatinine Ratio 16.7     Anion Gap 7.6 mmol/L     Narrative:       The MDRD GFR formula is only valid for adults with stable renal function between ages 18 and 70.    Magnesium [[id number]]  (Normal) Collected:  09/09/18 0159    Specimen:  Blood Updated:  09/09/18 0258     Magnesium 2.1 mg/dL     Osmolality, Calculated [[id number]]  (Normal) Collected:  09/09/18 0159    Specimen:  Blood Updated:  09/09/18 0258     Osmolality Calc 283.0 mOsm/kg     Potassium [[id number]]  (Normal) Collected:  09/08/18 1529    Specimen:  Blood Updated:  09/08/18 1625     Potassium 4.2 mmol/L     CBC & Differential [[id number]] Collected:  09/08/18 0232    Specimen:  Blood Updated:  09/08/18 0321    Narrative:       The following orders were created for panel order CBC & Differential.  Procedure                               Abnormality         Status                     ---------                               -----------         ------                     Manual Differential[[id number]]          Abnormal            Final result               Scan Slide[[id number]]                                       Final result               CBC Auto Differential[[id number]]        Abnormal            Final result                 Please view results for these tests on the individual orders.    CBC Auto Differential [[id number]]  (Abnormal) Collected:  09/08/18 0232    Specimen:  Blood Updated:  09/08/18 0321      WBC 11.31 10*3/mm3      RBC 3.84 (L) 10*6/mm3      Hemoglobin 11.3 (L) g/dL      Hematocrit 33.7 (L) %      MCV 87.8 fL      MCH 29.4 pg      MCHC 33.5 g/dL      RDW 13.8 %      RDW-SD 43.7 fl      MPV 11.1 (H) fL      Platelets 174 10*3/mm3     Scan Slide [[id number]] Collected:  09/08/18 0232    Specimen:  Blood Updated:  09/08/18 0321     Scan Slide --     Comment: See Manual Differential Results       Manual Differential [[id number]]  (Abnormal) Collected:  09/08/18 0232    Specimen:  Blood Updated:  09/08/18 0321     Neutrophil % 75.0 %      Lymphocyte % 20.0 %      Monocyte % 4.0 %      Bands %  1.0 %      Neutrophils Absolute 8.60 (H) 10*3/mm3      Lymphocytes Absolute 2.26 10*3/mm3      Monocytes Absolute 0.45 10*3/mm3      RBC Morphology Normal     Platelet Morphology Normal    Comprehensive Metabolic Panel [[id number]]  (Abnormal) Collected:  09/08/18 0232    Specimen:  Blood Updated:  09/08/18 0316     Glucose 129 (H) mg/dL      BUN 13 mg/dL      Creatinine 0.87 mg/dL      Sodium 145 mmol/L      Potassium 3.5 mmol/L      Chloride 121 (H) mmol/L      CO2 20.2 (L) mmol/L      Calcium 8.2 mg/dL      Total Protein 6.9 g/dL      Albumin 3.10 (L) g/dL      ALT (SGPT) 34 U/L      AST (SGOT) 52 (H) U/L      Alkaline Phosphatase 55 U/L      Comment: Note New Reference Ranges        Total Bilirubin 0.4 mg/dL      eGFR Non African Amer 63 mL/min/1.73      Globulin 3.8 gm/dL      A/G Ratio 0.8 (L) g/dL      BUN/Creatinine Ratio 14.9     Anion Gap 3.8 mmol/L     Narrative:       The MDRD GFR formula is only valid for adults with stable renal function between ages 18 and 70.    Phosphorus [[id number]]  (Abnormal) Collected:  09/08/18 0232    Specimen:  Blood Updated:  09/08/18 0316     Phosphorus 1.4 (L) mg/dL     Osmolality, Calculated [[id number]]  (Normal) Collected:  09/08/18 0232    Specimen:  Blood Updated:  09/08/18 0316     Osmolality Calc 290.5 mOsm/kg     Magnesium [[id number]]  (Normal) Collected:  09/08/18 0232     Specimen:  Blood Updated:  09/08/18 0315     Magnesium 1.7 mg/dL     C-reactive Protein [[id number]]  (Abnormal) Collected:  09/08/18 0232    Specimen:  Blood Updated:  09/08/18 0307     C-Reactive Protein 7.58 (H) mg/dL     CBC & Differential [[id number]] Collected:  09/07/18 0158    Specimen:  Blood Updated:  09/07/18 0300    Narrative:       The following orders were created for panel order CBC & Differential.  Procedure                               Abnormality         Status                     ---------                               -----------         ------                     Manual Differential[[id number]]          Abnormal            Final result               Scan Slide[[id number]]                                       Final result               CBC Auto Differential[[id number]]        Abnormal            Final result                 Please view results for these tests on the individual orders.    CBC Auto Differential [[id number]]  (Abnormal) Collected:  09/07/18 0158    Specimen:  Blood Updated:  09/07/18 0300     WBC 12.08 10*3/mm3      RBC 3.92 (L) 10*6/mm3      Hemoglobin 11.6 (L) g/dL      Hematocrit 34.4 (L) %      MCV 87.8 fL      MCH 29.6 pg      MCHC 33.7 g/dL      RDW 13.5 %      RDW-SD 42.2 fl      MPV 11.1 (H) fL      Platelets 164 10*3/mm3     Scan Slide [[id number]] Collected:  09/07/18 0158    Specimen:  Blood Updated:  09/07/18 0300     Scan Slide --     Comment: See Manual Differential Results       Manual Differential [[id number]]  (Abnormal) Collected:  09/07/18 0158    Specimen:  Blood Updated:  09/07/18 0300     Neutrophil % 77.0 (H) %      Lymphocyte % 13.0 (L) %      Monocyte % 4.0 %      Basophil % 1.0 %      Bands %  5.0 %      Neutrophils Absolute 9.91 (H) 10*3/mm3      Lymphocytes Absolute 1.57 10*3/mm3      Monocytes Absolute 0.48 10*3/mm3      Basophils Absolute 0.12 10*3/mm3      RBC Morphology Normal     Platelet Morphology Normal    Comprehensive Metabolic Panel [[id number]]   (Abnormal) Collected:  09/07/18 0158    Specimen:  Blood Updated:  09/07/18 0245     Glucose 124 (H) mg/dL      BUN 13 mg/dL      Creatinine 1.08 mg/dL      Sodium 142 mmol/L      Potassium 3.7 mmol/L      Chloride 118 (H) mmol/L      CO2 17.5 (L) mmol/L      Calcium 8.1 mg/dL      Total Protein 7.1 g/dL      Albumin 3.20 (L) g/dL      ALT (SGPT) 41 (H) U/L      AST (SGOT) 77 (H) U/L      Alkaline Phosphatase 56 U/L      Comment: Note New Reference Ranges        Total Bilirubin 0.5 mg/dL      eGFR Non African Amer 49 (L) mL/min/1.73      Globulin 3.9 gm/dL      A/G Ratio 0.8 (L) g/dL      BUN/Creatinine Ratio 12.0     Anion Gap 6.5 mmol/L     Narrative:       The MDRD GFR formula is only valid for adults with stable renal function between ages 18 and 70.    Osmolality, Calculated [[id number]]  (Normal) Collected:  09/07/18 0158    Specimen:  Blood Updated:  09/07/18 0245     Osmolality Calc 284.7 mOsm/kg     C-reactive Protein [[id number]]  (Abnormal) Collected:  09/07/18 0158    Specimen:  Blood Updated:  09/07/18 0243     C-Reactive Protein 6.93 (H) mg/dL     Legionella Antigen, Urine - Urine, Urine, Clean Catch [[id number]]  (Normal) Collected:  09/06/18 0821    Specimen:  Urine from Urine, Clean Catch Updated:  09/06/18 1331     LEGIONELLA ANTIGEN, URINE Negative    Narrative:         Presumptive negative for L. pneumophilia serogroup 1 antigen, suggesting no recent or current infection.    Mycoplasma Pneumoniae Antibody, IgM [[id number]]  (Normal) Collected:  09/05/18 1555    Specimen:  Blood Updated:  09/06/18 1120     Mycoplasma pneumo IgM Negative    Comprehensive Metabolic Panel [[id number]]  (Abnormal) Collected:  09/06/18 1054    Specimen:  Blood Updated:  09/06/18 1120     Glucose 128 (H) mg/dL      BUN 14 mg/dL      Creatinine 1.01 mg/dL      Sodium 136 mmol/L      Potassium 3.0 (L) mmol/L      Chloride 110 mmol/L      CO2 18.3 (L) mmol/L      Calcium 8.1 mg/dL      Total Protein 7.2 g/dL      Albumin 3.30  (L) g/dL      ALT (SGPT) 38 (H) U/L      AST (SGOT) 67 (H) U/L      Alkaline Phosphatase 58 U/L      Comment: Note New Reference Ranges        Total Bilirubin 0.4 mg/dL      eGFR Non African Amer 53 (L) mL/min/1.73      Globulin 3.9 gm/dL      A/G Ratio 0.8 (L) g/dL      BUN/Creatinine Ratio 13.9     Anion Gap 7.7 mmol/L     Narrative:       The MDRD GFR formula is only valid for adults with stable renal function between ages 18 and 70.    Osmolality, Calculated [[id number]]  (Normal) Collected:  09/06/18 1054    Specimen:  Blood Updated:  09/06/18 1120     Osmolality Calc 274.1 mOsm/kg     CBC & Differential [[id number]] Collected:  09/06/18 1054    Specimen:  Blood Updated:  09/06/18 1113    Narrative:       The following orders were created for panel order CBC & Differential.  Procedure                               Abnormality         Status                     ---------                               -----------         ------                     Scan Slide[[id number]]                                                                  CBC Auto Differential[[id number]]        Abnormal            Final result                 Please view results for these tests on the individual orders.    CBC Auto Differential [[id number]]  (Abnormal) Collected:  09/06/18 1054    Specimen:  Blood Updated:  09/06/18 1113     WBC 8.32 10*3/mm3      RBC 4.07 (L) 10*6/mm3      Hemoglobin 11.9 (L) g/dL      Hematocrit 35.3 (L) %      MCV 86.7 fL      MCH 29.2 pg      MCHC 33.7 g/dL      RDW 13.6 %      RDW-SD 42.9 fl      MPV 11.0 (H) fL      Platelets 171 10*3/mm3      Neutrophil % 56.1 %      Lymphocyte % 31.6 %      Monocyte % 10.7 %      Eosinophil % 0.1 %      Basophil % 1.1 %      Immature Grans % 0.4 %      Neutrophils, Absolute 4.67 10*3/mm3      Lymphocytes, Absolute 2.63 10*3/mm3      Monocytes, Absolute 0.89 10*3/mm3      Eosinophils, Absolute 0.01 10*3/mm3      Basophils, Absolute 0.09 10*3/mm3      Immature Grans, Absolute 0.03  10*3/mm3      nRBC 0.0 /100 WBC     C-reactive Protein [[id number]]  (Abnormal) Collected:  09/05/18 1555    Specimen:  Blood Updated:  09/06/18 0904     C-Reactive Protein 4.68 (H) mg/dL     Urinalysis With Culture If Indicated - Urine, Clean Catch [[id number]]  (Abnormal) Collected:  09/06/18 0821    Specimen:  Urine from Urine, Clean Catch Updated:  09/06/18 0837     Color, UA Yellow     Appearance, UA Cloudy (A)     pH, UA <=5.0     Specific Gravity, UA 1.016     Glucose, UA Negative     Ketones, UA Trace (A)     Bilirubin, UA Negative     Blood, UA Moderate (2+) (A)     Protein, UA Trace (A)     Leuk Esterase, UA Small (1+) (A)     Nitrite, UA Negative     Urobilinogen, UA 0.2 E.U./dL    Urinalysis, Microscopic Only - Urine, Clean Catch [[id number]]  (Abnormal) Collected:  09/06/18 0821    Specimen:  Urine from Urine, Clean Catch Updated:  09/06/18 0837     RBC, UA 21-30 (A) /HPF      WBC, UA 3-5 (A) /HPF      Bacteria, UA 2+ (A) /HPF      Squamous Epithelial Cells, UA 3-6 (A) /HPF      Hyaline Casts, UA None Seen /LPF      Methodology Automated Microscopy    Lactic Acid, Plasma [[id number]]  (Normal) Collected:  09/06/18 0759    Specimen:  Blood Updated:  09/06/18 0833     Lactate 1.2 mmol/L     CK-MB Index [[id number]]  (Normal) Collected:  09/06/18 0253    Specimen:  Blood Updated:  09/06/18 0345     CK-MB Index 1.3 %     Troponin [[id number]]  (Normal) Collected:  09/06/18 0253    Specimen:  Blood Updated:  09/06/18 0345     Troponin I 0.023 ng/mL     Narrative:       Ultra Troponin I Reference Range:         <=0.039 ng/mL: Negative    0.04-0.779 ng/mL: Indeterminate Range. Suspicious of MI.  Clinical correlation required.       >=0.78  ng/mL: Consistent with myocardial injury.  Clinical correlation required.    CK Total & CKMB [[id number]]  (Normal) Collected:  09/06/18 0253    Specimen:  Blood Updated:  09/06/18 0345     CKMB 1.63 ng/mL      Creatine Kinase 123 U/L     Troponin [[id number]]  (Normal) Collected:   09/05/18 2057    Specimen:  Blood Updated:  09/05/18 2157     Troponin I 0.016 ng/mL     Narrative:       Ultra Troponin I Reference Range:         <=0.039 ng/mL: Negative    0.04-0.779 ng/mL: Indeterminate Range. Suspicious of MI.  Clinical correlation required.       >=0.78  ng/mL: Consistent with myocardial injury.  Clinical correlation required.    CK Total & CKMB [[id number]]  (Abnormal) Collected:  09/05/18 2057    Specimen:  Blood Updated:  09/05/18 2157     CKMB <0.18 ng/mL      Creatine Kinase 23 (L) U/L     CK-MB Index [[id number]] Collected:  09/05/18 2057    Specimen:  Blood Updated:  09/05/18 2157     CK-MB Index -- %      Comment: Unable to calculate.       Troponin [[id number]]  (Normal) Collected:  09/05/18 1555    Specimen:  Blood Updated:  09/05/18 1714     Troponin I 0.012 ng/mL     Narrative:       Ultra Troponin I Reference Range:         <=0.039 ng/mL: Negative    0.04-0.779 ng/mL: Indeterminate Range. Suspicious of MI.  Clinical correlation required.       >=0.78  ng/mL: Consistent with myocardial injury.  Clinical correlation required.    CK Total & CKMB [[id number]]  (Normal) Collected:  09/05/18 1555    Specimen:  Blood Updated:  09/05/18 1714     CKMB <0.18 ng/mL      Creatine Kinase 25 U/L     TSH [[id number]]  (Normal) Collected:  09/05/18 1555    Specimen:  Blood Updated:  09/05/18 1714     TSH 0.963 mIU/mL     Cortisol [[id number]] Collected:  09/05/18 1555    Specimen:  Blood Updated:  09/05/18 1714     Cortisol 32.00 mcg/dL     CK-MB Index [[id number]] Collected:  09/05/18 1555    Specimen:  Blood Updated:  09/05/18 1714     CK-MB Index -- %      Comment: Unable to calculate.       Lactic Acid, Plasma [[id number]]  (Normal) Collected:  09/05/18 1555    Specimen:  Blood Updated:  09/05/18 1700     Lactate 1.4 mmol/L     Hepatitis Panel, Acute [[id number]]  (Normal) Collected:  09/05/18 1010    Specimen:  Blood Updated:  09/05/18 1642     Hepatitis B Surface Ag Non-Reactive     Hep A IgM  Non-Reactive     Hep B C IgM Non-Reactive     Hepatitis C Ab Non-Reactive    C-reactive Protein [[id number]]  (Abnormal) Collected:  09/05/18 1010    Specimen:  Blood Updated:  09/05/18 1604     C-Reactive Protein 4.88 (H) mg/dL     TSH [[id number]]  (Normal) Collected:  09/05/18 1010    Specimen:  Blood Updated:  09/05/18 1523     TSH 1.458 mIU/mL     Urinalysis With Culture If Indicated - Urine, Clean Catch [[id number]]  (Abnormal) Collected:  09/05/18 1143    Specimen:  Urine from Urine, Catheter In/Out Updated:  09/05/18 1153     Color, UA Dark Yellow (A)     Appearance, UA Clear     pH, UA 5.5     Specific Gravity, UA 1.019     Glucose, UA Negative     Ketones, UA 15 mg/dL (1+) (A)     Bilirubin, UA Small (1+) (A)     Blood, UA Negative     Protein, UA 30 mg/dL (1+) (A)     Leuk Esterase, UA Trace (A)     Nitrite, UA Negative     Urobilinogen, UA 0.2 E.U./dL    Urinalysis, Microscopic Only - Urine, Clean Catch [[id number]]  (Abnormal) Collected:  09/05/18 1143    Specimen:  Urine from Urine, Catheter In/Out Updated:  09/05/18 1153     RBC, UA 3-5 (A) /HPF      WBC, UA 0-2 /HPF      Bacteria, UA None Seen /HPF      Squamous Epithelial Cells, UA 0-2 /HPF      Hyaline Casts, UA 7-12 /LPF      Methodology Automated Microscopy    Influenza Antigen, Rapid - Swab, Nasopharynx [[id number]]  (Normal) Collected:  09/05/18 1106    Specimen:  Swab from Nasopharynx Updated:  09/05/18 1132     Influenza A Ag, EIA Negative     Influenza B Ag, EIA Negative    [facility] Draw [[id number]] Collected:  09/05/18 1010    Specimen:  Blood Updated:  09/05/18 1116    Narrative:       The following orders were created for panel order [facility] Draw.  Procedure                               Abnormality         Status                     ---------                               -----------         ------                     Light Blue Top[[id number]]                                   Final result               Green Top (Gel)[[id number]]                                   Final result               Lavender Top[[id number]]                                     Final result               Gold Top - SST[[id number]]                                   Final result                 Please view results for these tests on the individual orders.    Green Top (Gel) [[id number]] Collected:  09/05/18 1010    Specimen:  Blood Updated:  09/05/18 1116     Extra Tube Hold for add-ons.     Comment: Auto resulted.       Gold Top - SST [[id number]] Collected:  09/05/18 1010    Specimen:  Blood Updated:  09/05/18 1116     Extra Tube Hold for add-ons.     Comment: Auto resulted.       Light Blue Top [[id number]] Collected:  09/05/18 1010    Specimen:  Blood Updated:  09/05/18 1116     Extra Tube hold for add-on     Comment: Auto resulted       Lavender Top [[id number]] Collected:  09/05/18 1010    Specimen:  Blood Updated:  09/05/18 1116     Extra Tube hold for add-on     Comment: Auto resulted       Troponin [[id number]]  (Normal) Collected:  09/05/18 1010    Specimen:  Blood Updated:  09/05/18 1115     Troponin I 0.020 ng/mL     Narrative:       Ultra Troponin I Reference Range:         <=0.039 ng/mL: Negative    0.04-0.779 ng/mL: Indeterminate Range. Suspicious of MI.  Clinical correlation required.       >=0.78  ng/mL: Consistent with myocardial injury.  Clinical correlation required.    Comprehensive Metabolic Panel [[id number]]  (Abnormal) Collected:  09/05/18 1010    Specimen:  Blood Updated:  09/05/18 1106     Glucose 138 (H) mg/dL      BUN 20 mg/dL      Creatinine 1.38 (H) mg/dL      Sodium 133 (L) mmol/L      Potassium 3.8 mmol/L      Chloride 104 mmol/L      CO2 18.4 (L) mmol/L      Calcium 9.1 mg/dL      Total Protein 7.9 g/dL      Albumin 3.60 g/dL      ALT (SGPT) 39 (H) U/L      AST (SGOT) 57 (H) U/L      Alkaline Phosphatase 72 U/L      Comment: Note New Reference Ranges        Total Bilirubin 0.3 mg/dL      eGFR Non African Amer 37 (L) mL/min/1.73      Globulin 4.3 gm/dL      A/G  Ratio 0.8 (L) g/dL      BUN/Creatinine Ratio 14.5     Anion Gap 10.6 mmol/L     Narrative:       The MDRD GFR formula is only valid for adults with stable renal function between ages 18 and 70.    Osmolality, Calculated [[id number]]  (Abnormal) Collected:  09/05/18 1010    Specimen:  Blood Updated:  09/05/18 1106     Osmolality Calc 271.2 (L) mOsm/kg     Blood Gas, Arterial [[id number]]  (Abnormal) Collected:  09/05/18 1055    Specimen:  Arterial Blood Updated:  09/05/18 1101     Site Arterial: left brachial     Leif's Test N/A     pH, Arterial 7.497 (H) pH units      pCO2, Arterial 25.7 (C) mm Hg      pO2, Arterial 72.1 (L) mm Hg      HCO3, Arterial 19.5 (C) mmol/L      Base Excess, Arterial -2.3 mmol/L      O2 Saturation, Arterial 94.5 %      Hemoglobin, Blood Gas 13.0 g/dL      Hematocrit, Blood Gas 38.0 %      Oxyhemoglobin 92.9 %      Methemoglobin 0.40 %      Carboxyhemoglobin 1.3 %      A-a Gradiant 41.0 mmHg      Temperature 98.6 C      Barometric Pressure for Blood Gas 732 mmHg      Modality Room Air     FIO2 21 %     CBC & Differential [[id number]] Collected:  09/05/18 1010    Specimen:  Blood Updated:  09/05/18 1100    Narrative:       The following orders were created for panel order CBC & Differential.  Procedure                               Abnormality         Status                     ---------                               -----------         ------                     Scan Slide[[id number]]                                                                  CBC Auto Differential[[id number]]        Abnormal            Final result                 Please view results for these tests on the individual orders.    CBC Auto Differential [[id number]]  (Abnormal) Collected:  09/05/18 1010    Specimen:  Blood Updated:  09/05/18 1100     WBC 7.19 10*3/mm3      RBC 4.33 10*6/mm3      Hemoglobin 12.6 g/dL      Hematocrit 37.4 %      MCV 86.4 fL      MCH 29.1 pg      MCHC 33.7 g/dL      RDW 13.4 %      RDW-SD 42.9 fl       MPV 12.0 (H) fL      Platelets 184 10*3/mm3      Neutrophil % 48.3 %      Lymphocyte % 36.4 %      Monocyte % 13.6 (H) %      Eosinophil % 0.0 %      Basophil % 1.4 %      Immature Grans % 0.3 %      Neutrophils, Absolute 3.47 10*3/mm3      Lymphocytes, Absolute 2.62 10*3/mm3      Monocytes, Absolute 0.98 (H) 10*3/mm3      Eosinophils, Absolute 0.00 10*3/mm3      Basophils, Absolute 0.10 10*3/mm3      Immature Grans, Absolute 0.02 10*3/mm3           Imaging Results (all)     Procedure Component Value Units Date/Time    XR Chest AP [[id number]] Collected:  09/08/18 1006     Updated:  09/08/18 1009    Narrative:       EXAMINATION: XR CHEST AP-      CLINICAL INDICATION:     follow up pneumonia; I48.91-Unspecified atrial  fibrillation; R53.1-Weakness     TECHNIQUE:  XR CHEST AP-      COMPARISON: NONE      FINDINGS:   COARSENED INTERSTITIAL MARKINGS THROUGHOUT THE LUNGS. PATCHY BIBASILAR  AIRSPACE DISEASE IS GROSSLY STABLE.  Heart and mediastinum contours are unremarkable.  No pleural effusion.  No pneumothorax.   Bony and soft tissue structures are unremarkable.       Impression:       COARSENED INTERSTITIAL MARKINGS THROUGHOUT THE LUNGS. PATCHY  BIBASILAR AIRSPACE DISEASE IS GROSSLY STABLE.     This report was finalized on 9/8/2018 10:06 AM by Dr. Sae Montoya MD.       Fiberoptic Endo (fees) [[id number]] Resulted:  09/06/18 1439     Updated:  09/06/18 1439    Narrative:       This procedure was auto-finalized with no dictation required.    US Carotid Bilateral [[id number]] Collected:  09/06/18 1318     Updated:  09/06/18 1321    Narrative:       EXAMINATION: US CAROTID BILATERAL-      Technique: Multiple real-time color Doppler images were acquired of  bilateral carotid arteries.     Stenosis measurements if obtained, were performed by the NASCET or  similar method.        CLINICAL INDICATION:     Frequent falls, weakness; I48.91-Unspecified  atrial fibrillation; R53.1-Weakness          COMPARISON:    None      FINDINGS:        RIGHT:  Moderate plaque right carotid bulb and right ICA. No occlusion.  RIGHT CCA PSV: 726 mm/s  RIGHT ICA PSV: -461 mm/s  RIGHT ICA EDV: -107 mm/s.   Right ICA/CCA Ratio: 0.63  Anterograde flow is demonstrated in RIGHT vertebral artery.     LEFT:  Mild plaque. No occlusion.  LEFT CCA PSV: 859 mm/s  LEFT ICA PSV: -924 mm/s  LEFT EDV: -274 mm/s.   Left ICA/CCA Ratio: 1.08  Anterograde flow is demonstrated in LEFT vertebral artery.          Impression:       1. Mild to moderate plaque right greater than left carotid systems. No  occlusion.  2. No hemodynamically significant stenosis of either RIGHT or LEFT ICA.  3. Antegrade flow noted both vertebral arteries.     This report was finalized on 9/6/2018 1:19 PM by Dr. Luis E Kovacs MD.       XR Chest 1 View [[id number]] Collected:  09/06/18 1016     Updated:  09/06/18 1019    Narrative:       EXAMINATION:  XR CHEST 1 VW-      CLINICAL INDICATION:     Sepsis; I48.91-Unspecified atrial fibrillation;  R53.1-Weakness     TECHNIQUE:  XR CHEST 1 VW-       COMPARISON: 9/5/2018      FINDINGS:   Bibasilar atelectasis and minimal airspace disease noted.   Heart size is stable.   No pneumothorax.   No pleural effusion.   Bony and soft tissue structures are unremarkable.            Impression:       Bibasilar atelectasis and minimal airspace disease noted.     This report was finalized on 9/6/2018 10:17 AM by Dr. Sae Montoya MD.       CT Head Without Contrast [[id number]] Collected:  09/06/18 0828     Updated:  09/06/18 0830    Narrative:          EXAMINATION: CT HEAD WO CONTRAST-      CLINICAL INDICATION:     weakness, frequent falls; I48.91-Unspecified  atrial fibrillation; R53.1-Weakness     TECHNIQUE: Contiguous axial CT images of the head were obtained without  contrast administration.      Radiation dose reduction techniques were utilized per ALARA protocol.  Automated exposure control was initiated through either or [employer] or  [employer] software  "packages by  protocol.       677.76 mGy.cm     COMPARISON:  None.       FINDINGS: Generalized cerebral atrophy is present. There is no mass  effect, midline displacement, or hydrocephalus. There are patchy areas  of decreased density within the periventricular white matter which  likely reflect chronic small vessel ischemic changes. There is no CT  evidence of acute infarct or hemorrhage. Bone windows reveal no osseous  abnormalities or fractures.        Impression:       Atrophy and chronic small vessel ischemic change, but there  are no acute intracranial abnormalities identified.         This report was finalized on 9/6/2018 8:28 AM by Dr. Sae Montoya MD.       XR Chest 1 View [[id number]] Collected:  09/05/18 1134     Updated:  09/05/18 1204    Narrative:       EXAMINATION:  XR CHEST 1 VW-      CLINICAL INDICATION:     Short of breath     TECHNIQUE:  XR CHEST 1 VW-       COMPARISON: 6/1/2018      FINDINGS:   Coarsened interstitial markings.   Heart size is stable.   No pneumothorax.   Probably tiny pleural effusions.   Bony and soft tissue structures are unremarkable.            Impression:       Stable radiographic appearance of the chest.     This report was finalized on 9/5/2018 11:35 AM by Dr. Sae Montoya MD.             Condition on Discharge:      Stable    Vital Signs:    /93   Pulse 95   Temp 98.6 °F (37 °C) (Oral)   Resp 18   Ht 162.6 cm (64\")   Wt 65.6 kg (144 lb 9.6 oz)   SpO2 98%   BMI 24.82 kg/m²     Physical Exam:      General Appearance:    Alert, cooperative, in no acute distress   Head:    Normocephalic, without obvious abnormality, atraumatic   Eyes:            Lids and lashes normal, conjunctivae and sclerae normal, no   icterus, no pallor, corneas clear, PERRLA   Ears:    Ears appear intact with no abnormalities noted   Throat:   No oral lesions, no thrush, oral mucosa moist   Neck:   No adenopathy, supple, trachea midline, no thyromegaly, no     carotid bruit, no " JVD   Back:     No kyphosis present, no scoliosis present, no skin lesions,       erythema or scars, no tenderness to percussion or                   palpation,   range of motion normal   Lungs:     Clear to auscultation,respirations regular, even and                   unlabored    Heart:    Regular rhythm and normal rate, normal S1 and S2, no            murmur, no gallop, no rub, no click   Breast Exam:    Deferred   Abdomen:     Normal bowel sounds, no masses, no organomegaly, soft        non-tender, non-distended, no guarding, no rebound                 tenderness   Genitalia:    Deferred   Extremities:   Moves all extremities well, no edema, no cyanosis, no              redness   Pulses:   Pulses palpable and equal bilaterally   Skin:   No bleeding, bruising or rash   Lymph nodes:   No palpable adenopathy   Neurologic:   Cranial nerves 2 - 12 grossly intact, sensation intact, DTR        present and equal bilaterally       Discharge Disposition:    Home or Self Care    Discharge Medication:       Discharge Medications      New Medications      Instructions Start Date   amoxicillin-clavulanate 875-125 MG per tablet  Commonly known as:  AUGMENTIN   1 tablet, Oral, Every 12 Hours Scheduled      atorvastatin 10 MG tablet  Commonly known as:  LIPITOR   10 mg, Oral, Nightly      diltiaZEM  MG 24 hr capsule  Commonly known as:  CARDIZEM CD   300 mg, Oral, Every 24 Hours Scheduled      gabapentin 400 MG capsule  Commonly known as:  NEURONTIN  Replaces:  gabapentin 800 MG tablet   400 mg, Oral, 2 Times Daily      lactobacillus acidophilus capsule capsule   1 capsule, Oral, Daily         Continue These Medications      Instructions Start Date   albuterol 108 (90 Base) MCG/ACT inhaler  Commonly known as:  PROVENTIL HFA;VENTOLIN HFA   2 puffs, Inhalation, Every 6 Hours PRN      alendronate 70 MG tablet  Commonly known as:  FOSAMAX   70 mg, Oral, Every 7 Days, Prior to [facility] Admission, Patient was on: takes on  wednesdays      apixaban 5 MG tablet tablet  Commonly known as:  ELIQUIS   5 mg, Oral, 2 Times Daily      calcium carb-cholecalciferol 600-800 MG-UNIT tablet   1 tablet, Oral, 2 Times Daily With Meals      fexofenadine 180 MG tablet  Commonly known as:  ALLEGRA   180 mg, Oral, Daily      levothyroxine 50 MCG tablet  Commonly known as:  SYNTHROID, LEVOTHROID   50 mcg, Oral, Daily      LORazepam 1 MG tablet  Commonly known as:  ATIVAN   1 mg, Oral, Every 8 Hours PRN      metoprolol tartrate 25 MG tablet  Commonly known as:  LOPRESSOR   25 mg, Oral, 2 Times Daily      ranitidine 300 MG capsule  Commonly known as:  ZANTAC   300 mg, Oral, 2 Times Daily      rOPINIRole 1 MG tablet  Commonly known as:  REQUIP   1 mg, Oral, Nightly PRN, Take 1 hour before bedtime.       sennosides-docusate sodium 8.6-50 MG tablet  Commonly known as:  SENOKOT-S   2 tablets, Oral, 2 Times Daily      traMADol 50 MG tablet  Commonly known as:  ULTRAM   50 mg, Oral, Every 8 Hours PRN         Stop These Medications    gabapentin 800 MG tablet  Commonly known as:  NEURONTIN  Replaced by:  gabapentin 400 MG capsule     ibuprofen 800 MG tablet  Commonly known as:  ADVIL,MOTRIN            Discharge Diet:     Diet Instructions     Diet: Cardiac       Discharge Diet:  Cardiac          Activity at Discharge:     Activity Instructions     Activity as Tolerated             Follow-up Appointments:    Future Appointments  Date Time Provider Department Center   10/1/2018 1:40 PM Ed Sellers MD [facility] None     Additional Instructions for the Follow-ups that You Need to Schedule     Discharge Follow-up with PCP    As directed      Currently Documented PCP:  Juliann Santana APRN  PCP Phone Number:  [phone]    Follow Up Details:  follow up in 1 week         Discharge Follow-up with Specified Provider: f/u cardiologist; 2 Weeks    As directed      To:  f/u cardiologist    Follow Up:  2 Weeks               Test Results Pending at Discharge:      Order Current Status    Blood Culture - Blood, Preliminary result    Blood Culture - Blood, Preliminary result    Blood Culture - Blood, Preliminary result    Blood Culture - Blood, Preliminary result             Tien Dash DO  09/10/18  10:36 AM

## 2018-09-10 NOTE — PROGRESS NOTES
"  I have personally seen and examined the patient today and discussed overnight interval progress and pertinent issues with nursing staff.    Subjective     More stable and significantly better today.  Possible discharge home today.  Improving CRP level and normal white count.  Seems to have shown improvement on oral Augmentin course to be continued as outlined.    History taken from: patient chart family RN      Objective       Vital Signs    /93   Pulse 95   Temp 98.6 °F (37 °C) (Oral)   Resp 18   Ht 162.6 cm (64\")   Wt 65.6 kg (144 lb 9.6 oz)   SpO2 98%   BMI 24.82 kg/m²     Temp:  [97.9 °F (36.6 °C)-98.9 °F (37.2 °C)] 98.6 °F (37 °C)      Intake/Output Summary (Last 24 hours) at 09/10/18 1153  Last data filed at 09/10/18 0800   Gross per 24 hour   Intake              760 ml   Output                0 ml   Net              760 ml     Intake & Output (last 3 days)       09/07 0701 - 09/08 0700 09/08 0701 - 09/09 0700 09/09 0701 - 09/10 0700 09/10 0701 - 09/11 0700    P.O.  360 790 400    I.V. (mL/kg) 1000 (14.9)       IV Piggyback 300 700      Total Intake(mL/kg) 1300 (19.4) 1060 (15.9) 790 (12) 400 (6.1)    Net +1300 +1060 +790 +400            Unmeasured Urine Occurrence 3 x 5 x 6 x 1 x    Unmeasured Stool Occurrence 2 x 1 x 4 x         Physical Exam:                 General Appearance:    Alert, cooperative, in no acute distress   Head:    Normocephalic, without obvious abnormality, atraumatic   Eyes:            Lids and lashes normal, conjunctivae and sclerae normal, no   icterus, no pallor, corneas clear, PERRLA   Ears:    Ears appear intact with no abnormalities noted   Throat:   No oral lesions, no thrush, oral mucosa moist   Neck:   No adenopathy, supple, trachea midline, no thyromegaly, no   carotid bruit, no JVD   Back:     No tenderness to percussion or palpation, range of motion   normal   Lungs:     Crackles to the right base    Heart:    Regular rhythm and normal rate, normal S1 and S2, no "            murmur, no gallop, no rub, no click   Chest Wall:    No abnormalities observed   Abdomen:     Normal bowel sounds, no masses, no organomegaly, soft        non-tender, non-distended, no guarding, no rebound                tenderness   Rectal:     Deferred   Extremities:   Moves all extremities well, no edema, no cyanosis, no             redness   Pulses:   Pulses palpable and equal bilaterally   Skin:   No bleeding, bruising or rash   Lymph nodes:   No palpable adenopathy   Neurologic:   Awake, alert and oriented x 3. Following commands.       Results:      Results from last 7 days  Lab Units 09/10/18  0119 09/09/18  0159 09/08/18  0232 09/07/18  0158 09/06/18  1054 09/05/18  1010   WBC 10*3/mm3 8.87 10.42 11.31 12.08 8.32 7.19     Lab Results   Component Value Date    NEUTROABS 4.56 09/10/2018         Results from last 7 days  Lab Units 09/10/18  0119   CREATININE mg/dL 0.69         Results from last 7 days  Lab Units 09/10/18  0119 09/09/18  0159 09/08/18  0232   CRP mg/dL 3.78* 4.71* 7.58*       Imaging Results (last 24 hours)     ** No results found for the last 24 hours. **            Results Review:    I have personally reviewed laboratory data, culture results, radiology studies and antimicrobial therapy.    Hospital Medications (active)       Dose Frequency Start End    acetaminophen (TYLENOL) tablet 650 mg 650 mg Every 6 Hours PRN 9/6/2018     Sig - Route: Take 2 tablets by mouth Every 6 (Six) Hours As Needed for Mild Pain  or Fever. - Oral    albuterol (PROVENTIL) nebulizer solution 0.083% 2.5 mg/3mL 2.5 mg Every 6 Hours PRN 9/5/2018     Sig - Route: Take 2.5 mg by nebulization Every 6 (Six) Hours As Needed for Wheezing or Shortness of Air. - Nebulization    apixaban (ELIQUIS) tablet 5 mg 5 mg Every 12 Hours Scheduled 9/5/2018     Sig - Route: Take 1 tablet by mouth Every 12 (Twelve) Hours. - Oral    calcium carb-cholecalciferol 600-800 MG-UNIT tablet 1 tablet 1 tablet 2 Times Daily With Meals  "9/5/2018     Sig - Route: Take 1 tablet by mouth 2 (Two) Times a Day With Meals. - Oral    cetirizine (zyrTEC) tablet 5 mg 5 mg Daily 9/5/2018     Sig - Route: Take 0.5 tablets by mouth Daily. - Oral    diltiaZEM CD (CARDIZEM CD) 24 hr capsule 120 mg 120 mg Every 24 Hours Scheduled 9/6/2018     Sig - Route: Take 1 capsule by mouth Daily. - Oral    doxycycline (VIBRAMYCIN) 100 mg/100 mL 0.9% NS  mg Every 12 Hours 9/6/2018 9/13/2018    Sig - Route: Infuse 100 mL into a venous catheter Every 12 (Twelve) Hours. - Intravenous    Cosign for Ordering: Accepted by Evan Winters MD on 9/7/2018  6:11 AM    famotidine (PEPCID) tablet 40 mg 40 mg Daily 9/5/2018     Sig - Route: Take 2 tablets by mouth Daily. - Oral    gabapentin (NEURONTIN) capsule 400 mg 400 mg 2 Times Daily 9/5/2018     Sig - Route: Take 1 capsule by mouth 2 (Two) Times a Day. - Oral    lactobacillus acidophilus (RISAQUAD) capsule 1 capsule 1 capsule Daily 9/6/2018     Sig - Route: Take 1 capsule by mouth Daily. - Oral    levothyroxine (SYNTHROID, LEVOTHROID) tablet 50 mcg 50 mcg Daily 9/6/2018     Sig - Route: Take 1 tablet by mouth Daily. - Oral    LORazepam (ATIVAN) tablet 1 mg 1 mg Every 8 Hours PRN 9/5/2018     Sig - Route: Take 1 tablet by mouth Every 8 (Eight) Hours As Needed for Anxiety (1). - Oral    Magnesium Sulfate 2 gram / 50mL Infusion (GIVE X 3 BAGS TO EQUAL 6GM TOTAL DOSE) - Mg 1.1 - 1.5 mg/dl 2 g As Needed 9/6/2018     Sig - Route: Infuse 50 mL into a venous catheter As Needed (See Administration Instructions). - Intravenous    Linked Group 1:  \"Or\" Linked Group Details        Magnesium Sulfate 2 gram Bolus, followed by 8 gram infusion (total Mg dose 10 grams)- Mg less than or equal to 1mg/dL 2 g As Needed 9/6/2018     Sig - Route: Infuse 50 mL into a venous catheter As Needed (See Administration Instructions). - Intravenous    Linked Group 1:  \"Or\" Linked Group Details        Magnesium Sulfate 4 gram infusion- Mg 1.6-1.9 mg/dL " "4 g As Needed 9/6/2018     Sig - Route: Infuse 100 mL into a venous catheter As Needed (See Administration Instructions). - Intravenous    Linked Group 1:  \"Or\" Linked Group Details        metoprolol tartrate (LOPRESSOR) tablet 25 mg 25 mg Every 12 Hours Scheduled 9/5/2018     Sig - Route: Take 1 tablet by mouth Every 12 (Twelve) Hours. - Oral    Pharmacy to dose vancomycin  Continuous PRN 9/6/2018 9/13/2018    Sig - Route: Continuous As Needed for Consult. - Does not apply    Cosign for Ordering: Accepted by Dmitry Miranda MD on 9/6/2018  9:51 AM    Pharmacy to Dose Zosyn  Continuous PRN 9/6/2018 9/13/2018    Sig - Route: Continuous As Needed for Consult. - Does not apply    Cosign for Ordering: Accepted by Dmitry Miranda MD on 9/6/2018  9:51 AM    piperacillin-tazobactam (ZOSYN) 3.375 g/100 mL 0.9% NS IVPB (mbp) 3.375 g Every 8 Hours 9/6/2018 9/13/2018    Sig - Route: Infuse 100 mL into a venous catheter Every 8 (Eight) Hours. - Intravenous    potassium chloride (K-DUR,KLOR-CON) CR tablet 40 mEq 40 mEq Every 4 Hours 9/6/2018 9/7/2018    Sig - Route: Take 2 tablets by mouth Every 4 (Four) Hours. - Oral    potassium chloride (KLOR-CON) packet 40 mEq 40 mEq As Needed 9/6/2018     Sig - Route: Take 40 mEq by mouth As Needed (potassium replacement, see admin instructions). - Oral    Linked Group 2:  \"Or\" Linked Group Details        potassium chloride (MICRO-K) CR capsule 40 mEq 40 mEq As Needed 9/6/2018     Sig - Route: Take 4 capsules by mouth As Needed (potassium replacement.  see admin instructions). - Oral    Linked Group 2:  \"Or\" Linked Group Details        potassium chloride 10 mEq in 100 mL IVPB 10 mEq Every 1 Hour PRN 9/6/2018     Sig - Route: Infuse 100 mL into a venous catheter Every 1 (One) Hour As Needed (potassium protocol PERIPHERAL - see admin instructions). - Intravenous    Linked Group 2:  \"Or\" Linked Group Details        rOPINIRole (REQUIP) tablet 1 mg 1 mg Nightly PRN 9/5/2018     Sig - Route: Take " "1 tablet by mouth At Night As Needed (restless leg). - Oral    sennosides-docusate sodium (SENOKOT-S) 8.6-50 MG tablet 2 tablet 2 tablet 2 Times Daily 9/5/2018     Sig - Route: Take 2 tablets by mouth 2 (Two) Times a Day. - Oral    sodium chloride 0.9 % flush 1-10 mL 1-10 mL As Needed 9/5/2018     Sig - Route: Infuse 1-10 mL into a venous catheter As Needed for Line Care. - Intravenous    Cosign for Ordering: Accepted by Dmitry Miranda MD on 9/5/2018  5:35 PM    sodium chloride 0.9 % flush 10 mL 10 mL As Needed 9/5/2018     Sig - Route: Infuse 10 mL into a venous catheter As Needed for Line Care. - Intravenous    Linked Group 3:  \"And\" Linked Group Details        sodium chloride 0.9 % flush 3 mL 3 mL Every 8 Hours 9/7/2018     Sig - Route: Infuse 3 mL into a venous catheter Every 8 (Eight) Hours. - Intravenous    sodium chloride 0.9 % flush 5 mL 5 mL As Needed 9/7/2018     Sig - Route: Infuse 5 mL into a venous catheter As Needed for Line Care (After Medication Administration or Blood Draw). - Intravenous    sodium chloride 0.9 % infusion 75 mL/hr Continuous 9/5/2018     Sig - Route: Infuse 75 mL/hr into a venous catheter Continuous. - Intravenous    Cosign for Ordering: Accepted by Dmitry Miranda MD on 9/5/2018  5:35 PM    traMADol (ULTRAM) tablet 50 mg 50 mg Every 8 Hours PRN 9/5/2018     Sig - Route: Take 1 tablet by mouth Every 8 (Eight) Hours As Needed for Moderate Pain . - Oral    vancomycin (VANCOCIN) 1,250 mg in sodium chloride 0.9 % 250 mL IVPB 1,250 mg Once 9/6/2018 9/6/2018    Sig - Route: Infuse 1,250 mg into a venous catheter 1 (One) Time. - Intravenous    diltiaZEM (CARDIZEM) 125 mg in sodium chloride 0.9 % 125 mL (1 mg/mL) infusion (Discontinued) 5-15 mg/hr Titrated 9/5/2018 9/6/2018    Sig - Route: Infuse 5-15 mg/hr into a venous catheter Dose Adjusted By Provider As Needed. - Intravenous    Cosign for Ordering: Accepted by Dmitry Miranda MD on 9/5/2018  5:35 PM    vancomycin (VANCOCIN) 750 mg in " sodium chloride 0.9 % 250 mL IVPB (Discontinued) 750 mg Every 24 Hours 9/7/2018 9/6/2018    Sig - Route: Infuse 750 mg into a venous catheter Daily. - Intravenous            Cultures:    Blood Culture   Date Value Ref Range Status   09/06/2018 No growth at 24 hours  Preliminary   09/06/2018 No growth at 24 hours  Preliminary   09/05/2018 No growth at 2 days  Preliminary   09/05/2018 No growth at 2 days  Preliminary         ASSESSMENT/PLAN           Assessment/Plan     ASSESSMENT:    1.  Pneumonia     PLAN:     More stable and significantly better today.  Possible discharge home today.  Improving CRP level and normal white count.  Seems to have shown improvement on oral Augmentin course to be continued as outlined.    Augmentin 875 milligrams by mouth twice a day ×7 days through 9/16/18.    Patient's findings and recommendations were discussed with patient, family and nursing staff    Code Status:   Code Status and Medical Interventions:   Ordered at: 09/05/18 1427     Code Status:    CPR     Medical Interventions (Level of Support Prior to Arrest):    Full     Scribed for Evan Winters MD.    Dayanara Winn PA-C  09/10/18  11:53 AM    Physician Attestation:    The documentation recorded by the scribe accurately reflects the service I personally performed and the decisions made by me.    Evan Winters MD  Infectious Diseases  09/11/18  5:45 AM

## 2018-09-10 NOTE — PROGRESS NOTES
Discharge Planning Assessment  KATIE Piña     Patient Name: Anette Dunham  MRN: [id number]  Today's Date: 9/10/2018    Admit Date: 9/5/2018           Discharge Plan     Row Name 09/10/18 1350       Plan    Final Note Pt being discharged home on this date.  SS received order to arrange a wheelchair.  Pt would like to use [facility].  SS contacted [facility] informed pt will picking up the equipment at discharge. SS faxed order and demographics to [phone].  No other needs at this time.                 Expected Discharge Date and Time     Expected Discharge Date Expected Discharge Time    Sep 10, 2018         Lisa Stokes

## 2018-09-10 NOTE — THERAPY DISCHARGE NOTE
Acute Care - Speech Language Pathology   Swallow Treatment Note/Discharge    [place]     Patient Name: Anette Dunham  : 1940  MRN: [id number]  Today's Date: 9/10/2018             Admit Date: 2018    Ms. Dunham is seen this am at bedside on  w/ pt daughter present at bedside. Pt RN calls to state pt is discharging home today. Pt was evaluated via FEES 18 and has been seen for formal dysphagia tx x1, prior to today's session. Per this status and limited therapy sessions s/t pt recent dysphagia evaluation, pt is NOT felt appropriate candidate for reevaluation at this time.     Pt completes x3 sets x5 reps resistive breathing exercises at level of 1/1 inhale/exhale. Pt provided w/ resistive breather to take home for independent practice. Pt completes x1 set x5 reps of laryngeal adduction/elevation exercises w/ avg duration of 12.52 seconds w/ mod cues from SLP for participate. Pt educated on importance of completing exercises for dysphagia independently in generalization.     SLP d/w pt recommendation for formal dysphagia tx via either HH services or OP services. Pt and pt daughter request for referral for OP services. SLP d/w RN [place] for RN to place order for OP speech therapy services for dysphagia tx.    Pt is currently on mechanical soft, chopped consistencies w/ NECTAR thick liquids. Pt and pt daughter thoroughly educated on importance of compliance w/ nectar thick liquid recommendation. Pt provided w/ can of instant powder thickening agent and x3 apple juice nectar thick for home samples. D/w pt and pt family importance of following dietary and nectar thick liquids recommendations, completing dysphagia exercises independently and w/ OP ST, oral care, universal aspiration precautions, and NO THIN LIQUID CONSUMPTION. Pt and pt daughter states verbal agreement and understanding.     Thank you-  Igor Carvalho M.A., CCC-SLP      Visit Dx:      ICD-10-CM ICD-9-CM   1. Atrial fibrillation with  RVR (CMS/[facility]) I48.91 427.31   2. Generalized weakness R53.1 780.79   3. Pneumonia of both lower lobes due to infectious organism J18.9 483.8   4. Elevated LFTs R79.89 790.6   5. Essential hypertension I10 401.9     Patient Active Problem List   Diagnosis   • Essential hypertension   • Chest pain   • Paroxysmal atrial fibrillation (CMS/[facility])   • Closed left hip fracture, initial encounter (CMS/[facility])   • Tobacco abuse   • Closed left hip fracture (CMS/[facility])   • Atrial fibrillation with RVR (CMS/[facility])       Therapy Treatment    Therapy Treatment / Health Promotion         Vitals/Pain/Safety       Cognition, Communication, Swallow       Outcome Summary         EDUCATION  The patient has been educated in the following areas:   Dysphagia (Swallowing Impairment) Oral Care/Hydration Modified Diet Instruction.    SLP Recommendation and Plan        Time Calculation:       Therapy Charges for Today     Code Description Service Date Service Provider Modifiers Qty    79685085851 HC ST SWALLOWING DISCHARGE 9/10/2018 Isaac Carvalho MA,CCC-SLP GN, CI 1    [id number] HC ST TREATMENT SPEECH 4 9/10/2018 Isaac Carvalho MA,CCC-SLP GN 1          SLP G-Codes  SLP NOMS Used?: Yes  Functional Limitations: Swallowing  Swallow Current Status (): At least 1 percent but less than 20 percent impaired, limited or restricted  Swallow Goal Status (): 0 percent impaired, limited or restricted  Swallow Discharge Status (): At least 1 percent but less than 20 percent impaired, limited or restricted         Isaac Carvalho MA,DAV-SLP  9/10/2018

## 2018-09-10 NOTE — DISCHARGE PLACEMENT REQUEST
"Claudette De Santiago  (78 y.o. Female)     Date of Birth Social Security Number Address Home Phone MRN    1940  [address]  [facility] [address] [phone] [id number]    Jew Marital Status          Unknown        Admission Date Admission Type Admitting Provider Attending Provider Department, Room/Bed    9/5/18 Emergency Dmitry Miranda MD Heinss, Karl F, MD [facility] CARE, P218/1P    Discharge Date Discharge Disposition Discharge Destination         Home or Self Care              Attending Provider:  Dmitry Miranda MD    Allergies:  No Known Allergies    Isolation:  None   Infection:  None   Code Status:  CPR    Ht:  162.6 cm (64\")   Wt:  65.6 kg (144 lb 9.6 oz)    Admission Cmt:  None   Principal Problem:  None                Active Insurance as of 9/5/2018     Primary Coverage     Payor Plan Insurance Group Employer/Plan Group    MEDICARE MEDICARE A & B      Payor Plan Address Payor Plan Phone Number Effective From Effective To    [address] [phone] 3/1/2004     [facility] [id number]       Subscriber Name Subscriber Birth Date Member ID       CLAUDETTE DE SANTIAGO 1940 [id number]           Secondary Coverage     Payor Plan Insurance Group Employer/Plan Group    [employer]LL[employer] [place] WELL[place]ARE [place]ICA[place]      Payor Plan Address Payor Plan Phone Number Effective From Effective To    [address] [address] [phone] 8/16/2016     [facility] [id number]       Subscriber Name Subscriber Birth Date Member ID       CLAUDETTE DE SANTIAGO 1940 [id number]                 Emergency Contacts      (Rel.) Home Phone Work Phone Mobile Phone    Vielka Patel (Daughter) [phone] -- --            Emergency Contact Information     Name Relation Home Work Mobile    Vielka Patel Daughter [phone]            Insurance Information                MEDICARE/MEDICARE A & B Phone: [phone]    Subscriber: Claudette De Santiago Subscriber#: [id number]    Group#:  Precert#:         WELLCARE  " "[place] MEDICAID Phone: [phone]    Subscriber: Anette Dunham Subscriber#: [id number]    Group#:  Precert#:              History & Physical      Dmitry Miranda MD at 2018  2:47 PM              [facility] Medicine Services  History & Physical    Patient Identification:  Name:  Anette Dunham  Age:  78 y.o.  Sex:  female  :  1940  MRN:  [id number]   Visit Number:  [id number]  Primary Care Physician:  Juliann Santana APRN    I have seen the patient in conjunction with Ely Tijerina PA-C and I agree with the following statements:    Subjective      Assisted by: Adriana MCDONALD      Chief complaint: Weakness, palpitations    History of presenting illness:      Anette Dunham is a 78 y.o. female presented to the ED at this facility on this date with weakness and recent falls.  She was found upon presentation to be in atrial fibrillation with RVR.  She has known history of atrial fibrillation with chronic anticoagulation with Eliquis.  She takes Metoprolol 25 mg BID for her heart rate.  She did not take her dose this a.m.  She states yesterday she lost her balance and fell through a walker.  The only pain she is having from this fall is a very mild intensity \"soreness\" in character when she pushes on her chest.  She's had no associated cough or fever.  She reports that she was weak and her \"daughter's baby come to the hospital\".  Son-in-law who is here states that she was having trouble getting up and she was unstable in her gait.  She reports she has been feeling weak since Saturday (18) throughout her body, though worse in her lower extremities. She has sustained a few falls since that time but denies syncopal episodes or bruising.  She reports she has felt her heart intermittent beating rapidly since that time.  She denies chest pain during my examination.  She reports some dyspnea with heart beating out of her chest.  In the ED, she was found to have atrial fibrillation with " RVR in the 150s with initially borderline blood pressures in the low 100s to high 90s systolic and 50s-70s diastolic.  She received a 20mg bolus of cardizem followed by cardizem gtt at 5mg/hr with improvement of rate into the 90-100s during my bedside evaluation.   Mrs. Dunham denies any recent fever, chills, dysuria, or hematuria. She did cough once during examination but reports it to be new and only since getting cold in her ED room.   She denies nausea, vomiting, diarrhea.  She has been admitted to PCU for further evaluation and treatment.     Mrs. Dunham's primary cardiologist, per her account, is Dr. Sellers.      ---------------------------------------------------------------------------------------------------------------------   Review of Systems   Constitutional: Positive for fatigue. Negative for activity change, appetite change, chills and fever.   HENT: Negative for congestion and drooling.    Eyes: Negative for pain and redness.   Respiratory: Positive for shortness of breath. Negative for cough and wheezing.    Cardiovascular: Positive for palpitations. Negative for chest pain and leg swelling.   Gastrointestinal: Negative for abdominal distention, diarrhea and nausea.   Endocrine: Negative for cold intolerance and heat intolerance.   Genitourinary: Negative for difficulty urinating and dyspareunia.   Musculoskeletal: Negative for arthralgias and gait problem.   Skin: Negative for color change and pallor.   Allergic/Immunologic: Negative for environmental allergies and food allergies.   Neurological: Negative for dizziness and headaches.   Psychiatric/Behavioral: Negative for agitation and confusion.      ---------------------------------------------------------------------------------------------------------------------   Past Medical History:   Diagnosis Date   • Anxiety    • Arrhythmia    • Coronary artery disease    • Disease of thyroid gland    • Dysfunctional gallbladder    • GERD  (gastroesophageal reflux disease)    • Hypertension    • Osteoporosis      Past Surgical History:   Procedure Laterality Date   • ABDOMINAL SURGERY     • CHOLECYSTECTOMY OPEN     • HIP HEMIARTHROPLASTY Left 5/29/2018    Procedure: LEFT HIP BIPOLAR HEMIARTHROPLASTY;  Surgeon: Trevon Arriaga MD;  Location: [facility];  Service: Orthopedics     Family History   Problem Relation Age of Onset   • Heart disease Father      Social History     Social History   • Marital status:      Social History Main Topics   • Smoking status: Current Every Day Smoker     Packs/day: 0.25     Years: 15.00     Types: Cigarettes   • Smokeless tobacco: Current User     Types: Snuff      Comment: snuff for 60 years    • Alcohol use No   • Drug use: No   • Sexual activity: Defer     Other Topics Concern   • Not on file     ---------------------------------------------------------------------------------------------------------------------   Allergies:  Patient has no known allergies.  ---------------------------------------------------------------------------------------------------------------------   Home medications:    Medications below are reported home medications pulling from within the system; at this time, these medications have not been reconciled unless otherwise specified and are in the verification process for further verifcation as current home medications.    (Not in a hospital admission)    Hospital Scheduled Meds:    sodium chloride 500 mL Intravenous Once       diltiaZEM 5 mg/hr Last Rate: 5 mg/hr (09/05/18 1401)       Current listed hospital scheduled medications may not yet reflect those currently placed in orders that are signed and held awaiting patient's arrival to floor.   ---------------------------------------------------------------------------------------------------------------------     Objective     Vital Signs:  Temp:  [98.4 °F (36.9 °C)] 98.4 °F (36.9 °C)  Heart Rate:  [] 102  Resp:  [8-18] 16  BP:  ()/(55-77) 103/65  1    09/05/18  0930   Weight: 68.5 kg (151 lb)     Body mass index is 25.92 kg/m².  ---------------------------------------------------------------------------------------------------------------------       Physical Exam    Physical Exam:    Constitutional: Awake, alert, well-nourished, well-developed, nontoxic  HEENT: Normocephalic, atraumatic. PERRLA, EOMI, sclerae anicteric, conjunctivae without injection, mucous membranes moist, no oropharyngeal erythema appreciated.    Neck: Supple. No JVD appreciated.  No carotid bruits heard  Pulmonary: Clear to auscultation bilaterally, nonlabored respirations, no wheezing appreciated.   CV: Mildly tachycardic Irregularly, irregular rhythm. Normal s1/s2 with no murmur appreciated.  Current rate in the low 100s  Abdominal: Soft, No distension or tenderness appreciated. Bowel sounds appreciated in all four quadrants, no guarding or rebound  Musculoskeletal: No erythema or swelling to joints of upper and lower extremities   Extremities: No clubbing, cyanosis, or edema  Vascular: 3+ DP/PT pulses bilaterally, warm extremities  Skin: Skin is warm and dry. No truncal or extremity rash on limited exam  Neuro: Alert and oriented to person, place, and time. Strength symmetric in all extremities, speech clear, sensation intact to fine touch throughout.  No slurred speech.  No facial droop.    Psych: Appropriate mood and affect.  Judgement and though content appropriate.       ---------------------------------------------------------------------------------------------------------------------  EKG:          Telemetry:  Neetu in the 90s-100s    I have personally looked at both the EKG and the telemetry stripsCarmen traore RVR, reviewed ---------------------------------------------------------------------------------------------------------------------     Results from last 7 days  Lab Units 09/05/18  1010   WBC 10*3/mm3 7.19   HEMOGLOBIN g/dL 12.6   HEMATOCRIT % 37.4    MCV fL 86.4   MCHC g/dL 33.7   PLATELETS 10*3/mm3 184       Results from last 7 days  Lab Units 09/05/18  1055   PH, ARTERIAL pH units 7.497*   PO2 ART mm Hg 72.1*   PCO2, ARTERIAL mm Hg 25.7*   HCO3 ART mmol/L 19.5*       Results from last 7 days  Lab Units 09/05/18  1010   SODIUM mmol/L 133*   POTASSIUM mmol/L 3.8   CHLORIDE mmol/L 104   CO2 mmol/L 18.4*   BUN mg/dL 20   CREATININE mg/dL 1.38*   EGFR IF NONAFRICN AM mL/min/1.73 37*   CALCIUM mg/dL 9.1   GLUCOSE mg/dL 138*   ALBUMIN g/dL 3.60   BILIRUBIN mg/dL 0.3   ALK PHOS U/L 72   AST (SGOT) U/L 57*   ALT (SGPT) U/L 39*   Estimated Creatinine Clearance: 31.4 mL/min (A) (by C-G formula based on SCr of 1.38 mg/dL (H)).  No results found for: AMMONIA    Results from last 7 days  Lab Units 09/05/18  1010   TROPONIN I ng/mL 0.020         No results found for: HGBA1C  Lab Results   Component Value Date    TSH 3.373 05/31/2017     No results found for: PREGTESTUR, PREGSERUM, HCG, HCGQUANT  Pain Management Panel     There is no flowsheet data to display.                        ---------------------------------------------------------------------------------------------------------------------  Imaging Results (last 7 days)     Procedure Component Value Units Date/Time    XR Chest 1 View [[id number]] Collected:  09/05/18 1134     Updated:  09/05/18 1204    Narrative:       EXAMINATION:  XR CHEST 1 VW-      CLINICAL INDICATION:     Short of breath     TECHNIQUE:  XR CHEST 1 VW-       COMPARISON: 6/1/2018      FINDINGS:   Coarsened interstitial markings.   Heart size is stable.   No pneumothorax.   Probably tiny pleural effusions.   Bony and soft tissue structures are unremarkable.            Impression:       Stable radiographic appearance of the chest.     This report was finalized on 9/5/2018 11:35 AM by Dr. Sae Montoya MD.             Cultures:       Last echocardiogram:  Results for orders placed during the hospital encounter of 08/17/16   Adult transthoracic echo  complete    Narrative · All left ventricular wall segments contract normally.  · Left ventricular function is normal. Estimated EF = 50%.  · Left atrial cavity size is mild-to-moderately dilated.          CHADS-VASc Risk Assessment            4       Total Score        1 Hypertension    2 Age >/= 75    1 Sex: Female              Chest x-ray have reviewed and show some chronic type findings but nothing acute, CT without acute findings as well.  This is pending radiology report.  ---------------------------------------------------------------------------------------------------------------------  Assessment / Plan       Assessment and Plan:    -Atrial fibrillation with RVR:  Cardizem gtt has been ordered with titration and holding for monitoring of systolic BP.  Given borderline blood pressures in ED with fall to the 80s/50s during my examination, I did order another 500ml bolus fo NS to be followed by NS at 75cc/hr. I discussed 500ml bolus with ED RN Mary.  Last EF was 50% in 2016 per review of prior echocardiogram report. Cardiology consultation has been placed in addition to new echocardiogram.  Serial cardiac isoenzymes have been ordered.  TSH is ordered and pending.  [id number] a minimum of 4.   Continue home Eliquis for now with fall precautions.  There was a thought per old note by  possibility of cardioversion but she was started at that point on amiodarone and did convert to sinus without cardioversion.  Patient also has a stress test that is in our records March 2017 without significant findings.    -Hypotension, possibly iatrogenic in combination with a rapid rate:  Initially blood pressures were borderline low and then worsened following Cardizem bolus and gtt.   Repeat bolus of IV NS followed by continuous NS has been ordered as outlined above. Will closely monitor.  WBC unremarkable. Blood cultures obtained in ED. Will add lactic acid and C-RP.  Urinalysis and CXR are unremarkable.   Random cortisol level has been added.  Mrs. Dunham tells me that prior blood pressure medications (she is unsure of which medication) have been discontinued int he past 2/2 to hypotension.  Upon review of prior admission for hip fracture and following discharge from rehab, this appears to be low dose Amlodipine that was previously discontinued.       -Acute kidney injury: Will continue to hydrate and follow creatinine. Will hold nephrotoxic medications. Mild hypotension could likely further worsen GRISELDA.     -Dehydration: Will continue to hydrate.      -Recent falls:  Possibly 2/2 to dehydration and atrial fibrillation with RVR; however, Mrs. Dunham was also experiencing falls in June 2018 when admitted with mechanical fall with hip fracture. CT head has been ordered in addition to US carotid for further evaluation.   Fall precautions have been ordered. Home medication regimen does previously contain sedating medication of Ativan 1mg TID.      -Hypo osmolar hyponatremia: NS as previously outlined within this document.  Will follow sodium leves daily for now unless she has a significant drop.      -Mild transaminitis: Acute hepatitis panel added.  Will continue home atorvastatin upon reconciliation availability.      -Falls and functional decline, once patient is improved with her blood pressure atrial fibrillation we'll begin physical therapy again.  Patient had a CT of her head done because she is on anticoagulant and with this fall.  Pending official radiology report I see significant atrophy but no acute disease.    -Hypothyroidism, adequately replaced     -DVT prophylaxis:Eliquis will serve  -GI prophylaxis: Will add Pepcid 20mg daily.   -Activity: Fall precautions.  Bedrest at present 2/2 to Afib with RVR and borderline hypotension    -Expected length of stay: INPATIENT status due to the need for care which can only be reasonably provided in an hospital setting such as aggressive/expedited ancillary services  "and/or consultation services, the necessity for IV medications, close physician monitoring and/or the possible need for procedures.  In such, I feel patient’s risk for adverse outcomes and need for care warrant INPATIENT evaluation and predict the patient’s care encounter to likely last beyond 2 midnights.    (Cortisol level 32 not suggestive of adrenal insufficiency)      Mrs. Dunham is high risk 2/2 to atrial fibrillation with RVR requiring cardizem gtt with close monitoring     Ely Tijerina PA-C  18  3:21 PM  ---------------------------------------------------------------------------------------------------------------------             Electronically signed by Dmitry Miranda MD at 2018  5:52 PM       [address] [address]  Dept. Phone:  [phone]  Dept. Fax:   Date Ordered: Sep 10, 2018         Patient:  Anette Dunham MRN:  [id number]   [address]  [facility] [id number] :  1940  SSN:    Phone: [phone] Sex:  F     Weight: 65.6 kg (144 lb 9.6 oz)         Ht Readings from Last 1 Encounters:   18 162.6 cm (64\")         Lightweight Wheelchair         (Order ID: [id number])    Diagnosis:  Weakness (R53.1 [ICD-10-CM] 780.79 [ICD-9-CM])   Quantity:  1     Wheelchair supplies lightweight: [employer]strength W/ Fixed Full [employer]  Length of Need (99 Months = Lifetime): 12 Months        Authorizing Provider's Phone: [phone]         Authorizing Provider:Tien Dash DO  Authorizing Provider's NPI: [id number]  Order Entered By: Tien Dash DO 9/10/2018 11:43 AM     Electronically signed by: Tien Dash DO 9/10/2018 11:43 AM           "

## 2018-09-10 NOTE — PLAN OF CARE
Problem: Eating/Swallowing Impairment (IRF) (Adult)  Intervention: Promote Optimal Eating and Swallowing [place]   09/10/18 1059   Eating/Swallowing Management, Strategies/Techniques   Modified Food and Liquid Recommendations (Eating/Swallowing) nectar thick liquids   Strategies to Enhance Eating/Swallowing allow adequate time for eating;upright sitting position for eating       Goal: Optimal/Safe Level of Indep, Swallowing  Outcome: Ongoing (interventions implemented as appropriate)   09/10/18 1059   Eating/Swallowing Impairment (IRF) (Adult)   Optimal/Safe Level of Indep, Swallowing demonstrating adequate progress

## 2018-09-10 NOTE — THERAPY EVALUATION
Acute Care - Physical Therapy Initial Evaluation   Wang     Patient Name: Anette Dunham  : 1940  MRN: [id number]  Today's Date: 9/10/2018   Onset of Illness/Injury or Date of Surgery: 18  Date of Referral to PT: 18  Referring Physician: Dr. Miranda      Admit Date: 2018    Visit Dx:     ICD-10-CM ICD-9-CM   1. Atrial fibrillation with RVR (CMS/HCC) I48.91 427.31   2. Generalized weakness R53.1 780.79   3. Pneumonia of both lower lobes due to infectious organism J18.9 483.8   4. Elevated LFTs R79.89 790.6   5. Essential hypertension I10 401.9   6. Weakness R53.1 780.79   7. Aspiration pneumonia of both lungs, unspecified aspiration pneumonia type, unspecified part of lung (CMS/[facility]) J69.0 507.0     Patient Active Problem List   Diagnosis   • Essential hypertension   • Chest pain   • Paroxysmal atrial fibrillation (CMS/[facility])   • Closed left hip fracture, initial encounter (CMS/[facility])   • Tobacco abuse   • Closed left hip fracture (CMS/HCC)   • Atrial fibrillation with RVR (CMS/HCC)     Past Medical History:   Diagnosis Date   • Anxiety    • Arrhythmia    • Atrial fibrillation (CMS/[facility])    • Coronary artery disease    • Disease of thyroid gland    • Dysfunctional gallbladder    • GERD (gastroesophageal reflux disease)    • Hypertension    • Osteoporosis      Past Surgical History:   Procedure Laterality Date   • ABDOMINAL SURGERY     • CHOLECYSTECTOMY OPEN     • HIP HEMIARTHROPLASTY Left 2018    Procedure: LEFT HIP BIPOLAR HEMIARTHROPLASTY;  Surgeon: Trevon Arriaga MD;  Location: [facility];  Service: Orthopedics        PT ASSESSMENT (last 12 hours)      Physical Therapy Evaluation     Row Name 09/10/18 1400          PT Evaluation Time/Intention    Patient Effort good  -AG     Symptoms Noted During/After Treatment none  -AG     Row Name 09/10/18 1400          General Information    Patient Profile Reviewed? yes  -AG     Onset of Illness/Injury or Date of Surgery 18  -AG     Referring  Physician Dr. Miranda  -     Patient Observations alert;cooperative;agree to therapy  -AG     Prior Level of Function independent:;community mobility   using cane or walker  -AG     Row Name 09/10/18 1400          Relationship/Environment    Primary Source of Support/Comfort child(willy)  -AG     Row Name 09/10/18 1400          Cognitive Assessment/Intervention- PT/OT    Orientation Status (Cognition) oriented x 4  -AG     Row Name 09/10/18 1400          Physical Therapy Clinical Impression    Date of Referral to PT 09/07/18  -AG     Functional Level at Time of Evaluation (PT Clinical Impression) CGA  -AG     Criteria for Skilled Interventions Met (PT Clinical Impression) --   pt. d/c from facility shortly following evaluation  -AG       User Key  (r) = Recorded By, (t) = Taken By, (c) = Cosigned By    Initials Name Provider Type    Lisa Winn, PT Physical Therapist          Physical Therapy Education     Title: PT OT SLP Therapies (Done)     Topic: Physical Therapy (Done)     Point: Mobility training (Done)    Learning Progress Summary     Learner Status Readiness Method Response Comment Documented by    Patient Done Acceptance E,D VU  AG 09/10/18 1421    Family Done Acceptance E,D VU  AG 09/10/18 1421          Point: Home exercise program (Done)    Learning Progress Summary     Learner Status Readiness Method Response Comment Documented by    Patient Done Acceptance E,D VU  AG 09/10/18 1421    Family Done Acceptance E,D VU  AG 09/10/18 1421          Point: Body mechanics (Done)    Learning Progress Summary     Learner Status Readiness Method Response Comment Documented by    Patient Done Acceptance E,D VU  AG 09/10/18 1421    Family Done Acceptance E,D VU  AG 09/10/18 1421          Point: Precautions (Done)    Learning Progress Summary     Learner Status Readiness Method Response Comment Documented by    Patient Done Acceptance E,D VU  AG 09/10/18 1421    Family Done Acceptance E,D VU  AG 09/10/18 1421                       User Key     Initials Effective Dates Name Provider Type Discipline    AG 04/03/18 -  Lisa Lopez, PT Physical Therapist PT                PT Recommendation and Plan  Anticipated Discharge Disposition (PT): home with assist  Therapy Frequency (PT Clinical Impression): evaluation only  Outcome Summary/Treatment Plan (PT)  Daily Summary of Progress (PT): progress toward functional goals as expected  Anticipated Discharge Disposition (PT): home with assist  Plan of Care Reviewed With: patient, daughter          Outcome Measures     Row Name 09/10/18 1400             How much help from another person do you currently need...    Turning from your back to your side while in flat bed without using bedrails? 4  -AG      Moving from lying on back to sitting on the side of a flat bed without bedrails? 4  -AG      Moving to and from a bed to a chair (including a wheelchair)? 3  -AG      Standing up from a chair using your arms (e.g., wheelchair, bedside chair)? 3  -AG      Climbing 3-5 steps with a railing? 3  -AG      To walk in hospital room? 3  -AG      AM-PAC 6 Clicks Score 20  -AG         Functional Assessment    Outcome Measure Options AM-PAC 6 Clicks Basic Mobility (PT)  -AG        User Key  (r) = Recorded By, (t) = Taken By, (c) = Cosigned By    Initials Name Provider Type    AG Lisa Lopez, PT Physical Therapist           Time Calculation:         PT Charges     Row Name 09/10/18 1422             Time Calculation    PT Received On 09/10/18  -AG         Time Calculation- PT    TCU Minutes- PT 30 min  -AG        User Key  (r) = Recorded By, (t) = Taken By, (c) = Cosigned By    Initials Name Provider Type    AG Lisa Lopez, PT Physical Therapist        Therapy Suggested Charges     Code   Minutes Charges    None           Therapy Charges for Today     Code Description Service Date Service Provider Modifiers Qty    39979846067  PT MOBILITY CURRENT 9/10/2018 Lisa Lopez, PT GP, CJ 1     14641113308 HC PT MOBILITY PROJECTED 9/10/2018 Lisa Lopez, PT GP, CJ 1    38260796882 HC PT MOBILITY DISCHARGE 9/10/2018 Lisa Lopez, PT GP, CJ 1    [id number] HC PT EVAL HIGH COMPLEXITY 1 9/10/2018 Lisa Lopez, PT GP 1    98311942490 HC GAIT TRAINING EA 15 MIN 9/10/2018 Lisa Lopez, PT GP 1    58635955812 HC PT THER SUPP EA 15 MIN 9/10/2018 Lisa Lopez, PT GP 1          PT G-Codes  Outcome Measure Options: AM-PAC 6 Clicks Basic Mobility (PT)  AM-PAC 6 Clicks Score: 20  Mobility: Walking and Moving Around Current Status (): At least 20 percent but less than 40 percent impaired, limited or restricted  Mobility: Walking and Moving Around Goal Status (): At least 20 percent but less than 40 percent impaired, limited or restricted  Mobility: Walking and Moving Around Discharge Status (): At least 20 percent but less than 40 percent impaired, limited or restricted      Lisa Lopez PT  9/10/2018

## 2018-09-10 NOTE — PLAN OF CARE
Problem: Fall Risk (Adult)  Goal: Identify Related Risk Factors and Signs and Symptoms  Outcome: Ongoing (interventions implemented as appropriate)    Goal: Absence of Fall  Outcome: Ongoing (interventions implemented as appropriate)      Problem: Skin Injury Risk (Adult)  Goal: Skin Health and Integrity  Outcome: Ongoing (interventions implemented as appropriate)      Problem: Arrhythmia/Dysrhythmia (Symptomatic) (Adult)  Goal: Signs and Symptoms of Listed Potential Problems Will be Absent, Minimized or Managed (Arrhythmia/Dysrhythmia)  Outcome: Ongoing (interventions implemented as appropriate)

## 2018-09-11 ENCOUNTER — READMISSION MANAGEMENT (OUTPATIENT)
Dept: CALL CENTER | Facility: HOSPITAL | Age: 78
End: 2018-09-11

## 2018-09-11 LAB
BACTERIA SPEC AEROBE CULT: NORMAL
BACTERIA SPEC AEROBE CULT: NORMAL

## 2018-09-11 NOTE — OUTREACH NOTE
Prep Survey      Responses   Facility patient discharged from?  [place]   Is patient eligible?  Yes   Discharge diagnosis  Sepsis, pneumonia, AFIB with RVR on Eliquis, dysphagia, hypothyroidism, mild elevation of LFT's GRISELDA, mild carotid vasculr disease   Does the patient have one of the following disease processes/diagnoses(primary or secondary)?  Sepsis   Does the patient have Home health ordered?  No   Is there a DME ordered?  Yes   What DME was ordered?  Wheelchair per [facility] home care   Comments regarding appointments  See AVS   Prep survey completed?  Yes          Adriana Pierce RN

## 2018-09-11 NOTE — PAYOR COMM NOTE
"CONTACT:  TANO LIM RN, BSN  UTILIZATION MANAGEMENT DEPT.  [facility]  [address] [facility], [address]  PHONE:  [phone]  FAX: [phone]    PATIENT DISCHARGED TO HOME ON 9/10/18. REFER TO AUTH # [id number]    Claudette De Santiago  (78 y.o. Female)     Date of Birth Social Security Number Address Home Phone MRN    1940  [id number]  [facility] [address] [phone] [id number]    [employer] Marital Status          Unknown        Admission Date Admission Type Admitting Provider Attending Provider Department, Room/Bed    9/5/18 Emergency Dmitry Miranda MD  [facility] PROGRESS CARE, P218/1P    Discharge Date Discharge Disposition Discharge Destination        9/10/2018 Home or Self Care              Attending Provider:  (none)   Allergies:  No Known Allergies    Isolation:  None   Infection:  None   Code Status:  Prior    Ht:  162.6 cm (64\")   Wt:  65.6 kg (144 lb 9.6 oz)    Admission Cmt:  None   Principal Problem:  None                Active Insurance as of 9/5/2018     Primary Coverage     Payor Plan Insurance Group Employer/Plan Group    MEDICARE MEDICARE A & B      Payor Plan Address Payor Plan Phone Number Effective From Effective To    [address] [phone] 3/1/2004     [facility] [id number]       Subscriber Name Subscriber Birth Date Member ID       CLAUDETTE DE SANTIAGO 1940 [id number]           Secondary Coverage     Payor Plan Insurance Group Employer/Plan Group    [employer] [place] WELL[place]ARE MEDICAID      Payor Plan Address Payor Plan Phone Number Effective From Effective To    [address] [address] [phone] 8/16/2016     [facility] [id number]       Subscriber Name Subscriber Birth Date Member ID       CLAUDETTE DE SANTIAGO 1940 [id number]                 Emergency Contacts      (Rel.) Home Phone Work Phone Mobile Phone    Vielka Patel (Daughter) [phone] -- --               Discharge Summary      Tien Dash DO at 9/10/2018 10:36 AM      "         [facility] HOSPITALIST   DISCHARGE SUMMARY      Name:  Anette Dunham   Age:  78 y.o.  Sex:  female  :  1940  MRN:  [id number]   Visit Number:  [id number]  Primary Care Physician:  Juliann Santana APRN  Date of Discharge:  9/10/2018  Admission Date:  2018      Discharge Diagnosis:     1.  Sepsis, resolved  2.  Pneumonia, present on admission, improving.  3.  Atrial fibrillation with rapid ventricular response.  Now rate controlled.  Medications adjusted. On Eliquis.   4.  Dysphagia, improved after speech therapy.  5.  Hypothyroidism treated  6.  Mild elevation of LFTs, hepatitis testing negative.  Follow as outpatient.  7.  Acute kidney injury present on admission, resolved.  8.  Mild carotid vascular disease noted on carotid Doppler.  Now on Lipitor.        Active Hospital Problems    Diagnosis Date Noted   • Atrial fibrillation with RVR (CMS/HCC) [I48.91] 2018      Resolved Hospital Problems    Diagnosis Date Noted Date Resolved   No resolved problems to display.         Presenting Problem/History of Present Illness:    Atrial fibrillation with RVR (CMS/HCC) [I48.91]         Hospital Course:    Patient was admitted on 2018 with weakness and palpitations.  She was noted to be in atrial fibrillation with RVR.  She takes metoprolol 25 mg twice a day at home.  She had not taken her dose at home.  She apparently had sustained a few falls at home.  She was given a bolus of Cardizem and then started on a Cardizem drip.  Dr. Nino is her primary cardiologist. Lucien Herman MD was consulted regarding the case.  Echocardiogram was done showing normal EF.  Patient was placed on oral Cardizem.  Cardioversion was considered, however patient developed fever and sepsis.  It was felt that she had bilateral pneumonia, she was started on vancomycin and Zosyn and infectious disease was consulted.  Cultures were done which were all negative.  Patient was initially placed on Zosyn and  doxycycline, this was eventually changed to oral Augmentin.  WBC count has improved and CRP has normalized.  Pressure was low, however it has come up now at this point.  Patient has been placed on oral Cardizem CD which was titrated up.  Her heart rate is relatively well controlled in the low 100s.  She is also on Lopressor 25 mg twice a day.    She appears to be stable.  She denies any chest pressure, shortness breath, nausea, vomiting, or pain.  She does not appear to be choking on her food, however speech therapy had seen her during the course of his hospital stay for possible aspiration.  They placed her on thickened liquids.  They're reevaluating her this morning, hopefully she can be changed to thin liquids.  If not, she will need thickener at the pharmacy upon discharge.  Her O2 sat at present is 98% on room air.  Her other vital signs are stable.  Spoke to her and the daughter, answered all questions.  Patient will be discharged home today with close follow-up with her primary care physician in one week, and her cardiologist in 1-2 weeks.  She has mild elevation in her LFTs, hepatitis testing was negative.  Would recommend follow-up in one week.  Liver ultrasound should be done if they continue to be elevated.  She was offered PT, OT, and home health, she refused this.  Return to the hospital if symptoms should return.    Procedures Performed:  Results for orders placed during the hospital encounter of 09/05/18   Adult Transthoracic Echo Complete W/ Cont if Necessary Per Protocol    Narrative · Left ventricular systolic function is normal. Estimated EF appears to be   in the range of 51 - 55%  · Mild mitral valve regurgitation is present  · Mild pulmonic valve regurgitation is present.  · Left atrial cavity size is mildly dilated.  · Mild tricuspid valve regurgitation is present. Estimated right   ventricular systolic pressure from tricuspid regurgitation is mildly   elevated (35-45 mmHg).  · The pericardium  is normal. There is no evidence of pericardial effusion.  · No significant changes compared to previous study.                 Consults:     Consults     Date and Time Order Name Status Description    9/6/2018 0953 Inpatient Infectious Diseases Consult Completed     9/5/2018 1538 Inpatient Cardiology Consult Completed     9/5/2018 1420 Hospitalist (on-call MD unless specified)            Pertinent Test Results:     Lab Results (all)     Procedure Component Value Units Date/Time    Blood Culture - Blood, [[id number]]  (Normal) Collected:  09/06/18 0759    Specimen:  Blood from Arm, Left Updated:  09/10/18 0830     Blood Culture No growth at 4 days    Phosphorus [[id number]]  (Abnormal) Collected:  09/10/18 0119    Specimen:  Blood Updated:  09/10/18 0205     Phosphorus 2.4 (L) mg/dL     C-reactive Protein [[id number]]  (Abnormal) Collected:  09/10/18 0119    Specimen:  Blood Updated:  09/10/18 0159     C-Reactive Protein 3.78 (H) mg/dL     Comprehensive Metabolic Panel [[id number]]  (Abnormal) Collected:  09/10/18 0119    Specimen:  Blood Updated:  09/10/18 0158     Glucose 108 mg/dL      BUN 12 mg/dL      Creatinine 0.69 mg/dL      Sodium 141 mmol/L      Potassium 3.6 mmol/L      Chloride 113 (H) mmol/L      CO2 23.1 (L) mmol/L      Calcium 8.6 mg/dL      Total Protein 7.5 g/dL      Albumin 3.10 (L) g/dL      ALT (SGPT) 37 (H) U/L      AST (SGOT) 46 (H) U/L      Alkaline Phosphatase 76 U/L      Comment: Note New Reference Ranges        Total Bilirubin 0.5 mg/dL      eGFR Non African Amer 82 mL/min/1.73      Globulin 4.4 gm/dL      A/G Ratio 0.7 (L) g/dL      BUN/Creatinine Ratio 17.4     Anion Gap 4.9 mmol/L     Narrative:       The MDRD GFR formula is only valid for adults with stable renal function between ages 18 and 70.    Osmolality, Calculated [[id number]]  (Normal) Collected:  09/10/18 0119    Specimen:  Blood Updated:  09/10/18 0158     Osmolality Calc 281.5 mOsm/kg     CBC & Differential [[id number]] Collected:   09/10/18 0119    Specimen:  Blood Updated:  09/10/18 0149    Narrative:       The following orders were created for panel order CBC & Differential.  Procedure                               Abnormality         Status                     ---------                               -----------         ------                     Scan Slide[[id number]]                                                                  CBC Auto Differential[[id number]]        Abnormal            Final result                 Please view results for these tests on the individual orders.    CBC Auto Differential [[id number]]  (Abnormal) Collected:  09/10/18 0119    Specimen:  Blood Updated:  09/10/18 0149     WBC 8.87 10*3/mm3      RBC 3.90 (L) 10*6/mm3      Hemoglobin 11.2 (L) g/dL      Hematocrit 33.6 (L) %      MCV 86.2 fL      MCH 28.7 pg      MCHC 33.3 g/dL      RDW 14.0 %      RDW-SD 43.7 fl      MPV 11.3 (H) fL      Platelets 211 10*3/mm3      Neutrophil % 51.3 %      Lymphocyte % 32.8 %      Monocyte % 11.2 %      Eosinophil % 2.6 %      Basophil % 1.5 %      Immature Grans % 0.6 (H) %      Neutrophils, Absolute 4.56 10*3/mm3      Lymphocytes, Absolute 2.91 10*3/mm3      Monocytes, Absolute 0.99 (H) 10*3/mm3      Eosinophils, Absolute 0.23 10*3/mm3      Basophils, Absolute 0.13 10*3/mm3      Immature Grans, Absolute 0.05 (H) 10*3/mm3      nRBC 0.0 /100 WBC     Blood Culture - Blood, [[id number]]  (Normal) Collected:  09/05/18 1044    Specimen:  Blood from Arm, Left Updated:  09/09/18 1145     Blood Culture No growth at 4 days    Blood Culture - Blood, [[id number]]  (Normal) Collected:  09/05/18 1054    Specimen:  Blood from Arm, Right Updated:  09/09/18 1145     Blood Culture No growth at 4 days    Blood Culture - Blood, [[id number]]  (Normal) Collected:  09/06/18 0950    Specimen:  Blood from Arm, Left Updated:  09/09/18 1131     Blood Culture No growth at 3 days    Phosphorus [[id number]]  (Abnormal) Collected:  09/09/18 0159    Specimen:   Blood Updated:  09/09/18 1124     Phosphorus 1.9 (L) mg/dL     CBC & Differential [[id number]] Collected:  09/09/18 0159    Specimen:  Blood Updated:  09/09/18 0309    Narrative:       The following orders were created for panel order CBC & Differential.  Procedure                               Abnormality         Status                     ---------                               -----------         ------                     Manual Differential[[id number]]          Abnormal            Final result               Scan Slide[[id number]]                                       Final result               CBC Auto Differential[[id number]]        Abnormal            Final result                 Please view results for these tests on the individual orders.    CBC Auto Differential [[id number]]  (Abnormal) Collected:  09/09/18 0159    Specimen:  Blood Updated:  09/09/18 0309     WBC 10.42 10*3/mm3      RBC 3.92 (L) 10*6/mm3      Hemoglobin 11.5 (L) g/dL      Hematocrit 34.8 (L) %      MCV 88.8 fL      MCH 29.3 pg      MCHC 33.0 g/dL      RDW 14.0 %      RDW-SD 45.1 fl      MPV 11.4 (H) fL      Platelets 190 10*3/mm3     Scan Slide [[id number]] Collected:  09/09/18 0159    Specimen:  Blood Updated:  09/09/18 0309     Scan Slide --     Comment: See Manual Differential Results       Manual Differential [[id number]]  (Abnormal) Collected:  09/09/18 0159    Specimen:  Blood Updated:  09/09/18 0309     Neutrophil % 55.0 %      Lymphocyte % 41.0 %      Monocyte % 3.0 %      Basophil % 1.0 %      Neutrophils Absolute 5.73 10*3/mm3      Lymphocytes Absolute 4.27 (H) 10*3/mm3      Monocytes Absolute 0.31 10*3/mm3      Basophils Absolute 0.10 10*3/mm3      RBC Morphology Normal     Platelet Morphology Normal    C-reactive Protein [[id number]]  (Abnormal) Collected:  09/09/18 0159    Specimen:  Blood Updated:  09/09/18 0258     C-Reactive Protein 4.71 (H) mg/dL     Comprehensive Metabolic Panel [[id number]]  (Abnormal) Collected:  09/09/18 0159     Specimen:  Blood Updated:  09/09/18 0258     Glucose 100 mg/dL      BUN 12 mg/dL      Creatinine 0.72 mg/dL      Sodium 142 mmol/L      Potassium 3.7 mmol/L      Chloride 116 (H) mmol/L      CO2 18.4 (L) mmol/L      Calcium 8.3 mg/dL      Total Protein 7.1 g/dL      Albumin 3.00 (L) g/dL      ALT (SGPT) 29 U/L      AST (SGOT) 42 (H) U/L      Alkaline Phosphatase 62 U/L      Comment: Note New Reference Ranges        Total Bilirubin 0.5 mg/dL      eGFR Non African Amer 78 mL/min/1.73      Globulin 4.1 gm/dL      A/G Ratio 0.7 (L) g/dL      BUN/Creatinine Ratio 16.7     Anion Gap 7.6 mmol/L     Narrative:       The MDRD GFR formula is only valid for adults with stable renal function between ages 18 and 70.    Magnesium [[id number]]  (Normal) Collected:  09/09/18 0159    Specimen:  Blood Updated:  09/09/18 0258     Magnesium 2.1 mg/dL     Osmolality, Calculated [[id number]]  (Normal) Collected:  09/09/18 0159    Specimen:  Blood Updated:  09/09/18 0258     Osmolality Calc 283.0 mOsm/kg     Potassium [[id number]]  (Normal) Collected:  09/08/18 1529    Specimen:  Blood Updated:  09/08/18 1625     Potassium 4.2 mmol/L     CBC & Differential [[id number]] Collected:  09/08/18 0232    Specimen:  Blood Updated:  09/08/18 0321    Narrative:       The following orders were created for panel order CBC & Differential.  Procedure                               Abnormality         Status                     ---------                               -----------         ------                     Manual Differential[[id number]]          Abnormal            Final result               Scan Slide[[id number]]                                       Final result               CBC Auto Differential[[id number]]        Abnormal            Final result                 Please view results for these tests on the individual orders.    CBC Auto Differential [[id number]]  (Abnormal) Collected:  09/08/18 0232    Specimen:  Blood Updated:  09/08/18 0321      WBC 11.31 10*3/mm3      RBC 3.84 (L) 10*6/mm3      Hemoglobin 11.3 (L) g/dL      Hematocrit 33.7 (L) %      MCV 87.8 fL      MCH 29.4 pg      MCHC 33.5 g/dL      RDW 13.8 %      RDW-SD 43.7 fl      MPV 11.1 (H) fL      Platelets 174 10*3/mm3     Scan Slide [[id number]] Collected:  09/08/18 0232    Specimen:  Blood Updated:  09/08/18 0321     Scan Slide --     Comment: See Manual Differential Results       Manual Differential [[id number]]  (Abnormal) Collected:  09/08/18 0232    Specimen:  Blood Updated:  09/08/18 0321     Neutrophil % 75.0 %      Lymphocyte % 20.0 %      Monocyte % 4.0 %      Bands %  1.0 %      Neutrophils Absolute 8.60 (H) 10*3/mm3      Lymphocytes Absolute 2.26 10*3/mm3      Monocytes Absolute 0.45 10*3/mm3      RBC Morphology Normal     Platelet Morphology Normal    Comprehensive Metabolic Panel [[id number]]  (Abnormal) Collected:  09/08/18 0232    Specimen:  Blood Updated:  09/08/18 0316     Glucose 129 (H) mg/dL      BUN 13 mg/dL      Creatinine 0.87 mg/dL      Sodium 145 mmol/L      Potassium 3.5 mmol/L      Chloride 121 (H) mmol/L      CO2 20.2 (L) mmol/L      Calcium 8.2 mg/dL      Total Protein 6.9 g/dL      Albumin 3.10 (L) g/dL      ALT (SGPT) 34 U/L      AST (SGOT) 52 (H) U/L      Alkaline Phosphatase 55 U/L      Comment: Note New Reference Ranges        Total Bilirubin 0.4 mg/dL      eGFR Non African Amer 63 mL/min/1.73      Globulin 3.8 gm/dL      A/G Ratio 0.8 (L) g/dL      BUN/Creatinine Ratio 14.9     Anion Gap 3.8 mmol/L     Narrative:       The MDRD GFR formula is only valid for adults with stable renal function between ages 18 and 70.    Phosphorus [[id number]]  (Abnormal) Collected:  09/08/18 0232    Specimen:  Blood Updated:  09/08/18 0316     Phosphorus 1.4 (L) mg/dL     Osmolality, Calculated [[id number]]  (Normal) Collected:  09/08/18 0232    Specimen:  Blood Updated:  09/08/18 0316     Osmolality Calc 290.5 mOsm/kg     Magnesium [[id number]]  (Normal) Collected:  09/08/18 0232     Specimen:  Blood Updated:  09/08/18 0315     Magnesium 1.7 mg/dL     C-reactive Protein [[id number]]  (Abnormal) Collected:  09/08/18 0232    Specimen:  Blood Updated:  09/08/18 0307     C-Reactive Protein 7.58 (H) mg/dL     CBC & Differential [[id number]] Collected:  09/07/18 0158    Specimen:  Blood Updated:  09/07/18 0300    Narrative:       The following orders were created for panel order CBC & Differential.  Procedure                               Abnormality         Status                     ---------                               -----------         ------                     Manual Differential[[id number]]          Abnormal            Final result               Scan Slide[[id number]]                                       Final result               CBC Auto Differential[[id number]]        Abnormal            Final result                 Please view results for these tests on the individual orders.    CBC Auto Differential [[id number]]  (Abnormal) Collected:  09/07/18 0158    Specimen:  Blood Updated:  09/07/18 0300     WBC 12.08 10*3/mm3      RBC 3.92 (L) 10*6/mm3      Hemoglobin 11.6 (L) g/dL      Hematocrit 34.4 (L) %      MCV 87.8 fL      MCH 29.6 pg      MCHC 33.7 g/dL      RDW 13.5 %      RDW-SD 42.2 fl      MPV 11.1 (H) fL      Platelets 164 10*3/mm3     Scan Slide [[id number]] Collected:  09/07/18 0158    Specimen:  Blood Updated:  09/07/18 0300     Scan Slide --     Comment: See Manual Differential Results       Manual Differential [[id number]]  (Abnormal) Collected:  09/07/18 0158    Specimen:  Blood Updated:  09/07/18 0300     Neutrophil % 77.0 (H) %      Lymphocyte % 13.0 (L) %      Monocyte % 4.0 %      Basophil % 1.0 %      Bands %  5.0 %      Neutrophils Absolute 9.91 (H) 10*3/mm3      Lymphocytes Absolute 1.57 10*3/mm3      Monocytes Absolute 0.48 10*3/mm3      Basophils Absolute 0.12 10*3/mm3      RBC Morphology Normal     Platelet Morphology Normal    Comprehensive Metabolic Panel [[id number]]   (Abnormal) Collected:  09/07/18 0158    Specimen:  Blood Updated:  09/07/18 0245     Glucose 124 (H) mg/dL      BUN 13 mg/dL      Creatinine 1.08 mg/dL      Sodium 142 mmol/L      Potassium 3.7 mmol/L      Chloride 118 (H) mmol/L      CO2 17.5 (L) mmol/L      Calcium 8.1 mg/dL      Total Protein 7.1 g/dL      Albumin 3.20 (L) g/dL      ALT (SGPT) 41 (H) U/L      AST (SGOT) 77 (H) U/L      Alkaline Phosphatase 56 U/L      Comment: Note New Reference Ranges        Total Bilirubin 0.5 mg/dL      eGFR Non African Amer 49 (L) mL/min/1.73      Globulin 3.9 gm/dL      A/G Ratio 0.8 (L) g/dL      BUN/Creatinine Ratio 12.0     Anion Gap 6.5 mmol/L     Narrative:       The MDRD GFR formula is only valid for adults with stable renal function between ages 18 and 70.    Osmolality, Calculated [[id number]]  (Normal) Collected:  09/07/18 0158    Specimen:  Blood Updated:  09/07/18 0245     Osmolality Calc 284.7 mOsm/kg     C-reactive Protein [[id number]]  (Abnormal) Collected:  09/07/18 0158    Specimen:  Blood Updated:  09/07/18 0243     C-Reactive Protein 6.93 (H) mg/dL     Legionella Antigen, Urine - Urine, Urine, Clean Catch [[id number]]  (Normal) Collected:  09/06/18 0821    Specimen:  Urine from Urine, Clean Catch Updated:  09/06/18 1331     LEGIONELLA ANTIGEN, URINE Negative    Narrative:         Presumptive negative for L. pneumophilia serogroup 1 antigen, suggesting no recent or current infection.    Mycoplasma Pneumoniae Antibody, IgM [[id number]]  (Normal) Collected:  09/05/18 1555    Specimen:  Blood Updated:  09/06/18 1120     Mycoplasma pneumo IgM Negative    Comprehensive Metabolic Panel [[id number]]  (Abnormal) Collected:  09/06/18 1054    Specimen:  Blood Updated:  09/06/18 1120     Glucose 128 (H) mg/dL      BUN 14 mg/dL      Creatinine 1.01 mg/dL      Sodium 136 mmol/L      Potassium 3.0 (L) mmol/L      Chloride 110 mmol/L      CO2 18.3 (L) mmol/L      Calcium 8.1 mg/dL      Total Protein 7.2 g/dL      Albumin 3.30  (L) g/dL      ALT (SGPT) 38 (H) U/L      AST (SGOT) 67 (H) U/L      Alkaline Phosphatase 58 U/L      Comment: Note New Reference Ranges        Total Bilirubin 0.4 mg/dL      eGFR Non African Amer 53 (L) mL/min/1.73      Globulin 3.9 gm/dL      A/G Ratio 0.8 (L) g/dL      BUN/Creatinine Ratio 13.9     Anion Gap 7.7 mmol/L     Narrative:       The MDRD GFR formula is only valid for adults with stable renal function between ages 18 and 70.    Osmolality, Calculated [[id number]]  (Normal) Collected:  09/06/18 1054    Specimen:  Blood Updated:  09/06/18 1120     Osmolality Calc 274.1 mOsm/kg     CBC & Differential [[id number]] Collected:  09/06/18 1054    Specimen:  Blood Updated:  09/06/18 1113    Narrative:       The following orders were created for panel order CBC & Differential.  Procedure                               Abnormality         Status                     ---------                               -----------         ------                     Scan Slide[[id number]]                                                                  CBC Auto Differential[[id number]]        Abnormal            Final result                 Please view results for these tests on the individual orders.    CBC Auto Differential [[id number]]  (Abnormal) Collected:  09/06/18 1054    Specimen:  Blood Updated:  09/06/18 1113     WBC 8.32 10*3/mm3      RBC 4.07 (L) 10*6/mm3      Hemoglobin 11.9 (L) g/dL      Hematocrit 35.3 (L) %      MCV 86.7 fL      MCH 29.2 pg      MCHC 33.7 g/dL      RDW 13.6 %      RDW-SD 42.9 fl      MPV 11.0 (H) fL      Platelets 171 10*3/mm3      Neutrophil % 56.1 %      Lymphocyte % 31.6 %      Monocyte % 10.7 %      Eosinophil % 0.1 %      Basophil % 1.1 %      Immature Grans % 0.4 %      Neutrophils, Absolute 4.67 10*3/mm3      Lymphocytes, Absolute 2.63 10*3/mm3      Monocytes, Absolute 0.89 10*3/mm3      Eosinophils, Absolute 0.01 10*3/mm3      Basophils, Absolute 0.09 10*3/mm3      Immature Grans, Absolute 0.03  10*3/mm3      nRBC 0.0 /100 WBC     C-reactive Protein [[id number]]  (Abnormal) Collected:  09/05/18 1555    Specimen:  Blood Updated:  09/06/18 0904     C-Reactive Protein 4.68 (H) mg/dL     Urinalysis With Culture If Indicated - Urine, Clean Catch [[id number]]  (Abnormal) Collected:  09/06/18 0821    Specimen:  Urine from Urine, Clean Catch Updated:  09/06/18 0837     Color, UA Yellow     Appearance, UA Cloudy (A)     pH, UA <=5.0     Specific Gravity, UA 1.016     Glucose, UA Negative     Ketones, UA Trace (A)     Bilirubin, UA Negative     Blood, UA Moderate (2+) (A)     Protein, UA Trace (A)     Leuk Esterase, UA Small (1+) (A)     Nitrite, UA Negative     Urobilinogen, UA 0.2 E.U./dL    Urinalysis, Microscopic Only - Urine, Clean Catch [[id number]]  (Abnormal) Collected:  09/06/18 0821    Specimen:  Urine from Urine, Clean Catch Updated:  09/06/18 0837     RBC, UA 21-30 (A) /HPF      WBC, UA 3-5 (A) /HPF      Bacteria, UA 2+ (A) /HPF      Squamous Epithelial Cells, UA 3-6 (A) /HPF      Hyaline Casts, UA None Seen /LPF      Methodology Automated Microscopy    Lactic Acid, Plasma [[id number]]  (Normal) Collected:  09/06/18 0759    Specimen:  Blood Updated:  09/06/18 0833     Lactate 1.2 mmol/L     CK-MB Index [[id number]]  (Normal) Collected:  09/06/18 0253    Specimen:  Blood Updated:  09/06/18 0345     CK-MB Index 1.3 %     Troponin [[id number]]  (Normal) Collected:  09/06/18 0253    Specimen:  Blood Updated:  09/06/18 0345     Troponin I 0.023 ng/mL     Narrative:       Ultra Troponin I Reference Range:         <=0.039 ng/mL: Negative    0.04-0.779 ng/mL: Indeterminate Range. Suspicious of MI.  Clinical correlation required.       >=0.78  ng/mL: Consistent with myocardial injury.  Clinical correlation required.    CK Total & CKMB [[id number]]  (Normal) Collected:  09/06/18 0253    Specimen:  Blood Updated:  09/06/18 0345     CKMB 1.63 ng/mL      Creatine Kinase 123 U/L     Troponin [[id number]]  (Normal) Collected:   09/05/18 2057    Specimen:  Blood Updated:  09/05/18 2157     Troponin I 0.016 ng/mL     Narrative:       Ultra Troponin I Reference Range:         <=0.039 ng/mL: Negative    0.04-0.779 ng/mL: Indeterminate Range. Suspicious of MI.  Clinical correlation required.       >=0.78  ng/mL: Consistent with myocardial injury.  Clinical correlation required.    CK Total & CKMB [[id number]]  (Abnormal) Collected:  09/05/18 2057    Specimen:  Blood Updated:  09/05/18 2157     CKMB <0.18 ng/mL      Creatine Kinase 23 (L) U/L     CK-MB Index [[id number]] Collected:  09/05/18 2057    Specimen:  Blood Updated:  09/05/18 2157     CK-MB Index -- %      Comment: Unable to calculate.       Troponin [[id number]]  (Normal) Collected:  09/05/18 1555    Specimen:  Blood Updated:  09/05/18 1714     Troponin I 0.012 ng/mL     Narrative:       Ultra Troponin I Reference Range:         <=0.039 ng/mL: Negative    0.04-0.779 ng/mL: Indeterminate Range. Suspicious of MI.  Clinical correlation required.       >=0.78  ng/mL: Consistent with myocardial injury.  Clinical correlation required.    CK Total & CKMB [[id number]]  (Normal) Collected:  09/05/18 1555    Specimen:  Blood Updated:  09/05/18 1714     CKMB <0.18 ng/mL      Creatine Kinase 25 U/L     TSH [[id number]]  (Normal) Collected:  09/05/18 1555    Specimen:  Blood Updated:  09/05/18 1714     TSH 0.963 mIU/mL     Cortisol [[id number]] Collected:  09/05/18 1555    Specimen:  Blood Updated:  09/05/18 1714     Cortisol 32.00 mcg/dL     CK-MB Index [[id number]] Collected:  09/05/18 1555    Specimen:  Blood Updated:  09/05/18 1714     CK-MB Index -- %      Comment: Unable to calculate.       Lactic Acid, Plasma [[id number]]  (Normal) Collected:  09/05/18 1555    Specimen:  Blood Updated:  09/05/18 1700     Lactate 1.4 mmol/L     Hepatitis Panel, Acute [[id number]]  (Normal) Collected:  09/05/18 1010    Specimen:  Blood Updated:  09/05/18 1642     Hepatitis B Surface Ag Non-Reactive     Hep A IgM  Non-Reactive     Hep B C IgM Non-Reactive     Hepatitis C Ab Non-Reactive    C-reactive Protein [[id number]]  (Abnormal) Collected:  09/05/18 1010    Specimen:  Blood Updated:  09/05/18 1604     C-Reactive Protein 4.88 (H) mg/dL     TSH [[id number]]  (Normal) Collected:  09/05/18 1010    Specimen:  Blood Updated:  09/05/18 1523     TSH 1.458 mIU/mL     Urinalysis With Culture If Indicated - Urine, Clean Catch [[id number]]  (Abnormal) Collected:  09/05/18 1143    Specimen:  Urine from Urine, Catheter In/Out Updated:  09/05/18 1153     Color, UA Dark Yellow (A)     Appearance, UA Clear     pH, UA 5.5     Specific Gravity, UA 1.019     Glucose, UA Negative     Ketones, UA 15 mg/dL (1+) (A)     Bilirubin, UA Small (1+) (A)     Blood, UA Negative     Protein, UA 30 mg/dL (1+) (A)     Leuk Esterase, UA Trace (A)     Nitrite, UA Negative     Urobilinogen, UA 0.2 E.U./dL    Urinalysis, Microscopic Only - Urine, Clean Catch [[id number]]  (Abnormal) Collected:  09/05/18 1143    Specimen:  Urine from Urine, Catheter In/Out Updated:  09/05/18 1153     RBC, UA 3-5 (A) /HPF      WBC, UA 0-2 /HPF      Bacteria, UA None Seen /HPF      Squamous Epithelial Cells, UA 0-2 /HPF      Hyaline Casts, UA 7-12 /LPF      Methodology Automated Microscopy    Influenza Antigen, Rapid - Swab, Nasopharynx [[id number]]  (Normal) Collected:  09/05/18 1106    Specimen:  Swab from Nasopharynx Updated:  09/05/18 1132     Influenza A Ag, EIA Negative     Influenza B Ag, EIA Negative    [facility] Draw [[id number]] Collected:  09/05/18 1010    Specimen:  Blood Updated:  09/05/18 1116    Narrative:       The following orders were created for panel order [facility] Draw.  Procedure                               Abnormality         Status                     ---------                               -----------         ------                     Light Blue Top[[id number]]                                   Final result               Green Top (Gel)[[id number]]                                   Final result               Lavender Top[[id number]]                                     Final result               Gold Top - SST[[id number]]                                   Final result                 Please view results for these tests on the individual orders.    Green Top (Gel) [[id number]] Collected:  09/05/18 1010    Specimen:  Blood Updated:  09/05/18 1116     Extra Tube Hold for add-ons.     Comment: Auto resulted.       Gold Top - SST [[id number]] Collected:  09/05/18 1010    Specimen:  Blood Updated:  09/05/18 1116     Extra Tube Hold for add-ons.     Comment: Auto resulted.       Light Blue Top [[id number]] Collected:  09/05/18 1010    Specimen:  Blood Updated:  09/05/18 1116     Extra Tube hold for add-on     Comment: Auto resulted       Lavender Top [[id number]] Collected:  09/05/18 1010    Specimen:  Blood Updated:  09/05/18 1116     Extra Tube hold for add-on     Comment: Auto resulted       Troponin [[id number]]  (Normal) Collected:  09/05/18 1010    Specimen:  Blood Updated:  09/05/18 1115     Troponin I 0.020 ng/mL     Narrative:       Ultra Troponin I Reference Range:         <=0.039 ng/mL: Negative    0.04-0.779 ng/mL: Indeterminate Range. Suspicious of MI.  Clinical correlation required.       >=0.78  ng/mL: Consistent with myocardial injury.  Clinical correlation required.    Comprehensive Metabolic Panel [[id number]]  (Abnormal) Collected:  09/05/18 1010    Specimen:  Blood Updated:  09/05/18 1106     Glucose 138 (H) mg/dL      BUN 20 mg/dL      Creatinine 1.38 (H) mg/dL      Sodium 133 (L) mmol/L      Potassium 3.8 mmol/L      Chloride 104 mmol/L      CO2 18.4 (L) mmol/L      Calcium 9.1 mg/dL      Total Protein 7.9 g/dL      Albumin 3.60 g/dL      ALT (SGPT) 39 (H) U/L      AST (SGOT) 57 (H) U/L      Alkaline Phosphatase 72 U/L      Comment: Note New Reference Ranges        Total Bilirubin 0.3 mg/dL      eGFR Non African Amer 37 (L) mL/min/1.73      Globulin 4.3 gm/dL      A/G  Ratio 0.8 (L) g/dL      BUN/Creatinine Ratio 14.5     Anion Gap 10.6 mmol/L     Narrative:       The MDRD GFR formula is only valid for adults with stable renal function between ages 18 and 70.    Osmolality, Calculated [[id number]]  (Abnormal) Collected:  09/05/18 1010    Specimen:  Blood Updated:  09/05/18 1106     Osmolality Calc 271.2 (L) mOsm/kg     Blood Gas, Arterial [[id number]]  (Abnormal) Collected:  09/05/18 1055    Specimen:  Arterial Blood Updated:  09/05/18 1101     Site Arterial: left brachial     Leif's Test N/A     pH, Arterial 7.497 (H) pH units      pCO2, Arterial 25.7 (C) mm Hg      pO2, Arterial 72.1 (L) mm Hg      HCO3, Arterial 19.5 (C) mmol/L      Base Excess, Arterial -2.3 mmol/L      O2 Saturation, Arterial 94.5 %      Hemoglobin, Blood Gas 13.0 g/dL      Hematocrit, Blood Gas 38.0 %      Oxyhemoglobin 92.9 %      Methemoglobin 0.40 %      Carboxyhemoglobin 1.3 %      A-a Gradiant 41.0 mmHg      Temperature 98.6 C      Barometric Pressure for Blood Gas 732 mmHg      Modality Room Air     FIO2 21 %     CBC & Differential [[id number]] Collected:  09/05/18 1010    Specimen:  Blood Updated:  09/05/18 1100    Narrative:       The following orders were created for panel order CBC & Differential.  Procedure                               Abnormality         Status                     ---------                               -----------         ------                     Scan Slide[[id number]]                                                                  CBC Auto Differential[[id number]]        Abnormal            Final result                 Please view results for these tests on the individual orders.    CBC Auto Differential [[id number]]  (Abnormal) Collected:  09/05/18 1010    Specimen:  Blood Updated:  09/05/18 1100     WBC 7.19 10*3/mm3      RBC 4.33 10*6/mm3      Hemoglobin 12.6 g/dL      Hematocrit 37.4 %      MCV 86.4 fL      MCH 29.1 pg      MCHC 33.7 g/dL      RDW 13.4 %      RDW-SD 42.9 fl       MPV 12.0 (H) fL      Platelets 184 10*3/mm3      Neutrophil % 48.3 %      Lymphocyte % 36.4 %      Monocyte % 13.6 (H) %      Eosinophil % 0.0 %      Basophil % 1.4 %      Immature Grans % 0.3 %      Neutrophils, Absolute 3.47 10*3/mm3      Lymphocytes, Absolute 2.62 10*3/mm3      Monocytes, Absolute 0.98 (H) 10*3/mm3      Eosinophils, Absolute 0.00 10*3/mm3      Basophils, Absolute 0.10 10*3/mm3      Immature Grans, Absolute 0.02 10*3/mm3           Imaging Results (all)     Procedure Component Value Units Date/Time    XR Chest AP [[id number]] Collected:  09/08/18 1006     Updated:  09/08/18 1009    Narrative:       EXAMINATION: XR CHEST AP-      CLINICAL INDICATION:     follow up pneumonia; I48.91-Unspecified atrial  fibrillation; R53.1-Weakness     TECHNIQUE:  XR CHEST AP-      COMPARISON: NONE      FINDINGS:   COARSENED INTERSTITIAL MARKINGS THROUGHOUT THE LUNGS. PATCHY BIBASILAR  AIRSPACE DISEASE IS GROSSLY STABLE.  Heart and mediastinum contours are unremarkable.  No pleural effusion.  No pneumothorax.   Bony and soft tissue structures are unremarkable.       Impression:       COARSENED INTERSTITIAL MARKINGS THROUGHOUT THE LUNGS. PATCHY  BIBASILAR AIRSPACE DISEASE IS GROSSLY STABLE.     This report was finalized on 9/8/2018 10:06 AM by Dr. Sae Montoya MD.       Fiberoptic Endo (fees) [[id number]] Resulted:  09/06/18 1439     Updated:  09/06/18 1439    Narrative:       This procedure was auto-finalized with no dictation required.    US Carotid Bilateral [[id number]] Collected:  09/06/18 1318     Updated:  09/06/18 1321    Narrative:       EXAMINATION: US CAROTID BILATERAL-      Technique: Multiple real-time color Doppler images were acquired of  bilateral carotid arteries.     Stenosis measurements if obtained, were performed by the NASCET or  similar method.        CLINICAL INDICATION:     Frequent falls, weakness; I48.91-Unspecified  atrial fibrillation; R53.1-Weakness          COMPARISON:    None      FINDINGS:        RIGHT:  Moderate plaque right carotid bulb and right ICA. No occlusion.  RIGHT CCA PSV: 726 mm/s  RIGHT ICA PSV: -461 mm/s  RIGHT ICA EDV: -107 mm/s.   Right ICA/CCA Ratio: 0.63  Anterograde flow is demonstrated in RIGHT vertebral artery.     LEFT:  Mild plaque. No occlusion.  LEFT CCA PSV: 859 mm/s  LEFT ICA PSV: -924 mm/s  LEFT EDV: -274 mm/s.   Left ICA/CCA Ratio: 1.08  Anterograde flow is demonstrated in LEFT vertebral artery.          Impression:       1. Mild to moderate plaque right greater than left carotid systems. No  occlusion.  2. No hemodynamically significant stenosis of either RIGHT or LEFT ICA.  3. Antegrade flow noted both vertebral arteries.     This report was finalized on 9/6/2018 1:19 PM by Dr. Luis E Kovacs MD.       XR Chest 1 View [[id number]] Collected:  09/06/18 1016     Updated:  09/06/18 1019    Narrative:       EXAMINATION:  XR CHEST 1 VW-      CLINICAL INDICATION:     Sepsis; I48.91-Unspecified atrial fibrillation;  R53.1-Weakness     TECHNIQUE:  XR CHEST 1 VW-       COMPARISON: 9/5/2018      FINDINGS:   Bibasilar atelectasis and minimal airspace disease noted.   Heart size is stable.   No pneumothorax.   No pleural effusion.   Bony and soft tissue structures are unremarkable.            Impression:       Bibasilar atelectasis and minimal airspace disease noted.     This report was finalized on 9/6/2018 10:17 AM by Dr. Sae Montoya MD.       CT Head Without Contrast [[id number]] Collected:  09/06/18 0828     Updated:  09/06/18 0830    Narrative:          EXAMINATION: CT HEAD WO CONTRAST-      CLINICAL INDICATION:     weakness, frequent falls; I48.91-Unspecified  atrial fibrillation; R53.1-Weakness     TECHNIQUE: Contiguous axial CT images of the head were obtained without  contrast administration.      Radiation dose reduction techniques were utilized per ALARA protocol.  Automated exposure control was initiated through either or [employer] or  [email or web address] software  "packages by  protocol.       677.76 mGy.cm     COMPARISON:  None.       FINDINGS: Generalized cerebral atrophy is present. There is no mass  effect, midline displacement, or hydrocephalus. There are patchy areas  of decreased density within the periventricular white matter which  likely reflect chronic small vessel ischemic changes. There is no CT  evidence of acute infarct or hemorrhage. Bone windows reveal no osseous  abnormalities or fractures.        Impression:       Atrophy and chronic small vessel ischemic change, but there  are no acute intracranial abnormalities identified.         This report was finalized on 9/6/2018 8:28 AM by Dr. Sae Montoya MD.       XR Chest 1 View [[id number]] Collected:  09/05/18 1134     Updated:  09/05/18 1204    Narrative:       EXAMINATION:  XR CHEST 1 VW-      CLINICAL INDICATION:     Short of breath     TECHNIQUE:  XR CHEST 1 VW-       COMPARISON: 6/1/2018      FINDINGS:   Coarsened interstitial markings.   Heart size is stable.   No pneumothorax.   Probably tiny pleural effusions.   Bony and soft tissue structures are unremarkable.            Impression:       Stable radiographic appearance of the chest.     This report was finalized on 9/5/2018 11:35 AM by Dr. Sae Montoya MD.             Condition on Discharge:      Stable    Vital Signs:    /93   Pulse 95   Temp 98.6 °F (37 °C) (Oral)   Resp 18   Ht 162.6 cm (64\")   Wt 65.6 kg (144 lb 9.6 oz)   SpO2 98%   BMI 24.82 kg/m²      Physical Exam:      General Appearance:    Alert, cooperative, in no acute distress   Head:    Normocephalic, without obvious abnormality, atraumatic   Eyes:            Lids and lashes normal, conjunctivae and sclerae normal, no   icterus, no pallor, corneas clear, PERRLA   Ears:    Ears appear intact with no abnormalities noted   Throat:   No oral lesions, no thrush, oral mucosa moist   Neck:   No adenopathy, supple, trachea midline, no thyromegaly, no     carotid bruit, no " JVD   Back:     No kyphosis present, no scoliosis present, no skin lesions,       erythema or scars, no tenderness to percussion or                   palpation,   range of motion normal   Lungs:     Clear to auscultation,respirations regular, even and                   unlabored    Heart:    Regular rhythm and normal rate, normal S1 and S2, no            murmur, no gallop, no rub, no click   Breast Exam:    Deferred   Abdomen:     Normal bowel sounds, no masses, no organomegaly, soft        non-tender, non-distended, no guarding, no rebound                 tenderness   Genitalia:    Deferred   Extremities:   Moves all extremities well, no edema, no cyanosis, no              redness   Pulses:   Pulses palpable and equal bilaterally   Skin:   No bleeding, bruising or rash   Lymph nodes:   No palpable adenopathy   Neurologic:   Cranial nerves 2 - 12 grossly intact, sensation intact, DTR        present and equal bilaterally       Discharge Disposition:    Home or Self Care    Discharge Medication:       Discharge Medications      New Medications      Instructions Start Date   amoxicillin-clavulanate 875-125 MG per tablet  Commonly known as:  AUGMENTIN   1 tablet, Oral, Every 12 Hours Scheduled      atorvastatin 10 MG tablet  Commonly known as:  LIPITOR   10 mg, Oral, Nightly      diltiaZEM  MG 24 hr capsule  Commonly known as:  CARDIZEM CD   300 mg, Oral, Every 24 Hours Scheduled      gabapentin 400 MG capsule  Commonly known as:  NEURONTIN  Replaces:  gabapentin 800 MG tablet   400 mg, Oral, 2 Times Daily      lactobacillus acidophilus capsule capsule   1 capsule, Oral, Daily         Continue These Medications      Instructions Start Date   albuterol 108 (90 Base) MCG/ACT inhaler  Commonly known as:  PROVENTIL HFA;VENTOLIN HFA   2 puffs, Inhalation, Every 6 Hours PRN      alendronate 70 MG tablet  Commonly known as:  FOSAMAX   70 mg, Oral, Every 7 Days, Prior to [facility] Admission, Patient was on: takes on  wednesdays      apixaban 5 MG tablet tablet  Commonly known as:  ELIQUIS   5 mg, Oral, 2 Times Daily      calcium carb-cholecalciferol 600-800 MG-UNIT tablet   1 tablet, Oral, 2 Times Daily With Meals      fexofenadine 180 MG tablet  Commonly known as:  ALLEGRA   180 mg, Oral, Daily      levothyroxine 50 MCG tablet  Commonly known as:  SYNTHROID, LEVOTHROID   50 mcg, Oral, Daily      LORazepam 1 MG tablet  Commonly known as:  ATIVAN   1 mg, Oral, Every 8 Hours PRN      metoprolol tartrate 25 MG tablet  Commonly known as:  LOPRESSOR   25 mg, Oral, 2 Times Daily      ranitidine 300 MG capsule  Commonly known as:  ZANTAC   300 mg, Oral, 2 Times Daily      rOPINIRole 1 MG tablet  Commonly known as:  REQUIP   1 mg, Oral, Nightly PRN, Take 1 hour before bedtime.       sennosides-docusate sodium 8.6-50 MG tablet  Commonly known as:  SENOKOT-S   2 tablets, Oral, 2 Times Daily      traMADol 50 MG tablet  Commonly known as:  ULTRAM   50 mg, Oral, Every 8 Hours PRN         Stop These Medications    gabapentin 800 MG tablet  Commonly known as:  NEURONTIN  Replaced by:  gabapentin 400 MG capsule     ibuprofen 800 MG tablet  Commonly known as:  ADVIL,MOTRIN            Discharge Diet:     Diet Instructions     Diet: Cardiac       Discharge Diet:  Cardiac          Activity at Discharge:     Activity Instructions     Activity as Tolerated             Follow-up Appointments:    Future Appointments  Date Time Provider Department Center   10/1/2018 1:40 PM dE Sellers MD [facility] None     Additional Instructions for the Follow-ups that You Need to Schedule     Discharge Follow-up with PCP    As directed      Currently Documented PCP:  Juliann Santana APRN  PCP Phone Number:  [phone]    Follow Up Details:  follow up in 1 week         Discharge Follow-up with Specified Provider: f/u cardiologist; 2 Weeks    As directed      To:  f/u cardiologist    Follow Up:  2 Weeks               Test Results Pending at Discharge:      Order Current Status    Blood Culture - Blood, Preliminary result    Blood Culture - Blood, Preliminary result    Blood Culture - Blood, Preliminary result    Blood Culture - Blood, Preliminary result             Tien Dash DO  09/10/18  10:36 AM      Electronically signed by Tien Dash DO at 9/10/2018 10:47 AM

## 2018-09-12 ENCOUNTER — HOSPITAL ENCOUNTER (OUTPATIENT)
Dept: SPEECH THERAPY | Facility: HOSPITAL | Age: 78
Setting detail: THERAPIES SERIES
Discharge: HOME | End: 2018-09-12
Attending: INTERNAL MEDICINE

## 2018-09-12 DIAGNOSIS — J69.0 ASPIRATION PNEUMONIA, UNSPECIFIED ASPIRATION PNEUMONIA TYPE, UNSPECIFIED LATERALITY, UNSPECIFIED PART OF LUNG (HCC): ICD-10-CM

## 2018-09-12 DIAGNOSIS — R13.12 OROPHARYNGEAL DYSPHAGIA: Primary | ICD-10-CM

## 2018-09-12 PROCEDURE — G8997 SWALLOW GOAL STATUS: HCPCS | Performed by: SPEECH-LANGUAGE PATHOLOGIST

## 2018-09-12 PROCEDURE — G8996 SWALLOW CURRENT STATUS: HCPCS | Performed by: SPEECH-LANGUAGE PATHOLOGIST

## 2018-09-12 PROCEDURE — 92526 ORAL FUNCTION THERAPY: CPT | Performed by: SPEECH-LANGUAGE PATHOLOGIST

## 2018-09-12 PROCEDURE — 92610 EVALUATE SWALLOWING FUNCTION: CPT | Performed by: SPEECH-LANGUAGE PATHOLOGIST

## 2018-09-12 NOTE — THERAPY EVALUATION
Outpatient Speech Language Pathology   Adult Swallow Initial Evaluation  [facility]   CLINICAL DYSPHAGIA EVALUATION     Patient Name: Anette Dunham  : 1940  MRN: [id number]  Today's Date: 2018    [facility]ette Dunham  [facility] to assess safety/efficacy of swallowing fnx, determine safest/least restrictive diet tolerance. Pt is s/p FEES during stay at [facility] on 18 w/ SLP recs of mechanical soft, chopped w/ NECTAR THICK liquids only. Pt is accompanied to this evaluation w/ her daughter. Pt was given resistive breather while at [facility] by SLP, pt brings to this evaluation. Pt reports compliance of nectar thick liquids, daughter verbalizes agreement. Pt denies any difficulty w/ current level of po intake.     Social History     Social History   • Marital status:      Spouse name: N/A   • Number of children: N/A   • Years of education: N/A     Occupational History   • Not on file.     Social History Main Topics   • Smoking status: Current Every Day Smoker     Packs/day: 0.25     Years: 15.00     Types: Cigarettes   • Smokeless tobacco: Current User     Types: Snuff      Comment: snuff for 60 years    • Alcohol use No   • Drug use: No   • Sexual activity: Defer     Other Topics Concern   • Not on file     Social History Narrative   • No narrative on file        No recent chest x-ray available for review at this time.     Diet Orders (active)     None          Ms. Dunham is observed on ra w/o complications tolerating well.     Pt is positioned upright and centered in bed to accept multiple po presentations of ice chips solid cracker, puree and NECTAR thick liquids via spoon, cup and straw.  Pt is able to self feed.     Facial/oral structures are symmetrical upon observation. Lingual protrusion reveals no deviation. Oral mucosa are moist, pink, and clean. Secretions are clear, thin, and well controlled. OROM/DARRIUS is mildly weak to imitate oral postures. Gag is not  assessed. Volitional cough is intact however weak in intensity, clear in quality, non-productive. Voice is adequate in intensity, clear in quality w/ intelligible speech.    Upon po presentations, adequate bolus anticipation and acceptance w/ good labial seal for bolus clearance via spoon bowl, cup rim stability and suction via straw. Bolus formation, manipulation and control are mildly impaired w/ increased oral prep time/rotary mastication pattern. A-p transit is timely w/ minimal oral residue.    Pharyngeal swallow is mildly delayed w/ weak hyolaryngeal elevation per palpation. No overt s/s aspiration evidenced across this evaluation w/ nectar thick liquids, solid cracker, and puree. No silent aspiration suspected as pt is w/o changes in vocal quality, respirations or secretions post po presentations. Pt denies odynophagia.    Impression: Ms. Dunham presents w/ mildly impaired oropharyngeal swallowing deficits as pt is noted w/ silent aspiration on 9/6/18 during FEES procedure. Across this evaluation pt is only given nectar thick liquids as pt has h/o silent aspiration. No s/s of aspiration w/ nectar thick liquids .No s/s indicative of silent aspiration w/ nectar thick liquids.  No odynophagia reported. Ms. Dunham is felt to most benefit from initiation of least restrictive modified po diet of mechanical soft, chopped, w/ NECTAR thick liquids only. Universal aspiration precautions. SHAKA precautions. Oral care protocol. Meds whole in puree or given via non-oral method. Formal dysphagia tx 2x per week for 3-4 weeks     Recommendations:  1. Mechanical soft, chopped, w/ NECTAR thick liquids only, Cardiac modifiers per MD.  2. Meds whole in puree or given via non-oral method.   3. Upright and centered for all po intake.  4. Universal aspiration precautions  5. SHAKA precautions  6. Oral care protocol  7. No ice cream, jello, or milkshakes.  8. No thin water or ice chips.  9. Formal dysphagia tx 2x per week for 3-4  weeks w/ re-evaluation pending progress towards POC.    D/w pt and pt's daughter results and recommendations w/ verbal agreement.      Thank you for allowing me to participate in the care of your patient-  Radha Braun M.S., CCC-SLP     Visit Date: 09/12/2018   Patient Active Problem List   Diagnosis   • Essential hypertension   • Chest pain   • Paroxysmal atrial fibrillation (CMS/HCC)   • Closed left hip fracture, initial encounter (CMS/HCC)   • Tobacco abuse   • Closed left hip fracture (CMS/HCC)   • Atrial fibrillation with RVR (CMS/HCC)        Past Medical History:   Diagnosis Date   • Anxiety    • Arrhythmia    • Atrial fibrillation (CMS/HCC)    • Coronary artery disease    • Disease of thyroid gland    • Dysfunctional gallbladder    • GERD (gastroesophageal reflux disease)    • Hypertension    • Osteoporosis         Past Surgical History:   Procedure Laterality Date   • ABDOMINAL SURGERY     • CHOLECYSTECTOMY OPEN     • HIP HEMIARTHROPLASTY Left 5/29/2018    Procedure: LEFT HIP BIPOLAR HEMIARTHROPLASTY;  Surgeon: Trevon Arriaga MD;  Location: [facility];  Service: Orthopedics         Visit Dx:     ICD-10-CM ICD-9-CM   1. Oropharyngeal dysphagia R13.12 787.22   2. Aspiration pneumonia, unspecified aspiration pneumonia type, unspecified laterality, unspecified part of lung (CMS/HCC) J69.0 507.0     DYSPHAGIA THERAPY PLAN OF CARE:    Anette Dunham will benefit from formal dysphagia therapy  X2-3 days per week at 15-45 minutes sessions, as functionally tolerated, for 3-4 weeks, to address:    Long Term Goal:  Pt will accept least restrictive diet tolerance w/o overt s/s aspiration.     Short Term Goals:   1. Pt will perform OROM/DARRIUS exercises x3 sets x10 reps w/ min cues.  2. Pt will perform resistive breathing exercises x3 sets x5 reps w/resistance of 1-6  To improve respiratory support and control.   3. Pt will perform laryngeal adduction/elevation exercises x3 sets x10 reps w/ min cues.   4. Pt will perform  Shaker exercises sustained x3 sets x5 reps for 30+ seconds over 3 consecutive sessions.   5. Pt will perform Shaker exercises repetitive x3 sets x12 reps w/ mod cues.   6. Pt will perform compensatory techniques during meals w/ min cues.  7. Pt will participate in instrumental re-evaluation of swallowing fnx in 21-27 days, pending progress towards this poc.    *goals may be added/modified pending progress towards this poc.     Thank you-  Radha Braun M.S., CCC-SLP            SLP Adult Swallow Evaluation - 09/12/18 1500        Rehab Evaluation    Document Type evaluation  -    Subjective Information complains of;fatigue;weakness  -    Patient Observations alert;cooperative;agree to therapy  -    Patient Effort good  -CJ    Symptoms Noted During/After Treatment fatigue  -       General Information    Patient Profile Reviewed yes  -CJ    Current Method of Nutrition soft textures;nectar/syrup-thick liquids  -CJ    Precautions/Limitations, Vision WFL with corrective lenses  -CJ    Precautions/Limitations, Hearing WFL  -    Prior Level of Function-Communication WFL  -    Prior Level of Function-Swallowing no diet consistency restrictions  -    Plans/Goals Discussed with patient;family  -    Barriers to Rehab none identified  -    Patient's Goals for Discharge return to regular diet  -    Family Goals for Discharge patient able to return to regular diet  -CJ       Oral Motor and Function    Dentition Assessment upper dentures/partial in place;lower dentures/partial in place  -CJ    Secretion Management WNL/WFL  -CJ    Mucosal Quality moist, healthy  -CJ    Gag Response WFL  -CJ    Volitional Swallow weak  -CJ    Volitional Cough weak  -CJ       Oral Musculature and Cranial Nerve Assessment    Oral Motor General Assessment generalized oral motor weakness  -CJ       General Eating/Swallowing Observations    Respiratory Support Currently in Use room air  -CJ    Eating/Swallowing Skills self-fed  -CJ     Positioning During Eating upright 90 degree;upright in chair  -    Utensils Used spoon;cup;straw  -    Consistencies Trialed regular textures;soft textures;pureed;nectar/syrup-thick liquids  -       Respiratory    Respiratory Status room air  -    Respiratory Pattern inhale-exhale pattern  -       Clinical Swallow Eval    Oral Prep Phase WFL  -CJ    Oral Transit WFL  -CJ    Oral Residue impaired  -    Pharyngeal Phase suspected pharyngeal impairment  -    Esophageal Phase unremarkable  -       SLP Communication to Radiology    Severity Level of Dysphagia mild-moderate dysphagia;mild dysphagia  -       Recommendations    Therapy Frequency (Swallow) 2 days per week  -    Predicted Duration Therapy Intervention (Days) 3 weeks;4 weeks  -    SLP Diet Recommendation soft textures;chopped;nectar thick liquids  -    Recommended Precautions and Strategies upright posture during/after eating  -    Monitor for Signs of Aspiration yes;gurgly voice;throat clearing;pneumonia;right lower lobe infiltrates  -      User Key  (r) = Recorded By, (t) = Taken By, (c) = Cosigned By    Initials Name Provider Type     Radha Braun MS CCC-SLP Speech Therapist                              OP SLP Education     Row Name 09/12/18 1511       Education    Barriers to Learning No barriers identified  -    Education Provided Described results of evaluation;Patient expressed understanding of evaluation;Family/caregivers expressed understanding of evaluation;Family/caregivers require further education on strategies, risks;Patient requires further education on strategies, risks  -    Assessed Learning needs;Learning motivation;Learning readiness  -    Learning Motivation Strong  -    Learning Method Explanation;Demonstration  -    Teaching Response Verbalized understanding;Demonstrated understanding  -    Education Comments Explained evaluation results w/ pt and pt's daugther. They both verbalize agreement  and understanding. Provided home program for generalization.   -      User Key  (r) = Recorded By, (t) = Taken By, (c) = Cosigned By    Initials Name Effective Dates    Radha Sierra MS CCC-SLP 05/15/17 -                 SLP OP Goals     Row Name 09/12/18 1512          SLP Time Calculation    SLP Goal Re-Cert Due Date 10/12/18  -       User Key  (r) = Recorded By, (t) = Taken By, (c) = Cosigned By    Initials Name Provider Type    Radha Sierra MS CCC-SLP Speech Therapist                OP SLP Assessment/Plan - 09/12/18 1500        SLP Assessment    Functional Problems Swallowing  -CJ    Impact on Function: Swallowing Risk of dehydration;Risk of malnourishment;Risk of aspiration;Risk of pneumonia;Impact on social aspects of eating  -    Clinical Impression: Swallowing Mild:;Mild-Moderate:;oropharyngeal phase dysphagia  -CJ    Please refer to paper survey for additional self-reported information Yes  -CJ    Please refer to items scanned into chart for additional diagnostic informaiton and handouts as provided by clinician Yes  -CJ    Prognosis Good (comment)  -    Patient/caregiver participated in establishment of treatment plan and goals Yes  -CJ    Patient would benefit from skilled therapy intervention Yes  -CJ       SLP Plan    Frequency 2x week  -CJ    Duration 4 weeks  -    Planned CPT's? SLP SWALLOW THERAPY: 31098  -    Expected Duration Therapy Session - minutes 15-30 minutes;30-45 minutes  -    Plan Comments Complete evaluation. Initiate poc.  -      User Key  (r) = Recorded By, (t) = Taken By, (c) = Cosigned By    Initials Name Provider Type    Radha Sierra MS CCC-SLP Speech Therapist                    Time Calculation:   SLP Start Time: 1340  SLP Stop Time: 1420  SLP Time Calculation (min): 40 min  SLP Non-Billable Time (min): 45 min    Therapy Charges for Today     Code Description Service Date Service Provider Modifiers Qty    04090365727 HC ST SWALLOWING CURRENT  STATUS 9/12/2018 Radha Braun, MS CCC-SLP GN, CI 1    05184501753 HC ST SWALLOWING PROJECTED 9/12/2018 Radha Braun, MS CCC-SLP GN, CH 1    [id number] HC ST TREATMENT SWALLOW 2 9/12/2018 Radha Braun, MS CCC-SLP GN 1    [id number] HC ST EVAL ORAL PHARYNG SWALLOW 1 9/12/2018 Radha Braun, MS CCC-SLP GN 1    57501678761 HC ST CARE PLAN 15 MIN 9/12/2018 Radha Braun, MS CCC-SLP GN 3          SLP G-Codes  SLP NOMS Used?: Yes  Functional Limitations: Swallowing  Swallow Current Status (): At least 1 percent but less than 20 percent impaired, limited or restricted  Swallow Goal Status (): 0 percent impaired, limited or restricted        Radha Braun MS CCC-SLP  9/12/2018

## 2018-09-12 NOTE — PROGRESS NOTES
Outpatient Speech Language Pathology   Adult Swallow Treatment Note  [facility]     Patient Name: Anette Dunham  : 1940  MRN: [id number]  Today's Date: 2018         Visit Date: 2018   Patient Active Problem List   Diagnosis   • Essential hypertension   • Chest pain   • Paroxysmal atrial fibrillation (CMS/[facility])   • Closed left hip fracture, initial encounter (CMS/[facility])   • Tobacco abuse   • Closed left hip fracture (CMS/[facility])   • Atrial fibrillation with RVR (CMS/[facility])        Visit Dx:    ICD-10-CM ICD-9-CM   1. Oropharyngeal dysphagia R13.12 787.22   2. Aspiration pneumonia, unspecified aspiration pneumonia type, unspecified laterality, unspecified part of lung (CMS/[facility]) J69.0 507.0   Ms. Dunham is seen this pm for formal dysphagia tx. She is s/p FEES 18 w/ SLP recs of mechanical soft, chopped w/ NECTAR thick liquids only. Pt is positioned upright and centered chair. Pt's daughter accompanies her to tx session. Pt and pt's family re-educated on importance of following diet recommendations. They both verbalize agreement and understanding. Pt is a/a, cooperative to participate, however noted w/ mild-moderate fatigue effects throughout tx session.      Therapy Treatment / Health Promotion  Long Term Goal:  1. Pt will accept least restrictive diet tolerance w/o overt s/s aspiration.      Short Term Goals:  1. Pt will perform OROM/DARRIUS exercises x3 sets x10 reps w/ min cues.  *pt performs x2 sets x5 reps w/ mod cues and models  2. Pt will perform resistive breathing exercises x3 sets x5 reps w/resistance of 1-6 To improve respiratory support and control.   *pt performs x3 sets x10 reps w/ resistance level of 2x2, noted difficulty w/ exhale. Moderate fatigue effects noted, required rest breaks  3. Pt will perform laryngeal adduction/elevation exercises x3 sets x10 reps w/ min cues.   *pt performs x2 sets x5 reps w/ avg sustained phonation of 14.56 seconds pre-resistive breather, pt declines to perform  laryngeal elevation/adduction exercises post resistive breather 2/2 fatigue  4. Pt will perform Shaker exercises sustained x3 sets x5 reps for 30+ seconds over 3 consecutive sessions.   *pt performs sustained x3 sets x2 reps for 20 second avg w/ mod cues  5. Pt will perform Shaker exercises repetitive x3 sets x12 reps w/ mod cues.   *pt performs x2 sets x12 reps w/ mod cues from SLP  6. Pt will perform compensatory techniques during meals w/ min cues.  *pt educated on importance of following diet recommendations, upright and centered for all po intake. Pt and pt's dughter verbalize understanding. Pt is observed w/ trials of nectar thick liquids via straw/cup w/o overt s/s of aspiration. No s/s concerning for silent aspiration.   7. Pt will participate in instrumental re-evaluation of swallowing fnx in 21-27 days, pending progress towards this poc.  *please continue modified po diet of mechanical soft, chopped w/ NECTAR thick liquids only     Education:Provided home program for generalization. Resistive breather given for independent use. Both pt and pt's daughter verbalize agreement and understanding.     Thank you-  Radha Braun M.S., CCC-SLP          SLP Adult Swallow Evaluation - 09/12/18 1500        Rehab Evaluation    Document Type evaluation  -    Subjective Information complains of;fatigue;weakness  -    Patient Observations alert;cooperative;agree to therapy  -    Patient Effort good  -CJ    Symptoms Noted During/After Treatment fatigue  -       General Information    Patient Profile Reviewed yes  -CJ    Current Method of Nutrition soft textures;nectar/syrup-thick liquids  -    Precautions/Limitations, Vision WFL with corrective lenses  -CJ    Precautions/Limitations, Hearing WFL  -CJ    Prior Level of Function-Communication WFL  -CJ    Prior Level of Function-Swallowing no diet consistency restrictions  -    Plans/Goals Discussed with patient;family  -    Barriers to Rehab none identified   -CJ    Patient's Goals for Discharge return to regular diet  -CJ    Family Goals for Discharge patient able to return to regular diet  -CJ       Oral Motor and Function    Dentition Assessment upper dentures/partial in place;lower dentures/partial in place  -CJ    Secretion Management WNL/WFL  -CJ    Mucosal Quality moist, healthy  -CJ    Gag Response WFL  -CJ    Volitional Swallow weak  -CJ    Volitional Cough weak  -CJ       Oral Musculature and Cranial Nerve Assessment    Oral Motor General Assessment generalized oral motor weakness  -CJ       General Eating/Swallowing Observations    Respiratory Support Currently in Use room air  -CJ    Eating/Swallowing Skills self-fed  -CJ    Positioning During Eating upright 90 degree;upright in chair  -CJ    Utensils Used spoon;cup;straw  -CJ    Consistencies Trialed regular textures;soft textures;pureed;nectar/syrup-thick liquids  -CJ       Respiratory    Respiratory Status room air  -CJ    Respiratory Pattern inhale-exhale pattern  -CJ       Clinical Swallow Eval    Oral Prep Phase WFL  -CJ    Oral Transit WFL  -CJ    Oral Residue impaired  -CJ    Pharyngeal Phase suspected pharyngeal impairment  -CJ    Esophageal Phase unremarkable  -CJ       SLP Communication to Radiology    Severity Level of Dysphagia mild-moderate dysphagia;mild dysphagia  -       Recommendations    Therapy Frequency (Swallow) 2 days per week  -    Predicted Duration Therapy Intervention (Days) 3 weeks;4 weeks  -    SLP Diet Recommendation soft textures;chopped;nectar thick liquids  -    Recommended Precautions and Strategies upright posture during/after eating  -    Monitor for Signs of Aspiration yes;gurgly voice;throat clearing;pneumonia;right lower lobe infiltrates  -      User Key  (r) = Recorded By, (t) = Taken By, (c) = Cosigned By    Initials Name Provider Type    CJ Radha Braun MS CCC-SLP Speech Therapist                              SLP OP Goals     Row Name 09/12/18 9275           SLP Time Calculation    SLP Goal Re-Cert Due Date 10/12/18  -CJ       User Key  (r) = Recorded By, (t) = Taken By, (c) = Cosigned By    Initials Name Provider Type    Radha Sierra MS CCC-SLP Speech Therapist                OP SLP Education     Row Name 09/12/18 1511       Education    Barriers to Learning No barriers identified  -CJ    Education Provided Described results of evaluation;Patient expressed understanding of evaluation;Family/caregivers expressed understanding of evaluation;Family/caregivers require further education on strategies, risks;Patient requires further education on strategies, risks  -CJ    Assessed Learning needs;Learning motivation;Learning readiness  -    Learning Motivation Strong  -    Learning Method Explanation;Demonstration  -CJ    Teaching Response Verbalized understanding;Demonstrated understanding  -    Education Comments Explained evaluation results w/ pt and pt's daugther. They both verbalize agreement and understanding. Provided home program for generalization.   -CJ      User Key  (r) = Recorded By, (t) = Taken By, (c) = Cosigned By    Initials Name Effective Dates    Radha Sierra MS CCC-SLP 05/15/17 -                 OP SLP Assessment/Plan - 09/12/18 1500        SLP Assessment    Functional Problems Swallowing  -CJ    Impact on Function: Swallowing Risk of dehydration;Risk of malnourishment;Risk of aspiration;Risk of pneumonia;Impact on social aspects of eating  -CJ    Clinical Impression: Swallowing Mild:;Mild-Moderate:;oropharyngeal phase dysphagia  -CJ    Please refer to paper survey for additional self-reported information Yes  -CJ    Please refer to items scanned into chart for additional diagnostic informaiton and handouts as provided by clinician Yes  -CJ    Prognosis Good (comment)  -CJ    Patient/caregiver participated in establishment of treatment plan and goals Yes  -CJ    Patient would benefit from skilled therapy intervention Yes  -CJ        SLP Plan    Frequency 2x week  -    Duration 4 weeks  -    Planned CPT's? SLP SWALLOW THERAPY: 25128  -    Expected Duration Therapy Session - minutes 15-30 minutes;30-45 minutes  -    Plan Comments Complete evaluation. Initiate poc.  -      User Key  (r) = Recorded By, (t) = Taken By, (c) = Cosigned By    Initials Name Provider Type     Radha Braun, MS CCC-SLP Speech Therapist                Time Calculation:   SLP Start Time: 1340  SLP Stop Time: 1420  SLP Time Calculation (min): 40 min  SLP Non-Billable Time (min): 45 min    Therapy Charges for Today     Code Description Service Date Service Provider Modifiers Qty    18503772563 HC ST SWALLOWING CURRENT STATUS 9/12/2018 Radha Braun, MS CCC-SLP GN, CI 1    [id number] HC ST SWALLOWING PROJECTED 9/12/2018 Radha Braun, MS CCC-SLP GN, CH 1    [id number] HC ST TREATMENT SWALLOW 2 9/12/2018 Radha Braun, MS CCC-SLP GN 1    [id number] HC ST EVAL ORAL PHARYNG SWALLOW 1 9/12/2018 Radha Braun, MS CCC-SLP GN 1    [id number] HC ST CARE PLAN 15 MIN 9/12/2018 Radha Braun, MS CCC-SLP GN 3          SLP G-Codes  SLP NOMS Used?: Yes  Functional Limitations: Swallowing  Swallow Current Status (): At least 1 percent but less than 20 percent impaired, limited or restricted  Swallow Goal Status (): 0 percent impaired, limited or restricted        Radha rBaun MS CCC-SLP  9/12/2018

## 2018-09-14 ENCOUNTER — READMISSION MANAGEMENT (OUTPATIENT)
Dept: CALL CENTER | Facility: HOSPITAL | Age: 78
End: 2018-09-14

## 2018-09-14 NOTE — OUTREACH NOTE
Sepsis Week 1 Survey      Responses   Facility patient discharged from?  [facility]   Does the patient have one of the following disease processes/diagnoses(primary or secondary)?  Sepsis   Is there a successful TCM telephone encounter documented?  No   Week 1 attempt successful?  Yes   Call start time  1414   Call end time  1418   Discharge diagnosis  Sepsis, pneumonia, AFIB with RVR on Eliquis, dysphagia, hypothyroidism, mild elevation of LFT's GRISELDA, mild carotid vasculr disease   Is patient permission given to speak with other caregiver?  Yes   List who call center can speak with  dtr   Meds reviewed with patient/caregiver?  Yes   Is the patient having any side effects they believe may be caused by any medication additions or changes?  No   Does the patient have all medications related to this admission filled (includes all antibiotics, inhalers, nebulizers,steroids,etc.)  Yes   Is the patient taking all medications as directed (includes completed medication regime)?  Yes   Does the patient have a primary care provider?   Yes   Does the patient have an appointment with their PCP within 7 days of discharge?  Yes   Has the patient kept scheduled appointments due by today?  Yes   What DME was ordered?  Wheelchair per [facility]   Psychosocial issues?  No   Comments  dtr helps manage her meds, per pt.    Did the patient receive a copy of their discharge instructions?  Yes   Nursing interventions  Reviewed instructions with patient   What is the patient's perception of their health status since discharge?  Improving   Nursing interventions  Nurse provided patient education   Is the patient/caregiver able to teach back Sepsis?  S - Shivering,fever or very cold, P - Pale or discolored skin, S - Sleepy, difficult to arouse,confused, I -   I feel like I might die-a feeling of hopelessness, S - Short of breath   Nursing interventions  Nurse provided patient education   Is patient/caregiver able to teach back steps to  recovery at home?  Rest and regain strength, Eat a balanced diet   Is the patient/caregiver able to teach back signs and symptoms of worsening condition:  Fever, Rapid heart rate (>90), Shortness of breath/rapid respiratory rate   Is the patient/caregiver able to teach back the hierarchy of who to call/visit for symptoms/problems? PCP, Specialist, Home health nurse, Urgent Care, ED, 911  Yes   Week 1 call completed?  Yes          Kristal Sorneson RN

## 2018-09-17 ENCOUNTER — APPOINTMENT (OUTPATIENT)
Dept: SPEECH THERAPY | Facility: HOSPITAL | Age: 78
End: 2018-09-17
Attending: INTERNAL MEDICINE

## 2018-09-19 ENCOUNTER — HOSPITAL ENCOUNTER (OUTPATIENT)
Dept: SPEECH THERAPY | Facility: HOSPITAL | Age: 78
Setting detail: THERAPIES SERIES
Discharge: HOME | End: 2018-09-19
Attending: INTERNAL MEDICINE

## 2018-09-19 DIAGNOSIS — R13.12 OROPHARYNGEAL DYSPHAGIA: Primary | ICD-10-CM

## 2018-09-19 DIAGNOSIS — J69.0 ASPIRATION PNEUMONIA, UNSPECIFIED ASPIRATION PNEUMONIA TYPE, UNSPECIFIED LATERALITY, UNSPECIFIED PART OF LUNG (HCC): ICD-10-CM

## 2018-09-19 PROCEDURE — 92526 ORAL FUNCTION THERAPY: CPT | Performed by: SPEECH-LANGUAGE PATHOLOGIST

## 2018-09-19 NOTE — PROGRESS NOTES
Outpatient Speech Language Pathology   Adult Swallow Treatment Note  KATIE Piña     Patient Name: Anette Dunham  : 1940  MRN: [id number]  Today's Date: 2018         Visit Date: 2018   Patient Active Problem List   Diagnosis   • Essential hypertension   • Chest pain   • Paroxysmal atrial fibrillation (CMS/[facility])   • Closed left hip fracture, initial encounter (CMS/[facility])   • Tobacco abuse   • Closed left hip fracture (CMS/[facility])   • Atrial fibrillation with RVR (CMS/[facility])        Visit Dx:    ICD-10-CM ICD-9-CM   1. Oropharyngeal dysphagia R13.12 787.22   2. Aspiration pneumonia, unspecified aspiration pneumonia type, unspecified laterality, unspecified part of lung (CMS/[facility]) J69.0 507.0   Ms. Dunham is seen this pm for formal dysphagia tx. She is s/p FEES 18 w/ SLP recs of mechanical soft, chopped w/ NECTAR thick liquids only. Pt is positioned upright and centered chair. Pt's daughter accompanies her to tx session. Pt and pt's family re-educated on importance of following diet recommendations. They both verbalize agreement and understanding. Pt is a/a, cooperative to participate, however noted w/ mild-moderate fatigue effects throughout tx session.      Therapy Treatment / Health Promotion  Long Term Goal:  1. Pt will accept least restrictive diet tolerance w/o overt s/s aspiration.      Short Term Goals:  1. Pt will perform OROM/DARRIUS exercises x3 sets x10 reps w/ min cues.  *pt performs x3 sets x10 reps w/ mod cues and models  2. Pt will perform resistive breathing exercises x3 sets x5 reps w/resistance of 1-6 To improve respiratory support and control.   *pt performs x3 sets x10 reps w/ resistance level of 2x2, noted difficulty w/ exhale. Moderate fatigue effects noted, required rest breaks  3. Pt will perform laryngeal adduction/elevation exercises x3 sets x10 reps w/ min cues.   *pt performs x2 sets x5 reps w/ avg sustained phonation of 14 seconds  4. Pt will perform Shaker exercises sustained x3  sets x5 reps for 30+ seconds over 3 consecutive sessions.   *pt performs sustained x1 sets x3 reps for 45 second avg w/ mod cues  5. Pt will perform Shaker exercises repetitive x3 sets x12 reps w/ mod cues.   *pt performs x2 sets x12 reps w/ mod cues from SLP  6. Pt will perform compensatory techniques during meals w/ min cues.  *pt educated on importance of following diet recommendations, upright and centered for all po intake. Pt and pt's dughter verbalize understanding.  7. Pt will participate in instrumental re-evaluation of swallowing fnx in 21-27 days, pending progress towards this poc.  *please continue modified po diet of mechanical soft, chopped w/ NECTAR thick liquids only.     Education:Provided home program for generalization. Resistive breather given for independent use. Both pt and pt's daughter verbalize agreement and understanding.     Thank you-  Negrita Lin M.A., CCC-SLP          OP SLP Education     Row Name 09/19/18 1628       Education    Education Comments d/w pt, pt's daughter progress toward goals, exercises to complete at home with both verbalizing understanding and agreement.  -      User Key  (r) = Recorded By, (t) = Taken By, (c) = Cosigned By    Initials Name Effective Dates    SS Negrita Lin MA,CCC-SLP 12/20/17 -                     Time Calculation:   SLP Start Time: 1400  SLP Stop Time: 1430  SLP Time Calculation (min): 30 min  SLP Non-Billable Time (min): 30 min    Therapy Charges for Today     Code Description Service Date Service Provider Modifiers Qty    36558479511 HC ST TREATMENT SWALLOW 2 9/19/2018 Negrita Lin MA,CCC-SLP GN 1    65181207637 HC ST CARE PLAN 15 MIN 9/19/2018 Negrita Lin MA,CCC-SLP GN 2                   Negrita Lin MA,CCC-SLP  9/19/2018

## 2018-09-21 ENCOUNTER — READMISSION MANAGEMENT (OUTPATIENT)
Dept: CALL CENTER | Facility: HOSPITAL | Age: 78
End: 2018-09-21

## 2018-09-21 NOTE — OUTREACH NOTE
Sepsis Week 2 Survey      Responses   Facility patient discharged from?  [facility]   Does the patient have one of the following disease processes/diagnoses(primary or secondary)?  Sepsis   Week 2 attempt successful?  No   Unsuccessful attempts  Attempt 1          Anirudh Reina RN

## 2018-09-24 ENCOUNTER — OFFICE VISIT (OUTPATIENT)
Dept: CARDIOLOGY | Facility: CLINIC | Age: 78
End: 2018-09-24

## 2018-09-24 ENCOUNTER — READMISSION MANAGEMENT (OUTPATIENT)
Dept: CALL CENTER | Facility: HOSPITAL | Age: 78
End: 2018-09-24

## 2018-09-24 VITALS
HEART RATE: 81 BPM | WEIGHT: 141.9 LBS | DIASTOLIC BLOOD PRESSURE: 70 MMHG | SYSTOLIC BLOOD PRESSURE: 111 MMHG | OXYGEN SATURATION: 98 % | HEIGHT: 64 IN | BODY MASS INDEX: 24.22 KG/M2

## 2018-09-24 DIAGNOSIS — I10 ESSENTIAL HYPERTENSION: ICD-10-CM

## 2018-09-24 DIAGNOSIS — I48.0 PAROXYSMAL ATRIAL FIBRILLATION (HCC): Primary | ICD-10-CM

## 2018-09-24 PROCEDURE — 99213 OFFICE O/P EST LOW 20 MIN: CPT | Performed by: NURSE PRACTITIONER

## 2018-09-24 PROCEDURE — 93000 ELECTROCARDIOGRAM COMPLETE: CPT | Performed by: NURSE PRACTITIONER

## 2018-09-24 RX ORDER — FUROSEMIDE 20 MG/1
20 TABLET ORAL DAILY
Qty: 5 TABLET | Refills: 0 | Status: SHIPPED | OUTPATIENT
Start: 2018-09-24 | End: 2018-09-29

## 2018-09-24 RX ORDER — AMOXICILLIN AND CLAVULANATE POTASSIUM 875; 125 MG/1; MG/1
1 TABLET, FILM COATED ORAL 2 TIMES DAILY
COMMUNITY
End: 2019-10-25

## 2018-09-24 RX ORDER — POTASSIUM CHLORIDE 750 MG/1
10 TABLET, FILM COATED, EXTENDED RELEASE ORAL DAILY
Qty: 5 TABLET | Refills: 0 | Status: SHIPPED | OUTPATIENT
Start: 2018-09-24 | End: 2018-09-29

## 2018-09-24 NOTE — PROGRESS NOTES
Subjective     Chief Complaint: Atrial Fibrillation; Hypertension; and Palpitations    History of Present Illness   Anette Dunham is a 78 y.o. female who presents with past medical history significant for paroxysmal atrial fibrillation, essential hypertension, and tobacco use.  She had a recent hospitalization for sepsis and right lower lobe pneumonia.  During the hospitalization she was found to be in atrial fibrillation with RVR.  She is here today for follow-up.    Mrs. Dunham reports that she does continue to have sensations of irregular heart beat.  She denies any associated shortness of air, syncope or near syncopal event.  She reports dizziness and lightheadedness, generally when she goes from a squatting position and raises up.  This is chronic for her and not worsening.  She also reports some dyspnea on exertion.  Again this is her baseline.    She does have new complaint today of lower extremity swelling.  She states that this has happened over the last 2-3 days.  She has not had this type of swelling in the past.  She denies having eaten any salty food such as roque, ham, pickles, etc.  She further states that elevating her legs have not really helped with the swelling.    Unfortunately she does continue to smoke.      Current Outpatient Prescriptions:   •  albuterol (PROVENTIL HFA;VENTOLIN HFA) 108 (90 Base) MCG/ACT inhaler, Inhale 2 puffs Every 6 (Six) Hours As Needed for Wheezing., Disp: , Rfl:   •  alendronate (FOSAMAX) 70 MG tablet, Take 70 mg by mouth Every 7 (Seven) Days. Prior to [facility] Admission, Patient was on: takes on wednesdays, Disp: , Rfl:   •  amoxicillin-clavulanate (AUGMENTIN) 875-125 MG per tablet, Take 1 tablet by mouth 2 (Two) Times a Day., Disp: , Rfl:   •  apixaban (ELIQUIS) 5 MG tablet tablet, Take 1 tablet by mouth 2 (two) times a day., Disp: 60 tablet, Rfl: 5  •  atorvastatin (LIPITOR) 10 MG tablet, Take 1 tablet by mouth Every Night., Disp: 30 tablet, Rfl: 0  •  calcium  carb-cholecalciferol 600-800 MG-UNIT tablet, Take 1 tablet by mouth 2 (Two) Times a Day With Meals., Disp: 60 tablet, Rfl: 0  •  diltiaZEM CD (CARDIZEM CD) 300 MG 24 hr capsule, Take 1 capsule by mouth Daily., Disp: 30 capsule, Rfl: 0  •  fexofenadine (ALLEGRA) 180 MG tablet, Take 180 mg by mouth Daily., Disp: , Rfl:   •  gabapentin (NEURONTIN) 400 MG capsule, Take 1 capsule by mouth 2 (Two) Times a Day., Disp: 6 capsule, Rfl: 0  •  lactobacillus acidophilus (RISAQUAD) capsule capsule, Take 1 capsule by mouth Daily., Disp: 7 capsule, Rfl: 0  •  levothyroxine (SYNTHROID, LEVOTHROID) 50 MCG tablet, Take 50 mcg by mouth Daily., Disp: , Rfl:   •  LORazepam (ATIVAN) 1 MG tablet, Take 1 tablet by mouth Every 8 (Eight) Hours As Needed for Anxiety, Disp: 9 tablet, Rfl: 0  •  metoprolol tartrate (LOPRESSOR) 25 MG tablet, Take 1 tablet by mouth 2 (Two) Times a Day., Disp: 180 tablet, Rfl: 3  •  ranitidine (ZANTAC) 300 MG capsule, Take 300 mg by mouth 2 (two) times a day., Disp: , Rfl:   •  rOPINIRole (REQUIP) 1 MG tablet, Take 1 mg by mouth At Night As Needed (restless leg). Take 1 hour before bedtime., Disp: , Rfl:   •  sennosides-docusate sodium (SENOKOT-S) 8.6-50 MG tablet, Take 2 tablets by mouth 2 (Two) Times a Day., Disp: 120 tablet, Rfl: 0  •  traMADol (ULTRAM) 50 MG tablet, Take 50 mg by mouth Every 8 (Eight) Hours As Needed for Moderate Pain ., Disp: , Rfl:   •  furosemide (LASIX) 20 MG tablet, Take 1 tablet by mouth Daily for 5 days., Disp: 5 tablet, Rfl: 0  •  potassium chloride (K-DUR) 10 MEQ CR tablet, Take 1 tablet by mouth Daily for 5 days., Disp: 5 tablet, Rfl: 0     Current outpatient and discharge medications have been reconciled for the patient.  Reviewed by: JUNIE Gramajo    The following portions of the patient's history were reviewed and updated as appropriate: allergies, current medications, past family history, past medical history, past social history, past surgical history and problem  "list.    Review of Systems   Constitution: Positive for decreased appetite and weakness.   HENT: Negative.    Eyes: Negative.    Cardiovascular: Positive for dyspnea on exertion, irregular heartbeat and leg swelling.   Endocrine: Negative.    Hematologic/Lymphatic: Bruises/bleeds easily.   Skin: Negative.    Musculoskeletal: Negative.    Gastrointestinal: Negative.    Genitourinary: Negative.    Neurological: Positive for dizziness and light-headedness.   Psychiatric/Behavioral: Negative.    Allergic/Immunologic: Negative.          Objective     /70 (BP Location: Left arm, Patient Position: Sitting)   Pulse 81   Ht 162.6 cm (64\")   Wt 64.4 kg (141 lb 14.4 oz)   SpO2 98%   BMI 24.36 kg/m²     Physical Exam   Constitutional: She appears well-developed and well-nourished.   HENT:   Head: Normocephalic and atraumatic.   Eyes: Pupils are equal, round, and reactive to light.   Neck: No JVD present.   Cardiovascular: Normal rate and intact distal pulses.  An irregular rhythm present. Exam reveals no gallop and no friction rub.    No murmur heard.  Pulses:       Dorsalis pedis pulses are 2+ on the right side, and 2+ on the left side.        Posterior tibial pulses are 2+ on the right side, and 2+ on the left side.   Bilateral 1+ pitting edema.   Pulmonary/Chest: Effort normal and breath sounds normal. No respiratory distress. She has no wheezes. She has no rales.   Abdominal: Soft. She exhibits no mass. There is no tenderness. No hernia.   Skin: Skin is warm and dry.   Psychiatric: She has a normal mood and affect.   Vitals reviewed.        ECG 12 Lead  Date/Time: 9/24/2018 11:37 AM  Performed by: ODETTE DINH  Authorized by: DOETTE DINH   Comparison: compared with previous ECG from 9/9/2018  Comparison to previous ECG: Atrial fibrillation with RVR, 104 bpm.  Rhythm: atrial fibrillation  Ectopy: PVCs  Rate: tachycardic  BPM: 96  QRS axis: left  Clinical impression: abnormal ECG  Comments: When compared " to EKG of 9/9/2018 there is marked left axis deviation, moderate intraventricular conduction delay and moderate voltage criteria for LVH noted.  QTc is 427.              Assessment/Plan       Anette was seen today for atrial fibrillation, hypertension and palpitations.    Diagnoses and all orders for this visit:    Assessment:  1. Lower extremity swelling  2. Paroxysmal atrial fibrillation.  CHADsVASc is at least 4 for hypertension, age and sex.  Patient is anticoagulated on Eliquis 5 mg twice a day.  3. Essential hypertension  4. Tobacco use      Plan:  1. I have given the patient a prescription for Lasix 20 mg and K-Dur 10 milliequivalents for 5 days.  I have instructed her that if the swelling resolves she should stop the Lasix early and that would be fine.  2. Patient to continue Eliquis.  Patient is also on Cardizem and metoprolol for rate control.  3. Blood pressure shows good control at today's visit.  4. Return to clinic in 3 months, sooner for any worsening or concerning symptoms.      Return in about 3 months (around 12/24/2018).    JUNIE Painting

## 2018-09-24 NOTE — OUTREACH NOTE
Sepsis Week 2 Survey      Responses   Facility patient discharged from?  [facility]   Does the patient have one of the following disease processes/diagnoses(primary or secondary)?  Sepsis   Week 2 attempt successful?  No   Unsuccessful attempts  Attempt 2          Larisa Plascencia RN

## 2018-09-25 ENCOUNTER — READMISSION MANAGEMENT (OUTPATIENT)
Dept: CALL CENTER | Facility: HOSPITAL | Age: 78
End: 2018-09-25

## 2018-09-25 NOTE — OUTREACH NOTE
Sepsis Week 2 Survey      Responses   Facility patient discharged from?  [facility]   Does the patient have one of the following disease processes/diagnoses(primary or secondary)?  Sepsis   Week 2 attempt successful?  Yes   Call start time  1528   Call end time  1530   Discharge diagnosis  Sepsis, pneumonia, AFIB with RVR on Eliquis, dysphagia, hypothyroidism, mild elevation of LFT's GRISELDA, mild carotid vasculr disease   Is patient permission given to speak with other caregiver?  Yes   List who call center can speak with  Son- Bob   Meds reviewed with patient/caregiver?  Yes   Is the patient having any side effects they believe may be caused by any medication additions or changes?  No   Does the patient have all medications related to this admission filled (includes all antibiotics, inhalers, nebulizers,steroids,etc.)  Yes   Is the patient taking all medications as directed (includes completed medication regime)?  Yes   Has the patient kept scheduled appointments due by today?  Yes   Psychosocial issues?  No   Did the patient receive a copy of their discharge instructions?  Yes   Nursing interventions  Reviewed instructions with patient   What is the patient's perception of their health status since discharge?  Improving   Nursing interventions  Nurse provided patient education   Is patient/caregiver able to teach back steps to recovery at home?  Set small, achievable goals for return to baseline health, Rest and regain strength   Is the patient/caregiver able to teach back signs and symptoms of worsening condition:  Fever, Hyperthermia, Rapid heart rate (>90), Shortness of breath/rapid respiratory rate, Altered mental status(confusion/coma), Edema   Is the patient/caregiver able to teach back the hierarchy of who to call/visit for symptoms/problems? PCP, Specialist, Home health nurse, Urgent Care, ED, 911  Yes   Week 2 call completed?  Yes          Lexi Plascencia RN

## 2018-09-26 ENCOUNTER — HOSPITAL ENCOUNTER (OUTPATIENT)
Dept: SPEECH THERAPY | Facility: HOSPITAL | Age: 78
Setting detail: THERAPIES SERIES
Discharge: HOME | End: 2018-09-26
Attending: INTERNAL MEDICINE

## 2018-09-26 DIAGNOSIS — R13.12 OROPHARYNGEAL DYSPHAGIA: Primary | ICD-10-CM

## 2018-09-26 DIAGNOSIS — J69.0 ASPIRATION PNEUMONIA, UNSPECIFIED ASPIRATION PNEUMONIA TYPE, UNSPECIFIED LATERALITY, UNSPECIFIED PART OF LUNG (HCC): ICD-10-CM

## 2018-09-26 PROCEDURE — 92526 ORAL FUNCTION THERAPY: CPT | Performed by: SPEECH-LANGUAGE PATHOLOGIST

## 2018-09-27 NOTE — PROGRESS NOTES
Outpatient Speech Language Pathology   Adult Swallow Treatment Note  KATIE Piña     Patient Name: Anette Dunham  : 1940  MRN: [id number]  Today's Date: 2018         Visit Date: 2018   Patient Active Problem List   Diagnosis   • Essential hypertension   • Chest pain   • Paroxysmal atrial fibrillation (CMS/[facility])   • Closed left hip fracture, initial encounter (CMS/[facility])   • Tobacco abuse   • Closed left hip fracture (CMS/[facility])   • Atrial fibrillation with RVR (CMS/[facility])        Visit Dx:    ICD-10-CM ICD-9-CM   1. Oropharyngeal dysphagia R13.12 787.22   2. Aspiration pneumonia, unspecified aspiration pneumonia type, unspecified laterality, unspecified part of lung (CMS/[facility]) J69.0 507.0     Ms. Dunham is seen this pm for formal dysphagia tx. She is s/p FEES 18 w/ SLP recs of mechanical soft, chopped w/ NECTAR thick liquids only. Pt is positioned upright and centered chair. Pt's daughter accompanies her to tx session. Pt and pt's family re-educated on importance of following diet recommendations. They both verbalize agreement and understanding. Pt is a/a, cooperative to participate, however noted w/ mild-moderate fatigue effects throughout tx session.      Therapy Treatment / Health Promotion  Long Term Goal:  1. Pt will accept least restrictive diet tolerance w/o overt s/s aspiration.      Short Term Goals:  1. Pt will perform OROM/DARRIUS exercises x3 sets x10 reps w/ min cues.  *pt performs x3 sets x10 reps w/ mod cues and models  2. Pt will perform resistive breathing exercises x3 sets x5 reps w/resistance of 1-6 To improve respiratory support and control.   *pt performs x3 sets x10 reps w/ resistance level of 3x3, noted difficulty w/ exhale. Moderate fatigue effects noted, required rest breaks  3. Pt will perform laryngeal adduction/elevation exercises x3 sets x10 reps w/ min cues.   *pt performs x2 sets x5 reps w/ avg sustained phonation of 14.76 seconds pre-resistive breather, and x2 sets x5 reps w/  avg sustained phonation of 16.5 seconds post-resistive breather w/ mod cues. Fatigue effects noted.  4. Pt will perform Shaker exercises sustained x3 sets x5 reps for 30+ seconds over 3 consecutive sessions.   *pt performs modified shakers sustained x1 sets x5 reps for 30 second avg w/ mod cues  5. Pt will perform Shaker exercises repetitive x3 sets x12 reps w/ mod cues.   *pt performs modified repetitive shakers x5 sets x12 reps w/ mod cues from SLP  6. Pt will perform compensatory techniques during meals w/ min cues.  *pt educated on importance of following diet recommendations, upright and centered for all po intake. Pt and pt's dughter verbalize understanding.  7. Pt will participate in instrumental re-evaluation of swallowing fnx in 21-27 days, pending progress towards this poc.  *please continue modified po diet of mechanical soft, chopped w/ NECTAR thick liquids only.     Education:Provided home program for generalization. Resistive breather given for independent use. Both pt and pt's daughter verbalize agreement and understanding.     Thank you-  Radha Braun M.S., CCC-SLP      Time Calculation:   SLP Start Time: 1400  SLP Stop Time: 1435  SLP Time Calculation (min): 35 min  SLP Non-Billable Time (min): 30 min    Therapy Charges for Today     Code Description Service Date Service Provider Modifiers Qty    44484491926 HC ST CARE PLAN 15 MIN 9/26/2018 Radha Braun MS CCC-SLP GN 2    [id number] HC ST TREATMENT SWALLOW 2 9/26/2018 Radha Braun MS CCC-SLP GN 1                   Radha Braun MS CCC-SLP  9/27/2018

## 2018-10-01 ENCOUNTER — HOSPITAL ENCOUNTER (OUTPATIENT)
Dept: SPEECH THERAPY | Facility: HOSPITAL | Age: 78
Setting detail: THERAPIES SERIES
Discharge: HOME | End: 2018-10-01
Attending: INTERNAL MEDICINE

## 2018-10-01 DIAGNOSIS — R13.12 OROPHARYNGEAL DYSPHAGIA: Primary | ICD-10-CM

## 2018-10-01 DIAGNOSIS — J69.0 ASPIRATION PNEUMONIA, UNSPECIFIED ASPIRATION PNEUMONIA TYPE, UNSPECIFIED LATERALITY, UNSPECIFIED PART OF LUNG (HCC): ICD-10-CM

## 2018-10-01 PROCEDURE — 92526 ORAL FUNCTION THERAPY: CPT | Performed by: SPEECH-LANGUAGE PATHOLOGIST

## 2018-10-01 NOTE — PROGRESS NOTES
Outpatient Speech Language Pathology   Adult Swallow Treatment Note  KATIE Piña     Patient Name: Anette Dunham  : 1940  MRN: [id number]  Today's Date: 10/1/2018         Visit Date: 10/01/2018   Patient Active Problem List   Diagnosis   • Essential hypertension   • Chest pain   • Paroxysmal atrial fibrillation (CMS/[facility])   • Closed left hip fracture, initial encounter (CMS/[facility])   • Tobacco abuse   • Closed left hip fracture (CMS/[facility])   • Atrial fibrillation with RVR (CMS/[facility])        Visit Dx:    ICD-10-CM ICD-9-CM   1. Oropharyngeal dysphagia R13.12 787.22   2. Aspiration pneumonia, unspecified aspiration pneumonia type, unspecified laterality, unspecified part of lung (CMS/[facility]) J69.0 507.0     Ms. Dunham is seen this pm for formal dysphagia tx. She is s/p FEES 18 w/ SLP recs of mechanical soft, chopped w/ NECTAR thick liquids only. Pt is positioned upright and centered chair. Pt's daughter accompanies her to tx session. Pt and pt's family re-educated on importance of following diet recommendations. They both verbalize agreement and understanding. Pt is a/a, cooperative to participate, however noted w/ mild-moderate fatigue effects throughout tx session.      Therapy Treatment / Health Promotion  Long Term Goal:  1. Pt will accept least restrictive diet tolerance w/o overt s/s aspiration.      Short Term Goals:  1. Pt will perform OROM/DARRIUS exercises x3 sets x10 reps w/ min cues.  *pt performs x3 sets x10 reps w/ mod cues and models  2. Pt will perform resistive breathing exercises x3 sets x5 reps w/resistance of 1-6 To improve respiratory support and control.   *pt performs x3 sets x10 reps w/ resistance level of 3x3, noted difficulty w/ exhale. Moderate fatigue effects noted, required rest breaks  3. Pt will perform laryngeal adduction/elevation exercises x3 sets x10 reps w/ min cues.   *pt performs x2 sets x5 reps w/ avg sustained phonation of 18.29 seconds pre-resistive breather, and x2 sets x5 reps w/  avg sustained phonation of 18.4 seconds post-resistive breather w/ mod cues. Fatigue effects noted.  4. Pt will perform Shaker exercises sustained x3 sets x5 reps for 30+ seconds over 3 consecutive sessions.   *pt performs modified shakers sustained x1 sets x5 reps for 30 second avg w/ mod cues  5. Pt will perform Shaker exercises repetitive x3 sets x12 reps w/ mod cues.   *pt performs modified repetitive shakers x5 sets x12 reps w/ min cues from SLP  6. Pt will perform compensatory techniques during meals w/ min cues.  *pt educated on importance of following diet recommendations, upright and centered for all po intake. Pt and pt's dughter verbalize understanding.  7. Pt will participate in instrumental re-evaluation of swallowing fnx in 21-27 days, pending progress towards this poc.  *please continue modified po diet of mechanical soft, chopped w/ NECTAR thick liquids only.     Education:Provided home program for generalization. Resistive breather given for independent use. Both pt and pt's daughter verbalize agreement and understanding.     Thank you-  Radha Braun M.S., CCC-SLP      Time Calculation:   SLP Start Time: 1000  SLP Stop Time: 1035  SLP Time Calculation (min): 35 min  SLP Non-Billable Time (min): 30 min    Therapy Charges for Today     Code Description Service Date Service Provider Modifiers Qty    76613990399 HC ST CARE PLAN 15 MIN 10/1/2018 Radha Braun MS CCC-SLP GN 2    [id number] HC ST TREATMENT SWALLOW 2 10/1/2018 Radha Braun MS CCC-SLP GN 1                   Radha Braun MS CCC-SLP  10/1/2018

## 2018-10-03 ENCOUNTER — READMISSION MANAGEMENT (OUTPATIENT)
Dept: CALL CENTER | Facility: HOSPITAL | Age: 78
End: 2018-10-03

## 2018-10-03 ENCOUNTER — HOSPITAL ENCOUNTER (OUTPATIENT)
Dept: SPEECH THERAPY | Facility: HOSPITAL | Age: 78
Setting detail: THERAPIES SERIES
Discharge: HOME | End: 2018-10-03
Attending: INTERNAL MEDICINE

## 2018-10-03 DIAGNOSIS — R13.12 OROPHARYNGEAL DYSPHAGIA: Primary | ICD-10-CM

## 2018-10-03 DIAGNOSIS — J69.0 ASPIRATION PNEUMONIA, UNSPECIFIED ASPIRATION PNEUMONIA TYPE, UNSPECIFIED LATERALITY, UNSPECIFIED PART OF LUNG (HCC): ICD-10-CM

## 2018-10-03 PROCEDURE — 92526 ORAL FUNCTION THERAPY: CPT | Performed by: SPEECH-LANGUAGE PATHOLOGIST

## 2018-10-03 NOTE — OUTREACH NOTE
"Sepsis Week 3 Survey      Responses   Facility patient discharged from?  [facility]   Does the patient have one of the following disease processes/diagnoses(primary or secondary)?  Sepsis   Week 3 attempt successful?  Yes   Call start time  1055   Call end time  1059   Discharge diagnosis  Sepsis, pneumonia, AFIB with RVR on Eliquis, dysphagia, hypothyroidism, mild elevation of LFT's GRISELDA, mild carotid vasculr disease   Meds reviewed with patient/caregiver?  Yes   Is the patient taking all medications as directed (includes completed medication regime)?  Yes   Has the patient kept scheduled appointments due by today?  Yes   What is the patient's perception of their health status since discharge?  Improving   Is patient/caregiver able to teach back steps to recovery at home?  Rest and regain strength, Eat a balanced diet   Is the patient/caregiver able to teach back signs and symptoms of worsening condition:  Fever, Shortness of breath/rapid respiratory rate   Additional teach back comments  Pt says \"my heart runs away when I lay down at night to sleep\"  is aware of this. Appetite good.    Week 3 call completed?  Yes          Arti Olivo RN  "

## 2018-10-03 NOTE — PROGRESS NOTES
Outpatient Speech Language Pathology   Adult Swallow Treatment Note  KATIE Piña     Patient Name: Anette Dunham  : 1940  MRN: [id number]  Today's Date: 10/3/2018         Visit Date: 10/03/2018   Patient Active Problem List   Diagnosis   • Essential hypertension   • Chest pain   • Paroxysmal atrial fibrillation (CMS/[facility])   • Closed left hip fracture, initial encounter (CMS/[facility])   • Tobacco abuse   • Closed left hip fracture (CMS/[facility])   • Atrial fibrillation with RVR (CMS/[facility])        Visit Dx:    ICD-10-CM ICD-9-CM   1. Oropharyngeal dysphagia R13.12 787.22   2. Aspiration pneumonia, unspecified aspiration pneumonia type, unspecified laterality, unspecified part of lung (CMS/[facility]) J69.0 507.0     Ms. Dunham is seen this pm for formal dysphagia tx. She is s/p FEES 18 w/ SLP recs of mechanical soft, chopped w/ NECTAR thick liquids only. Pt is positioned upright and centered chair. Pt's daughter accompanies her to tx session. Pt and pt's family re-educated on importance of following diet recommendations. They both verbalize agreement and understanding. Pt is a/a, cooperative to participate, however noted w/ mild-moderate fatigue effects throughout tx session.      Therapy Treatment / Health Promotion  Long Term Goal:  1. Pt will accept least restrictive diet tolerance w/o overt s/s aspiration.      Short Term Goals:  1. Pt will perform OROM/DARRIUS exercises x3 sets x10 reps w/ min cues.  *pt performs x3 sets x10 reps w/ mod cues and models  2. Pt will perform resistive breathing exercises x3 sets x5 reps w/resistance of 1-6 To improve respiratory support and control.   *pt performs x6 sets x5 reps w/ resistance level of 4x4, noted difficulty w/ exhale. Moderate fatigue effects noted, required rest breaks  3. Pt will perform laryngeal adduction/elevation exercises x3 sets x10 reps w/ min cues.   *pt performs x2 sets x5 reps w/ avg sustained phonation of 22.22 seconds pre-resistive breather, and x2 sets x5 reps w/  avg sustained phonation of 23.12 seconds post-resistive breather w/ mod cues. Fatigue effects noted.  4. Pt will perform Shaker exercises sustained x3 sets x5 reps for 30+ seconds over 3 consecutive sessions.   *pt performs modified shakers sustained x1 sets x6 reps for 30 second avg w/ mod cues  5. Pt will perform Shaker exercises repetitive x3 sets x12 reps w/ mod cues.   *pt performs modified repetitive shakers x6 sets x12 reps w/ min cues from SLP  6. Pt will perform compensatory techniques during meals w/ min cues.  *pt educated on importance of following diet recommendations, upright and centered for all po intake. Pt and pt's dughter verbalize understanding.  7. Pt will participate in instrumental re-evaluation of swallowing fnx in 21-27 days, pending progress towards this poc.  *please continue modified po diet of mechanical soft, chopped w/ NECTAR thick liquids only.     Education:Provided home program for generalization. Resistive breather given for independent use. Both pt and pt's daughter verbalize agreement and understanding.     Thank you-  Radha Braun M.S., CCC-SLP      Time Calculation:   SLP Start Time: 1322  SLP Stop Time: 1400  SLP Time Calculation (min): 38 min  SLP Non-Billable Time (min): 30 min    Therapy Charges for Today     Code Description Service Date Service Provider Modifiers Qty    15366049915 HC ST CARE PLAN 15 MIN 10/3/2018 Radha Braun MS CCC-SLP GN 2    99481519636 HC ST TREATMENT SWALLOW 3 10/3/2018 Radha Braun MS CCC-SLP GN 1                   Radha Braun MS CCC-SLP  10/3/2018

## 2018-10-08 ENCOUNTER — APPOINTMENT (OUTPATIENT)
Dept: SPEECH THERAPY | Facility: HOSPITAL | Age: 78
End: 2018-10-08
Attending: INTERNAL MEDICINE

## 2018-10-08 ENCOUNTER — TRANSCRIBE ORDERS (OUTPATIENT)
Dept: ADMINISTRATIVE | Facility: HOSPITAL | Age: 78
End: 2018-10-08

## 2018-10-08 DIAGNOSIS — R13.10 DYSPHAGIA, UNSPECIFIED TYPE: Primary | ICD-10-CM

## 2018-10-11 ENCOUNTER — HOSPITAL ENCOUNTER (OUTPATIENT)
Dept: GENERAL RADIOLOGY | Facility: HOSPITAL | Age: 78
Discharge: HOME | End: 2018-10-11
Admitting: NURSE PRACTITIONER

## 2018-10-11 ENCOUNTER — READMISSION MANAGEMENT (OUTPATIENT)
Dept: CALL CENTER | Facility: HOSPITAL | Age: 78
End: 2018-10-11

## 2018-10-11 DIAGNOSIS — R13.10 DYSPHAGIA, UNSPECIFIED TYPE: ICD-10-CM

## 2018-10-11 PROCEDURE — 92611 MOTION FLUOROSCOPY/SWALLOW: CPT | Performed by: SPEECH-LANGUAGE PATHOLOGIST

## 2018-10-11 PROCEDURE — G8996 SWALLOW CURRENT STATUS: HCPCS | Performed by: SPEECH-LANGUAGE PATHOLOGIST

## 2018-10-11 PROCEDURE — G8997 SWALLOW GOAL STATUS: HCPCS | Performed by: SPEECH-LANGUAGE PATHOLOGIST

## 2018-10-11 PROCEDURE — 74230 X-RAY XM SWLNG FUNCJ C+: CPT

## 2018-10-11 PROCEDURE — G8998 SWALLOW D/C STATUS: HCPCS | Performed by: SPEECH-LANGUAGE PATHOLOGIST

## 2018-10-11 PROCEDURE — 74230 X-RAY XM SWLNG FUNCJ C+: CPT | Performed by: RADIOLOGY

## 2018-10-11 NOTE — MBS/VFSS/FEES
Outpatient Speech Language Pathology   Adult Swallow Initial Evaluation  KATIE Piña   MODIFIED BARIUM SWALLOW STUDY     Patient Name: Anette Dunham  : 1940  MRN: [id number]  Today's Date: 10/11/2018      Anette Dunham  presents to the radiology suite this am from her private residence to participate in an instrumental MBS to assess safety/efficacy of swallowing fnx, determine safest/least restrictive diet. Pt is s/p FEES on 18 w/ SLP recs of regular diet consistency w/ nectar thick liquids only. Pt has been actively participating in formal dysphagia tx via outpatient services. Pt reports compliance of modified diet w/ carryover of home program exercises. Pt is accompanied to this evaluation by her daughter. Pt is eager to participate.      No recent chest x-ray available for review at this time.     Pt is observed on ra tolerating well w/o complications.     Risks and benefits of the procedure are explained w/ verbalizing understanding/agreement to participate, proceed per protocol.     Pt is positioned upright and centered in a stationary chair to accept multiple po presentations of solid cracker, puree, honey thick, nectar thick, and thin liquids via spoon, cup and straw, along w/ whole placebo pill in puree. Pt is able to self feed.     All views are from the lateral plane.     Facial/oral structures are symmetrical upon observation w/o lingual deviation upon protrusion. Oral mucosa are moist, pink and clean. Secretions are clear, thin and well controlled. OROM/DARRIUS is wfl to imitate oral postures. Gag is not assessed. Volitional cough is adequate in intensity, clear in quality, nonproductive. Vocal quality is adequate in intensity, clear in quality w/ intelligible speech. Pt is a/a and cooperative to particpate. Pt is oriented to person, place, and time, follows simple directives, and participates in simple conversational exchanges.     Upon po presentations, adequate bolus anticipation w/ good labial  seal for bolus clearance via spoon bowl, cup rim stability and suction via straw.  Bolus formation, manipulation,  and control are wfl w/ rotary mastication pattern. A-p transit is timely w/o oral residue. Tongue base retraction and linguavelar seal are adequate w/o premature spillage. No laryngeal penetration or aspiration is evidenced before the swallow.     Pharyngeal swallow is timely w/ adequate hyolaryngeal elevation and epiglottic inversion. Pharyngeal contraction is adequate w/o significant residue. Fleeting laryngeal penetration w/ thin liquids via cup and straw, however clears upon completion of swallow w/o significant residue. No other laryngeal penetration or aspiration evidenced during or after the swallow.     Full esophageal sweep reveals no obvious mucosal abnormalities. Motility is wfl w/o retrograde flow noted.      Impression: Ms. Dunham presents w/wfl oropharyngeal swallow w/ fleeting laryngeal penetration w/ thin liquids via cup and straw, however clears upon completion of swallow w/o significant residue. No other laryngeal penetration or aspiration across this evaluation. Pt is felt to most benefit from upgrade to thin liquids w/ continued regular diet consistency. No further SLP f/u warranted at this time.     Recommendations:   1. Regular consistency diet, thin liquids.   2. Meds whole in puree/thins.   3. SHAKA precautions.   4. Oral care protocol.     No further SLP f/u warranted at this time.     D/w pt and pt's daughter results and recommendations w/ verbal understanding and agreement.     Thank you for allowing me to participate in the care of your patient-  Radha Braun M.S., CCC-SLP       Visit Date: 10/11/2018   Patient Active Problem List   Diagnosis   • Essential hypertension   • Chest pain   • Paroxysmal atrial fibrillation (CMS/HCC)   • Closed left hip fracture, initial encounter (CMS/HCC)   • Tobacco abuse   • Closed left hip fracture (CMS/HCC)   • Atrial fibrillation with RVR  (CMS/HCC)        Past Medical History:   Diagnosis Date   • Anxiety    • Arrhythmia    • Atrial fibrillation (CMS/HCC)    • Coronary artery disease    • Disease of thyroid gland    • Dysfunctional gallbladder    • GERD (gastroesophageal reflux disease)    • Hypertension    • Osteoporosis         Past Surgical History:   Procedure Laterality Date   • ABDOMINAL SURGERY     • CHOLECYSTECTOMY OPEN     • HIP HEMIARTHROPLASTY Left 5/29/2018    Procedure: LEFT HIP BIPOLAR HEMIARTHROPLASTY;  Surgeon: Trevon Arriaga MD;  Location: [facility];  Service: Orthopedics         Visit Dx:     ICD-10-CM ICD-9-CM   1. Dysphagia, unspecified type R13.10 787.20         Time Calculation:        Therapy Charges for Today     Code Description Service Date Service Provider Modifiers Qty    91541341043 HC ST SWALLOWING CURRENT STATUS 10/11/2018 Radha Braun, MS CCC-SLP GN, CH 1    81545298728 HC ST SWALLOWING PROJECTED 10/11/2018 Radha Braun MS CCC-SLP GN, CH 1    91039725945 HC ST SWALLOWING DISCHARGE 10/11/2018 Radha Braun MS CCC-SLP GN, CH 1    50559854912 HC ST MOTION FLUORO EVAL SWALLOW 8 10/11/2018 Radha Braun MS CCC-SLP GN 1          SLP G-Codes  SLP NOMS Used?: Yes  Functional Limitations: Swallowing  Swallow Current Status (): 0 percent impaired, limited or restricted  Swallow Goal Status (): 0 percent impaired, limited or restricted  Swallow Discharge Status (): 0 percent impaired, limited or restricted        Radha Braun MS CCC-SLP  10/11/2018

## 2018-10-11 NOTE — OUTREACH NOTE
Sepsis Week 4 Survey      Responses   Facility patient discharged from?  [facility]   Does the patient have one of the following disease processes/diagnoses(primary or secondary)?  Sepsis   Week 4 attempt successful?  Yes   Call start time  1117   Call end time  1118   Meds reviewed with patient/caregiver?  Yes   Is the patient taking all medications as directed (includes completed medication regime)?  Yes   Has the patient kept scheduled appointments due by today?  Yes   Is the patient still receiving Home Health Services?  No   What is the patient's perception of their health status since discharge?  Improving   Week 4 call completed?  Yes   Would the patient like one additional call?  No   Graduated  Yes   Did the patient feel the follow up calls were helpful during their recovery period?  Yes   Was the number of calls appropriate?  Yes   Wrap up additional comments  doing well apreciate the calls.           Natalya Stanley RN

## 2018-12-19 ENCOUNTER — OFFICE VISIT (OUTPATIENT)
Dept: CARDIOLOGY | Facility: CLINIC | Age: 78
End: 2018-12-19

## 2018-12-19 VITALS
HEART RATE: 94 BPM | HEIGHT: 64 IN | WEIGHT: 142.4 LBS | DIASTOLIC BLOOD PRESSURE: 66 MMHG | BODY MASS INDEX: 24.31 KG/M2 | OXYGEN SATURATION: 97 % | SYSTOLIC BLOOD PRESSURE: 104 MMHG

## 2018-12-19 DIAGNOSIS — I10 ESSENTIAL HYPERTENSION: ICD-10-CM

## 2018-12-19 DIAGNOSIS — I48.0 PAROXYSMAL ATRIAL FIBRILLATION (HCC): Primary | ICD-10-CM

## 2018-12-19 DIAGNOSIS — Z72.0 TOBACCO ABUSE: ICD-10-CM

## 2018-12-19 PROCEDURE — 99213 OFFICE O/P EST LOW 20 MIN: CPT | Performed by: NURSE PRACTITIONER

## 2018-12-19 NOTE — PROGRESS NOTES
Subjective     Chief Complaint: Atrial Fibrillation; Leg Swelling; and Hypertension    History of Present Illness   Anette Dunham is a 78 y.o. female who presents with past medical history significant for paroxysmal atrial fibrillation, essential hypertension, and tobacco use.  she is here today for follow-up.    Patient states that she has been doing well.  She denies chest pain.  She reports occasional palpitations and a sensation that her heart is running away.  She states that these episodes do not last longer than a minute.  She reports some occasional lower extremity swelling.  At her last visit she was given Lasix and potassium supplement and states that the Lasix to care of her additional swelling and she has not had any additional problems.      Current Outpatient Medications:   •  albuterol (PROVENTIL HFA;VENTOLIN HFA) 108 (90 Base) MCG/ACT inhaler, Inhale 2 puffs Every 6 (Six) Hours As Needed for Wheezing., Disp: , Rfl:   •  alendronate (FOSAMAX) 70 MG tablet, Take 70 mg by mouth Every 7 (Seven) Days. Prior to [facility] Admission, Patient was on: takes on wednesdays, Disp: , Rfl:   •  amoxicillin-clavulanate (AUGMENTIN) 875-125 MG per tablet, Take 1 tablet by mouth 2 (Two) Times a Day., Disp: , Rfl:   •  apixaban (ELIQUIS) 5 MG tablet tablet, Take 1 tablet by mouth 2 (two) times a day., Disp: 60 tablet, Rfl: 5  •  atorvastatin (LIPITOR) 10 MG tablet, Take 1 tablet by mouth Every Night., Disp: 30 tablet, Rfl: 0  •  calcium carb-cholecalciferol 600-800 MG-UNIT tablet, Take 1 tablet by mouth 2 (Two) Times a Day With Meals., Disp: 60 tablet, Rfl: 0  •  diltiaZEM CD (CARDIZEM CD) 300 MG 24 hr capsule, Take 1 capsule by mouth Daily., Disp: 30 capsule, Rfl: 0  •  fexofenadine (ALLEGRA) 180 MG tablet, Take 180 mg by mouth Daily., Disp: , Rfl:   •  gabapentin (NEURONTIN) 400 MG capsule, Take 1 capsule by mouth 2 (Two) Times a Day., Disp: 6 capsule, Rfl: 0  •  lactobacillus acidophilus (RISAQUAD) capsule capsule,  "Take 1 capsule by mouth Daily., Disp: 7 capsule, Rfl: 0  •  levothyroxine (SYNTHROID, LEVOTHROID) 50 MCG tablet, Take 50 mcg by mouth Daily., Disp: , Rfl:   •  LORazepam (ATIVAN) 1 MG tablet, Take 1 tablet by mouth Every 8 (Eight) Hours As Needed for Anxiety, Disp: 9 tablet, Rfl: 0  •  metoprolol tartrate (LOPRESSOR) 25 MG tablet, Take 1 tablet by mouth 2 (Two) Times a Day., Disp: 180 tablet, Rfl: 3  •  ranitidine (ZANTAC) 300 MG capsule, Take 300 mg by mouth 2 (two) times a day., Disp: , Rfl:   •  rOPINIRole (REQUIP) 1 MG tablet, Take 1 mg by mouth At Night As Needed (restless leg). Take 1 hour before bedtime., Disp: , Rfl:   •  sennosides-docusate sodium (SENOKOT-S) 8.6-50 MG tablet, Take 2 tablets by mouth 2 (Two) Times a Day., Disp: 120 tablet, Rfl: 0  •  traMADol (ULTRAM) 50 MG tablet, Take 50 mg by mouth Every 8 (Eight) Hours As Needed for Moderate Pain ., Disp: , Rfl:      The following portions of the patient's history were reviewed and updated as appropriate: allergies, current medications, past family history, past medical history, past social history, past surgical history and problem list.    Review of Systems   Constitution: Negative for weakness and malaise/fatigue.   Cardiovascular: Positive for leg swelling and palpitations. Negative for chest pain, dyspnea on exertion, irregular heartbeat, near-syncope, orthopnea, paroxysmal nocturnal dyspnea and syncope.   Respiratory: Negative for shortness of breath.    Hematologic/Lymphatic: Does not bruise/bleed easily.   Neurological: Negative for dizziness and light-headedness.         Objective     /66 (BP Location: Left arm, Patient Position: Sitting)   Pulse 94   Ht 161.3 cm (63.5\")   Wt 64.6 kg (142 lb 6.4 oz)   SpO2 97%   BMI 24.83 kg/m²     Physical Exam   Constitutional: She is oriented to person, place, and time. She appears well-developed and well-nourished.   HENT:   Head: Normocephalic and atraumatic.   Eyes: Pupils are equal, round, and " reactive to light.   Neck: No JVD present.   Cardiovascular: Normal rate and intact distal pulses. An irregular rhythm present. Exam reveals no gallop and no friction rub.   No murmur heard.  Pulmonary/Chest: Effort normal and breath sounds normal. No respiratory distress. She has no wheezes. She has no rales.   Neurological: She is alert and oriented to person, place, and time.   Skin: Skin is warm and dry.   Psychiatric: She has a normal mood and affect.   Vitals reviewed.      Procedures      Assessment/Plan       Anette was seen today for atrial fibrillation, leg swelling and hypertension.    Diagnoses and all orders for this visit:      Assessment:  1. Paroxysmal atrial fibrillation.  CHADsVASc is at least 4 for hypertension, age and sex.  Patient is anticoagulated on Eliquis 5 mg twice a day.  2. Essential hypertension  3. Tobacco use        Plan:  1. Patient to continue Eliquis.  Patient is also on Cardizem and metoprolol for rate control.  2. Return to clinic in 6 months, sooner for any worsening or concerning symptoms.         Return in about 6 months (around 6/19/2019).    JUNIE Painting

## 2019-05-31 ENCOUNTER — HOSPITAL ENCOUNTER (EMERGENCY)
Facility: HOSPITAL | Age: 79
Discharge: HOME | End: 2019-05-31
Admitting: FAMILY MEDICINE

## 2019-05-31 VITALS
RESPIRATION RATE: 18 BRPM | OXYGEN SATURATION: 97 % | SYSTOLIC BLOOD PRESSURE: 104 MMHG | BODY MASS INDEX: 24.63 KG/M2 | DIASTOLIC BLOOD PRESSURE: 79 MMHG | WEIGHT: 139 LBS | TEMPERATURE: 98.1 F | HEIGHT: 63 IN | HEART RATE: 75 BPM

## 2019-05-31 DIAGNOSIS — R04.0 NOSEBLEED: Primary | ICD-10-CM

## 2019-05-31 LAB
APTT PPP: 33.5 SECONDS (ref 23.8–36.1)
BASOPHILS # BLD AUTO: 0.06 10*3/MM3 (ref 0–0.2)
BASOPHILS NFR BLD AUTO: 0.9 % (ref 0–1.5)
DEPRECATED RDW RBC AUTO: 44.9 FL (ref 37–54)
EOSINOPHIL # BLD AUTO: 0.38 10*3/MM3 (ref 0–0.4)
EOSINOPHIL NFR BLD AUTO: 5.8 % (ref 0.3–6.2)
ERYTHROCYTE [DISTWIDTH] IN BLOOD BY AUTOMATED COUNT: 13.7 % (ref 12.3–15.4)
HCT VFR BLD AUTO: 39.7 % (ref 34–46.6)
HGB BLD-MCNC: 12.8 G/DL (ref 12–15.9)
IMM GRANULOCYTES # BLD AUTO: 0.01 10*3/MM3 (ref 0–0.05)
IMM GRANULOCYTES NFR BLD AUTO: 0.2 % (ref 0–0.5)
INR PPP: 1.24 (ref 0.9–1.1)
LYMPHOCYTES # BLD AUTO: 2.19 10*3/MM3 (ref 0.7–3.1)
LYMPHOCYTES NFR BLD AUTO: 33.3 % (ref 19.6–45.3)
MCH RBC QN AUTO: 30.4 PG (ref 26.6–33)
MCHC RBC AUTO-ENTMCNC: 32.2 G/DL (ref 31.5–35.7)
MCV RBC AUTO: 94.3 FL (ref 79–97)
MONOCYTES # BLD AUTO: 0.87 10*3/MM3 (ref 0.1–0.9)
MONOCYTES NFR BLD AUTO: 13.2 % (ref 5–12)
NEUTROPHILS # BLD AUTO: 3.07 10*3/MM3 (ref 1.7–7)
NEUTROPHILS NFR BLD AUTO: 46.6 % (ref 42.7–76)
PLATELET # BLD AUTO: 207 10*3/MM3 (ref 140–450)
PMV BLD AUTO: 10.8 FL (ref 6–12)
PROTHROMBIN TIME: 16.2 SECONDS (ref 11–15.4)
RBC # BLD AUTO: 4.21 10*6/MM3 (ref 3.77–5.28)
WBC NRBC COR # BLD: 6.58 10*3/MM3 (ref 3.4–10.8)

## 2019-05-31 PROCEDURE — 85025 COMPLETE CBC W/AUTO DIFF WBC: CPT | Performed by: FAMILY MEDICINE

## 2019-05-31 PROCEDURE — 85730 THROMBOPLASTIN TIME PARTIAL: CPT | Performed by: NURSE PRACTITIONER

## 2019-05-31 PROCEDURE — 85610 PROTHROMBIN TIME: CPT | Performed by: NURSE PRACTITIONER

## 2019-05-31 PROCEDURE — 99283 EMERGENCY DEPT VISIT LOW MDM: CPT

## 2019-05-31 PROCEDURE — 36415 COLL VENOUS BLD VENIPUNCTURE: CPT

## 2019-05-31 RX ADMIN — PHENYLEPHRINE HYDROCHLORIDE 2 SPRAY: 0.25 SPRAY NASAL at 21:10

## 2019-06-01 NOTE — ED PROVIDER NOTES
Subjective     History provided by:  Patient   used: No    Nose Bleed   Location:  L nare  Severity:  Moderate  Duration:  3 days  Timing:  Intermittent  Progression:  Waxing and waning  Chronicity:  New  Context: anticoagulants    Relieved by:  Nothing  Ineffective treatments:  None tried  Associated symptoms: blood in oropharynx        Review of Systems   Constitutional: Negative.    HENT: Positive for nosebleeds.    Eyes: Negative.    Respiratory: Negative.    Cardiovascular: Negative.    Gastrointestinal: Negative.    Endocrine: Negative.    Genitourinary: Negative.    Musculoskeletal: Negative.    Skin: Negative.    Allergic/Immunologic: Negative.    Neurological: Negative.    Hematological: Negative.    Psychiatric/Behavioral: Negative.    All other systems reviewed and are negative.      Past Medical History:   Diagnosis Date   • Anxiety    • Arrhythmia    • Atrial fibrillation (CMS/HCC)    • Coronary artery disease    • Disease of thyroid gland    • Dysfunctional gallbladder    • GERD (gastroesophageal reflux disease)    • Hypertension    • Osteoporosis        No Known Allergies    Past Surgical History:   Procedure Laterality Date   • ABDOMINAL SURGERY     • CHOLECYSTECTOMY OPEN     • HIP HEMIARTHROPLASTY Left 5/29/2018    Procedure: LEFT HIP BIPOLAR HEMIARTHROPLASTY;  Surgeon: Trevon Arriaga MD;  Location: [facility];  Service: Orthopedics       Family History   Problem Relation Age of Onset   • Heart disease Father        Social History     Socioeconomic History   • Marital status:      Spouse name: Not on file   • Number of children: Not on file   • Years of education: Not on file   • Highest education level: Not on file   Tobacco Use   • Smoking status: Current Every Day Smoker     Packs/day: 0.25     Years: 15.00     Pack years: 3.75     Types: Cigarettes   • Smokeless tobacco: Current User     Types: Snuff   • Tobacco comment: snuff for 60 years    Substance and Sexual  Activity   • Alcohol use: No   • Drug use: No   • Sexual activity: Defer           Objective   Physical Exam   Constitutional: She is oriented to person, place, and time. She appears well-developed and well-nourished.   HENT:   Head: Normocephalic.   Left naris dried blood, no active bleeding   Eyes: EOM are normal. Pupils are equal, round, and reactive to light.   Neck: Normal range of motion.   Cardiovascular: Normal rate, regular rhythm, normal heart sounds and intact distal pulses.   Pulmonary/Chest: Effort normal and breath sounds normal.   Abdominal: Soft. Bowel sounds are normal.   Musculoskeletal: Normal range of motion.   Neurological: She is alert and oriented to person, place, and time.   Skin: Skin is warm and dry. Capillary refill takes less than 2 seconds.   Psychiatric: She has a normal mood and affect. Her behavior is normal. Judgment and thought content normal.   Nursing note and vitals reviewed.      Procedures           ED Course                  MDM  Number of Diagnoses or Management Options  Nosebleed: new and requires workup     Amount and/or Complexity of Data Reviewed  Clinical lab tests: ordered and reviewed  Tests in the medicine section of CPT®: reviewed and ordered    Risk of Complications, Morbidity, and/or Mortality  Presenting problems: moderate  Diagnostic procedures: moderate  Management options: moderate    Patient Progress  Patient progress: improved        Final diagnoses:   Nosebleed            Venita Robbins, APRN  05/31/19 2129

## 2019-08-21 ENCOUNTER — OFFICE VISIT (OUTPATIENT)
Dept: CARDIOLOGY | Facility: CLINIC | Age: 79
End: 2019-08-21

## 2019-08-21 VITALS
DIASTOLIC BLOOD PRESSURE: 87 MMHG | HEIGHT: 64 IN | SYSTOLIC BLOOD PRESSURE: 118 MMHG | BODY MASS INDEX: 23.63 KG/M2 | HEART RATE: 63 BPM | OXYGEN SATURATION: 98 % | WEIGHT: 138.4 LBS

## 2019-08-21 DIAGNOSIS — I10 ESSENTIAL HYPERTENSION: ICD-10-CM

## 2019-08-21 DIAGNOSIS — I48.0 PAROXYSMAL ATRIAL FIBRILLATION (HCC): Primary | ICD-10-CM

## 2019-08-21 PROCEDURE — 99214 OFFICE O/P EST MOD 30 MIN: CPT | Performed by: NURSE PRACTITIONER

## 2019-08-21 NOTE — PROGRESS NOTES
Subjective     Chief Complaint: Atrial Fibrillation and Hypertension    History of Present Illness     Anette Dunham is a 79 y.o. female who presents with past medical history significant for paroxysmal atrial fibrillation, essential hypertension, and tobacco use.  She is here today for follow-up.    Number denies chest pain, lower extremity swelling.  She does report that at night her heart races when she is going to bed and also she gets dizzy at these times.  She denies any PND orthopnea.  She denies syncope or near syncopal event.      Current Outpatient Medications:   •  albuterol (PROVENTIL HFA;VENTOLIN HFA) 108 (90 Base) MCG/ACT inhaler, Inhale 2 puffs Every 6 (Six) Hours As Needed for Wheezing., Disp: , Rfl:   •  alendronate (FOSAMAX) 70 MG tablet, Take 70 mg by mouth Every 7 (Seven) Days. Prior to [facility] Admission, Patient was on: takes on wednesdays, Disp: , Rfl:   •  amoxicillin-clavulanate (AUGMENTIN) 875-125 MG per tablet, Take 1 tablet by mouth 2 (Two) Times a Day., Disp: , Rfl:   •  apixaban (ELIQUIS) 5 MG tablet tablet, Take 1 tablet by mouth 2 (two) times a day., Disp: 60 tablet, Rfl: 5  •  atorvastatin (LIPITOR) 10 MG tablet, Take 1 tablet by mouth Every Night., Disp: 30 tablet, Rfl: 0  •  calcium carb-cholecalciferol 600-800 MG-UNIT tablet, Take 1 tablet by mouth 2 (Two) Times a Day With Meals., Disp: 60 tablet, Rfl: 0  •  diltiaZEM CD (CARDIZEM CD) 300 MG 24 hr capsule, Take 1 capsule by mouth Daily., Disp: 30 capsule, Rfl: 0  •  fexofenadine (ALLEGRA) 180 MG tablet, Take 180 mg by mouth Daily., Disp: , Rfl:   •  gabapentin (NEURONTIN) 400 MG capsule, Take 1 capsule by mouth 2 (Two) Times a Day., Disp: 6 capsule, Rfl: 0  •  lactobacillus acidophilus (RISAQUAD) capsule capsule, Take 1 capsule by mouth Daily., Disp: 7 capsule, Rfl: 0  •  levothyroxine (SYNTHROID, LEVOTHROID) 50 MCG tablet, Take 50 mcg by mouth Daily., Disp: , Rfl:   •  LORazepam (ATIVAN) 1 MG tablet, Take 1 tablet by mouth Every 8  "(Eight) Hours As Needed for Anxiety, Disp: 9 tablet, Rfl: 0  •  metoprolol tartrate (LOPRESSOR) 25 MG tablet, Take 1 tablet by mouth 2 (Two) Times a Day., Disp: 180 tablet, Rfl: 3  •  ranitidine (ZANTAC) 300 MG capsule, Take 300 mg by mouth 2 (two) times a day., Disp: , Rfl:   •  rOPINIRole (REQUIP) 1 MG tablet, Take 1 mg by mouth At Night As Needed (restless leg). Take 1 hour before bedtime., Disp: , Rfl:   •  sennosides-docusate sodium (SENOKOT-S) 8.6-50 MG tablet, Take 2 tablets by mouth 2 (Two) Times a Day., Disp: 120 tablet, Rfl: 0  •  traMADol (ULTRAM) 50 MG tablet, Take 50 mg by mouth Every 8 (Eight) Hours As Needed for Moderate Pain ., Disp: , Rfl:      On this date:  The following portions of the patient's history were reviewed and updated as appropriate: allergies, current medications, past family history, past medical history, past social history, past surgical history and problem list.    Review of Systems   Constitution: Negative for weakness and malaise/fatigue.   Cardiovascular: Negative for chest pain, dyspnea on exertion, irregular heartbeat, leg swelling, near-syncope, orthopnea, palpitations, paroxysmal nocturnal dyspnea and syncope.   Respiratory: Negative for shortness of breath.    Hematologic/Lymphatic: Does not bruise/bleed easily.   Neurological: Negative for dizziness and light-headedness.         Objective     /87 (BP Location: Left arm, Patient Position: Sitting)   Pulse 63   Ht 161.3 cm (63.5\")   Wt 62.8 kg (138 lb 6.4 oz)   SpO2 98%   BMI 24.13 kg/m²     Physical Exam   Constitutional: She appears well-developed and well-nourished.   HENT:   Head: Normocephalic and atraumatic.   Eyes: Pupils are equal, round, and reactive to light.   Neck: No JVD present.   Cardiovascular: Normal rate, regular rhythm and intact distal pulses. Exam reveals no gallop and no friction rub.   No murmur heard.  Pulmonary/Chest: Effort normal and breath sounds normal. No respiratory distress. She " has no wheezes. She has no rales.   Abdominal: Soft. She exhibits no mass. There is no tenderness. No hernia.   Skin: Skin is warm and dry.   Psychiatric: She has a normal mood and affect.       Procedures      Assessment/Plan       Anette was seen today for atrial fibrillation and hypertension.    Diagnoses and all orders for this visit:    Assessment:  1. Tachycardia and dizziness  2. Paroxysmal atrial fibrillation.  CHADsVASc is at least 4 for hypertension, age, and sex.  Patient is anticoagulated on Eliquis 5 mg twice a day  3. Essential hypertension  4. Tobacco use      Plan:  1. We will proceed with event monitor to evaluate for tachyarrhythmia and any associated dizziness.  2. Plan of care has been reviewed.  Patient to continue on Eliquis, Cardizem CD, metoprolol tartrate, and atorvastatin.  3. Return to clinic in one month, sooner for any worsening or concerning symptoms.      Return in about 4 weeks (around 9/18/2019).      JUNIE Painting

## 2019-09-03 ENCOUNTER — CLINICAL SUPPORT NO REQUIREMENTS (OUTPATIENT)
Dept: CARDIOLOGY | Facility: CLINIC | Age: 79
End: 2019-09-03

## 2019-09-03 DIAGNOSIS — I48.0 PAROXYSMAL ATRIAL FIBRILLATION (HCC): Primary | ICD-10-CM

## 2019-09-03 PROCEDURE — 93228 REMOTE 30 DAY ECG REV/REPORT: CPT | Performed by: NURSE PRACTITIONER

## 2019-09-25 ENCOUNTER — TRANSCRIBE ORDERS (OUTPATIENT)
Dept: ADMINISTRATIVE | Facility: HOSPITAL | Age: 79
End: 2019-09-25

## 2019-09-25 DIAGNOSIS — H90.3 SENSORY HEARING LOSS, BILATERAL: Primary | ICD-10-CM

## 2019-09-27 ENCOUNTER — OFFICE VISIT (OUTPATIENT)
Dept: CARDIOLOGY | Facility: CLINIC | Age: 79
End: 2019-09-27

## 2019-09-27 VITALS
WEIGHT: 137 LBS | DIASTOLIC BLOOD PRESSURE: 78 MMHG | HEART RATE: 62 BPM | BODY MASS INDEX: 24.27 KG/M2 | HEIGHT: 63 IN | SYSTOLIC BLOOD PRESSURE: 138 MMHG | OXYGEN SATURATION: 96 %

## 2019-09-27 DIAGNOSIS — I48.0 PAROXYSMAL ATRIAL FIBRILLATION (HCC): Primary | ICD-10-CM

## 2019-09-27 DIAGNOSIS — I10 ESSENTIAL HYPERTENSION: ICD-10-CM

## 2019-09-27 DIAGNOSIS — Z72.0 TOBACCO ABUSE: ICD-10-CM

## 2019-09-27 PROCEDURE — 99213 OFFICE O/P EST LOW 20 MIN: CPT | Performed by: NURSE PRACTITIONER

## 2019-09-27 RX ORDER — DILTIAZEM HYDROCHLORIDE 360 MG/1
360 CAPSULE, EXTENDED RELEASE ORAL
Qty: 30 CAPSULE | Refills: 11 | Status: SHIPPED | OUTPATIENT
Start: 2019-09-27 | End: 2019-10-25 | Stop reason: SINTOL

## 2019-09-27 NOTE — PROGRESS NOTES
Subjective     Chief Complaint: Paroxysmal atrial fibrillation and Hypertension    History of Present Illness   Anette Dunham is a 79 y.o. female who presents with past medical history significant for paroxysmal atrial fibrillation, essential hypertension, and tobacco use.  She presents today for follow-up of her recent event monitor placed due to complaints of tachycardia and dizziness.    At today's visit Ms. Dunham reports that she continues to have palpitations and heart racing, especially at night.  This often disturbs her sleep, her ability to fall asleep.  She has some chest tightness and shortness of breath today which she relates to her allergies.      Current Outpatient Medications:   •  albuterol (PROVENTIL HFA;VENTOLIN HFA) 108 (90 Base) MCG/ACT inhaler, Inhale 2 puffs Every 6 (Six) Hours As Needed for Wheezing., Disp: , Rfl:   •  alendronate (FOSAMAX) 70 MG tablet, Take 70 mg by mouth Every 7 (Seven) Days. Prior to [facility] [place] Admission, Patient was on: takes on wednesdays, Disp: , Rfl:   •  apixaban (ELIQUIS) 5 MG tablet tablet, Take 1 tablet by mouth 2 (two) times a day., Disp: 60 tablet, Rfl: 5  •  atorvastatin (LIPITOR) 10 MG tablet, Take 1 tablet by mouth Every Night., Disp: 30 tablet, Rfl: 0  •  calcium carb-cholecalciferol 600-800 MG-UNIT tablet, Take 1 tablet by mouth 2 (Two) Times a Day With Meals., Disp: 60 tablet, Rfl: 0  •  dilTIAZem CD (CARDIZEM CD) 360 MG 24 hr capsule, Take 1 capsule by mouth Daily., Disp: 30 capsule, Rfl: 11  •  fexofenadine (ALLEGRA) 180 MG tablet, Take 180 mg by mouth Daily., Disp: , Rfl:   •  gabapentin (NEURONTIN) 400 MG capsule, Take 1 capsule by mouth 2 (Two) Times a Day., Disp: 6 capsule, Rfl: 0  •  lactobacillus acidophilus (RISAQUAD) capsule capsule, Take 1 capsule by mouth Daily., Disp: 7 capsule, Rfl: 0  •  levothyroxine (SYNTHROID, LEVOTHROID) 50 MCG tablet, Take 50 mcg by mouth Daily., Disp: , Rfl:   •  LORazepam (ATIVAN) 1 MG tablet, Take 1 tablet by mouth  "Every 8 (Eight) Hours As Needed for Anxiety, Disp: 9 tablet, Rfl: 0  •  metoprolol tartrate (LOPRESSOR) 25 MG tablet, Take 1 tablet by mouth 2 (Two) Times a Day., Disp: 180 tablet, Rfl: 3  •  ranitidine (ZANTAC) 300 MG capsule, Take 300 mg by mouth 2 (two) times a day., Disp: , Rfl:   •  rOPINIRole (REQUIP) 1 MG tablet, Take 1 mg by mouth At Night As Needed (restless leg). Take 1 hour before bedtime., Disp: , Rfl:   •  sennosides-docusate sodium (SENOKOT-S) 8.6-50 MG tablet, Take 2 tablets by mouth 2 (Two) Times a Day., Disp: 120 tablet, Rfl: 0  •  traMADol (ULTRAM) 50 MG tablet, Take 50 mg by mouth Every 8 (Eight) Hours As Needed for Moderate Pain ., Disp: , Rfl:   •  amoxicillin-clavulanate (AUGMENTIN) 875-125 MG per tablet, Take 1 tablet by mouth 2 (Two) Times a Day., Disp: , Rfl:      On this date:  The following portions of the patient's history were reviewed and updated as appropriate: allergies, current medications, past family history, past medical history, past social history, past surgical history and problem list.    Review of Systems   Constitution: Positive for malaise/fatigue. Negative for weakness.   Cardiovascular: Positive for palpitations. Negative for chest pain, dyspnea on exertion, irregular heartbeat, leg swelling, near-syncope, orthopnea, paroxysmal nocturnal dyspnea and syncope.   Respiratory: Positive for shortness of breath.    Hematologic/Lymphatic: Does not bruise/bleed easily.   Neurological: Negative for dizziness and light-headedness.         Objective     /78 (BP Location: Left arm, Patient Position: Sitting, Cuff Size: Adult)   Pulse 62   Ht 160 cm (63\")   Wt 62.1 kg (137 lb)   SpO2 96%   BMI 24.27 kg/m²     Physical Exam   Constitutional: She is oriented to person, place, and time. She appears well-developed and well-nourished.   HENT:   Head: Normocephalic and atraumatic.   Eyes: Pupils are equal, round, and reactive to light.   Neck: No JVD present.   Cardiovascular: " Normal rate, regular rhythm and intact distal pulses. Exam reveals no gallop and no friction rub.   No murmur heard.  Pulmonary/Chest: Effort normal and breath sounds normal. No respiratory distress. She has no wheezes. She has no rales.   Neurological: She is alert and oriented to person, place, and time.   Skin: Skin is warm and dry.   Psychiatric: She has a normal mood and affect.       Procedures      Assessment/Plan       Anette was seen today for paroxysmal atrial fibrillation and hypertension.    Diagnoses and all orders for this visit:    Assessment:  1. Paroxysmal atrial fibrillation with RVR.  CHADsVASc is at least 4 for hypertension, age and sex.  Patient is anticoagulated on Eliquis 5 mg twice a day  2. Essential hypertension  3. Tobacco use      Plan:  1. Results reviewed with patient: Event monitor results were reviewed with the patient.  She did have several episodes of atrial fibrillation with RVR, approximately 88% of the time the patient was in atrial fibrillation.  65% of the time the rate was controlled and 35% it was rapid.  She had one episode of nonsustained V. tach, 3 beat run.  2. Increase Cardizem to 360 daily.  3. Risk factor modification: Patient counseled for less than 3 minutes regarding smoking cessation.    4. Return to clinic in 2 months, sooner for any worsening or concerning symptoms.      Return in about 2 months (around 11/27/2019).      JUNIE Painting

## 2019-10-01 ENCOUNTER — APPOINTMENT (OUTPATIENT)
Dept: MRI IMAGING | Facility: HOSPITAL | Age: 79
End: 2019-10-01

## 2019-10-11 ENCOUNTER — TELEPHONE (OUTPATIENT)
Dept: CARDIOLOGY | Facility: CLINIC | Age: 79
End: 2019-10-11

## 2019-10-11 NOTE — TELEPHONE ENCOUNTER
Patient called to state that her blood pressure has been low on the new medication which was prescribed to her at her last visit on 9/30/2019.    Ms. Dunham went to see her primary care provider a day after having started the Cardizem 360 mg.  Her blood pressure was below 100 and her PCP told her to stop taking Cardizem 360 mg.  Ms. Dunham then stopped it however she decided that she needed the Cardizem and now has stopped the metoprolol.    When I prescribed Cardizem 360 Ms. Dunham she indicated to me that she currently on Cardizem 300 mg.  However, it now appears that she has not been on Cardizem 300.      I have instructed her not to take any more Cardizem and to continue metoprolol as previously prescribed.  She is to come and see me in the next week or 2 and bring all of her medications with her when she comes.    Ms. Dunham stated understanding of the plan of care.

## 2019-10-25 ENCOUNTER — OFFICE VISIT (OUTPATIENT)
Dept: CARDIOLOGY | Facility: CLINIC | Age: 79
End: 2019-10-25

## 2019-10-25 VITALS
OXYGEN SATURATION: 98 % | BODY MASS INDEX: 24.41 KG/M2 | HEIGHT: 63 IN | SYSTOLIC BLOOD PRESSURE: 120 MMHG | WEIGHT: 137.8 LBS | DIASTOLIC BLOOD PRESSURE: 78 MMHG | HEART RATE: 125 BPM

## 2019-10-25 DIAGNOSIS — I48.21 PERMANENT ATRIAL FIBRILLATION (HCC): Primary | ICD-10-CM

## 2019-10-25 DIAGNOSIS — I10 ESSENTIAL HYPERTENSION: ICD-10-CM

## 2019-10-25 DIAGNOSIS — Z72.0 TOBACCO ABUSE: ICD-10-CM

## 2019-10-25 PROCEDURE — 99213 OFFICE O/P EST LOW 20 MIN: CPT | Performed by: NURSE PRACTITIONER

## 2019-10-25 RX ORDER — FAMOTIDINE 20 MG/1
20 TABLET, FILM COATED ORAL 2 TIMES DAILY
Refills: 5 | COMMUNITY
Start: 2019-10-07 | End: 2023-01-23

## 2019-11-05 PROCEDURE — 93000 ELECTROCARDIOGRAM COMPLETE: CPT | Performed by: NURSE PRACTITIONER

## 2020-01-23 ENCOUNTER — TELEPHONE (OUTPATIENT)
Dept: CARDIOLOGY | Facility: CLINIC | Age: 80
End: 2020-01-23

## 2020-01-24 ENCOUNTER — OFFICE VISIT (OUTPATIENT)
Dept: CARDIOLOGY | Facility: CLINIC | Age: 80
End: 2020-01-24

## 2020-01-24 VITALS
BODY MASS INDEX: 23.01 KG/M2 | SYSTOLIC BLOOD PRESSURE: 105 MMHG | HEIGHT: 64 IN | DIASTOLIC BLOOD PRESSURE: 69 MMHG | OXYGEN SATURATION: 96 % | HEART RATE: 98 BPM | WEIGHT: 134.8 LBS

## 2020-01-24 DIAGNOSIS — I48.21 PERMANENT ATRIAL FIBRILLATION (HCC): Primary | ICD-10-CM

## 2020-01-24 DIAGNOSIS — I10 ESSENTIAL HYPERTENSION: ICD-10-CM

## 2020-01-24 PROCEDURE — 99213 OFFICE O/P EST LOW 20 MIN: CPT | Performed by: NURSE PRACTITIONER

## 2020-01-24 RX ORDER — DILTIAZEM HYDROCHLORIDE 120 MG/1
120 CAPSULE, COATED, EXTENDED RELEASE ORAL DAILY
Qty: 30 CAPSULE | Refills: 2 | Status: SHIPPED | OUTPATIENT
Start: 2020-01-24 | End: 2020-02-11 | Stop reason: SINTOL

## 2020-02-03 PROBLEM — Z72.0 TOBACCO ABUSE: Status: RESOLVED | Noted: 2018-05-27 | Resolved: 2020-02-03

## 2020-02-04 ENCOUNTER — TELEPHONE (OUTPATIENT)
Dept: CARDIOLOGY | Facility: CLINIC | Age: 80
End: 2020-02-04

## 2020-02-04 NOTE — TELEPHONE ENCOUNTER
Anette has called to let us know that her BP has been dropping since adding in the Diltiazem. It started out at 117/76 pulse of 65. Then dropped down to 99/51 and 90/57. She did not record what her pulse was at those times. I have asked how she is feeling with those bp readings. She states that she has been feeling very weak and washed out. I told her I would discuss with Celeste and call her back.    D/w Celeste who states she is not tolerating this well and should discontinue the Diltiazem. Patient is aware and understands.

## 2020-02-11 ENCOUNTER — OFFICE VISIT (OUTPATIENT)
Dept: CARDIOLOGY | Facility: CLINIC | Age: 80
End: 2020-02-11

## 2020-02-11 VITALS
OXYGEN SATURATION: 95 % | SYSTOLIC BLOOD PRESSURE: 118 MMHG | HEART RATE: 42 BPM | HEIGHT: 63 IN | WEIGHT: 135 LBS | DIASTOLIC BLOOD PRESSURE: 80 MMHG | BODY MASS INDEX: 23.92 KG/M2

## 2020-02-11 DIAGNOSIS — I10 ESSENTIAL HYPERTENSION: ICD-10-CM

## 2020-02-11 DIAGNOSIS — I48.21 PERMANENT ATRIAL FIBRILLATION (HCC): Primary | ICD-10-CM

## 2020-02-11 PROCEDURE — 99213 OFFICE O/P EST LOW 20 MIN: CPT | Performed by: NURSE PRACTITIONER

## 2020-02-11 NOTE — PROGRESS NOTES
Subjective     Chief Complaint: Atrial Fibrillation and Hypertension    History of Present Illness   Anette Dunham is a 79 y.o. female who presents  with a past medical history significant for paroxysmal atrial fibrillation, essential hypertension and tobacco use. She has been tried on Cardizem but is intolerant of it.  It causes increased dizziness.  She presents today for follow up.      She reports intermittent sharp pain across her chest, lasts only seconds and happens only occasionally.  There are no associated symptoms.  She does report palpitations, shortness of breath and dyspnea on exertion.  These are chronic and non worsening.    Current Outpatient Medications:   •  albuterol (PROVENTIL HFA;VENTOLIN HFA) 108 (90 Base) MCG/ACT inhaler, Inhale 2 puffs Every 6 (Six) Hours As Needed for Wheezing., Disp: , Rfl:   •  alendronate (FOSAMAX) 70 MG tablet, Take 70 mg by mouth Every 7 (Seven) Days. Prior to [facility] Admission, Patient was on: takes on wednesdays, Disp: , Rfl:   •  apixaban (ELIQUIS) 2.5 MG tablet tablet, Take 1 tablet by mouth Every 12 (Twelve) Hours., Disp: 60 tablet, Rfl: 11  •  atorvastatin (LIPITOR) 10 MG tablet, Take 1 tablet by mouth Every Night., Disp: 30 tablet, Rfl: 0  •  calcium carb-cholecalciferol 600-800 MG-UNIT tablet, Take 1 tablet by mouth 2 (Two) Times a Day With Meals., Disp: 60 tablet, Rfl: 0  •  diclofenac (VOLTAREN) 1 % gel gel, APPLY 2 GRAMS TO AFFECTED AREA FOUR TIMES A DAY FOR PAIN, Disp: , Rfl: 3  •  famotidine (PEPCID) 20 MG tablet, Take 20 mg by mouth 2 (Two) Times a Day., Disp: , Rfl: 5  •  fexofenadine (ALLEGRA) 180 MG tablet, Take 180 mg by mouth Daily., Disp: , Rfl:   •  gabapentin (NEURONTIN) 400 MG capsule, Take 1 capsule by mouth 2 (Two) Times a Day., Disp: 6 capsule, Rfl: 0  •  levothyroxine (SYNTHROID, LEVOTHROID) 50 MCG tablet, Take 50 mcg by mouth Daily., Disp: , Rfl:   •  LORazepam (ATIVAN) 1 MG tablet, Take 1 tablet by mouth Every 8 (Eight) Hours As Needed for  "Anxiety, Disp: 9 tablet, Rfl: 0  •  rOPINIRole (REQUIP) 1 MG tablet, Take 1 mg by mouth At Night As Needed (restless leg). Take 1 hour before bedtime., Disp: , Rfl:   •  sennosides-docusate sodium (SENOKOT-S) 8.6-50 MG tablet, Take 2 tablets by mouth 2 (Two) Times a Day., Disp: 120 tablet, Rfl: 0  •  traMADol (ULTRAM) 50 MG tablet, Take 50 mg by mouth Every 8 (Eight) Hours As Needed for Moderate Pain ., Disp: , Rfl:   •  metoprolol tartrate (LOPRESSOR) 25 MG tablet, Take 0.5 tablets by mouth 2 (Two) Times a Day., Disp: 30 tablet, Rfl: 11     On this date:  The following portions of the patient's history were reviewed and updated as appropriate: allergies, current medications, past family history, past medical history, past social history, past surgical history and problem list.    Review of Systems   Constitution: Positive for malaise/fatigue.   Cardiovascular: Positive for dyspnea on exertion and palpitations. Negative for chest pain, irregular heartbeat, leg swelling, near-syncope, orthopnea, paroxysmal nocturnal dyspnea and syncope.   Respiratory: Positive for shortness of breath.    Hematologic/Lymphatic: Does not bruise/bleed easily.   Neurological: Positive for dizziness, light-headedness and weakness.         Objective     /80 (BP Location: Right arm)   Pulse (!) 42   Ht 160 cm (63\")   Wt 61.2 kg (135 lb)   SpO2 95%   BMI 23.91 kg/m²     Physical Exam   Constitutional: She is oriented to person, place, and time. She appears well-developed and well-nourished.   HENT:   Head: Normocephalic and atraumatic.   Eyes: Pupils are equal, round, and reactive to light.   Neck: No JVD present.   Cardiovascular: Normal rate, regular rhythm and intact distal pulses. Exam reveals no gallop and no friction rub.   No murmur heard.  Pulmonary/Chest: Effort normal and breath sounds normal. No respiratory distress. She has no wheezes. She has no rales.   Neurological: She is alert and oriented to person, place, and " time.   Skin: Skin is warm and dry.   Psychiatric: She has a normal mood and affect.   Vitals reviewed.      Procedures      Assessment/Plan       Anette was seen today for atrial fibrillation and hypertension.    Diagnoses and all orders for this visit:    Permanent atrial fibrillation    Essential hypertension  -     metoprolol tartrate (LOPRESSOR) 25 MG tablet; Take 0.5 tablets by mouth 2 (Two) Times a Day.          Plan of care was reviewed.  Medication changes were made as noted below.  Patient agreed with plan of care.    With regard to her atrial fibrillation, her rate is slow today.  When I listened to her, however, the rate was normal.  I feel that she needs a low dose BB for the frequent palpitations and likely runs of a fib with RVR.  Will try low dose metoprolol tartrate.    Requested patient keep a blood pressure diary.  Pt also cautioned to not take metoprolol if her heart rate was below 60 or her systolic blood pressure was below 110.      Advance Care Planning   ACP discussion was held with the patient during this visit. Patient does not have an advance directive, information provided.    Return in about 3 months (around 5/11/2020).      JUNIE Painting

## 2020-06-16 ENCOUNTER — OFFICE VISIT (OUTPATIENT)
Dept: CARDIOLOGY | Facility: CLINIC | Age: 80
End: 2020-06-16

## 2020-06-16 DIAGNOSIS — R07.2 PRECORDIAL PAIN: ICD-10-CM

## 2020-06-16 DIAGNOSIS — I48.21 PERMANENT ATRIAL FIBRILLATION (HCC): Primary | ICD-10-CM

## 2020-06-16 DIAGNOSIS — I10 ESSENTIAL HYPERTENSION: ICD-10-CM

## 2020-06-16 PROCEDURE — 99213 OFFICE O/P EST LOW 20 MIN: CPT | Performed by: NURSE PRACTITIONER

## 2020-06-16 PROCEDURE — 93000 ELECTROCARDIOGRAM COMPLETE: CPT | Performed by: NURSE PRACTITIONER

## 2020-06-16 NOTE — PROGRESS NOTES
Subjective     Chief Complaint: Atrial Fibrillation and Hypertension    History of Present Illness   Anette Dunham is a 80 y.o. female who presents with a past medical history significant for paroxysmal atrial fibrillation, essential hypertension and tobacco use. She has been tried on Cardizem but is intolerant of it.  It causes increased dizziness.  She presents today for follow up.    At Ms. Dunham's last visit her heart rate was low and her metoprolol was decreased to 12.5 mg twice daily.     Ms. Dunham reports that she is having occasional chest pain.  She states it is on the left side and radiates sometimes to her jaw and shoulder.  She reports associated shortness of breath.  She reports associated palpitations.  She also reports fatigue      Current Outpatient Medications:   •  albuterol (PROVENTIL HFA;VENTOLIN HFA) 108 (90 Base) MCG/ACT inhaler, Inhale 2 puffs Every 6 (Six) Hours As Needed for Wheezing., Disp: , Rfl:   •  alendronate (FOSAMAX) 70 MG tablet, Take 70 mg by mouth Every 7 (Seven) Days. Prior to [facility] [place] Admission, Patient was on: takes on wednesdays, Disp: , Rfl:   •  apixaban (ELIQUIS) 2.5 MG tablet tablet, Take 1 tablet by mouth Every 12 (Twelve) Hours., Disp: 60 tablet, Rfl: 11  •  atorvastatin (LIPITOR) 10 MG tablet, Take 1 tablet by mouth Every Night., Disp: 30 tablet, Rfl: 0  •  calcium carb-cholecalciferol 600-800 MG-UNIT tablet, Take 1 tablet by mouth 2 (Two) Times a Day With Meals., Disp: 60 tablet, Rfl: 0  •  diclofenac (VOLTAREN) 1 % gel gel, APPLY 2 GRAMS TO AFFECTED AREA FOUR TIMES A DAY FOR PAIN, Disp: , Rfl: 3  •  famotidine (PEPCID) 20 MG tablet, Take 20 mg by mouth 2 (Two) Times a Day., Disp: , Rfl: 5  •  fexofenadine (ALLEGRA) 180 MG tablet, Take 180 mg by mouth Daily., Disp: , Rfl:   •  gabapentin (NEURONTIN) 400 MG capsule, Take 1 capsule by mouth 2 (Two) Times a Day., Disp: 6 capsule, Rfl: 0  •  levothyroxine (SYNTHROID, LEVOTHROID) 50 MCG tablet, Take 50 mcg by mouth  Daily., Disp: , Rfl:   •  LORazepam (ATIVAN) 1 MG tablet, Take 1 tablet by mouth Every 8 (Eight) Hours As Needed for Anxiety, Disp: 9 tablet, Rfl: 0  •  metoprolol tartrate (LOPRESSOR) 25 MG tablet, Take 0.5 tablets by mouth 2 (Two) Times a Day., Disp: 30 tablet, Rfl: 11  •  rOPINIRole (REQUIP) 1 MG tablet, Take 1 mg by mouth At Night As Needed (restless leg). Take 1 hour before bedtime., Disp: , Rfl:   •  sennosides-docusate sodium (SENOKOT-S) 8.6-50 MG tablet, Take 2 tablets by mouth 2 (Two) Times a Day., Disp: 120 tablet, Rfl: 0  •  traMADol (ULTRAM) 50 MG tablet, Take 50 mg by mouth Every 8 (Eight) Hours As Needed for Moderate Pain ., Disp: , Rfl:      On this date:  The following portions of the patient's history were reviewed and updated as appropriate: allergies, current medications, past family history, past medical history, past social history, past surgical history and problem list.    Review of Systems   Constitution: Positive for malaise/fatigue.   Cardiovascular: Positive for chest pain and palpitations. Negative for dyspnea on exertion, irregular heartbeat, leg swelling, near-syncope, orthopnea, paroxysmal nocturnal dyspnea and syncope.   Respiratory: Positive for shortness of breath.    Hematologic/Lymphatic: Does not bruise/bleed easily.   Neurological: Negative for dizziness, light-headedness and weakness.         Objective     There were no vitals taken for this visit.    Physical Exam   Constitutional: She is oriented to person, place, and time. She appears well-developed and well-nourished.   HENT:   Head: Normocephalic and atraumatic.   Eyes: Pupils are equal, round, and reactive to light.   Neck: No JVD present.   Cardiovascular: Normal rate, regular rhythm and intact distal pulses. Exam reveals no gallop and no friction rub.   No murmur heard.  Pulmonary/Chest: Effort normal and breath sounds normal. No respiratory distress. She has no wheezes. She has no rales.   Neurological: She is alert  and oriented to person, place, and time.   Skin: Skin is warm and dry.   Psychiatric: She has a normal mood and affect.         ECG 12 Lead  Date/Time: 6/17/2020 12:22 PM  Performed by: Celeste Nielsen APRN  Authorized by: Celeste Nielsen APRN   Comparison: compared with previous ECG from 11/5/2019  Similar to previous ECG  Rhythm: atrial fibrillation  Comments: Left axis deviation, LVH                Assessment/Plan       Anette was seen today for atrial fibrillation and hypertension.    Diagnoses and all orders for this visit:    Permanent atrial fibrillation  -     Adult Transthoracic Echo Complete W/ Cont if Necessary Per Protocol; Future  -     ECG 12 Lead    Essential hypertension  -     ECG 12 Lead    Precordial pain  -     Stress Test With Myocardial Perfusion One Day; Future  -     ECG 12 Lead          Plan of care was reviewed.  Medication changes were made as noted below.  Patient agreed with plan of care.    Due to patient's report of chest pain with concerning associated symptoms, I have ordered a nuclear stress test and a echocardiogram.    With regard to permanent atrial fibrillation, the rate is controlled.  Continue Eliquis for stroke prophylaxis and metoprolol for rate control.    Hypertension is controlled    Risk factor modification: Patient counseled regarding diet and exercise.  Patient encouraged to follow Mediterranean diet.  Handout given.  Patient counseled to participate in physical activity 30 minutes a day most days of the week.      Risk factor modification: The patient was counseled regarding the correlation between uncontrolled diabetes and coronary artery disease.  The patient was urged to follow an appropriate diet with very limited simple carbohydrates and discuss with their primary care provider possible strategies to control blood sugar, including additional medications.    No follow-ups on file.      JUNIE Painting

## 2020-06-25 ENCOUNTER — TELEPHONE (OUTPATIENT)
Dept: CARDIOLOGY | Facility: CLINIC | Age: 80
End: 2020-06-25

## 2020-06-25 NOTE — TELEPHONE ENCOUNTER
Patient received our letter about test being unable to schedule. I have transferred her down to scheduling at [phone] to set up stress and echo

## 2020-07-14 ENCOUNTER — APPOINTMENT (OUTPATIENT)
Dept: CARDIOLOGY | Facility: HOSPITAL | Age: 80
End: 2020-07-14

## 2020-07-14 ENCOUNTER — APPOINTMENT (OUTPATIENT)
Dept: NUCLEAR MEDICINE | Facility: HOSPITAL | Age: 80
End: 2020-07-14

## 2020-07-22 ENCOUNTER — HOSPITAL ENCOUNTER (OUTPATIENT)
Dept: CARDIOLOGY | Facility: HOSPITAL | Age: 80
Discharge: HOME | End: 2020-07-22

## 2020-07-22 ENCOUNTER — APPOINTMENT (OUTPATIENT)
Dept: GENERAL RADIOLOGY | Facility: HOSPITAL | Age: 80
End: 2020-07-22

## 2020-07-22 ENCOUNTER — HOSPITAL ENCOUNTER (EMERGENCY)
Facility: HOSPITAL | Age: 80
Discharge: HOME | End: 2020-07-22
Attending: EMERGENCY MEDICINE | Admitting: EMERGENCY MEDICINE

## 2020-07-22 VITALS
OXYGEN SATURATION: 100 % | HEIGHT: 63 IN | WEIGHT: 130 LBS | SYSTOLIC BLOOD PRESSURE: 124 MMHG | BODY MASS INDEX: 23.04 KG/M2 | HEART RATE: 93 BPM | RESPIRATION RATE: 18 BRPM | TEMPERATURE: 98.6 F | DIASTOLIC BLOOD PRESSURE: 90 MMHG

## 2020-07-22 DIAGNOSIS — I48.21 PERMANENT ATRIAL FIBRILLATION (HCC): ICD-10-CM

## 2020-07-22 DIAGNOSIS — I48.20 CHRONIC ATRIAL FIBRILLATION (HCC): Primary | ICD-10-CM

## 2020-07-22 LAB
ALBUMIN SERPL-MCNC: 4.37 G/DL (ref 3.5–5.2)
ALBUMIN/GLOB SERPL: 0.8 G/DL
ALP SERPL-CCNC: 50 U/L (ref 39–117)
ALT SERPL W P-5'-P-CCNC: 14 U/L (ref 1–33)
ANION GAP SERPL CALCULATED.3IONS-SCNC: 11.6 MMOL/L (ref 5–15)
AST SERPL-CCNC: 31 U/L (ref 1–32)
BASOPHILS # BLD AUTO: 0.06 10*3/MM3 (ref 0–0.2)
BASOPHILS NFR BLD AUTO: 1 % (ref 0–1.5)
BILIRUB SERPL-MCNC: 0.5 MG/DL (ref 0–1.2)
BILIRUB UR QL STRIP: NEGATIVE
BUN SERPL-MCNC: 12 MG/DL (ref 8–23)
BUN/CREAT SERPL: 11.9 (ref 7–25)
CALCIUM SPEC-SCNC: 10.2 MG/DL (ref 8.6–10.5)
CHLORIDE SERPL-SCNC: 101 MMOL/L (ref 98–107)
CLARITY UR: CLEAR
CLUMPED PLATELETS: PRESENT
CO2 SERPL-SCNC: 27.4 MMOL/L (ref 22–29)
COLOR UR: YELLOW
CREAT SERPL-MCNC: 1.01 MG/DL (ref 0.57–1)
DEPRECATED RDW RBC AUTO: 46.5 FL (ref 37–54)
EOSINOPHIL # BLD AUTO: 0.18 10*3/MM3 (ref 0–0.4)
EOSINOPHIL NFR BLD AUTO: 3.1 % (ref 0.3–6.2)
ERYTHROCYTE [DISTWIDTH] IN BLOOD BY AUTOMATED COUNT: 13 % (ref 12.3–15.4)
GFR SERPL CREATININE-BSD FRML MDRD: 53 ML/MIN/1.73
GLOBULIN UR ELPH-MCNC: 5.2 GM/DL
GLUCOSE SERPL-MCNC: 96 MG/DL (ref 65–99)
GLUCOSE UR STRIP-MCNC: NEGATIVE MG/DL
HCT VFR BLD AUTO: 43.4 % (ref 34–46.6)
HGB BLD-MCNC: 14 G/DL (ref 12–15.9)
HGB UR QL STRIP.AUTO: NEGATIVE
HOLD SPECIMEN: NORMAL
HOLD SPECIMEN: NORMAL
IMM GRANULOCYTES # BLD AUTO: 0.02 10*3/MM3 (ref 0–0.05)
IMM GRANULOCYTES NFR BLD AUTO: 0.3 % (ref 0–0.5)
KETONES UR QL STRIP: NEGATIVE
LEUKOCYTE ESTERASE UR QL STRIP.AUTO: NEGATIVE
LYMPHOCYTES # BLD AUTO: 1.47 10*3/MM3 (ref 0.7–3.1)
LYMPHOCYTES NFR BLD AUTO: 25.1 % (ref 19.6–45.3)
MAGNESIUM SERPL-MCNC: 1.7 MG/DL (ref 1.6–2.4)
MCH RBC QN AUTO: 31.4 PG (ref 26.6–33)
MCHC RBC AUTO-ENTMCNC: 32.3 G/DL (ref 31.5–35.7)
MCV RBC AUTO: 97.3 FL (ref 79–97)
MONOCYTES # BLD AUTO: 0.39 10*3/MM3 (ref 0.1–0.9)
MONOCYTES NFR BLD AUTO: 6.7 % (ref 5–12)
NEUTROPHILS NFR BLD AUTO: 3.73 10*3/MM3 (ref 1.7–7)
NEUTROPHILS NFR BLD AUTO: 63.8 % (ref 42.7–76)
NITRITE UR QL STRIP: NEGATIVE
NRBC BLD AUTO-RTO: 0 /100 WBC (ref 0–0.2)
NT-PROBNP SERPL-MCNC: 3434 PG/ML (ref 0–1800)
PH UR STRIP.AUTO: 6.5 [PH] (ref 5–8)
PLATELET # BLD AUTO: 157 10*3/MM3 (ref 140–450)
PMV BLD AUTO: 11.2 FL (ref 6–12)
POTASSIUM SERPL-SCNC: 3.9 MMOL/L (ref 3.5–5.2)
PROT SERPL-MCNC: 9.6 G/DL (ref 6–8.5)
PROT UR QL STRIP: NEGATIVE
RBC # BLD AUTO: 4.46 10*6/MM3 (ref 3.77–5.28)
RBC MORPH BLD: NORMAL
SODIUM SERPL-SCNC: 140 MMOL/L (ref 136–145)
SP GR UR STRIP: 1.01 (ref 1–1.03)
T4 FREE SERPL-MCNC: 1.32 NG/DL (ref 0.93–1.7)
TROPONIN T SERPL-MCNC: <0.01 NG/ML (ref 0–0.03)
TROPONIN T SERPL-MCNC: <0.01 NG/ML (ref 0–0.03)
TSH SERPL DL<=0.05 MIU/L-ACNC: 1.03 UIU/ML (ref 0.27–4.2)
UROBILINOGEN UR QL STRIP: NORMAL
WBC # BLD AUTO: 5.85 10*3/MM3 (ref 3.4–10.8)
WHOLE BLOOD HOLD SPECIMEN: NORMAL
WHOLE BLOOD HOLD SPECIMEN: NORMAL

## 2020-07-22 PROCEDURE — 80053 COMPREHEN METABOLIC PANEL: CPT | Performed by: EMERGENCY MEDICINE

## 2020-07-22 PROCEDURE — 81003 URINALYSIS AUTO W/O SCOPE: CPT | Performed by: EMERGENCY MEDICINE

## 2020-07-22 PROCEDURE — 84443 ASSAY THYROID STIM HORMONE: CPT | Performed by: EMERGENCY MEDICINE

## 2020-07-22 PROCEDURE — 99285 EMERGENCY DEPT VISIT HI MDM: CPT

## 2020-07-22 PROCEDURE — 93010 ELECTROCARDIOGRAM REPORT: CPT | Performed by: INTERNAL MEDICINE

## 2020-07-22 PROCEDURE — 93005 ELECTROCARDIOGRAM TRACING: CPT | Performed by: EMERGENCY MEDICINE

## 2020-07-22 PROCEDURE — 85025 COMPLETE CBC W/AUTO DIFF WBC: CPT | Performed by: EMERGENCY MEDICINE

## 2020-07-22 PROCEDURE — 71045 X-RAY EXAM CHEST 1 VIEW: CPT | Performed by: RADIOLOGY

## 2020-07-22 PROCEDURE — 93306 TTE W/DOPPLER COMPLETE: CPT | Performed by: INTERNAL MEDICINE

## 2020-07-22 PROCEDURE — 83735 ASSAY OF MAGNESIUM: CPT | Performed by: EMERGENCY MEDICINE

## 2020-07-22 PROCEDURE — 84484 ASSAY OF TROPONIN QUANT: CPT | Performed by: EMERGENCY MEDICINE

## 2020-07-22 PROCEDURE — 84439 ASSAY OF FREE THYROXINE: CPT | Performed by: EMERGENCY MEDICINE

## 2020-07-22 PROCEDURE — 71045 X-RAY EXAM CHEST 1 VIEW: CPT

## 2020-07-22 PROCEDURE — 83880 ASSAY OF NATRIURETIC PEPTIDE: CPT | Performed by: EMERGENCY MEDICINE

## 2020-07-22 PROCEDURE — 85007 BL SMEAR W/DIFF WBC COUNT: CPT | Performed by: EMERGENCY MEDICINE

## 2020-07-22 PROCEDURE — 93306 TTE W/DOPPLER COMPLETE: CPT

## 2020-07-22 RX ORDER — SODIUM CHLORIDE 0.9 % (FLUSH) 0.9 %
10 SYRINGE (ML) INJECTION AS NEEDED
Status: DISCONTINUED | OUTPATIENT
Start: 2020-07-22 | End: 2020-07-22 | Stop reason: HOSPADM

## 2020-07-22 RX ADMIN — METOPROLOL TARTRATE 12.5 MG: 25 TABLET, FILM COATED ORAL at 11:58

## 2020-07-22 RX ADMIN — APIXABAN 2.5 MG: 2.5 TABLET, FILM COATED ORAL at 12:00

## 2020-07-22 NOTE — ED NOTES
Discharge instructions reviewed with patient, patient instructed to return to ED if she becomes symptomatic or if any new problems arise. Patient instructed to follow up with Desire, her cardiologist. Patient verbalizes understanding of discharge instructions, patient ambulatory out of ED with family member. No acute distress noted.     Genie Matthews, HARRY  07/22/20 [id number]

## 2020-07-22 NOTE — ED PROVIDER NOTES
"Subjective   Patient is an 80-year-old female sent from the stress lab today.  They report that she came for her scheduled stress test, and they noted her to have atrial fibrillation with rapid ventricular response.  Patient reports that she is always in atrial fibrillation, has constantly been in atrial fibrillation for many years.  Patient reports that she can feel the palpitations, states that this is not at all unusual for her.  She denies chest pain, shortness of breath, cough, nausea, vomiting, diaphoresis, syncope or near syncope, other symptoms or other complaints.  Patient does have a history of atrial fibrillation, for which she is chronically anticoagulated with Coumadin.  Her chart reflects a diagnosis of \"permanent atrial fibrillation\".          Review of Systems   Constitutional: Negative for chills, diaphoresis and fever.   HENT: Negative for ear pain, sore throat and trouble swallowing.    Eyes: Negative for photophobia and pain.   Respiratory: Negative for shortness of breath, wheezing and stridor.    Cardiovascular: Positive for palpitations. Negative for chest pain.   Gastrointestinal: Negative for abdominal distention, abdominal pain, blood in stool, diarrhea, nausea and vomiting.   Endocrine: Negative for polydipsia and polyphagia.   Genitourinary: Negative for difficulty urinating and flank pain.   Musculoskeletal: Negative for back pain, neck pain and neck stiffness.   Skin: Negative for color change and pallor.   Neurological: Negative for seizures, syncope and speech difficulty.   Psychiatric/Behavioral: Negative for confusion.   All other systems reviewed and are negative.      Past Medical History:   Diagnosis Date   • Anxiety    • Arrhythmia    • Atrial fibrillation (CMS/HCC)    • Coronary artery disease    • Disease of thyroid gland    • Dysfunctional gallbladder    • GERD (gastroesophageal reflux disease)    • Hypertension    • Osteoporosis        No Known Allergies    Past Surgical " History:   Procedure Laterality Date   • ABDOMINAL SURGERY     • CHOLECYSTECTOMY OPEN     • HIP HEMIARTHROPLASTY Left 5/29/2018    Procedure: LEFT HIP BIPOLAR HEMIARTHROPLASTY;  Surgeon: Trevon Arriaga MD;  Location: [facility];  Service: Orthopedics       Family History   Problem Relation Age of Onset   • Heart disease Father        Social History     Socioeconomic History   • Marital status:      Spouse name: Not on file   • Number of children: Not on file   • Years of education: Not on file   • Highest education level: Not on file   Tobacco Use   • Smoking status: Former Smoker     Packs/day: 0.25     Years: 15.00     Pack years: 3.75     Types: Cigarettes   • Smokeless tobacco: Current User     Types: Snuff   • Tobacco comment: snuff for 60 years    Substance and Sexual Activity   • Alcohol use: No   • Drug use: No   • Sexual activity: Defer           Objective   Physical Exam   Constitutional: She is oriented to person, place, and time. She appears well-developed and well-nourished. No distress.   HENT:   Head: Normocephalic and atraumatic.   Eyes: Pupils are equal, round, and reactive to light. EOM are normal. No scleral icterus.   Neck: Normal range of motion. Neck supple. No neck rigidity. No tracheal deviation present.   Cardiovascular: Intact distal pulses.   Irregularly irregular, tachycardic   Pulmonary/Chest: Effort normal and breath sounds normal. No respiratory distress. She exhibits no tenderness.   Abdominal: Soft. Bowel sounds are normal. There is no tenderness. There is no rebound and no guarding.   Musculoskeletal: Normal range of motion. She exhibits no tenderness.   Neurological: She is alert and oriented to person, place, and time. She has normal strength. No sensory deficit. She exhibits normal muscle tone. Coordination normal. GCS eye subscore is 4. GCS verbal subscore is 5. GCS motor subscore is 6.   Skin: Skin is warm and dry. Capillary refill takes less than 2 seconds. She is not  diaphoretic. No cyanosis. No pallor.   Psychiatric: She has a normal mood and affect. Her behavior is normal.   Nursing note and vitals reviewed.      Procedures           ED Course  ED Course as of Jul 22 1906   Wed Jul 22, 2020   1617 Patient's emergency department stay has been uneventful.  Never has she shown any signs of distress.  She voices that she feels completely normal, wants to go home, wants to be discharged.  Her family agrees with this.  Patient is discharged to home.  She is to follow-up closely with her cardiologist.  She will return to the emergency department right away if her symptoms worsen or if she has any problems.  She is discharged home in care of family.    [CM]      ED Course User Index  [CM] Minor Reynoso MD                                           [facility]    Final diagnoses:   Chronic atrial fibrillation (CMS/HCC)             Please note that portions of this note were completed with a voice recognition program.        Minor Reynoso MD  07/22/20 1906

## 2020-07-22 NOTE — ED NOTES
Attempted to take pt to bathroom upon request. Rejected attempt and asked for same-sex assistance to the restroom.      Jose Peterson, PCT  07/22/20 4019

## 2020-07-23 LAB
BH CV ECHO MEAS - % IVS THICK: 1.1 %
BH CV ECHO MEAS - % LVPW THICK: 70.6 %
BH CV ECHO MEAS - ACS: 2.2 CM
BH CV ECHO MEAS - AO ROOT AREA (BSA CORRECTED): 2
BH CV ECHO MEAS - AO ROOT AREA: 9.1 CM^2
BH CV ECHO MEAS - AO ROOT DIAM: 3.4 CM
BH CV ECHO MEAS - BSA(HAYCOCK): 1.8 M^2
BH CV ECHO MEAS - BSA: 1.7 M^2
BH CV ECHO MEAS - BZI_BMI: 27.5 KILOGRAMS/M^2
BH CV ECHO MEAS - BZI_METRIC_HEIGHT: 160 CM
BH CV ECHO MEAS - BZI_METRIC_WEIGHT: 70.3 KG
BH CV ECHO MEAS - EDV(CUBED): 87.5 ML
BH CV ECHO MEAS - EDV(MOD-SP4): 41.2 ML
BH CV ECHO MEAS - EDV(TEICH): 89.6 ML
BH CV ECHO MEAS - EF(CUBED): 41.2 %
BH CV ECHO MEAS - EF(MOD-SP4): 60 %
BH CV ECHO MEAS - EF(TEICH): 34.3 %
BH CV ECHO MEAS - ESV(CUBED): 51.5 ML
BH CV ECHO MEAS - ESV(MOD-SP4): 16.5 ML
BH CV ECHO MEAS - ESV(TEICH): 58.9 ML
BH CV ECHO MEAS - FS: 16.2 %
BH CV ECHO MEAS - IVS/LVPW: 1.1
BH CV ECHO MEAS - IVSD: 0.97 CM
BH CV ECHO MEAS - IVSS: 0.99 CM
BH CV ECHO MEAS - LA DIMENSION: 4.8 CM
BH CV ECHO MEAS - LA/AO: 1.4
BH CV ECHO MEAS - LV DIASTOLIC VOL/BSA (35-75): 23.7 ML/M^2
BH CV ECHO MEAS - LV MASS(C)D: 132.9 GRAMS
BH CV ECHO MEAS - LV MASS(C)DI: 76.6 GRAMS/M^2
BH CV ECHO MEAS - LV MASS(C)S: 152.7 GRAMS
BH CV ECHO MEAS - LV MASS(C)SI: 88 GRAMS/M^2
BH CV ECHO MEAS - LV SYSTOLIC VOL/BSA (12-30): 9.5 ML/M^2
BH CV ECHO MEAS - LVIDD: 4.4 CM
BH CV ECHO MEAS - LVIDS: 3.7 CM
BH CV ECHO MEAS - LVLD AP4: 6.3 CM
BH CV ECHO MEAS - LVLS AP4: 5.5 CM
BH CV ECHO MEAS - LVOT AREA (M): 2.5 CM^2
BH CV ECHO MEAS - LVOT AREA: 2.5 CM^2
BH CV ECHO MEAS - LVOT DIAM: 1.8 CM
BH CV ECHO MEAS - LVPWD: 0.86 CM
BH CV ECHO MEAS - LVPWS: 1.5 CM
BH CV ECHO MEAS - MV A MAX VEL: 30 CM/SEC
BH CV ECHO MEAS - MV E MAX VEL: 83.1 CM/SEC
BH CV ECHO MEAS - MV E/A: 2.8
BH CV ECHO MEAS - PA ACC TIME: 0.07 SEC
BH CV ECHO MEAS - PA PR(ACCEL): 45.7 MMHG
BH CV ECHO MEAS - RAP SYSTOLE: 10 MMHG
BH CV ECHO MEAS - RVDD: 2 CM
BH CV ECHO MEAS - RVSP: 35.4 MMHG
BH CV ECHO MEAS - SI(CUBED): 20.8 ML/M^2
BH CV ECHO MEAS - SI(MOD-SP4): 14.2 ML/M^2
BH CV ECHO MEAS - SI(TEICH): 17.7 ML/M^2
BH CV ECHO MEAS - SV(CUBED): 36 ML
BH CV ECHO MEAS - SV(MOD-SP4): 24.7 ML
BH CV ECHO MEAS - SV(TEICH): 30.7 ML
BH CV ECHO MEAS - TR MAX VEL: 252 CM/SEC
MAXIMAL PREDICTED HEART RATE: 139 BPM
STRESS TARGET HR: 118 BPM

## 2020-07-27 ENCOUNTER — OFFICE VISIT (OUTPATIENT)
Dept: CARDIOLOGY | Facility: CLINIC | Age: 80
End: 2020-07-27

## 2020-07-27 VITALS
DIASTOLIC BLOOD PRESSURE: 89 MMHG | BODY MASS INDEX: 24.02 KG/M2 | OXYGEN SATURATION: 92 % | SYSTOLIC BLOOD PRESSURE: 155 MMHG | HEART RATE: 88 BPM | WEIGHT: 135.6 LBS

## 2020-07-27 DIAGNOSIS — I10 ESSENTIAL HYPERTENSION: ICD-10-CM

## 2020-07-27 DIAGNOSIS — I48.91 ATRIAL FIBRILLATION WITH RVR (HCC): Primary | ICD-10-CM

## 2020-07-27 PROCEDURE — 99213 OFFICE O/P EST LOW 20 MIN: CPT | Performed by: NURSE PRACTITIONER

## 2020-07-27 NOTE — PROGRESS NOTES
Subjective     Chief Complaint: Atrial Fibrillation    History of Present Illness   Anette Dunham is a 80 y.o. female who presents with a past medical history significant for paroxysmal atrial fibrillation, essential hypertension and tobacco use. She has been tried on Cardizem but is intolerant of it.  It causes increased dizziness.  She presents today for follow up.    At patient's last visit an echo and stress was ordered due to chest pain.  When she presented for her stress test she was in afib with RVR and was referred to ER for treatment.  She was discharged from ER and asked to follow up with us.      She tells me today that her heart has accellerations daily and she has to rest in order to get her heart rate down.        Current Outpatient Medications:   •  albuterol (PROVENTIL HFA;VENTOLIN HFA) 108 (90 Base) MCG/ACT inhaler, Inhale 2 puffs Every 6 (Six) Hours As Needed for Wheezing., Disp: , Rfl:   •  alendronate (FOSAMAX) 70 MG tablet, Take 70 mg by mouth Every 7 (Seven) Days. Prior to [facility] Admission, Patient was on: takes on wednesdays, Disp: , Rfl:   •  apixaban (ELIQUIS) 2.5 MG tablet tablet, Take 1 tablet by mouth Every 12 (Twelve) Hours., Disp: 60 tablet, Rfl: 11  •  atorvastatin (LIPITOR) 10 MG tablet, Take 1 tablet by mouth Every Night., Disp: 30 tablet, Rfl: 0  •  calcium carb-cholecalciferol 600-800 MG-UNIT tablet, Take 1 tablet by mouth 2 (Two) Times a Day With Meals., Disp: 60 tablet, Rfl: 0  •  diclofenac (VOLTAREN) 1 % gel gel, APPLY 2 GRAMS TO AFFECTED AREA FOUR TIMES A DAY FOR PAIN, Disp: , Rfl: 3  •  famotidine (PEPCID) 20 MG tablet, Take 20 mg by mouth 2 (Two) Times a Day., Disp: , Rfl: 5  •  fexofenadine (ALLEGRA) 180 MG tablet, Take 180 mg by mouth Daily., Disp: , Rfl:   •  gabapentin (NEURONTIN) 400 MG capsule, Take 1 capsule by mouth 2 (Two) Times a Day., Disp: 6 capsule, Rfl: 0  •  levothyroxine (SYNTHROID, LEVOTHROID) 50 MCG tablet, Take 50 mcg by mouth Daily., Disp: , Rfl:   •   LORazepam (ATIVAN) 1 MG tablet, Take 1 tablet by mouth Every 8 (Eight) Hours As Needed for Anxiety, Disp: 9 tablet, Rfl: 0  •  metoprolol tartrate (LOPRESSOR) 25 MG tablet, Take 0.5 tablets by mouth 2 (Two) Times a Day., Disp: 30 tablet, Rfl: 11  •  rOPINIRole (REQUIP) 1 MG tablet, Take 1 mg by mouth At Night As Needed (restless leg). Take 1 hour before bedtime., Disp: , Rfl:   •  sennosides-docusate sodium (SENOKOT-S) 8.6-50 MG tablet, Take 2 tablets by mouth 2 (Two) Times a Day., Disp: 120 tablet, Rfl: 0  •  traMADol (ULTRAM) 50 MG tablet, Take 50 mg by mouth Every 8 (Eight) Hours As Needed for Moderate Pain ., Disp: , Rfl:      On this date:  The following portions of the patient's history were reviewed and updated as appropriate: allergies, current medications, past family history, past medical history, past social history, past surgical history and problem list.    Review of Systems   Constitution: Positive for malaise/fatigue.   HENT: Negative.    Eyes: Negative.    Cardiovascular: Positive for dyspnea on exertion, irregular heartbeat and leg swelling.   Endocrine: Negative.    Hematologic/Lymphatic: Bruises/bleeds easily.   Skin: Negative.    Musculoskeletal: Negative.    Gastrointestinal: Negative.    Genitourinary: Negative.    Neurological: Positive for dizziness, light-headedness and weakness.   Psychiatric/Behavioral: Negative.    Allergic/Immunologic: Negative.          Objective     /89 (BP Location: Left arm)   Pulse 88   Wt 61.5 kg (135 lb 9.6 oz)   SpO2 92%   BMI 24.02 kg/m²     Physical Exam   Constitutional: She appears well-developed and well-nourished.   HENT:   Head: Normocephalic and atraumatic.   Eyes: Pupils are equal, round, and reactive to light.   Neck: No JVD present.   Cardiovascular: Normal rate, regular rhythm and intact distal pulses. Exam reveals no gallop and no friction rub.   No murmur heard.  Pulmonary/Chest: Effort normal and breath sounds normal. No respiratory  distress. She has no wheezes. She has no rales.   Skin: Skin is warm and dry.   Psychiatric: She has a normal mood and affect.       Procedures      Assessment/Plan       Anette was seen today for atrial fibrillation.    Diagnoses and all orders for this visit:    Atrial fibrillation with RVR (CMS/[facility])  -     Cardiac Event Monitor    Essential hypertension          Plan of care was reviewed.  Medication changes were made as noted below.  Patient agreed with plan of care.    With regard to the atrial fibrillation with RVR, patient to continue Eliquis for stroke prophylaxis.  Continue metoprolol for rate control.  Patient has been intolerant to Cardizem in the past due to increased dizziness.  I did discuss the patient with Dr. Allen who recommended a event monitor to find the true burden of A. fib RVR.  Will consider referral to EP for further management if necessary.    Hypertension is controlled.  Home blood pressures are better than today's.  Requested patient keep a blood pressure diary.    Return in about 3 months (around 10/27/2020).      JUNIE Painting

## 2020-07-29 ENCOUNTER — TELEPHONE (OUTPATIENT)
Dept: CARDIOLOGY | Facility: CLINIC | Age: 80
End: 2020-07-29

## 2020-07-29 NOTE — TELEPHONE ENCOUNTER
Called Anette back to let her know that [facility] had discussed her past event monitor and they would like her to wear another one. We would get her registered and then actually have it mailed directly to her house to be put on. She is aware and understands.

## 2020-07-31 ENCOUNTER — EPISODE CHANGES (OUTPATIENT)
Dept: CASE MANAGEMENT | Facility: OTHER | Age: 80
End: 2020-07-31

## 2020-08-05 ENCOUNTER — APPOINTMENT (OUTPATIENT)
Dept: CARDIOLOGY | Facility: HOSPITAL | Age: 80
End: 2020-08-05

## 2020-08-05 ENCOUNTER — APPOINTMENT (OUTPATIENT)
Dept: NUCLEAR MEDICINE | Facility: HOSPITAL | Age: 80
End: 2020-08-05

## 2020-08-17 PROCEDURE — 93228 REMOTE 30 DAY ECG REV/REPORT: CPT | Performed by: NURSE PRACTITIONER

## 2020-10-26 NOTE — PROGRESS NOTES
Subjective     Chief Complaint: Atrial Fibrillation and Hypertension    History of Present Illness   Anette Dunham is a 80 y.o. female who presents with a past medical history significant for paroxysmal atrial fibrillation, essential hypertension and tobacco use. She has been tried on Cardizem but is intolerant of it.  It causes increased dizziness.  She presents today for follow up.    Ms. Dunham reports continued episodes of fast heart rate and palpitations.  She does also state that at times she loses her balance.  This morning she lost her balance while in the bathroom and fell against the commode.  She believes that these episodes are due to her high heart rate.  She denies sensation of dizziness before falling.  She denies loss of consciousness.  Patient states she has not talked to her primary care provider regarding the falls that she has had.  She reports symptoms of fatigue, shortness of breath and dyspnea on exertion.  She does report occasional dizziness, although again states that these is not related to the times that she falls.  She reports lower extremity swelling.    Ms. Dunham states that her blood pressures at home are in the 140s and 150s systolically.      Current Outpatient Medications:   •  albuterol (PROVENTIL HFA;VENTOLIN HFA) 108 (90 Base) MCG/ACT inhaler, Inhale 2 puffs Every 6 (Six) Hours As Needed for Wheezing., Disp: , Rfl:   •  alendronate (FOSAMAX) 70 MG tablet, Take 70 mg by mouth Every 7 (Seven) Days. Prior to [facility] Admission, Patient was on: takes on wednesdays, Disp: , Rfl:   •  apixaban (ELIQUIS) 2.5 MG tablet tablet, Take 1 tablet by mouth Every 12 (Twelve) Hours., Disp: 60 tablet, Rfl: 11  •  atorvastatin (LIPITOR) 10 MG tablet, Take 1 tablet by mouth Every Night., Disp: 30 tablet, Rfl: 0  •  calcium carb-cholecalciferol 600-800 MG-UNIT tablet, Take 1 tablet by mouth 2 (Two) Times a Day With Meals., Disp: 60 tablet, Rfl: 0  •  diclofenac (VOLTAREN) 1 % gel gel, APPLY 2  "GRAMS TO AFFECTED AREA FOUR TIMES A DAY FOR PAIN, Disp: , Rfl: 3  •  famotidine (PEPCID) 20 MG tablet, Take 20 mg by mouth 2 (Two) Times a Day., Disp: , Rfl: 5  •  fexofenadine (ALLEGRA) 180 MG tablet, Take 180 mg by mouth Daily., Disp: , Rfl:   •  gabapentin (NEURONTIN) 400 MG capsule, Take 1 capsule by mouth 2 (Two) Times a Day., Disp: 6 capsule, Rfl: 0  •  levothyroxine (SYNTHROID, LEVOTHROID) 50 MCG tablet, Take 50 mcg by mouth Daily., Disp: , Rfl:   •  LORazepam (ATIVAN) 1 MG tablet, Take 1 tablet by mouth Every 8 (Eight) Hours As Needed for Anxiety, Disp: 9 tablet, Rfl: 0  •  metoprolol tartrate (LOPRESSOR) 50 MG tablet, Take 1 tablet by mouth 2 (Two) Times a Day., Disp: 60 tablet, Rfl: 11  •  rOPINIRole (REQUIP) 1 MG tablet, Take 1 mg by mouth At Night As Needed (restless leg). Take 1 hour before bedtime., Disp: , Rfl:   •  sennosides-docusate sodium (SENOKOT-S) 8.6-50 MG tablet, Take 2 tablets by mouth 2 (Two) Times a Day., Disp: 120 tablet, Rfl: 0  •  traMADol (ULTRAM) 50 MG tablet, Take 50 mg by mouth Every 8 (Eight) Hours As Needed for Moderate Pain ., Disp: , Rfl:   •  sertraline (ZOLOFT) 100 MG tablet, , Disp: , Rfl:      On this date:  The following portions of the patient's history were reviewed and updated as appropriate: allergies, current medications, past family history, past medical history, past social history, past surgical history and problem list.    Review of Systems   Constitution: Positive for malaise/fatigue.   Cardiovascular: Positive for dyspnea on exertion, irregular heartbeat, leg swelling, near-syncope, orthopnea and palpitations. Negative for chest pain, paroxysmal nocturnal dyspnea and syncope.   Respiratory: Positive for shortness of breath.    Hematologic/Lymphatic: Bruises/bleeds easily.   Neurological: Positive for dizziness. Negative for light-headedness and weakness.         Objective     /87 (BP Location: Right arm)   Pulse 120   Ht 161.3 cm (63.5\")   Wt 63.7 kg " (140 lb 6.4 oz)   SpO2 99%   BMI 24.48 kg/m²     Physical Exam  Constitutional:       Appearance: Normal appearance. She is well-developed.   HENT:      Head: Normocephalic and atraumatic.   Eyes:      Pupils: Pupils are equal, round, and reactive to light.   Neck:      Vascular: No JVD.   Cardiovascular:      Rate and Rhythm: Normal rate. Rhythm irregular.      Heart sounds: No murmur. No friction rub. No gallop.    Pulmonary:      Effort: Pulmonary effort is normal. No respiratory distress.      Breath sounds: Normal breath sounds. No wheezing or rales.   Abdominal:      Palpations: Abdomen is soft. There is no mass.      Tenderness: There is no abdominal tenderness.      Hernia: No hernia is present.   Skin:     General: Skin is warm and dry.   Neurological:      Mental Status: She is alert and oriented to person, place, and time.   Psychiatric:         Mood and Affect: Mood normal.         Behavior: Behavior normal.           ECG 12 Lead    Date/Time: 10/27/2020 9:55 AM  Performed by: Celeste Nielsen APRN  Authorized by: Celeste Nielsen APRN   Comparison: compared with previous ECG from 7/20/2020  Similar to previous ECG  Comparison to previous ECG: Atrial fibrillation with aberrantly conducted complexes  Rhythm: atrial fibrillation  Rate: normal  BPM: 83  QRS axis: left  Comments:               Assessment/Plan       Diagnoses and all orders for this visit:    1. Permanent atrial fibrillation (Primary)  -     ECG 12 Lead    2. Essential hypertension  -     ECG 12 Lead  -     metoprolol tartrate (LOPRESSOR) 50 MG tablet; Take 1 tablet by mouth 2 (Two) Times a Day.  Dispense: 60 tablet; Refill: 11          Plan of care was reviewed.  Medication changes were made as noted below.  Patient agreed with plan of care.    With regard to patient's recurrent accelerations of heart rate, with associated symptoms of falling and fatigue, I have increased her metoprolol to 50 mg twice daily instructed the patient  to not take metoprolol if her heart rate is less than 60.  Additionally she is to contact this office if she is not able to take 3 doses in 1 week of her metoprolol due to low heart rate.  She stated understanding.  In the past she was tried on Cardizem, however she had severe symptoms of dizziness and felt she could not function well without medication.  It was discontinued at that time.    Hypertension is not well controlled.  Metoprolol was added today.    I will have the patient come back in 4 weeks with Dr. Allen.  I did talk with the patient about the possibility of her seeing EP.    Return in about 4 weeks (around 11/24/2020).      JUNIE Painting

## 2020-10-27 ENCOUNTER — OFFICE VISIT (OUTPATIENT)
Dept: CARDIOLOGY | Facility: CLINIC | Age: 80
End: 2020-10-27

## 2020-10-27 VITALS
SYSTOLIC BLOOD PRESSURE: 144 MMHG | WEIGHT: 140.4 LBS | BODY MASS INDEX: 23.97 KG/M2 | OXYGEN SATURATION: 99 % | HEIGHT: 64 IN | DIASTOLIC BLOOD PRESSURE: 87 MMHG | HEART RATE: 120 BPM

## 2020-10-27 DIAGNOSIS — I10 ESSENTIAL HYPERTENSION: ICD-10-CM

## 2020-10-27 DIAGNOSIS — I48.21 PERMANENT ATRIAL FIBRILLATION (HCC): Primary | ICD-10-CM

## 2020-10-27 PROCEDURE — 99213 OFFICE O/P EST LOW 20 MIN: CPT | Performed by: NURSE PRACTITIONER

## 2020-10-27 PROCEDURE — 93000 ELECTROCARDIOGRAM COMPLETE: CPT | Performed by: NURSE PRACTITIONER

## 2020-10-27 RX ORDER — SERTRALINE HYDROCHLORIDE 100 MG/1
100 TABLET, FILM COATED ORAL DAILY
COMMUNITY
Start: 2020-08-11 | End: 2022-12-15

## 2020-10-27 RX ORDER — METOPROLOL TARTRATE 50 MG/1
50 TABLET, FILM COATED ORAL 2 TIMES DAILY
Qty: 60 TABLET | Refills: 11 | Status: SHIPPED | OUTPATIENT
Start: 2020-10-27 | End: 2021-04-01 | Stop reason: DRUGHIGH

## 2020-12-03 ENCOUNTER — OFFICE VISIT (OUTPATIENT)
Dept: CARDIOLOGY | Facility: CLINIC | Age: 80
End: 2020-12-03

## 2020-12-03 VITALS
SYSTOLIC BLOOD PRESSURE: 133 MMHG | HEART RATE: 63 BPM | BODY MASS INDEX: 25.09 KG/M2 | WEIGHT: 141.6 LBS | HEIGHT: 63 IN | OXYGEN SATURATION: 98 % | DIASTOLIC BLOOD PRESSURE: 87 MMHG

## 2020-12-03 DIAGNOSIS — E78.5 DYSLIPIDEMIA: ICD-10-CM

## 2020-12-03 DIAGNOSIS — I20.9 ANGINA PECTORIS (HCC): ICD-10-CM

## 2020-12-03 DIAGNOSIS — I10 ESSENTIAL HYPERTENSION: ICD-10-CM

## 2020-12-03 DIAGNOSIS — R00.2 PALPITATIONS: ICD-10-CM

## 2020-12-03 DIAGNOSIS — I48.21 PERMANENT ATRIAL FIBRILLATION (HCC): Primary | ICD-10-CM

## 2020-12-03 PROCEDURE — 93000 ELECTROCARDIOGRAM COMPLETE: CPT | Performed by: INTERNAL MEDICINE

## 2020-12-03 PROCEDURE — 99214 OFFICE O/P EST MOD 30 MIN: CPT | Performed by: INTERNAL MEDICINE

## 2020-12-03 NOTE — PROGRESS NOTES
[facility] CARDIOLOGY  [address] [place] [address]  Phone: [phone]  Fax: [phone]    12/03/2020    Chief Complaint   Patient presents with   • Atrial Fibrillation   • Hypertension        History:   Anette Dunham is a 80 y.o. female seen in regular follow up CAD. She has a past medical history significant for paroxysmal atrial fib, hypothyroidism, hypertension, and CAD. She still has palpitations 2-3 times a day despite the increase of Metoprolol. Associated symptoms is dizziness and chest tightness.  The chart and medications were reviewed today. Problems above addressed this visit.      Past Medical History:   Diagnosis Date   • Anxiety    • Arrhythmia    • Atrial fibrillation (CMS/HCC)    • Coronary artery disease    • Disease of thyroid gland    • Dysfunctional gallbladder    • GERD (gastroesophageal reflux disease)    • Hypertension    • Osteoporosis        Past Surgical History:   Procedure Laterality Date   • ABDOMINAL SURGERY     • CHOLECYSTECTOMY OPEN     • HIP HEMIARTHROPLASTY Left 5/29/2018    Procedure: LEFT HIP BIPOLAR HEMIARTHROPLASTY;  Surgeon: Trevon Arriaga MD;  Location: [facility];  Service: Orthopedics        Past Social History:  Social History     Socioeconomic History   • Marital status:      Spouse name: Not on file   • Number of children: Not on file   • Years of education: Not on file   • Highest education level: Not on file   Tobacco Use   • Smoking status: Current Some Day Smoker     Packs/day: 0.25     Years: 15.00     Pack years: 3.75     Types: Cigarettes   • Smokeless tobacco: Current User     Types: Snuff   • Tobacco comment: snuff for 60 years    Substance and Sexual Activity   • Alcohol use: No   • Drug use: No   • Sexual activity: Defer       Past Family History:  Family History   Problem Relation Age of Onset   • Heart disease Father        Review of Systems:   Review of Systems   Constitution: Positive for weight loss.   HENT:  Negative for nosebleeds.    Eyes: Negative for blurred vision and double vision.   Cardiovascular: Positive for claudication, irregular heartbeat, leg swelling and orthopnea.   Respiratory: Negative for shortness of breath.    Hematologic/Lymphatic: Negative for bleeding problem. Does not bruise/bleed easily.   Skin: Negative for dry skin and rash.   Musculoskeletal: Negative for myalgias.   Gastrointestinal: Negative for heartburn and nausea.   Genitourinary: Negative for dysuria, hematuria and nocturia.   Neurological: Positive for light-headedness.   Psychiatric/Behavioral: The patient is not nervous/anxious.    Allergic/Immunologic: Negative for hives.         Current Outpatient Medications   Medication Sig Dispense Refill   • alendronate (FOSAMAX) 70 MG tablet Take 70 mg by mouth Every 7 (Seven) Days. Prior to [facility] Admission, Patient was on: takes on wednesdays     • apixaban (ELIQUIS) 2.5 MG tablet tablet Take 1 tablet by mouth Every 12 (Twelve) Hours. 60 tablet 11   • atorvastatin (LIPITOR) 10 MG tablet Take 1 tablet by mouth Every Night. 30 tablet 0   • calcium carb-cholecalciferol 600-800 MG-UNIT tablet Take 1 tablet by mouth 2 (Two) Times a Day With Meals. 60 tablet 0   • fexofenadine (ALLEGRA) 180 MG tablet Take 180 mg by mouth Daily.     • levothyroxine (SYNTHROID, LEVOTHROID) 50 MCG tablet Take 50 mcg by mouth Daily.     • LORazepam (ATIVAN) 1 MG tablet Take 1 tablet by mouth Every 8 (Eight) Hours As Needed for Anxiety 9 tablet 0   • metoprolol tartrate (LOPRESSOR) 50 MG tablet Take 1 tablet by mouth 2 (Two) Times a Day. 60 tablet 11   • sennosides-docusate sodium (SENOKOT-S) 8.6-50 MG tablet Take 2 tablets by mouth 2 (Two) Times a Day. 120 tablet 0   • albuterol (PROVENTIL HFA;VENTOLIN HFA) 108 (90 Base) MCG/ACT inhaler Inhale 2 puffs Every 6 (Six) Hours As Needed for Wheezing.     • diclofenac (VOLTAREN) 1 % gel gel APPLY 2 GRAMS TO AFFECTED AREA FOUR TIMES A DAY FOR PAIN  3   • famotidine (PEPCID)  "20 MG tablet Take 20 mg by mouth 2 (Two) Times a Day.  5   • gabapentin (NEURONTIN) 400 MG capsule Take 1 capsule by mouth 2 (Two) Times a Day. 6 capsule 0   • rOPINIRole (REQUIP) 1 MG tablet Take 1 mg by mouth At Night As Needed (restless leg). Take 1 hour before bedtime.     • sertraline (ZOLOFT) 100 MG tablet      • traMADol (ULTRAM) 50 MG tablet Take 50 mg by mouth Every 8 (Eight) Hours As Needed for Moderate Pain .       No current facility-administered medications for this visit.         No Known Allergies    Objective     /87 (BP Location: Left arm)   Pulse 63   Ht 160 cm (63\")   Wt 64.2 kg (141 lb 9.6 oz)   SpO2 98%   BMI 25.08 kg/m²     Physical Exam  Constitutional:       General: She is not in acute distress.     Appearance: Normal appearance.   HENT:      Head: Normocephalic and atraumatic.      Nose: Nose normal.      Mouth/Throat:      Pharynx: Oropharynx is clear.   Eyes:      Pupils: Pupils are equal, round, and reactive to light.   Neck:      Musculoskeletal: Neck supple.      Vascular: No carotid bruit.   Cardiovascular:      Rate and Rhythm: Normal rate and regular rhythm.      Pulses: Normal pulses.      Heart sounds: Normal heart sounds.   Pulmonary:      Effort: Pulmonary effort is normal.      Breath sounds: Normal breath sounds.   Abdominal:      General: Bowel sounds are normal.      Palpations: Abdomen is soft.   Musculoskeletal: Normal range of motion.   Skin:     General: Skin is warm.      Findings: No bruising.   Neurological:      General: No focal deficit present.      Mental Status: She is alert and oriented to person, place, and time.   Psychiatric:         Mood and Affect: Mood normal.         Behavior: Behavior normal.         Judgment: Judgment normal.         DATA:      Results for orders placed during the hospital encounter of 07/22/20   Adult Transthoracic Echo Complete W/ Cont if Necessary Per Protocol    Narrative · Left ventricular wall thickness is consistent " with moderate concentric   hypertrophy.  · Left atrial cavity size is mildly dilated.  · Moderate mitral valve regurgitation is present  · No significant change since 9/6/18         Results for orders placed during the hospital encounter of 03/07/17   Stress Test With Myocardial Perfusion (1 Day)    Narrative · Findings consistent with an abnormal ECG stress test.  · Left ventricular ejection fraction is hyperdynamic (Calculated EF >   70%).  · Myocardial perfusion imaging indicates a normal myocardial perfusion   study with no evidence of ischemia.  · Impressions are consistent with a low risk study.         Results for orders placed during the hospital encounter of 03/07/17   Stress Test With Myocardial Perfusion (1 Day)    Narrative · Findings consistent with an abnormal ECG stress test.  · Left ventricular ejection fraction is hyperdynamic (Calculated EF >   70%).  · Myocardial perfusion imaging indicates a normal myocardial perfusion   study with no evidence of ischemia.  · Impressions are consistent with a low risk study.            ECG 12 Lead    Date/Time: 12/3/2020 2:37 PM  Performed by: Lucien Herman MD  Authorized by: Lucien Herman MD   Rhythm: atrial fibrillation  Rate: normal  BPM: 60    Clinical impression: abnormal EKG  Comments: VT int:  *ms  QRS dur: 113 ms  QT/Qtc:  388/389 ms  P-R-T axes:   8   -45   27            Diagnoses and all orders for this visit:    1. Permanent atrial fibrillation (Primary)  -     Cardioversion External in Cardiology Department; Future  -     Stress Test With Myocardial Perfusion One Day; Future    2. Essential hypertension  -     Stress Test With Myocardial Perfusion One Day; Future    3. Dyslipidemia  -     Stress Test With Myocardial Perfusion One Day; Future    4. Palpitations  -     Cardioversion External in Cardiology Department; Future  -     Stress Test With Myocardial Perfusion One Day; Future    5. Angina pectoris (CMS/HCC)  -      Stress Test With Myocardial Perfusion One Day; Future    Other orders  -     ECG 12 Lead    Assessment:  1. CAD stable  2. Persistent atrial fib  Chronically anticoagulated with Eliquis 2.5 mg BID  3. Palpitations 2-3 times per day despite increase Metoprolol.   4. Dyslipidemia stable  5. Tobacco abuse     Plan:  1. Proceed with cardioversion.  Stop Metoprolol 2 days prior to procedure  2. Lexiscan stress test same day as cardioversion. Inability to walk on treadmill due to unsteady gait with a fall and dizziness   3. Follow up post procedure    Recommended increase activity to 30 minutes of walking daily, most days of the week.   Counseled the patient for 3 minutes regarding smoking cessation.  Discussed tobacco use relationship to cardiac disease progression.  Offered smoking cessation medications.  Discussed diet and weight loss with patient.        Patient's Body mass index is 25.08 kg/m². BMI is within normal parameters. No follow-up required..       Return for Post procedure.    Thank you for allowing me to participate in the care of Anette Dunham. Feel free to contact me directly with any further questions or concerns.          Lucien Herman MD, [facility]  Interventional Cardiology    GRACIE Cabrera, acting as scribe for Lucien Herman MD, [facility]  12/03/20  14:39 EST

## 2020-12-07 ENCOUNTER — CLINICAL SUPPORT NO REQUIREMENTS (OUTPATIENT)
Dept: CARDIOLOGY | Facility: CLINIC | Age: 80
End: 2020-12-07

## 2020-12-07 ENCOUNTER — TRANSCRIBE ORDERS (OUTPATIENT)
Dept: ADMINISTRATIVE | Facility: HOSPITAL | Age: 80
End: 2020-12-07

## 2020-12-07 DIAGNOSIS — Z01.818 PREOP EXAMINATION: Primary | ICD-10-CM

## 2020-12-07 DIAGNOSIS — I48.21 PERMANENT ATRIAL FIBRILLATION (HCC): Primary | ICD-10-CM

## 2020-12-07 DIAGNOSIS — I48.91 ATRIAL FIBRILLATION WITH RVR (HCC): ICD-10-CM

## 2020-12-07 DIAGNOSIS — Z01.818 OTHER SPECIFIED PRE-OPERATIVE EXAMINATION: Primary | ICD-10-CM

## 2020-12-14 ENCOUNTER — LAB (OUTPATIENT)
Dept: LAB | Facility: HOSPITAL | Age: 80
End: 2020-12-14

## 2020-12-14 DIAGNOSIS — Z01.818 PREOP EXAMINATION: ICD-10-CM

## 2020-12-14 PROCEDURE — C9803 HOPD COVID-19 SPEC COLLECT: HCPCS

## 2020-12-14 PROCEDURE — U0004 COV-19 TEST NON-CDC HGH THRU: HCPCS | Performed by: INTERNAL MEDICINE

## 2020-12-15 ENCOUNTER — APPOINTMENT (OUTPATIENT)
Dept: GENERAL RADIOLOGY | Facility: HOSPITAL | Age: 80
End: 2020-12-15

## 2020-12-15 ENCOUNTER — APPOINTMENT (OUTPATIENT)
Dept: CT IMAGING | Facility: HOSPITAL | Age: 80
End: 2020-12-15

## 2020-12-15 ENCOUNTER — HOSPITAL ENCOUNTER (EMERGENCY)
Facility: HOSPITAL | Age: 80
Discharge: HOME | End: 2020-12-16
Attending: FAMILY MEDICINE | Admitting: FAMILY MEDICINE

## 2020-12-15 DIAGNOSIS — I48.21 PERMANENT ATRIAL FIBRILLATION (HCC): ICD-10-CM

## 2020-12-15 DIAGNOSIS — S42.212A CLOSED DISPLACED FRACTURE OF SURGICAL NECK OF LEFT HUMERUS, UNSPECIFIED FRACTURE MORPHOLOGY, INITIAL ENCOUNTER: ICD-10-CM

## 2020-12-15 DIAGNOSIS — E86.0 DEHYDRATION: Primary | ICD-10-CM

## 2020-12-15 DIAGNOSIS — I48.91 ATRIAL FIBRILLATION WITH RVR (HCC): ICD-10-CM

## 2020-12-15 DIAGNOSIS — Z01.818 OTHER SPECIFIED PRE-OPERATIVE EXAMINATION: Primary | ICD-10-CM

## 2020-12-15 LAB — SARS-COV-2 RNA RESP QL NAA+PROBE: NOT DETECTED

## 2020-12-15 PROCEDURE — 96365 THER/PROPH/DIAG IV INF INIT: CPT

## 2020-12-15 PROCEDURE — 73502 X-RAY EXAM HIP UNI 2-3 VIEWS: CPT

## 2020-12-15 PROCEDURE — 73030 X-RAY EXAM OF SHOULDER: CPT

## 2020-12-15 PROCEDURE — 93010 ELECTROCARDIOGRAM REPORT: CPT | Performed by: INTERNAL MEDICINE

## 2020-12-15 PROCEDURE — 81001 URINALYSIS AUTO W/SCOPE: CPT | Performed by: PHYSICIAN ASSISTANT

## 2020-12-15 PROCEDURE — 70450 CT HEAD/BRAIN W/O DYE: CPT

## 2020-12-15 PROCEDURE — 93005 ELECTROCARDIOGRAM TRACING: CPT | Performed by: PHYSICIAN ASSISTANT

## 2020-12-15 PROCEDURE — 71045 X-RAY EXAM CHEST 1 VIEW: CPT

## 2020-12-15 PROCEDURE — 70486 CT MAXILLOFACIAL W/O DYE: CPT

## 2020-12-15 PROCEDURE — 99285 EMERGENCY DEPT VISIT HI MDM: CPT

## 2020-12-15 RX ORDER — SODIUM CHLORIDE 0.9 % (FLUSH) 0.9 %
10 SYRINGE (ML) INJECTION AS NEEDED
Status: DISCONTINUED | OUTPATIENT
Start: 2020-12-15 | End: 2020-12-16 | Stop reason: HOSPADM

## 2020-12-16 ENCOUNTER — APPOINTMENT (OUTPATIENT)
Dept: CARDIOLOGY | Facility: HOSPITAL | Age: 80
End: 2020-12-16

## 2020-12-16 VITALS
DIASTOLIC BLOOD PRESSURE: 96 MMHG | SYSTOLIC BLOOD PRESSURE: 154 MMHG | BODY MASS INDEX: 23.9 KG/M2 | OXYGEN SATURATION: 99 % | HEIGHT: 64 IN | HEART RATE: 88 BPM | RESPIRATION RATE: 18 BRPM | WEIGHT: 140 LBS | TEMPERATURE: 99.1 F

## 2020-12-16 LAB
ALBUMIN SERPL-MCNC: 3.6 G/DL (ref 3.5–5.2)
ALBUMIN/GLOB SERPL: 0.9 G/DL
ALP SERPL-CCNC: 48 U/L (ref 39–117)
ALT SERPL W P-5'-P-CCNC: 12 U/L (ref 1–33)
ANION GAP SERPL CALCULATED.3IONS-SCNC: 8.7 MMOL/L (ref 5–15)
AST SERPL-CCNC: 29 U/L (ref 1–32)
BACTERIA UR QL AUTO: ABNORMAL /HPF
BASOPHILS # BLD AUTO: 0.05 10*3/MM3 (ref 0–0.2)
BASOPHILS NFR BLD AUTO: 0.7 % (ref 0–1.5)
BILIRUB SERPL-MCNC: 0.2 MG/DL (ref 0–1.2)
BILIRUB UR QL STRIP: NEGATIVE
BUN SERPL-MCNC: 17 MG/DL (ref 8–23)
BUN/CREAT SERPL: 15.9 (ref 7–25)
CALCIUM SPEC-SCNC: 9.7 MG/DL (ref 8.6–10.5)
CHLORIDE SERPL-SCNC: 102 MMOL/L (ref 98–107)
CK SERPL-CCNC: 193 U/L (ref 20–180)
CLARITY UR: CLEAR
CO2 SERPL-SCNC: 24.3 MMOL/L (ref 22–29)
COLOR UR: YELLOW
CREAT SERPL-MCNC: 1.07 MG/DL (ref 0.57–1)
CRP SERPL-MCNC: 0.12 MG/DL (ref 0–0.5)
DEPRECATED RDW RBC AUTO: 45.6 FL (ref 37–54)
EOSINOPHIL # BLD AUTO: 0.13 10*3/MM3 (ref 0–0.4)
EOSINOPHIL NFR BLD AUTO: 1.9 % (ref 0.3–6.2)
ERYTHROCYTE [DISTWIDTH] IN BLOOD BY AUTOMATED COUNT: 12.6 % (ref 12.3–15.4)
GFR SERPL CREATININE-BSD FRML MDRD: 49 ML/MIN/1.73
GLOBULIN UR ELPH-MCNC: 4 GM/DL
GLUCOSE SERPL-MCNC: 127 MG/DL (ref 65–99)
GLUCOSE UR STRIP-MCNC: NEGATIVE MG/DL
HCT VFR BLD AUTO: 37.5 % (ref 34–46.6)
HGB BLD-MCNC: 12.2 G/DL (ref 12–15.9)
HGB UR QL STRIP.AUTO: NEGATIVE
HOLD SPECIMEN: NORMAL
HOLD SPECIMEN: NORMAL
HYALINE CASTS UR QL AUTO: ABNORMAL /LPF
IMM GRANULOCYTES # BLD AUTO: 0.01 10*3/MM3 (ref 0–0.05)
IMM GRANULOCYTES NFR BLD AUTO: 0.1 % (ref 0–0.5)
KETONES UR QL STRIP: NEGATIVE
LEUKOCYTE ESTERASE UR QL STRIP.AUTO: ABNORMAL
LYMPHOCYTES # BLD AUTO: 1.66 10*3/MM3 (ref 0.7–3.1)
LYMPHOCYTES NFR BLD AUTO: 23.9 % (ref 19.6–45.3)
MAGNESIUM SERPL-MCNC: 1.5 MG/DL (ref 1.6–2.4)
MCH RBC QN AUTO: 32 PG (ref 26.6–33)
MCHC RBC AUTO-ENTMCNC: 32.5 G/DL (ref 31.5–35.7)
MCV RBC AUTO: 98.4 FL (ref 79–97)
MONOCYTES # BLD AUTO: 0.67 10*3/MM3 (ref 0.1–0.9)
MONOCYTES NFR BLD AUTO: 9.6 % (ref 5–12)
NEUTROPHILS NFR BLD AUTO: 4.43 10*3/MM3 (ref 1.7–7)
NEUTROPHILS NFR BLD AUTO: 63.8 % (ref 42.7–76)
NITRITE UR QL STRIP: NEGATIVE
NRBC BLD AUTO-RTO: 0 /100 WBC (ref 0–0.2)
NT-PROBNP SERPL-MCNC: 3280 PG/ML (ref 0–1800)
PH UR STRIP.AUTO: 5.5 [PH] (ref 5–8)
PLATELET # BLD AUTO: 174 10*3/MM3 (ref 140–450)
PMV BLD AUTO: 10.2 FL (ref 6–12)
POTASSIUM SERPL-SCNC: 3.9 MMOL/L (ref 3.5–5.2)
PROT SERPL-MCNC: 7.6 G/DL (ref 6–8.5)
PROT UR QL STRIP: NEGATIVE
QT INTERVAL: 390 MS
QTC INTERVAL: 471 MS
RBC # BLD AUTO: 3.81 10*6/MM3 (ref 3.77–5.28)
RBC # UR: ABNORMAL /HPF
REF LAB TEST METHOD: ABNORMAL
SODIUM SERPL-SCNC: 135 MMOL/L (ref 136–145)
SP GR UR STRIP: 1.01 (ref 1–1.03)
SQUAMOUS #/AREA URNS HPF: ABNORMAL /HPF
T4 FREE SERPL-MCNC: 1.19 NG/DL (ref 0.93–1.7)
TROPONIN T SERPL-MCNC: <0.01 NG/ML (ref 0–0.03)
TSH SERPL DL<=0.05 MIU/L-ACNC: 2.19 UIU/ML (ref 0.27–4.2)
UROBILINOGEN UR QL STRIP: ABNORMAL
WBC # BLD AUTO: 6.95 10*3/MM3 (ref 3.4–10.8)
WBC UR QL AUTO: ABNORMAL /HPF
WHOLE BLOOD HOLD SPECIMEN: NORMAL
WHOLE BLOOD HOLD SPECIMEN: NORMAL

## 2020-12-16 PROCEDURE — 25010000002 MAGNESIUM SULFATE IN D5W 1G/100ML (PREMIX) 1-5 GM/100ML-% SOLUTION: Performed by: FAMILY MEDICINE

## 2020-12-16 PROCEDURE — 80053 COMPREHEN METABOLIC PANEL: CPT | Performed by: PHYSICIAN ASSISTANT

## 2020-12-16 PROCEDURE — 83880 ASSAY OF NATRIURETIC PEPTIDE: CPT | Performed by: PHYSICIAN ASSISTANT

## 2020-12-16 PROCEDURE — 96365 THER/PROPH/DIAG IV INF INIT: CPT

## 2020-12-16 PROCEDURE — 84484 ASSAY OF TROPONIN QUANT: CPT | Performed by: PHYSICIAN ASSISTANT

## 2020-12-16 PROCEDURE — 84443 ASSAY THYROID STIM HORMONE: CPT | Performed by: FAMILY MEDICINE

## 2020-12-16 PROCEDURE — 86140 C-REACTIVE PROTEIN: CPT | Performed by: FAMILY MEDICINE

## 2020-12-16 PROCEDURE — 84439 ASSAY OF FREE THYROXINE: CPT | Performed by: FAMILY MEDICINE

## 2020-12-16 PROCEDURE — 82550 ASSAY OF CK (CPK): CPT | Performed by: FAMILY MEDICINE

## 2020-12-16 PROCEDURE — 85025 COMPLETE CBC W/AUTO DIFF WBC: CPT | Performed by: PHYSICIAN ASSISTANT

## 2020-12-16 PROCEDURE — 83735 ASSAY OF MAGNESIUM: CPT | Performed by: FAMILY MEDICINE

## 2020-12-16 RX ORDER — HYDROCODONE BITARTRATE AND ACETAMINOPHEN 5; 325 MG/1; MG/1
1 TABLET ORAL EVERY 6 HOURS PRN
Qty: 12 TABLET | Refills: 0 | Status: SHIPPED | OUTPATIENT
Start: 2020-12-16 | End: 2022-12-15

## 2020-12-16 RX ORDER — HYDROCODONE BITARTRATE AND ACETAMINOPHEN 5; 325 MG/1; MG/1
1 TABLET ORAL ONCE
Status: COMPLETED | OUTPATIENT
Start: 2020-12-16 | End: 2020-12-16

## 2020-12-16 RX ORDER — MAGNESIUM SULFATE 1 G/100ML
1 INJECTION INTRAVENOUS ONCE
Status: COMPLETED | OUTPATIENT
Start: 2020-12-16 | End: 2020-12-16

## 2020-12-16 RX ADMIN — MAGNESIUM SULFATE IN DEXTROSE 1 G: 10 INJECTION, SOLUTION INTRAVENOUS at 01:23

## 2020-12-16 RX ADMIN — HYDROCODONE BITARTRATE AND ACETAMINOPHEN 1 TABLET: 5; 325 TABLET ORAL at 01:23

## 2020-12-16 RX ADMIN — SODIUM CHLORIDE 500 ML: 9 INJECTION, SOLUTION INTRAVENOUS at 01:23

## 2020-12-16 NOTE — DISCHARGE INSTRUCTIONS
Be sure to drink at least eight 8 ounce glasses of water every day.  This will prevent further episodes of dizziness.

## 2020-12-16 NOTE — ED PROVIDER NOTES
"Subjective   79 yo female patient presents to the ED by EMS after taking a fall at home. Patient has had multiple falls in the last little bit. Family member reports it is worse with standing. She will \"black out\" and fall. Last fall was 1 week ago and she hit the toilet.  Patient did not get seen for that fall and has a bruise to the right eye. Patient fell tonight trying to show her family member where she was going to put some [employer] decorations. Patient reportedly blacked out again. Injuring her left shoulder and hip. She has decreased ROM of both. PMHX- A fib, GERD, HTN, thyroid disease, anxiety, CAD, osteoporosis.        History provided by:  Patient   used: No        Review of Systems   Constitutional: Negative.    HENT: Negative.    Eyes: Negative.    Respiratory: Negative.    Cardiovascular: Negative.    Gastrointestinal: Negative.    Endocrine: Negative.    Genitourinary: Negative.    Musculoskeletal:        Left shoulder pain    Left hip pain    Skin: Negative.    Allergic/Immunologic: Negative.    Neurological: Negative.    Hematological: Negative.    Psychiatric/Behavioral: Negative.    All other systems reviewed and are negative.      Past Medical History:   Diagnosis Date   • Anxiety    • Arrhythmia    • Atrial fibrillation (CMS/HCC)    • Coronary artery disease    • Disease of thyroid gland    • Dysfunctional gallbladder    • GERD (gastroesophageal reflux disease)    • Hypertension    • Osteoporosis        No Known Allergies    Past Surgical History:   Procedure Laterality Date   • ABDOMINAL SURGERY     • CHOLECYSTECTOMY OPEN     • HIP HEMIARTHROPLASTY Left 5/29/2018    Procedure: LEFT HIP BIPOLAR HEMIARTHROPLASTY;  Surgeon: Trevon Arriaga MD;  Location: [facility];  Service: Orthopedics       Family History   Problem Relation Age of Onset   • Heart disease Father        Social History     Socioeconomic History   • Marital status:      Spouse name: Not on file   • Number " of children: Not on file   • Years of education: Not on file   • Highest education level: Not on file   Tobacco Use   • Smoking status: Current Some Day Smoker     Packs/day: 0.25     Years: 15.00     Pack years: 3.75     Types: Cigarettes   • Smokeless tobacco: Current User     Types: Snuff   • Tobacco comment: snuff for 60 years    Substance and Sexual Activity   • Alcohol use: No   • Drug use: No   • Sexual activity: Defer           Objective   Physical Exam  Vitals signs and nursing note reviewed.   Constitutional:       Appearance: Normal appearance.   HENT:      Head: Normocephalic.        Right Ear: External ear normal.      Left Ear: External ear normal.      Nose: Nose normal.      Mouth/Throat:      Mouth: Mucous membranes are moist.      Pharynx: Oropharynx is clear.   Eyes:      Extraocular Movements: Extraocular movements intact.      Conjunctiva/sclera: Conjunctivae normal.      Pupils: Pupils are equal, round, and reactive to light.   Neck:      Musculoskeletal: Normal range of motion and neck supple.   Cardiovascular:      Rate and Rhythm: Normal rate and regular rhythm.      Pulses: Normal pulses.      Heart sounds: Normal heart sounds.   Pulmonary:      Effort: Pulmonary effort is normal.      Breath sounds: Normal breath sounds.   Abdominal:      General: Abdomen is flat. Bowel sounds are normal.      Palpations: Abdomen is soft.   Musculoskeletal:      Left shoulder: She exhibits decreased range of motion, tenderness, bony tenderness, swelling, deformity, pain and decreased strength. She exhibits no effusion, no crepitus, no laceration, no spasm and normal pulse.      Left hip: She exhibits tenderness. She exhibits normal range of motion, normal strength, no bony tenderness, no swelling, no crepitus, no deformity and no laceration.   Skin:     General: Skin is warm and dry.      Capillary Refill: Capillary refill takes less than 2 seconds.   Neurological:      General: No focal deficit present.       Mental Status: She is alert and oriented to person, place, and time. Mental status is at baseline.   Psychiatric:         Mood and Affect: Mood normal.         Behavior: Behavior normal.         Thought Content: Thought content normal.         Judgment: Judgment normal.         Procedures           ED Course  ED Course as of Dec 16 0304   Tue Dec 15, 2020   2203 Sign out to Dr. Beltran at shift change.     [ML]   2359 EKG shows atrial fibrillation with a rate of 88 bpm QRS duration is 110 ms QT/QTc is 390/470 ms no acute changes noted    [EG]   Wed Dec 16, 2020   0143 Spoke to Dr. Barclay who recommends following up as an outpatient.  Shoulder immobilizer ordered.    [EG]      ED Course User Index  [EG] Sarah Beltran DO  [ML] Bell Murcia PA                                           MDM  Number of Diagnoses or Management Options  Closed displaced fracture of surgical neck of left humerus, unspecified fracture morphology, initial encounter: new and requires workup  Dehydration: new and requires workup     Amount and/or Complexity of Data Reviewed  Clinical lab tests: ordered and reviewed  Tests in the radiology section of CPT®: ordered and reviewed  Tests in the medicine section of CPT®: ordered and reviewed  Discuss the patient with other providers: yes  Independent visualization of images, tracings, or specimens: yes    Risk of Complications, Morbidity, and/or Mortality  Presenting problems: high  Diagnostic procedures: high  Management options: high    Patient Progress  Patient progress: stable      Final diagnoses:   Dehydration   Closed displaced fracture of surgical neck of left humerus, unspecified fracture morphology, initial encounter            Sarah Beltran DO  12/16/20 0308

## 2020-12-16 NOTE — ED NOTES
EKG completed by [employer] @ 7325 and given to Dr. Beltran @ 2148     Yamile More  12/16/20 0001

## 2020-12-16 NOTE — ED NOTES
Pt unable to stand for orthostatics due to current condition.      Barbara Bermudez, RN  12/16/20 0011

## 2020-12-17 ENCOUNTER — EPISODE CHANGES (OUTPATIENT)
Dept: CASE MANAGEMENT | Facility: OTHER | Age: 80
End: 2020-12-17

## 2020-12-17 ENCOUNTER — TELEPHONE (OUTPATIENT)
Dept: CARDIOLOGY | Facility: CLINIC | Age: 80
End: 2020-12-17

## 2020-12-18 DIAGNOSIS — R55 SYNCOPE AND COLLAPSE: Primary | ICD-10-CM

## 2020-12-18 DIAGNOSIS — R00.2 PALPITATIONS: ICD-10-CM

## 2020-12-18 DIAGNOSIS — I48.0 PAROXYSMAL ATRIAL FIBRILLATION (HCC): ICD-10-CM

## 2020-12-22 ENCOUNTER — APPOINTMENT (OUTPATIENT)
Dept: NUCLEAR MEDICINE | Facility: HOSPITAL | Age: 80
End: 2020-12-22

## 2020-12-22 ENCOUNTER — APPOINTMENT (OUTPATIENT)
Dept: CARDIOLOGY | Facility: HOSPITAL | Age: 80
End: 2020-12-22

## 2020-12-23 ENCOUNTER — PATIENT OUTREACH (OUTPATIENT)
Dept: CASE MANAGEMENT | Facility: OTHER | Age: 80
End: 2020-12-23

## 2020-12-23 NOTE — OUTREACH NOTE
Patient Outreach Note    RN-ACM outreach attempt #4.  Patient had an ED visit at [facility] 12/15/20.  Patient presented after a fall.  Diagnosis description noted as dehydration and fracture.  Call answered this date by male individual who identified himself as Rajinder.  Message left for patient. Rajinder stated patient was doing okay and was not at home now. CM outreach discontinued.      Gisela Irwin RN  Ambulatory     12/23/2020, 13:09 EST

## 2021-01-28 DIAGNOSIS — I48.21 PERMANENT ATRIAL FIBRILLATION (HCC): ICD-10-CM

## 2021-01-29 ENCOUNTER — TRANSCRIBE ORDERS (OUTPATIENT)
Dept: ADMINISTRATIVE | Facility: HOSPITAL | Age: 81
End: 2021-01-29

## 2021-01-29 DIAGNOSIS — R53.1 UNILATERAL WEAKNESS: ICD-10-CM

## 2021-01-29 DIAGNOSIS — R27.0 ATAXIA: ICD-10-CM

## 2021-01-29 DIAGNOSIS — R93.0 ABNORMAL CT SCAN OF HEAD: Primary | ICD-10-CM

## 2021-02-01 RX ORDER — APIXABAN 2.5 MG/1
TABLET, FILM COATED ORAL
Qty: 60 TABLET | Refills: 11 | Status: SHIPPED | OUTPATIENT
Start: 2021-02-01 | End: 2022-02-22

## 2021-02-01 NOTE — TELEPHONE ENCOUNTER
Last seen on 12/3/20 by Dr. Allen. Was not scheduled follow up appt due to needing cardioversion but it does not look like she had this done.

## 2021-02-04 ENCOUNTER — HOSPITAL ENCOUNTER (OUTPATIENT)
Dept: MRI IMAGING | Facility: HOSPITAL | Age: 81
Discharge: HOME | End: 2021-02-04
Admitting: PHYSICIAN ASSISTANT

## 2021-02-04 DIAGNOSIS — R93.0 ABNORMAL CT SCAN OF HEAD: ICD-10-CM

## 2021-02-04 DIAGNOSIS — R53.1 UNILATERAL WEAKNESS: ICD-10-CM

## 2021-02-04 DIAGNOSIS — R27.0 ATAXIA: ICD-10-CM

## 2021-02-04 PROCEDURE — 70551 MRI BRAIN STEM W/O DYE: CPT

## 2021-02-04 PROCEDURE — 70551 MRI BRAIN STEM W/O DYE: CPT | Performed by: RADIOLOGY

## 2021-03-31 ENCOUNTER — OFFICE VISIT (OUTPATIENT)
Dept: CARDIOLOGY | Facility: CLINIC | Age: 81
End: 2021-03-31

## 2021-03-31 ENCOUNTER — CLINICAL SUPPORT NO REQUIREMENTS (OUTPATIENT)
Dept: CARDIOLOGY | Facility: CLINIC | Age: 81
End: 2021-03-31

## 2021-03-31 VITALS
DIASTOLIC BLOOD PRESSURE: 78 MMHG | SYSTOLIC BLOOD PRESSURE: 124 MMHG | OXYGEN SATURATION: 96 % | WEIGHT: 138 LBS | BODY MASS INDEX: 24.45 KG/M2 | HEIGHT: 63 IN | HEART RATE: 90 BPM

## 2021-03-31 DIAGNOSIS — I48.91 ATRIAL FIBRILLATION WITH RVR (HCC): ICD-10-CM

## 2021-03-31 DIAGNOSIS — K92.1 MELENA: Primary | ICD-10-CM

## 2021-03-31 DIAGNOSIS — I10 ESSENTIAL HYPERTENSION: ICD-10-CM

## 2021-03-31 DIAGNOSIS — R55 SYNCOPE AND COLLAPSE: ICD-10-CM

## 2021-03-31 DIAGNOSIS — I48.21 PERMANENT ATRIAL FIBRILLATION (HCC): ICD-10-CM

## 2021-03-31 PROCEDURE — 93000 ELECTROCARDIOGRAM COMPLETE: CPT | Performed by: INTERNAL MEDICINE

## 2021-03-31 PROCEDURE — 99214 OFFICE O/P EST MOD 30 MIN: CPT | Performed by: INTERNAL MEDICINE

## 2021-04-01 DIAGNOSIS — I10 ESSENTIAL HYPERTENSION: ICD-10-CM

## 2021-04-01 DIAGNOSIS — I48.21 PERMANENT ATRIAL FIBRILLATION (HCC): Primary | ICD-10-CM

## 2021-04-01 RX ORDER — METOPROLOL TARTRATE 75 MG/1
75 TABLET, FILM COATED ORAL 2 TIMES DAILY
Qty: 60 TABLET | Refills: 11 | Status: SHIPPED | OUTPATIENT
Start: 2021-04-01 | End: 2021-06-28

## 2021-04-02 ENCOUNTER — TELEPHONE (OUTPATIENT)
Dept: CARDIOLOGY | Facility: CLINIC | Age: 81
End: 2021-04-02

## 2021-04-02 NOTE — TELEPHONE ENCOUNTER
Called patient and made sure that she had picked up her new prescription. She had stated that she was going today to pick it up.

## 2021-04-21 PROBLEM — I48.91 ATRIAL FIBRILLATION WITH RVR: Status: RESOLVED | Noted: 2018-09-05 | Resolved: 2021-04-21

## 2021-04-29 PROCEDURE — 93228 REMOTE 30 DAY ECG REV/REPORT: CPT | Performed by: INTERNAL MEDICINE

## 2021-06-28 ENCOUNTER — OFFICE VISIT (OUTPATIENT)
Dept: CARDIOLOGY | Facility: CLINIC | Age: 81
End: 2021-06-28

## 2021-06-28 VITALS
SYSTOLIC BLOOD PRESSURE: 135 MMHG | WEIGHT: 138.2 LBS | OXYGEN SATURATION: 99 % | HEIGHT: 64 IN | BODY MASS INDEX: 23.6 KG/M2 | HEART RATE: 76 BPM | DIASTOLIC BLOOD PRESSURE: 90 MMHG

## 2021-06-28 DIAGNOSIS — I73.9 CLAUDICATION IN PERIPHERAL VASCULAR DISEASE (HCC): ICD-10-CM

## 2021-06-28 DIAGNOSIS — I48.21 PERMANENT ATRIAL FIBRILLATION (HCC): ICD-10-CM

## 2021-06-28 DIAGNOSIS — I10 ESSENTIAL HYPERTENSION: ICD-10-CM

## 2021-06-28 DIAGNOSIS — I20.8 ANGINA OF EFFORT (HCC): ICD-10-CM

## 2021-06-28 DIAGNOSIS — I73.9 CLAUDICATION OF LOWER EXTREMITY (HCC): ICD-10-CM

## 2021-06-28 PROCEDURE — 99214 OFFICE O/P EST MOD 30 MIN: CPT | Performed by: INTERNAL MEDICINE

## 2021-06-28 RX ORDER — METOPROLOL TARTRATE 75 MG/1
100 TABLET, FILM COATED ORAL 2 TIMES DAILY
Qty: 60 TABLET | Refills: 11 | Status: SHIPPED | OUTPATIENT
Start: 2021-06-28 | End: 2021-06-28

## 2021-06-28 RX ORDER — ISOSORBIDE MONONITRATE 30 MG/1
30 TABLET, EXTENDED RELEASE ORAL DAILY
Qty: 30 TABLET | Refills: 11 | Status: SHIPPED | OUTPATIENT
Start: 2021-06-28

## 2021-06-28 RX ORDER — METOPROLOL TARTRATE 75 MG/1
75 TABLET, FILM COATED ORAL 2 TIMES DAILY
Qty: 60 TABLET | Refills: 11 | Status: SHIPPED | OUTPATIENT
Start: 2021-06-28 | End: 2022-01-11 | Stop reason: SDUPTHER

## 2021-06-28 RX ORDER — NITROGLYCERIN 0.4 MG/1
TABLET SUBLINGUAL
Qty: 30 TABLET | Refills: 11 | Status: SHIPPED | OUTPATIENT
Start: 2021-06-28

## 2021-06-28 NOTE — PROGRESS NOTES
"Chief Complaint  Atrial Fibrillation and Hypertension    Subjective         Anette Dunham presents to [facility] for   History of Present Illness     Objective    Patient was seen and examined.  Her heart rate is well controlled on p.o. Lopressor, she has permanent A. fib.  She has had no more issues with the melena.  She has been able to tolerate a low-dose Eliquis.  She does complaint of chest pain, on exertion, lasting for a few minutes, relieved on rest, this is a new symptom for her.    Vital Signs:   /90 (BP Location: Left arm)   Pulse 76   Ht 161.3 cm (63.5\")   Wt 62.7 kg (138 lb 3.2 oz)   SpO2 99%   BMI 24.10 kg/m²       Physical Exam  Vitals and nursing note reviewed.   Constitutional:       Appearance: She is well-developed.   HENT:      Head: Normocephalic and atraumatic.   Eyes:      Pupils: Pupils are equal, round, and reactive to light.   Neck:      Vascular: No JVD.   Cardiovascular:      Rate and Rhythm: Normal rate. Rhythm irregular.      Heart sounds: Murmur heard.     Pulmonary:      Effort: Pulmonary effort is normal. No respiratory distress.      Breath sounds: Normal breath sounds. No wheezing.   Abdominal:      General: Bowel sounds are normal.      Palpations: Abdomen is soft.      Tenderness: There is no abdominal tenderness.   Musculoskeletal:         General: Normal range of motion.      Cervical back: Normal range of motion and neck supple.   Skin:     General: Skin is warm and dry.      Capillary Refill: Capillary refill takes less than 2 seconds.      Findings: No erythema.   Neurological:      Mental Status: She is alert and oriented to person, place, and time.   Psychiatric:         Behavior: Behavior normal.          Result Review :                         Problem List Items Addressed This Visit     None            Follow Up     No follow-ups on file.  Patient was given instructions and counseling regarding her condition or for health " maintenance advice. Please see specific information pulled into the AVS if appropriate.       Chest pain, suggestive of CCS class II angina  Permanent A. fib on chronic oral anticoagulation, history of melena, currently resolved.  Hypertension  Dyslipidemia  Claudication, bilateral lower extremities.    Plan:  We will start the patient on Imdur 30 mg p.o. daily, increase her Lopressor to 75 twice daily.  We will further evaluate her angina with a stress perfusion imaging study, will also check an echocardiogram.  She also complains of lower extremity claudication, will get an TOSHA and duplex arterial ultrasound bilateral lower extremities.        Lucien Herman MD, FACC  Interventional Cardiology    GRACIE Cabrera, acting as scribe for Lucein Herman MD, [facility]    06/28/21  11:36 EDT

## 2021-07-26 ENCOUNTER — HOSPITAL ENCOUNTER (OUTPATIENT)
Dept: NUCLEAR MEDICINE | Facility: HOSPITAL | Age: 81
Discharge: HOME | End: 2021-07-26

## 2021-07-26 ENCOUNTER — HOSPITAL ENCOUNTER (OUTPATIENT)
Dept: CARDIOLOGY | Facility: HOSPITAL | Age: 81
Discharge: HOME | End: 2021-07-26

## 2021-07-26 DIAGNOSIS — I20.8 ANGINA OF EFFORT (HCC): ICD-10-CM

## 2021-07-26 DIAGNOSIS — I10 ESSENTIAL HYPERTENSION: ICD-10-CM

## 2021-07-26 DIAGNOSIS — I73.9 CLAUDICATION IN PERIPHERAL VASCULAR DISEASE (HCC): ICD-10-CM

## 2021-07-26 LAB
BH CV ECHO MEAS - % IVS THICK: -2.5 %
BH CV ECHO MEAS - % LVPW THICK: 9.3 %
BH CV ECHO MEAS - ACS: 1.6 CM
BH CV ECHO MEAS - AO ROOT AREA (BSA CORRECTED): 2.1
BH CV ECHO MEAS - AO ROOT AREA: 9.1 CM^2
BH CV ECHO MEAS - AO ROOT DIAM: 3.4 CM
BH CV ECHO MEAS - BSA(HAYCOCK): 1.7 M^2
BH CV ECHO MEAS - BSA: 1.7 M^2
BH CV ECHO MEAS - BZI_BMI: 24.4 KILOGRAMS/M^2
BH CV ECHO MEAS - BZI_METRIC_HEIGHT: 160 CM
BH CV ECHO MEAS - BZI_METRIC_WEIGHT: 62.6 KG
BH CV ECHO MEAS - EDV(CUBED): 70.2 ML
BH CV ECHO MEAS - EDV(MOD-SP4): 45 ML
BH CV ECHO MEAS - EDV(TEICH): 75.3 ML
BH CV ECHO MEAS - EF(CUBED): 44.7 %
BH CV ECHO MEAS - EF(MOD-SP4): 38.4 %
BH CV ECHO MEAS - EF(TEICH): 37.7 %
BH CV ECHO MEAS - ESV(CUBED): 38.8 ML
BH CV ECHO MEAS - ESV(MOD-SP4): 27.7 ML
BH CV ECHO MEAS - ESV(TEICH): 46.9 ML
BH CV ECHO MEAS - FS: 17.9 %
BH CV ECHO MEAS - IVS/LVPW: 0.94
BH CV ECHO MEAS - IVSD: 1.2 CM
BH CV ECHO MEAS - IVSS: 1.2 CM
BH CV ECHO MEAS - LA DIMENSION: 4 CM
BH CV ECHO MEAS - LA/AO: 1.2
BH CV ECHO MEAS - LV DIASTOLIC VOL/BSA (35-75): 27.2 ML/M^2
BH CV ECHO MEAS - LV MASS(C)D: 186.3 GRAMS
BH CV ECHO MEAS - LV MASS(C)DI: 112.8 GRAMS/M^2
BH CV ECHO MEAS - LV MASS(C)S: 147.6 GRAMS
BH CV ECHO MEAS - LV MASS(C)SI: 89.4 GRAMS/M^2
BH CV ECHO MEAS - LV SYSTOLIC VOL/BSA (12-30): 16.8 ML/M^2
BH CV ECHO MEAS - LVIDD: 4.1 CM
BH CV ECHO MEAS - LVIDS: 3.4 CM
BH CV ECHO MEAS - LVLD AP4: 6 CM
BH CV ECHO MEAS - LVLS AP4: 5.9 CM
BH CV ECHO MEAS - LVOT AREA (M): 3.5 CM^2
BH CV ECHO MEAS - LVOT AREA: 3.5 CM^2
BH CV ECHO MEAS - LVOT DIAM: 2.1 CM
BH CV ECHO MEAS - LVPWD: 1.3 CM
BH CV ECHO MEAS - LVPWS: 1.4 CM
BH CV ECHO MEAS - MV A MAX VEL: 37.7 CM/SEC
BH CV ECHO MEAS - MV E MAX VEL: 89.1 CM/SEC
BH CV ECHO MEAS - MV E/A: 2.4
BH CV ECHO MEAS - PA ACC TIME: 0.05 SEC
BH CV ECHO MEAS - PA PR(ACCEL): 55.2 MMHG
BH CV ECHO MEAS - RAP SYSTOLE: 10 MMHG
BH CV ECHO MEAS - RVDD: 1.7 CM
BH CV ECHO MEAS - RVSP: 36 MMHG
BH CV ECHO MEAS - SI(CUBED): 19 ML/M^2
BH CV ECHO MEAS - SI(MOD-SP4): 10.5 ML/M^2
BH CV ECHO MEAS - SI(TEICH): 17.2 ML/M^2
BH CV ECHO MEAS - SV(CUBED): 31.4 ML
BH CV ECHO MEAS - SV(MOD-SP4): 17.3 ML
BH CV ECHO MEAS - SV(TEICH): 28.4 ML
BH CV ECHO MEAS - TR MAX VEL: 255 CM/SEC
BH CV NUCLEAR PRIOR STUDY: 3
BH CV REST NUCLEAR ISOTOPE DOSE: 10.3 MCI
BH CV STRESS BP STAGE 1: NORMAL
BH CV STRESS BP STAGE 2: NORMAL
BH CV STRESS COMMENTS STAGE 1: NORMAL
BH CV STRESS COMMENTS STAGE 2: NORMAL
BH CV STRESS DOSE REGADENOSON STAGE 1: 0.4
BH CV STRESS DURATION MIN STAGE 1: 0
BH CV STRESS DURATION MIN STAGE 2: 4
BH CV STRESS DURATION SEC STAGE 1: 10
BH CV STRESS DURATION SEC STAGE 2: 0
BH CV STRESS HR STAGE 1: 107
BH CV STRESS HR STAGE 2: 137
BH CV STRESS NUCLEAR ISOTOPE DOSE: 30.9 MCI
BH CV STRESS PROTOCOL 1: NORMAL
BH CV STRESS RECOVERY BP: NORMAL MMHG
BH CV STRESS RECOVERY HR: 94 BPM
BH CV STRESS STAGE 1: 1
BH CV STRESS STAGE 2: 2
LV EF NUC BP: 57 %
MAXIMAL PREDICTED HEART RATE: 138 BPM
MAXIMAL PREDICTED HEART RATE: 138 BPM
PERCENT MAX PREDICTED HR: 99.28 %
STRESS BASELINE BP: NORMAL MMHG
STRESS BASELINE HR: 78 BPM
STRESS PERCENT HR: 117 %
STRESS POST PEAK BP: NORMAL MMHG
STRESS POST PEAK HR: 137 BPM
STRESS TARGET HR: 117 BPM
STRESS TARGET HR: 117 BPM

## 2021-07-26 PROCEDURE — 93306 TTE W/DOPPLER COMPLETE: CPT | Performed by: INTERNAL MEDICINE

## 2021-07-26 PROCEDURE — 93925 LOWER EXTREMITY STUDY: CPT | Performed by: RADIOLOGY

## 2021-07-26 PROCEDURE — 93017 CV STRESS TEST TRACING ONLY: CPT

## 2021-07-26 PROCEDURE — 0 TECHNETIUM SESTAMIBI: Performed by: INTERNAL MEDICINE

## 2021-07-26 PROCEDURE — 93018 CV STRESS TEST I&R ONLY: CPT | Performed by: INTERNAL MEDICINE

## 2021-07-26 PROCEDURE — A9500 TC99M SESTAMIBI: HCPCS | Performed by: INTERNAL MEDICINE

## 2021-07-26 PROCEDURE — 93306 TTE W/DOPPLER COMPLETE: CPT

## 2021-07-26 PROCEDURE — 78452 HT MUSCLE IMAGE SPECT MULT: CPT | Performed by: INTERNAL MEDICINE

## 2021-07-26 PROCEDURE — 78452 HT MUSCLE IMAGE SPECT MULT: CPT

## 2021-07-26 PROCEDURE — 25010000002 REGADENOSON 0.4 MG/5ML SOLUTION: Performed by: INTERNAL MEDICINE

## 2021-07-26 PROCEDURE — 93925 LOWER EXTREMITY STUDY: CPT

## 2021-07-26 RX ADMIN — TECHNETIUM TC 99M SESTAMIBI 1 DOSE: 1 INJECTION INTRAVENOUS at 08:22

## 2021-07-26 RX ADMIN — REGADENOSON 0.4 MG: 0.08 INJECTION, SOLUTION INTRAVENOUS at 09:31

## 2021-07-26 RX ADMIN — TECHNETIUM TC 99M SESTAMIBI 1 DOSE: 1 INJECTION INTRAVENOUS at 09:31

## 2021-07-29 ENCOUNTER — TELEPHONE (OUTPATIENT)
Dept: CARDIOLOGY | Facility: CLINIC | Age: 81
End: 2021-07-29

## 2021-07-29 NOTE — TELEPHONE ENCOUNTER
----- Message from Lucien Herman MD sent at 7/29/2021 10:22 AM EDT -----  Please call patient to and tell her overall echo is normal, she has mildly elevated pressures which is expected for her age  Thanks.

## 2021-08-05 ENCOUNTER — TELEPHONE (OUTPATIENT)
Dept: CARDIOLOGY | Facility: CLINIC | Age: 81
End: 2021-08-05

## 2021-08-05 NOTE — TELEPHONE ENCOUNTER
----- Message from Lucien Herman MD sent at 7/29/2021 10:36 AM EDT -----  Please call patient to and tell him his test was normal.   Thanks.

## 2021-08-23 ENCOUNTER — TELEPHONE (OUTPATIENT)
Dept: CARDIOLOGY | Facility: CLINIC | Age: 81
End: 2021-08-23

## 2021-08-23 ENCOUNTER — TRANSCRIBE ORDERS (OUTPATIENT)
Dept: ADMINISTRATIVE | Facility: HOSPITAL | Age: 81
End: 2021-08-23

## 2021-08-23 DIAGNOSIS — Z01.818 PRE-OPERATIVE CLEARANCE: Primary | ICD-10-CM

## 2021-08-23 NOTE — TELEPHONE ENCOUNTER
INFORMED THE PATIENT OF BOTH THE ECHO AND STRESS RESULTS, PER DR STEELE. PT IS AWARE AND UNDERSTANDS.

## 2021-10-04 ENCOUNTER — OFFICE VISIT (OUTPATIENT)
Dept: CARDIOLOGY | Facility: CLINIC | Age: 81
End: 2021-10-04

## 2021-10-04 VITALS
OXYGEN SATURATION: 96 % | HEIGHT: 64 IN | BODY MASS INDEX: 23.73 KG/M2 | HEART RATE: 92 BPM | WEIGHT: 139 LBS | SYSTOLIC BLOOD PRESSURE: 134 MMHG | DIASTOLIC BLOOD PRESSURE: 74 MMHG

## 2021-10-04 DIAGNOSIS — I10 ESSENTIAL HYPERTENSION: Primary | ICD-10-CM

## 2021-10-04 DIAGNOSIS — I48.21 PERMANENT ATRIAL FIBRILLATION (HCC): ICD-10-CM

## 2021-10-04 PROCEDURE — 99214 OFFICE O/P EST MOD 30 MIN: CPT | Performed by: INTERNAL MEDICINE

## 2021-10-04 NOTE — PROGRESS NOTES
"Chief Complaint  Hypertension and Atrial Fibrillation    Subjective         Anette Dunham presents to [facility] for   History of Present Illness   She presents today for a follow up. She is having intermittent episodes of chest pain and palpitations.    Objective    Patient was seen and examined.  Her heart rate is well controlled on p.o. Lopressor, she has permanent A. fib.  She has had no more issues with the melena.  She has been able to tolerate a low-dose Eliquis.  She does complaint of chest pain, on exertion, lasting for a few minutes, relieved on rest, this is a new symptom for her.    Vital Signs:   /74 (BP Location: Right arm, Patient Position: Sitting)   Pulse 92   Ht 162.6 cm (64\")   Wt 63 kg (139 lb)   SpO2 96%   BMI 23.86 kg/m²       Physical Exam  Vitals and nursing note reviewed.   Constitutional:       Appearance: She is well-developed.   HENT:      Head: Normocephalic and atraumatic.   Eyes:      Pupils: Pupils are equal, round, and reactive to light.   Neck:      Vascular: No JVD.   Cardiovascular:      Rate and Rhythm: Normal rate. Rhythm irregular.      Heart sounds: Murmur heard.     Pulmonary:      Effort: Pulmonary effort is normal. No respiratory distress.      Breath sounds: Normal breath sounds. No wheezing.   Abdominal:      General: Bowel sounds are normal.      Palpations: Abdomen is soft.      Tenderness: There is no abdominal tenderness.   Musculoskeletal:         General: Normal range of motion.      Cervical back: Normal range of motion and neck supple.   Skin:     General: Skin is warm and dry.      Capillary Refill: Capillary refill takes less than 2 seconds.      Findings: No erythema.   Neurological:      Mental Status: She is alert and oriented to person, place, and time.   Psychiatric:         Behavior: Behavior normal.          Result Review :     Assessment/Plan  Chest pain, suggestive of CCS class II angina  Permanent A. fib on " chronic oral anticoagulation, history of melena, currently resolved.  Hypertension  Dyslipidemia  Claudication, bilateral lower extremities.      Angina CCS Class II  Palpations. Described as rapid  Chest pain also after eating.     Increase Metoprolol to 100 mg BID  Follow up in 3 month    Problem List Items Addressed This Visit     None            Follow Up     No follow-ups on file.  Patient was given instructions and counseling regarding her condition or for health maintenance advice. Please see specific information pulled into the AVS if appropriate.       :      uLcien Herman MD, FACC  Interventional Cardiology    GRACIE Cabrera, acting as scribe for Lucien Herman MD, [facility]    10/04/21  13:38 EDT

## 2022-01-11 ENCOUNTER — OFFICE VISIT (OUTPATIENT)
Dept: CARDIOLOGY | Facility: CLINIC | Age: 82
End: 2022-01-11

## 2022-01-11 VITALS
DIASTOLIC BLOOD PRESSURE: 99 MMHG | HEIGHT: 64 IN | HEART RATE: 74 BPM | OXYGEN SATURATION: 98 % | WEIGHT: 140 LBS | BODY MASS INDEX: 23.9 KG/M2 | SYSTOLIC BLOOD PRESSURE: 151 MMHG

## 2022-01-11 DIAGNOSIS — I10 ESSENTIAL HYPERTENSION: Primary | ICD-10-CM

## 2022-01-11 DIAGNOSIS — I48.21 PERMANENT ATRIAL FIBRILLATION: ICD-10-CM

## 2022-01-11 DIAGNOSIS — R07.9 CHEST PAIN, UNSPECIFIED TYPE: ICD-10-CM

## 2022-01-11 PROCEDURE — 99214 OFFICE O/P EST MOD 30 MIN: CPT | Performed by: INTERNAL MEDICINE

## 2022-01-11 RX ORDER — AMLODIPINE BESYLATE 5 MG/1
5 TABLET ORAL DAILY
Qty: 30 TABLET | Refills: 11 | Status: SHIPPED | OUTPATIENT
Start: 2022-01-11 | End: 2022-04-20

## 2022-01-11 RX ORDER — METOPROLOL TARTRATE 100 MG/1
100 TABLET ORAL 2 TIMES DAILY
Qty: 60 TABLET | Refills: 11 | Status: SHIPPED | OUTPATIENT
Start: 2022-01-11 | End: 2022-04-20 | Stop reason: SDUPTHER

## 2022-01-11 NOTE — PROGRESS NOTES
[facility]  [address] [address]  [place] [address]  Phone: [phone]  Fax: [phone]    01/11/2022    Chief Complaint   Patient presents with   • Follow-up   • Shortness of Breath        History:   Anette Dunham is a 81 y.o. female seen in followup, she is doing stable overall with intermittent episodes of chest pain, bilateral lower extremity pain, palpitations.  Her heart rate is in 70s today, A. fib.      Past Medical History:   Diagnosis Date   • Anxiety    • Arrhythmia    • Atrial fibrillation (HCC)    • Coronary artery disease    • Disease of thyroid gland    • Dysfunctional gallbladder    • GERD (gastroesophageal reflux disease)    • Hypertension    • Osteoporosis        Past Surgical History:   Procedure Laterality Date   • ABDOMINAL SURGERY     • CHOLECYSTECTOMY OPEN     • HIP HEMIARTHROPLASTY Left 5/29/2018    Procedure: LEFT HIP BIPOLAR HEMIARTHROPLASTY;  Surgeon: Trevon Arriaga MD;  Location: [facility];  Service: Orthopedics        Past Social History:  Social History     Socioeconomic History   • Marital status:    Tobacco Use   • Smoking status: Current Some Day Smoker     Packs/day: 0.25     Years: 15.00     Pack years: 3.75     Types: Cigarettes, Cigars   • Smokeless tobacco: Current User     Types: Snuff   • Tobacco comment: snuff for 60 years    Vaping Use   • Vaping Use: Never used   Substance and Sexual Activity   • Alcohol use: No   • Drug use: No   • Sexual activity: Defer       Past Family History:  Family History   Problem Relation Age of Onset   • Heart disease Father        Review of Systems:   ROS       Current Outpatient Medications   Medication Sig Dispense Refill   • albuterol (PROVENTIL HFA;VENTOLIN HFA) 108 (90 Base) MCG/ACT inhaler Inhale 2 puffs Every 6 (Six) Hours As Needed for Wheezing.     • alendronate (FOSAMAX) 70 MG tablet Take 70 mg by mouth Every 7 (Seven) Days. Prior to [facility] Admission, Patient was on: takes on  wednesdays     • atorvastatin (LIPITOR) 10 MG tablet Take 1 tablet by mouth Every Night. 30 tablet 0   • calcium carb-cholecalciferol 600-800 MG-UNIT tablet Take 1 tablet by mouth 2 (Two) Times a Day With Meals. 60 tablet 0   • diclofenac (VOLTAREN) 1 % gel gel APPLY 2 GRAMS TO AFFECTED AREA FOUR TIMES A DAY FOR PAIN  3   • Eliquis 2.5 MG tablet tablet TAKE ONE TABLET BY MOUTH EVERY 12 HOURS AS DIRECTED 60 tablet 11   • famotidine (PEPCID) 20 MG tablet Take 20 mg by mouth 2 (Two) Times a Day.  5   • fexofenadine (ALLEGRA) 180 MG tablet Take 180 mg by mouth Daily.     • gabapentin (NEURONTIN) 400 MG capsule Take 1 capsule by mouth 2 (Two) Times a Day. 6 capsule 0   • isosorbide mononitrate (IMDUR) 30 MG 24 hr tablet Take 1 tablet by mouth Daily. 30 tablet 11   • levothyroxine (SYNTHROID, LEVOTHROID) 50 MCG tablet Take 50 mcg by mouth Daily.     • LORazepam (ATIVAN) 1 MG tablet Take 1 tablet by mouth Every 8 (Eight) Hours As Needed for Anxiety 9 tablet 0   • nitroglycerin (NITROSTAT) 0.4 MG SL tablet 1 under the tongue as needed for angina, may repeat q5mins for up three doses 30 tablet 11   • rOPINIRole (REQUIP) 1 MG tablet Take 1 mg by mouth At Night As Needed (restless leg). Take 1 hour before bedtime.     • sennosides-docusate sodium (SENOKOT-S) 8.6-50 MG tablet Take 2 tablets by mouth 2 (Two) Times a Day. 120 tablet 0   • sertraline (ZOLOFT) 100 MG tablet      • traMADol (ULTRAM) 50 MG tablet Take 50 mg by mouth Every 8 (Eight) Hours As Needed for Moderate Pain .     • amLODIPine (NORVASC) 5 MG tablet Take 1 tablet by mouth Daily. 30 tablet 11   • HYDROcodone-acetaminophen (NORCO) 5-325 MG per tablet Take 1 tablet by mouth Every 6 (Six) Hours As Needed for Moderate Pain . 12 tablet 0   • metoprolol tartrate (LOPRESSOR) 100 MG tablet Take 1 tablet by mouth 2 (Two) Times a Day. 60 tablet 11     No current facility-administered medications for this visit.        No Known Allergies    Objective     /99 (BP  "Location: Right arm, Patient Position: Sitting, Cuff Size: Adult)   Pulse 74   Ht 162.6 cm (64\")   Wt 63.5 kg (140 lb)   SpO2 98%   BMI 24.03 kg/m²     Physical Exam       DATA:   Results for orders placed in visit on 06/28/21    Adult Transthoracic Echo Complete W/ Cont if Necessary Per Protocol    Interpretation Summary  · Left ventricular ejection fraction appears to be 51 - 55%. Left ventricular systolic function is low normal.  · Left ventricular wall thickness is consistent with mild concentric hypertrophy.  · Left atrial volume is mildly increased.  · The right atrial cavity is mildly dilated.  · Trace tricuspid valve regurgitation is present. Estimated right ventricular systolic pressure from tricuspid regurgitation is mildly elevated (35-45 mmHg). Mild pulmonary hypertension is present.  · There is no evidence of pericardial effusion.   Results for orders placed during the hospital encounter of 07/26/21    Stress Test With Myocardial Perfusion (1 Day)    Interpretation Summary  · Myocardial perfusion imaging indicates a normal myocardial perfusion study with no evidence of ischemia.  · Diaphragmatic attenuation artifact is present.  · Left ventricular ejection fraction is normal. (Calculated EF = 57%).  · Findings consistent with a normal ECG stress test.  · Impressions are consistent with a low risk study.   Results for orders placed during the hospital encounter of 07/26/21    Stress Test With Myocardial Perfusion (1 Day)    Interpretation Summary  · Myocardial perfusion imaging indicates a normal myocardial perfusion study with no evidence of ischemia.  · Diaphragmatic attenuation artifact is present.  · Left ventricular ejection fraction is normal. (Calculated EF = 57%).  · Findings consistent with a normal ECG stress test.  · Impressions are consistent with a low risk study.     Procedures     No results found for: CHOL, CHLPL  No results found for: TRIG  No results found for: HDL  No results " found for: LDL, LDLDIRECT    Lab Results   Component Value Date    TSH 2.190 12/16/2020               Invalid input(s): LABALBU, PROT        Assessment and Plan     Diagnosis Plan   1. Essential hypertension     2. Permanent atrial fibrillation     3. Permanent atrial fibrillation (HCC)     4. Chest pain, unspecified type       Will load and increase her Lopressor 200 mg PAD.  I will also add amlodipine 5 mg daily for better blood pressure control.  Continue with the current dose of Eliquis, Lipitor, Imdur.      Recommended increase activity to 30 minutes of walking daily, most days of the week    Thank you for allowing me to participate in the care of Anette Dunham. Feel free to contact me directly with any further questions or concerns.          Lucien Herman MD, FACC  Interventional Cardiology

## 2022-02-22 DIAGNOSIS — I48.21 PERMANENT ATRIAL FIBRILLATION: ICD-10-CM

## 2022-02-22 RX ORDER — APIXABAN 2.5 MG/1
TABLET, FILM COATED ORAL
Qty: 60 TABLET | Refills: 11 | Status: ON HOLD | OUTPATIENT
Start: 2022-02-22 | End: 2022-09-23 | Stop reason: SDUPTHER

## 2022-04-20 ENCOUNTER — OFFICE VISIT (OUTPATIENT)
Dept: CARDIOLOGY | Facility: CLINIC | Age: 82
End: 2022-04-20

## 2022-04-20 VITALS
DIASTOLIC BLOOD PRESSURE: 82 MMHG | BODY MASS INDEX: 24.82 KG/M2 | HEART RATE: 78 BPM | WEIGHT: 145.4 LBS | HEIGHT: 64 IN | SYSTOLIC BLOOD PRESSURE: 140 MMHG | OXYGEN SATURATION: 98 %

## 2022-04-20 DIAGNOSIS — R60.9 SWELLING: Primary | ICD-10-CM

## 2022-04-20 DIAGNOSIS — I48.21 PERMANENT ATRIAL FIBRILLATION: ICD-10-CM

## 2022-04-20 DIAGNOSIS — R60.0 EDEMA LEG: ICD-10-CM

## 2022-04-20 DIAGNOSIS — I10 ESSENTIAL HYPERTENSION: ICD-10-CM

## 2022-04-20 PROCEDURE — 99214 OFFICE O/P EST MOD 30 MIN: CPT | Performed by: INTERNAL MEDICINE

## 2022-04-20 RX ORDER — METOPROLOL TARTRATE 100 MG/1
150 TABLET ORAL 2 TIMES DAILY
Qty: 60 TABLET | Refills: 11 | Status: SHIPPED | OUTPATIENT
Start: 2022-04-20 | End: 2022-08-22

## 2022-04-20 RX ORDER — SPIRONOLACTONE 25 MG/1
25 TABLET ORAL DAILY
Qty: 90 TABLET | Refills: 3 | Status: SHIPPED | OUTPATIENT
Start: 2022-04-20

## 2022-04-20 NOTE — PROGRESS NOTES
[facility] CARDIOLOGY  [address] [facility] [place] [address]  Phone: [phone]  Fax: [phone]    04/20/2022    Chief Complaint   Patient presents with   • Follow-up     PATIENT STATES LEGS SWELL IN THE EVENING, SHORT OF BREATH WHEN LAYING DOWN AT NIGHT .         History:     Anette Dunham is a 82 y.o. female presenting for  follow-up evaluation   She reports increasing frequency of her palpitations, almost happens every day.  She is currently on Lopressor 100 mg twice daily.  Her blood pressures borderline higher side but better controlled than the previous visits.  She also has a lower extremity edema which in part largely contributed from varicose veins.  We did an arterial duplex in the past which showed plaque but no significant stenosis.  She denies any chest pain.  No dizziness or syncope.        Past Medical History:   Diagnosis Date   • Anxiety    • Arrhythmia    • Atrial fibrillation (HCC)    • Coronary artery disease    • Disease of thyroid gland    • Dysfunctional gallbladder    • GERD (gastroesophageal reflux disease)    • Hypertension    • Osteoporosis        Past Surgical History:   Procedure Laterality Date   • ABDOMINAL SURGERY     • CHOLECYSTECTOMY OPEN     • HIP HEMIARTHROPLASTY Left 5/29/2018    Procedure: LEFT HIP BIPOLAR HEMIARTHROPLASTY;  Surgeon: Trevon Arriaga MD;  Location: [facility];  Service: Orthopedics        Past Social History:  Social History     Socioeconomic History   • Marital status:    Tobacco Use   • Smoking status: Current Some Day Smoker     Packs/day: 0.25     Years: 15.00     Pack years: 3.75     Types: Cigarettes, Cigars   • Smokeless tobacco: Current User     Types: Snuff   • Tobacco comment: snuff for 60 years    Vaping Use   • Vaping Use: Never used   Substance and Sexual Activity   • Alcohol use: No   • Drug use: No   • Sexual activity: Defer       Past Family History:  Family History   Problem Relation Age of Onset   • Heart  disease Father        Review of Systems:   ROS       Current Outpatient Medications   Medication Sig Dispense Refill   • albuterol (PROVENTIL HFA;VENTOLIN HFA) 108 (90 Base) MCG/ACT inhaler Inhale 2 puffs Every 6 (Six) Hours As Needed for Wheezing.     • alendronate (FOSAMAX) 70 MG tablet Take 70 mg by mouth Every 7 (Seven) Days. Prior to [facility] Admission, Patient was on: takes on wednesdays     • atorvastatin (LIPITOR) 10 MG tablet Take 1 tablet by mouth Every Night. 30 tablet 0   • calcium carb-cholecalciferol 600-800 MG-UNIT tablet Take 1 tablet by mouth 2 (Two) Times a Day With Meals. 60 tablet 0   • diclofenac (VOLTAREN) 1 % gel gel APPLY 2 GRAMS TO AFFECTED AREA FOUR TIMES A DAY FOR PAIN  3   • Eliquis 2.5 MG tablet tablet TAKE ONE TABLET BY MOUTH EVERY 12 HOURS AS DIRECTED 60 tablet 11   • famotidine (PEPCID) 20 MG tablet Take 20 mg by mouth 2 (Two) Times a Day.  5   • fexofenadine (ALLEGRA) 180 MG tablet Take 180 mg by mouth Daily.     • gabapentin (NEURONTIN) 400 MG capsule Take 1 capsule by mouth 2 (Two) Times a Day. 6 capsule 0   • HYDROcodone-acetaminophen (NORCO) 5-325 MG per tablet Take 1 tablet by mouth Every 6 (Six) Hours As Needed for Moderate Pain . 12 tablet 0   • isosorbide mononitrate (IMDUR) 30 MG 24 hr tablet Take 1 tablet by mouth Daily. 30 tablet 11   • levothyroxine (SYNTHROID, LEVOTHROID) 50 MCG tablet Take 50 mcg by mouth Daily.     • LORazepam (ATIVAN) 1 MG tablet Take 1 tablet by mouth Every 8 (Eight) Hours As Needed for Anxiety 9 tablet 0   • metoprolol tartrate (LOPRESSOR) 100 MG tablet Take 1.5 tablets by mouth 2 (Two) Times a Day. 60 tablet 11   • nitroglycerin (NITROSTAT) 0.4 MG SL tablet 1 under the tongue as needed for angina, may repeat q5mins for up three doses 30 tablet 11   • rOPINIRole (REQUIP) 1 MG tablet Take 1 mg by mouth At Night As Needed (restless leg). Take 1 hour before bedtime.     • sennosides-docusate sodium (SENOKOT-S) 8.6-50 MG tablet Take 2 tablets by mouth 2  "(Two) Times a Day. 120 tablet 0   • sertraline (ZOLOFT) 100 MG tablet      • traMADol (ULTRAM) 50 MG tablet Take 50 mg by mouth Every 8 (Eight) Hours As Needed for Moderate Pain .     • spironolactone (ALDACTONE) 25 MG tablet Take 1 tablet by mouth Daily. 90 tablet 3     No current facility-administered medications for this visit.        No Known Allergies    Objective     /82 (BP Location: Left arm, Patient Position: Sitting, Cuff Size: Adult)   Pulse 78   Ht 161.3 cm (63.5\")   Wt 66 kg (145 lb 6.4 oz)   SpO2 98%   BMI 25.35 kg/m²     Physical Exam  Constitutional:       Appearance: She is well-developed.   HENT:      Head: Normocephalic and atraumatic.   Eyes:      Pupils: Pupils are equal, round, and reactive to light.   Neck:      Vascular: No JVD.   Cardiovascular:      Rate and Rhythm: Normal rate. Rhythm irregular.      Heart sounds: Murmur heard.   Pulmonary:      Effort: Pulmonary effort is normal. No respiratory distress.      Breath sounds: Normal breath sounds. No wheezing.   Abdominal:      General: Bowel sounds are normal.      Palpations: Abdomen is soft.      Tenderness: There is no abdominal tenderness.   Musculoskeletal:         General: Normal range of motion.      Cervical back: Normal range of motion and neck supple.   Skin:     General: Skin is warm and dry.      Capillary Refill: Capillary refill takes less than 2 seconds.      Findings: No erythema.   Neurological:      Mental Status: She is alert and oriented to person, place, and time.   Psychiatric:         Behavior: Behavior normal.            DATA:  Results for orders placed during the hospital encounter of 09/05/18    SCANNED - TELEMETRY     Results for orders placed in visit on 06/28/21    Adult Transthoracic Echo Complete W/ Cont if Necessary Per Protocol    Interpretation Summary  · Left ventricular ejection fraction appears to be 51 - 55%. Left ventricular systolic function is low normal.  · Left ventricular wall " thickness is consistent with mild concentric hypertrophy.  · Left atrial volume is mildly increased.  · The right atrial cavity is mildly dilated.  · Trace tricuspid valve regurgitation is present. Estimated right ventricular systolic pressure from tricuspid regurgitation is mildly elevated (35-45 mmHg). Mild pulmonary hypertension is present.  · There is no evidence of pericardial effusion.   Results for orders placed during the hospital encounter of 07/26/21    Stress Test With Myocardial Perfusion (1 Day)    Interpretation Summary  · Myocardial perfusion imaging indicates a normal myocardial perfusion study with no evidence of ischemia.  · Diaphragmatic attenuation artifact is present.  · Left ventricular ejection fraction is normal. (Calculated EF = 57%).  · Findings consistent with a normal ECG stress test.  · Impressions are consistent with a low risk study.   Results for orders placed during the hospital encounter of 07/26/21    Stress Test With Myocardial Perfusion (1 Day)    Interpretation Summary  · Myocardial perfusion imaging indicates a normal myocardial perfusion study with no evidence of ischemia.  · Diaphragmatic attenuation artifact is present.  · Left ventricular ejection fraction is normal. (Calculated EF = 57%).  · Findings consistent with a normal ECG stress test.  · Impressions are consistent with a low risk study.     Procedures     No results found for: CHOL, CHLPL  No results found for: TRIG  No results found for: HDL  No results found for: LDL, LDLDIRECT    Lab Results   Component Value Date    TSH 2.190 12/16/2020               Invalid input(s): LABALBU, PROT        Assessment and Plan     Diagnosis Plan   1. Swelling  Duplex Venous Lower Extremity - Bilateral CAR   2. Edema leg   Duplex Venous Lower Extremity - Bilateral CAR   3. Permanent atrial fibrillation (HCC)   I will increase her Lopressor to 150 twice daily and will also get a monitor to evaluate her heart rate response, she did  had a 3 second pause during her last event monitor a year ago so we will be careful in uptitrating her AV karol blockers given her age she probably has some underlying sick sinus syndrome 2.   4. Essential hypertension   I will change her amlodipine to Aldactone.           Recommended increase activity to 30 minutes of walking daily, most days of the week    Thank you for allowing me to participate in the care of Anette Dunham. Feel free to contact me directly with any further questions or concerns.          Lucien Herman MD, FACC  Interventional Cardiology

## 2022-04-22 ENCOUNTER — TELEPHONE (OUTPATIENT)
Dept: CARDIOLOGY | Facility: CLINIC | Age: 82
End: 2022-04-22

## 2022-04-25 DIAGNOSIS — R60.0 EDEMA LEG: Primary | ICD-10-CM

## 2022-04-25 DIAGNOSIS — R60.9 SWELLING: ICD-10-CM

## 2022-04-27 ENCOUNTER — TELEPHONE (OUTPATIENT)
Dept: CARDIOLOGY | Facility: CLINIC | Age: 82
End: 2022-04-27

## 2022-04-27 NOTE — TELEPHONE ENCOUNTER
Spoke with Patients daughter and told her its very important that i speak with Anette today. She said she would go over there today and have her call us back.

## 2022-04-28 DIAGNOSIS — I48.21 PERMANENT ATRIAL FIBRILLATION: Primary | ICD-10-CM

## 2022-05-19 ENCOUNTER — HOSPITAL ENCOUNTER (OUTPATIENT)
Dept: CARDIOLOGY | Facility: HOSPITAL | Age: 82
Discharge: HOME | End: 2022-05-19
Admitting: INTERNAL MEDICINE

## 2022-05-19 DIAGNOSIS — R60.9 SWELLING: ICD-10-CM

## 2022-05-19 DIAGNOSIS — R60.0 EDEMA LEG: ICD-10-CM

## 2022-05-19 PROCEDURE — 93970 EXTREMITY STUDY: CPT

## 2022-05-19 PROCEDURE — 93970 EXTREMITY STUDY: CPT | Performed by: RADIOLOGY

## 2022-05-23 ENCOUNTER — TELEPHONE (OUTPATIENT)
Dept: CARDIOLOGY | Facility: CLINIC | Age: 82
End: 2022-05-23

## 2022-05-23 NOTE — TELEPHONE ENCOUNTER
----- Message from Lucien Herman MD sent at 5/20/2022 11:13 AM EDT -----  Please call patient to and tell him his test was normal.   Thanks.

## 2022-06-08 NOTE — PROGRESS NOTES
Electrophysiology Clinic Consult     Anette Dunham  1940  [unfilled]  [unfilled]    06/15/22    DATE OF ADMISSION: (Not on file)  [facility] CARDIOLOGY    Juliann Santana, APRN  [address] / [place] [address]  Referring Provider: No ref. provider found     Chief Complaint   Patient presents with   • Chest Pain   • Shortness of Breath   • Irregular Heart Beat   • Leg Swelling     Referring provider: Dr Herman    Problem List:    1. Permanent AF  a. CHADsvasc= 4 on Eliquis 2.5 mg bid  b. Echo, 8/17/16, EF 50%, LA mild to mod dilated.  c. Echo, 9/6/18, EF 51-55%. Mild MR. Mild MA. LA mildly dilated. Mild TR.  d. 14 day Holter, 8/21/19-9/3/19, Avg Hr 88, min Hr 70, max hr 150 bpm. 88% AF burden. Cardizem increased.    e. Echo, 7/22/20, EF 60%. LA mildly dilated. LV with moderate concentric hypertrophy. Mod MR.   f. Echo, 7/26/21, EF 51-55%, Mild LVH, LA mildly increased, RA mildly dilated. Trace TR. Mild pulmonary hypertension.   g. Rate controlled on metoprolol 150 bid  h. 14 day Preventice, 4/20/22-5/3/22, Avg hr 78, min hr 52, Max hr 152 bpm. 88% AF burden. 9 sinus pauses >3 seconds.   2. CAD  a. Stress test, 3/7/17, EF >70%, no ischemia  b. Bilateral carotid US, 9/5/18, Mild to mod plaque right greater than left carotid systems. No occlusion. No hemodynamically significant stenosis of either right or left ICA.   c. US arterial doppler lower extremity complete, 7/26/21, Heavy atherosclerotic plaquing is noted involving the arteries of the lower extremities. No focal areas of stenosis or occlusion were demonstrated. Patient was noted to have a irregular heart rate throughout the course of the exam.  d. Stress test, 7/26/21, EF 57%, No ischemia.   3. HTN  4. Hypothyroidism  5. GERD  6. Dysfunctional gallbladder  7. Osteoporosis   8. Anxiety  9. Tobacco abuse  a. 1/2 ppd x 15 years (cigs / cigars / snuff)- quit March 2021   10. Past surgical hx  a. Cholecystectomy  b. Left  hip bipolar hemiarthroplasty - 5/29/18  11. Family hx  a. Father-CAD    History of Present Illness:     Ms. Dunham is a 82-year-old white female from [place], [place] with above-noted past medical history.  She presents today in consultation with Dr. Zhao for further evaluation and management of her permanent AF and consideration of ppm implant. Patient is referred today by Dr. Matt. Patient is unsure when she was diagnosed with AF but believes it was in the 1990s. Patient has been rate controlled on metoprolol for several years. She denies ever being tried on AAD or undergoing ECVs. She was seen in follow-up with Dr. Matt in April and was having increased frequency of palpitations, almost happening daily.  Lopressor was increased 150 mg twice daily and she was placed on a 14-day preventice monitor. Upon review of her preventice monitor she had 88% A. fib burden with multiple sinus pauses greater than 3 seconds in duration. Patient is severely symptotic with palps, fatigue, and sob. Her daughter also notes she has frequent gait imbalances. She denies any LH/Dizziness or TIA/CVA symptoms. She denies any recent hospitalizations or ED visits. In regards to anticoagulation, she is on eliquis 2.5 mg bid for a elevated CHADsVasc of 4. She tells me her dose was decreased last year due to significant bruising.     Tobacco-quit last march  Alcohol-none   Illicit drug use-none   She takes claritin daily   History of hypothyroidism last TSH documented 2.1 in 2020.  Caffeine-1 cup coffee daily / 1 coke daily   PHILLIP-never tested, her daughter states she snores and patient does not feel rested in the morning.       No Known Allergies     Cannot display prior to admission medications because the patient has not been admitted in this contact.            Current Outpatient Medications:   •  albuterol (PROVENTIL HFA;VENTOLIN HFA) 108 (90 Base) MCG/ACT inhaler, Inhale 2 puffs Every 6 (Six) Hours As Needed for Wheezing., Disp: ,  Rfl:   •  alendronate (FOSAMAX) 70 MG tablet, Take 70 mg by mouth Every 7 (Seven) Days. Prior to [facility] Admission, Patient was on: takes on wednesdays, Disp: , Rfl:   •  atorvastatin (LIPITOR) 10 MG tablet, Take 1 tablet by mouth Every Night., Disp: 30 tablet, Rfl: 0  •  calcium carb-cholecalciferol 600-800 MG-UNIT tablet, Take 1 tablet by mouth 2 (Two) Times a Day With Meals., Disp: 60 tablet, Rfl: 0  •  diclofenac (VOLTAREN) 1 % gel gel, APPLY 2 GRAMS TO AFFECTED AREA FOUR TIMES A DAY FOR PAIN, Disp: , Rfl: 3  •  Eliquis 2.5 MG tablet tablet, TAKE ONE TABLET BY MOUTH EVERY 12 HOURS AS DIRECTED, Disp: 60 tablet, Rfl: 11  •  famotidine (PEPCID) 20 MG tablet, Take 20 mg by mouth 2 (Two) Times a Day., Disp: , Rfl: 5  •  fexofenadine (ALLEGRA) 180 MG tablet, Take 180 mg by mouth Daily., Disp: , Rfl:   •  gabapentin (NEURONTIN) 400 MG capsule, Take 1 capsule by mouth 2 (Two) Times a Day., Disp: 6 capsule, Rfl: 0  •  HYDROcodone-acetaminophen (NORCO) 5-325 MG per tablet, Take 1 tablet by mouth Every 6 (Six) Hours As Needed for Moderate Pain ., Disp: 12 tablet, Rfl: 0  •  isosorbide mononitrate (IMDUR) 30 MG 24 hr tablet, Take 1 tablet by mouth Daily., Disp: 30 tablet, Rfl: 11  •  levothyroxine (SYNTHROID, LEVOTHROID) 50 MCG tablet, Take 50 mcg by mouth Daily., Disp: , Rfl:   •  LORazepam (ATIVAN) 1 MG tablet, Take 1 tablet by mouth Every 8 (Eight) Hours As Needed for Anxiety, Disp: 9 tablet, Rfl: 0  •  metoprolol tartrate (LOPRESSOR) 100 MG tablet, Take 1.5 tablets by mouth 2 (Two) Times a Day., Disp: 60 tablet, Rfl: 11  •  nitroglycerin (NITROSTAT) 0.4 MG SL tablet, 1 under the tongue as needed for angina, may repeat q5mins for up three doses, Disp: 30 tablet, Rfl: 11  •  rOPINIRole (REQUIP) 1 MG tablet, Take 1 mg by mouth At Night As Needed (restless leg). Take 1 hour before bedtime., Disp: , Rfl:   •  sennosides-docusate sodium (SENOKOT-S) 8.6-50 MG tablet, Take 2 tablets by mouth 2 (Two) Times a Day., Disp: 120  "tablet, Rfl: 0  •  sertraline (ZOLOFT) 100 MG tablet, 100 mg Daily., Disp: , Rfl:   •  spironolactone (ALDACTONE) 25 MG tablet, Take 1 tablet by mouth Daily., Disp: 90 tablet, Rfl: 3  •  traMADol (ULTRAM) 50 MG tablet, Take 50 mg by mouth Every 8 (Eight) Hours As Needed for Moderate Pain ., Disp: , Rfl:     Social History     Socioeconomic History   • Marital status:    Tobacco Use   • Smoking status: Former Smoker     Packs/day: 0.25     Years: 15.00     Pack years: 3.75     Types: Cigarettes, Cigars     Quit date: 3/10/2022     Years since quittin.2   • Smokeless tobacco: Current User     Types: Snuff   • Tobacco comment: snuff for 60 years    Vaping Use   • Vaping Use: Never used   Substance and Sexual Activity   • Alcohol use: No   • Drug use: No   • Sexual activity: Defer       Family History   Problem Relation Age of Onset   • Heart disease Father        REVIEW OF SYSTEMS:   CONST:  No weight loss, fever, chills, +weakness +fatigue. +palps  HEENT:  No visual loss, blurred vision, double vision, yellow sclerae.                   No hearing loss, congestion, sore throat.   SKIN:      No rashes, urticaria, ulcers, sores.     RESP:     + shortness of breath, -hemoptysis, cough, sputum.   GI:           No anorexia, nausea, vomiting, diarrhea. No abdominal pain, melena.   :         No burning on urination, hematuria or increased frequency.  ENDO:    No diaphoresis, cold or heat intolerance. No polyuria or polydipsia.   NEURO:  No headache, dizziness, syncope, paralysis, ataxia, or parasthesias.                  No change in bowel or bladder control. No history of CVA/TIA  MUSC:    No muscle, back pain, joint pain or stiffness.   HEME:    No anemia, bleeding, bruising. No history of DVT/PE.  PSYCH:  No history of depression, anxiety    Vitals:    06/15/22 1307   BP: 110/62   BP Location: Left arm   Patient Position: Sitting   Pulse: 76   SpO2: 98%   Weight: 65.3 kg (144 lb)   Height: 165.1 cm (65\") "                 Physical Exam:  GEN: Well nourished, well-developed, no acute distress  HEENT: Normocephalic, atraumatic, PERRLA, moist mucous membranes  NECK: Supple, NO JVD, no thyromegaly, no lymphadenopathy  CARD: S1S2, irr irr, no murmur, gallop, rub, PMI NL   LUNGS: Clear to auscultation, normal respiratory effort  ABDOMEN: Soft, nontender, normal bowel sounds  EXTREMITIES: No gross deformities, no clubbing, cyanosis, or edema  SKIN: Warm, dry  NEURO: No focal deficits, alert and oriented x 3  PSYCHIATRIC: Normal affect and mood      I personally viewed and interpreted the patient's EKG/Telemetry/lab data    Lab Results   Component Value Date    GLUCOSE 127 (H) 12/16/2020    CALCIUM 9.7 12/16/2020     (L) 12/16/2020    K 3.9 12/16/2020    CO2 24.3 12/16/2020     12/16/2020    BUN 17 12/16/2020    CREATININE 1.07 (H) 12/16/2020    EGFRIFNONA 49 (L) 12/16/2020    BCR 15.9 12/16/2020    ANIONGAP 8.7 12/16/2020     Lab Results   Component Value Date    WBC 6.95 12/16/2020    HGB 12.2 12/16/2020    HCT 37.5 12/16/2020    MCV 98.4 (H) 12/16/2020     12/16/2020     Lab Results   Component Value Date    INR 1.24 (H) 05/31/2019    INR 1.20 (H) 05/27/2018    PROTIME 16.2 (H) 05/31/2019    PROTIME 15.5 (H) 05/27/2018     Lab Results   Component Value Date    TSH 2.190 12/16/2020    E0AOSHT 8.60 05/31/2017             ECG 12 Lead    Date/Time: 6/15/2022 1:17 PM  Performed by: Jefe Zhao MD  Authorized by: Jefe Zhao MD   Comparison: compared with previous ECG from 4/1/2021  Rhythm: atrial fibrillation  Rate: normal  BPM: 62                ICD-10-CM ICD-9-CM   1. Permanent atrial fibrillation (HCC)  I48.21 427.31   2. SSS (sick sinus syndrome) (HCC)  I49.5 427.81   3. Essential hypertension  I10 401.9       Assessment and Plan:     1. Permanent atrial fibrillation  -Permanent AF since 1990s, rate controlled on metoprolol 150 bid. Now with worsening palps, SOB, fatigue. Recent preventice  monitor with degree of tachybrady syndrome. Would recommened PPM implant. The risks, benefits, and alternatives of the procedure have been reviewed and the patient wishes to proceed.     -14 day Preventice, 4/20/22-5/3/22, Avg hr 78, min hr 52, Max hr 152 bpm. 88% AF burden. 9 sinus pauses >3 seconds.   -Echo, 7/26/21, EF 51-55%, Mild LVH, LA mildly increased, RA mildly dilated. Trace TR. Mild pulmonary hypertension.   -[id number] equals 4 on low-dose Eliquis, hold eliquis x 1 day.     2. SymptomaticTachybrady syndrome   -Element of tachybrady syndrome noted on recent preventice monitor with 9 episodes of pauses, longest 3.8 seconds. Patient severely symptomatic with fatigue, sob, unsteady gait. She would benefit from leadless ppm. The risks, benefits, and alternatives of the procedure have been reviewed and the patient wishes to proceed.     3.CAD   -continue present medications  -per Dr. Matt     4. HTN  -Well controlled, continue present medications. Per Dr Matt     Scribed for Jefe Zhao MD by JUNIE Zacarias. 6/15/2022 13:45 EDT     I, Jefe Zhao MD, personally performed the services described in this documentation as scribed by the above named individual in my presence, and it is both accurate and complete.  6/15/2022  13:54 EDT

## 2022-06-15 ENCOUNTER — OFFICE VISIT (OUTPATIENT)
Dept: CARDIOLOGY | Facility: CLINIC | Age: 82
End: 2022-06-15

## 2022-06-15 VITALS
OXYGEN SATURATION: 98 % | SYSTOLIC BLOOD PRESSURE: 110 MMHG | HEART RATE: 76 BPM | DIASTOLIC BLOOD PRESSURE: 62 MMHG | WEIGHT: 144 LBS | BODY MASS INDEX: 23.99 KG/M2 | HEIGHT: 65 IN

## 2022-06-15 DIAGNOSIS — I48.21 PERMANENT ATRIAL FIBRILLATION: Primary | ICD-10-CM

## 2022-06-15 DIAGNOSIS — I10 ESSENTIAL HYPERTENSION: ICD-10-CM

## 2022-06-15 DIAGNOSIS — I49.5 TACHY-BRADY SYNDROME: Primary | ICD-10-CM

## 2022-06-15 DIAGNOSIS — E03.9 HYPOTHYROIDISM (ACQUIRED): ICD-10-CM

## 2022-06-15 PROCEDURE — 93000 ELECTROCARDIOGRAM COMPLETE: CPT | Performed by: INTERNAL MEDICINE

## 2022-06-15 PROCEDURE — 99204 OFFICE O/P NEW MOD 45 MIN: CPT | Performed by: INTERNAL MEDICINE

## 2022-06-15 NOTE — PROGRESS NOTES
TSH lab order placed, I called patient to see where she would like to have it drawn. No answer and voicemail is not set up.

## 2022-06-22 NOTE — PROGRESS NOTES
Called and spoke with patient, she would like the order faxed to her PCP. I called PCP to obtain a fax number, no answer, I left a voicemail for a return call.

## 2022-06-23 ENCOUNTER — HOSPITAL ENCOUNTER (OUTPATIENT)
Facility: HOSPITAL | Age: 82
Setting detail: HOSPITAL OUTPATIENT SURGERY
End: 2022-06-23
Attending: INTERNAL MEDICINE | Admitting: INTERNAL MEDICINE

## 2022-06-23 DIAGNOSIS — I49.5 TACHY-BRADY SYNDROME: ICD-10-CM

## 2022-07-12 ENCOUNTER — LAB (OUTPATIENT)
Dept: LAB | Facility: HOSPITAL | Age: 82
End: 2022-07-12

## 2022-07-12 DIAGNOSIS — E03.9 HYPOTHYROIDISM (ACQUIRED): ICD-10-CM

## 2022-07-12 LAB — TSH SERPL DL<=0.05 MIU/L-ACNC: 2.85 UIU/ML (ref 0.27–4.2)

## 2022-07-12 PROCEDURE — 36415 COLL VENOUS BLD VENIPUNCTURE: CPT

## 2022-07-12 PROCEDURE — 84443 ASSAY THYROID STIM HORMONE: CPT

## 2022-07-28 ENCOUNTER — PREP FOR SURGERY (OUTPATIENT)
Dept: OTHER | Facility: HOSPITAL | Age: 82
End: 2022-07-28

## 2022-07-28 DIAGNOSIS — I49.5 SSS (SICK SINUS SYNDROME): Primary | ICD-10-CM

## 2022-07-28 RX ORDER — BUPIVACAINE HCL/0.9 % NACL/PF 0.1 %
2 PLASTIC BAG, INJECTION (ML) EPIDURAL ONCE
Status: CANCELLED | OUTPATIENT
Start: 2022-07-28 | End: 2022-07-28

## 2022-07-28 RX ORDER — SODIUM CHLORIDE 0.9 % (FLUSH) 0.9 %
3 SYRINGE (ML) INJECTION EVERY 12 HOURS SCHEDULED
Status: CANCELLED | OUTPATIENT
Start: 2022-07-28

## 2022-07-28 RX ORDER — ACETAMINOPHEN 325 MG/1
650 TABLET ORAL EVERY 4 HOURS PRN
Status: CANCELLED | OUTPATIENT
Start: 2022-07-28

## 2022-07-28 RX ORDER — SODIUM CHLORIDE 9 MG/ML
1 INJECTION, SOLUTION INTRAVENOUS CONTINUOUS
Status: CANCELLED | OUTPATIENT
Start: 2022-07-28 | End: 2022-07-28

## 2022-07-28 RX ORDER — NITROGLYCERIN 0.4 MG/1
0.4 TABLET SUBLINGUAL
Status: CANCELLED | OUTPATIENT
Start: 2022-07-28

## 2022-07-28 RX ORDER — SODIUM CHLORIDE 0.9 % (FLUSH) 0.9 %
10 SYRINGE (ML) INJECTION AS NEEDED
Status: CANCELLED | OUTPATIENT
Start: 2022-07-28

## 2022-08-01 ENCOUNTER — APPOINTMENT (OUTPATIENT)
Dept: CT IMAGING | Facility: HOSPITAL | Age: 82
End: 2022-08-01
Payer: MEDICARE

## 2022-08-01 ENCOUNTER — HOSPITAL ENCOUNTER (EMERGENCY)
Facility: HOSPITAL | Age: 82
Discharge: OTHER INSTITUTION | End: 2022-08-02
Attending: STUDENT IN AN ORGANIZED HEALTH CARE EDUCATION/TRAINING PROGRAM
Payer: MEDICARE

## 2022-08-01 DIAGNOSIS — S22.42XA CLOSED FRACTURE OF MULTIPLE RIBS OF LEFT SIDE, INITIAL ENCOUNTER: ICD-10-CM
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
## 2022-08-01 DIAGNOSIS — S32.040S CLOSED COMPRESSION FRACTURE OF L4 LUMBAR VERTEBRA, SEQUELA: Primary | ICD-10-CM

## 2022-08-01 LAB
ALBUMIN SERPL-MCNC: 3.89 G/DL (ref 3.5–5.2)
ALBUMIN/GLOB SERPL: 0.9 G/DL
ALP SERPL-CCNC: 55 U/L (ref 39–117)
ALT SERPL W P-5'-P-CCNC: 10 U/L (ref 1–33)
ANION GAP SERPL CALCULATED.3IONS-SCNC: 10.3 MMOL/L (ref 5–15)
AST SERPL-CCNC: 22 U/L (ref 1–32)
BASOPHILS # BLD AUTO: 0.07 10*3/MM3 (ref 0–0.2)
BASOPHILS NFR BLD AUTO: 1.1 % (ref 0–1.5)
BILIRUB SERPL-MCNC: 0.2 MG/DL (ref 0–1.2)
BUN SERPL-MCNC: 20 MG/DL (ref 8–23)
BUN/CREAT SERPL: 14.3 (ref 7–25)
CALCIUM SPEC-SCNC: 9.7 MG/DL (ref 8.6–10.5)
CHLORIDE SERPL-SCNC: 105 MMOL/L (ref 98–107)
CO2 SERPL-SCNC: 21.7 MMOL/L (ref 22–29)
CREAT SERPL-MCNC: 1.4 MG/DL (ref 0.57–1)
DEPRECATED RDW RBC AUTO: 45.6 FL (ref 37–54)
EGFRCR SERPLBLD CKD-EPI 2021: 37.4 ML/MIN/1.73
EOSINOPHIL # BLD AUTO: 0.51 10*3/MM3 (ref 0–0.4)
EOSINOPHIL NFR BLD AUTO: 8.1 % (ref 0.3–6.2)
ERYTHROCYTE [DISTWIDTH] IN BLOOD BY AUTOMATED COUNT: 12.4 % (ref 12.3–15.4)
GLOBULIN UR ELPH-MCNC: 4.1 GM/DL
GLUCOSE SERPL-MCNC: 105 MG/DL (ref 65–99)
HCT VFR BLD AUTO: 39.4 % (ref 34–46.6)
HGB BLD-MCNC: 12.8 G/DL (ref 12–15.9)
IMM GRANULOCYTES # BLD AUTO: 0.03 10*3/MM3 (ref 0–0.05)
IMM GRANULOCYTES NFR BLD AUTO: 0.5 % (ref 0–0.5)
LYMPHOCYTES # BLD AUTO: 1.37 10*3/MM3 (ref 0.7–3.1)
LYMPHOCYTES NFR BLD AUTO: 21.8 % (ref 19.6–45.3)
MCH RBC QN AUTO: 32.3 PG (ref 26.6–33)
MCHC RBC AUTO-ENTMCNC: 32.5 G/DL (ref 31.5–35.7)
MCV RBC AUTO: 99.5 FL (ref 79–97)
MONOCYTES # BLD AUTO: 0.86 10*3/MM3 (ref 0.1–0.9)
MONOCYTES NFR BLD AUTO: 13.7 % (ref 5–12)
NEUTROPHILS NFR BLD AUTO: 3.45 10*3/MM3 (ref 1.7–7)
NEUTROPHILS NFR BLD AUTO: 54.8 % (ref 42.7–76)
NRBC BLD AUTO-RTO: 0 /100 WBC (ref 0–0.2)
PLATELET # BLD AUTO: 192 10*3/MM3 (ref 140–450)
PMV BLD AUTO: 10.1 FL (ref 6–12)
POTASSIUM SERPL-SCNC: 4.2 MMOL/L (ref 3.5–5.2)
PROT SERPL-MCNC: 8 G/DL (ref 6–8.5)
RBC # BLD AUTO: 3.96 10*6/MM3 (ref 3.77–5.28)
SODIUM SERPL-SCNC: 137 MMOL/L (ref 136–145)
TROPONIN T SERPL-MCNC: <0.01 NG/ML (ref 0–0.03)
WBC NRBC COR # BLD: 6.29 10*3/MM3 (ref 3.4–10.8)

## 2022-08-01 PROCEDURE — 85025 COMPLETE CBC W/AUTO DIFF WBC: CPT | Performed by: STUDENT IN AN ORGANIZED HEALTH CARE EDUCATION/TRAINING PROGRAM

## 2022-08-01 PROCEDURE — 71260 CT THORAX DX C+: CPT

## 2022-08-01 PROCEDURE — 84484 ASSAY OF TROPONIN QUANT: CPT | Performed by: STUDENT IN AN ORGANIZED HEALTH CARE EDUCATION/TRAINING PROGRAM

## 2022-08-01 PROCEDURE — 99284 EMERGENCY DEPT VISIT MOD MDM: CPT

## 2022-08-01 PROCEDURE — 80053 COMPREHEN METABOLIC PANEL: CPT | Performed by: STUDENT IN AN ORGANIZED HEALTH CARE EDUCATION/TRAINING PROGRAM

## 2022-08-01 PROCEDURE — 93005 ELECTROCARDIOGRAM TRACING: CPT | Performed by: STUDENT IN AN ORGANIZED HEALTH CARE EDUCATION/TRAINING PROGRAM

## 2022-08-01 PROCEDURE — 36415 COLL VENOUS BLD VENIPUNCTURE: CPT

## 2022-08-02 ENCOUNTER — APPOINTMENT (OUTPATIENT)
Dept: CT IMAGING | Facility: HOSPITAL | Age: 82
End: 2022-08-02
Payer: MEDICARE

## 2022-08-02 VITALS
OXYGEN SATURATION: 98 % | BODY MASS INDEX: 26.05 KG/M2 | SYSTOLIC BLOOD PRESSURE: 155 MMHG | HEIGHT: 63 IN | RESPIRATION RATE: 16 BRPM | TEMPERATURE: 98.4 F | WEIGHT: 147 LBS | DIASTOLIC BLOOD PRESSURE: 95 MMHG | HEART RATE: 76 BPM

## 2022-08-02 LAB
QT INTERVAL: 392 MS
QTC INTERVAL: 407 MS

## 2022-08-02 PROCEDURE — 25010000002 IOPAMIDOL 61 % SOLUTION: Performed by: STUDENT IN AN ORGANIZED HEALTH CARE EDUCATION/TRAINING PROGRAM

## 2022-08-02 PROCEDURE — 74177 CT ABD & PELVIS W/CONTRAST: CPT

## 2022-08-02 PROCEDURE — 72128 CT CHEST SPINE W/O DYE: CPT

## 2022-08-02 PROCEDURE — 72131 CT LUMBAR SPINE W/O DYE: CPT

## 2022-08-02 RX ORDER — ACETAMINOPHEN 500 MG
1000 TABLET ORAL ONCE
Status: COMPLETED | OUTPATIENT
Start: 2022-08-02 | End: 2022-08-02

## 2022-08-02 RX ADMIN — ACETAMINOPHEN 1000 MG: 500 TABLET ORAL at 00:46

## 2022-08-02 RX ADMIN — SODIUM CHLORIDE 1000 ML: 9 INJECTION, SOLUTION INTRAVENOUS at 00:45

## 2022-08-02 RX ADMIN — IOPAMIDOL 86 ML: 612 INJECTION, SOLUTION INTRAVENOUS at 00:23

## 2022-08-02 NOTE — ED NOTES
Pt being transported to  at this time via [facility]. Pt alert. VSS. NADN. Respirations even and nonlabored. IV patent/intact.  Belongings sent with pts family

## 2022-08-02 NOTE — ED PROVIDER NOTES
"Subjective   History of Present Illness     Mrs. Dunham is a 82-year-old female past medical history for A. Fib (on eliquis, stopped 2d ago in preparation for pacemaker placement tomorrow at [facility]), and recurrent falls over the last 2-3 years presenting for mechanical fall.  Tonight patient reports her \"legs gave out\" and she fell landing onto her left side.  Patient has pain along the left rib cage along with left upper quadrant pain.  Patient denies loss of consciousness. No neck pain. Denies hitting head.  Patient also reports lower back pain.  No blood thinners.  No prodromal symptoms such as heart palpitations, dizziness or lightheadedness.    Review of Systems   Constitutional: Negative.  Negative for fever.   HENT: Negative.    Respiratory: Negative.    Cardiovascular: Positive for chest pain.   Gastrointestinal: Positive for abdominal pain.   Endocrine: Negative.    Genitourinary: Negative.  Negative for dysuria.   Musculoskeletal: Positive for back pain.   Skin: Negative.    Neurological: Negative.    Psychiatric/Behavioral: Negative.    All other systems reviewed and are negative.      Past Medical History:   Diagnosis Date   • Anxiety    • Arrhythmia    • Atrial fibrillation (HCC)    • Coronary artery disease    • Disease of thyroid gland    • Dysfunctional gallbladder    • GERD (gastroesophageal reflux disease)    • Hypertension    • Osteoporosis        No Known Allergies    Past Surgical History:   Procedure Laterality Date   • ABDOMINAL SURGERY     • CHOLECYSTECTOMY OPEN     • HIP HEMIARTHROPLASTY Left 5/29/2018    Procedure: LEFT HIP BIPOLAR HEMIARTHROPLASTY;  Surgeon: Trevon Arriaga MD;  Location: [facility];  Service: Orthopedics       Family History   Problem Relation Age of Onset   • Heart disease Father        Social History     Socioeconomic History   • Marital status:    Tobacco Use   • Smoking status: Former Smoker     Packs/day: 0.25     Years: 15.00     Pack years: " 3.75     Types: Cigarettes, Cigars     Quit date: 3/10/2022     Years since quittin.3   • Smokeless tobacco: Current User     Types: Snuff   • Tobacco comment: snuff for 60 years    Vaping Use   • Vaping Use: Never used   Substance and Sexual Activity   • Alcohol use: No   • Drug use: No   • Sexual activity: Defer           Objective   Physical Exam  Vitals and nursing note reviewed.   Constitutional:       Appearance: Normal appearance.   HENT:      Head: Normocephalic and atraumatic.      Right Ear: Tympanic membrane normal.      Left Ear: Tympanic membrane normal.      Nose: Nose normal. No congestion or rhinorrhea.      Mouth/Throat:      Mouth: Mucous membranes are moist.      Pharynx: No oropharyngeal exudate.   Eyes:      Extraocular Movements: Extraocular movements intact.      Pupils: Pupils are equal, round, and reactive to light.   Cardiovascular:      Rate and Rhythm: Normal rate and regular rhythm.      Pulses: Normal pulses.      Heart sounds: Normal heart sounds.   Pulmonary:      Effort: Pulmonary effort is normal.      Breath sounds: Normal breath sounds.   Abdominal:      General: Abdomen is flat. Bowel sounds are normal.      Tenderness: There is abdominal tenderness.   Musculoskeletal:         General: Normal range of motion.      Cervical back: Normal range of motion. No rigidity.   Skin:     General: Skin is warm.      Capillary Refill: Capillary refill takes less than 2 seconds.   Neurological:      General: No focal deficit present.      Mental Status: She is alert and oriented to person, place, and time.         Procedures           ED Course  ED Course as of 22 0454   Mon Aug 01, 2022   2322 Atrial fibrillation.  65 bpm.  407 MS QTc.  PA interval undetermined.  No ST segment elevation or other acute signs of ischemia. [LS]      ED Course User Index  [LS] Kendal Burks MD                                           MDM  Number of Diagnoses or Management Options  Closed  compression fracture of L4 lumbar vertebra, sequela  Closed fracture of multiple ribs of left side, initial encounter  Fall, initial encounter  Diagnosis management comments: Mrs. Dunham is an 82-year-old female past medical history for A. Fib on eliquis/scheduled to undergo pacemaker placement tomorrow presenting to the emergency department for mechanical fall with associated (L) sided chest pain, abdominal pain and lower back pain.  Patient is neurovascularly intact and hemodynamically stable on arrival.  On physical exam patient has tenderness to the substernal chest wall as well as left rib cage and left upper quadrant.  Patient denies loss of consciousness.  Denies hitting head.  Denies any prodromal symptoms prior to falling. But reports history of recurrent falls. Due to concern for polytrauma given significant pain on exam, CT abdomen pelvis obtained to rule out any splenic injury/laceration or other acute intraabdominal process along with CT chest, CT thoracic and CT lumbar spine.  Imaging remarkable for posterior left rib fractures 11 and 12 no evidence of hemopneumothorax along with L4 compression fracture age-indeterminate, given pain along lumbar spine with reassessment favoring acute fracture. Furthermore patient denies any prior trauma to the lower back.  Given polytrauma patient to be admitted to  for trauma evaluation and spinal consult. Patient placed in Lumbar precautions prior to transfer.        Amount and/or Complexity of Data Reviewed  Clinical lab tests: reviewed and ordered  Tests in the radiology section of CPT®: reviewed and ordered  Tests in the medicine section of CPT®: reviewed and ordered  Decide to obtain previous medical records or to obtain history from someone other than the patient: yes  Review and summarize past medical records: yes  Independent visualization of images, tracings, or specimens: yes        Final diagnoses:   Closed compression fracture of L4 lumbar vertebra,  sequela   Fall, initial encounter   Closed fracture of multiple ribs of left side, initial encounter       ED Disposition  ED Disposition     ED Disposition   Transfer to Another Facility     Condition   --    Comment   --             No follow-up provider specified.       Medication List      No changes were made to your prescriptions during this visit.          Kendal Burks MD  08/02/22 0454       Kendal Burks MD  08/02/22 7311

## 2022-08-02 NOTE — ED NOTES
Spoke to Dominga MCDONALD at  ED to ensure they were aware of pts transfer. All questions addressed.

## 2022-08-02 NOTE — ED NOTES
Report called on pt to  Adult ER, nurse taking report Esmer MCDONALD. (Report included pt was recently on eliquis). Also informed them pt did not have transportation at this time.

## 2022-08-03 ENCOUNTER — TELEPHONE (OUTPATIENT)
Dept: CARDIOLOGY | Facility: CLINIC | Age: 82
End: 2022-08-03

## 2022-08-03 NOTE — TELEPHONE ENCOUNTER
She was discharged from  today after a fall. On her trauma scans they found a proximal abdominal aortic aneurysm that was 3cm. The nurse practitioner wanted to let you know in case you would want to see her in office.

## 2022-08-08 DIAGNOSIS — I49.5 SSS (SICK SINUS SYNDROME): ICD-10-CM

## 2022-08-08 DIAGNOSIS — I48.21 PERMANENT ATRIAL FIBRILLATION: Primary | ICD-10-CM

## 2022-08-12 ENCOUNTER — TREATMENT (OUTPATIENT)
Dept: CARDIOLOGY | Facility: CLINIC | Age: 82
End: 2022-08-12

## 2022-08-12 DIAGNOSIS — I49.5 TACHY-BRADY SYNDROME: Primary | ICD-10-CM

## 2022-08-16 PROCEDURE — 93228 REMOTE 30 DAY ECG REV/REPORT: CPT | Performed by: INTERNAL MEDICINE

## 2022-08-22 ENCOUNTER — OFFICE VISIT (OUTPATIENT)
Dept: CARDIOLOGY | Facility: CLINIC | Age: 82
End: 2022-08-22

## 2022-08-22 VITALS
WEIGHT: 142 LBS | HEIGHT: 63 IN | OXYGEN SATURATION: 96 % | SYSTOLIC BLOOD PRESSURE: 101 MMHG | DIASTOLIC BLOOD PRESSURE: 67 MMHG | HEART RATE: 66 BPM | BODY MASS INDEX: 25.16 KG/M2

## 2022-08-22 DIAGNOSIS — I48.21 PERMANENT ATRIAL FIBRILLATION: ICD-10-CM

## 2022-08-22 DIAGNOSIS — I10 ESSENTIAL HYPERTENSION: Primary | ICD-10-CM

## 2022-08-22 PROCEDURE — 99214 OFFICE O/P EST MOD 30 MIN: CPT | Performed by: INTERNAL MEDICINE

## 2022-08-22 RX ORDER — METOPROLOL TARTRATE 100 MG/1
100 TABLET ORAL 2 TIMES DAILY
Qty: 180 TABLET | Refills: 3 | Status: ON HOLD | OUTPATIENT
Start: 2022-08-22 | End: 2022-09-23 | Stop reason: SDUPTHER

## 2022-08-22 RX ORDER — OMEPRAZOLE 40 MG/1
40 CAPSULE, DELAYED RELEASE ORAL DAILY
COMMUNITY
End: 2023-01-23

## 2022-08-22 RX ORDER — LEVOCETIRIZINE DIHYDROCHLORIDE 5 MG/1
TABLET, FILM COATED ORAL
COMMUNITY
Start: 2022-08-08

## 2022-08-24 PROCEDURE — 93000 ELECTROCARDIOGRAM COMPLETE: CPT | Performed by: INTERNAL MEDICINE

## 2022-09-06 ENCOUNTER — PREP FOR SURGERY (OUTPATIENT)
Dept: OTHER | Facility: HOSPITAL | Age: 82
End: 2022-09-06

## 2022-09-06 DIAGNOSIS — I49.5 SSS (SICK SINUS SYNDROME): ICD-10-CM

## 2022-09-06 DIAGNOSIS — I48.21 PERMANENT ATRIAL FIBRILLATION: Primary | ICD-10-CM

## 2022-09-06 RX ORDER — SODIUM CHLORIDE 0.9 % (FLUSH) 0.9 %
3 SYRINGE (ML) INJECTION EVERY 12 HOURS SCHEDULED
Status: CANCELLED | OUTPATIENT
Start: 2022-09-06

## 2022-09-06 RX ORDER — ACETAMINOPHEN 325 MG/1
650 TABLET ORAL EVERY 4 HOURS PRN
Status: CANCELLED | OUTPATIENT
Start: 2022-09-06

## 2022-09-06 RX ORDER — SODIUM CHLORIDE 9 MG/ML
1 INJECTION, SOLUTION INTRAVENOUS CONTINUOUS
Status: CANCELLED | OUTPATIENT
Start: 2022-09-06 | End: 2022-09-06

## 2022-09-06 RX ORDER — CEFAZOLIN SODIUM 2 G/100ML
2 INJECTION, SOLUTION INTRAVENOUS ONCE
Status: CANCELLED | OUTPATIENT
Start: 2022-09-06 | End: 2022-09-06

## 2022-09-06 RX ORDER — NITROGLYCERIN 0.4 MG/1
0.4 TABLET SUBLINGUAL
Status: CANCELLED | OUTPATIENT
Start: 2022-09-06

## 2022-09-06 RX ORDER — SODIUM CHLORIDE 0.9 % (FLUSH) 0.9 %
10 SYRINGE (ML) INJECTION AS NEEDED
Status: CANCELLED | OUTPATIENT
Start: 2022-09-06

## 2022-09-23 ENCOUNTER — HOSPITAL ENCOUNTER (OUTPATIENT)
Facility: HOSPITAL | Age: 82
Discharge: HOME | End: 2022-09-23
Attending: INTERNAL MEDICINE | Admitting: INTERNAL MEDICINE

## 2022-09-23 VITALS
WEIGHT: 141.8 LBS | DIASTOLIC BLOOD PRESSURE: 88 MMHG | HEIGHT: 63 IN | RESPIRATION RATE: 16 BRPM | TEMPERATURE: 97.6 F | HEART RATE: 80 BPM | SYSTOLIC BLOOD PRESSURE: 143 MMHG | OXYGEN SATURATION: 96 % | BODY MASS INDEX: 25.12 KG/M2

## 2022-09-23 DIAGNOSIS — I48.21 PERMANENT ATRIAL FIBRILLATION: ICD-10-CM

## 2022-09-23 DIAGNOSIS — I49.5 SSS (SICK SINUS SYNDROME): ICD-10-CM

## 2022-09-23 LAB
ANION GAP SERPL CALCULATED.3IONS-SCNC: 9 MMOL/L (ref 5–15)
BUN SERPL-MCNC: 16 MG/DL (ref 8–23)
BUN/CREAT SERPL: 13.8 (ref 7–25)
CALCIUM SPEC-SCNC: 8.9 MG/DL (ref 8.6–10.5)
CHLORIDE SERPL-SCNC: 107 MMOL/L (ref 98–107)
CO2 SERPL-SCNC: 24 MMOL/L (ref 22–29)
CREAT SERPL-MCNC: 1.16 MG/DL (ref 0.57–1)
DEPRECATED RDW RBC AUTO: 46.5 FL (ref 37–54)
EGFRCR SERPLBLD CKD-EPI 2021: 46.9 ML/MIN/1.73
ERYTHROCYTE [DISTWIDTH] IN BLOOD BY AUTOMATED COUNT: 13.1 % (ref 12.3–15.4)
GLUCOSE SERPL-MCNC: 81 MG/DL (ref 65–99)
HBA1C MFR BLD: 5.5 % (ref 4.8–5.6)
HCT VFR BLD AUTO: 38.5 % (ref 34–46.6)
HGB BLD-MCNC: 12.6 G/DL (ref 12–15.9)
MCH RBC QN AUTO: 31.4 PG (ref 26.6–33)
MCHC RBC AUTO-ENTMCNC: 32.7 G/DL (ref 31.5–35.7)
MCV RBC AUTO: 96 FL (ref 79–97)
PLATELET # BLD AUTO: 166 10*3/MM3 (ref 140–450)
PMV BLD AUTO: 10.7 FL (ref 6–12)
POTASSIUM SERPL-SCNC: 4.1 MMOL/L (ref 3.5–5.2)
RBC # BLD AUTO: 4.01 10*6/MM3 (ref 3.77–5.28)
SODIUM SERPL-SCNC: 140 MMOL/L (ref 136–145)
WBC NRBC COR # BLD: 6.24 10*3/MM3 (ref 3.4–10.8)

## 2022-09-23 PROCEDURE — 93650 ICAR CATH ABLTJ AV NODE FUNC: CPT | Performed by: INTERNAL MEDICINE

## 2022-09-23 PROCEDURE — 25010000002 ONDANSETRON PER 1 MG: Performed by: INTERNAL MEDICINE

## 2022-09-23 PROCEDURE — 33274 TCAT INSJ/RPL PERM LDLS PM: CPT | Performed by: INTERNAL MEDICINE

## 2022-09-23 PROCEDURE — 25010000002 MIDAZOLAM PER 1 MG: Performed by: INTERNAL MEDICINE

## 2022-09-23 PROCEDURE — 25010000002 CEFAZOLIN IN DEXTROSE 2-4 GM/100ML-% SOLUTION: Performed by: NURSE PRACTITIONER

## 2022-09-23 PROCEDURE — 85027 COMPLETE CBC AUTOMATED: CPT | Performed by: INTERNAL MEDICINE

## 2022-09-23 PROCEDURE — 80048 BASIC METABOLIC PNL TOTAL CA: CPT | Performed by: INTERNAL MEDICINE

## 2022-09-23 PROCEDURE — 0 IOPAMIDOL PER 1 ML: Performed by: INTERNAL MEDICINE

## 2022-09-23 PROCEDURE — 99153 MOD SED SAME PHYS/QHP EA: CPT | Performed by: INTERNAL MEDICINE

## 2022-09-23 PROCEDURE — C1894 INTRO/SHEATH, NON-LASER: HCPCS | Performed by: INTERNAL MEDICINE

## 2022-09-23 PROCEDURE — 25010000002 HEPARIN (PORCINE) PER 1000 UNITS: Performed by: INTERNAL MEDICINE

## 2022-09-23 PROCEDURE — 36415 COLL VENOUS BLD VENIPUNCTURE: CPT

## 2022-09-23 PROCEDURE — 99152 MOD SED SAME PHYS/QHP 5/>YRS: CPT | Performed by: INTERNAL MEDICINE

## 2022-09-23 PROCEDURE — 25010000002 FENTANYL CITRATE (PF) 50 MCG/ML SOLUTION: Performed by: INTERNAL MEDICINE

## 2022-09-23 PROCEDURE — 83036 HEMOGLOBIN GLYCOSYLATED A1C: CPT | Performed by: NURSE PRACTITIONER

## 2022-09-23 PROCEDURE — C1769 GUIDE WIRE: HCPCS | Performed by: INTERNAL MEDICINE

## 2022-09-23 PROCEDURE — C1733 CATH, EP, OTHR THAN COOL-TIP: HCPCS | Performed by: INTERNAL MEDICINE

## 2022-09-23 PROCEDURE — C1786 PMKR, SINGLE, RATE-RESP: HCPCS | Performed by: INTERNAL MEDICINE

## 2022-09-23 DEVICE — GEN PM MICRA TRANSCATHETER LDFR: Type: IMPLANTABLE DEVICE | Status: FUNCTIONAL

## 2022-09-23 RX ORDER — NITROGLYCERIN 0.4 MG/1
0.4 TABLET SUBLINGUAL
Status: DISCONTINUED | OUTPATIENT
Start: 2022-09-23 | End: 2022-09-23 | Stop reason: HOSPADM

## 2022-09-23 RX ORDER — FENTANYL CITRATE 50 UG/ML
INJECTION, SOLUTION INTRAMUSCULAR; INTRAVENOUS AS NEEDED
Status: DISCONTINUED | OUTPATIENT
Start: 2022-09-23 | End: 2022-09-23 | Stop reason: HOSPADM

## 2022-09-23 RX ORDER — ACETAMINOPHEN 325 MG/1
650 TABLET ORAL EVERY 4 HOURS PRN
Status: DISCONTINUED | OUTPATIENT
Start: 2022-09-23 | End: 2022-09-23 | Stop reason: HOSPADM

## 2022-09-23 RX ORDER — SODIUM CHLORIDE 0.9 % (FLUSH) 0.9 %
10 SYRINGE (ML) INJECTION AS NEEDED
Status: DISCONTINUED | OUTPATIENT
Start: 2022-09-23 | End: 2022-09-23 | Stop reason: HOSPADM

## 2022-09-23 RX ORDER — CEFAZOLIN SODIUM 2 G/100ML
2 INJECTION, SOLUTION INTRAVENOUS ONCE
Status: COMPLETED | OUTPATIENT
Start: 2022-09-23 | End: 2022-09-23

## 2022-09-23 RX ORDER — MIDAZOLAM HYDROCHLORIDE 1 MG/ML
INJECTION INTRAMUSCULAR; INTRAVENOUS AS NEEDED
Status: DISCONTINUED | OUTPATIENT
Start: 2022-09-23 | End: 2022-09-23 | Stop reason: HOSPADM

## 2022-09-23 RX ORDER — HEPARIN SODIUM 1000 [USP'U]/ML
INJECTION, SOLUTION INTRAVENOUS; SUBCUTANEOUS AS NEEDED
Status: DISCONTINUED | OUTPATIENT
Start: 2022-09-23 | End: 2022-09-23 | Stop reason: HOSPADM

## 2022-09-23 RX ORDER — SODIUM CHLORIDE 0.9 % (FLUSH) 0.9 %
3 SYRINGE (ML) INJECTION EVERY 12 HOURS SCHEDULED
Status: DISCONTINUED | OUTPATIENT
Start: 2022-09-23 | End: 2022-09-23 | Stop reason: HOSPADM

## 2022-09-23 RX ORDER — SODIUM CHLORIDE 9 MG/ML
1 INJECTION, SOLUTION INTRAVENOUS CONTINUOUS
Status: ACTIVE | OUTPATIENT
Start: 2022-09-23 | End: 2022-09-23

## 2022-09-23 RX ORDER — ACETAMINOPHEN 650 MG/1
650 SUPPOSITORY RECTAL EVERY 4 HOURS PRN
Status: DISCONTINUED | OUTPATIENT
Start: 2022-09-23 | End: 2022-09-23 | Stop reason: HOSPADM

## 2022-09-23 RX ORDER — METOPROLOL TARTRATE 50 MG/1
50 TABLET, FILM COATED ORAL 2 TIMES DAILY
Qty: 60 TABLET | Refills: 6 | Status: SHIPPED | OUTPATIENT
Start: 2022-09-23

## 2022-09-23 RX ORDER — ONDANSETRON 2 MG/ML
4 INJECTION INTRAMUSCULAR; INTRAVENOUS EVERY 6 HOURS PRN
Status: DISCONTINUED | OUTPATIENT
Start: 2022-09-23 | End: 2022-09-23 | Stop reason: HOSPADM

## 2022-09-23 RX ORDER — SODIUM CHLORIDE 9 MG/ML
INJECTION, SOLUTION INTRAVENOUS CONTINUOUS PRN
Status: DISCONTINUED | OUTPATIENT
Start: 2022-09-23 | End: 2022-09-23 | Stop reason: HOSPADM

## 2022-09-23 RX ORDER — ONDANSETRON 2 MG/ML
INJECTION INTRAMUSCULAR; INTRAVENOUS AS NEEDED
Status: DISCONTINUED | OUTPATIENT
Start: 2022-09-23 | End: 2022-09-23 | Stop reason: HOSPADM

## 2022-09-23 RX ORDER — LIDOCAINE HYDROCHLORIDE 10 MG/ML
INJECTION, SOLUTION EPIDURAL; INFILTRATION; INTRACAUDAL; PERINEURAL AS NEEDED
Status: DISCONTINUED | OUTPATIENT
Start: 2022-09-23 | End: 2022-09-23 | Stop reason: HOSPADM

## 2022-09-23 RX ADMIN — SODIUM CHLORIDE 1 ML/KG/HR: 9 INJECTION, SOLUTION INTRAVENOUS at 06:50

## 2022-09-23 RX ADMIN — CEFAZOLIN SODIUM 2 G: 2 INJECTION, SOLUTION INTRAVENOUS at 08:25

## 2022-09-23 NOTE — H&P
Cardiology H&P     Anette Dunham  1940  [phone]  There is no work phone number on file.    09/23/22    DATE OF ADMISSION: 9/23/2022  [facility] EP LAB    Juliann Santana, APRN  [address] / [place] [address]  Referring Provider: Jefe Zhao MD     CC: Bradycardia/permanent AFIB    Problem List:     1. Permanent AF/Tachy-Amarjit Syndrome:   a. CHADsvasc= 4 on Eliquis 2.5 mg bid  b. Echo, 8/17/16, EF 50%, LA mild to mod dilated.  c. Echo, 9/6/18, EF 51-55%. Mild MR. Mild MN. LA mildly dilated. Mild TR.  d. 14 day Holter, 8/21/19-9/3/19, Avg Hr 88, min Hr 70, max hr 150 bpm. 88% AF burden. Cardizem increased.    e. Echo, 7/22/20, EF 60%. LA mildly dilated. LV with moderate concentric hypertrophy. Mod MR.   f. Echo, 7/26/21, EF 51-55%, Mild LVH, LA mildly increased, RA mildly dilated. Trace TR. Mild pulmonary hypertension.   g. Rate controlled on metoprolol 150 bid  h. 14 day Preventice, 4/20/22-5/3/22, Avg hr 78, min hr 52, Max hr 152 bpm. 88% AF burden. 9 sinus pauses >3 seconds.   2. CAD  a. Stress test, 3/7/17, EF >70%, no ischemia  b. Bilateral carotid US, 9/5/18, Mild to mod plaque right greater than left carotid systems. No occlusion. No hemodynamically significant stenosis of either right or left ICA.   c. US arterial doppler lower extremity complete, 7/26/21, Heavy atherosclerotic plaquing is noted involving the arteries of the lower extremities. No focal areas of stenosis or occlusion were demonstrated. Patient was noted to have a irregular heart rate throughout the course of the exam.  d. Stress test, 7/26/21, EF 57%, No ischemia.   3. HTN  4. Hypothyroidism  5. GERD  6. Dysfunctional gallbladder  7. Osteoporosis   8. Anxiety  9. Tobacco abuse  a. 1/2 ppd x 15 years (cigs / cigars / snuff)- quit March 2021   10. Past surgical hx  a. Cholecystectomy  b. Left hip bipolar hemiarthroplasty - 5/29/18  11. Family hx  a. Father-CAD      History of Present Illness:   Anette  Charla is an 82-year-old female history of permanent atrial fibrillation and tachybradycardia syndrome who presents today for leadless pacemaker.  She has had permanent atrial fibrillation for over 30 years and has been rate controlled on metoprolol for several years.  In the last several months she has had issues with instability and falls.  She wore a 14-day event monitor which showed multiple sinus pauses greater than 3 seconds in duration.  She has had fatigue and shortness of breath.  He had a recent ED visit in August 2022 for a fall and broke one of her ribs.  In regards anticoagulation she is on Eliquis 2.5 mg twice daily for [id number] score of 4.  She has held her Eliquis 1 day prior to procedure today as instructed.  She denies any recent illnesses, fevers, chills, vomiting, diarrhea.  No skin infections or open wounds or rashes.      No Known Allergies    Prior to Admission Medications     Prescriptions Last Dose Informant Patient Reported? Taking?    albuterol (PROVENTIL HFA;VENTOLIN HFA) 108 (90 Base) MCG/ACT inhaler  Pharmacy Yes Yes    Inhale 2 puffs Every 6 (Six) Hours As Needed for Wheezing.    alendronate (FOSAMAX) 70 MG tablet  Pharmacy Yes Yes    Take 70 mg by mouth Every 7 (Seven) Days. Prior to [facility] Admission, Patient was on: takes on wednesdays    atorvastatin (LIPITOR) 10 MG tablet   No Yes    Take 1 tablet by mouth Every Night.    calcium carb-cholecalciferol 600-800 MG-UNIT tablet  Pharmacy No Yes    Take 1 tablet by mouth 2 (Two) Times a Day With Meals.    diclofenac (VOLTAREN) 1 % gel gel   Yes Yes    APPLY 2 GRAMS TO AFFECTED AREA FOUR TIMES A DAY FOR PAIN    Eliquis 2.5 MG tablet tablet 9/21/2022  No Yes    TAKE ONE TABLET BY MOUTH EVERY 12 HOURS AS DIRECTED    famotidine (PEPCID) 20 MG tablet   Yes Yes    Take 20 mg by mouth 2 (Two) Times a Day.    fexofenadine (ALLEGRA) 180 MG tablet  Medication Bottle Yes Yes    Take 180 mg by mouth Daily.    gabapentin (NEURONTIN) 400 MG  capsule   No Yes    Take 1 capsule by mouth 2 (Two) Times a Day.    levocetirizine (XYZAL) 5 MG tablet   Yes Yes    TAKE ONE TABLET BY MOUTH EVERY DAY IN THE EVENING FOR ALLERGIES    levothyroxine (SYNTHROID, LEVOTHROID) 50 MCG tablet  Pharmacy Yes Yes    Take 50 mcg by mouth Daily.    LORazepam (ATIVAN) 1 MG tablet   No Yes    Take 1 tablet by mouth Every 8 (Eight) Hours As Needed for Anxiety    metoprolol tartrate (LOPRESSOR) 100 MG tablet   No Yes    Take 1 tablet by mouth 2 (Two) Times a Day.    nitroglycerin (NITROSTAT) 0.4 MG SL tablet   No Yes    1 under the tongue as needed for angina, may repeat q5mins for up three doses    omeprazole (priLOSEC) 40 MG capsule   Yes Yes    Take 40 mg by mouth Daily.    rOPINIRole (REQUIP) 1 MG tablet  Family Member Yes Yes    Take 1 mg by mouth At Night As Needed (restless leg). Take 1 hour before bedtime.    sennosides-docusate sodium (SENOKOT-S) 8.6-50 MG tablet  Pharmacy No Yes    Take 2 tablets by mouth 2 (Two) Times a Day.    sertraline (ZOLOFT) 100 MG tablet   Yes Yes    100 mg Daily.    spironolactone (ALDACTONE) 25 MG tablet   No Yes    Take 1 tablet by mouth Daily.    traMADol (ULTRAM) 50 MG tablet  Pharmacy Yes Yes    Take 50 mg by mouth Every 8 (Eight) Hours As Needed for Moderate Pain .    HYDROcodone-acetaminophen (NORCO) 5-325 MG per tablet   No No    Take 1 tablet by mouth Every 6 (Six) Hours As Needed for Moderate Pain .    isosorbide mononitrate (IMDUR) 30 MG 24 hr tablet   No No    Take 1 tablet by mouth Daily.            Current Facility-Administered Medications:   •  acetaminophen (TYLENOL) tablet 650 mg, 650 mg, Oral, Q4H PRN, Siri Braun APRN  •  fentaNYL citrate (PF) (SUBLIMAZE) injection, , , PRN, Jefe Zhao MD, 50 mcg at 09/23/22 0829  •  midazolam (VERSED) injection, , , PRN, Jefe Zhao MD, 1 mg at 09/23/22 0829  •  nitroglycerin (NITROSTAT) SL tablet 0.4 mg, 0.4 mg, Sublingual, Q5 Min PRN, Siri Braun, APRN  •   O2 (OXYGEN), , , PRN, Jefe Zhao MD, 2 L at 22 0827  •  sodium chloride 0.9 % flush 10 mL, 10 mL, Intravenous, PRN, Siri Braun APRN  •  sodium chloride 0.9 % flush 3 mL, 3 mL, Intravenous, Q12H, Siri Braun APRN  •  sodium chloride 0.9 % infusion, 1 mL/kg/hr (Order-Specific), Intravenous, Continuous, Siri Braun APRN, Last Rate: 64.4 mL/hr at 22 0650, 1 mL/kg/hr at 22 0650  •  sodium chloride 0.9 % infusion, , , Continuous PRN, Jefe Zhao MD, 1,000 mL at 22 0825    Social History     Socioeconomic History   • Marital status:    Tobacco Use   • Smoking status: Former Smoker     Packs/day: 0.25     Years: 15.00     Pack years: 3.75     Types: Cigarettes     Quit date: 3/10/2022     Years since quittin.5   • Smokeless tobacco: Current User     Types: Snuff   • Tobacco comment: snuff for 60 years    Vaping Use   • Vaping Use: Never used   Substance and Sexual Activity   • Alcohol use: No   • Drug use: No   • Sexual activity: Defer       Family History   Problem Relation Age of Onset   • Heart disease Father        REVIEW OF SYSTEMS:   CONSTITUTIONAL:         No weight loss, fever, chills, weakness + fatigue.   HEENT:                            No visual loss, blurred vision, double vision, yellow sclerae.                                             No hearing loss, congestion, sore throat.   SKIN:                                No rashes, urticaria, ulcers, sores.     RESPIRATORY:               + shortness of breath, - hemoptysis, cough, sputum.   GI:                                     No anorexia, nausea, vomiting, diarrhea. No abdominal pain, melena.   :                                   No burning on urination, hematuria or increased frequency.  ENDOCRINE:                   No diaphoresis, cold or heat intolerance. No polyuria or polydipsia.   NEURO:                            No headache, +dizziness, - syncope, paralysis, ataxia,  "or parasthesias.                                            No change in bowel or bladder control. No history of CVA/TIA  MUSCULOSKELETAL:    No muscle, back pain, joint pain or stiffness.   HEMATOLOGY:               No anemia, bleeding, bruising. No history of DVT/PE.  PSYCH:                            No history of depression, anxiety    Vitals:    09/23/22 0620 09/23/22 0622 09/23/22 0823 09/23/22 0827   BP: (!) 150/105 141/100 150/97    BP Location: Right arm Left arm     Patient Position: Lying Sitting     Pulse: 81 73 (!) 126    Resp: 18  12    Temp: 97.6 °F (36.4 °C)      TempSrc: Temporal      SpO2: 97% 97% 97% 99%   Weight: 64.3 kg (141 lb 12.8 oz)      Height: 160 cm (63\")            Vital Sign Min/Max for last 24 hours  Temp  Min: 97.6 °F (36.4 °C)  Max: 97.6 °F (36.4 °C)   BP  Min: 141/100  Max: 150/97   Pulse  Min: 73  Max: 126   Resp  Min: 12  Max: 18   SpO2  Min: 97 %  Max: 99 %   Flow (L/min)  Min: 2  Max: 2    No intake or output data in the 24 hours ending 09/23/22 0833          Physical Exam:  GEN: Well nourished, Well- developed  No acute distress  HEENT: Normocephalic, Atraumatic, PERRLA, moist mucous membranes  NECK: supple, NO JVD, no thyromegaly, no lymphadenopathy  CARDIAC: S1S2, irr, irr,  no murmur, gallop, rub  LUNGS: Clear to ausculation, normal respiratory effort  ABDOMEN: Soft, nontender, normal bowel sounds  EXTREMITIES:No gross deformities,  No clubbing, cyanosis, or edema  SKIN: Warm, dry  NEURO: No focal deficits  PSYCHIATRIC: Normal affect and mood      I personally viewed and interpreted the patient's EKG/Telemetry/lab data    Data:   Results from last 7 days   Lab Units 09/23/22  0621   WBC 10*3/mm3 6.24   HEMOGLOBIN g/dL 12.6   HEMATOCRIT % 38.5   PLATELETS 10*3/mm3 166     Results from last 7 days   Lab Units 09/23/22  0718   SODIUM mmol/L 140   POTASSIUM mmol/L 4.1   CHLORIDE mmol/L 107   CO2 mmol/L 24.0   BUN mg/dL 16   CREATININE mg/dL 1.16*   GLUCOSE mg/dL 81               "                    No intake or output data in the 24 hours ending 09/23/22 0833      Telemetry: atrial fibrillation           Assessment and Plan:        1. SymptomaticTachybrady syndrome   -Element of tachybrady syndrome noted on recent preventice monitor with 9 episodes of pauses, longest 3.8 seconds. Patient severely symptomatic with fatigue, sob, unsteady gait and falls, with most recent fall August 2022. She would benefit from leadless ppm. The risks, benefits, and alternatives of the procedure have been reviewed and the patient wishes to proceed.        2. Permanent atrial fibrillation  -Permanent AF since 1990s, rate controlled on metoprolol 200 mg BID.  Now with worsening palps, SOB, fatigue. Recent preventice monitor with degree of tachybrady syndrome. Would recommened PPM implant. The risks, benefits, and alternatives of the procedure have been reviewed and the patient wishes to proceed.     -[id number] equals 4 on low-dose Eliquis, held eliquis x 1 day.       3.CAD   -continue present medications  -per Dr. Matt      4. HTN  -Well controlled, continue present medications. Per Dr Matt         Electronically signed by NISA Loya, 09/23/22, 7:30 AM EDT.

## 2022-09-26 ENCOUNTER — CALL CENTER PROGRAMS (OUTPATIENT)
Dept: CALL CENTER | Facility: HOSPITAL | Age: 82
End: 2022-09-26

## 2022-09-26 NOTE — OUTREACH NOTE
"PCI/Device Survey    Flowsheet Row Responses   Facility patient discharged from? [facility]   Procedure date 09/23/22   Procedure (if device, specify in description) Device   Device Description leadless VVIR PPM   Performing MD Dr. Jefe Zhao   Attempt successful? Yes   Call start time 0945   Call end time 0953   Is the patient taking prescribed medications: --  [Eliquis 2.5mg BID restart 9-25-22]   Nursing intervention Reminded to continue to take prescribed medications, Nurse provided patient education   Does the patient have any of the following symptoms related to the cath/surgical site? --  [right groin site is clean]   Nursing intervention Patient education provided   Does the patient have an appointment scheduled with the cardiologist? No   Appointment comments was told office will make 3mo f/u appt.   If the patient is a current smoker, are they able to teach back resources for cessation? --  [dips snuff, 2pouches/day for approx 10min at a time.]   Did the patient feel prepared to go home on the same day as the procedure? Yes   Is the patient satisfied with the same day discharge process? Yes   PCI/Device call completed Yes   Wrap up additional comments \"Everyone was very kind to me\".          JOSELIN REYEZ - Registered Nurse  "

## 2022-10-05 ENCOUNTER — LAB (OUTPATIENT)
Dept: LAB | Facility: HOSPITAL | Age: 82
End: 2022-10-05

## 2022-10-05 ENCOUNTER — TRANSCRIBE ORDERS (OUTPATIENT)
Dept: ADMINISTRATIVE | Facility: HOSPITAL | Age: 82
End: 2022-10-05

## 2022-10-05 DIAGNOSIS — M31.6 TEMPORAL ARTERITIS: Primary | ICD-10-CM

## 2022-10-05 DIAGNOSIS — M31.6 TEMPORAL ARTERITIS: ICD-10-CM

## 2022-10-05 PROCEDURE — 85652 RBC SED RATE AUTOMATED: CPT

## 2022-10-05 PROCEDURE — 36415 COLL VENOUS BLD VENIPUNCTURE: CPT

## 2022-10-05 PROCEDURE — 86140 C-REACTIVE PROTEIN: CPT

## 2022-10-05 PROCEDURE — 85025 COMPLETE CBC W/AUTO DIFF WBC: CPT

## 2022-10-06 LAB
BASOPHILS # BLD AUTO: 0.05 10*3/MM3 (ref 0–0.2)
BASOPHILS NFR BLD AUTO: 0.9 % (ref 0–1.5)
CRP SERPL-MCNC: <0.3 MG/DL (ref 0–0.5)
DEPRECATED RDW RBC AUTO: 46.1 FL (ref 37–54)
EOSINOPHIL # BLD AUTO: 0.72 10*3/MM3 (ref 0–0.4)
EOSINOPHIL NFR BLD AUTO: 13.6 % (ref 0.3–6.2)
ERYTHROCYTE [DISTWIDTH] IN BLOOD BY AUTOMATED COUNT: 13 % (ref 12.3–15.4)
ERYTHROCYTE [SEDIMENTATION RATE] IN BLOOD: 43 MM/HR (ref 0–30)
HCT VFR BLD AUTO: 37.7 % (ref 34–46.6)
HGB BLD-MCNC: 11.9 G/DL (ref 12–15.9)
IMM GRANULOCYTES # BLD AUTO: 0.01 10*3/MM3 (ref 0–0.05)
IMM GRANULOCYTES NFR BLD AUTO: 0.2 % (ref 0–0.5)
LYMPHOCYTES # BLD AUTO: 1.31 10*3/MM3 (ref 0.7–3.1)
LYMPHOCYTES NFR BLD AUTO: 24.8 % (ref 19.6–45.3)
MCH RBC QN AUTO: 30.4 PG (ref 26.6–33)
MCHC RBC AUTO-ENTMCNC: 31.6 G/DL (ref 31.5–35.7)
MCV RBC AUTO: 96.4 FL (ref 79–97)
MONOCYTES # BLD AUTO: 0.6 10*3/MM3 (ref 0.1–0.9)
MONOCYTES NFR BLD AUTO: 11.4 % (ref 5–12)
NEUTROPHILS NFR BLD AUTO: 2.59 10*3/MM3 (ref 1.7–7)
NEUTROPHILS NFR BLD AUTO: 49.1 % (ref 42.7–76)
NRBC BLD AUTO-RTO: 0 /100 WBC (ref 0–0.2)
PLATELET # BLD AUTO: 233 10*3/MM3 (ref 140–450)
PMV BLD AUTO: 11.8 FL (ref 6–12)
RBC # BLD AUTO: 3.91 10*6/MM3 (ref 3.77–5.28)
WBC NRBC COR # BLD: 5.28 10*3/MM3 (ref 3.4–10.8)

## 2022-10-26 ENCOUNTER — TELEPHONE (OUTPATIENT)
Dept: CARDIOLOGY | Facility: CLINIC | Age: 82
End: 2022-10-26

## 2022-10-26 NOTE — TELEPHONE ENCOUNTER
Called patient to see if she reached MDT to see about her remote monitor. There was no answer and no voicemail set up.

## 2022-11-11 PROCEDURE — 93294 REM INTERROG EVL PM/LDLS PM: CPT | Performed by: INTERNAL MEDICINE

## 2022-11-11 PROCEDURE — 93296 REM INTERROG EVL PM/IDS: CPT | Performed by: INTERNAL MEDICINE

## 2022-11-16 ENCOUNTER — APPOINTMENT (OUTPATIENT)
Dept: CT IMAGING | Facility: HOSPITAL | Age: 82
End: 2022-11-16

## 2022-11-16 ENCOUNTER — HOSPITAL ENCOUNTER (EMERGENCY)
Facility: HOSPITAL | Age: 82
Discharge: HOME | End: 2022-11-16
Attending: EMERGENCY MEDICINE | Admitting: EMERGENCY MEDICINE

## 2022-11-16 VITALS
DIASTOLIC BLOOD PRESSURE: 83 MMHG | TEMPERATURE: 98.3 F | OXYGEN SATURATION: 99 % | WEIGHT: 138 LBS | SYSTOLIC BLOOD PRESSURE: 135 MMHG | RESPIRATION RATE: 18 BRPM | HEIGHT: 60 IN | HEART RATE: 92 BPM | BODY MASS INDEX: 27.09 KG/M2

## 2022-11-16 DIAGNOSIS — R10.13 EPIGASTRIC PAIN: Primary | ICD-10-CM

## 2022-11-16 LAB
ALBUMIN SERPL-MCNC: 3.94 G/DL (ref 3.5–5.2)
ALBUMIN/GLOB SERPL: 0.9 G/DL
ALP SERPL-CCNC: 60 U/L (ref 39–117)
ALT SERPL W P-5'-P-CCNC: 6 U/L (ref 1–33)
ANION GAP SERPL CALCULATED.3IONS-SCNC: 11.3 MMOL/L (ref 5–15)
AST SERPL-CCNC: 17 U/L (ref 1–32)
BASOPHILS # BLD AUTO: 0.07 10*3/MM3 (ref 0–0.2)
BASOPHILS NFR BLD AUTO: 1 % (ref 0–1.5)
BILIRUB SERPL-MCNC: 0.4 MG/DL (ref 0–1.2)
BILIRUB UR QL STRIP: NEGATIVE
BUN SERPL-MCNC: 17 MG/DL (ref 8–23)
BUN/CREAT SERPL: 12.6 (ref 7–25)
CALCIUM SPEC-SCNC: 9.2 MG/DL (ref 8.6–10.5)
CHLORIDE SERPL-SCNC: 104 MMOL/L (ref 98–107)
CLARITY UR: CLEAR
CO2 SERPL-SCNC: 25.7 MMOL/L (ref 22–29)
COLOR UR: YELLOW
CREAT SERPL-MCNC: 1.35 MG/DL (ref 0.57–1)
CRP SERPL-MCNC: 1.7 MG/DL (ref 0–0.5)
D-LACTATE SERPL-SCNC: 1.4 MMOL/L (ref 0.5–2)
DEPRECATED RDW RBC AUTO: 45.5 FL (ref 37–54)
EGFRCR SERPLBLD CKD-EPI 2021: 39.1 ML/MIN/1.73
EOSINOPHIL # BLD AUTO: 0.41 10*3/MM3 (ref 0–0.4)
EOSINOPHIL NFR BLD AUTO: 5.9 % (ref 0.3–6.2)
ERYTHROCYTE [DISTWIDTH] IN BLOOD BY AUTOMATED COUNT: 12.8 % (ref 12.3–15.4)
ERYTHROCYTE [SEDIMENTATION RATE] IN BLOOD: 55 MM/HR (ref 0–30)
GLOBULIN UR ELPH-MCNC: 4.3 GM/DL
GLUCOSE SERPL-MCNC: 99 MG/DL (ref 65–99)
GLUCOSE UR STRIP-MCNC: NEGATIVE MG/DL
HCT VFR BLD AUTO: 42.2 % (ref 34–46.6)
HGB BLD-MCNC: 13.8 G/DL (ref 12–15.9)
HGB UR QL STRIP.AUTO: NEGATIVE
HOLD SPECIMEN: NORMAL
HOLD SPECIMEN: NORMAL
IMM GRANULOCYTES # BLD AUTO: 0.01 10*3/MM3 (ref 0–0.05)
IMM GRANULOCYTES NFR BLD AUTO: 0.1 % (ref 0–0.5)
KETONES UR QL STRIP: NEGATIVE
LEUKOCYTE ESTERASE UR QL STRIP.AUTO: NEGATIVE
LYMPHOCYTES # BLD AUTO: 1.63 10*3/MM3 (ref 0.7–3.1)
LYMPHOCYTES NFR BLD AUTO: 23.4 % (ref 19.6–45.3)
MCH RBC QN AUTO: 31.4 PG (ref 26.6–33)
MCHC RBC AUTO-ENTMCNC: 32.7 G/DL (ref 31.5–35.7)
MCV RBC AUTO: 96.1 FL (ref 79–97)
MONOCYTES # BLD AUTO: 0.88 10*3/MM3 (ref 0.1–0.9)
MONOCYTES NFR BLD AUTO: 12.6 % (ref 5–12)
NEUTROPHILS NFR BLD AUTO: 3.98 10*3/MM3 (ref 1.7–7)
NEUTROPHILS NFR BLD AUTO: 57 % (ref 42.7–76)
NITRITE UR QL STRIP: NEGATIVE
NRBC BLD AUTO-RTO: 0 /100 WBC (ref 0–0.2)
PH UR STRIP.AUTO: 6.5 [PH] (ref 5–8)
PLATELET # BLD AUTO: 198 10*3/MM3 (ref 140–450)
PMV BLD AUTO: 10.5 FL (ref 6–12)
POTASSIUM SERPL-SCNC: 3.9 MMOL/L (ref 3.5–5.2)
PROT SERPL-MCNC: 8.2 G/DL (ref 6–8.5)
PROT UR QL STRIP: NEGATIVE
RBC # BLD AUTO: 4.39 10*6/MM3 (ref 3.77–5.28)
SODIUM SERPL-SCNC: 141 MMOL/L (ref 136–145)
SP GR UR STRIP: 1.01 (ref 1–1.03)
TROPONIN T SERPL-MCNC: <0.01 NG/ML (ref 0–0.03)
UROBILINOGEN UR QL STRIP: NORMAL
WBC NRBC COR # BLD: 6.98 10*3/MM3 (ref 3.4–10.8)
WHOLE BLOOD HOLD COAG: NORMAL
WHOLE BLOOD HOLD SPECIMEN: NORMAL

## 2022-11-16 PROCEDURE — 99283 EMERGENCY DEPT VISIT LOW MDM: CPT

## 2022-11-16 PROCEDURE — 85025 COMPLETE CBC W/AUTO DIFF WBC: CPT | Performed by: PHYSICIAN ASSISTANT

## 2022-11-16 PROCEDURE — 80053 COMPREHEN METABOLIC PANEL: CPT | Performed by: PHYSICIAN ASSISTANT

## 2022-11-16 PROCEDURE — 96374 THER/PROPH/DIAG INJ IV PUSH: CPT

## 2022-11-16 PROCEDURE — 86140 C-REACTIVE PROTEIN: CPT | Performed by: PHYSICIAN ASSISTANT

## 2022-11-16 PROCEDURE — 74176 CT ABD & PELVIS W/O CONTRAST: CPT

## 2022-11-16 PROCEDURE — 36415 COLL VENOUS BLD VENIPUNCTURE: CPT

## 2022-11-16 PROCEDURE — 93005 ELECTROCARDIOGRAM TRACING: CPT | Performed by: EMERGENCY MEDICINE

## 2022-11-16 PROCEDURE — 85652 RBC SED RATE AUTOMATED: CPT | Performed by: PHYSICIAN ASSISTANT

## 2022-11-16 PROCEDURE — 84484 ASSAY OF TROPONIN QUANT: CPT | Performed by: EMERGENCY MEDICINE

## 2022-11-16 PROCEDURE — 93010 ELECTROCARDIOGRAM REPORT: CPT | Performed by: INTERNAL MEDICINE

## 2022-11-16 PROCEDURE — 81003 URINALYSIS AUTO W/O SCOPE: CPT | Performed by: PHYSICIAN ASSISTANT

## 2022-11-16 PROCEDURE — 83605 ASSAY OF LACTIC ACID: CPT | Performed by: PHYSICIAN ASSISTANT

## 2022-11-16 PROCEDURE — 96361 HYDRATE IV INFUSION ADD-ON: CPT

## 2022-11-16 RX ORDER — SUCRALFATE 1 G/1
1 TABLET ORAL ONCE
Status: COMPLETED | OUTPATIENT
Start: 2022-11-16 | End: 2022-11-16

## 2022-11-16 RX ORDER — SUCRALFATE 1 G/1
1 TABLET ORAL 4 TIMES DAILY
Qty: 120 TABLET | Refills: 0 | Status: SHIPPED | OUTPATIENT
Start: 2022-11-16 | End: 2022-12-15

## 2022-11-16 RX ORDER — PANTOPRAZOLE SODIUM 40 MG/10ML
40 INJECTION, POWDER, LYOPHILIZED, FOR SOLUTION INTRAVENOUS ONCE
Status: COMPLETED | OUTPATIENT
Start: 2022-11-16 | End: 2022-11-16

## 2022-11-16 RX ORDER — SODIUM CHLORIDE 9 MG/ML
125 INJECTION, SOLUTION INTRAVENOUS CONTINUOUS
Status: DISCONTINUED | OUTPATIENT
Start: 2022-11-16 | End: 2022-11-16 | Stop reason: HOSPADM

## 2022-11-16 RX ADMIN — SODIUM CHLORIDE 500 ML: 9 INJECTION, SOLUTION INTRAVENOUS at 18:10

## 2022-11-16 RX ADMIN — SUCRALFATE 1 G: 1 TABLET ORAL at 19:43

## 2022-11-16 RX ADMIN — SODIUM CHLORIDE 125 ML/HR: 9 INJECTION, SOLUTION INTRAVENOUS at 18:16

## 2022-11-16 RX ADMIN — PANTOPRAZOLE SODIUM 40 MG: 40 INJECTION, POWDER, FOR SOLUTION INTRAVENOUS at 18:15

## 2022-11-16 NOTE — ED NOTES
MEDICAL SCREENING:    Reason for Visit: Upper abdominal pain intermittent over the past 2 weeks.    Patient initially seen in triage.  The patient was advised further evaluation and diagnostic testing will be needed, some of the treatment and testing will be initiated in the lobby in order to begin the process.  The patient will be returned to the waiting area for the time being and possibly be re-assessed by a subsequent ED provider.  The patient will be brought back to the treatment area in as timely manner as possible.         Alberto Gonzales, PA  11/16/22 3744

## 2022-11-16 NOTE — ED PROVIDER NOTES
"Subjective   History of Present Illness  82-year-old female presents with family having had intermittent diffuse upper abdominal pain over the past 2 to 3 weeks.  She reports that initially she had some diarrhea but that has since resolved, only passing \"small\" stools now.  She has had some nausea, but no vomiting.  She denies fever, chills, chest pain, shortness of breath, hematemesis, hematochezia or melena, syncope or near syncope, focal numbness or weakness, other symptoms or other complaints.  She has been treated at an urgent care and told that it was probably \"gas\".  Patient had a cholecystectomy many years ago        Review of Systems   All other systems reviewed and are negative.      Past Medical History:   Diagnosis Date   • Anxiety    • Arrhythmia    • Atrial fibrillation (HCC)    • Coronary artery disease    • Disease of thyroid gland    • Dysfunctional gallbladder    • Fracture of two ribs    • GERD (gastroesophageal reflux disease)    • Hypertension    • Lumbar compression fracture (HCC) 07/2022   • Osteoporosis        No Known Allergies    Past Surgical History:   Procedure Laterality Date   • ABDOMINAL SURGERY     • CARDIAC ELECTROPHYSIOLOGY PROCEDURE N/A 9/23/2022    Procedure: Device Implant. Leadless PPM. Hold eliquis x1 day prior to procedure.;  Surgeon: Jefe Zhao MD;  Location:  [facility];  Service: Cardiology;  Laterality: N/A;   • CARDIAC ELECTROPHYSIOLOGY PROCEDURE N/A 9/23/2022    Procedure: AV Node;  Surgeon: Jefe Zhao MD;  Location:  [facility];  Service: Cardiovascular;  Laterality: N/A;   • CHOLECYSTECTOMY OPEN     • HIP HEMIARTHROPLASTY Left 5/29/2018    Procedure: LEFT HIP BIPOLAR HEMIARTHROPLASTY;  Surgeon: Trevon Arriaga MD;  Location: [facility];  Service: Orthopedics       Family History   Problem Relation Age of Onset   • Heart disease Father        Social History     Socioeconomic History   • Marital status:    Tobacco Use "   • Smoking status: Former     Packs/day: 0.25     Years: 15.00     Pack years: 3.75     Types: Cigarettes     Quit date: 3/10/2022     Years since quittin.6   • Smokeless tobacco: Current     Types: Snuff   • Tobacco comments:     snuff for 60 years    Vaping Use   • Vaping Use: Never used   Substance and Sexual Activity   • Alcohol use: No   • Drug use: No   • Sexual activity: Defer           Objective   Physical Exam  Vitals and nursing note reviewed.   Constitutional:       General: She is not in acute distress.     Appearance: Normal appearance. She is well-developed. She is not ill-appearing, toxic-appearing or diaphoretic.   HENT:      Head: Normocephalic and atraumatic.   Eyes:      General: No scleral icterus.     Pupils: Pupils are equal, round, and reactive to light.   Neck:      Trachea: No tracheal deviation.   Cardiovascular:      Rate and Rhythm: Normal rate and regular rhythm.   Pulmonary:      Effort: Pulmonary effort is normal. No respiratory distress.      Breath sounds: Normal breath sounds.   Chest:      Chest wall: No tenderness.   Abdominal:      General: Bowel sounds are normal.      Palpations: Abdomen is soft.      Tenderness: There is no guarding or rebound.      Comments: Overall very benign abdominal exam, perhaps slight tenderness in the epigastrium.  No other tenderness.  No acute peritoneal signs.   Musculoskeletal:         General: No tenderness. Normal range of motion.      Cervical back: Normal range of motion and neck supple. No rigidity or tenderness.      Right lower leg: No edema.      Left lower leg: No edema.   Skin:     General: Skin is warm and dry.      Capillary Refill: Capillary refill takes less than 2 seconds.      Coloration: Skin is not pale.   Neurological:      General: No focal deficit present.      Mental Status: She is alert and oriented to person, place, and time.      GCS: GCS eye subscore is 4. GCS verbal subscore is 5. GCS motor subscore is 6.       Motor: No abnormal muscle tone.   Psychiatric:         Mood and Affect: Mood normal.         Behavior: Behavior normal.         Procedures  EKG shows a regular rhythm, ventricular rate 81.  QRS duration 138, QTc 480 ms.  Possible old anteroseptal infarct.  No evidence for STEMI.  Overall similar to most recent tracing on file from 8/22/2022.  CT Abdomen Pelvis Without Contrast   Final Result      1.  No convincing evidence of acute process within the abdomen or pelvis by noncontrast CT. Limited visualization in the left hemipelvis due to artifact generated by left hip replacement.   2.  Redemonstration of infrarenal abdominal aortic aneurysm measuring up to 3.2 cm.            Signer Name: HALI PIERSON MD    Signed: 11/16/2022 6:55 PM    Workstation Name: [facility]     [facility]        Results for orders placed or performed during the hospital encounter of 11/16/22   Comprehensive Metabolic Panel    Specimen: Blood   Result Value Ref Range    Glucose 99 65 - 99 mg/dL    BUN 17 8 - 23 mg/dL    Creatinine 1.35 (H) 0.57 - 1.00 mg/dL    Sodium 141 136 - 145 mmol/L    Potassium 3.9 3.5 - 5.2 mmol/L    Chloride 104 98 - 107 mmol/L    CO2 25.7 22.0 - 29.0 mmol/L    Calcium 9.2 8.6 - 10.5 mg/dL    Total Protein 8.2 6.0 - 8.5 g/dL    Albumin 3.94 3.50 - 5.20 g/dL    ALT (SGPT) 6 1 - 33 U/L    AST (SGOT) 17 1 - 32 U/L    Alkaline Phosphatase 60 39 - 117 U/L    Total Bilirubin 0.4 0.0 - 1.2 mg/dL    Globulin 4.3 gm/dL    A/G Ratio 0.9 g/dL    BUN/Creatinine Ratio 12.6 7.0 - 25.0    Anion Gap 11.3 5.0 - 15.0 mmol/L    eGFR 39.1 (L) >60.0 mL/min/1.73   Urinalysis With Culture If Indicated - Urine, Clean Catch    Specimen: Urine, Clean Catch   Result Value Ref Range    Color, UA Yellow Yellow, Straw    Appearance, UA Clear Clear    pH, UA 6.5 5.0 - 8.0    Specific Gravity, UA 1.007 1.005 - 1.030    Glucose, UA Negative Negative    Ketones, UA Negative Negative    Bilirubin, UA Negative Negative    Blood,  UA Negative Negative    Protein, UA Negative Negative    Leuk Esterase, UA Negative Negative    Nitrite, UA Negative Negative    Urobilinogen, UA 0.2 E.U./dL 0.2 - 1.0 E.U./dL   C-reactive Protein    Specimen: Blood   Result Value Ref Range    C-Reactive Protein 1.70 (H) 0.00 - 0.50 mg/dL   Sedimentation Rate    Specimen: Blood   Result Value Ref Range    Sed Rate 55 (H) 0 - 30 mm/hr   Lactic Acid, Plasma    Specimen: Blood   Result Value Ref Range    Lactate 1.4 0.5 - 2.0 mmol/L   CBC Auto Differential    Specimen: Blood   Result Value Ref Range    WBC 6.98 3.40 - 10.80 10*3/mm3    RBC 4.39 3.77 - 5.28 10*6/mm3    Hemoglobin 13.8 12.0 - 15.9 g/dL    Hematocrit 42.2 34.0 - 46.6 %    MCV 96.1 79.0 - 97.0 fL    MCH 31.4 26.6 - 33.0 pg    MCHC 32.7 31.5 - 35.7 g/dL    RDW 12.8 12.3 - 15.4 %    RDW-SD 45.5 37.0 - 54.0 fl    MPV 10.5 6.0 - 12.0 fL    Platelets 198 140 - 450 10*3/mm3    Neutrophil % 57.0 42.7 - 76.0 %    Lymphocyte % 23.4 19.6 - 45.3 %    Monocyte % 12.6 (H) 5.0 - 12.0 %    Eosinophil % 5.9 0.3 - 6.2 %    Basophil % 1.0 0.0 - 1.5 %    Immature Grans % 0.1 0.0 - 0.5 %    Neutrophils, Absolute 3.98 1.70 - 7.00 10*3/mm3    Lymphocytes, Absolute 1.63 0.70 - 3.10 10*3/mm3    Monocytes, Absolute 0.88 0.10 - 0.90 10*3/mm3    Eosinophils, Absolute 0.41 (H) 0.00 - 0.40 10*3/mm3    Basophils, Absolute 0.07 0.00 - 0.20 10*3/mm3    Immature Grans, Absolute 0.01 0.00 - 0.05 10*3/mm3    nRBC 0.0 0.0 - 0.2 /100 WBC   Troponin    Specimen: Blood   Result Value Ref Range    Troponin T <0.010 0.000 - 0.030 ng/mL   ECG 12 Lead Other; Abdominal pain   Result Value Ref Range    QT Interval 414 ms    QTC Interval 480 ms   Green Top (Gel)   Result Value Ref Range    Extra Tube Hold for add-ons.    Lavender Top   Result Value Ref Range    Extra Tube hold for add-on    Gold Top - SST   Result Value Ref Range    Extra Tube Hold for add-ons.    Light Blue Top   Result Value Ref Range    Extra Tube Hold for add-ons.                  ED Course  ED Course as of 11/16/22 1916 Wed Nov 16, 2022 1914 Patient's emergency department stay has been uneventful.  She has shown no signs of acute distress.  Patient, her family and I discussed all of her test results and her plan of care.  They voiced understanding and agreement.  They report that she has been taking her 40 mg of omeprazole on a daily basis. [CM]      ED Course User Index  [CM] Minor Reynoso MD                                           [facility]    Final diagnoses:   Epigastric pain       ED Disposition  ED Disposition     ED Disposition   Discharge    Condition   Stable    Comment   --             Linda Rocha MD  [address] [facility]  [address]  [place] [id number]  [phone]    Go to   Call tomorrow morning to schedule first available appointment    Juliann Santana, APRN  [address]  [address]  [facility] [id number] [phone]    Go to   1 to 2 days    [facility] Emergency Department  [address] [place] [id number] [phone]  Go to   If symptoms worsen         Medication List      New Prescriptions    sucralfate 1 g tablet  Commonly known as: CARAFATE  Take 1 tablet by mouth 4 (Four) Times a Day.           Where to Get Your Medications      These medications were sent to [place], [place] - [address] - [phone]  - [phone] FX  [address], [facility] [address]    Phone: [phone]   · sucralfate 1 g tablet       Please note that portions of this note were completed with a voice recognition program.        Minor Reynoso MD  11/17/22 0530

## 2022-11-17 NOTE — DISCHARGE INSTRUCTIONS
Home care family.  Medication as prescribed.  Eat a bland diet.  Call Dr. Platt's office early tomorrow morning to schedule the first available follow-up appointment.  See your family doctor in 1 to 2 days.  Return to the emergency department right away if symptoms worsen/any problems.

## 2022-11-18 LAB
QT INTERVAL: 414 MS
QTC INTERVAL: 480 MS

## 2022-12-15 ENCOUNTER — OFFICE VISIT (OUTPATIENT)
Dept: CARDIOLOGY | Facility: CLINIC | Age: 82
End: 2022-12-15

## 2022-12-15 VITALS
BODY MASS INDEX: 25.52 KG/M2 | HEIGHT: 63 IN | WEIGHT: 144 LBS | OXYGEN SATURATION: 97 % | DIASTOLIC BLOOD PRESSURE: 66 MMHG | SYSTOLIC BLOOD PRESSURE: 100 MMHG | HEART RATE: 78 BPM

## 2022-12-15 DIAGNOSIS — I48.21 PERMANENT ATRIAL FIBRILLATION: ICD-10-CM

## 2022-12-15 DIAGNOSIS — I49.5 SSS (SICK SINUS SYNDROME): ICD-10-CM

## 2022-12-15 DIAGNOSIS — I10 ESSENTIAL HYPERTENSION: Primary | ICD-10-CM

## 2022-12-15 DIAGNOSIS — I48.19 PERSISTENT ATRIAL FIBRILLATION: ICD-10-CM

## 2022-12-15 PROCEDURE — 99214 OFFICE O/P EST MOD 30 MIN: CPT | Performed by: PHYSICIAN ASSISTANT

## 2022-12-15 PROCEDURE — 93000 ELECTROCARDIOGRAM COMPLETE: CPT | Performed by: PHYSICIAN ASSISTANT

## 2022-12-15 PROCEDURE — 93279 PRGRMG DEV EVAL PM/LDLS PM: CPT | Performed by: PHYSICIAN ASSISTANT

## 2022-12-15 RX ORDER — ERYTHROMYCIN 5 MG/G
OINTMENT OPHTHALMIC 3 TIMES DAILY
COMMUNITY
End: 2022-12-15 | Stop reason: SDUPTHER

## 2022-12-15 RX ORDER — ERYTHROMYCIN 5 MG/G
OINTMENT OPHTHALMIC
COMMUNITY
Start: 2022-11-10

## 2022-12-15 NOTE — PROGRESS NOTES
Electrophysiology Clinic     Anette Dunham  1940  12/15/22    Juliann Santana, APRN  [address] / [place] [address]       Chief Complaint   Patient presents with   • Essential hypertension   • Permanent atrial fibrillation      Referring provider: Dr Herman    Problem List:    1. Permanent AF  a. CHADsvasc= 4 on Eliquis 2.5 mg bid  b. Echo, 8/17/16, EF 50%, LA mild to mod dilated.  c. Echo, 9/6/18, EF 51-55%. Mild MR. Mild GA. LA mildly dilated. Mild TR.  d. 14 day Holter, 8/21/19-9/3/19, Avg Hr 88, min Hr 70, max hr 150 bpm. 88% AF burden. Cardizem increased.    e. Echo, 7/22/20, EF 60%. LA mildly dilated. LV with moderate concentric hypertrophy. Mod MR.   f. Echo, 7/26/21, EF 51-55%, Mild LVH, LA mildly increased, RA mildly dilated. Trace TR. Mild pulmonary hypertension.   g. Rate controlled on metoprolol 150 bid  h. 14 day Preventice, 4/20/22-5/3/22, Avg hr 78, min hr 52, Max hr 152 bpm. 88% AF burden. 9 sinus pauses >3 seconds.   i. Medtronic VVIR leadless pacemaker and AV node ablation  2. CAD  a. Stress test, 3/7/17, EF >70%, no ischemia  b. Bilateral carotid US, 9/5/18, Mild to mod plaque right greater than left carotid systems. No occlusion. No hemodynamically significant stenosis of either right or left ICA.   c. US arterial doppler lower extremity complete, 7/26/21, Heavy atherosclerotic plaquing is noted involving the arteries of the lower extremities. No focal areas of stenosis or occlusion were demonstrated. Patient was noted to have a irregular heart rate throughout the course of the exam.  d. Stress test, 7/26/21, EF 57%, No ischemia.   3. HTN  4. Hypothyroidism  5. GERD  6. Dysfunctional gallbladder  7. Osteoporosis   8. Anxiety  9. Tobacco abuse  a. 1/2 ppd x 15 years (cigs / cigars / snuff)- quit March 2021   10. Past surgical hx  a. Cholecystectomy  b. Left hip bipolar hemiarthroplasty - 5/29/18  11. Family hx  a. Father-CAD    History of Present Illness:     Ms. Medinaover  is a 82-year-old white female from [place], [place] with above-noted past medical history.  She presents today for follow-up regarding her recent EP study AV node ablation and VVIR leadless pacemaker placement.  Since AV node ablation and pacemaker she is done quite well.  She is having no complaints of any symptoms.  She denies chest pain, shortness of breath or heart failure.  She denies any dizziness near syncope or syncope.  She does remain quite debilitated walking through her house she still gets quite fatigued quickly.  She thinks this may be somewhat better since her procedure.      No Known Allergies     Cannot display prior to admission medications because the patient has not been admitted in this contact.            Current Outpatient Medications:   •  albuterol (PROVENTIL HFA;VENTOLIN HFA) 108 (90 Base) MCG/ACT inhaler, Inhale 2 puffs Every 6 (Six) Hours As Needed for Wheezing., Disp: , Rfl:   •  alendronate (FOSAMAX) 70 MG tablet, Take 70 mg by mouth Every 7 (Seven) Days. Prior to [facility] Admission, Patient was on: takes on wednesdays, Disp: , Rfl:   •  apixaban (Eliquis) 2.5 MG tablet tablet, Take 1 tablet by mouth Every 12 (Twelve) Hours. Restart on 9/25/2022, Disp: 60 tablet, Rfl: 11  •  atorvastatin (LIPITOR) 10 MG tablet, Take 1 tablet by mouth Every Night., Disp: 30 tablet, Rfl: 0  •  calcium carb-cholecalciferol 600-800 MG-UNIT tablet, Take 1 tablet by mouth 2 (Two) Times a Day With Meals., Disp: 60 tablet, Rfl: 0  •  diclofenac (VOLTAREN) 1 % gel gel, APPLY 2 GRAMS TO AFFECTED AREA FOUR TIMES A DAY FOR PAIN, Disp: , Rfl: 3  •  famotidine (PEPCID) 20 MG tablet, Take 20 mg by mouth 2 (Two) Times a Day., Disp: , Rfl: 5  •  fexofenadine (ALLEGRA) 180 MG tablet, Take 180 mg by mouth Daily., Disp: , Rfl:   •  gabapentin (NEURONTIN) 400 MG capsule, Take 1 capsule by mouth 2 (Two) Times a Day., Disp: 6 capsule, Rfl: 0  •  isosorbide mononitrate (IMDUR) 30 MG 24 hr tablet, Take 1 tablet by mouth Daily.,  Disp: 30 tablet, Rfl: 11  •  levothyroxine (SYNTHROID, LEVOTHROID) 50 MCG tablet, Take 50 mcg by mouth Daily., Disp: , Rfl:   •  LORazepam (ATIVAN) 1 MG tablet, Take 1 tablet by mouth Every 8 (Eight) Hours As Needed for Anxiety, Disp: 9 tablet, Rfl: 0  •  metoprolol tartrate (LOPRESSOR) 50 MG tablet, Take 1 tablet by mouth 2 (Two) Times a Day., Disp: 60 tablet, Rfl: 6  •  nitroglycerin (NITROSTAT) 0.4 MG SL tablet, 1 under the tongue as needed for angina, may repeat q5mins for up three doses, Disp: 30 tablet, Rfl: 11  •  omeprazole (priLOSEC) 40 MG capsule, Take 40 mg by mouth Daily., Disp: , Rfl:   •  rOPINIRole (REQUIP) 1 MG tablet, Take 1 mg by mouth At Night As Needed (restless leg). Take 1 hour before bedtime., Disp: , Rfl:   •  sennosides-docusate sodium (SENOKOT-S) 8.6-50 MG tablet, Take 2 tablets by mouth 2 (Two) Times a Day., Disp: 120 tablet, Rfl: 0  •  spironolactone (ALDACTONE) 25 MG tablet, Take 1 tablet by mouth Daily., Disp: 90 tablet, Rfl: 3  •  traMADol (ULTRAM) 50 MG tablet, Take 50 mg by mouth Every 8 (Eight) Hours As Needed for Moderate Pain ., Disp: , Rfl:   •  erythromycin (ROMYCIN) 5 MG/GM ophthalmic ointment, APPLY 1CM OF OINTMENT INTO THE RIGHT EYE 3 TIMES A DAY AS DIRECTED, Disp: , Rfl:   •  levocetirizine (XYZAL) 5 MG tablet, TAKE ONE TABLET BY MOUTH EVERY DAY IN THE EVENING FOR ALLERGIES, Disp: , Rfl:     Social History     Socioeconomic History   • Marital status:    Tobacco Use   • Smoking status: Former     Packs/day: 0.25     Years: 15.00     Pack years: 3.75     Types: Cigarettes     Quit date: 3/10/2022     Years since quittin.7   • Smokeless tobacco: Current     Types: Snuff   • Tobacco comments:     snuff for 60 years    Vaping Use   • Vaping Use: Never used   Substance and Sexual Activity   • Alcohol use: No   • Drug use: No   • Sexual activity: Defer       Family History   Problem Relation Age of Onset   • Heart disease Father          Vitals:    12/15/22 1335   BP:  "100/66   BP Location: Left arm   Patient Position: Sitting   Pulse: 78   SpO2: 97%   Weight: 65.3 kg (144 lb)   Height: 160 cm (63\")                 Physical Exam:  GEN: Well nourished, well-developed, no acute distress  HEENT: Normocephalic, atraumatic, PERRLA, moist mucous membranes  NECK: Supple, NO JVD, no thyromegaly, no lymphadenopathy  CARD: S1S2, irr irr, no murmur, gallop, rub, PMI NL   LUNGS: Clear to auscultation, normal respiratory effort  ABDOMEN: Soft, nontender, normal bowel sounds  EXTREMITIES: No gross deformities, no clubbing, cyanosis, or edema  SKIN: Warm, dry  NEURO: No focal deficits, alert and oriented x 3  PSYCHIATRIC: Normal affect and mood      I personally viewed and interpreted the patient's EKG/Telemetry/lab data    Lab Results   Component Value Date    GLUCOSE 99 11/16/2022    CALCIUM 9.2 11/16/2022     11/16/2022    K 3.9 11/16/2022    CO2 25.7 11/16/2022     11/16/2022    BUN 17 11/16/2022    CREATININE 1.35 (H) 11/16/2022    EGFRIFNONA 49 (L) 12/16/2020    BCR 12.6 11/16/2022    ANIONGAP 11.3 11/16/2022     Lab Results   Component Value Date    WBC 6.98 11/16/2022    HGB 13.8 11/16/2022    HCT 42.2 11/16/2022    MCV 96.1 11/16/2022     11/16/2022     Lab Results   Component Value Date    INR 1.1 08/02/2022    INR 1.24 (H) 05/31/2019    INR 1.20 (H) 05/27/2018    PROTIME 16.2 (H) 05/31/2019    PROTIME 15.5 (H) 05/27/2018     Lab Results   Component Value Date    TSH 2.850 07/12/2022    H6FHHSD 8.60 05/31/2017       Pacemaker interrogation: Manual interrogation of Medtronic leadless Micra AV.  V paced 98%.  No P waves at 40 bpm.  Normal thresh impedances.  Battery voltage 8 years remaining.  Lowered base rate from 80 to 70      ECG 12 Lead    Date/Time: 12/15/2022 2:02 PM  Performed by: Baljit Canales PA  Authorized by: Baljit Canales PA   Comparison: compared with previous ECG from 11/18/2022  Similar to previous ECG  Rhythm: atrial fibrillation and " paced  Rate: normal  Conduction: conduction normal  QRS axis: normal    Clinical impression: non-specific ECG              ICD-10-CM ICD-9-CM   1. Essential hypertension  I10 401.9   2. Permanent atrial fibrillation (HCC)  I48.21 427.31   3. SSS (sick sinus syndrome) (HCC)  I49.5 427.81   4. Persistent atrial fibrillation (HCC)  I48.19 427.31       Assessment and Plan:     1. Permanent atrial fibrillation  -EP study and RFA of AV node, Medtronic VVIR leadless pacemaker placement Dr. Zhao-September 23, 2022.  -[id number] equals 4 on low-dose Eliquis, pacemaker interrogation normal function.  Wore lower base rate from 80-70 post AV node.    2. SymptomaticTachybrady syndrome: Status post AV node and VVIR leadless pacemaker.  Normal function interrogation today.  -  3.CAD   -continue present medications  -per Dr. Matt     4. HTN  -Well controlled, continue present medications. Per Dr Matt       Stable CV course, return follow-up 6 months or sooner as needed.  Electronically signed by NISA Farley, 12/15/22, 1:40 PM EST.

## 2022-12-29 ENCOUNTER — OFFICE VISIT (OUTPATIENT)
Dept: CARDIOLOGY | Facility: CLINIC | Age: 82
End: 2022-12-29

## 2022-12-29 VITALS
OXYGEN SATURATION: 97 % | WEIGHT: 140 LBS | BODY MASS INDEX: 24.8 KG/M2 | SYSTOLIC BLOOD PRESSURE: 114 MMHG | DIASTOLIC BLOOD PRESSURE: 76 MMHG | HEART RATE: 76 BPM | HEIGHT: 63 IN

## 2022-12-29 DIAGNOSIS — I10 ESSENTIAL HYPERTENSION: Chronic | ICD-10-CM

## 2022-12-29 DIAGNOSIS — I48.21 PERMANENT ATRIAL FIBRILLATION: Chronic | ICD-10-CM

## 2022-12-29 DIAGNOSIS — R06.09 DYSPNEA ON EXERTION: Primary | ICD-10-CM

## 2022-12-29 PROCEDURE — 99214 OFFICE O/P EST MOD 30 MIN: CPT | Performed by: NURSE PRACTITIONER

## 2022-12-29 RX ORDER — FUROSEMIDE 20 MG/1
TABLET ORAL
Qty: 20 TABLET | Refills: 3 | Status: SHIPPED | OUTPATIENT
Start: 2022-12-29

## 2022-12-29 NOTE — PROGRESS NOTES
"Chief Complaint  Hypertension (Patient states fatigue , weight loss, bruising, leg pain , shortness of breath on exertion )    Subjective          Anette Dunham presents to [facility] for follow up.    History of Present Illness    Anette was last seen in our clinic on 8/22/2022.  She was referred to Dr. Zhao for ablation and pacemaker placement due to symptomatic atrial fibrillation.  On 9/23/2022 she underwent successful AV karol ablation and insertion of a VVIR leadless pacemaker.    At today's visit Alexia reports that she has been compliant with her medications.      She does report that she gets short of breath easily and that when she gets up to walk she often gets \"weak in the legs\" quickly.  She does report lower extremity swelling and states that she sleeps with 1-2 pillows every night and that she occasionally wakes with shortness of breath.  She denies any chest pain.  She denies palpitations.    Objective     Vital Signs:   /76 (BP Location: Left arm, Patient Position: Sitting, Cuff Size: Adult)   Pulse 76   Ht 160 cm (63\")   Wt 63.5 kg (140 lb)   SpO2 97%   BMI 24.80 kg/m²       Physical Exam  Vitals reviewed.   Constitutional:       Appearance: Normal appearance. She is well-developed.   Cardiovascular:      Rate and Rhythm: Normal rate and regular rhythm.      Heart sounds: No murmur heard.    No friction rub. No gallop.   Pulmonary:      Effort: Pulmonary effort is normal. No respiratory distress.      Breath sounds: Normal breath sounds. No wheezing or rales.   Skin:     General: Skin is warm and dry.   Neurological:      Mental Status: She is alert and oriented to person, place, and time.   Psychiatric:         Mood and Affect: Mood normal.         Behavior: Behavior normal.          Result Review :                Current Outpatient Medications   Medication Sig Dispense Refill   • albuterol (PROVENTIL HFA;VENTOLIN HFA) 108 (90 Base) MCG/ACT inhaler " Inhale 2 puffs Every 6 (Six) Hours As Needed for Wheezing.     • alendronate (FOSAMAX) 70 MG tablet Take 70 mg by mouth Every 7 (Seven) Days. Prior to [facility] Admission, Patient was on: takes on wednesdays     • apixaban (Eliquis) 2.5 MG tablet tablet Take 1 tablet by mouth Every 12 (Twelve) Hours. Restart on 9/25/2022 60 tablet 11   • atorvastatin (LIPITOR) 10 MG tablet Take 1 tablet by mouth Every Night. 30 tablet 0   • calcium carb-cholecalciferol 600-800 MG-UNIT tablet Take 1 tablet by mouth 2 (Two) Times a Day With Meals. 60 tablet 0   • diclofenac (VOLTAREN) 1 % gel gel APPLY 2 GRAMS TO AFFECTED AREA FOUR TIMES A DAY FOR PAIN  3   • famotidine (PEPCID) 20 MG tablet Take 20 mg by mouth 2 (Two) Times a Day.  5   • fexofenadine (ALLEGRA) 180 MG tablet Take 180 mg by mouth Daily.     • gabapentin (NEURONTIN) 400 MG capsule Take 1 capsule by mouth 2 (Two) Times a Day. 6 capsule 0   • isosorbide mononitrate (IMDUR) 30 MG 24 hr tablet Take 1 tablet by mouth Daily. 30 tablet 11   • levocetirizine (XYZAL) 5 MG tablet TAKE ONE TABLET BY MOUTH EVERY DAY IN THE EVENING FOR ALLERGIES     • levothyroxine (SYNTHROID, LEVOTHROID) 50 MCG tablet Take 50 mcg by mouth Daily.     • LORazepam (ATIVAN) 1 MG tablet Take 1 tablet by mouth Every 8 (Eight) Hours As Needed for Anxiety 9 tablet 0   • metoprolol tartrate (LOPRESSOR) 50 MG tablet Take 1 tablet by mouth 2 (Two) Times a Day. 60 tablet 6   • nitroglycerin (NITROSTAT) 0.4 MG SL tablet 1 under the tongue as needed for angina, may repeat q5mins for up three doses 30 tablet 11   • omeprazole (priLOSEC) 40 MG capsule Take 40 mg by mouth Daily.     • rOPINIRole (REQUIP) 1 MG tablet Take 1 mg by mouth At Night As Needed (restless leg). Take 1 hour before bedtime.     • sennosides-docusate sodium (SENOKOT-S) 8.6-50 MG tablet Take 2 tablets by mouth 2 (Two) Times a Day. 120 tablet 0   • spironolactone (ALDACTONE) 25 MG tablet Take 1 tablet by mouth Daily. 90 tablet 3   • traMADol  (ULTRAM) 50 MG tablet Take 50 mg by mouth Every 8 (Eight) Hours As Needed for Moderate Pain .     • erythromycin (ROMYCIN) 5 MG/GM ophthalmic ointment APPLY 1CM OF OINTMENT INTO THE RIGHT EYE 3 TIMES A DAY AS DIRECTED     • furosemide (LASIX) 20 MG tablet As needed for 3 pound weight gain in 24 hours. 20 tablet 3     No current facility-administered medications for this visit.            Assessment and Plan    Problem List Items Addressed This Visit        Cardiac and Vasculature    Essential hypertension (Chronic)    Relevant Medications    furosemide (LASIX) 20 MG tablet    Permanent atrial fibrillation (HCC) (Chronic)   Other Visit Diagnoses     Dyspnea on exertion    -  Primary    Relevant Orders    Adult Transthoracic Echo Complete W/ Cont if Necessary Per Protocol              Follow Up     Medications were reviewed with the patient.    Hypertension is stable.  Patient is to continue metoprolol, spironolactone, and isosorbide mononitrate.    Atrial fibrillation is stable.  Patient is status post PPM and AV karol ablation.   Continue Eliquis.    Due to patient's dyspnea on exertion as well as her lower extremity swelling I have ordered an echocardiogram.  I have also given her prescription for Lasix with instructions to take as needed for weight gain of 3 pounds.      Return in about 3 months (around 3/29/2023).    Patient was given instructions and counseling regarding her condition or for health maintenance advice. Please see specific information pulled into the AVS if appropriate.

## 2023-01-23 ENCOUNTER — HOSPITAL ENCOUNTER (OUTPATIENT)
Dept: CARDIOLOGY | Facility: HOSPITAL | Age: 83
Discharge: HOME | End: 2023-01-23
Admitting: NURSE PRACTITIONER
Payer: MEDICARE

## 2023-01-23 ENCOUNTER — OFFICE VISIT (OUTPATIENT)
Dept: GASTROENTEROLOGY | Facility: CLINIC | Age: 83
End: 2023-01-23
Payer: MEDICARE

## 2023-01-23 VITALS — HEIGHT: 63 IN | BODY MASS INDEX: 24.8 KG/M2 | WEIGHT: 140 LBS

## 2023-01-23 DIAGNOSIS — R10.11 RIGHT UPPER QUADRANT ABDOMINAL PAIN: ICD-10-CM

## 2023-01-23 DIAGNOSIS — R06.09 DYSPNEA ON EXERTION: ICD-10-CM

## 2023-01-23 DIAGNOSIS — K21.9 GASTROESOPHAGEAL REFLUX DISEASE WITHOUT ESOPHAGITIS: Primary | ICD-10-CM

## 2023-01-23 LAB
BH CV ECHO MEAS - AO MAX PG: 4 MMHG
BH CV ECHO MEAS - AO MEAN PG: 2 MMHG
BH CV ECHO MEAS - AO ROOT DIAM: 3.2 CM
BH CV ECHO MEAS - AO V2 MAX: 100 CM/SEC
BH CV ECHO MEAS - AO V2 VTI: 19.7 CM
BH CV ECHO MEAS - EDV(CUBED): 79.5 ML
BH CV ECHO MEAS - EDV(MOD-SP4): 43.2 ML
BH CV ECHO MEAS - EF(MOD-SP4): 63.9 %
BH CV ECHO MEAS - ESV(CUBED): 50.7 ML
BH CV ECHO MEAS - ESV(MOD-SP4): 15.6 ML
BH CV ECHO MEAS - FS: 14 %
BH CV ECHO MEAS - IVS/LVPW: 1.4 CM
BH CV ECHO MEAS - IVSD: 1.4 CM
BH CV ECHO MEAS - LA DIMENSION: 4.1 CM
BH CV ECHO MEAS - LAT PEAK E' VEL: 7.9 CM/SEC
BH CV ECHO MEAS - LV DIASTOLIC VOL/BSA (35-75): 26 CM2
BH CV ECHO MEAS - LV MASS(C)D: 184.7 GRAMS
BH CV ECHO MEAS - LV SYSTOLIC VOL/BSA (12-30): 9.4 CM2
BH CV ECHO MEAS - LVIDD: 4.3 CM
BH CV ECHO MEAS - LVIDS: 3.7 CM
BH CV ECHO MEAS - LVOT AREA: 2.27 CM2
BH CV ECHO MEAS - LVOT DIAM: 1.7 CM
BH CV ECHO MEAS - LVPWD: 1 CM
BH CV ECHO MEAS - MED PEAK E' VEL: 5.9 CM/SEC
BH CV ECHO MEAS - MV A MAX VEL: 27.3 CM/SEC
BH CV ECHO MEAS - MV E MAX VEL: 93 CM/SEC
BH CV ECHO MEAS - MV E/A: 3.4
BH CV ECHO MEAS - PA ACC TIME: 0.08 SEC
BH CV ECHO MEAS - PA PR(ACCEL): 44.4 MMHG
BH CV ECHO MEAS - RAP SYSTOLE: 10 MMHG
BH CV ECHO MEAS - RVSP: 38 MMHG
BH CV ECHO MEAS - SI(MOD-SP4): 16.6 ML/M2
BH CV ECHO MEAS - SV(MOD-SP4): 27.6 ML
BH CV ECHO MEAS - TAPSE (>1.6): 1.82 CM
BH CV ECHO MEAS - TR MAX PG: 28 MMHG
BH CV ECHO MEAS - TR MAX VEL: 264 CM/SEC
BH CV ECHO MEASUREMENTS AVERAGE E/E' RATIO: 13.48
LEFT ATRIUM VOLUME INDEX: 47.7 ML/M2
MAXIMAL PREDICTED HEART RATE: 137 BPM
STRESS TARGET HR: 116 BPM

## 2023-01-23 PROCEDURE — 93306 TTE W/DOPPLER COMPLETE: CPT | Performed by: INTERNAL MEDICINE

## 2023-01-23 PROCEDURE — 99214 OFFICE O/P EST MOD 30 MIN: CPT | Performed by: PHYSICIAN ASSISTANT

## 2023-01-23 PROCEDURE — 93306 TTE W/DOPPLER COMPLETE: CPT

## 2023-01-23 RX ORDER — DEXLANSOPRAZOLE 60 MG/1
60 CAPSULE, DELAYED RELEASE ORAL
Qty: 30 CAPSULE | Refills: 11 | Status: SHIPPED | OUTPATIENT
Start: 2023-01-23

## 2023-01-23 NOTE — PROGRESS NOTES
Chief Complaint   Patient presents with   • Heartburn       Claudette De Santiago is a 82 y.o. female who presents to the office today for evaluation of Heartburn  .    HPI   Mrs. CLAUDETTE DE SANTIAGO is an 82-year-old female who presents as a new patient for evaluation of heartburn and reflux. She is on omeprazole 40 mg once daily.    The patient reports she is having heartburn and reflux. She mentions it starts on the right side and radiates down underneath her rib on her right side. She notes it feels like gas, but if it is she can not get it to past. She reports she is on omeprazole and it helped more than the famotidine she was previously on. She notes she has been on omeprazole for a few months now. She reports her pain started all day long, but now it is mostly sporadic. She mentions it depends on what she eats. She denies eating a lot of spicy or acidic foods.    Review of Systems   Constitutional: Negative.    HENT: Negative for trouble swallowing.    Eyes: Negative.    Respiratory: Negative.    Cardiovascular: Negative.    Gastrointestinal: Positive for abdominal distention, anal bleeding, blood in stool, constipation, diarrhea and nausea. Negative for abdominal pain and vomiting.   Endocrine: Negative.    Genitourinary: Negative.    Musculoskeletal: Negative.    Skin: Negative.    Allergic/Immunologic: Negative.    Neurological: Negative.    Hematological: Negative.    Psychiatric/Behavioral: Negative.        ACTIVE PROBLEMS:   Specialty Problems    None      PAST MEDICAL HISTORY:  Past Medical History:   Diagnosis Date   • Anxiety    • Arrhythmia    • Atrial fibrillation (HCC)    • Coronary artery disease    • Disease of thyroid gland    • Dysfunctional gallbladder    • Fracture of two ribs    • GERD (gastroesophageal reflux disease)    • Hypertension    • Lumbar compression fracture (HCC) 07/2022   • Osteoporosis        SURGICAL HISTORY:  Past Surgical History:   Procedure Laterality Date   • ABDOMINAL SURGERY     •  CARDIAC ELECTROPHYSIOLOGY PROCEDURE N/A 2022    Procedure: Device Implant. Leadless PPM. Hold eliquis x1 day prior to procedure.;  Surgeon: Jefe Zhao MD;  Location:  [facility];  Service: Cardiology;  Laterality: N/A;   • CARDIAC ELECTROPHYSIOLOGY PROCEDURE N/A 2022    Procedure: AV Node;  Surgeon: Jefe Zhao MD;  Location:  [facility];  Service: Cardiovascular;  Laterality: N/A;   • CHOLECYSTECTOMY OPEN     • HIP HEMIARTHROPLASTY Left 2018    Procedure: LEFT HIP BIPOLAR HEMIARTHROPLASTY;  Surgeon: Trevon Arriaga MD;  Location:  [facility];  Service: Orthopedics       FAMILY HISTORY:  Family History   Problem Relation Age of Onset   • Heart disease Father        SOCIAL HISTORY:  Social History     Tobacco Use   • Smoking status: Former     Packs/day: 0.25     Years: 15.00     Pack years: 3.75     Types: Cigarettes     Quit date: 3/10/2022     Years since quittin.8   • Smokeless tobacco: Current     Types: Snuff   • Tobacco comments:     snuff for 60 years    Substance Use Topics   • Alcohol use: No       CURRENT MEDICATION:    Current Outpatient Medications:   •  albuterol (PROVENTIL HFA;VENTOLIN HFA) 108 (90 Base) MCG/ACT inhaler, Inhale 2 puffs Every 6 (Six) Hours As Needed for Wheezing., Disp: , Rfl:   •  alendronate (FOSAMAX) 70 MG tablet, Take 70 mg by mouth Every 7 (Seven) Days. Prior to [facility] Admission, Patient was on: takes on , Disp: , Rfl:   •  apixaban (Eliquis) 2.5 MG tablet tablet, Take 1 tablet by mouth Every 12 (Twelve) Hours. Restart on 2022, Disp: 60 tablet, Rfl: 11  •  atorvastatin (LIPITOR) 10 MG tablet, Take 1 tablet by mouth Every Night., Disp: 30 tablet, Rfl: 0  •  calcium carb-cholecalciferol 600-800 MG-UNIT tablet, Take 1 tablet by mouth 2 (Two) Times a Day With Meals., Disp: 60 tablet, Rfl: 0  •  diclofenac (VOLTAREN) 1 % gel gel, APPLY 2 GRAMS TO AFFECTED AREA FOUR TIMES A DAY FOR PAIN, Disp: , Rfl: 3  •   "erythromycin (ROMYCIN) 5 MG/GM ophthalmic ointment, APPLY 1CM OF OINTMENT INTO THE RIGHT EYE 3 TIMES A DAY AS DIRECTED, Disp: , Rfl:   •  fexofenadine (ALLEGRA) 180 MG tablet, Take 180 mg by mouth Daily., Disp: , Rfl:   •  furosemide (LASIX) 20 MG tablet, As needed for 3 pound weight gain in 24 hours., Disp: 20 tablet, Rfl: 3  •  gabapentin (NEURONTIN) 400 MG capsule, Take 1 capsule by mouth 2 (Two) Times a Day., Disp: 6 capsule, Rfl: 0  •  isosorbide mononitrate (IMDUR) 30 MG 24 hr tablet, Take 1 tablet by mouth Daily., Disp: 30 tablet, Rfl: 11  •  levocetirizine (XYZAL) 5 MG tablet, TAKE ONE TABLET BY MOUTH EVERY DAY IN THE EVENING FOR ALLERGIES, Disp: , Rfl:   •  levothyroxine (SYNTHROID, LEVOTHROID) 50 MCG tablet, Take 50 mcg by mouth Daily., Disp: , Rfl:   •  LORazepam (ATIVAN) 1 MG tablet, Take 1 tablet by mouth Every 8 (Eight) Hours As Needed for Anxiety, Disp: 9 tablet, Rfl: 0  •  metoprolol tartrate (LOPRESSOR) 50 MG tablet, Take 1 tablet by mouth 2 (Two) Times a Day., Disp: 60 tablet, Rfl: 6  •  nitroglycerin (NITROSTAT) 0.4 MG SL tablet, 1 under the tongue as needed for angina, may repeat q5mins for up three doses, Disp: 30 tablet, Rfl: 11  •  rOPINIRole (REQUIP) 1 MG tablet, Take 1 mg by mouth At Night As Needed (restless leg). Take 1 hour before bedtime., Disp: , Rfl:   •  sennosides-docusate sodium (SENOKOT-S) 8.6-50 MG tablet, Take 2 tablets by mouth 2 (Two) Times a Day., Disp: 120 tablet, Rfl: 0  •  spironolactone (ALDACTONE) 25 MG tablet, Take 1 tablet by mouth Daily., Disp: 90 tablet, Rfl: 3  •  traMADol (ULTRAM) 50 MG tablet, Take 50 mg by mouth Every 8 (Eight) Hours As Needed for Moderate Pain ., Disp: , Rfl:   •  dexlansoprazole (DEXILANT) 60 MG capsule, Take 1 capsule by mouth Every Morning Before Breakfast., Disp: 30 capsule, Rfl: 11    ALLERGIES:  Patient has no known allergies.    VISIT VITALS:  Ht 160 cm (63\")   Wt 63.5 kg (140 lb)   BMI 24.80 kg/m²   Physical Exam  Constitutional:       " General: She is not in acute distress.     Appearance: Normal appearance. She is well-developed.   HENT:      Head: Normocephalic and atraumatic.   Eyes:      Pupils: Pupils are equal, round, and reactive to light.   Cardiovascular:      Rate and Rhythm: Normal rate and regular rhythm.      Heart sounds: Normal heart sounds.   Pulmonary:      Effort: Pulmonary effort is normal. No respiratory distress.      Breath sounds: Normal breath sounds. No wheezing, rhonchi or rales.   Abdominal:      General: Abdomen is flat. Bowel sounds are normal. There is no distension.      Palpations: Abdomen is soft. There is no mass.      Tenderness: There is no abdominal tenderness. There is no guarding or rebound.      Hernia: No hernia is present.   Musculoskeletal:         General: No swelling. Normal range of motion.      Cervical back: Normal range of motion and neck supple.      Right lower leg: No edema.      Left lower leg: No edema.   Skin:     General: Skin is warm and dry.   Neurological:      Mental Status: She is alert and oriented to person, place, and time.   Psychiatric:         Attention and Perception: Attention normal.         Mood and Affect: Mood normal.         Speech: Speech normal.         Behavior: Behavior normal. Behavior is cooperative.         Thought Content: Thought content normal.         Assessment       Diagnosis Plan   1. Gastroesophageal reflux disease without esophagitis  dexlansoprazole (DEXILANT) 60 MG capsule      2. Right upper quadrant abdominal pain            Return in about 4 weeks (around 2/20/2023).         1. Gastroesophageal reflux disease, unspecified whether esophagitis present  - Dexilant (DEXILANT) 60 MG capsule; Take 1 capsule by mouth Daily. Dispense: 30 capsule; Refill: 0  - If Dexilant is not approved, we will try lansoprazole.  - Recommend a bland diet for the next 2 weeks while medication takes effect.  - Recommend Activia yogurt daily.  - Follow up in 1 month.          This  document has been electronically signed by Giuliana Allan PA-C  January 24, 2023 12:39 EST    Part of this note may be an electronic transcription/translation of spoken language to printed text using the Dragon Dictation System.            Transcribed from ambient dictation for Giuliana Allan PA-C by Karlie Mae.  01/23/23   16:23 EST    Patient or patient representative verbalized consent to the visit recording.  I have personally performed the services described in this document as transcribed by the above individual, and it is both accurate and complete.

## 2023-01-24 ENCOUNTER — TELEPHONE (OUTPATIENT)
Dept: CARDIOLOGY | Facility: CLINIC | Age: 83
End: 2023-01-24
Payer: MEDICARE

## 2023-01-24 NOTE — TELEPHONE ENCOUNTER
Attempted to contact patient , unable to leave voice mail ----- Message from JUNIE Gramajo sent at 1/24/2023  3:07 PM EST -----  Please call patient about their normal result.    The echocardiogram showed normal pumping function of the heart.  Ms. Dunham does have mild pulmonary hypertension.  None of these findings account for lower extremity swelling.    Please make sure that she has had some relief of her lower extremity swelling with the Lasix that was given to her.    Thanks, Celeste

## 2023-01-25 ENCOUNTER — TELEPHONE (OUTPATIENT)
Dept: CARDIOLOGY | Facility: CLINIC | Age: 83
End: 2023-01-25
Payer: MEDICARE

## 2023-01-25 NOTE — TELEPHONE ENCOUNTER
----- Message from JUNIE Gramajo sent at 1/24/2023  3:07 PM EST -----  Please call patient about their normal result.    The echocardiogram showed normal pumping function of the heart.  Ms. Dunham does have mild pulmonary hypertension.  None of these findings account for lower extremity swelling.    Please make sure that she has had some relief of her lower extremity swelling with the Lasix that was given to her.    Thanks, Celeste

## 2023-02-01 ENCOUNTER — TELEPHONE (OUTPATIENT)
Dept: CARDIOLOGY | Facility: CLINIC | Age: 83
End: 2023-02-01
Payer: MEDICARE

## 2023-02-03 ENCOUNTER — TELEPHONE (OUTPATIENT)
Dept: CARDIOLOGY | Facility: CLINIC | Age: 83
End: 2023-02-03
Payer: MEDICARE

## 2023-02-03 NOTE — TELEPHONE ENCOUNTER
Patient called in to state that she took Lasix x 3 days and had weight loss of 2 pounds. She states her feet are less swollen but sometimes they swell in the evenings. Patient states she does not want to take the Lasix as it kept her in the restroom too much.

## 2023-02-10 PROCEDURE — 93296 REM INTERROG EVL PM/IDS: CPT | Performed by: INTERNAL MEDICINE

## 2023-02-10 PROCEDURE — 93294 REM INTERROG EVL PM/LDLS PM: CPT | Performed by: INTERNAL MEDICINE

## 2023-02-20 ENCOUNTER — OFFICE VISIT (OUTPATIENT)
Dept: GASTROENTEROLOGY | Facility: CLINIC | Age: 83
End: 2023-02-20
Payer: MEDICARE

## 2023-02-20 VITALS — WEIGHT: 140 LBS | BODY MASS INDEX: 24.8 KG/M2 | HEIGHT: 63 IN

## 2023-02-20 DIAGNOSIS — K21.9 GASTROESOPHAGEAL REFLUX DISEASE WITHOUT ESOPHAGITIS: ICD-10-CM

## 2023-02-20 DIAGNOSIS — K58.1 IRRITABLE BOWEL SYNDROME WITH CONSTIPATION: Primary | ICD-10-CM

## 2023-02-20 PROCEDURE — 99213 OFFICE O/P EST LOW 20 MIN: CPT | Performed by: PHYSICIAN ASSISTANT

## 2023-02-20 NOTE — PROGRESS NOTES
Chief Complaint   Patient presents with   • Heartburn       Claudette De Santiago is a 82 y.o. female who presents to the office today for evaluation of Heartburn  .    HPI     CLAUDETTE DE SANTIAGO YOB: 1940. She is a follow-up office note for GERD. She was last seen in clinic back in 01/2023 in which she was started on Dexilant 60 mg once daily.    The patient reports that the Dexilant was making her sick in her stomach right after she ate. She mentions that she decided it did not work like it should. The patient reports that she has a lot of acid reflux. She mentions that it hurts like gas. The patient denies burning. She reports that it is more pressure. The patient reports that the omeprazole worked better. She mentions that everything she eats is aggravating it. The patient reports that she has always had trouble with her bowels since she had a cholecystectomy. She mentions that she sometimes has 2 bowel movements a day, but most of the time she has 1 bowel movement a day. The patient reports that she feels like she is emptying out pretty well. She mentions that she takes Senokot 2 times a day. The patient denies ever taking Linzess. She mentions that she has been taking the Senokot for a couple of years. She reports she takes 2 tablets 2 times a day. The patient denies being diabetic.      Review of Systems   Constitutional: Negative.    HENT: Negative for trouble swallowing.    Eyes: Negative.    Respiratory: Negative.    Cardiovascular: Negative.    Gastrointestinal: Positive for abdominal distention and constipation. Negative for abdominal pain, anal bleeding, blood in stool, diarrhea, nausea and vomiting.   Endocrine: Negative.    Genitourinary: Negative.    Musculoskeletal: Negative.    Skin: Negative.    Allergic/Immunologic: Negative.    Neurological: Negative.    Hematological: Negative.    Psychiatric/Behavioral: Negative.        ACTIVE PROBLEMS:   Specialty Problems    None      PAST MEDICAL  HISTORY:  Past Medical History:   Diagnosis Date   • Anxiety    • Arrhythmia    • Atrial fibrillation (HCC)    • Coronary artery disease    • Disease of thyroid gland    • Dysfunctional gallbladder    • Fracture of two ribs    • GERD (gastroesophageal reflux disease)    • Hypertension    • Lumbar compression fracture (HCC) 2022   • Osteoporosis        SURGICAL HISTORY:  Past Surgical History:   Procedure Laterality Date   • ABDOMINAL SURGERY     • CARDIAC ELECTROPHYSIOLOGY PROCEDURE N/A 2022    Procedure: Device Implant. Leadless PPM. Hold eliquis x1 day prior to procedure.;  Surgeon: Jefe Zhao MD;  Location:  [facility];  Service: Cardiology;  Laterality: N/A;   • CARDIAC ELECTROPHYSIOLOGY PROCEDURE N/A 2022    Procedure: AV Node;  Surgeon: Jefe Zhao MD;  Location:  [facility];  Service: Cardiovascular;  Laterality: N/A;   • CHOLECYSTECTOMY OPEN     • HIP HEMIARTHROPLASTY Left 2018    Procedure: LEFT HIP BIPOLAR HEMIARTHROPLASTY;  Surgeon: Trevon Arriaga MD;  Location:  [facility];  Service: Orthopedics       FAMILY HISTORY:  Family History   Problem Relation Age of Onset   • Heart disease Father        SOCIAL HISTORY:  Social History     Tobacco Use   • Smoking status: Former     Packs/day: 0.25     Years: 15.00     Pack years: 3.75     Types: Cigarettes     Quit date: 3/10/2022     Years since quittin.9   • Smokeless tobacco: Current     Types: Snuff   • Tobacco comments:     snuff for 60 years    Substance Use Topics   • Alcohol use: No       CURRENT MEDICATION:    Current Outpatient Medications:   •  albuterol (PROVENTIL HFA;VENTOLIN HFA) 108 (90 Base) MCG/ACT inhaler, Inhale 2 puffs Every 6 (Six) Hours As Needed for Wheezing., Disp: , Rfl:   •  alendronate (FOSAMAX) 70 MG tablet, Take 70 mg by mouth Every 7 (Seven) Days. Prior to [facility] Admission, Patient was on: takes on , Disp: , Rfl:   •  apixaban (Eliquis) 2.5 MG tablet tablet,  Take 1 tablet by mouth Every 12 (Twelve) Hours. Restart on 9/25/2022, Disp: 60 tablet, Rfl: 11  •  atorvastatin (LIPITOR) 10 MG tablet, Take 1 tablet by mouth Every Night., Disp: 30 tablet, Rfl: 0  •  calcium carb-cholecalciferol 600-800 MG-UNIT tablet, Take 1 tablet by mouth 2 (Two) Times a Day With Meals., Disp: 60 tablet, Rfl: 0  •  dexlansoprazole (DEXILANT) 60 MG capsule, Take 1 capsule by mouth Every Morning Before Breakfast., Disp: 30 capsule, Rfl: 11  •  diclofenac (VOLTAREN) 1 % gel gel, APPLY 2 GRAMS TO AFFECTED AREA FOUR TIMES A DAY FOR PAIN, Disp: , Rfl: 3  •  erythromycin (ROMYCIN) 5 MG/GM ophthalmic ointment, APPLY 1CM OF OINTMENT INTO THE RIGHT EYE 3 TIMES A DAY AS DIRECTED, Disp: , Rfl:   •  fexofenadine (ALLEGRA) 180 MG tablet, Take 180 mg by mouth Daily., Disp: , Rfl:   •  furosemide (LASIX) 20 MG tablet, As needed for 3 pound weight gain in 24 hours., Disp: 20 tablet, Rfl: 3  •  gabapentin (NEURONTIN) 400 MG capsule, Take 1 capsule by mouth 2 (Two) Times a Day., Disp: 6 capsule, Rfl: 0  •  isosorbide mononitrate (IMDUR) 30 MG 24 hr tablet, Take 1 tablet by mouth Daily., Disp: 30 tablet, Rfl: 11  •  levocetirizine (XYZAL) 5 MG tablet, TAKE ONE TABLET BY MOUTH EVERY DAY IN THE EVENING FOR ALLERGIES, Disp: , Rfl:   •  levothyroxine (SYNTHROID, LEVOTHROID) 50 MCG tablet, Take 50 mcg by mouth Daily., Disp: , Rfl:   •  LORazepam (ATIVAN) 1 MG tablet, Take 1 tablet by mouth Every 8 (Eight) Hours As Needed for Anxiety, Disp: 9 tablet, Rfl: 0  •  metoprolol tartrate (LOPRESSOR) 50 MG tablet, Take 1 tablet by mouth 2 (Two) Times a Day., Disp: 60 tablet, Rfl: 6  •  nitroglycerin (NITROSTAT) 0.4 MG SL tablet, 1 under the tongue as needed for angina, may repeat q5mins for up three doses, Disp: 30 tablet, Rfl: 11  •  rOPINIRole (REQUIP) 1 MG tablet, Take 1 mg by mouth At Night As Needed (restless leg). Take 1 hour before bedtime., Disp: , Rfl:   •  sennosides-docusate sodium (SENOKOT-S) 8.6-50 MG tablet, Take 2  "tablets by mouth 2 (Two) Times a Day., Disp: 120 tablet, Rfl: 0  •  spironolactone (ALDACTONE) 25 MG tablet, Take 1 tablet by mouth Daily., Disp: 90 tablet, Rfl: 3  •  traMADol (ULTRAM) 50 MG tablet, Take 50 mg by mouth Every 8 (Eight) Hours As Needed for Moderate Pain ., Disp: , Rfl:   •  linaclotide (LINZESS) 290 MCG capsule capsule, Take 1 capsule by mouth Every Morning Before Breakfast., Disp: 30 capsule, Rfl: 11    ALLERGIES:  Patient has no known allergies.    VISIT VITALS:  Ht 160 cm (63\")   Wt 63.5 kg (140 lb)   BMI 24.80 kg/m²   Physical Exam  Constitutional:       General: She is not in acute distress.     Appearance: Normal appearance. She is well-developed.   HENT:      Head: Normocephalic and atraumatic.   Eyes:      Pupils: Pupils are equal, round, and reactive to light.   Cardiovascular:      Rate and Rhythm: Normal rate and regular rhythm.      Heart sounds: Normal heart sounds.   Pulmonary:      Effort: Pulmonary effort is normal. No respiratory distress.      Breath sounds: Normal breath sounds. No wheezing, rhonchi or rales.   Abdominal:      General: Abdomen is flat. Bowel sounds are normal. There is no distension.      Palpations: Abdomen is soft. There is no mass.      Tenderness: There is no abdominal tenderness. There is no guarding or rebound.      Hernia: No hernia is present.   Musculoskeletal:         General: No swelling. Normal range of motion.      Cervical back: Normal range of motion and neck supple.      Right lower leg: No edema.      Left lower leg: No edema.   Skin:     General: Skin is warm and dry.   Neurological:      Mental Status: She is alert and oriented to person, place, and time.   Psychiatric:         Attention and Perception: Attention normal.         Mood and Affect: Mood normal.         Speech: Speech normal.         Behavior: Behavior normal. Behavior is cooperative.         Thought Content: Thought content normal.         Assessment      1. Gastroesophageal " reflux disease without esophagitis  - She will continue Dexilant 60 mg once daily.    2. Constipation, unspecified constipation type  - She will start Linzess 290 mcg daily.  - She will discontinue Senna.  - She will contact the office if she experiences diarrhea with Linzess 290 mcg.  - She will follow up in 1 month.     Diagnosis Plan   1. Irritable bowel syndrome with constipation  linaclotide (LINZESS) 290 MCG capsule capsule      2. Gastroesophageal reflux disease without esophagitis            Return in about 4 weeks (around 3/20/2023).                   This document has been electronically signed by Giuliana Allan PA-C  February 21, 2023 08:26 EST    Part of this note may be an electronic transcription/translation of spoken language to printed text using the Dragon Dictation System.          Transcribed from ambient dictation for Giuliana Allan PA-C by Karlie Mae.  02/20/23   15:02 EST    Patient or patient representative verbalized consent to the visit recording.  I have personally performed the services described in this document as transcribed by the above individual, and it is both accurate and complete.

## 2023-03-20 ENCOUNTER — OFFICE VISIT (OUTPATIENT)
Dept: GASTROENTEROLOGY | Facility: CLINIC | Age: 83
End: 2023-03-20
Payer: MEDICARE

## 2023-03-20 VITALS
HEIGHT: 63 IN | BODY MASS INDEX: 24.98 KG/M2 | SYSTOLIC BLOOD PRESSURE: 145 MMHG | HEART RATE: 80 BPM | DIASTOLIC BLOOD PRESSURE: 90 MMHG | WEIGHT: 141 LBS

## 2023-03-20 DIAGNOSIS — K59.04 CHRONIC IDIOPATHIC CONSTIPATION: Primary | ICD-10-CM

## 2023-03-20 DIAGNOSIS — K21.9 GASTROESOPHAGEAL REFLUX DISEASE WITHOUT ESOPHAGITIS: ICD-10-CM

## 2023-03-20 PROCEDURE — 1159F MED LIST DOCD IN RCRD: CPT | Performed by: PHYSICIAN ASSISTANT

## 2023-03-20 PROCEDURE — 99213 OFFICE O/P EST LOW 20 MIN: CPT | Performed by: PHYSICIAN ASSISTANT

## 2023-03-20 PROCEDURE — 1160F RVW MEDS BY RX/DR IN RCRD: CPT | Performed by: PHYSICIAN ASSISTANT

## 2023-03-20 PROCEDURE — 3077F SYST BP >= 140 MM HG: CPT | Performed by: PHYSICIAN ASSISTANT

## 2023-03-20 PROCEDURE — 3080F DIAST BP >= 90 MM HG: CPT | Performed by: PHYSICIAN ASSISTANT

## 2023-03-20 NOTE — PROGRESS NOTES
DATE:  3/20/2023    DIAGNOSIS:    CHIEF COMPLAINT:  Chief Complaint   Patient presents with   • Irritable Bowel Syndrome     HPI   CLAUDETTE DE SANTIAGO YOB: 1940. She is a follow-up office note for GERD. She was last seen in clinic back in 01/2023 in which she was started on Dexilant 60 mg once daily.     The patient reports that the Dexilant was making her sick in her stomach right after she ate. She mentions that she decided it did not work like it should. The patient reports that she has a lot of acid reflux. She mentions that it hurts like gas. The patient denies burning. She reports that it is more pressure. The patient reports that the omeprazole worked better. She mentions that everything she eats is aggravating it. The patient reports that she has always had trouble with her bowels since she had a cholecystectomy. She mentions that she sometimes has 2 bowel movements a day, but most of the time she has 1 bowel movement a day. The patient reports that she feels like she is emptying out pretty well. She mentions that she takes Senokot 2 times a day. The patient denies ever taking Linzess. She mentions that she has been taking the Senokot for a couple of years. She reports she takes 2 tablets 2 times a day. The patient denies being diabetic.      Interval History:  Patient states that she has been doing well since she was seen in the clinic last.  At her last visit she was started on Linzess 290 mcg once daily for constipation.  Patient states since starting the medication she is now having several daily bowel movements that range from type IV-VI on the [place] stool scale.  She feels like she is evacuating her colon well.  She is no longer experiencing any pain or pressure occurring in the abdomen.  She continues to do well on her omeprazole 40 mg once daily for GERD.    The following portions of the patient's history were reviewed and updated as appropriate: allergies, current medications, past  family history, past medical history, past social history, past surgical history and problem list.  REVIEW OF SYSTEMS:   Review of Systems   Constitutional: Negative.    HENT: Negative for trouble swallowing.    Eyes: Negative.    Respiratory: Negative.    Cardiovascular: Negative.    Gastrointestinal: Positive for abdominal distention and constipation. Negative for abdominal pain, anal bleeding, blood in stool, diarrhea, nausea and vomiting.   Endocrine: Negative.    Genitourinary: Negative.    Musculoskeletal: Negative.    Skin: Negative.    Allergic/Immunologic: Negative.    Neurological: Negative.    Hematological: Negative.    Psychiatric/Behavioral: Negative.        PAST MEDICAL HISTORY:  Past Medical History:   Diagnosis Date   • Anxiety    • Arrhythmia    • Atrial fibrillation (HCC)    • Coronary artery disease    • Disease of thyroid gland    • Dysfunctional gallbladder    • Fracture of two ribs    • GERD (gastroesophageal reflux disease)    • Hypertension    • Lumbar compression fracture (HCC) 07/2022   • Osteoporosis        PAST SURGICAL HISTORY:  Past Surgical History:   Procedure Laterality Date   • ABDOMINAL SURGERY     • CARDIAC ELECTROPHYSIOLOGY PROCEDURE N/A 9/23/2022    Procedure: Device Implant. Leadless PPM. Hold eliquis x1 day prior to procedure.;  Surgeon: Jefe Zhao MD;  Location: [facility] INVASIVE LOCATION;  Service: Cardiology;  Laterality: N/A;   • CARDIAC ELECTROPHYSIOLOGY PROCEDURE N/A 9/23/2022    Procedure: AV Node;  Surgeon: Jefe Zhao MD;  Location: [facility] INVASIVE LOCATION;  Service: Cardiovascular;  Laterality: N/A;   • CHOLECYSTECTOMY OPEN     • HIP HEMIARTHROPLASTY Left 5/29/2018    Procedure: LEFT HIP BIPOLAR HEMIARTHROPLASTY;  Surgeon: Trevon Arriaga MD;  Location: [facility] OR;  Service: Orthopedics       SOCIAL HISTORY:  Social History     Socioeconomic History   • Marital status:    Tobacco Use   • Smoking status: Former     Packs/day: 0.25     Years:  15.00     Pack years: 3.75     Types: Cigarettes     Quit date: 3/10/2022     Years since quittin.0   • Smokeless tobacco: Current     Types: Snuff   • Tobacco comments:     snuff for 60 years    Vaping Use   • Vaping Use: Never used   Substance and Sexual Activity   • Alcohol use: No   • Drug use: No   • Sexual activity: Defer       FAMILY HISTORY:  Family History   Problem Relation Age of Onset   • Heart disease Father        MEDICATIONS:      Current Outpatient Medications:   •  albuterol (PROVENTIL HFA;VENTOLIN HFA) 108 (90 Base) MCG/ACT inhaler, Inhale 2 puffs Every 6 (Six) Hours As Needed for Wheezing., Disp: , Rfl:   •  alendronate (FOSAMAX) 70 MG tablet, Take 1 tablet by mouth Every 7 (Seven) Days. Prior to [facility] Admission, Patient was on: takes on , Disp: , Rfl:   •  apixaban (Eliquis) 2.5 MG tablet tablet, Take 1 tablet by mouth Every 12 (Twelve) Hours. Restart on 2022, Disp: 60 tablet, Rfl: 11  •  atorvastatin (LIPITOR) 10 MG tablet, Take 1 tablet by mouth Every Night., Disp: 30 tablet, Rfl: 0  •  calcium carb-cholecalciferol 600-800 MG-UNIT tablet, Take 1 tablet by mouth 2 (Two) Times a Day With Meals., Disp: 60 tablet, Rfl: 0  •  dexlansoprazole (DEXILANT) 60 MG capsule, Take 1 capsule by mouth Every Morning Before Breakfast., Disp: 30 capsule, Rfl: 11  •  diclofenac (VOLTAREN) 1 % gel gel, APPLY 2 GRAMS TO AFFECTED AREA FOUR TIMES A DAY FOR PAIN, Disp: , Rfl: 3  •  erythromycin (ROMYCIN) 5 MG/GM ophthalmic ointment, APPLY 1CM OF OINTMENT INTO THE RIGHT EYE 3 TIMES A DAY AS DIRECTED, Disp: , Rfl:   •  fexofenadine (ALLEGRA) 180 MG tablet, Take 1 tablet by mouth Daily., Disp: , Rfl:   •  furosemide (LASIX) 20 MG tablet, As needed for 3 pound weight gain in 24 hours., Disp: 20 tablet, Rfl: 3  •  gabapentin (NEURONTIN) 400 MG capsule, Take 1 capsule by mouth 2 (Two) Times a Day., Disp: 6 capsule, Rfl: 0  •  isosorbide mononitrate (IMDUR) 30 MG 24 hr tablet, Take 1 tablet by mouth  "Daily., Disp: 30 tablet, Rfl: 11  •  levocetirizine (XYZAL) 5 MG tablet, TAKE ONE TABLET BY MOUTH EVERY DAY IN THE EVENING FOR ALLERGIES, Disp: , Rfl:   •  levothyroxine (SYNTHROID, LEVOTHROID) 50 MCG tablet, Take 1 tablet by mouth Daily., Disp: , Rfl:   •  LORazepam (ATIVAN) 1 MG tablet, Take 1 tablet by mouth Every 8 (Eight) Hours As Needed for Anxiety, Disp: 9 tablet, Rfl: 0  •  metoprolol tartrate (LOPRESSOR) 50 MG tablet, Take 1 tablet by mouth 2 (Two) Times a Day., Disp: 60 tablet, Rfl: 6  •  nitroglycerin (NITROSTAT) 0.4 MG SL tablet, 1 under the tongue as needed for angina, may repeat q5mins for up three doses, Disp: 30 tablet, Rfl: 11  •  rOPINIRole (REQUIP) 1 MG tablet, Take 1 tablet by mouth At Night As Needed (restless leg). Take 1 hour before bedtime., Disp: , Rfl:   •  sennosides-docusate sodium (SENOKOT-S) 8.6-50 MG tablet, Take 2 tablets by mouth 2 (Two) Times a Day., Disp: 120 tablet, Rfl: 0  •  spironolactone (ALDACTONE) 25 MG tablet, Take 1 tablet by mouth Daily., Disp: 90 tablet, Rfl: 3  •  traMADol (ULTRAM) 50 MG tablet, Take 1 tablet by mouth Every 8 (Eight) Hours As Needed for Moderate Pain., Disp: , Rfl:   •  linaclotide (LINZESS) 145 MCG capsule capsule, Take 1 capsule by mouth Every Morning Before Breakfast., Disp: 90 capsule, Rfl: 3    ALLERGIES:  No Known Allergies    VIST VITALS/PHYSICAL EXAM:  /90   Pulse 80   Ht 160 cm (63\")   Wt 64 kg (141 lb)   BMI 24.98 kg/m²   Physical Exam  Constitutional:       General: She is not in acute distress.     Appearance: Normal appearance. She is well-developed.   HENT:      Head: Normocephalic and atraumatic.   Eyes:      Pupils: Pupils are equal, round, and reactive to light.   Cardiovascular:      Rate and Rhythm: Normal rate and regular rhythm.      Heart sounds: Normal heart sounds.   Pulmonary:      Effort: Pulmonary effort is normal. No respiratory distress.      Breath sounds: Normal breath sounds. No wheezing, rhonchi or rales. "   Abdominal:      General: Abdomen is flat. Bowel sounds are normal. There is no distension.      Palpations: Abdomen is soft. There is no mass.      Tenderness: There is no abdominal tenderness. There is no guarding or rebound.      Hernia: No hernia is present.   Musculoskeletal:         General: No swelling. Normal range of motion.      Cervical back: Normal range of motion and neck supple.      Right lower leg: No edema.      Left lower leg: No edema.   Skin:     General: Skin is warm and dry.   Neurological:      Mental Status: She is alert and oriented to person, place, and time.   Psychiatric:         Attention and Perception: Attention normal.         Mood and Affect: Mood normal.         Speech: Speech normal.         Behavior: Behavior normal. Behavior is cooperative.         Thought Content: Thought content normal.         PATHOLOGY:  NONE      ENDOSCOPY:  NONE      IMAGING:  No Images in the past 120 days found..        RECENT LABS:  Lab Results   Component Value Date    WBC 6.98 11/16/2022    HGB 13.8 11/16/2022    HCT 42.2 11/16/2022    MCV 96.1 11/16/2022    RDW 12.8 11/16/2022     11/16/2022    NEUTRORELPCT 57.0 11/16/2022    LYMPHORELPCT 23.4 11/16/2022    MONORELPCT 12.6 (H) 11/16/2022    EOSRELPCT 5.9 11/16/2022    BASORELPCT 1.0 11/16/2022    NEUTROABS 3.98 11/16/2022    LYMPHSABS 1.63 11/16/2022       Lab Results   Component Value Date     11/16/2022    K 3.9 11/16/2022    CO2 25.7 11/16/2022     11/16/2022    BUN 17 11/16/2022    CREATININE 1.35 (H) 11/16/2022    EGFRIFNONA 49 (L) 12/16/2020    GLUCOSE 99 11/16/2022    CALCIUM 9.2 11/16/2022    ALKPHOS 60 11/16/2022    AST 17 11/16/2022    ALT 6 11/16/2022    BILITOT 0.4 11/16/2022    ALBUMIN 3.94 11/16/2022    PROTEINTOT 8.2 11/16/2022    MG 1.5 (L) 12/16/2020    PHOS 2.4 (L) 09/10/2018             ASSESSMENT & PLAN:  Patient's symptoms have resolved since starting linzess 290 mcg once daily. She is having diarrhea daily  therefore we will decrease her dosage down to linzess 145 mcg.  She will continue on omeprazole 40 mg once daily for GERD.   Diagnosis Plan   1. Chronic idiopathic constipation  linaclotide (LINZESS) 145 MCG capsule capsule      2. Gastroesophageal reflux disease without esophagitis            Return in about 6 months (around 9/20/2023).        Electronically Signed by: Giuliana Allan PA-C , March 20, 2023 14:11 EDT       CC:   Juliann Santana APRN

## 2023-03-30 ENCOUNTER — TELEPHONE (OUTPATIENT)
Dept: CARDIOLOGY | Facility: CLINIC | Age: 83
End: 2023-03-30
Payer: MEDICARE

## 2023-03-30 ENCOUNTER — TELEPHONE (OUTPATIENT)
Dept: UROLOGY | Facility: CLINIC | Age: 83
End: 2023-03-30
Payer: MEDICARE

## 2023-03-30 ENCOUNTER — OFFICE VISIT (OUTPATIENT)
Dept: CARDIOLOGY | Facility: CLINIC | Age: 83
End: 2023-03-30
Payer: MEDICARE

## 2023-03-30 VITALS
DIASTOLIC BLOOD PRESSURE: 66 MMHG | WEIGHT: 138.8 LBS | HEART RATE: 76 BPM | SYSTOLIC BLOOD PRESSURE: 105 MMHG | HEIGHT: 63 IN | OXYGEN SATURATION: 96 % | BODY MASS INDEX: 24.59 KG/M2

## 2023-03-30 DIAGNOSIS — I49.5 SSS (SICK SINUS SYNDROME): Chronic | ICD-10-CM

## 2023-03-30 DIAGNOSIS — I10 ESSENTIAL HYPERTENSION: Chronic | ICD-10-CM

## 2023-03-30 DIAGNOSIS — I48.21 PERMANENT ATRIAL FIBRILLATION: Primary | Chronic | ICD-10-CM

## 2023-03-30 DIAGNOSIS — K58.1 IRRITABLE BOWEL SYNDROME WITH CONSTIPATION: Primary | ICD-10-CM

## 2023-03-30 PROCEDURE — 3074F SYST BP LT 130 MM HG: CPT | Performed by: NURSE PRACTITIONER

## 2023-03-30 PROCEDURE — 3078F DIAST BP <80 MM HG: CPT | Performed by: NURSE PRACTITIONER

## 2023-03-30 PROCEDURE — 99214 OFFICE O/P EST MOD 30 MIN: CPT | Performed by: NURSE PRACTITIONER

## 2023-03-30 NOTE — TELEPHONE ENCOUNTER
We gave her low dose of lInzess but it's not helping her. She is asking for the higher dose to be called in.     [facility]

## 2023-03-30 NOTE — TELEPHONE ENCOUNTER
Patient's daughter (annette) called back after visit today to say her mother had not been taking the isosorbide but was taking spironolactone. Advised Celeste of this information and she requested patient stop taking the spironolactone as well.

## 2023-03-30 NOTE — PROGRESS NOTES
"Chief Complaint  Shortness of Breath (F/U), Atrial Fibrillation (F/U), Hypertension (F/U), Follow-up (Pt denies CP, SOA @hs and on exertion,leg/ankle swelling in the evenings,stays tired), and MED REVIEW (Pt brought med list,pt tolerating all meds well.)    Subjective          Anette Dunham presents to [facility] for follow up.    History of Present Illness    Ms. Dunham was last seen in clinic on 12/29/2022.  She did have some lower extremity swelling and was given Lasix to be taken for 3 pound weight gain.  A transthoracic echocardiogram was also ordered at that time.    Ms. Dunham is accompanied by her daughter for today's visit.  She denies any chest pain.  She does have chronic known worsening symptoms of shortness of breath and dyspnea on exertion as well as lower extremity swelling.  She still has Lasix for this.  She does report fatigue.  She does tell me that she has fallen several times in the last several weeks.  She denies any dizziness.  She denies loss of consciousness.    She does tell me that her blood pressure is usually lower than 105/66 as we have for today's reading.    Objective     Vital Signs:   /66   Pulse 76   Ht 160 cm (63\")   Wt 63 kg (138 lb 12.8 oz)   SpO2 96%   BMI 24.59 kg/m²       Physical Exam  Vitals reviewed.   Constitutional:       Appearance: Normal appearance. She is well-developed.   Cardiovascular:      Rate and Rhythm: Normal rate and regular rhythm.      Heart sounds: No murmur heard.    No friction rub. No gallop.   Pulmonary:      Effort: Pulmonary effort is normal. No respiratory distress.      Breath sounds: Normal breath sounds. No wheezing or rales.   Skin:     General: Skin is warm and dry.   Neurological:      Mental Status: She is alert and oriented to person, place, and time.   Psychiatric:         Mood and Affect: Mood normal.         Behavior: Behavior normal.          Result Review :       Data reviewed: Cardiology " studies Transthoracic echocardiogram       1/23/2023 transthoracic echocardiogram  Interpretation Summary       •  Left ventricular systolic function is normal. Left ventricular ejection fraction appears to be 51 - 55%.  •  Estimated right ventricular systolic pressure from tricuspid regurgitation is mildly elevated (35-45 mmHg).  •  Mild pulmonary hypertension is present.  •  There is no evidence of pericardial effusion      Current Outpatient Medications   Medication Sig Dispense Refill   • albuterol (PROVENTIL HFA;VENTOLIN HFA) 108 (90 Base) MCG/ACT inhaler Inhale 2 puffs Every 6 (Six) Hours As Needed for Wheezing.     • alendronate (FOSAMAX) 70 MG tablet Take 1 tablet by mouth Every 7 (Seven) Days. Prior to [facility] Admission, Patient was on: takes on wednesdays     • apixaban (Eliquis) 2.5 MG tablet tablet Take 1 tablet by mouth Every 12 (Twelve) Hours. Restart on 9/25/2022 60 tablet 11   • atorvastatin (LIPITOR) 10 MG tablet Take 1 tablet by mouth Every Night. 30 tablet 0   • calcium carb-cholecalciferol 600-800 MG-UNIT tablet Take 1 tablet by mouth 2 (Two) Times a Day With Meals. 60 tablet 0   • dexlansoprazole (DEXILANT) 60 MG capsule Take 1 capsule by mouth Every Morning Before Breakfast. 30 capsule 11   • diclofenac (VOLTAREN) 1 % gel gel APPLY 2 GRAMS TO AFFECTED AREA FOUR TIMES A DAY FOR PAIN  3   • erythromycin (ROMYCIN) 5 MG/GM ophthalmic ointment APPLY 1CM OF OINTMENT INTO THE RIGHT EYE 3 TIMES A DAY AS DIRECTED     • fexofenadine (ALLEGRA) 180 MG tablet Take 1 tablet by mouth Daily.     • furosemide (LASIX) 20 MG tablet As needed for 3 pound weight gain in 24 hours. 20 tablet 3   • gabapentin (NEURONTIN) 400 MG capsule Take 1 capsule by mouth 2 (Two) Times a Day. 6 capsule 0   • isosorbide mononitrate (IMDUR) 30 MG 24 hr tablet Take 1 tablet by mouth Daily. 30 tablet 11   • levocetirizine (XYZAL) 5 MG tablet TAKE ONE TABLET BY MOUTH EVERY DAY IN THE EVENING FOR ALLERGIES     • levothyroxine  (SYNTHROID, LEVOTHROID) 50 MCG tablet Take 1 tablet by mouth Daily.     • linaclotide (LINZESS) 145 MCG capsule capsule Take 1 capsule by mouth Every Morning Before Breakfast. 90 capsule 3   • LORazepam (ATIVAN) 1 MG tablet Take 1 tablet by mouth Every 8 (Eight) Hours As Needed for Anxiety 9 tablet 0   • metoprolol tartrate (LOPRESSOR) 50 MG tablet Take 1 tablet by mouth 2 (Two) Times a Day. 60 tablet 6   • nitroglycerin (NITROSTAT) 0.4 MG SL tablet 1 under the tongue as needed for angina, may repeat q5mins for up three doses 30 tablet 11   • rOPINIRole (REQUIP) 1 MG tablet Take 1 tablet by mouth At Night As Needed (restless leg). Take 1 hour before bedtime.     • spironolactone (ALDACTONE) 25 MG tablet Take 1 tablet by mouth Daily. 90 tablet 3   • traMADol (ULTRAM) 50 MG tablet Take 1 tablet by mouth Every 8 (Eight) Hours As Needed for Moderate Pain.     • sennosides-docusate sodium (SENOKOT-S) 8.6-50 MG tablet Take 2 tablets by mouth 2 (Two) Times a Day. (Patient not taking: Reported on 3/30/2023) 120 tablet 0     No current facility-administered medications for this visit.            Assessment and Plan    Problem List Items Addressed This Visit        Cardiac and Vasculature    Essential hypertension (Chronic)    Permanent atrial fibrillation (HCC) - Primary (Chronic)    SSS (sick sinus syndrome) (HCC) (Chronic)           Follow Up     Medications were reviewed with the patient.    Patient has sick sinus syndrome and last interrogation of her pacemaker was 2/1/2023.  There were no events noted on interrogation.    With regard to hypertension, I do think her falling may be related to her low blood pressure.  I am taking her off of the isosorbide mononitrate.  I have asked her to monitor her blood pressure at home.  I instructed her that her goal was to have a blood pressure on the top number to be between 110-130.  She stated understanding.    A-fib is stable.  Patient is to continue Eliquis.[id number] Score: 5  (3/30/2023 10:41 AM)        No follow-ups on file.    Patient was given instructions and counseling regarding her condition or for health maintenance advice. Please see specific information pulled into the AVS if appropriate.

## 2023-05-12 PROCEDURE — 93294 REM INTERROG EVL PM/LDLS PM: CPT | Performed by: INTERNAL MEDICINE

## 2023-05-12 PROCEDURE — 93296 REM INTERROG EVL PM/IDS: CPT | Performed by: INTERNAL MEDICINE

## 2023-07-07 PROBLEM — I73.9 PAD (PERIPHERAL ARTERY DISEASE): Status: ACTIVE | Noted: 2023-07-07

## 2023-07-07 PROBLEM — Z79.01 CHRONIC ANTICOAGULATION: Status: ACTIVE | Noted: 2023-07-07

## 2023-07-07 PROBLEM — S72.002A CLOSED LEFT HIP FRACTURE: Status: RESOLVED | Noted: 2018-06-01 | Resolved: 2023-07-07

## 2023-07-07 PROBLEM — S72.002A CLOSED LEFT HIP FRACTURE, INITIAL ENCOUNTER: Status: RESOLVED | Noted: 2018-05-27 | Resolved: 2023-07-07

## 2023-07-07 PROBLEM — R60.0 EDEMA LEG: Status: RESOLVED | Noted: 2022-04-20 | Resolved: 2023-07-07

## 2023-07-07 PROBLEM — I44.2 COMPLETE HEART BLOCK: Status: ACTIVE | Noted: 2022-06-15

## 2023-07-07 PROBLEM — Z95.0 PRESENCE OF CARDIAC PACEMAKER: Status: ACTIVE | Noted: 2023-07-07

## 2023-08-02 ENCOUNTER — TELEPHONE (OUTPATIENT)
Dept: CARDIOLOGY | Facility: CLINIC | Age: 83
End: 2023-08-02
Payer: MEDICARE

## 2023-08-11 PROCEDURE — 93294 REM INTERROG EVL PM/LDLS PM: CPT | Performed by: INTERNAL MEDICINE

## 2023-08-11 PROCEDURE — 93296 REM INTERROG EVL PM/IDS: CPT | Performed by: INTERNAL MEDICINE

## 2023-10-05 ENCOUNTER — OFFICE VISIT (OUTPATIENT)
Dept: GASTROENTEROLOGY | Facility: CLINIC | Age: 83
End: 2023-10-05
Payer: MEDICARE

## 2023-10-05 VITALS
DIASTOLIC BLOOD PRESSURE: 87 MMHG | SYSTOLIC BLOOD PRESSURE: 142 MMHG | WEIGHT: 139 LBS | HEART RATE: 83 BPM | HEIGHT: 63 IN | BODY MASS INDEX: 24.63 KG/M2

## 2023-10-05 DIAGNOSIS — K58.1 IRRITABLE BOWEL SYNDROME WITH CONSTIPATION: Primary | ICD-10-CM

## 2023-10-05 RX ORDER — PLECANATIDE 3 MG/1
3 TABLET ORAL DAILY
Qty: 90 TABLET | Refills: 3 | Status: SHIPPED | OUTPATIENT
Start: 2023-10-05

## 2023-10-05 NOTE — PROGRESS NOTES
DATE:  10/5/2023    DIAGNOSIS: constipation    CHIEF COMPLAINT:  Chief Complaint   Patient presents with    Constipation     HPI   Patient states that she has been doing well since she was seen in the clinic last.  At her last visit she was started on Linzess 290 mcg once daily for constipation.  Patient states since starting the medication she is now having several daily bowel movements that range from type IV-VI on the Lee stool scale.  She feels like she is evacuating her colon well.  She is no longer experiencing any pain or pressure occurring in the abdomen.  She continues to do well on her omeprazole 40 mg once daily for GERD.    Interval History:  Patient reports that she continues to suffer from constipation despite her medication.  She states that the Linzess 145 mcg does not produce a bowel movement however Linzess 290 mcg causes diarrhea.  Despite having diarrhea she continues to not feel like she is evacuating her colon well.  She denies any formed bowel movements with the medication.    The following portions of the patient's history were reviewed and updated as appropriate: allergies, current medications, past family history, past medical history, past social history, past surgical history and problem list.  REVIEW OF SYSTEMS:   Review of Systems   Constitutional: Negative.    HENT:  Negative for trouble swallowing.    Eyes: Negative.    Respiratory: Negative.     Cardiovascular: Negative.    Gastrointestinal:  Positive for abdominal distention and constipation. Negative for abdominal pain, anal bleeding, blood in stool, diarrhea, nausea and vomiting.   Endocrine: Negative.    Genitourinary: Negative.    Musculoskeletal: Negative.    Skin: Negative.    Allergic/Immunologic: Negative.    Neurological: Negative.    Hematological: Negative.    Psychiatric/Behavioral: Negative.       PAST MEDICAL HISTORY:  Past Medical History:   Diagnosis Date    Anxiety     Arrhythmia     Atrial fibrillation      Coronary artery disease     Disease of thyroid gland     Dysfunctional gallbladder     Fracture of two ribs     GERD (gastroesophageal reflux disease)     Hypertension     Lumbar compression fracture 2022    Osteoporosis        PAST SURGICAL HISTORY:  Past Surgical History:   Procedure Laterality Date    ABDOMINAL SURGERY      CARDIAC ELECTROPHYSIOLOGY PROCEDURE N/A 2022    Procedure: Device Implant. Leadless PPM. Hold eliquis x1 day prior to procedure.;  Surgeon: Jefe Zhao MD;  Location:  [facility];  Service: Cardiology;  Laterality: N/A;    CARDIAC ELECTROPHYSIOLOGY PROCEDURE N/A 2022    Procedure: AV Node;  Surgeon: Jefe Zhao MD;  Location:  [facility];  Service: Cardiovascular;  Laterality: N/A;    CHOLECYSTECTOMY OPEN      HIP HEMIARTHROPLASTY Left 2018    Procedure: LEFT HIP BIPOLAR HEMIARTHROPLASTY;  Surgeon: Trevon Arriaga MD;  Location:  [facility];  Service: Orthopedics       SOCIAL HISTORY:  Social History     Socioeconomic History    Marital status:    Tobacco Use    Smoking status: Former     Packs/day: 0.25     Years: 15.00     Pack years: 3.75     Types: Cigarettes     Quit date: 3/10/2022     Years since quittin.5    Smokeless tobacco: Current     Types: Snuff    Tobacco comments:     snuff for 60 years    Vaping Use    Vaping Use: Never used   Substance and Sexual Activity    Alcohol use: No    Drug use: No    Sexual activity: Defer       FAMILY HISTORY:  Family History   Problem Relation Age of Onset    Heart disease Father        MEDICATIONS:      Current Outpatient Medications:     albuterol (PROVENTIL HFA;VENTOLIN HFA) 108 (90 Base) MCG/ACT inhaler, Inhale 2 puffs Every 6 (Six) Hours As Needed for Wheezing., Disp: , Rfl:     alendronate (FOSAMAX) 70 MG tablet, Take 1 tablet by mouth Every 7 (Seven) Days. Prior to [facility] Admission, Patient was on: takes on , Disp: , Rfl:     apixaban (ELIQUIS) 5 MG tablet  tablet, Take 1 tablet by mouth 2 (Two) Times a Day., Disp: 60 tablet, Rfl: 5    atorvastatin (LIPITOR) 10 MG tablet, Take 1 tablet by mouth Every Night., Disp: 30 tablet, Rfl: 0    dexlansoprazole (DEXILANT) 60 MG capsule, Take 1 capsule by mouth Every Morning Before Breakfast., Disp: 30 capsule, Rfl: 11    diclofenac (VOLTAREN) 1 % gel gel, APPLY 2 GRAMS TO AFFECTED AREA FOUR TIMES A DAY FOR PAIN, Disp: , Rfl: 3    fexofenadine (ALLEGRA) 180 MG tablet, Take 1 tablet by mouth Daily., Disp: , Rfl:     furosemide (LASIX) 20 MG tablet, As needed for 3 pound weight gain in 24 hours., Disp: 20 tablet, Rfl: 3    gabapentin (NEURONTIN) 400 MG capsule, Take 1 capsule by mouth 2 (Two) Times a Day., Disp: 6 capsule, Rfl: 0    levocetirizine (XYZAL) 5 MG tablet, TAKE ONE TABLET BY MOUTH EVERY DAY IN THE EVENING FOR ALLERGIES, Disp: , Rfl:     levothyroxine (SYNTHROID, LEVOTHROID) 50 MCG tablet, Take 1 tablet by mouth Daily., Disp: , Rfl:     LORazepam (ATIVAN) 1 MG tablet, Take 1 tablet by mouth Every 8 (Eight) Hours As Needed for Anxiety, Disp: 9 tablet, Rfl: 0    metoprolol tartrate (LOPRESSOR) 50 MG tablet, Take 1 tablet by mouth 2 (Two) Times a Day., Disp: 60 tablet, Rfl: 6    nitroglycerin (NITROSTAT) 0.4 MG SL tablet, 1 under the tongue as needed for angina, may repeat q5mins for up three doses, Disp: 30 tablet, Rfl: 11    rOPINIRole (REQUIP) 1 MG tablet, Take 1 tablet by mouth At Night As Needed (restless leg). Take 1 hour before bedtime., Disp: , Rfl:     sennosides-docusate sodium (SENOKOT-S) 8.6-50 MG tablet, Take 2 tablets by mouth 2 (Two) Times a Day., Disp: 120 tablet, Rfl: 0    spironolactone (ALDACTONE) 25 MG tablet, Take 1 tablet by mouth Daily., Disp: 90 tablet, Rfl: 3    traMADol (ULTRAM) 50 MG tablet, Take 1 tablet by mouth Every 8 (Eight) Hours As Needed for Moderate Pain., Disp: , Rfl:     Plecanatide (Trulance) 3 MG tablet, Take 1 tablet by mouth Daily., Disp: 90 tablet, Rfl: 3    ALLERGIES:  No Known  "Allergies    VIST VITALS/PHYSICAL EXAM:  /87   Pulse 83   Ht 160 cm (63\")   Wt 63 kg (139 lb)   BMI 24.62 kg/m²   Physical Exam  Constitutional:       General: She is not in acute distress.     Appearance: Normal appearance. She is well-developed.   HENT:      Head: Normocephalic and atraumatic.   Eyes:      Pupils: Pupils are equal, round, and reactive to light.   Pulmonary:      Effort: Pulmonary effort is normal. No respiratory distress.   Abdominal:      General: Abdomen is flat. There is no distension.      Palpations: Abdomen is soft. There is no mass.      Tenderness: There is no abdominal tenderness. There is no guarding or rebound.      Hernia: No hernia is present.   Musculoskeletal:         General: No swelling. Normal range of motion.      Cervical back: Normal range of motion.   Skin:     General: Skin is warm and dry.   Neurological:      Mental Status: She is alert and oriented to person, place, and time.   Psychiatric:         Attention and Perception: Attention normal.         Mood and Affect: Mood normal.         Speech: Speech normal.         Behavior: Behavior normal. Behavior is cooperative.         Thought Content: Thought content normal.       PATHOLOGY:  NONE      ENDOSCOPY:  NONE      IMAGING:  No Images in the past 120 days found..        RECENT LABS:  Lab Results   Component Value Date    WBC 6.98 11/16/2022    HGB 13.8 11/16/2022    HCT 42.2 11/16/2022    MCV 96.1 11/16/2022    RDW 12.8 11/16/2022     11/16/2022    NEUTRORELPCT 57.0 11/16/2022    LYMPHORELPCT 23.4 11/16/2022    MONORELPCT 12.6 (H) 11/16/2022    EOSRELPCT 5.9 11/16/2022    BASORELPCT 1.0 11/16/2022    NEUTROABS 3.98 11/16/2022    LYMPHSABS 1.63 11/16/2022       Lab Results   Component Value Date     11/16/2022    K 3.9 11/16/2022    CO2 25.7 11/16/2022     11/16/2022    BUN 17 11/16/2022    CREATININE 1.35 (H) 11/16/2022    EGFRIFNONA 49 (L) 12/16/2020    GLUCOSE 99 11/16/2022    CALCIUM 9.2 " 11/16/2022    ALKPHOS 60 11/16/2022    AST 17 11/16/2022    ALT 6 11/16/2022    BILITOT 0.4 11/16/2022    ALBUMIN 3.94 11/16/2022    PROTEINTOT 8.2 11/16/2022    MG 1.5 (L) 12/16/2020    PHOS 2.4 (L) 09/10/2018             ASSESSMENT & PLAN:  Based on patient's current medication regimen not being effective-we will try Trulance 3 mg once daily.  She has experience constipation with Linzess 145 mcg once daily however 290 mcg causes diarrhea.  If failure of the Trulance-we will send in IBSrela 50 mg twice daily dosing.   Diagnosis Plan   1. Irritable bowel syndrome with constipation  Plecanatide (Trulance) 3 MG tablet            Return in about 6 weeks (around 11/16/2023).        Electronically Signed by: Giuliana Allan PA-C , October 5, 2023 12:29 EDT       CC:   Maria Eugenia Yap APRN

## 2023-10-05 NOTE — PROGRESS NOTES
Inpatient Rehabilitation Functional Measures Assessment    Functional Measures  DONNY Eating:  Eating Score = 5. Patient is supervision/set-up for eating,  requiring: Opening containers. No assistive devices were required.  DONNY Grooming:  DONNY Bathing:  DONNY Upper Body Dressing:  DONNY Lower Body Dressing:  DONNY Toileting:  Patient requires moderate assistance for adjusting clothing  before using a toilet, commode, bedpan, or urinal. Patient requires minimal  assistance for hygiene. Patient requires moderate assistance for adjusting  clothing after using a toilet, commode, bedpan, or urinal. Patient performs 0 -  24% of toileting tasks.  Toileting Score = 1, Total Assistance. Patient requires  the following assistive device(s): Grab bar.    DONNY Bladder Management  Level of Assistance:  Bladder Score = 5.  Patient is supervision/set-up for  bladder management, requiring: No assistive devices were required.  Frequency/Number of Accidents this Shift:  Bladder accidents this shift:  0 .  Patient has not had an accident this shift.    DONNY Bowel Management  Level of Assistance: Bowel Score = 7.  Patient is completely independent for  bowel management.  Patient did not have bowel movement.  No  medication/intervention was provided.  Frequency/Number of Accidents this Shift: Bowel accidents this shift: 0 .  Patient has not had an accident this shift.    DONNY Bed/Chair/Wheelchair Transfer:  Bed/chair/wheelchair Transfer Score = 3.  Patient performs 50-74% of effort and requires moderate assistance (some  lifting) for transferring to and from the bed/chair/wheelchair. No assistive  devices were required.  DONNY Toilet Transfer:  Toilet Transfer Score = 3.  Patient performs 50-74% of  effort and requires moderate assistance (some lifting) for transferring to and  from the toilet/commode. Patient requires the following assistive device(s):  Grab bars.  DONNY Tub/Shower Transfer:  Activity was not observed.    Previously Documented Mode of  Locomotion at Discharge:  DONNY Expected Mode of Locomotion at Discharge:  DONNY Walk/Wheelchair:  DONNY Stairs:    DONNY Comprehension:  DONNY Expression:  DONNY Social Interaction:  [facility] Problem Solving:  DONNY Memory:    Therapy Mode Minutes  Occupational Therapy:  Physical Therapy:  Speech Language Pathology:    Discharge Functional Goals:    Signed by: PARTH Penn     Opzelura Counseling:  I discussed with the patient the risks of Opzelura including but not limited to nasopharngitis, bronchitis, ear infection, eosinophila, hives, diarrhea, folliculitis, tonsillitis, and rhinorrhea.  Taken orally, this medication has been linked to serious infections; higher rate of mortality; malignancy and lymphoproliferative disorders; major adverse cardiovascular events; thrombosis; thrombocytopenia, anemia, and neutropenia; and lipid elevations.

## 2023-11-17 ENCOUNTER — OFFICE VISIT (OUTPATIENT)
Dept: GASTROENTEROLOGY | Facility: CLINIC | Age: 83
End: 2023-11-17
Payer: MEDICARE

## 2023-11-17 VITALS
HEIGHT: 63 IN | HEART RATE: 83 BPM | DIASTOLIC BLOOD PRESSURE: 79 MMHG | WEIGHT: 135 LBS | BODY MASS INDEX: 23.92 KG/M2 | SYSTOLIC BLOOD PRESSURE: 126 MMHG

## 2023-11-17 DIAGNOSIS — K21.9 GASTROESOPHAGEAL REFLUX DISEASE WITHOUT ESOPHAGITIS: ICD-10-CM

## 2023-11-17 DIAGNOSIS — K58.1 IRRITABLE BOWEL SYNDROME WITH CONSTIPATION: Primary | ICD-10-CM

## 2023-11-17 PROCEDURE — 1159F MED LIST DOCD IN RCRD: CPT | Performed by: NURSE PRACTITIONER

## 2023-11-17 PROCEDURE — 99213 OFFICE O/P EST LOW 20 MIN: CPT | Performed by: NURSE PRACTITIONER

## 2023-11-17 PROCEDURE — 3078F DIAST BP <80 MM HG: CPT | Performed by: NURSE PRACTITIONER

## 2023-11-17 PROCEDURE — 1160F RVW MEDS BY RX/DR IN RCRD: CPT | Performed by: NURSE PRACTITIONER

## 2023-11-17 PROCEDURE — 3074F SYST BP LT 130 MM HG: CPT | Performed by: NURSE PRACTITIONER

## 2023-11-17 RX ORDER — DEXLANSOPRAZOLE 60 MG/1
60 CAPSULE, DELAYED RELEASE ORAL
Qty: 30 CAPSULE | Refills: 11 | Status: SHIPPED | OUTPATIENT
Start: 2023-11-17

## 2023-11-17 RX ORDER — TENAPANOR HYDROCHLORIDE 53.2 MG/1
50 TABLET ORAL 2 TIMES DAILY
Qty: 60 TABLET | Refills: 11 | Status: SHIPPED | OUTPATIENT
Start: 2023-11-17

## 2023-11-17 NOTE — PROGRESS NOTES
DATE:  11/17/2023    DIAGNOSIS: IBS-C    CHIEF COMPLAINT:  Chief Complaint   Patient presents with    Irritable Bowel Syndrome     Interval History:  Ms. Dunham presents today for follow up. She has previously been following with Giuliana Allan PA-C for management of IBS-C. Please see previous notes for full details. In brief, she tells me this has been a chronic issue. She has previously tried over the counter stool softeners and laxatives without improvement. She has also tried Linzess and most recently Trulance. With Trulance, she reports having ~2 bowel movements per day with stool type 7 on BSS. She does not feel like she is evacuating her colon well and has associated abdominal bloating and gas. She says she often feels like she needs to have a bowel movement and cannot. She admits she drinks very little water. She denies having melena or BRBPR. She reports GERD is well controlled with Dexilant. She has no other complaints today.     PAST MEDICAL HISTORY:  Past Medical History:   Diagnosis Date    Anxiety     Arrhythmia     Atrial fibrillation     Coronary artery disease     Disease of thyroid gland     Dysfunctional gallbladder     Fracture of two ribs     GERD (gastroesophageal reflux disease)     Hypertension     Lumbar compression fracture 07/2022    Osteoporosis        PAST SURGICAL HISTORY:  Past Surgical History:   Procedure Laterality Date    ABDOMINAL SURGERY      CARDIAC ELECTROPHYSIOLOGY PROCEDURE N/A 9/23/2022    Procedure: Device Implant. Leadless PPM. Hold eliquis x1 day prior to procedure.;  Surgeon: Jefe Zhao MD;  Location:  [facility];  Service: Cardiology;  Laterality: N/A;    CARDIAC ELECTROPHYSIOLOGY PROCEDURE N/A 9/23/2022    Procedure: AV Node;  Surgeon: Jefe Zhao MD;  Location:  [facility];  Service: Cardiovascular;  Laterality: N/A;    CHOLECYSTECTOMY OPEN      HIP HEMIARTHROPLASTY Left 5/29/2018    Procedure: LEFT HIP BIPOLAR  HEMIARTHROPLASTY;  Surgeon: Trevon Arriaga MD;  Location: [facility];  Service: Orthopedics       SOCIAL HISTORY:  Social History     Socioeconomic History    Marital status:    Tobacco Use    Smoking status: Former     Packs/day: 0.25     Years: 15.00     Additional pack years: 0.00     Total pack years: 3.75     Types: Cigarettes     Quit date: 3/10/2022     Years since quittin.6    Smokeless tobacco: Current     Types: Snuff    Tobacco comments:     snuff for 60 years    Vaping Use    Vaping Use: Never used   Substance and Sexual Activity    Alcohol use: No    Drug use: No    Sexual activity: Defer       FAMILY HISTORY:  Family History   Problem Relation Age of Onset    Heart disease Father          MEDICATIONS:  The current medication list was reviewed in the EMR    Current Outpatient Medications:     albuterol (PROVENTIL HFA;VENTOLIN HFA) 108 (90 Base) MCG/ACT inhaler, Inhale 2 puffs Every 6 (Six) Hours As Needed for Wheezing., Disp: , Rfl:     alendronate (FOSAMAX) 70 MG tablet, Take 1 tablet by mouth Every 7 (Seven) Days. Prior to [facility], Patient was on: takes on , Disp: , Rfl:     apixaban (ELIQUIS) 5 MG tablet tablet, Take 1 tablet by mouth 2 (Two) Times a Day., Disp: 60 tablet, Rfl: 5    atorvastatin (LIPITOR) 10 MG tablet, Take 1 tablet by mouth Every Night., Disp: 30 tablet, Rfl: 0    dexlansoprazole (DEXILANT) 60 MG capsule, Take 1 capsule by mouth Every Morning Before Breakfast., Disp: 30 capsule, Rfl: 11    diclofenac (VOLTAREN) 1 % gel gel, APPLY 2 GRAMS TO AFFECTED AREA FOUR TIMES A DAY FOR PAIN, Disp: , Rfl: 3    fexofenadine (ALLEGRA) 180 MG tablet, Take 1 tablet by mouth Daily., Disp: , Rfl:     furosemide (LASIX) 20 MG tablet, As needed for 3 pound weight gain in 24 hours., Disp: 20 tablet, Rfl: 3    gabapentin (NEURONTIN) 400 MG capsule, Take 1 capsule by mouth 2 (Two) Times a Day., Disp: 6 capsule, Rfl: 0    levocetirizine (XYZAL) 5 MG tablet, TAKE ONE TABLET BY  MOUTH EVERY DAY IN THE EVENING FOR ALLERGIES, Disp: , Rfl:     levothyroxine (SYNTHROID, LEVOTHROID) 50 MCG tablet, Take 1 tablet by mouth Daily., Disp: , Rfl:     LORazepam (ATIVAN) 1 MG tablet, Take 1 tablet by mouth Every 8 (Eight) Hours As Needed for Anxiety, Disp: 9 tablet, Rfl: 0    metoprolol tartrate (LOPRESSOR) 50 MG tablet, Take 1 tablet by mouth 2 (Two) Times a Day., Disp: 60 tablet, Rfl: 6    nitroglycerin (NITROSTAT) 0.4 MG SL tablet, 1 under the tongue as needed for angina, may repeat q5mins for up three doses, Disp: 30 tablet, Rfl: 11    Plecanatide (Trulance) 3 MG tablet, Take 1 tablet by mouth Daily., Disp: 90 tablet, Rfl: 3    rOPINIRole (REQUIP) 1 MG tablet, Take 1 tablet by mouth At Night As Needed (restless leg). Take 1 hour before bedtime., Disp: , Rfl:     sennosides-docusate sodium (SENOKOT-S) 8.6-50 MG tablet, Take 2 tablets by mouth 2 (Two) Times a Day., Disp: 120 tablet, Rfl: 0    spironolactone (ALDACTONE) 25 MG tablet, Take 1 tablet by mouth Daily., Disp: 90 tablet, Rfl: 3    traMADol (ULTRAM) 50 MG tablet, Take 1 tablet by mouth Every 8 (Eight) Hours As Needed for Moderate Pain., Disp: , Rfl:     ALLERGIES:  No Known Allergies      REVIEW OF SYSTEMS:    A comprehensive 14 point review of systems was performed.  Significant findings as mentioned above.  All other systems reviewed and are negative.        Physical Exam   Vital Signs:   Vitals:    11/17/23 1051   BP: 126/79   Pulse: 83   General: Well developed, well nourished, alert and oriented x 3, in no acute distress.   Head: ATNC   Eyes: PERRL, No evidence of conjunctivitis.   Nose: No nasal discharge.   Mouth: Oral mucosal membranes moist. No oral ulceration or hemorrhages.   Neck: Neck supple. No thyromegaly. No JVD.   Lungs: Clear in all fields to A&P without rales, rhonchi or wheezing.   Heart: Regular rate and rhythm. No murmurs, rubs, or gallops.   Abdomen: Soft. Bowel sounds are normoactive. Nontender with palpation.    Extremities: No cyanosis or edema.   Neurologic: MS as above. Grossly non focal exam.    IMAGING:  No Images in the past 120 days found..       RECENT LABS:  Lab Results   Component Value Date    WBC 6.98 11/16/2022    HGB 13.8 11/16/2022    HCT 42.2 11/16/2022    MCV 96.1 11/16/2022    RDW 12.8 11/16/2022     11/16/2022    NEUTRORELPCT 57.0 11/16/2022    LYMPHORELPCT 23.4 11/16/2022    MONORELPCT 12.6 (H) 11/16/2022    EOSRELPCT 5.9 11/16/2022    BASORELPCT 1.0 11/16/2022    NEUTROABS 3.98 11/16/2022    LYMPHSABS 1.63 11/16/2022       Lab Results   Component Value Date     11/16/2022    K 3.9 11/16/2022    CO2 25.7 11/16/2022     11/16/2022    BUN 17 11/16/2022    CREATININE 1.35 (H) 11/16/2022    EGFRIFNONA 49 (L) 12/16/2020    GLUCOSE 99 11/16/2022    CALCIUM 9.2 11/16/2022    ALKPHOS 60 11/16/2022    AST 17 11/16/2022    ALT 6 11/16/2022    BILITOT 0.4 11/16/2022    ALBUMIN 3.94 11/16/2022    PROTEINTOT 8.2 11/16/2022    MG 1.5 (L) 12/16/2020    PHOS 2.4 (L) 09/10/2018       ASSESSMENT & PLAN:  Anette Dunham is a very pleasant 83 y.o. female with    1.  IBS-C:  -Constipation has been a chronic issue. She has previously tried over the counter stool softeners and laxatives without improvement. She has also tried Linzess and most recently Trulance without improvement. Therefore, will discontinue Trulance and start her on Ibsrela. Rx and samples provided today. She was also advised to push oral hydration.  -She will RTC in ~8-12 weeks for symptom check.     2. GERD:  -Well controlled with Dexilant, will continue. Refills provided.       Electronically Signed by: JUNIE Caballero , November 17, 2023 10:59 EST       CC:   No ref. provider found  Maria Eugenia Yap APRN

## 2023-12-08 ENCOUNTER — TELEPHONE (OUTPATIENT)
Dept: GASTROENTEROLOGY | Facility: CLINIC | Age: 83
End: 2023-12-08

## 2023-12-08 NOTE — TELEPHONE ENCOUNTER
Provider: SALENA MELCHOR    Caller: PARI ACEVEDO    Relationship to Patient: DAUGHTER    Pharmacy: [place], [place] - [address]  - [phone] [facility] [phone] FX [[id number]]     Phone Number: [phone]    Reason for Call: PATIENT'S DAUGHTER, PARI ACEVEDO CLLED IN AND STATED THAT THE MEDICATION IBSRELA THAT THE PATIENT WAS RECENTLY SWITCHED TO IS CAUSING HER TO HAVE GAS AND STOMACH ACHE. PATIENT WOULD LIKE TO GO BACK TO THE LINZESS MEDICATION. PLEASE CALL TO CONSULT. OKAY TO CALL ANYTIME, OKAY TO LEAVE .

## 2023-12-12 NOTE — TELEPHONE ENCOUNTER
Patient stated that she would like to take Linzess and she does need a new RX. She was also wondering if it was ok to take a stool softener with it.

## 2023-12-13 DIAGNOSIS — K58.1 IRRITABLE BOWEL SYNDROME WITH CONSTIPATION: Primary | ICD-10-CM

## 2023-12-29 ENCOUNTER — OFFICE VISIT (OUTPATIENT)
Dept: CARDIOLOGY | Facility: CLINIC | Age: 83
End: 2023-12-29
Payer: MEDICARE

## 2023-12-29 VITALS
HEIGHT: 63 IN | DIASTOLIC BLOOD PRESSURE: 70 MMHG | HEART RATE: 69 BPM | BODY MASS INDEX: 23.57 KG/M2 | SYSTOLIC BLOOD PRESSURE: 108 MMHG | WEIGHT: 133 LBS | OXYGEN SATURATION: 96 %

## 2023-12-29 DIAGNOSIS — I48.21 PERMANENT ATRIAL FIBRILLATION: Primary | Chronic | ICD-10-CM

## 2023-12-29 DIAGNOSIS — I10 ESSENTIAL HYPERTENSION: Chronic | ICD-10-CM

## 2023-12-29 PROCEDURE — 1159F MED LIST DOCD IN RCRD: CPT | Performed by: NURSE PRACTITIONER

## 2023-12-29 PROCEDURE — 1160F RVW MEDS BY RX/DR IN RCRD: CPT | Performed by: NURSE PRACTITIONER

## 2023-12-29 PROCEDURE — 93000 ELECTROCARDIOGRAM COMPLETE: CPT | Performed by: NURSE PRACTITIONER

## 2023-12-29 PROCEDURE — 99214 OFFICE O/P EST MOD 30 MIN: CPT | Performed by: NURSE PRACTITIONER

## 2023-12-29 PROCEDURE — 3078F DIAST BP <80 MM HG: CPT | Performed by: NURSE PRACTITIONER

## 2023-12-29 PROCEDURE — 3074F SYST BP LT 130 MM HG: CPT | Performed by: NURSE PRACTITIONER

## 2023-12-29 RX ORDER — METOPROLOL TARTRATE 50 MG/1
50 TABLET, FILM COATED ORAL 2 TIMES DAILY
Qty: 180 TABLET | Refills: 2 | Status: SHIPPED | OUTPATIENT
Start: 2023-12-29 | End: 2023-12-29 | Stop reason: DRUGHIGH

## 2023-12-29 RX ORDER — SPIRONOLACTONE 25 MG/1
25 TABLET ORAL DAILY
Qty: 90 TABLET | Refills: 2 | Status: SHIPPED | OUTPATIENT
Start: 2023-12-29

## 2023-12-29 RX ORDER — ATORVASTATIN CALCIUM 10 MG/1
10 TABLET, FILM COATED ORAL NIGHTLY
Qty: 90 TABLET | Refills: 2 | Status: SHIPPED | OUTPATIENT
Start: 2023-12-29

## 2023-12-29 RX ORDER — FUROSEMIDE 20 MG/1
TABLET ORAL
Qty: 60 TABLET | Refills: 3 | Status: SHIPPED | OUTPATIENT
Start: 2023-12-29

## 2023-12-29 RX ORDER — DUPILUMAB 100 MG/.67ML
100 INJECTION, SOLUTION SUBCUTANEOUS
COMMUNITY

## 2023-12-29 NOTE — PROGRESS NOTES
"Chief Complaint  Atrial Fibrillation (Weakness, short of breath , some swelling to legs )    Subjective          Anette Dunham presents to [facility] for follow up.    History of Present Illness    O was last seen in clinic on 6/29/2023.  From a cardiac standpoint she was stable at that time and no changes were made to her medications.    At today's visit letter reports that last week she had a day where she fell 6 times.  She denies any loss of consciousness.  She does not recall being dizzy previous to the fall.  She denies having \"tripped\" over something.  She does not know what her blood pressure was during these episodes.    Objective     Vital Signs:   /70 (BP Location: Left arm, Patient Position: Sitting, Cuff Size: Adult)   Pulse 69   Ht 160 cm (63\")   Wt 60.3 kg (133 lb)   SpO2 96%   BMI 23.56 kg/m²       Physical Exam  Vitals reviewed.   Constitutional:       Appearance: Normal appearance. She is well-developed.   Cardiovascular:      Rate and Rhythm: Normal rate and regular rhythm.      Heart sounds: No murmur heard.     No friction rub. No gallop.   Pulmonary:      Effort: Pulmonary effort is normal. No respiratory distress.      Breath sounds: Normal breath sounds. No wheezing or rales.   Skin:     General: Skin is warm and dry.   Neurological:      Mental Status: She is alert and oriented to person, place, and time.   Psychiatric:         Mood and Affect: Mood normal.         Behavior: Behavior normal.          Result Review :            ECG 12 Lead    Date/Time: 12/29/2023 11:16 AM  Performed by: Celeste Nielsen APRN    Authorized by: Celeste Nielsen APRN  Comparison: compared with previous ECG from 12/15/2022  Similar to previous ECG  Rhythm: paced  Ectopy: atrial premature contractions  Rate: normal  BPM: 76    Clinical impression: abnormal EKG           Most recent Stress Test  Results for orders placed during the hospital encounter of " 07/26/21    Stress Test With Myocardial Perfusion (1 Day)    Interpretation Summary  · Myocardial perfusion imaging indicates a normal myocardial perfusion study with no evidence of ischemia.  · Diaphragmatic attenuation artifact is present.  · Left ventricular ejection fraction is normal. (Calculated EF = 57%).  · Findings consistent with a normal ECG stress test.  · Impressions are consistent with a low risk study.       Most recent Cardiac Cath      Most recent Echocardiogram  Results for orders placed during the hospital encounter of 01/23/23    Adult Transthoracic Echo Complete W/ Cont if Necessary Per Protocol    Interpretation Summary    Left ventricular systolic function is normal. Left ventricular ejection fraction appears to be 51 - 55%.    Estimated right ventricular systolic pressure from tricuspid regurgitation is mildly elevated (35-45 mmHg).    Mild pulmonary hypertension is present.    There is no evidence of pericardial effusion        Current Outpatient Medications   Medication Sig Dispense Refill    albuterol (PROVENTIL HFA;VENTOLIN HFA) 108 (90 Base) MCG/ACT inhaler Inhale 2 puffs Every 6 (Six) Hours As Needed for Wheezing.      alendronate (FOSAMAX) 70 MG tablet Take 1 tablet by mouth Every 7 (Seven) Days. Prior to [facility] Admission, Patient was on: takes on wednesdays      apixaban (ELIQUIS) 2.5 MG tablet tablet Take 1 tablet by mouth 2 (Two) Times a Day. 180 tablet 2    atorvastatin (LIPITOR) 10 MG tablet Take 1 tablet by mouth Every Night. 90 tablet 2    dexlansoprazole (DEXILANT) 60 MG capsule Take 1 capsule by mouth Every Morning Before Breakfast. 30 capsule 11    diclofenac (VOLTAREN) 1 % gel gel APPLY 2 GRAMS TO AFFECTED AREA FOUR TIMES A DAY FOR PAIN  3    Dupilumab (Dupixent) 100 MG/0.67ML solution prefilled syringe Inject 100 mg under the skin into the appropriate area as directed Every 14 (Fourteen) Days.      fexofenadine (ALLEGRA) 180 MG tablet Take 1 tablet by mouth Daily.       furosemide (LASIX) 20 MG tablet As needed for 3 pound weight gain in 24 hours. 60 tablet 3    gabapentin (NEURONTIN) 400 MG capsule Take 1 capsule by mouth 2 (Two) Times a Day. 6 capsule 0    levocetirizine (XYZAL) 5 MG tablet TAKE ONE TABLET BY MOUTH EVERY DAY IN THE EVENING FOR ALLERGIES      levothyroxine (SYNTHROID, LEVOTHROID) 50 MCG tablet Take 1 tablet by mouth Daily.      linaclotide (Linzess) 290 MCG capsule capsule Take 1 capsule by mouth Daily. 30 minutes before meals on an empty stomach. 30 capsule 5    LORazepam (ATIVAN) 1 MG tablet Take 1 tablet by mouth Every 8 (Eight) Hours As Needed for Anxiety 9 tablet 0    nitroglycerin (NITROSTAT) 0.4 MG SL tablet 1 under the tongue as needed for angina, may repeat q5mins for up three doses 30 tablet 11    rOPINIRole (REQUIP) 1 MG tablet Take 1 tablet by mouth At Night As Needed (restless leg). Take 1 hour before bedtime.      spironolactone (ALDACTONE) 25 MG tablet Take 1 tablet by mouth Daily. 90 tablet 2    traMADol (ULTRAM) 50 MG tablet Take 1 tablet by mouth Every 8 (Eight) Hours As Needed for Moderate Pain.      apixaban (ELIQUIS) 2.5 MG tablet tablet Take 1 tablet by mouth 2 (Two) Times a Day. 56 tablet 0    metoprolol tartrate (LOPRESSOR) 25 MG tablet Take 1 tablet by mouth 2 (Two) Times a Day. 180 tablet 3    sennosides-docusate sodium (SENOKOT-S) 8.6-50 MG tablet Take 2 tablets by mouth 2 (Two) Times a Day. (Patient not taking: Reported on 12/29/2023) 120 tablet 0     No current facility-administered medications for this visit.            Assessment and Plan    Problem List Items Addressed This Visit          Cardiac and Vasculature    Essential hypertension (Chronic)    Relevant Medications    furosemide (LASIX) 20 MG tablet    spironolactone (ALDACTONE) 25 MG tablet    metoprolol tartrate (LOPRESSOR) 25 MG tablet    Permanent atrial fibrillation - Primary (Chronic)    Overview     CHADsvasc= 4 on Eliquis 2.5 mg bid  Echo, 8/17/16, EF 50%, LA mild to  mod dilated.  Echo, 9/6/18, EF 51-55%. Mild MR. Mild MN. LA mildly dilated. Mild TR.  14 day Holter, 8/21/19-9/3/19, Avg Hr 88, min Hr 70, max hr 150 bpm. 88% AF burden. Cardizem increased.    Echo, 7/22/20, EF 60%. LA mildly dilated. LV with moderate concentric hypertrophy. Mod MR.   Echo, 7/26/21, EF 51-55%, Mild LVH, LA mildly increased, RA mildly dilated. Trace TR. Mild pulmonary hypertension.   Rate controlled on metoprolol 150 bid  14 day [employer], 4/20/22-5/3/22, Avg hr 78, min hr 52, Max hr 152 bpm. 88% AF burden. 9 sinus pauses >3 seconds.   [employer]onic VVIR leadless pacemaker and AV node ablation         Relevant Medications    metoprolol tartrate (LOPRESSOR) 25 MG tablet    Other Relevant Orders    ECG 12 Lead           Follow Up     Medications were reviewed with the patient.    Continue Eliquis for stroke prophylaxis in the presence of atrial fibrillation.  I did decrease her dose today down to 2.5 mg twice daily.  She is greater than 80 and her current weight is 60.3 kg.    She does have a pacemaker which is followed by Dr. Martinez.  Her next appointment is in February.    Her most recent falls may be secondary to low blood pressure.  Today her blood pressure is on the low side.  I have decreased her metoprolol to tartrate to 25 mg twice daily.    Return in about 3 months (around 3/29/2024).    Patient was given instructions and counseling regarding her condition or for health maintenance advice. Please see specific information pulled into the AVS if appropriate.

## 2024-01-24 ENCOUNTER — TELEPHONE (OUTPATIENT)
Dept: CARDIOLOGY | Facility: CLINIC | Age: 84
End: 2024-01-24
Payer: MEDICARE

## 2024-01-24 NOTE — TELEPHONE ENCOUNTER
Daughter called to say  carelink monitor is not working. It is giving a code stating the reader is not charged even though it is plugged up all the time. I told her to give it 15 minutes to charge and try again.It it does not work she will call [employer].

## 2024-02-02 ENCOUNTER — OFFICE VISIT (OUTPATIENT)
Dept: CARDIOLOGY | Facility: CLINIC | Age: 84
End: 2024-02-02
Payer: MEDICARE

## 2024-02-02 VITALS
OXYGEN SATURATION: 98 % | HEIGHT: 63 IN | SYSTOLIC BLOOD PRESSURE: 124 MMHG | DIASTOLIC BLOOD PRESSURE: 82 MMHG | BODY MASS INDEX: 23.57 KG/M2 | HEART RATE: 71 BPM | WEIGHT: 133 LBS

## 2024-02-02 DIAGNOSIS — I10 ESSENTIAL HYPERTENSION: Chronic | ICD-10-CM

## 2024-02-02 DIAGNOSIS — Z95.0 PRESENCE OF CARDIAC PACEMAKER: ICD-10-CM

## 2024-02-02 DIAGNOSIS — I48.21 PERMANENT ATRIAL FIBRILLATION: Chronic | ICD-10-CM

## 2024-02-02 DIAGNOSIS — I44.2 COMPLETE HEART BLOCK: Primary | ICD-10-CM

## 2024-02-02 RX ORDER — FEXOFENADINE HCL 180 MG/1
180 TABLET ORAL DAILY
COMMUNITY

## 2024-02-02 NOTE — PROGRESS NOTES
Cardiac Electrophysiology Outpatient Follow Up Note            [facility]    Follow Up Office Visit      Anette Dunham  [id number]  02/02/2024  [unfilled]  [unfilled]    Primary Care Physician: Maria Eugenia Yap APRN    Referred By: No ref. provider found    Subjective     Chief Complaint:   Diagnoses and all orders for this visit:    1. Complete heart block (Primary)    2. Permanent atrial fibrillation    3. Presence of cardiac pacemaker    4. Essential hypertension      Chief Complaint   Patient presents with    Permanent atrial fibrillation       History of Present Illness:   Anette Dunham is a 83 y.o. female who presents to my electrophysiology clinic for follow up of falling.  Not syncope.  Mechanical fall.  Losing some weight.  Appetite is poor..      Past Medical History:   Past Medical History:   Diagnosis Date    Anxiety     Arrhythmia     Atrial fibrillation     Coronary artery disease     Disease of thyroid gland     Dysfunctional gallbladder     Fracture of two ribs     GERD (gastroesophageal reflux disease)     Hypertension     Lumbar compression fracture 07/2022    Osteoporosis        Past Surgical History:   Past Surgical History:   Procedure Laterality Date    ABDOMINAL SURGERY      CARDIAC ELECTROPHYSIOLOGY PROCEDURE N/A 9/23/2022    Procedure: Device Implant. Leadless PPM. Hold eliquis x1 day prior to procedure.;  Surgeon: Jefe Zhao MD;  Location:  [facility];  Service: Cardiology;  Laterality: N/A;    CARDIAC ELECTROPHYSIOLOGY PROCEDURE N/A 9/23/2022    Procedure: AV Node;  Surgeon: Jefe Zhao MD;  Location:  [facility];  Service: Cardiovascular;  Laterality: N/A;    CHOLECYSTECTOMY OPEN      HIP HEMIARTHROPLASTY Left 5/29/2018    Procedure: LEFT HIP BIPOLAR HEMIARTHROPLASTY;  Surgeon: Trevon Arriaga MD;  Location: [facility] OR;  Service: Orthopedics       Family History:   Family History    Problem Relation Age of Onset    Heart disease Father        Social History:   Social History     Socioeconomic History    Marital status:    Tobacco Use    Smoking status: Every Day     Years: 15     Types: Cigarettes     Last attempt to quit: 3/10/2022     Years since quittin.9     Passive exposure: Past    Smokeless tobacco: Current     Types: Snuff    Tobacco comments:     snuff for 60 years    Vaping Use    Vaping Use: Never used   Substance and Sexual Activity    Alcohol use: No    Drug use: No    Sexual activity: Defer       Medications:     Current Outpatient Medications:     albuterol (PROVENTIL HFA;VENTOLIN HFA) 108 (90 Base) MCG/ACT inhaler, Inhale 2 puffs Every 6 (Six) Hours As Needed for Wheezing., Disp: , Rfl:     alendronate (FOSAMAX) 70 MG tablet, Take 1 tablet by mouth Every 7 (Seven) Days. Prior to [facility] [place] Admission, Patient was on: takes on , Disp: , Rfl:     apixaban (ELIQUIS) 2.5 MG tablet tablet, Take 1 tablet by mouth 2 (Two) Times a Day., Disp: 180 tablet, Rfl: 2    atorvastatin (LIPITOR) 10 MG tablet, Take 1 tablet by mouth Every Night., Disp: 90 tablet, Rfl: 2    dexlansoprazole (DEXILANT) 60 MG capsule, Take 1 capsule by mouth Every Morning Before Breakfast., Disp: 30 capsule, Rfl: 11    diclofenac (VOLTAREN) 1 % gel gel, APPLY 2 GRAMS TO AFFECTED AREA FOUR TIMES A DAY FOR PAIN, Disp: , Rfl: 3    Dupilumab (Dupixent) 100 MG/0.67ML solution prefilled syringe, Inject 100 mg under the skin into the appropriate area as directed Every 14 (Fourteen) Days., Disp: , Rfl:     fexofenadine (ALLEGRA) 180 MG tablet, Take 1 tablet by mouth Daily., Disp: , Rfl:     gabapentin (NEURONTIN) 400 MG capsule, Take 1 capsule by mouth 2 (Two) Times a Day., Disp: 6 capsule, Rfl: 0    levothyroxine (SYNTHROID, LEVOTHROID) 50 MCG tablet, Take 1 tablet by mouth Daily., Disp: , Rfl:     LORazepam (ATIVAN) 1 MG tablet, Take 1 tablet by mouth Every 8 (Eight) Hours As Needed for Anxiety, Disp: 9  "tablet, Rfl: 0    metoprolol tartrate (LOPRESSOR) 25 MG tablet, Take 1 tablet by mouth 2 (Two) Times a Day., Disp: 180 tablet, Rfl: 3    nitroglycerin (NITROSTAT) 0.4 MG SL tablet, 1 under the tongue as needed for angina, may repeat q5mins for up three doses, Disp: 30 tablet, Rfl: 11    rOPINIRole (REQUIP) 1 MG tablet, Take 1 tablet by mouth At Night As Needed (restless leg). Take 1 hour before bedtime., Disp: , Rfl:     traMADol (ULTRAM) 50 MG tablet, Take 1 tablet by mouth Every 8 (Eight) Hours As Needed for Moderate Pain., Disp: , Rfl:     Allergies:   No Known Allergies    Objective   Vital Signs:   Vitals:    02/02/24 1409   BP: 124/82   BP Location: Left arm   Patient Position: Sitting   Pulse: 71   SpO2: 98%   Weight: 60.3 kg (133 lb)   Height: 160 cm (63\")       PHYSICAL EXAM  General appearance: Awake, alert, cooperative  Head: Normocephalic, without obvious abnormality, atraumatic  Eyes: Conjunctivae/corneas clear, EOMs intact  Neck: no adenopathy, no carotid bruit, no JVD, and thyroid: not enlarged  Lungs: clear to auscultation bilaterally and no rhonchi or crackles\", ' symmetric  Heart: regular rate and rhythm, S1, S2 normal, no murmur, click, rub or gallop  Abdomen: Soft, non-tender, bowel sounds normal,  no organomegaly  Extremities: extremities normal, atraumatic, no cyanosis or edema  Skin: Skin color, turgor normal, no rashes or lesions  Neurologic: Grossly normal     Lab Results   Component Value Date    GLUCOSE 99 11/16/2022    CALCIUM 9.2 11/16/2022     11/16/2022    K 3.9 11/16/2022    CO2 25.7 11/16/2022     11/16/2022    BUN 17 11/16/2022    CREATININE 1.35 (H) 11/16/2022    EGFRIFNONA 49 (L) 12/16/2020    BCR 12.6 11/16/2022    ANIONGAP 11.3 11/16/2022     Lab Results   Component Value Date    WBC 6.98 11/16/2022    HGB 13.8 11/16/2022    HCT 42.2 11/16/2022    MCV 96.1 11/16/2022     11/16/2022     Lab Results   Component Value Date    INR 1.1 08/02/2022    INR 1.24 (H) " 05/31/2019    INR 1.20 (H) 05/27/2018    PROTIME 16.2 (H) 05/31/2019    PROTIME 15.5 (H) 05/27/2018     Lab Results   Component Value Date    TSH 2.850 07/12/2022    E2YLSKO 8.60 05/31/2017       Cardiac Testing:     I personally viewed and interpreted the patient's EKG/Telemetry/lab data    Procedures    Tobacco Cessation: N/A  Obstructive Sleep Apnea Screening: Completed    Advance Care Planning   ACP discussion was declined by the patient. Patient does not have an advance directive, declines further assistance.       Assessment & Plan    Diagnoses and all orders for this visit:    1. Complete heart block (Primary)    2. Permanent atrial fibrillation    3. Presence of cardiac pacemaker    4. Essential hypertension         Diagnosis Plan   1. Complete heart block  [id number] post AV node ablation remotely.  Definitive rate control for atrial fibrillation.      2. Permanent atrial fibrillation  Anticoagulated for the primary prevention of stroke.  Apixaban 2.5 mg twice daily based upon her weight of 60 kg sexually less than that now she is lost 5 pounds since we last measured her so she is now less than 60 kg and her age is greater than 79.    Discussed with her the option of left atrial appendage closure with a watchman.  Probably the right answer is continued anticoagulation at the current dose however.      3. Presence of cardiac pacemaker  Medtronic Micra VR  Appropriate pacing sensing and impedance values.  Pacemaker dependent.      This patient's Cardiac Implanted Electronic Device was manually interrogated and reprogrammed during the patient encounter today.  Iterative programming changes were manually made to determine the sensing threshold, pacing threshold, lead impedance as well as underlying cardiac rhythm.  These programming changes were not limited to but included some or all of the following when appropriate: pacing mode, programmed AV delays, blanking periods, and refractory periods.  Data obtained  as a result of these manual programing changes informed the patient's CIED permanent programming.        4. Essential hypertension  Blood pressure well-controlled.        Body mass index is 23.56 kg/m².    I spent 37 minutes in consultation with this patient which included more than 65% of this time in direct face-to-face counseling, physical examination and discussion of my assessment and findings and this shared decision making with the patient.  The remainder of the time not spent face-to-face was performing one, some or all of the following actions: preparing to see the patient (e.g. reviewing tests, prior clinicians' notes, etc), ordering medications, tests or procedures, coordination of care, discussion of the plan with other healthcare providers, documenting clinical information in epic as well as independently interpreting results and communication of these results to the patient family and/or caregiver(s).  Please note that this explicitly excludes time spent on other separate billable services such as performing procedures or test interpretation, when applicable.      Follow Up:       Thank you for allowing me to participate in the care of your patient. Please to not hesitate to contact me with additional questions or concerns.      Chance Martinez, DO, FACC, RS  Cardiac Electrophysiologist  [facility] / [facility]

## 2024-02-19 ENCOUNTER — APPOINTMENT (OUTPATIENT)
Dept: GENERAL RADIOLOGY | Facility: HOSPITAL | Age: 84
End: 2024-02-19
Payer: MEDICARE

## 2024-02-19 ENCOUNTER — HOSPITAL ENCOUNTER (EMERGENCY)
Facility: HOSPITAL | Age: 84
Discharge: HOME | End: 2024-02-19
Attending: STUDENT IN AN ORGANIZED HEALTH CARE EDUCATION/TRAINING PROGRAM | Admitting: STUDENT IN AN ORGANIZED HEALTH CARE EDUCATION/TRAINING PROGRAM
Payer: MEDICARE

## 2024-02-19 VITALS
HEIGHT: 63 IN | SYSTOLIC BLOOD PRESSURE: 119 MMHG | RESPIRATION RATE: 18 BRPM | TEMPERATURE: 98 F | DIASTOLIC BLOOD PRESSURE: 63 MMHG | OXYGEN SATURATION: 98 % | BODY MASS INDEX: 23.57 KG/M2 | HEART RATE: 70 BPM | WEIGHT: 133 LBS

## 2024-02-19 DIAGNOSIS — S92.352A CLOSED DISPLACED FRACTURE OF FIFTH METATARSAL BONE OF LEFT FOOT, INITIAL ENCOUNTER: ICD-10-CM

## 2024-02-19 DIAGNOSIS — S82.832A CLOSED FRACTURE OF DISTAL END OF LEFT FIBULA, UNSPECIFIED FRACTURE MORPHOLOGY, INITIAL ENCOUNTER: Primary | ICD-10-CM

## 2024-02-19 PROCEDURE — 73630 X-RAY EXAM OF FOOT: CPT

## 2024-02-19 PROCEDURE — 73610 X-RAY EXAM OF ANKLE: CPT

## 2024-02-19 PROCEDURE — 73610 X-RAY EXAM OF ANKLE: CPT | Performed by: RADIOLOGY

## 2024-02-19 PROCEDURE — 99283 EMERGENCY DEPT VISIT LOW MDM: CPT

## 2024-02-19 PROCEDURE — 73630 X-RAY EXAM OF FOOT: CPT | Performed by: RADIOLOGY

## 2024-02-19 RX ORDER — HYDROCODONE BITARTRATE AND ACETAMINOPHEN 5; 325 MG/1; MG/1
1 TABLET ORAL EVERY 6 HOURS PRN
Qty: 12 TABLET | Refills: 0 | Status: SHIPPED | OUTPATIENT
Start: 2024-02-19 | End: 2024-02-22

## 2024-02-19 RX ORDER — HYDROCODONE BITARTRATE AND ACETAMINOPHEN 5; 325 MG/1; MG/1
1 TABLET ORAL ONCE
Status: COMPLETED | OUTPATIENT
Start: 2024-02-19 | End: 2024-02-19

## 2024-02-19 RX ORDER — NALOXONE HYDROCHLORIDE 4 MG/.1ML
SPRAY NASAL
Qty: 2 EACH | Refills: 0 | Status: SHIPPED | OUTPATIENT
Start: 2024-02-19

## 2024-02-19 RX ADMIN — HYDROCODONE BITARTRATE AND ACETAMINOPHEN 1 TABLET: 5; 325 TABLET ORAL at 11:59

## 2024-02-19 NOTE — DISCHARGE INSTRUCTIONS
Please wear your splint and don't put weight on your left leg. Please follow up with orthopedics or return to ER if symptoms worsen.

## 2024-02-19 NOTE — ED PROVIDER NOTES
Subjective   History of Present Illness  This is an 83 year old female patient who presents to the ER with chief complaint of left ankle injury. PMH significant for HLD, HTN, GERD, hypothyroidism, A fibb on eliquis. 4 days ago, the patient lost her balance and twisted her left ankle. Since that time, she has had worsening pain, swelling and difficulty ambulating. Denies associated numbness/tingling.       Review of Systems   Constitutional: Negative.  Negative for fever.   HENT: Negative.     Respiratory: Negative.     Cardiovascular: Negative.  Negative for chest pain.   Gastrointestinal: Negative.  Negative for abdominal pain.   Endocrine: Negative.    Genitourinary: Negative.  Negative for dysuria.   Musculoskeletal:  Negative for arthralgias, back pain, gait problem, joint swelling, myalgias, neck pain and neck stiffness.        Left ankle and foot pain    Skin: Negative.    Neurological: Negative.    Psychiatric/Behavioral: Negative.     All other systems reviewed and are negative.      Past Medical History:   Diagnosis Date    Anxiety     Arrhythmia     Atrial fibrillation     Coronary artery disease     Disease of thyroid gland     Dysfunctional gallbladder     Fracture of two ribs     GERD (gastroesophageal reflux disease)     Hypertension     Lumbar compression fracture 07/2022    Osteoporosis        No Known Allergies    Past Surgical History:   Procedure Laterality Date    ABDOMINAL SURGERY      CARDIAC ELECTROPHYSIOLOGY PROCEDURE N/A 9/23/2022    Procedure: Device Implant. Leadless PPM. Hold eliquis x1 day prior to procedure.;  Surgeon: Jefe Zhao MD;  Location: [facility] INVASIVE LOCATION;  Service: Cardiology;  Laterality: N/A;    CARDIAC ELECTROPHYSIOLOGY PROCEDURE N/A 9/23/2022    Procedure: AV Node;  Surgeon: Jefe Zhao MD;  Location: [facility] EP INVASIVE LOCATION;  Service: Cardiovascular;  Laterality: N/A;    CHOLECYSTECTOMY OPEN      HIP HEMIARTHROPLASTY Left 5/29/2018    Procedure:  LEFT HIP BIPOLAR HEMIARTHROPLASTY;  Surgeon: Trevon Arriaga MD;  Location: [facility];  Service: Orthopedics       Family History   Problem Relation Age of Onset    Heart disease Father        Social History     Socioeconomic History    Marital status:    Tobacco Use    Smoking status: Every Day     Years: 15     Types: Cigarettes     Last attempt to quit: 3/10/2022     Years since quittin.9     Passive exposure: Past    Smokeless tobacco: Current     Types: Snuff    Tobacco comments:     snuff for 60 years    Vaping Use    Vaping Use: Never used   Substance and Sexual Activity    Alcohol use: No    Drug use: No    Sexual activity: Defer           Objective   Physical Exam  Vitals and nursing note reviewed.   Constitutional:       General: She is not in acute distress.     Appearance: She is well-developed. She is not diaphoretic.   HENT:      Head: Normocephalic and atraumatic.      Right Ear: External ear normal.      Left Ear: External ear normal.      Nose: Nose normal.   Eyes:      Conjunctiva/sclera: Conjunctivae normal.      Pupils: Pupils are equal, round, and reactive to light.   Neck:      Vascular: No JVD.      Trachea: No tracheal deviation.   Cardiovascular:      Rate and Rhythm: Normal rate and regular rhythm.      Heart sounds: Normal heart sounds. No murmur heard.  Pulmonary:      Effort: Pulmonary effort is normal. No respiratory distress.      Breath sounds: Normal breath sounds. No wheezing.   Abdominal:      General: Bowel sounds are normal.      Palpations: Abdomen is soft.      Tenderness: There is no abdominal tenderness.   Musculoskeletal:         General: Swelling, tenderness and signs of injury present. No deformity.      Cervical back: Normal range of motion and neck supple.      Comments: Left ankle skin intact with no abrasion; bruising, edema, tenderness to palpation noted; limited ROM; neurovascular status and sensation LLE intact.    Skin:     General: Skin is warm and dry.       Coloration: Skin is not pale.      Findings: No erythema or rash.   Neurological:      Mental Status: She is alert and oriented to person, place, and time.      Cranial Nerves: No cranial nerve deficit.   Psychiatric:         Behavior: Behavior normal.         Thought Content: Thought content normal.         Splint - Cast - Strapping    Date/Time: 2/19/2024 11:37 AM    Performed by: Nataliia Jefferson PA  Authorized by: Finn Moreau DO    Consent:     Consent obtained:  Verbal    Consent given by:  Patient    Risks, benefits, and alternatives were discussed: yes      Risks discussed:  Discoloration, numbness, pain and swelling    Alternatives discussed:  Referral, observation, alternative treatment, delayed treatment and no treatment  Universal protocol:     Imaging studies available: yes      Patient identity confirmed:  Verbally with patient, arm band and hospital-assigned identification number  Pre-procedure details:     Distal neurologic exam:  Normal    Distal perfusion: distal pulses strong    Procedure details:     Location:  Ankle    Ankle location:  L ankle    Splint type:  Short leg    Supplies:  Fiberglass    Attestation: Splint applied and adjusted personally by me    Post-procedure details:     Distal neurologic exam:  Normal    Distal perfusion: distal pulses strong      Procedure completion:  Tolerated well, no immediate complications             ED Course  ED Course as of 02/19/24 1151   Mon Feb 19, 2024   1117 XR Foot 3+ View Left  IMPRESSION:  1. Fracture of the base of the fifth metatarsal.  2. Possible nondisplaced fracture of the distal fibula.                 This report was finalized on 2/19/2024 11:13 AM by Jhony Alvarez M.D.   [MM]   1118 XR Ankle 3+ View Left  IMPRESSION:  1. Fracture of the base of the fifth metatarsal.  2. Possible nondisplaced fracture of the distal fibula.                 This report was finalized on 2/19/2024 11:13 AM by Jhony Alvarez M.D   [MM]       ED Course User Index  [MM] Nataliia Jefferson PA                                             Medical Decision Making    This is an 83 year old female patient who presents to the ER with chief complaint of left ankle injury. PMH significant for HLD, HTN, GERD, hypothyroidism, A fibb on eliquis. 4 days ago, the patient lost her balance and twisted her left ankle. Since that time, she has had worsening pain, swelling and difficulty ambulating. Denies associated numbness/tingling.       Amount and/or Complexity of Data Reviewed  Radiology: ordered. Decision-making details documented in ED Course.    Risk  Prescription drug management.        Final diagnoses:   Closed fracture of distal end of left fibula, unspecified fracture morphology, initial encounter   Closed displaced fracture of fifth metatarsal bone of left foot, initial encounter       ED Disposition  ED Disposition       ED Disposition   Discharge    Condition   Stable    Comment   --               Luis E Lindo MD  [address] [place] [address] [address] [place] [address] [phone]    Call today           Medication List        Stop      traMADol 50 MG tablet  Commonly known as: ULTRAM                 Nataliia Jefferson PA  02/19/24 1532

## 2024-02-19 NOTE — ED NOTES
MEDICAL SCREENING:    Reason for Visit: Left Foot Injury     Patient initially seen in triage.  The patient was advised further evaluation and diagnostic testing will be needed, some of the treatment and testing will be initiated in the lobby in order to begin the process.  The patient will be returned to the waiting area for the time being and possibly be re-assessed by a subsequent ED provider.  The patient will be brought back to the treatment area in as timely manner as possible.     Maria Eugenia Sousa, APRN  02/19/24 1038

## 2024-03-27 ENCOUNTER — OFFICE VISIT (OUTPATIENT)
Dept: CARDIOLOGY | Facility: CLINIC | Age: 84
End: 2024-03-27
Payer: MEDICARE

## 2024-03-27 VITALS
BODY MASS INDEX: 22.5 KG/M2 | HEIGHT: 63 IN | WEIGHT: 127 LBS | OXYGEN SATURATION: 98 % | HEART RATE: 75 BPM | SYSTOLIC BLOOD PRESSURE: 144 MMHG | DIASTOLIC BLOOD PRESSURE: 78 MMHG

## 2024-03-27 DIAGNOSIS — I48.21 PERMANENT ATRIAL FIBRILLATION: Primary | Chronic | ICD-10-CM

## 2024-03-27 DIAGNOSIS — I10 ESSENTIAL HYPERTENSION: Chronic | ICD-10-CM

## 2024-03-27 NOTE — PROGRESS NOTES
"Chief Complaint  Hypertension (Patient states fatigue, sometimes chest pressure and shortness of breath )    Subjective          Anette Dunham presents to [facility] CARDIOLOGY for follow up.    History of Present Illness    Ms. Dunham was last seen in clinic on 12/29/2023.  At that visit she reported that she had fallen 6 times in 1 day.  She is having frequent falls.  She denies any dizziness or lightheadedness.  She denies loss of consciousness.  She also denied a mechanical fall.  Her Eliquis was decreased to 2.5 mg daily due to her age and weight.  Her blood pressure was low and metoprolol was decreased.    At today's visit Ms. Dunham continues to report that she is having frequent falls.  She states that she was seen by Dr. Martinez and he mentioned a device that she can get to prevent clots so that she would no longer have to take Eliquis.  She was unsure about the procedure.  Her daughter, who accompanied Ms. Dunham today, states that this device might be safer than her mother taking the Eliquis.    There was seen on 2/2/2024 by Dr. Martinez.her pacemaker was interrogated at that visit.    Ms. Dunham denies any palpitations.  She does report occasional sensation of chest pressure.  She reports shortness of breath with exertion she also tells me that she does get dizzy at times.    Objective     Vital Signs:   /78 (BP Location: Left arm, Patient Position: Sitting, Cuff Size: Adult)   Pulse 75   Ht 160 cm (63\")   Wt 57.6 kg (127 lb)   SpO2 98%   BMI 22.50 kg/m²       Physical Exam  Vitals reviewed.   Constitutional:       Appearance: Normal appearance. She is well-developed.   Cardiovascular:      Rate and Rhythm: Normal rate and regular rhythm.      Heart sounds: No murmur heard.     No friction rub. No gallop.   Pulmonary:      Effort: Pulmonary effort is normal. No respiratory distress.      Breath sounds: Normal breath sounds. No wheezing or rales.   Skin:     General: Skin is " warm and dry.   Neurological:      Mental Status: She is alert and oriented to person, place, and time.   Psychiatric:         Mood and Affect: Mood normal.         Behavior: Behavior normal.          Result Review :                Most recent echocardiogram  Results for orders placed during the hospital encounter of 01/23/23    Adult Transthoracic Echo Complete W/ Cont if Necessary Per Protocol    Interpretation Summary    Left ventricular systolic function is normal. Left ventricular ejection fraction appears to be 51 - 55%.    Estimated right ventricular systolic pressure from tricuspid regurgitation is mildly elevated (35-45 mmHg).    Mild pulmonary hypertension is present.    There is no evidence of pericardial effusion      Most recent Stress Test  Results for orders placed during the hospital encounter of 07/26/21    Stress Test With Myocardial Perfusion (1 Day)    Interpretation Summary  · Myocardial perfusion imaging indicates a normal myocardial perfusion study with no evidence of ischemia.  · Diaphragmatic attenuation artifact is present.  · Left ventricular ejection fraction is normal. (Calculated EF = 57%).  · Findings consistent with a normal ECG stress test.  · Impressions are consistent with a low risk study.       Most recent Cardiac Cath      Current Outpatient Medications   Medication Sig Dispense Refill    albuterol (PROVENTIL HFA;VENTOLIN HFA) 108 (90 Base) MCG/ACT inhaler Inhale 2 puffs Every 6 (Six) Hours As Needed for Wheezing.      alendronate (FOSAMAX) 70 MG tablet Take 1 tablet by mouth Every 7 (Seven) Days. Prior to [facility] Admission, Patient was on: takes on wednesdays      apixaban (ELIQUIS) 2.5 MG tablet tablet Take 1 tablet by mouth 2 (Two) Times a Day. 180 tablet 2    atorvastatin (LIPITOR) 10 MG tablet Take 1 tablet by mouth Every Night. 90 tablet 2    dexlansoprazole (DEXILANT) 60 MG capsule Take 1 capsule by mouth Every Morning Before Breakfast. 30 capsule 11    Dupilumab  (Dupixent) 100 MG/0.67ML solution prefilled syringe Inject 100 mg under the skin into the appropriate area as directed Every 14 (Fourteen) Days.      fexofenadine (ALLEGRA) 180 MG tablet Take 1 tablet by mouth Daily.      gabapentin (NEURONTIN) 400 MG capsule Take 1 capsule by mouth 2 (Two) Times a Day. 6 capsule 0    levothyroxine (SYNTHROID, LEVOTHROID) 50 MCG tablet Take 1 tablet by mouth Daily.      LORazepam (ATIVAN) 1 MG tablet Take 1 tablet by mouth Every 8 (Eight) Hours As Needed for Anxiety 9 tablet 0    metoprolol tartrate (LOPRESSOR) 25 MG tablet Take 1 tablet by mouth 2 (Two) Times a Day. 180 tablet 3    naloxone (NARCAN) 4 MG/0.1ML nasal spray Call 911. Don't prime. [place] in 1 nostril for overdose. Repeat in 2-3 minutes in other nostril if no or minimal breathing/responsiveness. 2 each 0    nitroglycerin (NITROSTAT) 0.4 MG SL tablet 1 under the tongue as needed for angina, may repeat q5mins for up three doses 30 tablet 11    rOPINIRole (REQUIP) 1 MG tablet Take 1 tablet by mouth At Night As Needed (restless leg). Take 1 hour before bedtime.       No current facility-administered medications for this visit.            Assessment and Plan    Problem List Items Addressed This Visit          Cardiac and Vasculature    Essential hypertension (Chronic)    Permanent atrial fibrillation - Primary (Chronic)    Overview     CHADsvasc= 4   Echo, 7/26/21, EF 51-55%, Mild LVH, LA mildly increased, RA mildly dilated. Trace TR. Mild pulmonary hypertension.   14 day [employer], 4/20/22-5/3/22, Avg hr 78, min hr 52, Max hr 152 bpm. 88% AF burden. 9 sinus pauses >3 seconds.   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    Hypertension is stable.  Ms Dunham is to continue current medications.  I would not increase any of her anti hypertension for fear that she might be  getting too low at home.      Permanent atrial fibrillation is stable.  Pt is to continue Eliquis for stroke prophylaxis.  She does wan to talk with Dr Martinez about the watchman device so I will re-refer her to him.    Return in about 3 months (around 6/27/2024).    Patient was given instructions and counseling regarding her condition or for health maintenance advice. Please see specific information pulled into the AVS if appropriate.

## 2024-04-02 NOTE — THERAPY TREATMENT NOTE
Inpatient Rehabilitation - Physical Therapy Treatment Note   [facility]     Patient Name: Anette Dunham  : 1940  MRN: [id number]    Today's Date: 2018                 Admit Date: 2018      Visit Dx:    No diagnosis found.    Patient Active Problem List   Diagnosis   • Essential hypertension   • Chest pain   • Paroxysmal atrial fibrillation   • Closed left hip fracture, initial encounter   • Tobacco abuse   • Closed left hip fracture       Therapy Treatment          IRF Treatment Summary     Row Name 18 1647 18 121          Evaluation/Treatment Time and Intent    Subjective Information  -- no complaints  -KH     Document Type therapy note (daily note)  -LB therapy note (daily note)  -KH     Mode of Treatment physical therapy  -LB occupational therapy;individual therapy  -KH     Patient/Family Observations  -- Pt agreeable to treatment at this time.  -KH     Recorded by [LB] Noris Wooten, PT [KH] Bernarda Velasquez OT     Row Name 18 1647 18 1215          Cognition/Psychosocial- PT/OT    Affect/Mental Status (Cognitive) WFL  -LB WFL  -KH     Orientation Status (Cognition)  -- oriented x 4  -KH     Follows Commands (Cognition) WFL  -LB WFL  -KH     Personal Safety Interventions gait belt;nonskid shoes/slippers when out of bed  -LB fall prevention program maintained;gait belt;nonskid shoes/slippers when out of bed  -KH     Recorded by [LB] Noris Wooten, PT [KH] Bernarda Velasquez, OT     Row Name 18 1647             Mobility    Left Lower Extremity (Weight-bearing Status) weight-bearing as tolerated (WBAT)  -LB      Recorded by [LB] Noris oWoten, PT      Row Name 18 1647             Bed Mobility Assessment/Treatment    Bed Mobility Assessment/Treatment supine-sit;sit-supine;supine-sit-supine  -LB      Supine-Sit-Supine [place] (Bed Mobility) verbal cues;nonverbal cues (demo/gesture);contact guard  -LB      Recorded by [LB] Noris  Spoke with PA dept, last note PA dept stated no PA needed, now received notice that rizatriptan not on formulary list.  Never received list of approved alternatives.  Susan GUZMAN dept, to rerun PA and to get back to us.     Harsh Wooten, PT      Row Name 06/05/18 1647             Transfer Assessment/Treatment    Transfer Assessment/Treatment bed-chair transfer;chair-bed transfer;sit-stand transfer;stand-sit transfer  -LB      Bed-[place]air [place] (Transfers) stand by assist  -LB      Chair-Bed [place] (Transfers) stand by assist  -LB      Assistive Device (Bed-Chair Transfers) walker, front-wheeled  -LB      Sit-Stand [place] (Transfers) stand by assist  -LB      Stand-Sit [place] (Transfers) stand by assist  -LB      [place] (Toilet Transfer) stand by assist  -LB      Assistive Device (Toilet Transfer) grab bars/safety frame  -LB      Recorded by [LB] Noris Wooten, PT      Row Name 06/05/18 1647             Chair-Bed Transfer    Assistive Device (Chair-Bed Transfers) walker, front-wheeled  -LB      Recorded by [LB] Noris Wooten PT      Row Name 06/05/18 1647             Sit-Stand Transfer    Assistive Device (Sit-Stand Transfers) walker, front-wheeled  -LB      Recorded by [LB] Noris Wooten PT      Row Name 06/05/18 1647             Stand-Sit Transfer    Assistive Device (Stand-Sit Transfers) walker, front-wheeled  -LB      Recorded by [LB] Noris Wooten PT      Row Name 06/05/18 1647             Gait/Stairs Assessment/Training    [place] (Gait) stand by assist  -LB      Assistive Device (Gait) walker, front-wheeled  -LB      Distance in Feet (Gait) am-340', pm-170' x2  -LB      Deviations/Abnormal Patterns (Gait) antalgic;gait speed decreased;stride length decreased  -LB      Bilateral Gait Deviations forward flexed posture  -LB      Recorded by [LB] Noris Wooten PT      Row Name 06/05/18 1647             Safety Issues, Functional Mobility    Impairments Affecting Function (Mobility) balance;endurance/activity tolerance;pain;strength  -LB      Comment, Safety Issues/Impairments (Mobility) fall risk, left hip precautions  -LB      Recorded by [LB]  Noris Wooten, LATONIA      Row Name 06/05/18 1647             Pain Scale: Numbers Pre/Post-Treatment    Pain Location - Side Left  -LB      Pain Location hip  -LB      Recorded by [LB] Noris Wooten PT      Row Name 06/05/18 1647             Pain Scale: FACES Pre/Post-Treatment    Pain: FACES Scale, Pretreatment 4-->hurts little more  -LB      Pain: FACES Scale, Post-Treatment 4-->hurts little more  -LB      Recorded by [LB] Noris Wooten PT      Row Name 06/05/18 1647             Therapeutic Exercise    Therapeutic Exercise seated, lower extremities;supine, lower extremities  -LB      Recorded by [LB] Noris Wooten, LATONIA      Row Name 06/05/18 1215             Upper Extremity Seated Therapeutic Exercise    Device, Seated Upper Extremity (Therapeutic Exercise) restorator/arm bike   3 mins x 3; 0 resistance; BUE Wrist rolls x 2  -KH      Exercise Type, Seated Upper Extremity (Therapeutic Exercise) AROM (active range of motion)   BUE TherEx/Act; BUE Coordination; BUE FMC/GMC  -KH      Expected Outcomes, Seated Upper Extremity (Therapeutic Exercise) improve functional tolerance, self-care activity;improve performance, BADLs  -KH      Recorded by [KH] Bernarda Velasquez, OT      Row Name 06/05/18 1647             Lower Extremity Seated Therapeutic Exercise    Performed, Seated Lower Extremity (Therapeutic Exercise) hip flexion/extension;hip abduction/adduction;knee flexion/extension;ankle dorsiflexion/plantarflexion  -LB      Device, Seated Lower Extremity (Therapeutic Exercise) elastic bands/tubing;small ball  -LB      Restrictions, Seated Lower Extremity (Therapeutic Exercise) left hip prec.  -LB      Sets/Reps Detail, Seated Lower Extremity (Therapeutic Exercise) 20 reps, bid  -LB      Recorded by [LB] Noris Wooten PT      Row Name 06/05/18 1647             Lower Extremity Supine Therapeutic Exercise    Performed, Supine Lower Extremity (Therapeutic Exercise) heel slides;ankle  pumps;gluteal sets;quadriceps sets;SAQ (short arc quad) over bolster;hip abduction/adduction  -LB      Restrictions, Supine Lower Extremity (Therapeutic Exercise) left hip prec.  -LB      Sets/Reps Detail, Supine Lower Extremity (Therapeutic Exercise) 20 reps, bid  -LB      Recorded by [LATHA] Noris Wooten, PT      Row Name 06/05/18 1647 06/05/18 1215          Positioning and Restraints    Pre-Treatment Position --   am-w/c, pm-bed  -LB sitting in chair/recliner  -KH     Post Treatment Position  -- wheelchair  -KH     In Bed sitting EOB;call light within reach;encouraged to call for assist   pm  -LB  --     In Wheelchair call light within reach;encouraged to call for assist   am  -LB sitting;with PT  -KH     Recorded by [LB] Noris Wooten, PT [KH] Bernarda Velasquez, OT       User Key  (r) = Recorded By, (t) = Taken By, (c) = Cosigned By    Initials Name Effective Dates    LATHA Wooten, PT 03/14/16 -     MASTER Velasquez, OT 04/17/18 -           Wound 05/27/18 0425 Right posterior;lower arm skin tear (Active)   Closure Open to air 6/5/2018  7:30 AM   Base dry;scab 6/4/2018  7:05 PM   Drainage Amount none 6/4/2018  7:05 PM   Care, Wound other (see comments) 6/4/2018  7:05 PM   Dressing Care, Wound open to air 6/4/2018  7:05 PM       Wound 05/29/18 1222 Left hip incision (Active)   Dressing Appearance dry;intact 6/5/2018  7:30 AM   Drainage Amount none 6/4/2018  7:05 PM   Care, Wound other (see comments) 6/4/2018  7:05 PM       Physical Therapy Education     Title: PT OT SLP Therapies (Active)     Topic: Physical Therapy (Done)     Point: Mobility training (Done)    Learning Progress Summary     Learner Status Readiness Method Response Comment Documented by    Patient Done Acceptance E,TB VU,NR  LB 06/05/18 1652     Done Acceptance E,TB VU,NR  LB 06/04/18 1232     Done Acceptance E,TB VU,NR  LB 06/02/18 1052          Point: Home exercise program (Done)    Learning Progress  Summary     Learner Status Readiness Method Response Comment Documented by    Patient Done Acceptance E,TB VU,NR  LB 06/05/18 1652     Done Acceptance E,TB VU,NR  LB 06/04/18 1232     Done Acceptance E,TB VU,NR  LB 06/02/18 1052          Point: Body mechanics (Done)    Learning Progress Summary     Learner Status Readiness Method Response Comment Documented by    Patient Done Acceptance E,TB VU,NR  LB 06/05/18 1652     Done Acceptance E,TB VU,NR  LB 06/04/18 1232     Done Acceptance E,TB VU,NR  LB 06/02/18 1052          Point: Precautions (Done)    Learning Progress Summary     Learner Status Readiness Method Response Comment Documented by    Patient Done Acceptance E,TB VU,NR  LB 06/05/18 1652     Done Acceptance E,TB VU,NR  LB 06/04/18 1232     Done Acceptance E,TB VU,NR  LB 06/02/18 1052                      User Key     Initials Effective Dates Name Provider Type Discipline     03/14/16 -  Noris Wooten, PT Physical Therapist PT                  PT Recommendation and Plan    Anticipated Equipment Needs at Discharge (PT Eval): front wheeled walker  Planned Therapy Interventions (PT Eval): balance training, bed mobility training, gait training, patient/family education, strengthening, transfer training  Frequency of Treatment (PT Eval): 5 times per week    Plan of Care Reviewed With: patient  IRF Plan of Care Review: progress ongoing, continue  Progress, Functional Goals: demonstrating adequate progress                   Time Calculation:           PT Charges     Row Name 06/05/18 1654 06/05/18 1653          Time Calculation    Start Time 1415  -LB 0915  -LB     Stop Time 1500  -LB 1000  -LB     Time Calculation (min) 45 min  -LB 45 min  -LB     PT Received On  -- 06/05/18  -LB       User Key  (r) = Recorded By, (t) = Taken By, (c) = Cosigned By    Initials Name Provider Type     Noris Wooten, LATONIA Physical Therapist            Therapy Charges for Today     Code Description Service Date Service  Provider Modifiers Qty    66800394152 HC GAIT TRAINING EA 15 MIN 6/4/2018 Noris Wooten, PT GP 2    07443327725 HC PT THER PROC EA 15 MIN 6/4/2018 Noris Wooten, PT GP 4    46922937786 HC PT THER SUPP EA 15 MIN 6/4/2018 Noris Wooten, PT GP 2    91326386899 HC GAIT TRAINING EA 15 MIN 6/5/2018 Noris Wooten, PT GP 2    88808173866 HC PT THER PROC EA 15 MIN 6/5/2018 Noris Wooten, PT GP 4    07410435280 HC PT THER SUPP EA 15 MIN 6/5/2018 Noris Wooten, PT GP 2                   Noris Wooten, PT  6/5/2018

## 2024-05-07 ENCOUNTER — TELEPHONE (OUTPATIENT)
Dept: CARDIOLOGY | Facility: CLINIC | Age: 84
End: 2024-05-07

## 2024-05-07 NOTE — TELEPHONE ENCOUNTER
Name: Anette Dunham    Relationship: Self    Best Callback Number: [phone]    Incoming call to the Hub, requesting to  Cancel their Other: MDT  appointment on 05-24-24.     Per Hub workflow, further review is needed before the task can be completed.    Result of Call: Please cancel this appointment

## 2024-06-26 ENCOUNTER — OFFICE VISIT (OUTPATIENT)
Dept: CARDIOLOGY | Facility: CLINIC | Age: 84
End: 2024-06-26
Payer: MEDICARE

## 2024-06-26 VITALS
BODY MASS INDEX: 23.39 KG/M2 | HEIGHT: 63 IN | WEIGHT: 132 LBS | OXYGEN SATURATION: 97 % | HEART RATE: 69 BPM | DIASTOLIC BLOOD PRESSURE: 84 MMHG | SYSTOLIC BLOOD PRESSURE: 147 MMHG

## 2024-06-26 DIAGNOSIS — I48.21 PERMANENT ATRIAL FIBRILLATION: Primary | Chronic | ICD-10-CM

## 2024-06-26 DIAGNOSIS — E78.5 DYSLIPIDEMIA: ICD-10-CM

## 2024-06-26 DIAGNOSIS — Z95.0 PRESENCE OF CARDIAC PACEMAKER: Chronic | ICD-10-CM

## 2024-06-26 DIAGNOSIS — I10 ESSENTIAL HYPERTENSION: Chronic | ICD-10-CM

## 2024-06-26 DIAGNOSIS — M79.89 SWELLING OF LOWER EXTREMITY: ICD-10-CM

## 2024-06-26 RX ORDER — FUROSEMIDE 20 MG/1
20 TABLET ORAL AS NEEDED
Qty: 30 TABLET | Refills: 5 | Status: SHIPPED | OUTPATIENT
Start: 2024-06-26

## 2024-06-26 RX ORDER — ATORVASTATIN CALCIUM 10 MG/1
10 TABLET, FILM COATED ORAL NIGHTLY
Qty: 90 TABLET | Refills: 2 | Status: SHIPPED | OUTPATIENT
Start: 2024-06-26

## 2024-06-26 NOTE — PROGRESS NOTES
"Chief Complaint  Follow-up (Intermittent CP,dizziness,SOA,moderate leg/ankle swelling,chronic fatigue.) and Med Management (Tolerating all current medications with no side effects.)    Subjective          Anette Dunham presents to [facility] for follow up.    History of Present Illness    Anette Dunham was last seen in clinic on 3/27/2024.  Patient reported that she had been seen by Dr. Martinez and he had mentioned a device which could prevent her from getting clots and then she would no longer have to take Eliquis.  Patient was unsure of the procedure but I did encourage her to talk to him more about this device.    Patient had recent remote pacemaker interrogation on 5/15/2024.  No events noted.  No changes required to PPM settings.    At today's visit Anette denies chest pain.  She does report some shortness of breath with exertion as well as lower extremity swelling.  She denies symptoms of claudication.      Anette does bring with her today her most recent labs.  Her cholesterol was 106, triglycerides 49 and LDL cholesterol was 50.  A1c was 5.3.  I do not have access to a basic metabolic panel.    Objective     Vital Signs:   /84 (BP Location: Left arm, Patient Position: Sitting, Cuff Size: Adult)   Pulse 69   Ht 160 cm (63\")   Wt 59.9 kg (132 lb)   SpO2 97%   BMI 23.38 kg/m²       Physical Exam  Vitals reviewed.   Constitutional:       Appearance: Normal appearance. She is well-developed.   Cardiovascular:      Rate and Rhythm: Normal rate and regular rhythm.      Heart sounds: No murmur heard.     No friction rub. No gallop.      Comments: Mild bilateral lower extremity swelling  Pulmonary:      Effort: Pulmonary effort is normal. No respiratory distress.      Breath sounds: Normal breath sounds. No wheezing or rales.   Skin:     General: Skin is warm and dry.   Neurological:      Mental Status: She is alert and oriented to person, place, and time.   Psychiatric:        "  Mood and Affect: Mood normal.         Behavior: Behavior normal.          Result Review :            ECG 12 Lead    Date/Time: 6/26/2024 1:23 PM  Performed by: Celeste Nielsen APRN    Authorized by: Celeste Nielsen APRN  Comparison: compared with previous ECG from 12/29/2023  Similar to previous ECG  Rhythm comments: Electronic ventricular pacemaker  Rate: normal  BPM: 71    Clinical impression: abnormal EKG           Most recent echocardiogram  Results for orders placed during the hospital encounter of 01/23/23    Adult Transthoracic Echo Complete W/ Cont if Necessary Per Protocol    Interpretation Summary    Left ventricular systolic function is normal. Left ventricular ejection fraction appears to be 51 - 55%.    Estimated right ventricular systolic pressure from tricuspid regurgitation is mildly elevated (35-45 mmHg).    Mild pulmonary hypertension is present.    There is no evidence of pericardial effusion      Most recent Stress Test  Results for orders placed during the hospital encounter of 07/26/21    Stress Test With Myocardial Perfusion (1 Day)    Interpretation Summary  · Myocardial perfusion imaging indicates a normal myocardial perfusion study with no evidence of ischemia.  · Diaphragmatic attenuation artifact is present.  · Left ventricular ejection fraction is normal. (Calculated EF = 57%).  · Findings consistent with a normal ECG stress test.  · Impressions are consistent with a low risk study.       Most recent Cardiac Cath      Current Outpatient Medications   Medication Sig Dispense Refill    alendronate (FOSAMAX) 70 MG tablet Take 1 tablet by mouth Every 7 (Seven) Days. Prior to [facility] Admission, Patient was on: takes on wednesdays      apixaban (ELIQUIS) 2.5 MG tablet tablet Take 1 tablet by mouth 2 (Two) Times a Day. 180 tablet 2    atorvastatin (LIPITOR) 10 MG tablet Take 1 tablet by mouth Every Night. 90 tablet 2    dexlansoprazole (DEXILANT) 60 MG capsule Take 1 capsule by mouth Every  Morning Before Breakfast. 30 capsule 11    fexofenadine (ALLEGRA) 180 MG tablet Take 1 tablet by mouth Daily.      gabapentin (NEURONTIN) 400 MG capsule Take 1 capsule by mouth 2 (Two) Times a Day. (Patient taking differently: Take 1 capsule by mouth 2 (Two) Times a Day. 1 of the morning, and 2 at night.) 6 capsule 0    levothyroxine (SYNTHROID, LEVOTHROID) 50 MCG tablet Take 1 tablet by mouth Daily.      LORazepam (ATIVAN) 1 MG tablet Take 1 tablet by mouth Every 8 (Eight) Hours As Needed for Anxiety 9 tablet 0    metoprolol tartrate (LOPRESSOR) 25 MG tablet Take 1.5 tablets by mouth 2 (Two) Times a Day. 270 tablet 3    nitroglycerin (NITROSTAT) 0.4 MG SL tablet 1 under the tongue as needed for angina, may repeat q5mins for up three doses 30 tablet 11    rOPINIRole (REQUIP) 1 MG tablet Take 1 tablet by mouth At Night As Needed (restless leg). Take 1 hour before bedtime.      furosemide (LASIX) 20 MG tablet Take 1 tablet by mouth As Needed (lower exrremity swelling). 30 tablet 5     No current facility-administered medications for this visit.            Assessment and Plan    Problem List Items Addressed This Visit          Cardiac and Vasculature    Essential hypertension (Chronic)    Relevant Medications    metoprolol tartrate (LOPRESSOR) 25 MG tablet    furosemide (LASIX) 20 MG tablet    Permanent atrial fibrillation - Primary (Chronic)    Overview     CHADsvasc= 4   Echo, 7/26/21, EF 51-55%, Mild LVH, LA mildly increased, RA mildly dilated. Trace TR. Mild pulmonary hypertension.   14 day [employer], 4/20/22-5/3/22, Avg hr 78, min hr 52, Max hr 152 bpm. 88% AF burden. 9 sinus pauses >3 seconds.   Medtronic VVIR leadless pacemaker and AV node ablation         Relevant Medications    metoprolol tartrate (LOPRESSOR) 25 MG tablet    Other Relevant Orders    ECG 12 Lead    Presence of cardiac pacemaker (Chronic)    Dyslipidemia (Chronic)    Overview     1/27/2024 total cholesterol 106, triglycerides 49, HDL 42, and  LDL 50          Other Visit Diagnoses       Swelling of lower extremity                    Follow Up     Medications were reviewed with the patient.    PAD is stable.  Continue atorvastatin.    HTN is not controlled.  Metoprolol increased today.  Patient is to keep a blood pressure log.  I did prescribe Lasix today which she can take as needed for lower extremity swelling.  I will speak to her primary care provider regarding a BMP at her next visit.    Continue atorvastatin for dyslipidemia.    Continue Eliquis for stroke prophylaxis in the presence of atrial fibrillation.  Continue metoprolol for rate control.    Due to lower extremity swelling I have given her prescription for Lasix to take as needed.  Creatinine reviewed and is normal.    Follow-up with Dr. Martinez for pacemaker interrogations.    Return in about 6 months (around 12/26/2024).    Patient was given instructions and counseling regarding her condition or for health maintenance advice. Please see specific information pulled into the AVS if appropriate.

## 2024-06-28 PROBLEM — I73.9 PAD (PERIPHERAL ARTERY DISEASE): Status: RESOLVED | Noted: 2023-07-07 | Resolved: 2024-06-28

## 2024-06-28 PROBLEM — E78.5 DYSLIPIDEMIA: Chronic | Status: ACTIVE | Noted: 2024-06-28

## 2024-06-28 PROBLEM — Z95.0 PRESENCE OF CARDIAC PACEMAKER: Chronic | Status: ACTIVE | Noted: 2023-07-07

## 2024-06-28 PROBLEM — E78.5 DYSLIPIDEMIA: Status: ACTIVE | Noted: 2024-06-28

## 2024-06-28 PROBLEM — I44.2 COMPLETE HEART BLOCK: Chronic | Status: ACTIVE | Noted: 2022-06-15

## 2024-08-23 ENCOUNTER — OFFICE VISIT (OUTPATIENT)
Dept: CARDIOLOGY | Facility: CLINIC | Age: 84
End: 2024-08-23
Payer: MEDICARE

## 2024-08-23 VITALS
OXYGEN SATURATION: 97 % | DIASTOLIC BLOOD PRESSURE: 88 MMHG | SYSTOLIC BLOOD PRESSURE: 136 MMHG | WEIGHT: 133 LBS | BODY MASS INDEX: 22.71 KG/M2 | HEART RATE: 77 BPM | HEIGHT: 64 IN

## 2024-08-23 DIAGNOSIS — Z95.0 PRESENCE OF CARDIAC PACEMAKER: Chronic | ICD-10-CM

## 2024-08-23 DIAGNOSIS — Z79.01 CHRONIC ANTICOAGULATION: ICD-10-CM

## 2024-08-23 DIAGNOSIS — I48.21 PERMANENT ATRIAL FIBRILLATION: Chronic | ICD-10-CM

## 2024-08-23 DIAGNOSIS — I10 ESSENTIAL HYPERTENSION: Chronic | ICD-10-CM

## 2024-08-23 DIAGNOSIS — I44.2 COMPLETE HEART BLOCK: Primary | Chronic | ICD-10-CM

## 2024-08-23 NOTE — PROGRESS NOTES
Encounter Date:08/23/2024      Patient ID: Anette Dunham is a 84 y.o. female.    Maria Eugenia Yap APRN Cheif Complaint EP: Atrial Fibrillation, Heart block, and Pacemaker check    PROBLEM LIST:  Patient Active Problem List    Diagnosis Date Noted    Complete heart block 06/15/2022     Priority: High    Permanent atrial fibrillation 09/07/2016     Priority: High     Note Last Updated: 2/1/2024     CHADsvasc= 4   Echo, 7/26/21, EF 51-55%, Mild LVH, LA mildly increased, RA mildly dilated. Trace TR. Mild pulmonary hypertension.   14 day [employer], 4/20/22-5/3/22, Avg hr 78, min hr 52, Max hr 152 bpm. 88% AF burden. 9 sinus pauses >3 seconds.   Medtronic VVIR leadless pacemaker and AV node ablation      Presence of cardiac pacemaker 07/07/2023     Priority: Medium    Chronic anticoagulation 07/07/2023     Priority: Low    Essential hypertension 08/16/2016     Priority: Low    Dyslipidemia 06/28/2024     Note Last Updated: 6/28/2024 1/27/2024 total cholesterol 106, triglycerides 49, HDL 42, and LDL 50               History of Present Illness  Patient presents today for follow-up with a history of permanent atrial fibrillation status post AV node ablation with leadless permanent pacemaker implantation.  She returns today for scheduled electrophysiology follow-up.  She is doing quite well.  She has no awareness of palpitations, no dizziness no syncope.  Blood pressure at home typically runs about 130 mmHg systolic.  She states compliance with her current medical regimen and reports no significant adverse side effects.    No Known Allergies    Current Outpatient Medications   Medication Instructions    alendronate (FOSAMAX) 70 mg, Oral, Every 7 Days, Prior to [facility] Admission, Patient was on: takes on wednesdays    apixaban (ELIQUIS) 2.5 mg, Oral, 2 Times Daily    atorvastatin (LIPITOR) 10 mg, Oral, Nightly    dexlansoprazole (DEXILANT) 60 mg, Oral, Every Morning Before Breakfast    fexofenadine  "(ALLEGRA) 180 mg, Oral, Daily    furosemide (LASIX) 20 mg, Oral, As Needed    gabapentin (NEURONTIN) 400 mg, Oral, 2 Times Daily    levothyroxine (SYNTHROID, LEVOTHROID) 50 mcg, Oral, Daily    LORazepam (ATIVAN) 1 MG tablet Take 1 tablet by mouth Every 8 (Eight) Hours As Needed for Anxiety    metoprolol tartrate (LOPRESSOR) 37.5 mg, Oral, 2 Times Daily    nitroglycerin (NITROSTAT) 0.4 MG SL tablet 1 under the tongue as needed for angina, may repeat q5mins for up three doses    rOPINIRole (REQUIP) 1 mg, Oral, Nightly PRN, Take 1 hour before bedtime.        .    Objective:     /88 (BP Location: Right arm, Patient Position: Sitting)   Pulse 77   Ht 161.3 cm (63.5\")   Wt 60.3 kg (133 lb)   SpO2 97%   BMI 23.19 kg/m²    Body mass index is 23.19 kg/m².     Vitals reviewed.   Constitutional:       Appearance: Well-developed.   Pulmonary:      Effort: Pulmonary effort is normal. No respiratory distress.      Breath sounds: Normal breath sounds. No wheezing. No rales.      Comments: Bases clear  Chest:      Chest wall: Not tender to palpatation.   Cardiovascular:      Normal rate. Regular rhythm.      Murmurs: There is no murmur.      No gallop.  No click. No rub.   Pulses:     Intact distal pulses.   Edema:     Peripheral edema absent.   Musculoskeletal: Normal range of motion.       Lab Review:           Procedures               Assessment:      Diagnosis Plan   1. Complete heart block  Normal functioning leadless permanent pacemaker with normal lead parameters and 8 years battery life remaining      2. Permanent atrial fibrillation  Status post AV node ablation with normal functioning leadless permanent pacemaker      3. Presence of cardiac pacemaker    This patient's Cardiac Implanted Electronic Device was manually interrogated and reprogrammed during the patient encounter today.  Iterative programming changes were manually made to determine the sensing threshold, pacing threshold, lead impedance as well as " underlying cardiac rhythm.  These programming changes were not limited to but included some or all of the following when appropriate: pacing mode, programmed AV delays, blanking periods, and refractory periods.  Data obtained as a result of these manual programing changes informed the patient's CIED permanent programming.       4. Chronic anticoagulation  Tolerating anticoagulation, continue Eliquis      5. Essential hypertension  Well-managed, continue current medical regimen        Plan:     Advance Care Planning   ACP discussion was declined by the patient. Patient has an advance directive (not in EMR), copy requested.           Stable cardiac status.  Continue current medications.   in 1 year, sooner as needed.  Thank you for allowing us to participate in the care of your patient.     Electronically signed by NISA Vides, 08/23/24, 2:18 PM EDT.

## 2024-10-01 ENCOUNTER — LAB (OUTPATIENT)
Dept: FAMILY MEDICINE CLINIC | Facility: CLINIC | Age: 84
End: 2024-10-01
Payer: MEDICARE

## 2024-10-01 ENCOUNTER — OFFICE VISIT (OUTPATIENT)
Dept: FAMILY MEDICINE CLINIC | Facility: CLINIC | Age: 84
End: 2024-10-01
Payer: MEDICARE

## 2024-10-01 VITALS
WEIGHT: 128 LBS | HEIGHT: 63 IN | SYSTOLIC BLOOD PRESSURE: 132 MMHG | OXYGEN SATURATION: 95 % | BODY MASS INDEX: 22.68 KG/M2 | TEMPERATURE: 96.4 F | DIASTOLIC BLOOD PRESSURE: 78 MMHG | HEART RATE: 85 BPM

## 2024-10-01 DIAGNOSIS — E55.9 VITAMIN D DEFICIENCY: ICD-10-CM

## 2024-10-01 DIAGNOSIS — D50.9 IRON DEFICIENCY ANEMIA, UNSPECIFIED IRON DEFICIENCY ANEMIA TYPE: ICD-10-CM

## 2024-10-01 DIAGNOSIS — K21.9 GASTROESOPHAGEAL REFLUX DISEASE WITHOUT ESOPHAGITIS: ICD-10-CM

## 2024-10-01 DIAGNOSIS — G25.81 RESTLESS LEG SYNDROME: ICD-10-CM

## 2024-10-01 DIAGNOSIS — E78.2 MIXED HYPERLIPIDEMIA: ICD-10-CM

## 2024-10-01 DIAGNOSIS — R53.81 DEBILITY: ICD-10-CM

## 2024-10-01 DIAGNOSIS — M80.80XA LOCALIZED OSTEOPOROSIS WITH CURRENT PATHOLOGICAL FRACTURE, INITIAL ENCOUNTER: Primary | ICD-10-CM

## 2024-10-01 DIAGNOSIS — E06.3 HYPOTHYROIDISM DUE TO HASHIMOTO THYROIDITIS: ICD-10-CM

## 2024-10-01 DIAGNOSIS — F41.1 GENERALIZED ANXIETY DISORDER: ICD-10-CM

## 2024-10-01 DIAGNOSIS — M80.80XA LOCALIZED OSTEOPOROSIS WITH CURRENT PATHOLOGICAL FRACTURE, INITIAL ENCOUNTER: ICD-10-CM

## 2024-10-01 PROCEDURE — 80069 RENAL FUNCTION PANEL: CPT | Performed by: STUDENT IN AN ORGANIZED HEALTH CARE EDUCATION/TRAINING PROGRAM

## 2024-10-01 PROCEDURE — 36415 COLL VENOUS BLD VENIPUNCTURE: CPT

## 2024-10-01 PROCEDURE — 3075F SYST BP GE 130 - 139MM HG: CPT | Performed by: STUDENT IN AN ORGANIZED HEALTH CARE EDUCATION/TRAINING PROGRAM

## 2024-10-01 PROCEDURE — 82330 ASSAY OF CALCIUM: CPT | Performed by: STUDENT IN AN ORGANIZED HEALTH CARE EDUCATION/TRAINING PROGRAM

## 2024-10-01 PROCEDURE — 82306 VITAMIN D 25 HYDROXY: CPT | Performed by: STUDENT IN AN ORGANIZED HEALTH CARE EDUCATION/TRAINING PROGRAM

## 2024-10-01 PROCEDURE — G0008 ADMIN INFLUENZA VIRUS VAC: HCPCS | Performed by: STUDENT IN AN ORGANIZED HEALTH CARE EDUCATION/TRAINING PROGRAM

## 2024-10-01 PROCEDURE — 83970 ASSAY OF PARATHORMONE: CPT | Performed by: STUDENT IN AN ORGANIZED HEALTH CARE EDUCATION/TRAINING PROGRAM

## 2024-10-01 PROCEDURE — 82728 ASSAY OF FERRITIN: CPT | Performed by: STUDENT IN AN ORGANIZED HEALTH CARE EDUCATION/TRAINING PROGRAM

## 2024-10-01 PROCEDURE — 90662 IIV NO PRSV INCREASED AG IM: CPT | Performed by: STUDENT IN AN ORGANIZED HEALTH CARE EDUCATION/TRAINING PROGRAM

## 2024-10-01 PROCEDURE — 82523 COLLAGEN CROSSLINKS: CPT | Performed by: STUDENT IN AN ORGANIZED HEALTH CARE EDUCATION/TRAINING PROGRAM

## 2024-10-01 PROCEDURE — 82607 VITAMIN B-12: CPT | Performed by: STUDENT IN AN ORGANIZED HEALTH CARE EDUCATION/TRAINING PROGRAM

## 2024-10-01 PROCEDURE — 84443 ASSAY THYROID STIM HORMONE: CPT | Performed by: STUDENT IN AN ORGANIZED HEALTH CARE EDUCATION/TRAINING PROGRAM

## 2024-10-01 PROCEDURE — 82570 ASSAY OF URINE CREATININE: CPT | Performed by: STUDENT IN AN ORGANIZED HEALTH CARE EDUCATION/TRAINING PROGRAM

## 2024-10-01 PROCEDURE — 1125F AMNT PAIN NOTED PAIN PRSNT: CPT | Performed by: STUDENT IN AN ORGANIZED HEALTH CARE EDUCATION/TRAINING PROGRAM

## 2024-10-01 PROCEDURE — 85027 COMPLETE CBC AUTOMATED: CPT | Performed by: STUDENT IN AN ORGANIZED HEALTH CARE EDUCATION/TRAINING PROGRAM

## 2024-10-01 PROCEDURE — 83540 ASSAY OF IRON: CPT | Performed by: STUDENT IN AN ORGANIZED HEALTH CARE EDUCATION/TRAINING PROGRAM

## 2024-10-01 PROCEDURE — 3078F DIAST BP <80 MM HG: CPT | Performed by: STUDENT IN AN ORGANIZED HEALTH CARE EDUCATION/TRAINING PROGRAM

## 2024-10-01 PROCEDURE — 84466 ASSAY OF TRANSFERRIN: CPT | Performed by: STUDENT IN AN ORGANIZED HEALTH CARE EDUCATION/TRAINING PROGRAM

## 2024-10-01 PROCEDURE — 99204 OFFICE O/P NEW MOD 45 MIN: CPT | Performed by: STUDENT IN AN ORGANIZED HEALTH CARE EDUCATION/TRAINING PROGRAM

## 2024-10-01 PROCEDURE — 80061 LIPID PANEL: CPT | Performed by: STUDENT IN AN ORGANIZED HEALTH CARE EDUCATION/TRAINING PROGRAM

## 2024-10-01 RX ORDER — GABAPENTIN 400 MG/1
CAPSULE ORAL
Qty: 90 CAPSULE | Refills: 2 | Status: SHIPPED | OUTPATIENT
Start: 2024-10-23

## 2024-10-01 RX ORDER — FLUTICASONE PROPIONATE 50 UG/1
2 SPRAY, METERED NASAL DAILY
COMMUNITY

## 2024-10-01 RX ORDER — VALACYCLOVIR HYDROCHLORIDE 500 MG/1
500 TABLET, FILM COATED ORAL DAILY
COMMUNITY

## 2024-10-01 RX ORDER — LORAZEPAM 0.5 MG/1
0.5 TABLET ORAL NIGHTLY PRN
Qty: 30 TABLET | Refills: 2 | Status: SHIPPED | OUTPATIENT
Start: 2024-10-26 | End: 2024-10-01

## 2024-10-01 RX ORDER — ALENDRONATE SODIUM 70 MG/1
70 TABLET ORAL
Status: CANCELLED | OUTPATIENT
Start: 2024-10-01

## 2024-10-01 RX ORDER — ATORVASTATIN CALCIUM 10 MG/1
10 TABLET, FILM COATED ORAL NIGHTLY
Qty: 90 TABLET | Refills: 2 | Status: SHIPPED | OUTPATIENT
Start: 2024-10-01

## 2024-10-01 RX ORDER — GABAPENTIN 400 MG/1
CAPSULE ORAL
Qty: 90 CAPSULE | Refills: 2 | Status: SHIPPED | OUTPATIENT
Start: 2024-10-23 | End: 2024-10-01

## 2024-10-01 RX ORDER — LORAZEPAM 0.5 MG/1
0.5 TABLET ORAL NIGHTLY PRN
Qty: 30 TABLET | Refills: 2 | Status: SHIPPED | OUTPATIENT
Start: 2024-10-26

## 2024-10-01 RX ORDER — ERYTHROMYCIN 5 MG/G
1 OINTMENT OPHTHALMIC 3 TIMES DAILY
COMMUNITY

## 2024-10-01 NOTE — PROGRESS NOTES
Chief Complaint  Establish Care and Arthritis    HPI:   HPI   Anette Dunham is a 84 y.o. female who presents today to [facility] for Establish Care and Arthritis. She would like symptom management for restless leg syndrome. She reports that the medicine she takes for restless leg syndrome has only improved her symptoms slightly. She endorses difficulty with balance. She reports falling several times and happens everyday. She is interested in ceasing lorazepam if that is the cause of the fall. She wants to get her thyroid levels checked since she has been on the same dose of levo since she was diagnosed. She reports that tramadol helps with headaches and arthritis. She endorses headaches that started after her eye split due to dry eyes. She has been going to the eye doctor for 2-3 years. She reports that she sleeps with her eyes open due to a condition she has.     Previous History:   Past Medical History:   Diagnosis Date    Anxiety     Arrhythmia     Atrial fibrillation     Coronary artery disease     Disease of thyroid gland     Dysfunctional gallbladder     Fracture of two ribs     GERD (gastroesophageal reflux disease)     Hypertension     Lumbar compression fracture 07/2022    Osteoporosis       Past Surgical History:   Procedure Laterality Date    ABDOMINAL SURGERY      CARDIAC ELECTROPHYSIOLOGY PROCEDURE N/A 09/23/2022    Procedure: Device Implant. Leadless PPM. Hold eliquis x1 day prior to procedure.;  Surgeon: Jefe Zhao MD;  Location: [facility] INVASIVE LOCATION;  Service: Cardiology;  Laterality: N/A;    CARDIAC ELECTROPHYSIOLOGY PROCEDURE N/A 09/23/2022    Procedure: AV Node;  Surgeon: Jefe Zhao MD;  Location:  [facility] INVASIVE LOCATION;  Service: Cardiovascular;  Laterality: N/A;    CATARACT EXTRACTION      GALLBLADDER SURGERY      HIP HEMIARTHROPLASTY Left 05/29/2018    Procedure: LEFT HIP BIPOLAR HEMIARTHROPLASTY;  Surgeon: Trevon Arriaga MD;   Location: [facility] OR;  Service: Orthopedics    PACEMAKER IMPLANTATION        Social History     Socioeconomic History    Marital status:    Tobacco Use    Smoking status: Some Days     Current packs/day: 0.00     Types: Cigarettes     Start date: 3/10/2007     Last attempt to quit: 3/10/2022     Years since quittin.5     Passive exposure: Past    Smokeless tobacco: Current     Types: Snuff, Chew   Vaping Use    Vaping status: Never Used   Substance and Sexual Activity    Alcohol use: No    Drug use: No    Sexual activity: Defer      Health Maintenance Due   Topic Date Due    LIPID PANEL  Never done    DXA SCAN  Never done    ZOSTER VACCINE (1 of 2) Never done    RSV Vaccine - Adults (1 - 1-dose 60+ series) Never done    ANNUAL WELLNESS VISIT  Never done    INFLUENZA VACCINE  2024    COVID-19 Vaccine (2023- season) Never done        Current Medications:  Current Outpatient Medications   Medication Sig Dispense Refill    apixaban (ELIQUIS) 2.5 MG tablet tablet Take 1 tablet by mouth 2 (Two) Times a Day. 180 tablet 2    atorvastatin (LIPITOR) 10 MG tablet Take 1 tablet by mouth Every Night. 90 tablet 2    dexlansoprazole (DEXILANT) 60 MG capsule Take 1 capsule by mouth Every Morning Before Breakfast. 30 capsule 11    erythromycin (ROMYCIN) 5 MG/GM ophthalmic ointment Administer 1 Application to the right eye 3 times a day.      fluticasone (FLONASE) 50 MCG/ACT nasal spray Administer 2 sprays into the nostril(s) as directed by provider Daily.      furosemide (LASIX) 20 MG tablet Take 1 tablet by mouth As Needed (lower exrremity swelling). 30 tablet 5    [START ON 10/23/2024] gabapentin (NEURONTIN) 400 MG capsule Take 1 tablet qam, take 2 tablets qpm 90 capsule 2    levothyroxine (SYNTHROID, LEVOTHROID) 50 MCG tablet Take 1 tablet by mouth Daily.      [START ON 10/26/2024] LORazepam (ATIVAN) 0.5 MG tablet Take 1 tablet by mouth At Night As Needed for Anxiety. 30 tablet 2    metoprolol tartrate  "(LOPRESSOR) 25 MG tablet Take 1.5 tablets by mouth 2 (Two) Times a Day. 270 tablet 3    nitroglycerin (NITROSTAT) 0.4 MG SL tablet 1 under the tongue as needed for angina, may repeat q5mins for up three doses 30 tablet 11    rOPINIRole (REQUIP) 1 MG tablet Take 1 tablet by mouth At Night As Needed (restless leg). Take 1 hour before bedtime.      valACYclovir (VALTREX) 500 MG tablet Take 1 tablet by mouth Daily.       No current facility-administered medications for this visit.       Allergies:   No Known Allergies    Vitals:   /78 (BP Location: Right arm, Patient Position: Sitting, Cuff Size: Adult)   Pulse 85   Temp 96.4 °F (35.8 °C) (Temporal)   Ht 160 cm (63\")   Wt 58.1 kg (128 lb)   SpO2 95%   BMI 22.67 kg/m²     Estimated body mass index is 22.67 kg/m² as calculated from the following:    Height as of this encounter: 160 cm (63\").    Weight as of this encounter: 58.1 kg (128 lb).    Anette Dunham  reports that she has been smoking cigarettes. She started smoking about 17 years ago. She has been exposed to tobacco smoke. Her smokeless tobacco use includes snuff and chew.            Physical Exam:   Physical Exam  Constitutional:       Appearance: Normal appearance.   HENT:      Mouth/Throat:      Mouth: Mucous membranes are moist.   Cardiovascular:      Rate and Rhythm: Normal rate and regular rhythm.   Pulmonary:      Effort: Pulmonary effort is normal.      Breath sounds: Normal breath sounds. No wheezing or rhonchi.   Musculoskeletal:         General: Normal range of motion.   Skin:     General: Skin is warm and dry.   Neurological:      Mental Status: She is alert.   Psychiatric:         Mood and Affect: Mood normal.          Lab Results:   No visits with results within 6 Month(s) from this visit.   Latest known visit with results is:   Hospital Outpatient Visit on 01/23/2023   Component Date Value Ref Range Status    Target HR (85%) 01/23/2023 116  bpm Final    Max. Pred. HR (100%) 01/23/2023 " 137  bpm Final    LVIDd 01/23/2023 4.3  cm Final    LVIDs 01/23/2023 3.7  cm Final    IVSd 01/23/2023 1.40  cm Final    LVPWd 01/23/2023 1.00  cm Final    FS 01/23/2023 14.0  % Final    IVS/LVPW 01/23/2023 1.40  cm Final    ESV(cubed) 01/23/2023 50.7  ml Final    LV Sys Vol (BSA corrected) 01/23/2023 9.4  cm2 Final    EDV(cubed) 01/23/2023 79.5  ml Final    LV [place] Vol (BSA corrected) 01/23/2023 26.0  cm2 Final    LVOT area 01/23/2023 2.27  cm2 Final    LV mass(C)d 01/23/2023 184.7  grams Final    LVOT diam 01/23/2023 1.70  cm Final    EDV(MOD-sp4) 01/23/2023 43.2  ml Final    ESV(MOD-sp4) 01/23/2023 15.6  ml Final    SV(MOD-sp4) 01/23/2023 27.6  ml Final    SVi(MOD-SP4) 01/23/2023 16.6  ml/m2 Final    EF(MOD-sp4) 01/23/2023 63.9  % Final    MV E max neftali 01/23/2023 93.0  cm/sec Final    MV A max neftali 01/23/2023 27.3  cm/sec Final    MV E/A 01/23/2023 3.4   Final    LA ESV Index (BP) 01/23/2023 47.7  ml/m2 Final    Med Peak E' Neftali 01/23/2023 5.9  cm/sec Final    Lat Peak E' Neftali 01/23/2023 7.9  cm/sec Final    Avg E/e' ratio 01/23/2023 13.48   Final    TAPSE (>1.6) 01/23/2023 1.82  cm Final    LA dimension (2D)  01/23/2023 4.1  cm Final    Ao pk neftali 01/23/2023 100.0  cm/sec Final    Ao max PG 01/23/2023 4.0  mmHg Final    Ao mean PG 01/23/2023 2.00  mmHg Final    Ao V2 VTI 01/23/2023 19.7  cm Final    TR max neftali 01/23/2023 264.0  cm/sec Final    TR max PG 01/23/2023 28.0  mmHg Final    RVSP(TR) 01/23/2023 38.0  mmHg Final    RAP systole 01/23/2023 10.0  mmHg Final    PA acc time 01/23/2023 0.08  sec Final    PA pr(Accel) 01/23/2023 44.4  mmHg Final    Ao root diam 01/23/2023 3.2  cm Final       Assessment and Plan  Diagnoses and all orders for this visit:    1. Localized osteoporosis with current pathological fracture, initial encounter (Primary)  -     N-Telopeptide, Urine - Urine, Clean Catch; Future  -     Calcium, Ionized; Future  -     PTH, Intact; Future  -     Renal Function Panel; Future  -     CBC No  Differential; Future    2. Hypothyroidism due to Hashimoto thyroiditis  -     TSH Rfx On Abnormal To Free T4; Future    3. Mixed hyperlipidemia  -     atorvastatin (LIPITOR) 10 MG tablet; Take 1 tablet by mouth Every Night.  Dispense: 90 tablet; Refill: 2  -     Lipid panel; Future    4. Restless leg syndrome  -     Discontinue: gabapentin (NEURONTIN) 400 MG capsule; Take 1 tablet qam, take 2 tablets qpm  Dispense: 90 capsule; Refill: 2  -     gabapentin (NEURONTIN) 400 MG capsule; Take 1 tablet qam, take 2 tablets qpm  Dispense: 90 capsule; Refill: 2  -     Vitamin B12; Future    5. Generalized anxiety disorder  -     Discontinue: LORazepam (ATIVAN) 0.5 MG tablet; Take 1 tablet by mouth At Night As Needed for Anxiety.  Dispense: 30 tablet; Refill: 2  -     LORazepam (ATIVAN) 0.5 MG tablet; Take 1 tablet by mouth At Night As Needed for Anxiety.  Dispense: 30 tablet; Refill: 2    6. Gastroesophageal reflux disease without esophagitis    7. Vitamin D deficiency  -     Vitamin D,25-Hydroxy; Future    8. Debility  -     Ambulatory Referral to Physical Therapy for Evaluation & Treatment  -     Ambulatory Referral to Occupational Therapy for Evaluation & Treatment    9. Iron deficiency anemia, unspecified iron deficiency anemia type  -     Ferritin; Future  -     Iron and TIBC; Future    Patient has a history of osteoporosis, she has been on Fosamax for over 20 years. Long term bisphosphonate's >10 years has not shown benefit and has a theoretical increase in atypical femur fractures. I will check a BMD and make further considerations. Check osteoporosis labs  Recheck Tsh, her dose is subtherapeutic based on weight.   ASCVD prevention, continue lipitor  Patient has a history of restless leg syndrome she is treated with Requip as well as gabapentin.  This improves her symptoms, we have discussed other options in the future.  As we are titrating off Ativan below I will leave gabapentin dose the same.  Judd reviewed, urine  drug screen ordered, controlled drug agreement in chart.  Patient has ANDREW, she has been on lorazepam for many years, she has recurrent falls and is interested in titrating off this medication. I have discussed the risks and benefits. I will decrease to ativan 0.5mg hs. We will slowly decrease the dose as tolerated.   Patient has been on Dexilant for many years.  She reports symptoms of esophageal strictures and refractory reflux.  Will consider EGD in the future.  Check vitamin D due to osteoporosis on Fosamax.  Patient has severe debility, recurrent falls.  She has a fracture risk.  Will refer to physical and Occupational Therapy for consideration of strengthening, gait training, and DME equipment that may be helpful.  Restless leg syndrome, concern for iron deficiency due to long-term PPI use will check iron and B12.    BMI is within normal parameters. No other follow-up for BMI required.       New Medications:   New Medications Ordered This Visit   Medications    atorvastatin (LIPITOR) 10 MG tablet     Sig: Take 1 tablet by mouth Every Night.     Dispense:  90 tablet     Refill:  2    gabapentin (NEURONTIN) 400 MG capsule     Sig: Take 1 tablet qam, take 2 tablets qpm     Dispense:  90 capsule     Refill:  2    LORazepam (ATIVAN) 0.5 MG tablet     Sig: Take 1 tablet by mouth At Night As Needed for Anxiety.     Dispense:  30 tablet     Refill:  2       Discontinued Medications:   Medications Discontinued During This Encounter   Medication Reason    fexofenadine (ALLEGRA) 180 MG tablet *Therapy completed    atorvastatin (LIPITOR) 10 MG tablet Reorder    LORazepam (ATIVAN) 1 MG tablet Reorder    gabapentin (NEURONTIN) 400 MG capsule Reorder    LORazepam (ATIVAN) 0.5 MG tablet     gabapentin (NEURONTIN) 400 MG capsule     alendronate (FOSAMAX) 70 MG tablet                  Follow Up:   Return in about 1 month (around 11/1/2024).    Patient was given instructions and counseling regarding her condition or for health  maintenance advice. Please see specific information pulled into the AVS if appropriate.       This document has been electronically signed by Isac Bang DO   October 1, 2024 14:15 EDT    Dictated Utilizing Dragon Dictation: Part of this note may be an electronic transcription/translation of spoken language to printed text using the Dragon Dictation System.

## 2024-10-02 ENCOUNTER — TELEPHONE (OUTPATIENT)
Dept: FAMILY MEDICINE CLINIC | Facility: CLINIC | Age: 84
End: 2024-10-02
Payer: MEDICARE

## 2024-10-02 LAB
25(OH)D3 SERPL-MCNC: 43.4 NG/ML (ref 30–100)
ALBUMIN SERPL-MCNC: 3.9 G/DL (ref 3.5–5.2)
ANION GAP SERPL CALCULATED.3IONS-SCNC: 11 MMOL/L (ref 5–15)
BUN SERPL-MCNC: 12 MG/DL (ref 8–23)
BUN/CREAT SERPL: 11.7 (ref 7–25)
CA-I SERPL ISE-MCNC: 1.24 MMOL/L (ref 1.15–1.35)
CALCIUM SPEC-SCNC: 9.5 MG/DL (ref 8.6–10.5)
CHLORIDE SERPL-SCNC: 102 MMOL/L (ref 98–107)
CHOLEST SERPL-MCNC: 123 MG/DL (ref 0–200)
CO2 SERPL-SCNC: 25 MMOL/L (ref 22–29)
CREAT SERPL-MCNC: 1.03 MG/DL (ref 0.57–1)
DEPRECATED RDW RBC AUTO: 43.2 FL (ref 37–54)
EGFRCR SERPLBLD CKD-EPI 2021: 53.4 ML/MIN/1.73
ERYTHROCYTE [DISTWIDTH] IN BLOOD BY AUTOMATED COUNT: 12.6 % (ref 12.3–15.4)
FERRITIN SERPL-MCNC: 102 NG/ML (ref 13–150)
GLUCOSE SERPL-MCNC: 86 MG/DL (ref 65–99)
HCT VFR BLD AUTO: 39.8 % (ref 34–46.6)
HDLC SERPL-MCNC: 48 MG/DL (ref 40–60)
HGB BLD-MCNC: 12.8 G/DL (ref 12–15.9)
IRON 24H UR-MRATE: 65 MCG/DL (ref 37–145)
IRON SATN MFR SERPL: 20 % (ref 20–50)
LDLC SERPL CALC-MCNC: 63 MG/DL (ref 0–100)
LDLC/HDLC SERPL: 1.33 {RATIO}
MCH RBC QN AUTO: 30.3 PG (ref 26.6–33)
MCHC RBC AUTO-ENTMCNC: 32.2 G/DL (ref 31.5–35.7)
MCV RBC AUTO: 94.3 FL (ref 79–97)
PHOSPHATE SERPL-MCNC: 3.3 MG/DL (ref 2.5–4.5)
PLATELET # BLD AUTO: 240 10*3/MM3 (ref 140–450)
PMV BLD AUTO: 12 FL (ref 6–12)
POTASSIUM SERPL-SCNC: 4.1 MMOL/L (ref 3.5–5.2)
PTH-INTACT SERPL-MCNC: 45 PG/ML (ref 15–65)
RBC # BLD AUTO: 4.22 10*6/MM3 (ref 3.77–5.28)
SODIUM SERPL-SCNC: 138 MMOL/L (ref 136–145)
TIBC SERPL-MCNC: 325 MCG/DL (ref 298–536)
TRANSFERRIN SERPL-MCNC: 218 MG/DL (ref 200–360)
TRIGL SERPL-MCNC: 55 MG/DL (ref 0–150)
TSH SERPL DL<=0.05 MIU/L-ACNC: 1.14 UIU/ML (ref 0.27–4.2)
VIT B12 BLD-MCNC: 689 PG/ML (ref 211–946)
VLDLC SERPL-MCNC: 12 MG/DL (ref 5–40)
WBC NRBC COR # BLD AUTO: 6.38 10*3/MM3 (ref 3.4–10.8)

## 2024-10-02 NOTE — TELEPHONE ENCOUNTER
----- Message from Isac Bang sent at 10/2/2024  3:30 PM EDT -----  Please let the patient know her labs look great, no abnormalities at all. No changes to the current plan.

## 2024-10-04 LAB
COLLAGEN NTX UR-SCNC: 143 NMOL BCE
COLLAGEN NTX/CREAT UR-SRTO: 26 NM BCE/MM CR (ref 0–89)
CREAT UR-MCNC: 62.4 MG/DL
INTERPRETIVE GUIDE:: NORMAL

## 2024-10-18 DIAGNOSIS — G25.81 RESTLESS LEG SYNDROME: ICD-10-CM

## 2024-10-18 RX ORDER — GABAPENTIN 400 MG/1
CAPSULE ORAL
Refills: 0 | OUTPATIENT
Start: 2024-10-18

## 2024-11-05 ENCOUNTER — HOSPITAL ENCOUNTER (OUTPATIENT)
Dept: PHYSICAL THERAPY | Facility: HOSPITAL | Age: 84
Setting detail: THERAPIES SERIES
Discharge: HOME | End: 2024-11-05
Payer: MEDICARE

## 2024-11-05 DIAGNOSIS — R53.81 DEBILITY: Primary | ICD-10-CM

## 2024-11-05 PROCEDURE — 97162 PT EVAL MOD COMPLEX 30 MIN: CPT | Performed by: PHYSICAL THERAPIST

## 2024-11-05 NOTE — THERAPY EVALUATION
"    Physical Therapy Initial Evaluation and Plan of Care    Patient: Anette Dunham   : 1940  Referring practitioner: Isac Bang DO  Date of Initial Visit: 2024  Today's Date: 2024  Patient seen for 1 session         Visit Diagnoses:    ICD-10-CM ICD-9-CM   1. Debility  R53.81 799.3         STEVENSON/56  TU seconds, 31 seconds      Subjective Evaluation    History of Present Illness  Mechanism of injury: Pt reports she has recently experienced falls, stating she does not realize she's falling until she hits the floor. The patient states she has not passed out or lost consciousness. The patient reports if she bends to  an object off the floor she will lose her balance and fall. She states she holds on to furniture at home while walking, reporting she does not have room in the house to use a walker. The patient reports she has fallen \"probably once\" in the last two weeks. She states she feels \"weak in my legs\". The patient reports she is able to transfer on/off the commode independently, however has difficulty getting into/out of the bath. She states her bed is tall, resulting in difficulty getting into bed at night. The patient reports she broke her hip approximately five years ago, stating weakness and falls have increased since surgery.      Precautions and Work Restrictions: PacemakerPain  Aggravating factors: ambulation, squatting, standing, stairs and lifting  Progression: worsening    Social Support  Lives in: one-story house (2 exterior steps with no handrails.)  Lives with: adult children    Hand dominance: right    Patient Goals  Patient goals for therapy: improved balance, increased strength, independence with ADLs/IADLs and return to sport/leisure activities           Objective          Neurological Testing     Sensation     Lumbar   Left   Intact: light touch    Right   Intact: light touch    Strength/Myotome Testing     Left Hip   Planes of Motion   Flexion: " 4  Abduction: 4-  Adduction: 4-    Right Hip   Planes of Motion   Flexion: 4  Abduction: 4-  Adduction: 4-    Left Knee   Flexion: 4-  Extension: 4-    Right Knee   Flexion: 3+  Extension: 3+    Left Ankle/Foot   Dorsiflexion: 3+    Right Ankle/Foot   Dorsiflexion: 3+    Ambulation     Ambulation: Level Surfaces   Ambulation without assistive device: minimum assist    Additional Level Surfaces Ambulation Details  Min assist required secondary to impaired balance.    Observational Gait   Gait: antalgic   Decreased walking speed.     Functional Assessment     Comments  Sit<>stand with SBA  Supine <> sit independently          Assessment & Plan       Assessment  Impairments: abnormal coordination, abnormal gait, activity intolerance, impaired balance, impaired physical strength, lacks appropriate home exercise program and safety issue   Functional limitations: carrying objects, lifting, walking, pulling, pushing, stooping, reaching overhead and unable to perform repetitive tasks   Assessment details: The patient is an 84 year old female presenting to the clinic with impaired balance and strength. She arrived without an assistive device, ambulating with antalgic gait. CGA/min assist was provided with gait for decreased risk of falls. The patient scored 19/56 on the Gilbert Balance Scale and performed the TUG in 25 seconds and 31 seconds. CGA was  provided during TUG. The patient was educated on the benefits of using an assistive device, with patient verbalizing understanding.  The patient would benefit from skilled physical therapy to address functional limitations, impairments, balance, and gait. She will be progressed as indicated to achieve max function.  Prognosis: good    Goals  Plan Goals: ST weeks  1. Pt and family will be instructed in HEP for improved independence and home safety.  2. Pt will score at least 25/56 on the Gilbert Balance Scale for improved balance and decreased risk of falls.  3. Pt will  demonstrate  the ability to stand 5 minutes with UE support for improved ability to stand at the bathroom sink while performing ADLs.  4. Pt will ambulate 150' with a-a-d and CGA for improved independence and decreased risk of falls.    LT weeks  1. Pt will report the ability to get into/out of the bath independently, with no loss of balance, for improved safety and decreased risk of falls.  2. Pt will score at least 40/56 on the Gilbert Balance Scale for improved safety and decreased risk of falls.  3. Pt will perform TUG, with a-a-d and CGA, in less than 20 seconds for improved functional independence.    Plan  Therapy options: will be seen for skilled therapy services  Planned modality interventions: thermotherapy (hydrocollator packs) and cryotherapy  Planned therapy interventions: abdominal trunk stabilization, ADL retraining, balance/weight-bearing training, body mechanics training, functional ROM exercises, gait training, home exercise program, joint mobilization, manual therapy, motor coordination training, neuromuscular re-education, postural training, soft tissue mobilization, spinal/joint mobilization, strengthening, stretching, therapeutic activities and transfer training  Frequency: 2x week  Duration in weeks: 12  Treatment plan discussed with: patient  Plan details: Progress as indicated to achieve max function.    Moderate Evaluation  18912  Re-evaluation   93999    Therapeutic exercise  48265  Therapeutic activity    21248  Neuromuscular re-education   77789  Manual therapy   10569  Gait training  98163    Moist heat/cryotherapy 35081               Timed:         Manual Therapy:         mins  73659;     Therapeutic Exercise:         mins  13302;     Neuromuscular Alex:        mins  71541;    Therapeutic Activity:          mins  11308;     Gait Training:           mins  81605;     Ultrasound:          mins  81465;    Ionto                                   mins   06822  Self Care                             mins   59803      Un-Timed:  Electrical Stimulation:         mins  37485 ( );  Dry Needling          mins self-pay  Traction          mins 58085  Low Eval          Mins  41100  Mod Eval     60     Mins  40735  High Eval                            Mins  50712  Canalith Repos         mins 07812      Timed Treatment:   60   mins   Total Treatment:     60   mins          PT: Ashley Claudene Dalton, PT     License Number: [id number]  Electronically signed by Ashley Claudene Dalton, PT, 11/05/24, 10:10 AM EST    Certification Period: 11/5/2024 thru 2/2/2025  I certify that the therapy services are furnished while this patient is under my care.  The services outlined above are required by this patient, and will be reviewed every 90 days.         Physician Signature:__________________________________________________    PHYSICIAN: Isac Bang DO  NPI: [id number]                                      DATE:      Please sign and return via fax to [email or web address] . Thank you, [facility].

## 2024-11-06 ENCOUNTER — OFFICE VISIT (OUTPATIENT)
Dept: FAMILY MEDICINE CLINIC | Facility: CLINIC | Age: 84
End: 2024-11-06
Payer: MEDICARE

## 2024-11-06 VITALS
BODY MASS INDEX: 23.14 KG/M2 | SYSTOLIC BLOOD PRESSURE: 138 MMHG | TEMPERATURE: 97.3 F | HEART RATE: 90 BPM | HEIGHT: 63 IN | OXYGEN SATURATION: 96 % | DIASTOLIC BLOOD PRESSURE: 82 MMHG | WEIGHT: 130.6 LBS

## 2024-11-06 DIAGNOSIS — E06.3 HASHIMOTO'S THYROIDITIS: ICD-10-CM

## 2024-11-06 DIAGNOSIS — Z78.0 POST-MENOPAUSAL: ICD-10-CM

## 2024-11-06 DIAGNOSIS — F41.1 GENERALIZED ANXIETY DISORDER: Primary | ICD-10-CM

## 2024-11-06 DIAGNOSIS — R10.84 GENERALIZED ABDOMINAL PAIN: ICD-10-CM

## 2024-11-06 PROCEDURE — 3075F SYST BP GE 130 - 139MM HG: CPT | Performed by: STUDENT IN AN ORGANIZED HEALTH CARE EDUCATION/TRAINING PROGRAM

## 2024-11-06 PROCEDURE — 3079F DIAST BP 80-89 MM HG: CPT | Performed by: STUDENT IN AN ORGANIZED HEALTH CARE EDUCATION/TRAINING PROGRAM

## 2024-11-06 PROCEDURE — 99214 OFFICE O/P EST MOD 30 MIN: CPT | Performed by: STUDENT IN AN ORGANIZED HEALTH CARE EDUCATION/TRAINING PROGRAM

## 2024-11-06 PROCEDURE — 1126F AMNT PAIN NOTED NONE PRSNT: CPT | Performed by: STUDENT IN AN ORGANIZED HEALTH CARE EDUCATION/TRAINING PROGRAM

## 2024-11-06 RX ORDER — LORAZEPAM 0.5 MG/1
0.5 TABLET ORAL NIGHTLY PRN
Qty: 30 TABLET | Refills: 2 | Status: SHIPPED | OUTPATIENT
Start: 2024-11-06

## 2024-11-06 RX ORDER — ALENDRONATE SODIUM 70 MG/1
70 TABLET ORAL
COMMUNITY
End: 2024-11-06

## 2024-11-06 RX ORDER — LEVOTHYROXINE SODIUM 50 UG/1
50 TABLET ORAL DAILY
Qty: 90 TABLET | Refills: 3 | Status: SHIPPED | OUTPATIENT
Start: 2024-11-06

## 2024-11-06 RX ORDER — SERTRALINE HYDROCHLORIDE 25 MG/1
25 TABLET, FILM COATED ORAL DAILY
Qty: 30 TABLET | Refills: 2 | Status: SHIPPED | OUTPATIENT
Start: 2024-11-06

## 2024-11-07 NOTE — PROGRESS NOTES
Chief Complaint  Gas, Anxiety, and Depression    HPI:   Gas    Anxiety  Her past medical history is significant for depression.   Depression  Her past medical history is significant for depression.      Anette Dunham is a 84 y.o. female who presents today to [facility] for Gas, Anxiety, and Depression. Patient seen in follow up for hypertension, atrial fibrillation, anxiety, insomnia and GERD.     At last visit we decreased her ativan to 0.5mg. She has tolerated this well but has noted an increase in anxiety.     She also reports trapped gas in her abdomen, she denies constipation.     Previous History:   Past Medical History:   Diagnosis Date    Anxiety     Arrhythmia     Atrial fibrillation     Coronary artery disease     Disease of thyroid gland     Dysfunctional gallbladder     Fracture of two ribs     GERD (gastroesophageal reflux disease)     Hypertension     Lumbar compression fracture 07/2022    Osteoporosis       Past Surgical History:   Procedure Laterality Date    ABDOMINAL SURGERY      CARDIAC ELECTROPHYSIOLOGY PROCEDURE N/A 09/23/2022    Procedure: Device Implant. Leadless PPM. Hold eliquis x1 day prior to procedure.;  Surgeon: Jefe Zhao MD;  Location:  [facility];  Service: Cardiology;  Laterality: N/A;    CARDIAC ELECTROPHYSIOLOGY PROCEDURE N/A 09/23/2022    Procedure: AV Node;  Surgeon: Jefe Zhao MD;  Location:  [facility];  Service: Cardiovascular;  Laterality: N/A;    CATARACT EXTRACTION      GALLBLADDER SURGERY      HIP HEMIARTHROPLASTY Left 05/29/2018    Procedure: LEFT HIP BIPOLAR HEMIARTHROPLASTY;  Surgeon: Trevon Arriaga MD;  Location:  [facility];  Service: Orthopedics    PACEMAKER IMPLANTATION        Social History     Socioeconomic History    Marital status:    Tobacco Use    Smoking status: Some Days     Current packs/day: 0.00     Types: Cigarettes     Start date: 3/10/2007     Last attempt to quit:  3/10/2022     Years since quittin.6     Passive exposure: Past    Smokeless tobacco: Current     Types: Snuff, Chew   Vaping Use    Vaping status: Never Used   Substance and Sexual Activity    Alcohol use: No    Drug use: No    Sexual activity: Defer      Health Maintenance Due   Topic Date Due    DXA SCAN  Never done    ZOSTER VACCINE (1 of 2) Never done    RSV Vaccine - Adults (1 - 1-dose 75+ series) Never done    ANNUAL WELLNESS VISIT  Never done    COVID-19 Vaccine (2024-25 season) Never done        Current Medications:  Current Outpatient Medications   Medication Sig Dispense Refill    apixaban (ELIQUIS) 2.5 MG tablet tablet Take 1 tablet by mouth 2 (Two) Times a Day. 180 tablet 2    atorvastatin (LIPITOR) 10 MG tablet Take 1 tablet by mouth Every Night. 90 tablet 2    dexlansoprazole (DEXILANT) 60 MG capsule Take 1 capsule by mouth Every Morning Before Breakfast. 30 capsule 11    erythromycin (ROMYCIN) 5 MG/GM ophthalmic ointment Administer 1 Application to the right eye 3 times a day.      fluticasone (FLONASE) 50 MCG/ACT nasal spray Administer 2 sprays into the nostril(s) as directed by provider Daily.      furosemide (LASIX) 20 MG tablet Take 1 tablet by mouth As Needed (lower exrremity swelling). 30 tablet 5    gabapentin (NEURONTIN) 400 MG capsule Take 1 tablet qam, take 2 tablets qpm 90 capsule 2    levothyroxine (SYNTHROID, LEVOTHROID) 50 MCG tablet Take 1 tablet by mouth Daily. 90 tablet 3    LORazepam (ATIVAN) 0.5 MG tablet Take 1 tablet by mouth At Night As Needed for Anxiety. 30 tablet 2    metoprolol tartrate (LOPRESSOR) 25 MG tablet Take 1.5 tablets by mouth 2 (Two) Times a Day. 270 tablet 3    nitroglycerin (NITROSTAT) 0.4 MG SL tablet 1 under the tongue as needed for angina, may repeat q5mins for up three doses 30 tablet 11    rOPINIRole (REQUIP) 1 MG tablet Take 1 tablet by mouth At Night As Needed (restless leg). Take 1 hour before bedtime.      valACYclovir (VALTREX) 500 MG tablet  "Take 1 tablet by mouth Daily.      sertraline (Zoloft) 25 MG tablet Take 1 tablet by mouth Daily. 30 tablet 2    Simethicone 125 MG tablet Take 1 tablet by mouth 2 (Two) Times a Day As Needed (Gas). 120 tablet 1     No current facility-administered medications for this visit.       Allergies:   No Known Allergies    Vitals:   /82 (BP Location: Right arm, Patient Position: Sitting, Cuff Size: Adult)   Pulse 90   Temp 97.3 °F (36.3 °C) (Temporal)   Ht 160 cm (63\")   Wt 59.2 kg (130 lb 9.6 oz)   SpO2 96%   BMI 23.13 kg/m²     Estimated body mass index is 23.13 kg/m² as calculated from the following:    Height as of this encounter: 160 cm (63\").    Weight as of this encounter: 59.2 kg (130 lb 9.6 oz).    Anette Dunham  reports that she has been smoking cigarettes. She started smoking about 17 years ago. She has been exposed to tobacco smoke. Her smokeless tobacco use includes snuff and chew.            Physical Exam:   Physical Exam  Constitutional:       Appearance: Normal appearance.   HENT:      Mouth/Throat:      Mouth: Mucous membranes are moist.   Cardiovascular:      Rate and Rhythm: Normal rate and regular rhythm.   Pulmonary:      Effort: Pulmonary effort is normal.      Breath sounds: Normal breath sounds. No wheezing or rhonchi.   Musculoskeletal:         General: Normal range of motion.   Skin:     General: Skin is warm and dry.   Neurological:      Mental Status: She is alert.   Psychiatric:         Mood and Affect: Mood normal.          Lab Results:   Lab on 10/01/2024   Component Date Value Ref Range Status    N Telopeptide, Urine 10/01/2024 143  Not Estab. nmol BCE Final    Creatinine, Urine 10/01/2024 62.4  Not Estab. mg/dL Final    N-telo/Creat. Ratio 10/01/2024 26  0 - 89 nM BCE/mM Cr Final    Interpretive Guide: 10/01/2024 Comment   Final    Comment: The N-telopeptide and Creatinine are used to calculate  the N-telo/Creat.  Ratio which is referred to as \"NTx\".  Suggested guidelines for " the clinical use of NTx are as  follows:   1.  Menopausal Women not on Hormone Replacement Therapy       (HRT):  Women with a baseline NTx value >38 are at       significant risk for a decrease in bone mineral       density (BMD) after 1 year compared to women on HRT.       The probability of a decline in BMD increases       with NTx value as follows:  (1):           Baseline NTx      Probability of Decrease in BMD             18- 38                1.4      p=0.28             38- 51                2.5      p=0.03             51- 67                3.8      p=0.0006                            17.3      p=0.0001  2. Menopausal Women Receiving Antiresorptive Therapy: The     probability that treatment is effective after three     months is increased when the measured NTx value is     <or=38 nM BCE/mM CRT, or NTx has decreased >or=30% from                                baseline.[1]  3. Patients with Paget's Disease of Bone:     The probability that treatment is effective after one     month is increased when the measured NTx value is within     the reference range, or NTx has decreased >or=30%     from baseline.[2]     1. Hemanth CH, Vasquez NH, Cuauhtemoc GS, et al. Am J Med,        102:29-37,1997. (1):M757, 1996.     2. Bone H, Juan C J, et al. J Bone Min Res.11(1):M757,1996    Ionized Calcium 10/01/2024 1.24  1.15 - 1.35 mmol/L Final    PTH, Intact 10/01/2024 45.0  15.0 - 65.0 pg/mL Final    Glucose 10/01/2024 86  65 - 99 mg/dL Final    BUN 10/01/2024 12  8 - 23 mg/dL Final    Creatinine 10/01/2024 1.03 (H)  0.57 - 1.00 mg/dL Final    Sodium 10/01/2024 138  136 - 145 mmol/L Final    Potassium 10/01/2024 4.1  3.5 - 5.2 mmol/L Final    Chloride 10/01/2024 102  98 - 107 mmol/L Final    CO2 10/01/2024 25.0  22.0 - 29.0 mmol/L Final    Calcium 10/01/2024 9.5  8.6 - 10.5 mg/dL Final    Albumin 10/01/2024 3.9  3.5 - 5.2 g/dL Final    Phosphorus 10/01/2024 3.3  2.5 - 4.5 mg/dL Final    Anion Gap 10/01/2024 11.0  5.0 - 15.0  mmol/L Final    BUN/Creatinine Ratio 10/01/2024 11.7  7.0 - 25.0 Final    eGFR 10/01/2024 53.4 (L)  >60.0 mL/min/1.73 Final    25 Hydroxy, Vitamin D 10/01/2024 43.4  30.0 - 100.0 ng/ml Final    TSH 10/01/2024 1.140  0.270 - 4.200 uIU/mL Final    Total Cholesterol 10/01/2024 123  0 - 200 mg/dL Final    Triglycerides 10/01/2024 55  0 - 150 mg/dL Final    HDL Cholesterol 10/01/2024 48  40 - 60 mg/dL Final    LDL Cholesterol  10/01/2024 63  0 - 100 mg/dL Final    VLDL Cholesterol 10/01/2024 12  5 - 40 mg/dL Final    LDL/HDL Ratio 10/01/2024 1.33   Final    WBC 10/01/2024 6.38  3.40 - 10.80 10*3/mm3 Final    RBC 10/01/2024 4.22  3.77 - 5.28 10*6/mm3 Final    Hemoglobin 10/01/2024 12.8  12.0 - 15.9 g/dL Final    Hematocrit 10/01/2024 39.8  34.0 - 46.6 % Final    MCV 10/01/2024 94.3  79.0 - 97.0 fL Final    MCH 10/01/2024 30.3  26.6 - 33.0 pg Final    MCHC 10/01/2024 32.2  31.5 - 35.7 g/dL Final    RDW 10/01/2024 12.6  12.3 - 15.4 % Final    RDW-SD 10/01/2024 43.2  37.0 - 54.0 fl Final    MPV 10/01/2024 12.0  6.0 - 12.0 fL Final    Platelets 10/01/2024 240  140 - 450 10*3/mm3 Final    Ferritin 10/01/2024 102.00  13.00 - 150.00 ng/mL Final    Iron 10/01/2024 65  37 - 145 mcg/dL Final    Iron Saturation (TSAT) 10/01/2024 20  20 - 50 % Final    Transferrin 10/01/2024 218  200 - 360 mg/dL Final    TIBC 10/01/2024 325  298 - 536 mcg/dL Final    Vitamin B-12 10/01/2024 689  211 - 946 pg/mL Final       Assessment and Plan  Diagnoses and all orders for this visit:    1. Generalized anxiety disorder (Primary)  -     sertraline (Zoloft) 25 MG tablet; Take 1 tablet by mouth Daily.  Dispense: 30 tablet; Refill: 2  -     LORazepam (ATIVAN) 0.5 MG tablet; Take 1 tablet by mouth At Night As Needed for Anxiety.  Dispense: 30 tablet; Refill: 2    2. Post-menopausal  -     dexa bone density axial; Future    3. Hashimoto's thyroiditis  -     levothyroxine (SYNTHROID, LEVOTHROID) 50 MCG tablet; Take 1 tablet by mouth Daily.  Dispense: 90  tablet; Refill: 3    4. Generalized abdominal pain    Other orders  -     Simethicone 125 MG tablet; Take 1 tablet by mouth 2 (Two) Times a Day As Needed (Gas).  Dispense: 120 tablet; Refill: 1    Patient seen in follow up for ANDREW, she has done well with decreased ativan. I will continue 0.5mg ativan. She has some generalized increase in anxiety. I will start zoloft and titrate up as we are coming off ativan.   Recommend DEXA scan  Continue levothyroxine.   Her abdomen is soft, no red flag signs, no diverticulitis, absent GB. No RLQ tenderness. Will treat with simethicone.     BMI is within normal parameters. No other follow-up for BMI required.       New Medications:   New Medications Ordered This Visit   Medications    sertraline (Zoloft) 25 MG tablet     Sig: Take 1 tablet by mouth Daily.     Dispense:  30 tablet     Refill:  2    Simethicone 125 MG tablet     Sig: Take 1 tablet by mouth 2 (Two) Times a Day As Needed (Gas).     Dispense:  120 tablet     Refill:  1    LORazepam (ATIVAN) 0.5 MG tablet     Sig: Take 1 tablet by mouth At Night As Needed for Anxiety.     Dispense:  30 tablet     Refill:  2    levothyroxine (SYNTHROID, LEVOTHROID) 50 MCG tablet     Sig: Take 1 tablet by mouth Daily.     Dispense:  90 tablet     Refill:  3       Discontinued Medications:   Medications Discontinued During This Encounter   Medication Reason    alendronate (FOSAMAX) 70 MG tablet *Therapy completed    LORazepam (ATIVAN) 0.5 MG tablet Reorder    levothyroxine (SYNTHROID, LEVOTHROID) 50 MCG tablet Reorder                 Follow Up:   Return in about 2 months (around 1/6/2025).    Patient was given instructions and counseling regarding her condition or for health maintenance advice. Please see specific information pulled into the AVS if appropriate.       This document has been electronically signed by Isac Bang DO   November 7, 2024 09:48 EST    Dictated Utilizing Dragon Dictation: Part of this note may be an  electronic transcription/translation of spoken language to printed text using the Dragon Dictation System.

## 2024-11-08 ENCOUNTER — TELEPHONE (OUTPATIENT)
Dept: CARDIOLOGY | Facility: CLINIC | Age: 84
End: 2024-11-08
Payer: MEDICARE

## 2024-11-08 NOTE — TELEPHONE ENCOUNTER
Caller: Anette Dunham    Relationship: Self    Best call back number: [phone]     What test/procedure requested: STOMACH GAS     When is it needed: ASAP    Where is the test/procedure going to be performed: [facility]    Additional information or concerns: NEEDS PRIOR AUTH

## 2024-11-11 NOTE — TELEPHONE ENCOUNTER
I contacted the pharmacy and asked them what medication they were asking a PA for. They listed a medication that was not on the pt's med list. I got them the correct order and they are getting it ready for the pt. I informed the pt of the medication being prepared for them.

## 2024-11-12 ENCOUNTER — HOSPITAL ENCOUNTER (OUTPATIENT)
Dept: PHYSICAL THERAPY | Facility: HOSPITAL | Age: 84
Discharge: HOME | End: 2024-11-12
Admitting: STUDENT IN AN ORGANIZED HEALTH CARE EDUCATION/TRAINING PROGRAM
Payer: MEDICARE

## 2024-11-12 DIAGNOSIS — R53.81 DEBILITY: Primary | ICD-10-CM

## 2024-11-12 PROCEDURE — 97116 GAIT TRAINING THERAPY: CPT | Performed by: PHYSICAL THERAPIST

## 2024-11-12 PROCEDURE — 97110 THERAPEUTIC EXERCISES: CPT | Performed by: PHYSICAL THERAPIST

## 2024-11-12 NOTE — THERAPY TREATMENT NOTE
"Physical Therapy Daily Treatment Note      Patient: Anette Dunham   : 1940  Referring practitioner: Isac Bang DO  Date of Initial Visit: Type: THERAPY  Episode: Debility  Today's Date: 2024  Patient seen for 2 sessions       Visit Diagnoses:    ICD-10-CM ICD-9-CM   1. Debility  R53.81 799.3       Subjective Evaluation    History of Present Illness    Subjective comment: Pt reports feeling \"weak\" prir to today's session. She states she \"almost fell\" a few days ago but was able to catch herself. The patient reports, \"if my knees were stronger I'd do a lot better\".       Objective   See Exercise, Manual, and Modality Logs for complete treatment.       Assessment & Plan       Assessment  Assessment details: Therapeutic exercises were performed for improved bilateral lower extremity strength and stability. Gait training was performed with a SBQC, with CGA/min assist required. Tactile and verbal cues were provided for proper management and sequence of the assistive device. The patient was educated on the importance of a safe maude for decreased risk of falls. Pt's family was also educated on gait safety. Patient and family verbalized understanding.    Plan  Plan details: Progress as indicated to achieve max function.          Timed:         Manual Therapy:         mins  62867;     Therapeutic Exercise:    30     mins  72067;     Neuromuscular Alex:        mins  03518;    Therapeutic Activity:          mins  73724;     Gait Training:      15     mins  90766;     Ultrasound:          mins  62659;    Ionto                                   mins   20152  Self Care                            mins   78181  Canalith Repos         mins 74589      Un-Timed:  Electrical Stimulation:         mins  19744 ( );  Dry Needling          mins self-pay  Traction          mins 57411      Timed Treatment:   45   mins   Total Treatment:     45   mins    Ashley Claudene Dalton, [place]ON[place]  [place] License: " [id number]      Electronically signed by Ashley Claudene Dalton, PT, 11/12/24, 10:22 AM EST

## 2024-11-14 ENCOUNTER — HOSPITAL ENCOUNTER (OUTPATIENT)
Dept: PHYSICAL THERAPY | Facility: HOSPITAL | Age: 84
Discharge: HOME | End: 2024-11-14
Admitting: STUDENT IN AN ORGANIZED HEALTH CARE EDUCATION/TRAINING PROGRAM
Payer: MEDICARE

## 2024-11-14 DIAGNOSIS — R53.81 DEBILITY: Primary | ICD-10-CM

## 2024-11-14 PROCEDURE — 97110 THERAPEUTIC EXERCISES: CPT | Performed by: PHYSICAL THERAPIST

## 2024-11-14 PROCEDURE — 97116 GAIT TRAINING THERAPY: CPT | Performed by: PHYSICAL THERAPIST

## 2024-11-14 NOTE — THERAPY TREATMENT NOTE
"Physical Therapy Daily Treatment Note      Patient: Anette Dunham   : 1940  Referring practitioner: Isac Bang DO  Date of Initial Visit: Type: THERAPY  Episode: Debility  Today's Date: 2024  Patient seen for 3 sessions       Visit Diagnoses:    ICD-10-CM ICD-9-CM   1. Debility  R53.81 799.3       Subjective Evaluation    History of Present Illness    Subjective comment: Pt reports no falls since her previous treatment session. She states she was able to get a new quad cane that is \"more stable\".       Objective   See Exercise, Manual, and Modality Logs for complete treatment.       Assessment & Plan       Assessment  Assessment details: Therapeutic exercises were performed for improved bilateral lower extremity strength and stability. Standing with feet together and eyes closed was introduced, with min/mod assist required to maintain balance. Gait training was performed with a SBQC, with CGA required. The patient ambulated 140' x 2 with gait training. Tactile and verbal cues were provided for proper management and sequence of the assistive device. The patient was educated on the importance of a safe maude for decreased risk of falls.    Plan  Plan details: Progress as indicated to achieve max function.          Timed:         Manual Therapy:         mins  53140;     Therapeutic Exercise:    30     mins  06171;     Neuromuscular Alex:        mins  45917;    Therapeutic Activity:          mins  67161;     Gait Trainin     mins  74283;     Ultrasound:          mins  76824;    Ionto                                   mins   85429  Self Care                            mins   37879  Canalith Repos         mins 42611      Un-Timed:  Electrical Stimulation:         mins  60024 (MC );  Dry Needling          mins self-pay  Traction          mins 47929      Timed Treatment:   38   mins   Total Treatment:     38   mins    Ashley Claudene Dalton, PT  [place] License: [id number]      Electronically " signed by Ashley Claudene Dalton, PT, 11/14/24, 10:16 AM EST

## 2024-11-21 ENCOUNTER — HOSPITAL ENCOUNTER (OUTPATIENT)
Dept: PHYSICAL THERAPY | Facility: HOSPITAL | Age: 84
Discharge: HOME | End: 2024-11-21
Payer: MEDICARE

## 2024-11-21 DIAGNOSIS — R53.81 DEBILITY: Primary | ICD-10-CM

## 2024-11-21 PROCEDURE — 97116 GAIT TRAINING THERAPY: CPT | Performed by: PHYSICAL THERAPIST

## 2024-11-21 PROCEDURE — 97110 THERAPEUTIC EXERCISES: CPT | Performed by: PHYSICAL THERAPIST

## 2024-11-21 NOTE — THERAPY TREATMENT NOTE
"Physical Therapy Daily Treatment Note      Patient: Anette Dunham   : 1940  Referring practitioner: Isac Bang DO  Date of Initial Visit: Type: THERAPY  Episode: Debility  Today's Date: 2024  Patient seen for 4 sessions       Visit Diagnoses:    ICD-10-CM ICD-9-CM   1. Debility  R53.81 799.3       Subjective Evaluation    History of Present Illness    Subjective comment: Pt reports falling yesterday while leaving the doctor's office. She states there was a crack in the concrete, resulting in her \"ankle turning and me falling\". She states she did not hit hard and is not having any ankle pain. The patient asked the best way to obtain a rollator. She was instructed to contact MD regarding an order for the insurance company.       Objective   See Exercise, Manual, and Modality Logs for complete treatment.       Assessment & Plan       Assessment  Assessment details: Therapeutic exercises were performed for improved BLE strength and stability. Therapeutic activities were performed, including stair navigation and toe-taps, with no reports of increased pain. Gait training was performed with a RW, for improved stability, with CGA provided. The patient reported improved stability with use of RW. Minimal rest breaks were required throughout today's session, with fatigue reported.    Plan  Plan details: Progress as indicated for improved mobility, balance, and function.          Timed:         Manual Therapy:         mins  97086;     Therapeutic Exercise:    25     mins  47718;     Neuromuscular Alex:        mins  77156;    Therapeutic Activity:     10     mins  77972;     Gait Training:      10     mins  14282;     Ultrasound:          mins  41693;    Ionto                                   mins   98006  Self Care                            mins   37578  Canalith Repos         mins 38902      Un-Timed:  Electrical Stimulation:         mins  68436 ( );  Dry Needling          mins " self-pay  Traction          mins 52203      Timed Treatment:   45   mins   Total Treatment:     45   mins    Ashley Claudene Dalton, PT  [place] License: [id number]      Electronically signed by Ashley Claudene Dalton, PT, 11/21/24, 1:11 PM EST

## 2024-11-26 ENCOUNTER — HOSPITAL ENCOUNTER (OUTPATIENT)
Dept: PHYSICAL THERAPY | Facility: HOSPITAL | Age: 84
Discharge: HOME | End: 2024-11-26
Admitting: STUDENT IN AN ORGANIZED HEALTH CARE EDUCATION/TRAINING PROGRAM
Payer: MEDICARE

## 2024-11-26 DIAGNOSIS — R53.81 DEBILITY: Primary | ICD-10-CM

## 2024-11-26 PROCEDURE — 97110 THERAPEUTIC EXERCISES: CPT | Performed by: PHYSICAL THERAPIST

## 2024-11-26 PROCEDURE — 97112 NEUROMUSCULAR REEDUCATION: CPT | Performed by: PHYSICAL THERAPIST

## 2024-11-26 NOTE — PROGRESS NOTES
Physical Therapy Daily Treatment Note      Patient: Anette Dunham   : 1940  Referring practitioner: Isac Bang DO  Date of Initial Visit: Type: THERAPY  Episode: Debility  Today's Date: 2024  Patient seen for 5 sessions       Visit Diagnoses:    ICD-10-CM ICD-9-CM   1. Debility  R53.81 799.3       Subjective Evaluation    History of Present Illness    Subjective comment: Patient notes that she continues to notice her balance being off.Pain  Current pain ratin           Objective   See Exercise, Manual, and Modality Logs for complete treatment.       Assessment & Plan       Assessment  Assessment details: Patient tolerated today's session well, with rest breaks provided as necessary due to fatigue.  Exercises progressed to include increased repetitions, with exercises focusing on LE strengthening.  Patient performed balance activities, with patient showing difficulty with semi-tandem stance.  CGA-mod assist required during balance activities for safety.  She will continue to be progressed per her tolerance and POC.          Timed:         Manual Therapy:         mins  88508;     Therapeutic Exercise:    30     mins  04938;     Neuromuscular Alex:    13    mins  70333;    Therapeutic Activity:          mins  66674;     Gait Training:           mins  87958;     Ultrasound:          mins  51678;    Ionto                                   mins   47282  Self Care                            mins   06655      Un-Timed:  Electrical Stimulation:         mins  12745 ( );  Dry Needling          mins self-pay  Traction          mins 06553  Canalith Repos         mins 16836    Timed Treatment:   43   mins   Total Treatment:     43   mins    Mary Becker PT  [place] License: [id number]  Electronically signed by Mary Becker PT, 24, 2:21 PM EST.

## 2024-12-05 ENCOUNTER — HOSPITAL ENCOUNTER (OUTPATIENT)
Dept: PHYSICAL THERAPY | Facility: HOSPITAL | Age: 84
Discharge: HOME | End: 2024-12-05
Admitting: STUDENT IN AN ORGANIZED HEALTH CARE EDUCATION/TRAINING PROGRAM
Payer: MEDICARE

## 2024-12-05 DIAGNOSIS — K21.9 GASTROESOPHAGEAL REFLUX DISEASE WITHOUT ESOPHAGITIS: ICD-10-CM

## 2024-12-05 DIAGNOSIS — R53.81 DEBILITY: Primary | ICD-10-CM

## 2024-12-05 PROCEDURE — 97112 NEUROMUSCULAR REEDUCATION: CPT | Performed by: PHYSICAL THERAPIST

## 2024-12-05 PROCEDURE — 97110 THERAPEUTIC EXERCISES: CPT | Performed by: PHYSICAL THERAPIST

## 2024-12-05 NOTE — THERAPY PROGRESS REPORT/RE-CERT
Physical Therapy Progress Note  Patient: Anette Dunham   : 1940  Referring practitioner: Isac Bang DO  Date of Initial Visit: Type: THERAPY  Episode: Debility  Today's Date: 2024  Patient seen for 6 sessions         Visit Diagnoses:    ICD-10-CM ICD-9-CM   1. Debility  R53.81 799.3       STEVENSON/56  TU seconds, 20 seconds  Clinical Progress: improved      Subjective Evaluation    History of Present Illness    Subjective comment: Pt reports she has noticed improvements since beginning physical therapy. She states she is now able to get in/out of the tub without assistance.         Objective          Neurological Testing     Sensation     Lumbar   Left   Intact: light touch    Right   Intact: light touch    Strength/Myotome Testing     Left Hip   Planes of Motion   Flexion: 4  Abduction: 4  Adduction: 4    Right Hip   Planes of Motion   Flexion: 4  Abduction: 4  Adduction: 4    Left Knee   Flexion: 4  Extension: 4    Right Knee   Flexion: 4  Extension: 4    Left Ankle/Foot   Dorsiflexion: 4    Right Ankle/Foot   Dorsiflexion: 4    Ambulation     Ambulation: Level Surfaces   Ambulation without assistive device: contact guard assist    Observational Gait   Gait: antalgic   Decreased walking speed.     Functional Assessment     Comments  Sit<>stand with SBA  Supine <> sit independently          Assessment & Plan       Assessment  Impairments: abnormal coordination, abnormal gait, activity intolerance, impaired balance, impaired physical strength, lacks appropriate home exercise program and safety issue   Functional limitations: carrying objects, lifting, walking, pulling, pushing, stooping, reaching overhead and unable to perform repetitive tasks   Assessment details: Outcome measures were obtained during today's session, with patient demonstrating improved BLE strength. The patient scored 26/56 on the Stevenson Balance Scale and performed the TUG in 20 seconds and 21 seconds. These scores have  improved from 19/56 on the STEVENSON and an average of 28 seconds on the TUG on 24. A rollator was used during TUG with SBA. The patient would benefit from continued skilled physical therapy to address functional limitations, impairments, balance, and gait. She will be progressed as indicated to achieve max function.  Prognosis: good    Goals  Plan Goals: ST weeks  1. Pt and family will be instructed in HEP for improved independence and home safety.      Met  2. Pt will score at least 25/56 on the Stevenson Balance Scale for improved balance and decreased risk of falls.      Met: 26/56  3. Pt will demonstrate  the ability to stand 5 minutes with UE support for improved ability to stand at the bathroom sink while performing ADLs.      Ongoin minutes  4. Pt will ambulate 150' with a-a-d and CGA for improved independence and decreased risk of falls.      Progressin' with rollator    LT weeks  1. Pt will report the ability to get into/out of the bath independently, with no loss of balance, for improved safety and decreased risk of falls.      Met  2. Pt will score at least 40/56 on the Stevenson Balance Scale for improved safety and decreased risk of falls.     Progressin/56  3. Pt will perform TUG, with a-a-d and CGA, in less than 20 seconds for improved functional independence.      Progressin seconds and 21 seconds on two trials    Plan  Therapy options: will be seen for skilled therapy services  Planned modality interventions: thermotherapy (hydrocollator packs) and cryotherapy  Planned therapy interventions: abdominal trunk stabilization, ADL retraining, balance/weight-bearing training, body mechanics training, functional ROM exercises, gait training, home exercise program, joint mobilization, manual therapy, motor coordination training, neuromuscular re-education, postural training, soft tissue mobilization, spinal/joint mobilization, strengthening, stretching, therapeutic activities and  transfer training  Frequency: 2x week  Duration in weeks: 8  Treatment plan discussed with: patient  Plan details: Progress as indicated to achieve max function.    Moderate Evaluation  23700  Re-evaluation   02069    Therapeutic exercise  98109  Therapeutic activity    34481  Neuromuscular re-education   78699  Manual therapy   67579  Gait training  91214    Moist heat/cryotherapy 04899            Recommendations: Continue as planned  Timeframe: 2 months  Prognosis to achieve goals: good      Timed:         Manual Therapy:         mins  93498;     Therapeutic Exercise:    15     mins  19159;     Neuromuscular Alex:    25    mins  47374;    Therapeutic Activity:          mins  00541;     Gait Training:           mins  48479;     Ultrasound:          mins  39432;    Ionto                                   mins   65149  Self Care                            mins   61148    Un-Timed:  Electrical Stimulation:         mins  98347 ( );  Dry Needling          mins self-pay  Traction          mins 79913  Re-Eval                               mins  46629  Canalith Repos         mins 81821    Timed Treatment:   40   mins   Total Treatment:     40   mins          PT: Ashley Claudene Dalton, PT     [place] License:  [id number]    Electronically signed by Ashley Claudene Dalton, PT, 12/05/24, 1:49 PM EST    Certification Period: 12/5/2024 thru 3/4/2025  I certify that the therapy services are furnished while this patient is under my care.  The services outlined above are required by this patient, and will be reviewed every 90 days.         Physician Signature:__________________________________________________    PHYSICIAN: Isac Bang DO  NPI: [id number]                                      DATE:  :     Please sign and return via fax to [email or web address] . Thank you, [facility] Physical Therapy

## 2024-12-06 RX ORDER — DEXLANSOPRAZOLE 60 MG/1
CAPSULE, DELAYED RELEASE ORAL
Qty: 30 CAPSULE | Refills: 5 | Status: SHIPPED | OUTPATIENT
Start: 2024-12-06

## 2024-12-10 ENCOUNTER — HOSPITAL ENCOUNTER (OUTPATIENT)
Dept: PHYSICAL THERAPY | Facility: HOSPITAL | Age: 84
Discharge: HOME | End: 2024-12-10
Admitting: STUDENT IN AN ORGANIZED HEALTH CARE EDUCATION/TRAINING PROGRAM
Payer: MEDICARE

## 2024-12-10 DIAGNOSIS — R53.81 DEBILITY: Primary | ICD-10-CM

## 2024-12-10 PROCEDURE — 97110 THERAPEUTIC EXERCISES: CPT | Performed by: PHYSICAL THERAPIST

## 2024-12-10 PROCEDURE — 97530 THERAPEUTIC ACTIVITIES: CPT | Performed by: PHYSICAL THERAPIST

## 2024-12-10 NOTE — THERAPY TREATMENT NOTE
Physical Therapy Daily Treatment Note      Patient: Anette Dunham   : 1940  Referring practitioner: Isac Bang DO  Date of Initial Visit: Type: THERAPY  Episode: Debility  Today's Date: 12/10/2024  Patient seen for 7 sessions       Visit Diagnoses:    ICD-10-CM ICD-9-CM   1. Debility  R53.81 799.3       Subjective Evaluation    History of Present Illness    Subjective comment: Pt reports no new questions or concerns prior to today's session.       Objective   See Exercise, Manual, and Modality Logs for complete treatment.       Assessment & Plan       Assessment  Assessment details: Therapeutic exercises were performed for improved BLE strength and stability. Therapeutic activities addressed functional activities such as stair navigation and ambulating with upright posture. Tactile and verbal cues were provided for proper form. Rest breaks were provided as needed. The patient reported muscle fatigue with standing activities, however no reports of pain or impaired balance. The patient ambulated 175' with rollator and SBA.    Plan  Plan details: Progress as indicated for improved balance and function.          Timed:         Manual Therapy:         mins  88176;     Therapeutic Exercise:    25     mins  57020;     Neuromuscular Alex:        mins  97289;    Therapeutic Activity:    18     mins  32391;     Gait Training:           mins  59460;     Ultrasound:          mins  62754;    Ionto                                   mins   88573  Self Care                            mins   54274  Canalith Repos         mins 06507      Un-Timed:  Electrical Stimulation:         mins  70802 ( );  Dry Needling          mins self-pay  Traction          mins 37868      Timed Treatment:   43   mins   Total Treatment:     43   mins    Ashley Claudene Dalton, PT  [place] License: [id number]      Electronically signed by Ashley Claudene Dalton, PT, 12/10/24, 1:37 PM EST

## 2024-12-11 ENCOUNTER — HOSPITAL ENCOUNTER (OUTPATIENT)
Dept: BONE DENSITY | Facility: HOSPITAL | Age: 84
Discharge: HOME | End: 2024-12-11
Admitting: STUDENT IN AN ORGANIZED HEALTH CARE EDUCATION/TRAINING PROGRAM
Payer: MEDICARE

## 2024-12-11 DIAGNOSIS — Z78.0 POST-MENOPAUSAL: ICD-10-CM

## 2024-12-11 PROCEDURE — 77080 DXA BONE DENSITY AXIAL: CPT

## 2024-12-12 ENCOUNTER — HOSPITAL ENCOUNTER (OUTPATIENT)
Dept: PHYSICAL THERAPY | Facility: HOSPITAL | Age: 84
Discharge: HOME | End: 2024-12-12
Admitting: STUDENT IN AN ORGANIZED HEALTH CARE EDUCATION/TRAINING PROGRAM
Payer: MEDICARE

## 2024-12-12 DIAGNOSIS — R53.81 DEBILITY: Primary | ICD-10-CM

## 2024-12-12 PROCEDURE — 97110 THERAPEUTIC EXERCISES: CPT | Performed by: PHYSICAL THERAPIST

## 2024-12-12 PROCEDURE — 97530 THERAPEUTIC ACTIVITIES: CPT | Performed by: PHYSICAL THERAPIST

## 2024-12-12 PROCEDURE — 97112 NEUROMUSCULAR REEDUCATION: CPT | Performed by: PHYSICAL THERAPIST

## 2024-12-12 NOTE — THERAPY TREATMENT NOTE
Physical Therapy Daily Treatment Note      Patient: Anette Dunham   : 1940  Referring practitioner: Isac Bang DO  Date of Initial Visit: Type: THERAPY  Episode: Debility  Today's Date: 2024  Patient seen for 8 sessions       Visit Diagnoses:    ICD-10-CM ICD-9-CM   1. Debility  R53.81 799.3       Subjective Evaluation    History of Present Illness    Subjective comment: Pt reports no new questions or concerns prior to today's session.Pain  Current pain ratin           Objective   See Exercise, Manual, and Modality Logs for complete treatment.       Assessment & Plan       Assessment  Assessment details: Therapeutic exercises were performed for improved BLE strength and stability. Therapeutic activities addressed functional activities such as stair navigation and ambulating with upright posture. Tactile and verbal cues were provided for proper form. Rest breaks were provided as needed. The patient demonstrated improved tolerance to gait, ambulating 350' with rollator and no rest breaks. The patient reported improvements since beginning physical therapy.    Plan  Plan details: Progress as indicated for improved balance and function.          Timed:         Manual Therapy:         mins  90029;     Therapeutic Exercise:    28     mins  83957;     Neuromuscular Alex:   10     mins  01692;    Therapeutic Activity:     15     mins  79526;     Gait Training:           mins  68687;     Ultrasound:          mins  04559;    Ionto                                   mins   78228  Self Care                            mins   49866  Canalith Repos         mins 61632      Un-Timed:  Electrical Stimulation:         mins  34347 ( );  Dry Needling          mins self-pay  Traction          mins 23303      Timed Treatment:   53   mins   Total Treatment:     53   mins    Ashley Claudene Dalton, PT  [place] License: [id number]      Electronically signed by Ashley Claudene Dalton, PT, 24, 1:19 PM EST

## 2024-12-17 ENCOUNTER — HOSPITAL ENCOUNTER (OUTPATIENT)
Dept: PHYSICAL THERAPY | Facility: HOSPITAL | Age: 84
Discharge: HOME | End: 2024-12-17
Admitting: STUDENT IN AN ORGANIZED HEALTH CARE EDUCATION/TRAINING PROGRAM
Payer: MEDICARE

## 2024-12-17 DIAGNOSIS — R53.81 DEBILITY: Primary | ICD-10-CM

## 2024-12-17 PROCEDURE — 97530 THERAPEUTIC ACTIVITIES: CPT | Performed by: PHYSICAL THERAPIST

## 2024-12-17 PROCEDURE — 97110 THERAPEUTIC EXERCISES: CPT | Performed by: PHYSICAL THERAPIST

## 2024-12-17 PROCEDURE — 97112 NEUROMUSCULAR REEDUCATION: CPT | Performed by: PHYSICAL THERAPIST

## 2024-12-17 NOTE — THERAPY TREATMENT NOTE
Physical Therapy Daily Treatment Note      Patient: Anette Dunham   : 1940  Referring practitioner: Isac Bang DO  Date of Initial Visit: Type: THERAPY  Episode: Debility  Today's Date: 2024  Patient seen for 9 sessions       Visit Diagnoses:    ICD-10-CM ICD-9-CM   1. Debility  R53.81 799.3       Subjective Evaluation    History of Present Illness    Subjective comment: Pt reports no new questions or concerns prior to today's session. She states she has not had any recent falls.       Objective   See Exercise, Manual, and Modality Logs for complete treatment.       Assessment & Plan       Assessment  Assessment details: Therapeutic exercises were performed for improved BLE strength and stability. Therapeutic activities addressed functional activities such as stair navigation and ambulating with upright posture. Tactile and verbal cues were provided for proper form. The patient demonstrated improved tolerance to gait, ambulating 350' with rollator and no rest breaks. She ambulated with upright posture, with only one verbal cue required to maintain upright posture.    Plan  Plan details: Progress as indicated for improved balance and function.          Timed:         Manual Therapy:         mins  84491;     Therapeutic Exercise:    30     mins  04632;     Neuromuscular Alex:    9    mins  32498;    Therapeutic Activity:     15     mins  03051;     Gait Training:           mins  49130;     Ultrasound:          mins  74179;    Ionto                                   mins   01043  Self Care                            mins   35196  Canalith Repos         mins 00405      Un-Timed:  Electrical Stimulation:         mins  14506 ( );  Dry Needling          mins self-pay  Traction          mins 70283      Timed Treatment:   54   mins   Total Treatment:     54   mins    Ashley Claudene Dalton, PT  [place] License: [id number]      Electronically signed by Ashley Claudene Dalton, PT, 24, 1:11 PM EST

## 2024-12-26 ENCOUNTER — HOSPITAL ENCOUNTER (OUTPATIENT)
Dept: PHYSICAL THERAPY | Facility: HOSPITAL | Age: 84
Setting detail: THERAPIES SERIES
Discharge: HOME | End: 2024-12-26
Payer: MEDICARE

## 2024-12-26 ENCOUNTER — OFFICE VISIT (OUTPATIENT)
Dept: CARDIOLOGY | Facility: CLINIC | Age: 84
End: 2024-12-26
Payer: MEDICARE

## 2024-12-26 VITALS
DIASTOLIC BLOOD PRESSURE: 81 MMHG | HEIGHT: 63 IN | SYSTOLIC BLOOD PRESSURE: 121 MMHG | OXYGEN SATURATION: 94 % | HEART RATE: 87 BPM | WEIGHT: 131 LBS | BODY MASS INDEX: 23.21 KG/M2

## 2024-12-26 DIAGNOSIS — E78.5 DYSLIPIDEMIA: Primary | Chronic | ICD-10-CM

## 2024-12-26 DIAGNOSIS — M79.89 SWELLING OF LOWER EXTREMITY: ICD-10-CM

## 2024-12-26 DIAGNOSIS — R53.81 DEBILITY: Primary | ICD-10-CM

## 2024-12-26 DIAGNOSIS — I10 ESSENTIAL HYPERTENSION: Chronic | ICD-10-CM

## 2024-12-26 DIAGNOSIS — I48.21 PERMANENT ATRIAL FIBRILLATION: Chronic | ICD-10-CM

## 2024-12-26 PROCEDURE — 1160F RVW MEDS BY RX/DR IN RCRD: CPT | Performed by: NURSE PRACTITIONER

## 2024-12-26 PROCEDURE — 3079F DIAST BP 80-89 MM HG: CPT | Performed by: NURSE PRACTITIONER

## 2024-12-26 PROCEDURE — 97112 NEUROMUSCULAR REEDUCATION: CPT | Performed by: PHYSICAL THERAPIST

## 2024-12-26 PROCEDURE — 1159F MED LIST DOCD IN RCRD: CPT | Performed by: NURSE PRACTITIONER

## 2024-12-26 PROCEDURE — 99214 OFFICE O/P EST MOD 30 MIN: CPT | Performed by: NURSE PRACTITIONER

## 2024-12-26 PROCEDURE — 3074F SYST BP LT 130 MM HG: CPT | Performed by: NURSE PRACTITIONER

## 2024-12-26 RX ORDER — ATORVASTATIN CALCIUM 10 MG/1
10 TABLET, FILM COATED ORAL NIGHTLY
Qty: 90 TABLET | Refills: 2 | Status: SHIPPED | OUTPATIENT
Start: 2024-12-26

## 2024-12-26 RX ORDER — FUROSEMIDE 20 MG/1
20 TABLET ORAL AS NEEDED
Qty: 30 TABLET | Refills: 5 | Status: SHIPPED | OUTPATIENT
Start: 2024-12-26

## 2024-12-26 RX ORDER — METOPROLOL TARTRATE 25 MG/1
37.5 TABLET, FILM COATED ORAL 2 TIMES DAILY
Qty: 270 TABLET | Refills: 3 | Status: SHIPPED | OUTPATIENT
Start: 2024-12-26

## 2024-12-26 NOTE — PROGRESS NOTES
Physical Therapy Re Certification Of Plan of Care  Patient: Anette Dunham   : 1940  Diagnosis/ICD-10 Code:  [unfilled]  Referring practitioner: Isac Bang DO  Date of Initial Visit: Type: THERAPY  Episode: Debility  Today's Date: 2024  Patient seen for 10 sessions         Visit Diagnoses:    ICD-10-CM ICD-9-CM   1. Debility  R53.81 799.3         Anette Dunham reports: She would like to discharge from therapy today and maybe return after winter.  She plans to continue with exercises at home.    Objective Measures: STEVENSON/56  Clinical Progress: improved  Home Program Compliance: Yes        Subjective Evaluation    Pain  Current pain ratin             Objective          Strength/Myotome Testing     Left Hip   Planes of Motion   Flexion: 4    Right Hip   Planes of Motion   Flexion: 4    Left Knee   Flexion: 4  Extension: 4    Right Knee   Flexion: 4  Extension: 4    Left Ankle/Foot   Dorsiflexion: 4  Plantar flexion: 4    Right Ankle/Foot   Dorsiflexion: 4  Plantar flexion: 4          Assessment & Plan       Assessment  Assessment details: Patient has been attending physical therapy for debility; she has attended therapy for a total of 10 sessions.  Patient has shown improvements with balance and strength; she has met 4/4 STGs and 1/3 LTGs.  Patient will be discharged at this time, per her request.  She is encouraged to continue with HEP following discharge from therapy.    Goals  Plan Goals: Plan Goals: ST weeks  1. Pt and family will be instructed in HEP for improved independence and home safety.  Met  2. Pt will score at least 25/56 on the Stevenson Balance Scale for improved balance and decreased risk of falls.  Met- 26/56  3. Pt will demonstrate  the ability to stand 5 minutes with UE support for improved ability to stand at the bathroom sink while performing ADLs.  Met  4. Pt will ambulate 150' with a-a-d and CGA for improved independence and decreased risk of falls.  Met-210' with  rollator and CGA     LT weeks  1. Pt will report the ability to get into/out of the bath independently, with no loss of balance, for improved safety and decreased risk of falls.  Met  2. Pt will score at least 40/56 on the Gilbert Balance Scale for improved safety and decreased risk of falls.  Not met-28/56  3. Pt will perform TUG, with a-a-d and CGA, in less than 20 seconds for improved functional independence.  Not met      Plan  Therapy options: will not be seen for skilled therapy services  Treatment plan discussed with: patient  Plan details: Patient will be discharged at conclusion of therapy           Recommendations: Discharge      Timed:         Manual Therapy:         mins  42826;     Therapeutic Exercise:         mins  31575;     Neuromuscular Alex:    31    mins  05841;    Therapeutic Activity:          mins  94091;     Gait Training:           mins  01592;     Ultrasound:          mins  56356;    Ionto                                   mins   34705  Self Care                            mins   17333    Un-Timed:  Electrical Stimulation:         mins  40924 (MC );  Dry Needling          mins self-pay  Traction          mins 82976  Re-Eval                               mins  07301  Canalith Repos         mins 15020    Timed Treatment:   31   mins   Total Treatment:     31   mins          PT: Mary Becker PT     [place] License:  [id number]    Electronically signed by Mary Becker PT, 24, 9:37 AM EST    Certification Period: 2024 thru 3/25/2025  I certify that the therapy services are furnished while this patient is under my care.  The services outlined above are required by this patient, and will be reviewed every 90 days.         Physician Signature:__________________________________________________    PHYSICIAN: Isac Bang DO  NPI: [id number]                                      DATE:  :     Please sign and return via fax to [email or web address] . Thank you, [facility]  Physical Therapy

## 2024-12-26 NOTE — PROGRESS NOTES
Chief Complaint  Follow-up (Denies chest pain, states SOA on exertion )    Subjective          Anette Dunham presents to [facility] for follow up.    History of Present Illness    TIMBO Dunham is a 84-year-old female who follows in clinic with permanent atrial fibrillation, status post AV karol ablation and pacemaker placement, essential hypertension, and dyslipidemia.  Her pacemaker was interrogated 11/13/2024 remotely and no issues were noted.  Battery life was greater than 8 years.  Anette was last seen in clinic on 6/26/2024 metoprolol was increased at that visit due to elevated blood pressure and patient was asked to keep a blood pressure log.  She was also given a prescription for as needed Lasix.  History of Present Illness  The patient presents for follow up.  She is accompanied by her daughter.      She utilizes a walker for mobility within her home and has a cane available for use. She has not experienced any recent falls but had a near-fall incident yesterday. She is currently on a regimen of metoprolol, taking 1.5 tablets twice daily. She reports no bleeding complications associated with her Eliquis use.    She reports that she has seen a physical therapist for assistance with her balance.      She experiences constipation and is seeking to identify if any of her current medications could be contributing to this issue. She has recently undergone thyroid function testing. She takes Ativan as needed and is in the process of tapering off this medication. She also takes gabapentin and suspects it may be causing her constipation. She admits to a dislike for water and frequently consumes Coke.    She reports no chest pain but does experience shortness of breath during exertion, such as walking to the bathroom, necessitating rest periods. This symptom has been present for some time. She also notes swelling in her lower extremities, which is less severe than previously experienced.  "The swelling tends to subside overnight and exacerbates with daytime activity. She takes Lasix as needed for lower extremity swelling.    MEDICATIONS  Eliquis, metoprolol, Lasix, atorvastatin, Ativan (as needed), gabapentin     Objective     Vital Signs:   /81 (BP Location: Left arm, Patient Position: Sitting, Cuff Size: Adult)   Pulse 87   Ht 160 cm (63\")   Wt 59.4 kg (131 lb)   SpO2 94%   BMI 23.21 kg/m²       Physical Exam  Constitutional:       Appearance: Normal appearance. She is well-developed.   Cardiovascular:      Rate and Rhythm: Normal rate and regular rhythm.      Heart sounds: No murmur heard.     No friction rub. No gallop.   Pulmonary:      Effort: Pulmonary effort is normal. No respiratory distress.      Breath sounds: Normal breath sounds. No wheezing or rales.   Skin:     General: Skin is warm and dry.   Neurological:      Mental Status: She is alert and oriented to person, place, and time.   Psychiatric:         Mood and Affect: Mood normal.         Behavior: Behavior normal.          Result Review :     CMP          10/1/2024    14:25   CMP   Glucose 86    BUN 12    Creatinine 1.03    EGFR 53.4    Sodium 138    Potassium 4.1    Chloride 102    Calcium 9.5    Albumin 3.9    BUN/Creatinine Ratio 11.7    Anion Gap 11.0      CBC          10/1/2024    14:25   CBC   WBC 6.38    RBC 4.22    Hemoglobin 12.8    Hematocrit 39.8    MCV 94.3    MCH 30.3    MCHC 32.2    RDW 12.6    Platelets 240      Lipid Panel          10/1/2024    14:25   Lipid Panel   Total Cholesterol 123    Triglycerides 55    HDL Cholesterol 48    VLDL Cholesterol 12    LDL Cholesterol  63    LDL/HDL Ratio 1.33               Most recent echocardiogram  Results for orders placed during the hospital encounter of 01/23/23    Adult Transthoracic Echo Complete W/ Cont if Necessary Per Protocol    Interpretation Summary    Left ventricular systolic function is normal. Left ventricular ejection fraction appears to be 51 - 55%.    " Estimated right ventricular systolic pressure from tricuspid regurgitation is mildly elevated (35-45 mmHg).    Mild pulmonary hypertension is present.    There is no evidence of pericardial effusion      Most recent Stress Test  Results for orders placed during the hospital encounter of 07/26/21    Stress Test With Myocardial Perfusion (1 Day)    Interpretation Summary  · Myocardial perfusion imaging indicates a normal myocardial perfusion study with no evidence of ischemia.  · Diaphragmatic attenuation artifact is present.  · Left ventricular ejection fraction is normal. (Calculated EF = 57%).  · Findings consistent with a normal ECG stress test.  · Impressions are consistent with a low risk study.       Most recent Cardiac Cath      Most recent Coronary CT  No results found for this or any previous visit.         Current Outpatient Medications   Medication Sig Dispense Refill    apixaban (ELIQUIS) 2.5 MG tablet tablet Take 1 tablet by mouth 2 (Two) Times a Day. 180 tablet 2    atorvastatin (LIPITOR) 10 MG tablet Take 1 tablet by mouth Every Night. 90 tablet 2    dexlansoprazole (DEXILANT) 60 MG capsule TAKE ONE CAPSULE BY MOUTH EVERY MORNING BEFORE BREAKFAST FOR THE STOMACH 30 capsule 5    erythromycin (ROMYCIN) 5 MG/GM ophthalmic ointment Administer 1 Application to the right eye 3 times a day.      fluticasone (FLONASE) 50 MCG/ACT nasal spray Administer 2 sprays into the nostril(s) as directed by provider Daily.      furosemide (LASIX) 20 MG tablet Take 1 tablet by mouth As Needed (lower exrremity swelling). 30 tablet 5    gabapentin (NEURONTIN) 400 MG capsule Take 1 tablet qam, take 2 tablets qpm 90 capsule 2    levothyroxine (SYNTHROID, LEVOTHROID) 50 MCG tablet Take 1 tablet by mouth Daily. 90 tablet 3    LORazepam (ATIVAN) 0.5 MG tablet Take 1 tablet by mouth At Night As Needed for Anxiety. 30 tablet 2    metoprolol tartrate (LOPRESSOR) 25 MG tablet Take 1.5 tablets by mouth 2 (Two) Times a Day. 270 tablet 3     nitroglycerin (NITROSTAT) 0.4 MG SL tablet 1 under the tongue as needed for angina, may repeat q5mins for up three doses 30 tablet 11    rOPINIRole (REQUIP) 1 MG tablet Take 1 tablet by mouth At Night As Needed (restless leg). Take 1 hour before bedtime.      sertraline (Zoloft) 25 MG tablet Take 1 tablet by mouth Daily. 30 tablet 2    Simethicone 125 MG tablet Take 1 tablet by mouth 2 (Two) Times a Day As Needed (Gas). 120 tablet 1    valACYclovir (VALTREX) 500 MG tablet Take 1 tablet by mouth Daily.       No current facility-administered medications for this visit.               Problem List Items Addressed This Visit          Cardiac and Vasculature    Essential hypertension (Chronic)    Relevant Medications    furosemide (LASIX) 20 MG tablet    metoprolol tartrate (LOPRESSOR) 25 MG tablet    Permanent atrial fibrillation (Chronic)    Overview     CHADsvasc= 4   Echo, 7/26/21, EF 51-55%, Mild LVH, LA mildly increased, RA mildly dilated. Trace TR. Mild pulmonary hypertension.   14 day [employer], 4/20/22-5/3/22, Avg hr 78, min hr 52, Max hr 152 bpm. 88% AF burden. 9 sinus pauses >3 seconds.   [employer]tronic VVIR leadless pacemaker and AV node ablation         Relevant Medications    apixaban (ELIQUIS) 2.5 MG tablet tablet    metoprolol tartrate (LOPRESSOR) 25 MG tablet    Dyslipidemia - Primary (Chronic)    Overview     1/27/2024 total cholesterol 106, triglycerides 49, HDL 42, and LDL 50  10/1/24  Total cholesterol 123, triglycerides 55, HDL 48 and LDL 63         Relevant Medications    atorvastatin (LIPITOR) 10 MG tablet     Other Visit Diagnoses       Swelling of lower extremity        Relevant Medications    furosemide (LASIX) 20 MG tablet            Assessment & Plan  1. Permanent atrial fibrillation s/p AV karol ablation and PPM placement  Her pacemaker was remotely interrogated in November and showed no concerning events, with an 8-year battery life. She has been experiencing balance issues and has been using  a walker around the house. Physical therapy has been helping, but she still struggles with balance. She has not had any recent falls but had a near-miss incident recently. Refills for Eliquis, atorvastatin, metoprolol, and Lasix have been provided and sent to [facility] in [place].  She denies any bleeding issues on Eliquis.      2. Lower extremity edema.  She reported swelling in her lower extremities, which is not as severe as it used to be. The swelling tends to worsen with activity and improve overnight. She uses Lasix as needed for this condition. A refill for Lasix has been provided.    3.  Hypertension  Stable.  Continue metoprolol.    4.  Dyslipidemia  Stable.  Continue atorvastatin    Non Cardiac  1. Constipation.  She has been experiencing constipation and inquired if any of her medications could be causing it. It was discussed that Eliquis, metoprolol, and Lasix do not cause constipation, and atorvastatin typically causes diarrhea. Hypothyroidism can cause constipation, but her thyroid levels were checked recently. Ativan 0.5 mg can cause constipation, but she is being weaned off it. Gabapentin can also cause constipation. She was advised to increase her fluid intake to help with constipation.        Follow-up  The patient will follow up in 6 months or sooner if needed.         Follow Up     No follow-ups on file.    Patient was given instructions and counseling regarding her condition or for health maintenance advice. Please see specific information pulled into the AVS if appropriate.     Patient or patient representative verbalized consent for the use of Ambient Listening during the visit with  JUNIE Gramajo for chart documentation. 12/30/2024  12:10 EST

## 2025-01-14 ENCOUNTER — OFFICE VISIT (OUTPATIENT)
Dept: FAMILY MEDICINE CLINIC | Facility: CLINIC | Age: 85
End: 2025-01-14
Payer: MEDICARE

## 2025-01-14 VITALS
SYSTOLIC BLOOD PRESSURE: 124 MMHG | WEIGHT: 130.4 LBS | OXYGEN SATURATION: 98 % | DIASTOLIC BLOOD PRESSURE: 74 MMHG | HEIGHT: 63 IN | BODY MASS INDEX: 23.11 KG/M2 | HEART RATE: 74 BPM | TEMPERATURE: 97.1 F

## 2025-01-14 DIAGNOSIS — R53.83 OTHER FATIGUE: ICD-10-CM

## 2025-01-14 DIAGNOSIS — K59.01 SLOW TRANSIT CONSTIPATION: ICD-10-CM

## 2025-01-14 DIAGNOSIS — M85.852 OSTEOPENIA OF BOTH HIPS: ICD-10-CM

## 2025-01-14 DIAGNOSIS — M85.851 OSTEOPENIA OF BOTH HIPS: ICD-10-CM

## 2025-01-14 DIAGNOSIS — F41.1 GENERALIZED ANXIETY DISORDER: Primary | ICD-10-CM

## 2025-01-14 PROCEDURE — 3074F SYST BP LT 130 MM HG: CPT | Performed by: STUDENT IN AN ORGANIZED HEALTH CARE EDUCATION/TRAINING PROGRAM

## 2025-01-14 PROCEDURE — G0439 PPPS, SUBSEQ VISIT: HCPCS | Performed by: STUDENT IN AN ORGANIZED HEALTH CARE EDUCATION/TRAINING PROGRAM

## 2025-01-14 PROCEDURE — 99214 OFFICE O/P EST MOD 30 MIN: CPT | Performed by: STUDENT IN AN ORGANIZED HEALTH CARE EDUCATION/TRAINING PROGRAM

## 2025-01-14 PROCEDURE — 1125F AMNT PAIN NOTED PAIN PRSNT: CPT | Performed by: STUDENT IN AN ORGANIZED HEALTH CARE EDUCATION/TRAINING PROGRAM

## 2025-01-14 PROCEDURE — 3078F DIAST BP <80 MM HG: CPT | Performed by: STUDENT IN AN ORGANIZED HEALTH CARE EDUCATION/TRAINING PROGRAM

## 2025-01-14 PROCEDURE — 1170F FXNL STATUS ASSESSED: CPT | Performed by: STUDENT IN AN ORGANIZED HEALTH CARE EDUCATION/TRAINING PROGRAM

## 2025-01-14 RX ORDER — DUPILUMAB 300 MG/2ML
300 INJECTION, SOLUTION SUBCUTANEOUS
COMMUNITY

## 2025-01-14 RX ORDER — LORAZEPAM 0.5 MG/1
0.25 TABLET ORAL NIGHTLY PRN
Qty: 15 TABLET | Refills: 0 | Status: SHIPPED | OUTPATIENT
Start: 2025-01-14

## 2025-01-14 RX ORDER — LACTULOSE 10 G/15ML
20 SOLUTION ORAL 2 TIMES DAILY PRN
Qty: 473 ML | Refills: 0 | Status: SHIPPED | OUTPATIENT
Start: 2025-01-14

## 2025-01-14 RX ORDER — ALENDRONATE SODIUM 70 MG/1
70 TABLET ORAL
Qty: 12 TABLET | Refills: 2 | Status: SHIPPED | OUTPATIENT
Start: 2025-01-14

## 2025-01-14 NOTE — PROGRESS NOTES
Subjective   The ABCs of the Annual Wellness Visit  Medicare Wellness Visit      Anette Dunham is a 84 y.o. patient who presents for a Medicare Wellness Visit.    The following portions of the patient's history were reviewed and   updated as appropriate: allergies, current medications, past family history, past medical history, past social history, past surgical history, and problem list.    Compared to one year ago, the patient's physical   health is better.  Compared to one year ago, the patient's mental   health is the same.    Recent Hospitalizations:  She was not admitted to the hospital during the last year.     Current Medical Providers:  Patient Care Team:  Isac Bang DO as PCP - General (Family Medicine)  Lucien Herman MD as Consulting Physician (Interventional Cardiology)  Baljit Canales PA as Physician Assistant (Cardiology)    Outpatient Medications Prior to Visit   Medication Sig Dispense Refill    apixaban (ELIQUIS) 2.5 MG tablet tablet Take 1 tablet by mouth 2 (Two) Times a Day. 180 tablet 2    atorvastatin (LIPITOR) 10 MG tablet Take 1 tablet by mouth Every Night. 90 tablet 2    dexlansoprazole (DEXILANT) 60 MG capsule TAKE ONE CAPSULE BY MOUTH EVERY MORNING BEFORE BREAKFAST FOR THE STOMACH 30 capsule 5    Dupilumab (Dupixent) 300 MG/2ML solution auto-injector injection Inject 2 mL under the skin into the appropriate area as directed Every 14 (Fourteen) Days.      erythromycin (ROMYCIN) 5 MG/GM ophthalmic ointment Administer 1 Application to the right eye 3 times a day.      fluticasone (FLONASE) 50 MCG/ACT nasal spray Administer 2 sprays into the nostril(s) as directed by provider Daily.      furosemide (LASIX) 20 MG tablet Take 1 tablet by mouth As Needed (lower exrremity swelling). 30 tablet 5    gabapentin (NEURONTIN) 400 MG capsule Take 1 tablet qam, take 2 tablets qpm 90 capsule 2    levothyroxine (SYNTHROID, LEVOTHROID) 50 MCG tablet Take 1 tablet by mouth Daily.  "90 tablet 3    metoprolol tartrate (LOPRESSOR) 25 MG tablet Take 1.5 tablets by mouth 2 (Two) Times a Day. 270 tablet 3    nitroglycerin (NITROSTAT) 0.4 MG SL tablet 1 under the tongue as needed for angina, may repeat q5mins for up three doses 30 tablet 11    rOPINIRole (REQUIP) 1 MG tablet Take 1 tablet by mouth At Night As Needed (restless leg). Take 1 hour before bedtime.      Simethicone 125 MG tablet Take 1 tablet by mouth 2 (Two) Times a Day As Needed (Gas). 120 tablet 1    valACYclovir (VALTREX) 500 MG tablet Take 1 tablet by mouth Daily.      LORazepam (ATIVAN) 0.5 MG tablet Take 1 tablet by mouth At Night As Needed for Anxiety. 30 tablet 2    sertraline (Zoloft) 25 MG tablet Take 1 tablet by mouth Daily. 30 tablet 2     No facility-administered medications prior to visit.     No opioid medication identified on active medication list. I have reviewed chart for other potential  high risk medication/s and harmful drug interactions in the elderly.      Aspirin is not on active medication list.  Aspirin use is not indicated based on review of current medical condition/s. Risk of harm outweighs potential benefits.  .    Patient Active Problem List   Diagnosis    Essential hypertension    Permanent atrial fibrillation    Complete heart block    Chronic anticoagulation    Presence of cardiac pacemaker    Dyslipidemia     Advance Care Planning Advance Directive is not on file.  ACP discussion was held with the patient during this visit. Patient does not have an advance directive, information provided.            Objective   Vitals:    01/14/25 1359   BP: 124/74   BP Location: Right arm   Patient Position: Sitting   Cuff Size: Adult   Pulse: 74   Temp: 97.1 °F (36.2 °C)   TempSrc: Temporal   SpO2: 98%   Weight: 59.1 kg (130 lb 6.4 oz)   Height: 160 cm (63\")   PainSc:   2   PainLoc: Generalized       Estimated body mass index is 23.1 kg/m² as calculated from the following:    Height as of this encounter: 160 cm " "(63\").    Weight as of this encounter: 59.1 kg (130 lb 6.4 oz).    BMI is within normal parameters. No other follow-up for BMI required.           Does the patient have evidence of cognitive impairment? No                                                                                                Health  Risk Assessment    Smoking Status:  Social History     Tobacco Use   Smoking Status Some Days    Current packs/day: 0.00    Types: Cigarettes    Start date: 3/10/2007    Last attempt to quit: 3/10/2022    Years since quittin.8    Passive exposure: Past   Smokeless Tobacco Current    Types: Snuff, Chew     Alcohol Consumption:  Social History     Substance and Sexual Activity   Alcohol Use No       Fall Risk Screen  STEADI Fall Risk Assessment was completed, and patient is at HIGH risk for falls. Assessment completed on:2025    Depression Screening   Little interest or pleasure in doing things? Not at all   Feeling down, depressed, or hopeless? Several days   PHQ-2 Total Score 1      Health Habits and Functional and Cognitive Screenin/14/2025     2:03 PM   Functional & Cognitive Status   Do you have difficulty preparing food and eating? No   Do you have difficulty bathing yourself, getting dressed or grooming yourself? No   Do you have difficulty using the toilet? No   Do you have difficulty moving around from place to place? No   Do you have trouble with steps or getting out of a bed or a chair? Yes   Current Diet Well Balanced Diet   Dental Exam Not up to date   Eye Exam Up to date   Exercise (times per week) 3 times per week   Current Exercises Include Walking        Exercise Comment PT Exercises   Do you need help using the phone?  No   Are you deaf or do you have serious difficulty hearing?  Yes   Do you need help to go to places out of walking distance? Yes   Do you need help shopping? Yes   Do you need help preparing meals?  No   Do you need help with housework?  No   Do you need help with " laundry? No   Do you need help taking your medications? Yes   Do you need help managing money? Yes   Do you ever drive or ride in a car without wearing a seat belt? No   Have you felt unusual stress, anger or loneliness in the last month? No   Who do you live with? Child   If you need help, do you have trouble finding someone available to you? No   Have you been bothered in the last four weeks by sexual problems? No   Do you have difficulty concentrating, remembering or making decisions? Yes           Age-appropriate Screening Schedule:  Refer to the list below for future screening recommendations based on patient's age, sex and/or medical conditions. Orders for these recommended tests are listed in the plan section. The patient has been provided with a written plan.    Health Maintenance List  Health Maintenance   Topic Date Due    ZOSTER VACCINE (1 of 2) Never done    RSV Vaccine - Adults (1 - 1-dose 75+ series) Never done    ANNUAL WELLNESS VISIT  Never done    COVID-19 Vaccine (1 - 2024-25 season) Never done    LIPID PANEL  10/01/2025    DXA SCAN  12/11/2026    TDAP/TD VACCINES (4 - Tdap) 02/20/2028    INFLUENZA VACCINE  Completed    Pneumococcal Vaccine 65+  Completed                                                                                                                                                CMS Preventative Services Quick Reference  Risk Factors Identified During Encounter  None Identified    The above risks/problems have been discussed with the patient.  Pertinent information has been shared with the patient in the After Visit Summary.  An After Visit Summary and PPPS were made available to the patient.    Follow Up:   Next Medicare Wellness visit to be scheduled in 1 year.         Additional E&M Note during same encounter follows:  Patient has additional, significant, and separately identifiable condition(s)/problem(s) that require work above and beyond the Medicare Wellness Visit     Chief  "Complaint  Medicare Wellness-subsequent    Subjective    HPI  Anette is also being seen today for additional medical problem/s.       The patient is an 84-year-old female who presents for evaluation of anxiety, constipation, osteopenia, fatigue, and memory loss.    She reports a perceived improvement in her anxiety and depression symptoms following the initiation of Zoloft therapy. She expresses a desire to discontinue Ativan, citing concerns about excessive sleepiness when both medications are taken concurrently.    She continues to experience gas and constipation, although the severity has lessened. She experiences bloating approximately 15 to 20 minutes after eating. Her bowel movements are typically watery, occurring twice in the morning with the aid of stool softeners, but she does not feel complete evacuation. She expresses a desire for a more effective treatment to alleviate her gas symptoms. She has been prescribed medication for gas relief, but its efficacy remains uncertain. She has been managing her symptoms with over-the-counter stool softeners and laxatives from [employer], as well as [employer].    She also reports experiencing fatigue, necessitating periods of rest throughout the day. Despite adequate nighttime sleep, she finds herself falling asleep during the evening hours.    She has previously been on Fosamax for several years, but this was discontinued in 07/2024.    MEDICATIONS  Current: Zoloft, Ativan, Eliquis, Lipitor  Discontinued: Fosamax          Objective   Vital Signs:  /74 (BP Location: Right arm, Patient Position: Sitting, Cuff Size: Adult)   Pulse 74   Temp 97.1 °F (36.2 °C) (Temporal)   Ht 160 cm (63\")   Wt 59.1 kg (130 lb 6.4 oz)   SpO2 98%   BMI 23.10 kg/m²   Physical Exam  Constitutional:       Appearance: Normal appearance.   HENT:      Mouth/Throat:      Mouth: Mucous membranes are moist.   Cardiovascular:      Rate and Rhythm: Normal rate and regular rhythm.   Pulmonary:    "   Effort: Pulmonary effort is normal.      Breath sounds: Normal breath sounds. No wheezing or rhonchi.   Musculoskeletal:         General: Normal range of motion.   Skin:     General: Skin is warm and dry.   Neurological:      Mental Status: She is alert.   Psychiatric:         Mood and Affect: Mood normal.                       Results  Laboratory Studies  Vitamin D, calcium, and kidney function tests were normal.    Imaging  DEXA bone density test shows osteopenia with a T score of -1.2 or 1.7.              Assessment and Plan        1. Generalized anxiety disorder  Her anxiety levels have shown improvement since the initiation of Zoloft therapy. The dosage of Ativan will be reduced to 0.25 mg for a duration of 30 days, after which it will be discontinued. Concurrently, the Zoloft dosage will be increased to 50 mg to manage any potential increase in anxiety during the Ativan tapering process.    2. Constipation.  Her constipation is likely contributing to her gas symptoms. A prescription for lactulose 30 mL twice daily has been provided. She is advised to initially take one dose and monitor its effectiveness. If necessary, she can increase the dosage to twice daily and subsequently use it as needed when experiencing constipation. The discontinuation of the previously prescribed gas medication has been recommended due to its lack of efficacy.    3. Osteopenia.  Her bone density test results indicate osteopenia, with a T score of -1.2 or -1.7. Her vitamin D, calcium, and kidney function tests were normal. Given her elevated fracture risk, particularly for hip fractures, the reinitiation of Fosamax therapy once weekly has been recommended.    4. Fatigue.  Her fatigue may be attributed to the long-term use of Ativan, which can suppress REM sleep. It is anticipated that her energy levels will improve following the discontinuation of Ativan.      Follow-up  The patient is scheduled for a follow-up visit in 30 days.             Follow Up   No follow-ups on file.  Patient was given instructions and counseling regarding her condition or for health maintenance advice. Please see specific information pulled into the AVS if appropriate.  Patient or patient representative verbalized consent for the use of Ambient Listening during the visit with  Isac Bang DO for chart documentation. 1/14/2025  14:33 EST

## 2025-01-17 DIAGNOSIS — G25.81 RESTLESS LEG SYNDROME: ICD-10-CM

## 2025-01-17 RX ORDER — GABAPENTIN 400 MG/1
CAPSULE ORAL
Qty: 90 CAPSULE | Refills: 2 | Status: SHIPPED | OUTPATIENT
Start: 2025-01-17

## 2025-01-17 RX ORDER — GABAPENTIN 400 MG/1
CAPSULE ORAL
Refills: 0 | OUTPATIENT
Start: 2025-01-17

## 2025-01-17 NOTE — TELEPHONE ENCOUNTER
Caller: Vielka Patel    Relationship: Emergency Contact    Best call back number: [phone]     Requested Prescriptions:   Requested Prescriptions     Pending Prescriptions Disp Refills    gabapentin (NEURONTIN) 400 MG capsule 90 capsule 2     Sig: Take 1 tablet qam, take 2 tablets qpm        Pharmacy where request should be sent: [place], [place] - [address] - [phone] PH - [phone] FX     Last office visit with prescribing clinician: 1/14/2025   Last telemedicine visit with prescribing clinician: Visit date not found   Next office visit with prescribing clinician: 2/14/2025     Additional details provided by patient: PATIENT OUT OF MEDICATION

## 2025-02-14 ENCOUNTER — OFFICE VISIT (OUTPATIENT)
Dept: FAMILY MEDICINE CLINIC | Facility: CLINIC | Age: 85
End: 2025-02-14
Payer: MEDICARE

## 2025-02-14 VITALS
BODY MASS INDEX: 22.79 KG/M2 | WEIGHT: 128.6 LBS | SYSTOLIC BLOOD PRESSURE: 124 MMHG | DIASTOLIC BLOOD PRESSURE: 76 MMHG | HEART RATE: 82 BPM | TEMPERATURE: 97.3 F | OXYGEN SATURATION: 98 % | HEIGHT: 63 IN

## 2025-02-14 DIAGNOSIS — G60.9 IDIOPATHIC PERIPHERAL NEUROPATHY: ICD-10-CM

## 2025-02-14 DIAGNOSIS — F41.1 GENERALIZED ANXIETY DISORDER: Primary | ICD-10-CM

## 2025-02-14 DIAGNOSIS — I10 ESSENTIAL HYPERTENSION: Chronic | ICD-10-CM

## 2025-02-14 DIAGNOSIS — K21.9 GASTROESOPHAGEAL REFLUX DISEASE WITHOUT ESOPHAGITIS: ICD-10-CM

## 2025-02-14 DIAGNOSIS — G25.81 RESTLESS LEG SYNDROME: ICD-10-CM

## 2025-02-14 DIAGNOSIS — E78.5 DYSLIPIDEMIA: Chronic | ICD-10-CM

## 2025-02-14 DIAGNOSIS — L60.2 HYPERTROPHY OF NAIL: ICD-10-CM

## 2025-02-14 DIAGNOSIS — E06.3 HASHIMOTO'S THYROIDITIS: ICD-10-CM

## 2025-02-14 DIAGNOSIS — M79.89 SWELLING OF LOWER EXTREMITY: ICD-10-CM

## 2025-02-14 DIAGNOSIS — K59.01 SLOW TRANSIT CONSTIPATION: ICD-10-CM

## 2025-02-14 DIAGNOSIS — I48.21 PERMANENT ATRIAL FIBRILLATION: Chronic | ICD-10-CM

## 2025-02-14 RX ORDER — AMITRIPTYLINE HYDROCHLORIDE 10 MG/1
10 TABLET ORAL NIGHTLY
Qty: 30 TABLET | Refills: 2 | Status: SHIPPED | OUTPATIENT
Start: 2025-02-14

## 2025-02-14 RX ORDER — LACTULOSE 10 G/15ML
20 SOLUTION ORAL 2 TIMES DAILY PRN
Qty: 473 ML | Refills: 2 | Status: SHIPPED | OUTPATIENT
Start: 2025-02-14

## 2025-02-14 RX ORDER — ATORVASTATIN CALCIUM 10 MG/1
10 TABLET, FILM COATED ORAL NIGHTLY
Qty: 90 TABLET | Refills: 2 | Status: SHIPPED | OUTPATIENT
Start: 2025-02-14

## 2025-02-14 RX ORDER — METOPROLOL TARTRATE 25 MG/1
37.5 TABLET, FILM COATED ORAL 2 TIMES DAILY
Qty: 270 TABLET | Refills: 3 | Status: SHIPPED | OUTPATIENT
Start: 2025-02-14

## 2025-02-14 RX ORDER — DEXLANSOPRAZOLE 60 MG/1
60 CAPSULE, DELAYED RELEASE ORAL DAILY
Qty: 30 CAPSULE | Refills: 5 | Status: SHIPPED | OUTPATIENT
Start: 2025-02-14

## 2025-02-14 RX ORDER — LEVOTHYROXINE SODIUM 50 UG/1
50 TABLET ORAL DAILY
Qty: 90 TABLET | Refills: 3 | Status: SHIPPED | OUTPATIENT
Start: 2025-02-14

## 2025-02-14 NOTE — PROGRESS NOTES
Chief Complaint  Constipation, Toe Injury, and Numbness (Lower extremities )    HPI:   Constipation    Toe Injury     Anette Dunham is a 84 y.o. female who presents today to [facility] for Constipation, Toe Injury, and Numbness (Lower extremities ).  History of Present Illness  The patient presents for evaluation of anxiety, constipation, and restless legs syndrome.    She has successfully discontinued Ativan without any noticeable adverse effects, except for an improvement in her sleep quality. She reports that Zoloft has been effective in managing her anxiety symptoms.    She was previously on MiraLAX but has since transitioned to lactulose, which she finds beneficial. However, she continues to experience difficulty with bowel movements, particularly with the final part of the process. She expresses concern about her stomach's ability to process food before elimination. She has previously tried Linzess, which resulted in diarrhea. She is seeking refills for her medications. She is not currently taking any medication for gas relief. She is currently on Dexilant for gastrointestinal issues.    She is on gabapentin 400 mg for leg pain, which provides some relief but does not completely alleviate the discomfort. She takes 1 dose in the morning and 2 at night. She has been diagnosed with restless legs syndrome and reports numbness in her legs. She is requesting a referral to a specialist for further evaluation of her ankle and foot. She is also on Requip for her restless legs syndrome but is uncertain about its effectiveness. She has not tried amitriptyline.    MEDICATIONS  Current: Zoloft, Fosamax, lactulose, Dexilant, gabapentin, Requip  Discontinued: Ativan, MiraLAX       Previous History:   Past Medical History:   Diagnosis Date    Anxiety     Arrhythmia     Atrial fibrillation     Coronary artery disease     Disease of thyroid gland     Dysfunctional gallbladder     Fracture of  two ribs     GERD (gastroesophageal reflux disease)     Hypertension     Lumbar compression fracture 2022    Osteoporosis       Past Surgical History:   Procedure Laterality Date    ABDOMINAL SURGERY      CARDIAC ELECTROPHYSIOLOGY PROCEDURE N/A 2022    Procedure: Device Implant. Leadless PPM. Hold eliquis x1 day prior to procedure.;  Surgeon: Jefe Zhao MD;  Location:  [facility];  Service: Cardiology;  Laterality: N/A;    CARDIAC ELECTROPHYSIOLOGY PROCEDURE N/A 2022    Procedure: AV Node;  Surgeon: Jefe Zhao MD;  Location:  [facility];  Service: Cardiovascular;  Laterality: N/A;    CATARACT EXTRACTION      GALLBLADDER SURGERY      HIP HEMIARTHROPLASTY Left 2018    Procedure: LEFT HIP BIPOLAR HEMIARTHROPLASTY;  Surgeon: Trevon Arriaga MD;  Location:  [facility];  Service: Orthopedics    PACEMAKER IMPLANTATION        Social History     Socioeconomic History    Marital status:    Tobacco Use    Smoking status: Some Days     Current packs/day: 0.00     Types: Cigarettes     Start date: 3/10/2007     Last attempt to quit: 3/10/2022     Years since quittin.9     Passive exposure: Past    Smokeless tobacco: Current     Types: Snuff, Chew   Vaping Use    Vaping status: Never Used   Substance and Sexual Activity    Alcohol use: No    Drug use: No    Sexual activity: Defer      Health Maintenance Due   Topic Date Due    ZOSTER VACCINE (1 of 2) Never done    RSV Vaccine - Adults (1 - 1-dose 75+ series) Never done    COVID-19 Vaccine ( season) Never done        Current Medications:  Current Outpatient Medications   Medication Sig Dispense Refill    alendronate (Fosamax) 70 MG tablet Take 1 tablet by mouth Every 7 (Seven) Days. 12 tablet 2    apixaban (ELIQUIS) 2.5 MG tablet tablet Take 1 tablet by mouth 2 (Two) Times a Day. 180 tablet 2    atorvastatin (LIPITOR) 10 MG tablet Take 1 tablet by mouth Every Night. 90 tablet 2     "dexlansoprazole (DEXILANT) 60 MG capsule Take 1 capsule by mouth Daily. 30 capsule 5    Dupilumab (Dupixent) 300 MG/2ML solution auto-injector injection Inject 2 mL under the skin into the appropriate area as directed Every 14 (Fourteen) Days.      erythromycin (ROMYCIN) 5 MG/GM ophthalmic ointment Administer 1 Application to the right eye 3 times a day.      fluticasone (FLONASE) 50 MCG/ACT nasal spray Administer 2 sprays into the nostril(s) as directed by provider Daily.      furosemide (LASIX) 20 MG tablet Take 1 tablet by mouth As Needed (lower exrremity swelling). 30 tablet 5    gabapentin (NEURONTIN) 400 MG capsule Take 1 tablet qam, take 2 tablets qpm 90 capsule 2    lactulose (CHRONULAC) 10 GM/15ML solution Take 30 mL by mouth 2 (Two) Times a Day As Needed (constipation). 473 mL 2    levothyroxine (SYNTHROID, LEVOTHROID) 50 MCG tablet Take 1 tablet by mouth Daily. 90 tablet 3    metoprolol tartrate (LOPRESSOR) 25 MG tablet Take 1.5 tablets by mouth 2 (Two) Times a Day. 270 tablet 3    nitroglycerin (NITROSTAT) 0.4 MG SL tablet 1 under the tongue as needed for angina, may repeat q5mins for up three doses 30 tablet 11    sertraline (Zoloft) 50 MG tablet Take 1 tablet by mouth Daily. 90 tablet 1    valACYclovir (VALTREX) 500 MG tablet Take 1 tablet by mouth Daily.      amitriptyline (ELAVIL) 10 MG tablet Take 1 tablet by mouth Every Night. 30 tablet 2    linaclotide (LINZESS) 72 MCG capsule capsule Take 1 capsule by mouth Every Morning Before Breakfast. 30 capsule 2     No current facility-administered medications for this visit.       Allergies:   No Known Allergies    Vitals:   /76 (BP Location: Right arm, Patient Position: Sitting, Cuff Size: Adult)   Pulse 82   Temp 97.3 °F (36.3 °C) (Temporal)   Ht 160 cm (63\")   Wt 58.3 kg (128 lb 9.6 oz)   SpO2 98%   BMI 22.78 kg/m²     Estimated body mass index is 22.78 kg/m² as calculated from the following:    Height as of this encounter: 160 cm (63\").    " "Weight as of this encounter: 58.3 kg (128 lb 9.6 oz).    Anette Dunham  reports that she has been smoking cigarettes. She started smoking about 17 years ago. She has been exposed to tobacco smoke. Her smokeless tobacco use includes snuff and chew.            Physical Exam:   Physical Exam  Constitutional:       Appearance: Normal appearance.   HENT:      Mouth/Throat:      Mouth: Mucous membranes are moist.   Cardiovascular:      Rate and Rhythm: Normal rate and regular rhythm.   Pulmonary:      Effort: Pulmonary effort is normal.      Breath sounds: Normal breath sounds. No wheezing or rhonchi.   Musculoskeletal:         General: Normal range of motion.   Skin:     General: Skin is warm and dry.   Neurological:      Mental Status: She is alert.   Psychiatric:         Mood and Affect: Mood normal.          Lab Results:   Lab on 10/01/2024   Component Date Value Ref Range Status    N Telopeptide, Urine 10/01/2024 143  Not Estab. nmol BCE Final    Creatinine, Urine 10/01/2024 62.4  Not Estab. mg/dL Final    N-telo/Creat. Ratio 10/01/2024 26  0 - 89 nM BCE/mM Cr Final    Interpretive Guide: 10/01/2024 Comment   Final    Comment: The N-telopeptide and Creatinine are used to calculate  the N-telo/Creat.  Ratio which is referred to as \"NTx\".  Suggested guidelines for the clinical use of NTx are as  follows:   1.  Menopausal Women not on Hormone Replacement Therapy       (HRT):  Women with a baseline NTx value >38 are at       significant risk for a decrease in bone mineral       density (BMD) after 1 year compared to women on HRT.       The probability of a decline in BMD increases       with NTx value as follows:  (1):           Baseline NTx      Probability of Decrease in BMD             18- 38                1.4      p=0.28             38- 51                2.5      p=0.03             51- 67                3.8      p=0.0006                            17.3      p=0.0001  2. Menopausal Women Receiving " Antiresorptive Therapy: The     probability that treatment is effective after three     months is increased when the measured NTx value is     <or=38 nM BCE/mM CRT, or NTx has decreased >or=30% from                                baseline.[1]  3. Patients with Paget's Disease of Bone:     The probability that treatment is effective after one     month is increased when the measured NTx value is within     the reference range, or NTx has decreased >or=30%     from baseline.[2]     1. Hemanth CH, Christian NH, Cuauhtemoc GS, et al. Am J Med,        102:29-37,1997. (1):M757, 1996.     2. Bone H, Juan C J, et al. J Bone Min Res.11(1):M757,1996    Ionized Calcium 10/01/2024 1.24  1.15 - 1.35 mmol/L Final    PTH, Intact 10/01/2024 45.0  15.0 - 65.0 pg/mL Final    Glucose 10/01/2024 86  65 - 99 mg/dL Final    BUN 10/01/2024 12  8 - 23 mg/dL Final    Creatinine 10/01/2024 1.03 (H)  0.57 - 1.00 mg/dL Final    Sodium 10/01/2024 138  136 - 145 mmol/L Final    Potassium 10/01/2024 4.1  3.5 - 5.2 mmol/L Final    Chloride 10/01/2024 102  98 - 107 mmol/L Final    CO2 10/01/2024 25.0  22.0 - 29.0 mmol/L Final    Calcium 10/01/2024 9.5  8.6 - 10.5 mg/dL Final    Albumin 10/01/2024 3.9  3.5 - 5.2 g/dL Final    Phosphorus 10/01/2024 3.3  2.5 - 4.5 mg/dL Final    Anion Gap 10/01/2024 11.0  5.0 - 15.0 mmol/L Final    BUN/Creatinine Ratio 10/01/2024 11.7  7.0 - 25.0 Final    eGFR 10/01/2024 53.4 (L)  >60.0 mL/min/1.73 Final    25 Hydroxy, Vitamin D 10/01/2024 43.4  30.0 - 100.0 ng/ml Final    TSH 10/01/2024 1.140  0.270 - 4.200 uIU/mL Final    Total Cholesterol 10/01/2024 123  0 - 200 mg/dL Final    Triglycerides 10/01/2024 55  0 - 150 mg/dL Final    HDL Cholesterol 10/01/2024 48  40 - 60 mg/dL Final    LDL Cholesterol  10/01/2024 63  0 - 100 mg/dL Final    VLDL Cholesterol 10/01/2024 12  5 - 40 mg/dL Final    LDL/HDL Ratio 10/01/2024 1.33   Final    WBC 10/01/2024 6.38  3.40 - 10.80 10*3/mm3 Final    RBC 10/01/2024 4.22  3.77 - 5.28 10*6/mm3 Final     Hemoglobin 10/01/2024 12.8  12.0 - 15.9 g/dL Final    Hematocrit 10/01/2024 39.8  34.0 - 46.6 % Final    MCV 10/01/2024 94.3  79.0 - 97.0 fL Final    MCH 10/01/2024 30.3  26.6 - 33.0 pg Final    MCHC 10/01/2024 32.2  31.5 - 35.7 g/dL Final    RDW 10/01/2024 12.6  12.3 - 15.4 % Final    RDW-SD 10/01/2024 43.2  37.0 - 54.0 fl Final    MPV 10/01/2024 12.0  6.0 - 12.0 fL Final    Platelets 10/01/2024 240  140 - 450 10*3/mm3 Final    Ferritin 10/01/2024 102.00  13.00 - 150.00 ng/mL Final    Iron 10/01/2024 65  37 - 145 mcg/dL Final    Iron Saturation (TSAT) 10/01/2024 20  20 - 50 % Final    Transferrin 10/01/2024 218  200 - 360 mg/dL Final    TIBC 10/01/2024 325  298 - 536 mcg/dL Final    Vitamin B-12 10/01/2024 689  211 - 946 pg/mL Final       Assessment and Plan  Diagnoses and all orders for this visit:    1. Generalized anxiety disorder (Primary)  -     sertraline (Zoloft) 50 MG tablet; Take 1 tablet by mouth Daily.  Dispense: 90 tablet; Refill: 1    2. Slow transit constipation  -     lactulose (CHRONULAC) 10 GM/15ML solution; Take 30 mL by mouth 2 (Two) Times a Day As Needed (constipation).  Dispense: 473 mL; Refill: 2  -     linaclotide (LINZESS) 72 MCG capsule capsule; Take 1 capsule by mouth Every Morning Before Breakfast.  Dispense: 30 capsule; Refill: 2    3. Restless leg syndrome    4. Permanent atrial fibrillation  -     apixaban (ELIQUIS) 2.5 MG tablet tablet; Take 1 tablet by mouth 2 (Two) Times a Day.  Dispense: 180 tablet; Refill: 2    5. Dyslipidemia  -     atorvastatin (LIPITOR) 10 MG tablet; Take 1 tablet by mouth Every Night.  Dispense: 90 tablet; Refill: 2    6. Gastroesophageal reflux disease without esophagitis  -     dexlansoprazole (DEXILANT) 60 MG capsule; Take 1 capsule by mouth Daily.  Dispense: 30 capsule; Refill: 5    7. Swelling of lower extremity    8. Idiopathic peripheral neuropathy  -     amitriptyline (ELAVIL) 10 MG tablet; Take 1 tablet by mouth Every Night.  Dispense: 30 tablet;  Refill: 2    9. Essential hypertension  -     metoprolol tartrate (LOPRESSOR) 25 MG tablet; Take 1.5 tablets by mouth 2 (Two) Times a Day.  Dispense: 270 tablet; Refill: 3    10. Hashimoto's thyroiditis  -     levothyroxine (SYNTHROID, LEVOTHROID) 50 MCG tablet; Take 1 tablet by mouth Daily.  Dispense: 90 tablet; Refill: 3      Assessment & Plan  1. Anxiety.  She reports that Zoloft is helping with her anxiety. She will continue her current regimen of Zoloft. She has successfully titrated off ativan without any significant rise in anxiety.     2. Constipation.  She has been experiencing hard stools despite taking lactulose. A prescription for Linzess 72 mcg has been provided, starting with a low dose. Lactulose will be continued as needed for backup. Samples of Linzess will be provided. She has failed miralax and lactulose.     3. Restless legs syndrome.  She reports persistent leg pain and numbness. Gabapentin and Requip will be discontinued as they are ineffective. Amitriptyline will be initiated at a low dose, to be taken 30 minutes before bedtime. Potential side effects, including dry mouth and drowsiness, were discussed. If amitriptyline is ineffective, gabapentin may be reintroduced.    4. Hypertrophy of the nails  Patient reports she is unable to cut her own nails. She request a referral to podiatry which was provided.     Health maintenance:   -Chronic medications were refilled at the patients requests.        BMI is within normal parameters. No other follow-up for BMI required.       New Medications:   New Medications Ordered This Visit   Medications    apixaban (ELIQUIS) 2.5 MG tablet tablet     Sig: Take 1 tablet by mouth 2 (Two) Times a Day.     Dispense:  180 tablet     Refill:  2    atorvastatin (LIPITOR) 10 MG tablet     Sig: Take 1 tablet by mouth Every Night.     Dispense:  90 tablet     Refill:  2    lactulose (CHRONULAC) 10 GM/15ML solution     Sig: Take 30 mL by mouth 2 (Two) Times a Day As  Needed (constipation).     Dispense:  473 mL     Refill:  2    linaclotide (LINZESS) 72 MCG capsule capsule     Sig: Take 1 capsule by mouth Every Morning Before Breakfast.     Dispense:  30 capsule     Refill:  2    dexlansoprazole (DEXILANT) 60 MG capsule     Sig: Take 1 capsule by mouth Daily.     Dispense:  30 capsule     Refill:  5     NA    amitriptyline (ELAVIL) 10 MG tablet     Sig: Take 1 tablet by mouth Every Night.     Dispense:  30 tablet     Refill:  2    metoprolol tartrate (LOPRESSOR) 25 MG tablet     Sig: Take 1.5 tablets by mouth 2 (Two) Times a Day.     Dispense:  270 tablet     Refill:  3    sertraline (Zoloft) 50 MG tablet     Sig: Take 1 tablet by mouth Daily.     Dispense:  90 tablet     Refill:  1    levothyroxine (SYNTHROID, LEVOTHROID) 50 MCG tablet     Sig: Take 1 tablet by mouth Daily.     Dispense:  90 tablet     Refill:  3       Discontinued Medications:   Medications Discontinued During This Encounter   Medication Reason    LORazepam (ATIVAN) 0.5 MG tablet *Therapy completed    Simethicone 125 MG tablet *Therapy completed    rOPINIRole (REQUIP) 1 MG tablet *Therapy completed    levothyroxine (SYNTHROID, LEVOTHROID) 50 MCG tablet Reorder    dexlansoprazole (DEXILANT) 60 MG capsule Reorder    apixaban (ELIQUIS) 2.5 MG tablet tablet Reorder    atorvastatin (LIPITOR) 10 MG tablet Reorder    metoprolol tartrate (LOPRESSOR) 25 MG tablet Reorder    sertraline (Zoloft) 50 MG tablet Reorder    lactulose (CHRONULAC) 10 GM/15ML solution Reorder                 Follow Up:   Return in about 6 weeks (around 3/28/2025).    Patient was given instructions and counseling regarding her condition or for health maintenance advice. Please see specific information pulled into the AVS if appropriate.     Patient or patient representative verbalized consent for the use of Ambient Listening during the visit with  Isac Bang DO for chart documentation. 2/14/2025  14:56 EST       This document has been  electronically signed by Isac Bang DO   February 14, 2025 14:51 EST      Dictated Utilizing Dragon Dictation: Part of this note may be an electronic transcription/translation of spoken language to printed text using the Dragon Dictation System.

## 2025-02-17 ENCOUNTER — TELEPHONE (OUTPATIENT)
Dept: FAMILY MEDICINE CLINIC | Facility: CLINIC | Age: 85
End: 2025-02-17
Payer: MEDICARE

## 2025-02-17 NOTE — TELEPHONE ENCOUNTER
Caller: Vielka Patel    Relationship to patient: Emergency Contact\    Best call back number: [phone]     PATIENT'S DAUGHTER IS CALLING TO MAKE SURE THAT SHE IG GIVING HER THE RIGHT MEDICATION AND AMOUNTS.

## 2025-02-17 NOTE — TELEPHONE ENCOUNTER
Spoke with Vielka she was questioning the Amitriptyline they had read it was for depression explained it is also used for Nerve pain/Neuropathy,she verbalized understanding.

## 2025-03-03 ENCOUNTER — LAB (OUTPATIENT)
Dept: FAMILY MEDICINE CLINIC | Facility: CLINIC | Age: 85
End: 2025-03-03
Payer: MEDICARE

## 2025-03-03 ENCOUNTER — OFFICE VISIT (OUTPATIENT)
Dept: FAMILY MEDICINE CLINIC | Facility: CLINIC | Age: 85
End: 2025-03-03
Payer: MEDICARE

## 2025-03-03 VITALS
DIASTOLIC BLOOD PRESSURE: 74 MMHG | OXYGEN SATURATION: 97 % | SYSTOLIC BLOOD PRESSURE: 120 MMHG | TEMPERATURE: 96.9 F | HEART RATE: 64 BPM | WEIGHT: 118 LBS | HEIGHT: 63 IN | BODY MASS INDEX: 20.91 KG/M2

## 2025-03-03 DIAGNOSIS — R10.84 GENERALIZED ABDOMINAL PAIN: Primary | ICD-10-CM

## 2025-03-03 DIAGNOSIS — R53.83 OTHER FATIGUE: ICD-10-CM

## 2025-03-03 DIAGNOSIS — I10 ESSENTIAL HYPERTENSION: ICD-10-CM

## 2025-03-03 DIAGNOSIS — G25.81 RESTLESS LEG SYNDROME: ICD-10-CM

## 2025-03-03 PROCEDURE — 1126F AMNT PAIN NOTED NONE PRSNT: CPT | Performed by: STUDENT IN AN ORGANIZED HEALTH CARE EDUCATION/TRAINING PROGRAM

## 2025-03-03 PROCEDURE — 3078F DIAST BP <80 MM HG: CPT | Performed by: STUDENT IN AN ORGANIZED HEALTH CARE EDUCATION/TRAINING PROGRAM

## 2025-03-03 PROCEDURE — 36415 COLL VENOUS BLD VENIPUNCTURE: CPT

## 2025-03-03 PROCEDURE — 99214 OFFICE O/P EST MOD 30 MIN: CPT | Performed by: STUDENT IN AN ORGANIZED HEALTH CARE EDUCATION/TRAINING PROGRAM

## 2025-03-03 PROCEDURE — 80053 COMPREHEN METABOLIC PANEL: CPT | Performed by: STUDENT IN AN ORGANIZED HEALTH CARE EDUCATION/TRAINING PROGRAM

## 2025-03-03 PROCEDURE — 85027 COMPLETE CBC AUTOMATED: CPT | Performed by: STUDENT IN AN ORGANIZED HEALTH CARE EDUCATION/TRAINING PROGRAM

## 2025-03-03 PROCEDURE — 84443 ASSAY THYROID STIM HORMONE: CPT | Performed by: STUDENT IN AN ORGANIZED HEALTH CARE EDUCATION/TRAINING PROGRAM

## 2025-03-03 PROCEDURE — 86140 C-REACTIVE PROTEIN: CPT | Performed by: STUDENT IN AN ORGANIZED HEALTH CARE EDUCATION/TRAINING PROGRAM

## 2025-03-03 PROCEDURE — 3074F SYST BP LT 130 MM HG: CPT | Performed by: STUDENT IN AN ORGANIZED HEALTH CARE EDUCATION/TRAINING PROGRAM

## 2025-03-03 NOTE — PROGRESS NOTES
Chief Complaint  Abdominal Cramping and Weakness - Generalized    HPI:   HPI   Anette Dunham is a 84 y.o. female who presents today to [facility] for Abdominal Cramping and Weakness - Generalized.  History of Present Illness  The patient presents for evaluation of gastrointestinal issues and restless leg syndrome. She is accompanied by her daughter.    She has been experiencing gastrointestinal discomfort for the past 2 weeks, which has resulted in a weight loss of 10 pounds. She reports that her bowel movements are irregular, with everything she consumes leading to constipation. Despite this, she does not believe she is currently constipated. Her appetite is poor, and she experiences nausea when exposed to certain smells. She also reports feeling gassy and has been largely sedentary for the past 2 weeks. She does not experience any choking sensation while eating. Linzess has been prescribed but appears to be too potent, while lactulose seems to provide some relief. She underwent a cholecystectomy several years ago.    She reports a lack of energy and difficulty walking due to leg pain. She has discontinued gabapentin and ropinirole, and is currently taking amitriptyline, which she finds more effective than her previous medications. However, she notes that it causes dry mouth.       Previous History:   Past Medical History:   Diagnosis Date    Anxiety     Arrhythmia     Atrial fibrillation     Coronary artery disease     Disease of thyroid gland     Dysfunctional gallbladder     Fracture of two ribs     GERD (gastroesophageal reflux disease)     Hypertension     Lumbar compression fracture 07/2022    Osteoporosis       Past Surgical History:   Procedure Laterality Date    ABDOMINAL SURGERY      CARDIAC ELECTROPHYSIOLOGY PROCEDURE N/A 09/23/2022    Procedure: Device Implant. Leadless PPM. Hold eliquis x1 day prior to procedure.;  Surgeon: Jefe Zhao MD;  Location: [facility]  INVASIVE LOCATION;  Service: Cardiology;  Laterality: N/A;    CARDIAC ELECTROPHYSIOLOGY PROCEDURE N/A 2022    Procedure: AV Node;  Surgeon: Jefe Zhao MD;  Location:  [facility] INVASIVE LOCATION;  Service: Cardiovascular;  Laterality: N/A;    CATARACT EXTRACTION      GALLBLADDER SURGERY      HIP HEMIARTHROPLASTY Left 2018    Procedure: LEFT HIP BIPOLAR HEMIARTHROPLASTY;  Surgeon: Trevon Arriaga MD;  Location:  [facility];  Service: Orthopedics    PACEMAKER IMPLANTATION        Social History     Socioeconomic History    Marital status:    Tobacco Use    Smoking status: Some Days     Current packs/day: 0.00     Types: Cigarettes     Start date: 3/10/2007     Last attempt to quit: 3/10/2022     Years since quittin.9     Passive exposure: Past    Smokeless tobacco: Current     Types: Snuff, Chew   Vaping Use    Vaping status: Never Used   Substance and Sexual Activity    Alcohol use: No    Drug use: No    Sexual activity: Defer      Health Maintenance Due   Topic Date Due    ZOSTER VACCINE (1 of 2) Never done    RSV Vaccine - Adults (1 - 1-dose 75+ series) Never done    COVID-19 Vaccine ( season) Never done        Current Medications:  Current Outpatient Medications   Medication Sig Dispense Refill    alendronate (Fosamax) 70 MG tablet Take 1 tablet by mouth Every 7 (Seven) Days. 12 tablet 2    amitriptyline (ELAVIL) 10 MG tablet Take 1 tablet by mouth Every Night. 30 tablet 2    apixaban (ELIQUIS) 2.5 MG tablet tablet Take 1 tablet by mouth 2 (Two) Times a Day. 180 tablet 2    atorvastatin (LIPITOR) 10 MG tablet Take 1 tablet by mouth Every Night. 90 tablet 2    dexlansoprazole (DEXILANT) 60 MG capsule Take 1 capsule by mouth Daily. 30 capsule 5    Dupilumab (Dupixent) 300 MG/2ML solution auto-injector injection Inject 2 mL under the skin into the appropriate area as directed Every 14 (Fourteen) Days.      erythromycin (ROMYCIN) 5 MG/GM ophthalmic ointment Administer 1 Application  "to the right eye 3 times a day.      fluticasone (FLONASE) 50 MCG/ACT nasal spray Administer 2 sprays into the nostril(s) as directed by provider Daily.      furosemide (LASIX) 20 MG tablet Take 1 tablet by mouth As Needed (lower exrremity swelling). 30 tablet 5    lactulose (CHRONULAC) 10 GM/15ML solution Take 30 mL by mouth 2 (Two) Times a Day As Needed (constipation). 473 mL 2    levothyroxine (SYNTHROID, LEVOTHROID) 50 MCG tablet Take 1 tablet by mouth Daily. 90 tablet 3    linaclotide (LINZESS) 72 MCG capsule capsule Take 1 capsule by mouth Every Morning Before Breakfast. 30 capsule 2    metoprolol tartrate (LOPRESSOR) 25 MG tablet Take 1.5 tablets by mouth 2 (Two) Times a Day. 270 tablet 3    nitroglycerin (NITROSTAT) 0.4 MG SL tablet 1 under the tongue as needed for angina, may repeat q5mins for up three doses 30 tablet 11    sertraline (Zoloft) 50 MG tablet Take 1 tablet by mouth Daily. 90 tablet 1    valACYclovir (VALTREX) 500 MG tablet Take 1 tablet by mouth Daily.       No current facility-administered medications for this visit.       Allergies:   No Known Allergies    Vitals:   /74 (BP Location: Right arm, Patient Position: Sitting, Cuff Size: Adult)   Pulse 64   Temp 96.9 °F (36.1 °C) (Temporal)   Ht 160 cm (63\")   Wt 53.5 kg (118 lb)   SpO2 97%   BMI 20.90 kg/m²     Estimated body mass index is 20.9 kg/m² as calculated from the following:    Height as of this encounter: 160 cm (63\").    Weight as of this encounter: 53.5 kg (118 lb).    Anette Dunham  reports that she has been smoking cigarettes. She started smoking about 17 years ago. She has been exposed to tobacco smoke. Her smokeless tobacco use includes snuff and chew.            Physical Exam:   Physical Exam     Lab Results:   Lab on 10/01/2024   Component Date Value Ref Range Status    N Telopeptide, Urine 10/01/2024 143  Not Estab. nmol BCE Final    Creatinine, Urine 10/01/2024 62.4  Not Estab. mg/dL Final    N-telo/Creat. Ratio " "10/01/2024 26  0 - 89 nM BCE/mM Cr Final    Interpretive Guide: 10/01/2024 Comment   Final    Comment: The N-telopeptide and Creatinine are used to calculate  the N-telo/Creat.  Ratio which is referred to as \"NTx\".  Suggested guidelines for the clinical use of NTx are as  follows:   1.  Menopausal Women not on Hormone Replacement Therapy       (HRT):  Women with a baseline NTx value >38 are at       significant risk for a decrease in bone mineral       density (BMD) after 1 year compared to women on HRT.       The probability of a decline in BMD increases       with NTx value as follows:  (1):           Baseline NTx      Probability of Decrease in BMD             18- 38                1.4      p=0.28             38- 51                2.5      p=0.03             51- 67                3.8      p=0.0006                            17.3      p=0.0001  2. Menopausal Women Receiving Antiresorptive Therapy: The     probability that treatment is effective after three     months is increased when the measured NTx value is     <or=38 nM BCE/mM CRT, or NTx has decreased >or=30% from                                baseline.[1]  3. Patients with Paget's Disease of Bone:     The probability that treatment is effective after one     month is increased when the measured NTx value is within     the reference range, or NTx has decreased >or=30%     from baseline.[2]     1. Hemanth CH, Christina NH, Cuauhtemoc GS, et al. Am J Med,        102:29-37,1997. (1):M757, 1996.     2. Bone H, Juan C J, et al. J Bone Min Res.11(1):M757,1996    Ionized Calcium 10/01/2024 1.24  1.15 - 1.35 mmol/L Final    PTH, Intact 10/01/2024 45.0  15.0 - 65.0 pg/mL Final    Glucose 10/01/2024 86  65 - 99 mg/dL Final    BUN 10/01/2024 12  8 - 23 mg/dL Final    Creatinine 10/01/2024 1.03 (H)  0.57 - 1.00 mg/dL Final    Sodium 10/01/2024 138  136 - 145 mmol/L Final    Potassium 10/01/2024 4.1  3.5 - 5.2 mmol/L Final    Chloride 10/01/2024 102  98 - 107 mmol/L Final    " CO2 10/01/2024 25.0  22.0 - 29.0 mmol/L Final    Calcium 10/01/2024 9.5  8.6 - 10.5 mg/dL Final    Albumin 10/01/2024 3.9  3.5 - 5.2 g/dL Final    Phosphorus 10/01/2024 3.3  2.5 - 4.5 mg/dL Final    Anion Gap 10/01/2024 11.0  5.0 - 15.0 mmol/L Final    BUN/Creatinine Ratio 10/01/2024 11.7  7.0 - 25.0 Final    eGFR 10/01/2024 53.4 (L)  >60.0 mL/min/1.73 Final    25 Hydroxy, Vitamin D 10/01/2024 43.4  30.0 - 100.0 ng/ml Final    TSH 10/01/2024 1.140  0.270 - 4.200 uIU/mL Final    Total Cholesterol 10/01/2024 123  0 - 200 mg/dL Final    Triglycerides 10/01/2024 55  0 - 150 mg/dL Final    HDL Cholesterol 10/01/2024 48  40 - 60 mg/dL Final    LDL Cholesterol  10/01/2024 63  0 - 100 mg/dL Final    VLDL Cholesterol 10/01/2024 12  5 - 40 mg/dL Final    LDL/HDL Ratio 10/01/2024 1.33   Final    WBC 10/01/2024 6.38  3.40 - 10.80 10*3/mm3 Final    RBC 10/01/2024 4.22  3.77 - 5.28 10*6/mm3 Final    Hemoglobin 10/01/2024 12.8  12.0 - 15.9 g/dL Final    Hematocrit 10/01/2024 39.8  34.0 - 46.6 % Final    MCV 10/01/2024 94.3  79.0 - 97.0 fL Final    MCH 10/01/2024 30.3  26.6 - 33.0 pg Final    MCHC 10/01/2024 32.2  31.5 - 35.7 g/dL Final    RDW 10/01/2024 12.6  12.3 - 15.4 % Final    RDW-SD 10/01/2024 43.2  37.0 - 54.0 fl Final    MPV 10/01/2024 12.0  6.0 - 12.0 fL Final    Platelets 10/01/2024 240  140 - 450 10*3/mm3 Final    Ferritin 10/01/2024 102.00  13.00 - 150.00 ng/mL Final    Iron 10/01/2024 65  37 - 145 mcg/dL Final    Iron Saturation (TSAT) 10/01/2024 20  20 - 50 % Final    Transferrin 10/01/2024 218  200 - 360 mg/dL Final    TIBC 10/01/2024 325  298 - 536 mcg/dL Final    Vitamin B-12 10/01/2024 689  211 - 946 pg/mL Final       Assessment and Plan  Diagnoses and all orders for this visit:    1. Essential hypertension (Primary)    2. Other fatigue  -     CBC No Differential; Future  -     Comprehensive metabolic panel; Future  -     C-reactive protein; Future  -     TSH Rfx On Abnormal To Free T4; Future      Assessment &  Plan  1. Gastrointestinal issues.  She reports feeling unwell for the past 2 weeks, with a weight loss of 10 pounds, lack of appetite, and low energy. She does not feel constipated currently but has experienced issues with Linzess being too strong. Lactulose has been more effective for her. The physical examination is benign, with no significant findings of constipation or tenderness. Blood pressure, heart rate, and oxygen levels are normal. Blood work will be conducted to further investigate the cause of her symptoms. She will be contacted with the results. If the blood work does not reveal any abnormalities, she may need to return for further evaluation.    2. Restless leg syndrome.  She has stopped taking gabapentin and ropinirole. Currently, she is on amitriptyline, which seems to help her legs and is better tolerated than her previous medications, although it causes dry mouth.    Follow-up  The patient will follow up next month as scheduled.    PROCEDURE  The patient underwent a cholecystectomy several years ago.       BMI is within normal parameters. No other follow-up for BMI required.       New Medications:   No orders of the defined types were placed in this encounter.      Discontinued Medications:   Medications Discontinued During This Encounter   Medication Reason    gabapentin (NEURONTIN) 400 MG capsule *Therapy completed                 Follow Up:   No follow-ups on file.    Patient was given instructions and counseling regarding her condition or for health maintenance advice. Please see specific information pulled into the AVS if appropriate.     Patient or patient representative verbalized consent for the use of Ambient Listening during the visit with  Isac Bang DO for chart documentation. 3/3/2025  16:29 EST       This document has been electronically signed by Isac Bang DO   March 3, 2025 16:29 EST      Dictated Utilizing Dragon Dictation: Part of this note may be an electronic  transcription/translation of spoken language to printed text using the Dragon Dictation System.

## 2025-03-04 ENCOUNTER — TELEPHONE (OUTPATIENT)
Dept: FAMILY MEDICINE CLINIC | Facility: CLINIC | Age: 85
End: 2025-03-04
Payer: MEDICARE

## 2025-03-04 LAB
ALBUMIN SERPL-MCNC: 3.9 G/DL (ref 3.5–5.2)
ALBUMIN/GLOB SERPL: 1 G/DL
ALP SERPL-CCNC: 63 U/L (ref 39–117)
ALT SERPL W P-5'-P-CCNC: 8 U/L (ref 1–33)
ANION GAP SERPL CALCULATED.3IONS-SCNC: 13.5 MMOL/L (ref 5–15)
AST SERPL-CCNC: 17 U/L (ref 1–32)
BILIRUB SERPL-MCNC: 0.5 MG/DL (ref 0–1.2)
BUN SERPL-MCNC: 21 MG/DL (ref 8–23)
BUN/CREAT SERPL: 18.4 (ref 7–25)
CALCIUM SPEC-SCNC: 9.2 MG/DL (ref 8.6–10.5)
CHLORIDE SERPL-SCNC: 106 MMOL/L (ref 98–107)
CO2 SERPL-SCNC: 20.5 MMOL/L (ref 22–29)
CREAT SERPL-MCNC: 1.14 MG/DL (ref 0.57–1)
CRP SERPL-MCNC: 0.34 MG/DL (ref 0–0.5)
DEPRECATED RDW RBC AUTO: 46.3 FL (ref 37–54)
EGFRCR SERPLBLD CKD-EPI 2021: 47.3 ML/MIN/1.73
ERYTHROCYTE [DISTWIDTH] IN BLOOD BY AUTOMATED COUNT: 13.2 % (ref 12.3–15.4)
GLOBULIN UR ELPH-MCNC: 4.1 GM/DL
GLUCOSE SERPL-MCNC: 104 MG/DL (ref 65–99)
HCT VFR BLD AUTO: 41.1 % (ref 34–46.6)
HGB BLD-MCNC: 14 G/DL (ref 12–15.9)
MCH RBC QN AUTO: 32.3 PG (ref 26.6–33)
MCHC RBC AUTO-ENTMCNC: 34.1 G/DL (ref 31.5–35.7)
MCV RBC AUTO: 94.7 FL (ref 79–97)
PLATELET # BLD AUTO: 226 10*3/MM3 (ref 140–450)
PMV BLD AUTO: 12.1 FL (ref 6–12)
POTASSIUM SERPL-SCNC: 3.3 MMOL/L (ref 3.5–5.2)
PROT SERPL-MCNC: 8 G/DL (ref 6–8.5)
RBC # BLD AUTO: 4.34 10*6/MM3 (ref 3.77–5.28)
SODIUM SERPL-SCNC: 140 MMOL/L (ref 136–145)
TSH SERPL DL<=0.05 MIU/L-ACNC: 0.67 UIU/ML (ref 0.27–4.2)
WBC NRBC COR # BLD AUTO: 7.21 10*3/MM3 (ref 3.4–10.8)

## 2025-03-04 NOTE — TELEPHONE ENCOUNTER
----- Message from Isac Bang sent at 3/4/2025  9:22 AM EST -----  Please let the patient know that her labs are normal, no explanation for her weak feeling, recommend if it persists further evaluation.  Otherwise we can continue her regular follow-up.

## 2025-04-07 ENCOUNTER — OFFICE VISIT (OUTPATIENT)
Dept: FAMILY MEDICINE CLINIC | Facility: CLINIC | Age: 85
End: 2025-04-07
Payer: MEDICARE

## 2025-04-07 VITALS
HEART RATE: 84 BPM | BODY MASS INDEX: 21.58 KG/M2 | DIASTOLIC BLOOD PRESSURE: 76 MMHG | OXYGEN SATURATION: 98 % | HEIGHT: 63 IN | TEMPERATURE: 96.9 F | SYSTOLIC BLOOD PRESSURE: 124 MMHG | WEIGHT: 121.8 LBS

## 2025-04-07 DIAGNOSIS — Z91.81 AT HIGH RISK FOR FALLS: Primary | ICD-10-CM

## 2025-04-07 DIAGNOSIS — F41.1 GENERALIZED ANXIETY DISORDER: ICD-10-CM

## 2025-04-07 DIAGNOSIS — L60.2 HYPERTROPHY OF NAIL: ICD-10-CM

## 2025-04-07 DIAGNOSIS — E06.3 HASHIMOTO'S THYROIDITIS: ICD-10-CM

## 2025-04-07 DIAGNOSIS — I48.21 PERMANENT ATRIAL FIBRILLATION: Chronic | ICD-10-CM

## 2025-04-07 DIAGNOSIS — E78.5 DYSLIPIDEMIA: Chronic | ICD-10-CM

## 2025-04-07 DIAGNOSIS — G60.9 IDIOPATHIC PERIPHERAL NEUROPATHY: ICD-10-CM

## 2025-04-07 DIAGNOSIS — R13.19 ESOPHAGEAL DYSPHAGIA: ICD-10-CM

## 2025-04-07 DIAGNOSIS — K59.01 SLOW TRANSIT CONSTIPATION: ICD-10-CM

## 2025-04-07 RX ORDER — AMITRIPTYLINE HYDROCHLORIDE 10 MG/1
10 TABLET ORAL NIGHTLY
Qty: 90 TABLET | Refills: 2 | Status: SHIPPED | OUTPATIENT
Start: 2025-04-07

## 2025-04-07 RX ORDER — LEVOTHYROXINE SODIUM 50 UG/1
50 TABLET ORAL DAILY
Qty: 90 TABLET | Refills: 3 | Status: SHIPPED | OUTPATIENT
Start: 2025-04-07

## 2025-04-07 RX ORDER — ATORVASTATIN CALCIUM 10 MG/1
10 TABLET, FILM COATED ORAL NIGHTLY
Qty: 90 TABLET | Refills: 2 | Status: SHIPPED | OUTPATIENT
Start: 2025-04-07

## 2025-04-07 RX ORDER — LACTULOSE 10 G/15ML
20 SOLUTION ORAL 2 TIMES DAILY PRN
Qty: 946 ML | Refills: 11 | Status: SHIPPED | OUTPATIENT
Start: 2025-04-07

## 2025-04-07 NOTE — PROGRESS NOTES
Chief Complaint  Dyslipidemia    HPI:   HPI   Anette Dunham is a 85 y.o. female who presents today to [facility] for Dyslipidemia.  History of Present Illness  The patient presents for evaluation of fall, decreased appetite, constipation, anxiety, and itchy nodules.    She experienced a fall in the shower approximately one week ago, resulting in a significant bruise on her head. She did not seek immediate medical attention at a hospital. She reports another recent fall but is unable to recall the specific circumstances leading to it. She does not have any safety measures such as rails or a shower chair in place.    She reports a decreased appetite and frequent choking episodes. She is seeking pharmacological intervention to stimulate her appetite.    She has been experiencing restless legs, particularly when in bed. Amitriptyline has been beneficial in managing this symptom.    She reports irregular bowel movements and requests an additional prescription for lactulose. She has previously tried Linzess but discontinued its use due to excessive bowel cleansing and associated cramping.    She is currently on Dupixent for itchy nodules and has expressed concerns about potential ocular side effects. She reports cloudy vision and difficulty reading her Bible. She has undergone an eye evaluation and was informed by Dr. Low that she has calcium deposits growing in her eyes, which he plans to remove. 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    She reports experiencing vivid dreams nightly and expresses a desire to sleep continuously once she falls asleep.       Previous History:   Past Medical History:   Diagnosis Date    Anxiety     Arrhythmia     Atrial fibrillation     Coronary artery disease     Disease of thyroid gland     Dysfunctional gallbladder     Fracture of two ribs     GERD (gastroesophageal reflux disease)     Hypertension     Lumbar  compression fracture 07/2022    Osteoporosis       Past Surgical History:   Procedure Laterality Date    ABDOMINAL SURGERY      CARDIAC ELECTROPHYSIOLOGY PROCEDURE N/A 09/23/2022    Procedure: Device Implant. Leadless PPM. Hold eliquis x1 day prior to procedure.;  Surgeon: Jefe Zhao MD;  Location:  [facility];  Service: Cardiology;  Laterality: N/A;    CARDIAC ELECTROPHYSIOLOGY PROCEDURE N/A 09/23/2022    Procedure: AV Node;  Surgeon: Jefe Zhao MD;  Location:  [facility];  Service: Cardiovascular;  Laterality: N/A;    CATARACT EXTRACTION      GALLBLADDER SURGERY      HIP HEMIARTHROPLASTY Left 05/29/2018    Procedure: LEFT HIP BIPOLAR HEMIARTHROPLASTY;  Surgeon: Trevon Arriaga MD;  Location:  [facility];  Service: Orthopedics    PACEMAKER IMPLANTATION        Social History     Socioeconomic History    Marital status:    Tobacco Use    Smoking status: Some Days     Current packs/day: 0.00     Types: Cigarettes     Start date: 3/10/2007     Last attempt to quit: 3/10/2022     Years since quitting: 3.0     Passive exposure: Past    Smokeless tobacco: Current     Types: Snuff, Chew   Vaping Use    Vaping status: Never Used   Substance and Sexual Activity    Alcohol use: No    Drug use: No    Sexual activity: Defer      Health Maintenance Due   Topic Date Due    ZOSTER VACCINE (1 of 2) Never done    RSV Vaccine - Adults (1 - 1-dose 75+ series) Never done    COVID-19 Vaccine (1 - 2024-25 season) Never done        Current Medications:  Current Outpatient Medications   Medication Sig Dispense Refill    alendronate (Fosamax) 70 MG tablet Take 1 tablet by mouth Every 7 (Seven) Days. 12 tablet 2    amitriptyline (ELAVIL) 10 MG tablet Take 1 tablet by mouth Every Night. 90 tablet 2    apixaban (ELIQUIS) 2.5 MG tablet tablet Take 1 tablet by mouth 2 (Two) Times a Day. 180 tablet 2    atorvastatin (LIPITOR) 10 MG tablet Take 1 tablet by mouth Every Night. 90 tablet 2     "dexlansoprazole (DEXILANT) 60 MG capsule Take 1 capsule by mouth Daily. 30 capsule 5    Dupilumab (Dupixent) 300 MG/2ML solution auto-injector injection Inject 2 mL under the skin into the appropriate area as directed Every 14 (Fourteen) Days.      erythromycin (ROMYCIN) 5 MG/GM ophthalmic ointment Administer 1 Application to the right eye 3 times a day.      fluticasone (FLONASE) 50 MCG/ACT nasal spray Administer 2 sprays into the nostril(s) as directed by provider Daily.      furosemide (LASIX) 20 MG tablet Take 1 tablet by mouth As Needed (lower exrremity swelling). 30 tablet 5    levothyroxine (SYNTHROID, LEVOTHROID) 50 MCG tablet Take 1 tablet by mouth Daily. 90 tablet 3    linaclotide (LINZESS) 72 MCG capsule capsule Take 1 capsule by mouth Every Morning Before Breakfast. 30 capsule 2    metoprolol tartrate (LOPRESSOR) 25 MG tablet Take 1.5 tablets by mouth 2 (Two) Times a Day. 270 tablet 3    nitroglycerin (NITROSTAT) 0.4 MG SL tablet 1 under the tongue as needed for angina, may repeat q5mins for up three doses 30 tablet 11    sertraline (Zoloft) 50 MG tablet Take 1 tablet by mouth Daily. 90 tablet 1    valACYclovir (VALTREX) 500 MG tablet Take 1 tablet by mouth Daily.      lactulose (CHRONULAC) 10 GM/15ML solution Take 30 mL by mouth 2 (Two) Times a Day As Needed (constipation). 946 mL 11     No current facility-administered medications for this visit.       Allergies:   No Known Allergies    Vitals:   /76 (BP Location: Right arm, Patient Position: Sitting, Cuff Size: Adult)   Pulse 84   Temp 96.9 °F (36.1 °C) (Temporal)   Ht 160 cm (63\")   Wt 55.2 kg (121 lb 12.8 oz)   SpO2 98%   BMI 21.58 kg/m²     Estimated body mass index is 21.58 kg/m² as calculated from the following:    Height as of this encounter: 160 cm (63\").    Weight as of this encounter: 55.2 kg (121 lb 12.8 oz).    Ibrahimagrace NERI Dunham  reports that she has been smoking cigarettes. She started smoking about 18 years ago. She has been " exposed to tobacco smoke. Her smokeless tobacco use includes snuff and chew.           Physical Exam:   Physical Exam  Constitutional:       Appearance: Normal appearance.   HENT:      Mouth/Throat:      Mouth: Mucous membranes are moist.   Cardiovascular:      Rate and Rhythm: Normal rate and regular rhythm.   Pulmonary:      Effort: Pulmonary effort is normal.      Breath sounds: Normal breath sounds. No wheezing or rhonchi.   Musculoskeletal:         General: Normal range of motion.   Skin:     General: Skin is warm and dry.   Neurological:      Mental Status: She is alert.   Psychiatric:         Mood and Affect: Mood normal.          Lab Results:   Lab on 03/03/2025   Component Date Value Ref Range Status    WBC 03/03/2025 7.21  3.40 - 10.80 10*3/mm3 Final    RBC 03/03/2025 4.34  3.77 - 5.28 10*6/mm3 Final    Hemoglobin 03/03/2025 14.0  12.0 - 15.9 g/dL Final    Hematocrit 03/03/2025 41.1  34.0 - 46.6 % Final    MCV 03/03/2025 94.7  79.0 - 97.0 fL Final    MCH 03/03/2025 32.3  26.6 - 33.0 pg Final    MCHC 03/03/2025 34.1  31.5 - 35.7 g/dL Final    RDW 03/03/2025 13.2  12.3 - 15.4 % Final    RDW-SD 03/03/2025 46.3  37.0 - 54.0 fl Final    MPV 03/03/2025 12.1 (H)  6.0 - 12.0 fL Final    Platelets 03/03/2025 226  140 - 450 10*3/mm3 Final    Glucose 03/03/2025 104 (H)  65 - 99 mg/dL Final    BUN 03/03/2025 21  8 - 23 mg/dL Final    Creatinine 03/03/2025 1.14 (H)  0.57 - 1.00 mg/dL Final    Sodium 03/03/2025 140  136 - 145 mmol/L Final    Potassium 03/03/2025 3.3 (L)  3.5 - 5.2 mmol/L Final    Chloride 03/03/2025 106  98 - 107 mmol/L Final    CO2 03/03/2025 20.5 (L)  22.0 - 29.0 mmol/L Final    Calcium 03/03/2025 9.2  8.6 - 10.5 mg/dL Final    Total Protein 03/03/2025 8.0  6.0 - 8.5 g/dL Final    Albumin 03/03/2025 3.9  3.5 - 5.2 g/dL Final    ALT (SGPT) 03/03/2025 8  1 - 33 U/L Final    AST (SGOT) 03/03/2025 17  1 - 32 U/L Final    Alkaline Phosphatase 03/03/2025 63  39 - 117 U/L Final    Total Bilirubin 03/03/2025  0.5  0.0 - 1.2 mg/dL Final    Globulin 03/03/2025 4.1  gm/dL Final    A/G Ratio 03/03/2025 1.0  g/dL Final    BUN/Creatinine Ratio 03/03/2025 18.4  7.0 - 25.0 Final    Anion Gap 03/03/2025 13.5  5.0 - 15.0 mmol/L Final    eGFR 03/03/2025 47.3 (L)  >60.0 mL/min/1.73 Final    C-Reactive Protein 03/03/2025 0.34  0.00 - 0.50 mg/dL Final    TSH 03/03/2025 0.667  0.270 - 4.200 uIU/mL Final       Assessment and Plan  Diagnoses and all orders for this visit:    1. At high risk for falls (Primary)  -     Ambulatory Referral to Home Health    2. Idiopathic peripheral neuropathy  -     amitriptyline (ELAVIL) 10 MG tablet; Take 1 tablet by mouth Every Night.  Dispense: 90 tablet; Refill: 2    3. Permanent atrial fibrillation  -     apixaban (ELIQUIS) 2.5 MG tablet tablet; Take 1 tablet by mouth 2 (Two) Times a Day.  Dispense: 180 tablet; Refill: 2    4. Dyslipidemia  -     atorvastatin (LIPITOR) 10 MG tablet; Take 1 tablet by mouth Every Night.  Dispense: 90 tablet; Refill: 2    5. Hashimoto's thyroiditis  -     levothyroxine (SYNTHROID, LEVOTHROID) 50 MCG tablet; Take 1 tablet by mouth Daily.  Dispense: 90 tablet; Refill: 3    6. Slow transit constipation    7. Generalized anxiety disorder  -     sertraline (Zoloft) 50 MG tablet; Take 1 tablet by mouth Daily.  Dispense: 90 tablet; Refill: 1    8. Esophageal dysphagia  -     FL ESOPHAGRAM SINGLE CONTRAST; Future    9. Hypertrophy of nail  -     Ambulatory Referral to Podiatry    Other orders  -     lactulose (CHRONULAC) 10 GM/15ML solution; Take 30 mL by mouth 2 (Two) Times a Day As Needed (constipation).  Dispense: 946 mL; Refill: 11      Assessment & Plan  1. Fall  She experienced a fall in the shower approximately one week ago, resulting in a significant bruise on her head. She did not seek immediate medical attention at a hospital. She reports another recent fall but is unable to recall the specific circumstances leading to it. She does not have any safety measures such as  rails or a shower chair in place. A referral for occupational therapy home health will be made to assess her home environment and provide strategies to prevent future falls.    2. Dysphagia  She reports a decreased appetite and frequent choking episodes. She is seeking pharmacological intervention to stimulate her appetite. The risks associated with appetite stimulants, including blood clots and heart attacks, were discussed. An esophagram will be ordered to investigate potential esophageal strictures that may be contributing to her decreased appetite. A referral to the surgery team will also be made for further evaluation.    3. Restless leg  Amitriptyline has been beneficial in managing this symptom.    4. Constipation.  She reports irregular bowel movements and requests an additional prescription for lactulose. She has previously tried Linzess but discontinued its use due to excessive bowel cleansing and associated cramping. A prescription for lactulose will be provided, along with a larger bottle and additional refills.    5. Autoimmune rash  She is currently on Dupixent for itchy nodules and has expressed concerns about potential ocular side effects. She reports cloudy vision and difficulty reading her Bible. She has undergone an eye evaluation and was informed by Dr. Low that she has calcium deposits growing in her eyes, which he plans to remove. 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 The potential side effects of Dupixent, including dry eyes and conjunctivitis, were discussed. She was reassured that Dupixent does not pose a risk of blindness. If she chooses to discontinue Dupixent, topical steroids can be used to manage any resulting itchy skin lesions.    6. Anxiety.  She reports experiencing vivid dreams nightly and expresses a desire to sleep continuously once she falls asleep.    Follow-up  The patient will follow up in 3 months.       BMI is within normal parameters. No  other follow-up for BMI required.       New Medications:   New Medications Ordered This Visit   Medications    amitriptyline (ELAVIL) 10 MG tablet     Sig: Take 1 tablet by mouth Every Night.     Dispense:  90 tablet     Refill:  2    apixaban (ELIQUIS) 2.5 MG tablet tablet     Sig: Take 1 tablet by mouth 2 (Two) Times a Day.     Dispense:  180 tablet     Refill:  2    atorvastatin (LIPITOR) 10 MG tablet     Sig: Take 1 tablet by mouth Every Night.     Dispense:  90 tablet     Refill:  2    levothyroxine (SYNTHROID, LEVOTHROID) 50 MCG tablet     Sig: Take 1 tablet by mouth Daily.     Dispense:  90 tablet     Refill:  3    sertraline (Zoloft) 50 MG tablet     Sig: Take 1 tablet by mouth Daily.     Dispense:  90 tablet     Refill:  1    lactulose (CHRONULAC) 10 GM/15ML solution     Sig: Take 30 mL by mouth 2 (Two) Times a Day As Needed (constipation).     Dispense:  946 mL     Refill:  11       Discontinued Medications:   Medications Discontinued During This Encounter   Medication Reason    lactulose (CHRONULAC) 10 GM/15ML solution     apixaban (ELIQUIS) 2.5 MG tablet tablet Reorder    atorvastatin (LIPITOR) 10 MG tablet Reorder    amitriptyline (ELAVIL) 10 MG tablet Reorder    sertraline (Zoloft) 50 MG tablet Reorder    levothyroxine (SYNTHROID, LEVOTHROID) 50 MCG tablet Reorder                 Follow Up:   Return in about 3 months (around 7/7/2025).    Patient was given instructions and counseling regarding her condition or for health maintenance advice. Please see specific information pulled into the AVS if appropriate.     Patient or patient representative verbalized consent for the use of Ambient Listening during the visit with  Isac Bang DO for chart documentation. 4/7/2025  15:09 EDT       This document has been electronically signed by Isac Bang DO   April 7, 2025 15:09 EDT      Dictated Utilizing Dragon Dictation: Part of this note may be an electronic transcription/translation of spoken  language to printed text using the Dragon Dictation System.

## 2025-04-15 ENCOUNTER — HOSPITAL ENCOUNTER (OUTPATIENT)
Dept: GENERAL RADIOLOGY | Facility: HOSPITAL | Age: 85
Discharge: HOME | End: 2025-04-15
Admitting: STUDENT IN AN ORGANIZED HEALTH CARE EDUCATION/TRAINING PROGRAM
Payer: MEDICARE

## 2025-04-15 DIAGNOSIS — R13.19 ESOPHAGEAL DYSPHAGIA: ICD-10-CM

## 2025-04-15 PROCEDURE — 74220 X-RAY XM ESOPHAGUS 1CNTRST: CPT

## 2025-04-15 PROCEDURE — 74220 X-RAY XM ESOPHAGUS 1CNTRST: CPT | Performed by: RADIOLOGY

## 2025-04-16 ENCOUNTER — OFFICE VISIT (OUTPATIENT)
Dept: SURGERY | Facility: CLINIC | Age: 85
End: 2025-04-16
Payer: MEDICARE

## 2025-04-16 VITALS
DIASTOLIC BLOOD PRESSURE: 80 MMHG | HEIGHT: 63 IN | SYSTOLIC BLOOD PRESSURE: 128 MMHG | BODY MASS INDEX: 21.44 KG/M2 | WEIGHT: 121 LBS

## 2025-04-16 DIAGNOSIS — R91.8 LUNG MASS: ICD-10-CM

## 2025-04-16 DIAGNOSIS — R13.10 DYSPHAGIA, UNSPECIFIED TYPE: Primary | ICD-10-CM

## 2025-04-16 NOTE — H&P (VIEW-ONLY)
Subjective   Anette Dunham is a 85 y.o. female who presents today for Initial Evaluation    Chief Complaint:    Chief Complaint   Patient presents with    EGD        History of Present Illness:    History of Present Illness Anette is an 85-year-old female presents for evaluation for dysphagia.  She reports that she is having dysphagia for the past several months.  Reports that she will get choked.  Sometimes she can get it up and sometimes she can get it to go down.  She does have a history of acid reflux in which she did a DEXA on.  Currently anticoagulated on Eliquis for history of A-fib.  She did have an esophagram yesterday ordered by Dr. Bang.  Reports unintentional weight loss due to being afraid to eat.    The following portions of the patient's history were reviewed and updated as appropriate: allergies, current medications, past family history, past medical history, past social history, past surgical history and problem list.    Past Medical History:  Past Medical History:   Diagnosis Date    Anxiety     Arrhythmia     Atrial fibrillation     Coronary artery disease     Disease of thyroid gland     Dysfunctional gallbladder     Fracture of two ribs     GERD (gastroesophageal reflux disease)     Hypertension     Lumbar compression fracture 07/2022    Osteoporosis        Social History:  Social History     Socioeconomic History    Marital status:    Tobacco Use    Smoking status: Some Days     Current packs/day: 0.00     Types: Cigarettes     Start date: 3/10/2007     Last attempt to quit: 3/10/2022     Years since quitting: 3.1     Passive exposure: Past    Smokeless tobacco: Current     Types: Snuff, Chew   Vaping Use    Vaping status: Never Used   Substance and Sexual Activity    Alcohol use: No    Drug use: No    Sexual activity: Defer       Family History:  Family History   Problem Relation Age of Onset    Dementia Mother     Diabetes Father     Heart disease Father        Past Surgical  History:  Past Surgical History:   Procedure Laterality Date    ABDOMINAL SURGERY      CARDIAC ELECTROPHYSIOLOGY PROCEDURE N/A 09/23/2022    Procedure: Device Implant. Leadless PPM. Hold eliquis x1 day prior to procedure.;  Surgeon: Jefe Zhao MD;  Location:  [facility];  Service: Cardiology;  Laterality: N/A;    CARDIAC ELECTROPHYSIOLOGY PROCEDURE N/A 09/23/2022    Procedure: AV Node;  Surgeon: Jefe Zhao MD;  Location:  [facility];  Service: Cardiovascular;  Laterality: N/A;    CATARACT EXTRACTION      GALLBLADDER SURGERY      HIP HEMIARTHROPLASTY Left 05/29/2018    Procedure: LEFT HIP BIPOLAR HEMIARTHROPLASTY;  Surgeon: Trevon Arriaga MD;  Location:  [facility];  Service: Orthopedics    PACEMAKER IMPLANTATION         Problem List:  Patient Active Problem List   Diagnosis    Essential hypertension    Permanent atrial fibrillation    Complete heart block    Chronic anticoagulation    Presence of cardiac pacemaker    Dyslipidemia    Dysphagia       Allergy:   No Known Allergies     Current Medications:   Current Outpatient Medications   Medication Sig Dispense Refill    alendronate (Fosamax) 70 MG tablet Take 1 tablet by mouth Every 7 (Seven) Days. 12 tablet 2    amitriptyline (ELAVIL) 10 MG tablet Take 1 tablet by mouth Every Night. 90 tablet 2    apixaban (ELIQUIS) 2.5 MG tablet tablet Take 1 tablet by mouth 2 (Two) Times a Day. 180 tablet 2    atorvastatin (LIPITOR) 10 MG tablet Take 1 tablet by mouth Every Night. 90 tablet 2    dexlansoprazole (DEXILANT) 60 MG capsule Take 1 capsule by mouth Daily. 30 capsule 5    Dupilumab (Dupixent) 300 MG/2ML solution auto-injector injection Inject 2 mL under the skin into the appropriate area as directed Every 14 (Fourteen) Days.      erythromycin (ROMYCIN) 5 MG/GM ophthalmic ointment Administer 1 Application to the right eye 3 times a day.      fluticasone (FLONASE) 50 MCG/ACT nasal spray Administer 2 sprays into the nostril(s)  as directed by provider Daily.      furosemide (LASIX) 20 MG tablet Take 1 tablet by mouth As Needed (lower exrremity swelling). 30 tablet 5    lactulose (CHRONULAC) 10 GM/15ML solution Take 30 mL by mouth 2 (Two) Times a Day As Needed (constipation). 946 mL 11    levothyroxine (SYNTHROID, LEVOTHROID) 50 MCG tablet Take 1 tablet by mouth Daily. 90 tablet 3    linaclotide (LINZESS) 72 MCG capsule capsule Take 1 capsule by mouth Every Morning Before Breakfast. 30 capsule 2    metoprolol tartrate (LOPRESSOR) 25 MG tablet Take 1.5 tablets by mouth 2 (Two) Times a Day. 270 tablet 3    nitroglycerin (NITROSTAT) 0.4 MG SL tablet 1 under the tongue as needed for angina, may repeat q5mins for up three doses 30 tablet 11    sertraline (Zoloft) 50 MG tablet Take 1 tablet by mouth Daily. 90 tablet 1    valACYclovir (VALTREX) 500 MG tablet Take 1 tablet by mouth Daily.       No current facility-administered medications for this visit.       Review of Systems:    Review of Systems   HENT:  Positive for trouble swallowing.    Respiratory:  Positive for choking.          Physical Exam:   Physical Exam  Constitutional:       Appearance: Normal appearance.   HENT:      Head: Normocephalic and atraumatic.      Right Ear: External ear normal.      Left Ear: External ear normal.   Eyes:      Conjunctiva/sclera: Conjunctivae normal.      Pupils: Pupils are equal, round, and reactive to light.   Cardiovascular:      Pulses: Normal pulses.   Pulmonary:      Effort: Pulmonary effort is normal.   Abdominal:      General: Abdomen is flat.      Palpations: Abdomen is soft.   Musculoskeletal:      Cervical back: Normal range of motion.      Comments: Presents in wheelchair   Skin:     General: Skin is warm and dry.      Capillary Refill: Capillary refill takes less than 2 seconds.   Neurological:      General: No focal deficit present.      Mental Status: She is alert and oriented to person, place, and time.   Psychiatric:         Mood and  "Affect: Mood normal.         Behavior: Behavior normal.       Vitals:  Blood pressure 128/80, height 160 cm (62.99\"), weight 54.9 kg (121 lb).   Body mass index is 21.44 kg/m².     Imaging:   FL ESOPHAGRAM SINGLE CONTRAST  EXAMINATION: FL ESOPHAGRAM SINGLE CONTRAST-      CLINICAL INDICATION: dysphagia; R13.19-Other dysphagia        COMPARISON: None available     Total fluoroscopy time 0.9 minutes.     Fluoroscopy was provided. Patient was given thick barium and a 13 mm  barium tablet     FINDINGS:  Left lung mass noted. Recommend CT     Tertiary contractions throughout the esophagus. Narrowing of the distal  esophagus with delayed passage of the 13 mm barium tablet.     FINDINGS:  1. Left lung mass. Recommend CT  2. Narrowing of the distal esophagus. Recommend direct visualization           This report was finalized on 4/15/2025 11:18 AM by Dr. Milton Uribe MD.          Assessment & Plan   Diagnoses and all orders for this visit:    1. Dysphagia, unspecified type (Primary)  -     Case Request; Standing  -     Case Request    2. Lung mass  -     CT Chest With Contrast; Future    Other orders  -     Follow Anesthesia Guidelines / Protocol; Future  -     Follow Anesthesia Guidelines / Protocol; Standing  -     Verify / Perform Chlorhexidine Skin Prep; Standing  -     Provide NPO Instructions to Patient; Future  -     Chlorhexidine Skin Prep; Future  -     Place Sequential Compression Device; Standing  -     Maintain Sequential Compression Device; Standing    Anette is an 85-year-old female presents for evaluation for dysphagia.  She undergo EGD with possible dilation with Dr. Cruz on Friday.  I have reviewed her esophagram that reveals narrowing of the distal esophagus.  It also reveals a left lung mass in which it recommends a CT.  I have ordered a CT of her chest to further evaluate.  Verbalized understanding of prep instructions and procedure and wishes to proceed.  She will hold Eliquis for 48 hours.    Visit " Diagnoses:    ICD-10-CM ICD-9-CM   1. Dysphagia, unspecified type  R13.10 787.20   2. Lung mass  R91.8 786.6         MEDS ORDERED DURING VISIT:  No orders of the defined types were placed in this encounter.      Return for Follow-up postop.             This document has been electronically signed by JUNIE Salmon  April 16, 2025 11:08 EDT    Please note that portions of this note were completed with a voice recognition program.

## 2025-04-18 ENCOUNTER — HOSPITAL ENCOUNTER (OUTPATIENT)
Facility: HOSPITAL | Age: 85
Setting detail: HOSPITAL OUTPATIENT SURGERY
Discharge: HOME | End: 2025-04-18
Attending: SURGERY | Admitting: SURGERY
Payer: MEDICARE

## 2025-04-18 ENCOUNTER — RESULTS FOLLOW-UP (OUTPATIENT)
Dept: FAMILY MEDICINE CLINIC | Facility: CLINIC | Age: 85
End: 2025-04-18
Payer: MEDICARE

## 2025-04-18 ENCOUNTER — ANESTHESIA (OUTPATIENT)
Dept: PERIOP | Facility: HOSPITAL | Age: 85
End: 2025-04-18
Payer: MEDICARE

## 2025-04-18 ENCOUNTER — ANESTHESIA EVENT (OUTPATIENT)
Dept: PERIOP | Facility: HOSPITAL | Age: 85
End: 2025-04-18
Payer: MEDICARE

## 2025-04-18 VITALS
HEIGHT: 63 IN | DIASTOLIC BLOOD PRESSURE: 83 MMHG | WEIGHT: 121 LBS | BODY MASS INDEX: 21.44 KG/M2 | SYSTOLIC BLOOD PRESSURE: 116 MMHG | RESPIRATION RATE: 16 BRPM | HEART RATE: 69 BPM | TEMPERATURE: 97.7 F | OXYGEN SATURATION: 97 %

## 2025-04-18 DIAGNOSIS — R13.10 DYSPHAGIA, UNSPECIFIED TYPE: ICD-10-CM

## 2025-04-18 PROCEDURE — 25010000002 PROPOFOL 200 MG/20ML EMULSION: Performed by: NURSE ANESTHETIST, CERTIFIED REGISTERED

## 2025-04-18 RX ORDER — MIDAZOLAM HYDROCHLORIDE 1 MG/ML
0.5 INJECTION, SOLUTION INTRAMUSCULAR; INTRAVENOUS
Status: DISCONTINUED | OUTPATIENT
Start: 2025-04-18 | End: 2025-04-18 | Stop reason: HOSPADM

## 2025-04-18 RX ORDER — SODIUM CHLORIDE 0.9 % (FLUSH) 0.9 %
10 SYRINGE (ML) INJECTION EVERY 12 HOURS SCHEDULED
Status: DISCONTINUED | OUTPATIENT
Start: 2025-04-18 | End: 2025-04-18 | Stop reason: HOSPADM

## 2025-04-18 RX ORDER — OXYCODONE AND ACETAMINOPHEN 5; 325 MG/1; MG/1
1 TABLET ORAL ONCE AS NEEDED
Status: DISCONTINUED | OUTPATIENT
Start: 2025-04-18 | End: 2025-04-18 | Stop reason: HOSPADM

## 2025-04-18 RX ORDER — IPRATROPIUM BROMIDE AND ALBUTEROL SULFATE 2.5; .5 MG/3ML; MG/3ML
3 SOLUTION RESPIRATORY (INHALATION) ONCE AS NEEDED
Status: DISCONTINUED | OUTPATIENT
Start: 2025-04-18 | End: 2025-04-18 | Stop reason: HOSPADM

## 2025-04-18 RX ORDER — SODIUM CHLORIDE 0.9 % (FLUSH) 0.9 %
10 SYRINGE (ML) INJECTION AS NEEDED
Status: DISCONTINUED | OUTPATIENT
Start: 2025-04-18 | End: 2025-04-18 | Stop reason: HOSPADM

## 2025-04-18 RX ORDER — FENTANYL CITRATE 50 UG/ML
50 INJECTION, SOLUTION INTRAMUSCULAR; INTRAVENOUS
Status: DISCONTINUED | OUTPATIENT
Start: 2025-04-18 | End: 2025-04-18 | Stop reason: HOSPADM

## 2025-04-18 RX ORDER — ONDANSETRON 2 MG/ML
4 INJECTION INTRAMUSCULAR; INTRAVENOUS AS NEEDED
Status: DISCONTINUED | OUTPATIENT
Start: 2025-04-18 | End: 2025-04-18 | Stop reason: HOSPADM

## 2025-04-18 RX ORDER — PROPOFOL 10 MG/ML
INJECTION, EMULSION INTRAVENOUS AS NEEDED
Status: DISCONTINUED | OUTPATIENT
Start: 2025-04-18 | End: 2025-04-18 | Stop reason: SURG

## 2025-04-18 RX ORDER — SODIUM CHLORIDE, SODIUM LACTATE, POTASSIUM CHLORIDE, CALCIUM CHLORIDE 600; 310; 30; 20 MG/100ML; MG/100ML; MG/100ML; MG/100ML
125 INJECTION, SOLUTION INTRAVENOUS ONCE
Status: DISCONTINUED | OUTPATIENT
Start: 2025-04-18 | End: 2025-04-18 | Stop reason: HOSPADM

## 2025-04-18 RX ORDER — SODIUM CHLORIDE 9 MG/ML
40 INJECTION, SOLUTION INTRAVENOUS AS NEEDED
Status: DISCONTINUED | OUTPATIENT
Start: 2025-04-18 | End: 2025-04-18 | Stop reason: HOSPADM

## 2025-04-18 RX ORDER — MEPERIDINE HYDROCHLORIDE 25 MG/ML
12.5 INJECTION INTRAMUSCULAR; INTRAVENOUS; SUBCUTANEOUS
Status: DISCONTINUED | OUTPATIENT
Start: 2025-04-18 | End: 2025-04-18 | Stop reason: HOSPADM

## 2025-04-18 RX ADMIN — PROPOFOL 20 MG: 10 INJECTION, EMULSION INTRAVENOUS at 10:35

## 2025-04-18 RX ADMIN — PROPOFOL 50 MG: 10 INJECTION, EMULSION INTRAVENOUS at 10:32

## 2025-04-18 RX ADMIN — PROPOFOL 30 MG: 10 INJECTION, EMULSION INTRAVENOUS at 10:33

## 2025-04-18 RX ADMIN — PROPOFOL 20 MG: 10 INJECTION, EMULSION INTRAVENOUS at 10:36

## 2025-04-18 NOTE — ANESTHESIA PREPROCEDURE EVALUATION
Anesthesia Evaluation     no history of anesthetic complications:   NPO Solid Status: > 8 hours  NPO Liquid Status: > 8 hours           Airway   Mallampati: II  TM distance: >3 FB  Neck ROM: full  No difficulty expected  Dental    (+) edentulous    Pulmonary - normal exam   Cardiovascular - normal exam    Patient on routine beta blocker and Beta blocker given within 24 hours of surgery    (+) hypertension, CAD, dysrhythmias Atrial Fib      Neuro/Psych  (+) psychiatric history  GI/Hepatic/Renal/Endo    (+) GERD, thyroid problem     Musculoskeletal     Abdominal  - normal exam    Bowel sounds: normal.   Substance History      OB/GYN          Other                    Anesthesia Plan    ASA 3     general     intravenous induction     Anesthetic plan, risks, benefits, and alternatives have been provided, discussed and informed consent has been obtained with: patient.    CODE STATUS:

## 2025-04-18 NOTE — ANESTHESIA POSTPROCEDURE EVALUATION
Patient: Anette Dunham    Procedure Summary       Date: 04/18/25 Room / Location: [facility] OR  /  [facility]    Anesthesia Start: 1029 Anesthesia Stop: 1040    Procedure: ESOPHAGOGASTRODUODENOSCOPY WITH DILATATION (Esophagus) Diagnosis:       Dysphagia, unspecified type      (Dysphagia, unspecified type [R13.10])    Surgeons: Homero Cruz MD Provider: Nico Choe MD    Anesthesia Type: general ASA Status: 3            Anesthesia Type: general    Vitals  Vitals Value Taken Time   /83 04/18/25 11:12   Temp 97.7 °F (36.5 °C) 04/18/25 10:42   Pulse 69 04/18/25 11:12   Resp 16 04/18/25 11:12   SpO2 97 % 04/18/25 11:12           Post Anesthesia Care and Evaluation    Patient location during evaluation: PHASE II  Patient participation: complete - patient participated  Level of consciousness: awake and alert  Pain score: 0  Pain management: adequate    Airway patency: patent  Anesthetic complications: No anesthetic complications    Cardiovascular status: acceptable  Respiratory status: acceptable  Hydration status: acceptable

## 2025-04-21 LAB — REF LAB TEST METHOD: NORMAL

## 2025-05-02 ENCOUNTER — OFFICE VISIT (OUTPATIENT)
Dept: SURGERY | Facility: CLINIC | Age: 85
End: 2025-05-02
Payer: MEDICARE

## 2025-05-02 VITALS — WEIGHT: 121 LBS | BODY MASS INDEX: 21.44 KG/M2 | HEIGHT: 63 IN

## 2025-05-02 DIAGNOSIS — R13.10 DYSPHAGIA, UNSPECIFIED TYPE: Primary | ICD-10-CM

## 2025-05-02 DIAGNOSIS — R59.1 LYMPHADENOPATHY: ICD-10-CM

## 2025-05-02 NOTE — PROGRESS NOTES
Subjective   Anette Dunham is a 85 y.o. female who presents today for Follow Up    Chief Complaint:    Chief Complaint   Patient presents with    Post-op Follow-up        History of Present Illness:    History of Present Illness Anette is an 85-year-old female presents for evaluation for follow-up status post EGD with Dr. Cruz.  Patient has not had CT of chest with contrast to further investigate lung mass that was found on UGI ordered by Dr. Bang.  I did check the communications in which she reports that they have tried to schedule multiple times but was unable to get an answer.  Patient reports that she continues to have dysphagia.  Reports that she feels like stuff is getting hung in her left neck.  Does report that she feels an abnormality in her left neck upon palpation.    The following portions of the patient's history were reviewed and updated as appropriate: allergies, current medications, past family history, past medical history, past social history, past surgical history and problem list.    Past Medical History:  Past Medical History:   Diagnosis Date    Anxiety     Arrhythmia     Atrial fibrillation     Coronary artery disease     Disease of thyroid gland     Dysfunctional gallbladder     Fracture of two ribs     GERD (gastroesophageal reflux disease)     Hypertension     Lumbar compression fracture 07/2022    Osteoporosis        Social History:  Social History     Socioeconomic History    Marital status:    Tobacco Use    Smoking status: Former     Current packs/day: 0.00     Types: Cigarettes     Start date: 3/10/2007     Quit date: 3/10/2022     Years since quitting: 3.1     Passive exposure: Past    Smokeless tobacco: Current     Types: Snuff, Chew   Vaping Use    Vaping status: Never Used   Substance and Sexual Activity    Alcohol use: No    Drug use: No    Sexual activity: Defer       Family History:  Family History   Problem Relation Age of Onset    Dementia Mother     Diabetes  Father     Heart disease Father        Past Surgical History:  Past Surgical History:   Procedure Laterality Date    ABDOMINAL SURGERY      CARDIAC ELECTROPHYSIOLOGY PROCEDURE N/A 09/23/2022    Procedure: Device Implant. Leadless PPM. Hold eliquis x1 day prior to procedure.;  Surgeon: Jefe Zhao MD;  Location:  [facility];  Service: Cardiology;  Laterality: N/A;    CARDIAC ELECTROPHYSIOLOGY PROCEDURE N/A 09/23/2022    Procedure: AV Node;  Surgeon: Jefe Zhao MD;  Location:  [facility];  Service: Cardiovascular;  Laterality: N/A;    CATARACT EXTRACTION      ENDOSCOPY N/A 4/18/2025    Procedure: ESOPHAGOGASTRODUODENOSCOPY WITH DILATATION;  Surgeon: Homero Cruz MD;  Location:  [facility];  Service: Gastroenterology;  Laterality: N/A;    GALLBLADDER SURGERY      HIP HEMIARTHROPLASTY Left 05/29/2018    Procedure: LEFT HIP BIPOLAR HEMIARTHROPLASTY;  Surgeon: Trevon Arriaga MD;  Location:  [facility];  Service: Orthopedics    PACEMAKER IMPLANTATION         Problem List:  Patient Active Problem List   Diagnosis    Essential hypertension    Permanent atrial fibrillation    Complete heart block    Chronic anticoagulation    Presence of cardiac pacemaker    Dyslipidemia    Dysphagia       Allergy:   No Known Allergies     Current Medications:   Current Outpatient Medications   Medication Sig Dispense Refill    alendronate (Fosamax) 70 MG tablet Take 1 tablet by mouth Every 7 (Seven) Days. 12 tablet 2    amitriptyline (ELAVIL) 10 MG tablet Take 1 tablet by mouth Every Night. 90 tablet 2    apixaban (ELIQUIS) 2.5 MG tablet tablet Take 1 tablet by mouth 2 (Two) Times a Day. 180 tablet 2    atorvastatin (LIPITOR) 10 MG tablet Take 1 tablet by mouth Every Night. 90 tablet 2    dexlansoprazole (DEXILANT) 60 MG capsule Take 1 capsule by mouth Daily. 30 capsule 5    Dupilumab (Dupixent) 300 MG/2ML solution auto-injector injection Inject 2 mL under the skin into the appropriate area as  directed Every 14 (Fourteen) Days.      erythromycin (ROMYCIN) 5 MG/GM ophthalmic ointment Administer 1 Application to the right eye 3 times a day.      fluticasone (FLONASE) 50 MCG/ACT nasal spray Administer 2 sprays into the nostril(s) as directed by provider Daily.      furosemide (LASIX) 20 MG tablet Take 1 tablet by mouth As Needed (lower exrremity swelling). 30 tablet 5    lactulose (CHRONULAC) 10 GM/15ML solution Take 30 mL by mouth 2 (Two) Times a Day As Needed (constipation). 946 mL 11    levothyroxine (SYNTHROID, LEVOTHROID) 50 MCG tablet Take 1 tablet by mouth Daily. 90 tablet 3    linaclotide (LINZESS) 72 MCG capsule capsule Take 1 capsule by mouth Every Morning Before Breakfast. 30 capsule 2    metoprolol tartrate (LOPRESSOR) 25 MG tablet Take 1.5 tablets by mouth 2 (Two) Times a Day. 270 tablet 3    nitroglycerin (NITROSTAT) 0.4 MG SL tablet 1 under the tongue as needed for angina, may repeat q5mins for up three doses 30 tablet 11    sertraline (Zoloft) 50 MG tablet Take 1 tablet by mouth Daily. 90 tablet 1    valACYclovir (VALTREX) 500 MG tablet Take 1 tablet by mouth Daily.       No current facility-administered medications for this visit.       Review of Systems:    Review of Systems   HENT:  Positive for trouble swallowing.    Hematological:  Positive for adenopathy.         Physical Exam:   Physical Exam  Constitutional:       Appearance: Normal appearance.   HENT:      Head: Normocephalic and atraumatic.        Right Ear: External ear normal.      Left Ear: External ear normal.   Eyes:      Conjunctiva/sclera: Conjunctivae normal.   Cardiovascular:      Rate and Rhythm: Normal rate.      Pulses: Normal pulses.   Pulmonary:      Effort: Pulmonary effort is normal.   Abdominal:      General: Abdomen is flat.   Musculoskeletal:         General: Normal range of motion.   Skin:     General: Skin is warm and dry.      Capillary Refill: Capillary refill takes less than 2 seconds.   Neurological:       "General: No focal deficit present.      Mental Status: She is alert and oriented to person, place, and time.   Psychiatric:         Mood and Affect: Mood normal.         Behavior: Behavior normal.         Vitals:  Height 160 cm (62.99\"), weight 54.9 kg (121 lb).   Body mass index is 21.44 kg/m².     Lab Results:   Admission on 2025, Discharged on 2025   Component Date Value Ref Range Status    Reference Lab Report 2025    Final                    Value:[facility]  [address] [place] [address]  Phone: [phone] or [phone]  Fax: [phone]  Yusuf Fitzgerald M.D., Medical Director    PATIENT NAME                                     LABORATORY NO.  [id number]   [address]                                [id number]  [id number]                                 AGE                    SEX   SSN              CLIENT REF #  [facility] [place]                      85        1940      F     [id number]      [id number]    [address]                             REQUESTING M.D.           ATTENDING [place] [address]                           MONAE PEREZ  DATE COLLECTED            DATE RECEIVED          DATE REPORTED  2025    DIAGNOSIS:  GASTRIC ANTRUM, BIOPSY:  Gastric antral type mucosa with reactive gastropathy  Negative for H. pylori organisms on routine stain  Negative for intestinal metaplasia, dysplasia,                           or malignancy    AV    CLINICAL HISTORY:  Dysphagia, unspecified type    SPECIMENS RECEIVED:  GASTRIC ANTRUM, BIOPSY    MICROSCOPIC DESCRIPTION:  Tissue blocks are prepared and slides are examined microscopically on all  specimens. See diagnosis for details.    Professional interpretation rendered by Shandra Sumner D.O. at [facility], [address] [place] [address].    GROSS DESCRIPTION:  Labeled \"gastric, antrum\", consisting " of 1 piece of tan soft tissue measuring 0.4  x 0.4 x 0.2 cm, submitted entirely 1 cassette.  SOG    REVIEWED, DIAGNOSED AND ELECTRONICALLY  SIGNED BY:    Shandra Sumner D.O.  CPT CODES:  87707     Lab on 03/03/2025   Component Date Value Ref Range Status    WBC 03/03/2025 7.21  3.40 - 10.80 10*3/mm3 Final    RBC 03/03/2025 4.34  3.77 - 5.28 10*6/mm3 Final    Hemoglobin 03/03/2025 14.0  12.0 - 15.9 g/dL Final    Hematocrit 03/03/2025 41.1  34.0 - 46.6 % Final    MCV 03/03/2025 94.7  79.0 - 97.0 fL Final    MCH 03/03/2025 32.3  26.6 - 33.0 pg Final    MCHC 03/03/2025 34.1  31.5 - 35.7 g/dL Final    RDW 03/03/2025 13.2  12.3 - 15.4 % Final    RDW-SD 03/03/2025 46.3  37.0 - 54.0 fl Final    MPV 03/03/2025 12.1 (H)  6.0 - 12.0 fL Final    Platelets 03/03/2025 226  140 - 450 10*3/mm3 Final    Glucose 03/03/2025 104 (H)  65 - 99 mg/dL Final    BUN 03/03/2025 21  8 - 23 mg/dL Final    Creatinine 03/03/2025 1.14 (H)  0.57 - 1.00 mg/dL Final    Sodium 03/03/2025 140  136 - 145 mmol/L Final    Potassium 03/03/2025 3.3 (L)  3.5 - 5.2 mmol/L Final    Chloride 03/03/2025 106  98 - 107 mmol/L Final    CO2 03/03/2025 20.5 (L)  22.0 - 29.0 mmol/L Final    Calcium 03/03/2025 9.2  8.6 - 10.5 mg/dL Final    Total Protein 03/03/2025 8.0  6.0 - 8.5 g/dL Final    Albumin 03/03/2025 3.9  3.5 - 5.2 g/dL Final    ALT (SGPT) 03/03/2025 8  1 - 33 U/L Final    AST (SGOT) 03/03/2025 17  1 - 32 U/L Final    Alkaline Phosphatase 03/03/2025 63  39 - 117 U/L Final    Total Bilirubin 03/03/2025 0.5  0.0 - 1.2 mg/dL Final    Globulin 03/03/2025 4.1  gm/dL Final    A/G Ratio 03/03/2025 1.0  g/dL Final    BUN/Creatinine Ratio 03/03/2025 18.4  7.0 - 25.0 Final    Anion Gap 03/03/2025 13.5  5.0 - 15.0 mmol/L Final    eGFR 03/03/2025 47.3 (L)  >60.0 mL/min/1.73 Final    C-Reactive Protein 03/03/2025 0.34  0.00 - 0.50 mg/dL Final    TSH 03/03/2025 0.667  0.270 - 4.200 uIU/mL Final       Imaging:   FL ESOPHAGRAM SINGLE CONTRAST  EXAMINATION: FL  ESOPHAGRAM SINGLE CONTRAST-      CLINICAL INDICATION: dysphagia; R13.19-Other dysphagia        COMPARISON: None available     Total fluoroscopy time 0.9 minutes.     Fluoroscopy was provided. Patient was given thick barium and a 13 mm  barium tablet     FINDINGS:  Left lung mass noted. Recommend CT     Tertiary contractions throughout the esophagus. Narrowing of the distal  esophagus with delayed passage of the 13 mm barium tablet.     FINDINGS:  1. Left lung mass. Recommend CT  2. Narrowing of the distal esophagus. Recommend direct visualization           This report was finalized on 4/15/2025 11:18 AM by Dr. Milton Uribe MD.          Assessment & Plan   Diagnoses and all orders for this visit:    1. Dysphagia, unspecified type (Primary)  -     CT Soft Tissue Neck With Contrast; Future    2. Lymphadenopathy  -     CT Soft Tissue Neck With Contrast; Future    Anette is an 85-year-old female presents for evaluation for follow-up status post EGD with Dr. Cruz.  She continues to have dysphagia.  I noted adenopathy to patient's left neck.  I have ordered a CT scan for further evaluation.  I did have my office staff attempt to call scheduling for patient to schedule CT while she was in clinic, however, the wait time was long and they were unable to get anyone to answer the phone.  Will work on getting CT scheduled as they are having difficulty contacting patient.  She will follow-up in 2 weeks.    Visit Diagnoses:    ICD-10-CM ICD-9-CM   1. Dysphagia, unspecified type  R13.10 787.20   2. Lymphadenopathy  R59.1 785.6         MEDS ORDERED DURING VISIT:  No orders of the defined types were placed in this encounter.      Return in about 2 weeks (around 5/16/2025).             This document has been electronically signed by JUNIE Samlon  May 2, 2025 14:50 EDT    Please note that portions of this note were completed with a voice recognition program.

## 2025-05-05 ENCOUNTER — HOSPITAL ENCOUNTER (OUTPATIENT)
Dept: CT IMAGING | Facility: HOSPITAL | Age: 85
Discharge: HOME | End: 2025-05-05
Payer: MEDICARE

## 2025-05-05 ENCOUNTER — OFFICE VISIT (OUTPATIENT)
Dept: SURGERY | Facility: CLINIC | Age: 85
End: 2025-05-05
Payer: MEDICARE

## 2025-05-05 VITALS — HEIGHT: 63 IN | BODY MASS INDEX: 21.44 KG/M2 | WEIGHT: 121 LBS

## 2025-05-05 DIAGNOSIS — R91.8 LUNG MASS: ICD-10-CM

## 2025-05-05 DIAGNOSIS — R91.8 LUNG MASS: Primary | ICD-10-CM

## 2025-05-05 DIAGNOSIS — R59.1 LYMPHADENOPATHY: ICD-10-CM

## 2025-05-05 DIAGNOSIS — R13.10 DYSPHAGIA, UNSPECIFIED TYPE: ICD-10-CM

## 2025-05-05 LAB — CREAT BLDA-MCNC: 1.1 MG/DL (ref 0.6–1.3)

## 2025-05-05 PROCEDURE — 71260 CT THORAX DX C+: CPT

## 2025-05-05 PROCEDURE — 1159F MED LIST DOCD IN RCRD: CPT

## 2025-05-05 PROCEDURE — 70491 CT SOFT TISSUE NECK W/DYE: CPT

## 2025-05-05 PROCEDURE — 70491 CT SOFT TISSUE NECK W/DYE: CPT | Performed by: RADIOLOGY

## 2025-05-05 PROCEDURE — 1160F RVW MEDS BY RX/DR IN RCRD: CPT

## 2025-05-05 PROCEDURE — 82565 ASSAY OF CREATININE: CPT

## 2025-05-05 PROCEDURE — 99213 OFFICE O/P EST LOW 20 MIN: CPT

## 2025-05-05 PROCEDURE — 25510000001 IOPAMIDOL 61 % SOLUTION

## 2025-05-05 PROCEDURE — 71260 CT THORAX DX C+: CPT | Performed by: RADIOLOGY

## 2025-05-05 RX ORDER — IOPAMIDOL 612 MG/ML
100 INJECTION, SOLUTION INTRAVASCULAR
Status: COMPLETED | OUTPATIENT
Start: 2025-05-05 | End: 2025-05-05

## 2025-05-05 RX ADMIN — IOPAMIDOL 50 ML: 612 INJECTION, SOLUTION INTRAVENOUS at 09:38

## 2025-05-05 NOTE — PROGRESS NOTES
Subjective   Anette Dunham is a 85 y.o. female who presents today for Initial Evaluation    Chief Complaint:    Chief Complaint   Patient presents with   • Difficulty Swallowing        History of Present Illness:    History of Present Illness Anette is an 85-year-old female presents for evaluation for follow-up after imaging.  Previously I seen her for dysphagia in which I reviewed esophagram where the radiologist noted a lung mass.  I did order a CT of her chest with contrast, scheduling was unable to get up with patient.  However, I seen her in follow-up last week and ordered a CT of her neck and soft tissue with contrast in addition to CT of chest with contrast.  It was scheduled and performed this morning, she followed up with me afterwards.  She does report that she has had some shortness of breath ongoing for approximately 3 years as she believes it started after having her pacemaker.  She is a smoker in which she has not had a cigarette in approximately a month.  She is unsure of how long she has smoked for.  Reports that she will typically have 1 in the morning with her coffee and occasionally 1 cigarette at night.  Never been diagnosed with COPD.    The following portions of the patient's history were reviewed and updated as appropriate: allergies, current medications, past family history, past medical history, past social history, past surgical history and problem list.    Past Medical History:  Past Medical History:   Diagnosis Date   • Anxiety    • Arrhythmia    • Atrial fibrillation    • Coronary artery disease    • Disease of thyroid gland    • Dysfunctional gallbladder    • Fracture of two ribs    • GERD (gastroesophageal reflux disease)    • Hypertension    • Lumbar compression fracture 07/2022   • Osteoporosis        Social History:  Social History     Socioeconomic History   • Marital status:    Tobacco Use   • Smoking status: Former     Current packs/day: 0.00     Types: Cigarettes      Start date: 3/10/2007     Quit date: 3/10/2022     Years since quitting: 3.1     Passive exposure: Past   • Smokeless tobacco: Current     Types: Snuff, Chew   Vaping Use   • Vaping status: Never Used   Substance and Sexual Activity   • Alcohol use: No   • Drug use: No   • Sexual activity: Defer       Family History:  Family History   Problem Relation Age of Onset   • Dementia Mother    • Diabetes Father    • Heart disease Father        Past Surgical History:  Past Surgical History:   Procedure Laterality Date   • ABDOMINAL SURGERY     • CARDIAC ELECTROPHYSIOLOGY PROCEDURE N/A 09/23/2022    Procedure: Device Implant. Leadless PPM. Hold eliquis x1 day prior to procedure.;  Surgeon: Jfee Zhao MD;  Location:  [facility];  Service: Cardiology;  Laterality: N/A;   • CARDIAC ELECTROPHYSIOLOGY PROCEDURE N/A 09/23/2022    Procedure: AV Node;  Surgeon: Jefe Zhao MD;  Location:  [facility];  Service: Cardiovascular;  Laterality: N/A;   • CATARACT EXTRACTION     • ENDOSCOPY N/A 4/18/2025    Procedure: ESOPHAGOGASTRODUODENOSCOPY WITH DILATATION;  Surgeon: Homero Cruz MD;  Location: [facility];  Service: Gastroenterology;  Laterality: N/A;   • GALLBLADDER SURGERY     • HIP HEMIARTHROPLASTY Left 05/29/2018    Procedure: LEFT HIP BIPOLAR HEMIARTHROPLASTY;  Surgeon: Trevon Arriaga MD;  Location: [facility];  Service: Orthopedics   • PACEMAKER IMPLANTATION         Problem List:  Patient Active Problem List   Diagnosis   • Essential hypertension   • Permanent atrial fibrillation   • Complete heart block   • Chronic anticoagulation   • Presence of cardiac pacemaker   • Dyslipidemia   • Dysphagia       Allergy:   No Known Allergies     Current Medications:   Current Outpatient Medications   Medication Sig Dispense Refill   • alendronate (Fosamax) 70 MG tablet Take 1 tablet by mouth Every 7 (Seven) Days. 12 tablet 2   • amitriptyline (ELAVIL) 10 MG tablet Take 1 tablet by mouth Every  Night. 90 tablet 2   • apixaban (ELIQUIS) 2.5 MG tablet tablet Take 1 tablet by mouth 2 (Two) Times a Day. 180 tablet 2   • atorvastatin (LIPITOR) 10 MG tablet Take 1 tablet by mouth Every Night. 90 tablet 2   • dexlansoprazole (DEXILANT) 60 MG capsule Take 1 capsule by mouth Daily. 30 capsule 5   • Dupilumab (Dupixent) 300 MG/2ML solution auto-injector injection Inject 2 mL under the skin into the appropriate area as directed Every 14 (Fourteen) Days.     • erythromycin (ROMYCIN) 5 MG/GM ophthalmic ointment Administer 1 Application to the right eye 3 times a day.     • fluticasone (FLONASE) 50 MCG/ACT nasal spray Administer 2 sprays into the nostril(s) as directed by provider Daily.     • furosemide (LASIX) 20 MG tablet Take 1 tablet by mouth As Needed (lower exrremity swelling). 30 tablet 5   • lactulose (CHRONULAC) 10 GM/15ML solution Take 30 mL by mouth 2 (Two) Times a Day As Needed (constipation). 946 mL 11   • levothyroxine (SYNTHROID, LEVOTHROID) 50 MCG tablet Take 1 tablet by mouth Daily. 90 tablet 3   • linaclotide (LINZESS) 72 MCG capsule capsule Take 1 capsule by mouth Every Morning Before Breakfast. 30 capsule 2   • metoprolol tartrate (LOPRESSOR) 25 MG tablet Take 1.5 tablets by mouth 2 (Two) Times a Day. 270 tablet 3   • nitroglycerin (NITROSTAT) 0.4 MG SL tablet 1 under the tongue as needed for angina, may repeat q5mins for up three doses 30 tablet 11   • sertraline (Zoloft) 50 MG tablet Take 1 tablet by mouth Daily. 90 tablet 1   • valACYclovir (VALTREX) 500 MG tablet Take 1 tablet by mouth Daily.       No current facility-administered medications for this visit.       Review of Systems:    Review of Systems   Respiratory:  Positive for shortness of breath.    Cardiovascular:  Negative for chest pain.         Physical Exam:   Physical Exam  Constitutional:       Appearance: Normal appearance.   HENT:      Head: Normocephalic and atraumatic.      Right Ear: External ear normal.      Left Ear: External  "ear normal.   Eyes:      Conjunctiva/sclera: Conjunctivae normal.   Pulmonary:      Effort: Pulmonary effort is normal.   Abdominal:      General: Abdomen is flat.      Palpations: Abdomen is soft.   Musculoskeletal:         General: Normal range of motion.      Cervical back: Normal range of motion.   Skin:     General: Skin is warm and dry.      Capillary Refill: Capillary refill takes less than 2 seconds.   Neurological:      General: No focal deficit present.      Mental Status: She is alert and oriented to person, place, and time.   Psychiatric:         Mood and Affect: Mood normal.         Behavior: Behavior normal.       Vitals:  Height 160 cm (62.99\"), weight 54.9 kg (121 lb).   Body mass index is 21.44 kg/m².     Imaging:   CT Chest With Contrast Diagnostic  Narrative: EXAM:    CT Chest With Intravenous Contrast     EXAM DATE:    5/5/2025 9:48 AM     CLINICAL HISTORY:    L lung mass seen on esophagram; R91.8-Other nonspecific abnormal  finding of lung field     TECHNIQUE:    Axial computed tomography images of the chest with intravenous  contrast.  Sagittal and coronal reformatted images were created and  reviewed.  This CT exam was performed using one or more of the following  dose reduction techniques:  automated exposure control, adjustment of  the mA and/or kV according to patient size, and/or use of iterative  reconstruction technique.     COMPARISON:    8/2/2022     FINDINGS:    Limitations:  Please note that reported measurements are made  manually, as computer generated (AI) measurements can over measure  lesions. Additionally, lesions identified by AI may have been present  presently, significant nodules will be discussed in the report and the  visual depictions of lesions provided by AI are for general reference  only.    Lungs and pleural spaces:  Interval development of a left upper lobe  subpleural mass measuring 3.8 cm x 6.2 cm.  Fairly severe paraseptal  emphysema again noted.  No " consolidation.  No significant effusion.    Heart:  Unremarkable as visualized.  No cardiomegaly.  No significant  pericardial effusion.  No significant coronary artery calcifications.    Bones/joints:  Degenerative disc disease throughout the thoracic  spine.  Degenerative facet arthropathy throughout the thoracic spine,  most prominent in the lower thoracic spine.  No acute fracture.  No  dislocation.    Soft tissues:  Unremarkable as visualized.    Vasculature:  Unremarkable as visualized.  No thoracic aortic  aneurysm.  The ascending thoracic aorta measures 4.1 cm in maximum  diameter.    Lymph nodes:  Unremarkable as visualized.  No enlarged lymph nodes.     Impression: 1.  Interval development of a left upper lobe subpleural mass measuring  3.8 cm x 6.2 cm.  2.  Fairly severe paraseptal emphysema again noted.  3.  Degenerative changes thoracic spine as described.     This report was finalized on 5/5/2025 9:50 AM by Dr. Sae Montoya MD.     CT Soft Tissue Neck With Contrast  Narrative: EXAM:    CT Neck With Intravenous Contrast     EXAM DATE:    5/5/2025 9:46 AM     CLINICAL HISTORY:    dysphagia, lymphadenopathy, left neck; R13.10-Dysphagia, unspecified;  R59.1-Generalized enlarged lymph nodes     TECHNIQUE:    Axial computed tomography images of the neck with intravenous  contrast.  Sagittal and coronal reformatted images were created and  reviewed.  This CT exam was performed using one or more of the following  dose reduction techniques:  automated exposure control, adjustment of  the mA and/or kV according to patient size, and/or use of iterative  reconstruction technique.     COMPARISON:    No relevant prior studies available.     FINDINGS:    Oropharynx:  Unremarkable as visualized.  No significant tonsillar  enlargement.  No peritonsillar abscess.    Hypopharynx:  Unremarkable as visualized.    Larynx:  Unremarkable as visualized.  Normal epiglottis.    Trachea:  Unremarkable as visualized.     Retropharyngeal space:  Unremarkable as visualized.    Submandibular/parotid glands:  Unremarkable as visualized.  Glands are  normal in size.    Thyroid:  Unremarkable as visualized.  No enlarged or calcified  nodules.    Bones/joints:  No acute fracture.    Soft tissues:  Unremarkable as visualized.    Vasculature:  Bilateral ICA calcific plaques noted proximally.    Lymph nodes:  Unremarkable as visualized.  No lymphadenopathy by CT  size criteria.    Lung apices:  Unremarkable as visualized.     Impression:   Degenerative cervical     This report was finalized on 5/5/2025 9:48 AM by Dr. Sae Montoya MD.          Assessment & Plan   Diagnoses and all orders for this visit:    1. Lung mass (Primary)  -     Ambulatory Referral to Pulmonology    Anette is an 85-year-old female presents for evaluation for follow-up after imaging.  I have referred her to pulmonology for further evaluation for lung mass.  She had a CT of her chest and neck and soft tissue today.  I have reviewed both images.  CT of neck and soft tissue was significant for degenerative cervical changes.  I did discuss this with her today.  There is no findings that could be leading to her dysphagia.  CT of her chest reveals a left upper lobe lung mass that is approximately 3.8 cm x 6.2 cm.  As well as severe paraseptal emphysema.  Patient reports she does not have a history of emphysema.  She will follow-up in our clinic as needed.    Visit Diagnoses:    ICD-10-CM ICD-9-CM   1. Lung mass  R91.8 786.6         MEDS ORDERED DURING VISIT:  No orders of the defined types were placed in this encounter.      Return if symptoms worsen or fail to improve.             This document has been electronically signed by JUNIE Salmon  May 5, 2025 10:59 EDT    Please note that portions of this note were completed with a voice recognition program.

## 2025-05-12 ENCOUNTER — OFFICE VISIT (OUTPATIENT)
Dept: PULMONOLOGY | Facility: CLINIC | Age: 85
End: 2025-05-12
Payer: MEDICARE

## 2025-05-12 VITALS
WEIGHT: 121 LBS | SYSTOLIC BLOOD PRESSURE: 150 MMHG | TEMPERATURE: 97.1 F | BODY MASS INDEX: 20.66 KG/M2 | DIASTOLIC BLOOD PRESSURE: 84 MMHG | HEIGHT: 64 IN | OXYGEN SATURATION: 97 % | HEART RATE: 84 BPM

## 2025-05-12 DIAGNOSIS — R13.14 PHARYNGOESOPHAGEAL DYSPHAGIA: ICD-10-CM

## 2025-05-12 DIAGNOSIS — R91.8 LUNG MASS: Primary | ICD-10-CM

## 2025-05-12 DIAGNOSIS — K22.2 ESOPHAGEAL STRICTURE: ICD-10-CM

## 2025-05-12 DIAGNOSIS — Z72.0 TOBACCO ABUSE: ICD-10-CM

## 2025-05-12 NOTE — PROGRESS NOTES
PULMONARY  NOTE    Chief Complaint     Tobacco abuse, abnormal chest CT, esophageal dysphagia    History of Present Illness     85-year-old female referred for abnormal chest imaging    Long history of tobacco abuse  She tells me that she stopped smoking April 1  She denies past history of known lung disease    She does not feel limited by dyspnea  She is on no respiratory medications    She denies regular cough or sputum production  She has had no hemoptysis    Recent workup has included a CT scan of the head, neck, chest, and a esophagram  She has an opacity in the left chest that was not present in 2022  She also has an esophageal stricture by upper GI series    Patient Active Problem List   Diagnosis    Essential hypertension    Permanent atrial fibrillation    Tobacco abuse    Complete heart block    Chronic anticoagulation    Presence of cardiac pacemaker    Dyslipidemia    Dysphagia    Esophageal stricture    Lung mass      Allergies   Allergen Reactions    Dexamethasone Other (See Comments)     dexamethasone       Current Outpatient Medications:     alendronate (Fosamax) 70 MG tablet, Take 1 tablet by mouth Every 7 (Seven) Days., Disp: 12 tablet, Rfl: 2    amitriptyline (ELAVIL) 10 MG tablet, Take 1 tablet by mouth Every Night., Disp: 90 tablet, Rfl: 2    apixaban (ELIQUIS) 2.5 MG tablet tablet, Take 1 tablet by mouth 2 (Two) Times a Day., Disp: 180 tablet, Rfl: 2    atorvastatin (LIPITOR) 10 MG tablet, Take 1 tablet by mouth Every Night., Disp: 90 tablet, Rfl: 2    dexlansoprazole (DEXILANT) 60 MG capsule, Take 1 capsule by mouth Daily., Disp: 30 capsule, Rfl: 5    Dupilumab (Dupixent) 300 MG/2ML solution auto-injector injection, Inject 2 mL under the skin into the appropriate area as directed Every 14 (Fourteen) Days., Disp: , Rfl:     erythromycin (ROMYCIN) 5 MG/GM ophthalmic ointment, Administer 1 Application to the right eye 3 times a day., Disp: , Rfl:     fluticasone (FLONASE) 50 MCG/ACT nasal  spray, Administer 2 sprays into the nostril(s) as directed by provider Daily., Disp: , Rfl:     furosemide (LASIX) 20 MG tablet, Take 1 tablet by mouth As Needed (lower exrremity swelling)., Disp: 30 tablet, Rfl: 5    lactulose (CHRONULAC) 10 GM/15ML solution, Take 30 mL by mouth 2 (Two) Times a Day As Needed (constipation)., Disp: 946 mL, Rfl: 11    levothyroxine (SYNTHROID, LEVOTHROID) 50 MCG tablet, Take 1 tablet by mouth Daily., Disp: 90 tablet, Rfl: 3    linaclotide (LINZESS) 72 MCG capsule capsule, Take 1 capsule by mouth Every Morning Before Breakfast., Disp: 30 capsule, Rfl: 2    metoprolol tartrate (LOPRESSOR) 25 MG tablet, Take 1.5 tablets by mouth 2 (Two) Times a Day., Disp: 270 tablet, Rfl: 3    nitroglycerin (NITROSTAT) 0.4 MG SL tablet, 1 under the tongue as needed for angina, may repeat q5mins for up three doses, Disp: 30 tablet, Rfl: 11    sertraline (Zoloft) 50 MG tablet, Take 1 tablet by mouth Daily., Disp: 90 tablet, Rfl: 1    valACYclovir (VALTREX) 500 MG tablet, Take 1 tablet by mouth Daily., Disp: , Rfl:   MEDICATION LIST AND ALLERGIES REVIEWED.    Family History   Problem Relation Age of Onset    Dementia Mother     Diabetes Father     Heart disease Father      Social History     Tobacco Use    Smoking status: Former     Current packs/day: 0.00     Types: Cigarettes     Start date: 3/10/2007     Quit date: 3/10/2022     Years since quitting: 3.1     Passive exposure: Past    Smokeless tobacco: Current     Types: Snuff, Chew   Vaping Use    Vaping status: Never Used   Substance Use Topics    Alcohol use: No    Drug use: No     Social History     Social History Narrative    The chart indicates that she stopped smoking a couple years ago but she told me today that she stopped smoking in April     FAMILY AND SOCIAL HISTORY REVIEWED.    Review of Systems  IF PRESENT REFER TO SCANNED ROS SHEET FROM SAME DATE  OTHERWISE ROS OBTAINED AND NON-CONTRIBUTORY OVER HPI.    /84   Pulse 84   Temp 97.1  "°F (36.2 °C)   Ht 161.3 cm (63.5\")   Wt 54.9 kg (121 lb)   SpO2 97%   BMI 21.10 kg/m²   Physical Exam  Vitals and nursing note reviewed.   Constitutional:       General: She is not in acute distress.     Appearance: She is well-developed. She is not diaphoretic.   HENT:      Head: Normocephalic and atraumatic.   Neck:      Thyroid: No thyromegaly.   Cardiovascular:      Rate and Rhythm: Normal rate and regular rhythm.      Heart sounds: Normal heart sounds. No murmur heard.  Pulmonary:      Effort: Pulmonary effort is normal.      Breath sounds: Normal breath sounds. No stridor.   Lymphadenopathy:      Cervical: No cervical adenopathy.      Upper Body:      Right upper body: No supraclavicular or epitrochlear adenopathy.      Left upper body: No supraclavicular or epitrochlear adenopathy.   Skin:     General: Skin is warm and dry.   Neurological:      Mental Status: She is alert.   Psychiatric:         Behavior: Behavior normal.         Results     CT scan of the chest revealed a homogeneous opacity that looks like it is within the fissure between the left upper lobe and left lower lobe, not present 2022    Esophagram reveals esophageal stricture, distal    Immunization History   Administered Date(s) Administered    31-influenza Vac Quardvalent Preservativ 10/23/2020    Fluad Quad 65+ 10/07/2022    Fluzone (or Fluarix & Flulaval for VFC) >6mos 10/23/2020    Fluzone High-Dose 65+YRS 10/01/2024    Fluzone High-Dose 65+yrs 10/08/2021    Hepatitis A 11/23/2018, 05/24/2019    Influenza Seasonal Injectable 10/10/2008    Influenza, Unspecified 10/10/2008    Pneumococcal Conjugate 13-Valent (PCV13) 10/08/2018    Pneumococcal Conjugate 20-Valent (PCV20) 10/07/2022    Pneumococcal Polysaccharide (PPSV23) 12/28/2019    Td (TDVAX) 12/03/2001    Td, Unspecified 12/03/2001, 02/20/2018     Problem List       ICD-10-CM ICD-9-CM   1. Lung mass  R91.8 786.6   2. Tobacco abuse  Z72.0 305.1   3. Esophageal stricture  K22.2 530.3 "   4. Pharyngoesophageal dysphagia  R13.14 787.24       Discussion     We reviewed her chest imaging  I am not sure that the opacity we are seeing is not in the interlobar fissures  Although, it does not appear to have water density.  It could be a loculated pleural effusion  At this point I do recommend a PET scan and fully characterize the opacity  If it exhibits abnormal hypermetabolic activity then would probably recommend a percutaneous needle biopsy    She describes esophageal dysphagia symptoms and has an esophageal stricture  Will facilitate a referral to gastroenterology    I have encouraged her smoking cessation efforts    I will see her back after the above    This visit represents a new patient relationship with whom the provider will be providing ongoing longitudinal care related to serious and/or complex conditions    Moderate level of Medical Decision Making complexity based on 1 undiagnosed new problem or 2 stable chronic conditions, independent interpretation of tests, and/or prescription drug management     Tank Morales MD  Note electronically signed    CC: Isac Bang DO

## 2025-05-16 ENCOUNTER — OFFICE VISIT (OUTPATIENT)
Dept: GASTROENTEROLOGY | Facility: CLINIC | Age: 85
End: 2025-05-16
Payer: MEDICARE

## 2025-05-16 VITALS
DIASTOLIC BLOOD PRESSURE: 79 MMHG | BODY MASS INDEX: 21.97 KG/M2 | HEIGHT: 63 IN | SYSTOLIC BLOOD PRESSURE: 132 MMHG | WEIGHT: 124 LBS | HEART RATE: 89 BPM

## 2025-05-16 DIAGNOSIS — K21.9 GASTROESOPHAGEAL REFLUX DISEASE WITHOUT ESOPHAGITIS: Primary | ICD-10-CM

## 2025-05-16 DIAGNOSIS — R13.19 ESOPHAGEAL DYSPHAGIA: ICD-10-CM

## 2025-05-16 DIAGNOSIS — K58.1 IRRITABLE BOWEL SYNDROME WITH CONSTIPATION: ICD-10-CM

## 2025-05-16 DIAGNOSIS — R91.8 MASS OF LEFT LUNG: ICD-10-CM

## 2025-05-16 PROCEDURE — 1160F RVW MEDS BY RX/DR IN RCRD: CPT | Performed by: NURSE PRACTITIONER

## 2025-05-16 PROCEDURE — 1159F MED LIST DOCD IN RCRD: CPT | Performed by: NURSE PRACTITIONER

## 2025-05-16 PROCEDURE — 99214 OFFICE O/P EST MOD 30 MIN: CPT | Performed by: NURSE PRACTITIONER

## 2025-05-16 RX ORDER — TENAPANOR HYDROCHLORIDE 53.2 MG/1
50 TABLET ORAL 2 TIMES DAILY
Qty: 60 TABLET | Refills: 11 | Status: SHIPPED | OUTPATIENT
Start: 2025-05-16

## 2025-05-16 NOTE — PROGRESS NOTES
DATE:  5/16/2025    DIAGNOSIS: IBS-C, Dysphagia     CHIEF COMPLAINT:  Constipation  Dysphagia    HPI:  Ms. Dunham presents today for follow up. She has previously been following with Giuliana Allan PA-C for management of IBS-C. Please see previous notes for full details. In brief, she tells me this has been a chronic issue. She has previously tried over the counter stool softeners and laxatives without improvement. She has also tried Linzess and most recently Trulance. With Trulance, she reports having ~2 bowel movements per day with stool type 7 on BSS. She does not feel like she is evacuating her colon well and has associated abdominal bloating and gas. She says she often feels like she needs to have a bowel movement and cannot. She admits she drinks very little water. She denies having melena or BRBPR. She reports GERD is well controlled with Dexilant. She has no other complaints today.     INTERVAL HISTORY:  Ms. Dunham presents today for follow-up.  She was most recently seen in November 2023.  At that time, she was started on trial of Ibsrela.  Planned to return to clinic in 3 months.  However, patient did not keep this appointment.  Patient reports Ibsrela did cause some abdominal cramping and discontinued shortly after starting.  She is currently taking lactulose 20 g p.o. at bedtime and reports having daily BM with stool type VII on BSS.  However, patient reports BMs are small-volume.  She has associated abdominal bloating and incomplete evacuation of her colon.  Patient reports she often has to strain to have a BM.  Patient reports feeling full and has early satiety.  She denies nausea or vomiting.  She has had mild, intermittent rectal bleeding.  Of note patient has never had a screening colonoscopy.  She is without family history of colon cancer.  Patient also reports she has been having difficulty with dysphagia for the past~6 months.  She followed up with her PCP who obtained esophagram on 4/15/2025  which showed tertiary contractions throughout the esophagus.  Noted narrowing of the distal esophagus with delayed passage of the 13 mm barium tablet.  EGD was recommended.  Esophagram also noted left lung mass.  She subsequently underwent CT of the chest on 5/5/2025 which showed interval development of a left upper lobe subpleural mass measuring 3.8 cm x 6.2 cm.  Also noted fairly severe paraseptal emphysema.  Patient reports she is following with pulmonology who is planning to obtain PET/CT.  She was also referred to general surgery and underwent EGD with Dr. Cruz on 4/18/2025.  Esophagus appeared normal.  She had a small hiatal hernia and gastritis.  Biopsies of the stomach were negative for H. pylori gastritis.  Patient is currently taking Dexilant 60 mg p.o. daily which is controlling GERD well.  However, she continues to have dysphagia with both solids and liquids.  Given age and comorbidities, she would like to avoid repeat EGD if possible.  She has no other complaints today.    PAST MEDICAL HISTORY:  Past Medical History:   Diagnosis Date    Anxiety     Arrhythmia     Atrial fibrillation     Coronary artery disease     Disease of thyroid gland     Dysfunctional gallbladder     Fracture of two ribs     GERD (gastroesophageal reflux disease)     Hypertension     Lumbar compression fracture 07/2022    Osteoporosis        PAST SURGICAL HISTORY:  Past Surgical History:   Procedure Laterality Date    ABDOMINAL SURGERY      CARDIAC ELECTROPHYSIOLOGY PROCEDURE N/A 09/23/2022    Procedure: Device Implant. Leadless PPM. Hold eliquis x1 day prior to procedure.;  Surgeon: Jefe Zhao MD;  Location:  [facility];  Service: Cardiology;  Laterality: N/A;    CARDIAC ELECTROPHYSIOLOGY PROCEDURE N/A 09/23/2022    Procedure: AV Node;  Surgeon: Jefe Zhao MD;  Location:  [facility];  Service: Cardiovascular;  Laterality: N/A;    CATARACT EXTRACTION      ENDOSCOPY N/A 4/18/2025     Procedure: ESOPHAGOGASTRODUODENOSCOPY WITH DILATATION;  Surgeon: Homero Cruz MD;  Location:  [facility];  Service: Gastroenterology;  Laterality: N/A;    GALLBLADDER SURGERY      HIP HEMIARTHROPLASTY Left 05/29/2018    Procedure: LEFT HIP BIPOLAR HEMIARTHROPLASTY;  Surgeon: Trevon Arriaga MD;  Location:  [facility];  Service: Orthopedics    PACEMAKER IMPLANTATION         SOCIAL HISTORY:  Social History     Socioeconomic History    Marital status:    Tobacco Use    Smoking status: Former     Current packs/day: 0.00     Types: Cigarettes     Start date: 3/10/2007     Quit date: 3/10/2022     Years since quitting: 3.1     Passive exposure: Past    Smokeless tobacco: Current     Types: Snuff, Chew   Vaping Use    Vaping status: Never Used   Substance and Sexual Activity    Alcohol use: No    Drug use: No    Sexual activity: Defer       FAMILY HISTORY:  Family History   Problem Relation Age of Onset    Dementia Mother     Diabetes Father     Heart disease Father      MEDICATIONS:  The current medication list was reviewed in the EMR    Current Outpatient Medications:     alendronate (Fosamax) 70 MG tablet, Take 1 tablet by mouth Every 7 (Seven) Days., Disp: 12 tablet, Rfl: 2    amitriptyline (ELAVIL) 10 MG tablet, Take 1 tablet by mouth Every Night., Disp: 90 tablet, Rfl: 2    apixaban (ELIQUIS) 2.5 MG tablet tablet, Take 1 tablet by mouth 2 (Two) Times a Day., Disp: 180 tablet, Rfl: 2    atorvastatin (LIPITOR) 10 MG tablet, Take 1 tablet by mouth Every Night., Disp: 90 tablet, Rfl: 2    dexlansoprazole (DEXILANT) 60 MG capsule, Take 1 capsule by mouth Daily., Disp: 30 capsule, Rfl: 5    Dupilumab (Dupixent) 300 MG/2ML solution auto-injector injection, Inject 2 mL under the skin into the appropriate area as directed Every 14 (Fourteen) Days., Disp: , Rfl:     erythromycin (ROMYCIN) 5 MG/GM ophthalmic ointment, Administer 1 Application to the right eye 3 times a day., Disp: , Rfl:     fluticasone (FLONASE) 50  MCG/ACT nasal spray, Administer 2 sprays into the nostril(s) as directed by provider Daily., Disp: , Rfl:     furosemide (LASIX) 20 MG tablet, Take 1 tablet by mouth As Needed (lower exrremity swelling)., Disp: 30 tablet, Rfl: 5    lactulose (CHRONULAC) 10 GM/15ML solution, Take 30 mL by mouth 2 (Two) Times a Day As Needed (constipation)., Disp: 946 mL, Rfl: 11    levothyroxine (SYNTHROID, LEVOTHROID) 50 MCG tablet, Take 1 tablet by mouth Daily., Disp: 90 tablet, Rfl: 3    linaclotide (LINZESS) 72 MCG capsule capsule, Take 1 capsule by mouth Every Morning Before Breakfast., Disp: 30 capsule, Rfl: 2    metoprolol tartrate (LOPRESSOR) 25 MG tablet, Take 1.5 tablets by mouth 2 (Two) Times a Day., Disp: 270 tablet, Rfl: 3    nitroglycerin (NITROSTAT) 0.4 MG SL tablet, 1 under the tongue as needed for angina, may repeat q5mins for up three doses, Disp: 30 tablet, Rfl: 11    sertraline (Zoloft) 50 MG tablet, Take 1 tablet by mouth Daily., Disp: 90 tablet, Rfl: 1    valACYclovir (VALTREX) 500 MG tablet, Take 1 tablet by mouth Daily., Disp: , Rfl:     ALLERGIES:    Allergies   Allergen Reactions    Dexamethasone Other (See Comments)     dexamethasone       REVIEW OF SYSTEMS:    A comprehensive 14 point review of systems was performed.  Significant findings as mentioned above.  All other systems reviewed and are negative.      Physical Exam   Vital Signs:   Vitals:    05/16/25 0911   BP: 132/79   Pulse: 89   General: Elderly, Well developed, well nourished, alert and oriented x 3, in no acute distress.   Head: ATNC   Eyes: PERRL, No evidence of conjunctivitis.   Nose: No nasal discharge.   Mouth: Oral mucosal membranes moist. No oral ulceration or hemorrhages.   Neck: Neck supple. No thyromegaly. No JVD.   Lungs: Clear in all fields to A&P without rales, rhonchi or wheezing.   Heart: Regular rate and rhythm. No murmurs, rubs, or gallops.   Abdomen: Soft. Bowel sounds are normoactive. Nontender with palpation.   Extremities:  No cyanosis or edema.   Neurologic: MS as above. Grossly non focal exam.    IMAGING:  No Images in the past 120 days found..       RECENT LABS:  Lab Results   Component Value Date    WBC 7.21 03/03/2025    HGB 14.0 03/03/2025    HCT 41.1 03/03/2025    MCV 94.7 03/03/2025    RDW 13.2 03/03/2025     03/03/2025    NEUTRORELPCT 57.0 11/16/2022    LYMPHORELPCT 23.4 11/16/2022    MONORELPCT 12.6 (H) 11/16/2022    EOSRELPCT 5.9 11/16/2022    BASORELPCT 1.0 11/16/2022    NEUTROABS 3.98 11/16/2022    LYMPHSABS 1.63 11/16/2022       Lab Results   Component Value Date     03/03/2025    K 3.3 (L) 03/03/2025    CO2 20.5 (L) 03/03/2025     03/03/2025    BUN 21 03/03/2025    CREATININE 1.10 05/05/2025    EGFRIFNONA 49 (L) 12/16/2020    GLUCOSE 104 (H) 03/03/2025    CALCIUM 9.2 03/03/2025    ALKPHOS 63 03/03/2025    AST 17 03/03/2025    ALT 8 03/03/2025    BILITOT 0.5 03/03/2025    ALBUMIN 3.9 03/03/2025    PROTEINTOT 8.0 03/03/2025    MG 1.5 (L) 12/16/2020    PHOS 3.3 10/01/2024       ASSESSMENT & PLAN:  Anette Dunham is a very pleasant 85 y.o. female with    1.  IBS-C:  -Constipation has been a chronic issue. She has previously tried over the counter stool softeners,laxatives, lactulose, Linzess and Trulance without improvement.  Recommended retrial of Ibsrela 50 mg p.o. twice daily.  Rx and samples were provided.      2. GERD:  3.  Dysphagia (solids/liquids):  4.  Left lung mass:    -PCP obtained esophagram on 4/15/2025 which showed tertiary contractions throughout the esophagus.  Noted narrowing of the distal esophagus with delayed passage of the 13 mm barium tablet.  EGD was recommended.  Esophagram also noted left lung mass.  She subsequently underwent CT of the chest on 5/5/2025 which showed interval development of a left upper lobe subpleural mass measuring 3.8 cm x 6.2 cm.  Also noted fairly severe paraseptal emphysema.  Patient reports she is following with pulmonology who is planning to obtain  PET/CT.  She was also referred to general surgery and underwent EGD with Dr. Cruz on 4/18/2025.  Esophagus appeared normal.  She had a small hiatal hernia and gastritis.  Biopsies of the stomach were negative for H. pylori gastritis.  We reviewed findings today.  -Patient is currently taking Dexilant 60 mg p.o. daily which is controlling GERD well.  Recommended to continue.  However, she continues to have dysphagia with both solids and liquids.  Therefore, discussed with Dr. Platt, and will await pulmonary findings/workup.  It is possible that the lung mass could be pressing on the esophagus.  Recommended she chew her food thoroughly before swallowing, eat slowly and chop up all meat.  She could also consider drinking supplemental boost/Ensure.  Will monitor.    Will have patient return to clinic in 3 months for symptom check.      Electronically Signed by: JUNIE Caballero , May 16, 2025 09:08 EDT       CC:   Tank Morales, Isac Bob DO

## 2025-05-28 ENCOUNTER — TELEPHONE (OUTPATIENT)
Dept: GASTROENTEROLOGY | Facility: CLINIC | Age: 85
End: 2025-05-28

## 2025-05-28 NOTE — TELEPHONE ENCOUNTER
Hub staff attempted to follow warm transfer process and was unsuccessful     Caller: Anette Dunham    Relationship to patient: Self    Best call back number: [phone]    Patient is needing: PATIENT'S DAUGHTER, PARI CALLED IN AND STATED THAT THE PATIENT RECEIVED A CALL FROM THE [facility]TY [facility] THAT THE PATIENT'S IBSRELA MEDICATION WAS SENT TO, AND THEY COULD NOT GET IN CONTACT WITH THEM. PARI STATED THAT SHE TRIED CALLING [phone], BUT AUTOMATED SYSTEM STATES THAT IT IS NOT A WORKING NUMBER. PARI STATED THAT THE NAME OF THE PHARMACY IS [facility]. SHE WOULD LIKE A CALL BACK TO BE ADVISED.

## 2025-06-02 NOTE — TELEPHONE ENCOUNTER
I called [phone] no answer, no VM set up unable to leave a message. I have tried contacting patient 3 times, unable to reach. Closing note.

## 2025-06-26 ENCOUNTER — OFFICE VISIT (OUTPATIENT)
Dept: CARDIOLOGY | Facility: CLINIC | Age: 85
End: 2025-06-26
Payer: MEDICARE

## 2025-06-26 VITALS
SYSTOLIC BLOOD PRESSURE: 93 MMHG | BODY MASS INDEX: 21.97 KG/M2 | DIASTOLIC BLOOD PRESSURE: 64 MMHG | HEART RATE: 68 BPM | OXYGEN SATURATION: 94 % | HEIGHT: 63 IN | WEIGHT: 124 LBS

## 2025-06-26 DIAGNOSIS — E78.5 DYSLIPIDEMIA: Chronic | ICD-10-CM

## 2025-06-26 DIAGNOSIS — I10 ESSENTIAL HYPERTENSION: Chronic | ICD-10-CM

## 2025-06-26 DIAGNOSIS — I48.21 PERMANENT ATRIAL FIBRILLATION: Primary | Chronic | ICD-10-CM

## 2025-06-26 RX ORDER — METOPROLOL TARTRATE 25 MG/1
25 TABLET, FILM COATED ORAL 2 TIMES DAILY
Qty: 180 TABLET | Refills: 3 | Status: SHIPPED | OUTPATIENT
Start: 2025-06-26

## 2025-06-26 NOTE — PROGRESS NOTES
"Chief Complaint  Follow-up (Patient states soa, dizziness, swelling to feet, denies chest pain )    Subjective          Anette Dunham presents to [facility] for follow up.    History of Present Illness  History of Present Illness  The patient presents for her regular follow-up of complete heart block status post pacemaker placement, dyslipidemia, and hypertension. She was last seen in the clinic on 12/26/2024. At that time, she had some lower extremity swelling and was given a refill of her Lasix, which she used as needed.     Ms. Cheema is accompanied by her daughter for today's visit.  She reports that she is having frequent falls at home.  She is unsteady on her feet.  She is not passing out.  She is using a walker however reports she falls while using the walker.  Her last fall was a day ago.  She has not hit her head yet.  She estimates that she has fallen at least 4 times in the last week.  Fortunately she does live with her son and he is able to help her up.  She is not able to get up off the floor on her own.  PT/OT has been to see her but have not been able to help her regain any balance.      Ms Cheema denies any chest pain, shortness of breath has not worsened.  She does get light-headed at times.      Objective     Vital Signs:   BP 93/64 (BP Location: Left arm, Patient Position: Sitting, Cuff Size: Adult)   Pulse 68   Ht 160 cm (63\")   Wt 56.2 kg (124 lb)   SpO2 94%   BMI 21.97 kg/m²       Physical Exam  Vitals reviewed.   Constitutional:       Appearance: She is well-developed.   Cardiovascular:      Rate and Rhythm: Normal rate and regular rhythm.      Heart sounds: No murmur heard.     No friction rub. No gallop.   Pulmonary:      Effort: Pulmonary effort is normal. No respiratory distress.      Breath sounds: Normal breath sounds. No wheezing or rales.   Skin:     General: Skin is warm and dry.   Neurological:      Mental Status: She is alert and oriented to person, " place, and time.   Psychiatric:         Mood and Affect: Mood normal.         Behavior: Behavior normal.        Physical Exam      Result Review :     CMP          10/1/2024    14:25 3/3/2025    14:31 5/5/2025    09:21   CMP   Glucose 86  104     BUN 12  21     Creatinine 1.03  1.14  1.10    EGFR 53.4  47.3     Sodium 138  140     Potassium 4.1  3.3     Chloride 102  106     Calcium 9.5  9.2     Total Protein  8.0     Albumin 3.9  3.9     Globulin  4.1     Total Bilirubin  0.5     Alkaline Phosphatase  63     AST (SGOT)  17     ALT (SGPT)  8     Albumin/Globulin Ratio  1.0     BUN/Creatinine Ratio 11.7  18.4     Anion Gap 11.0  13.5       CBC          10/1/2024    14:25 3/3/2025    14:31   CBC   WBC 6.38  7.21    RBC 4.22  4.34    Hemoglobin 12.8  14.0    Hematocrit 39.8  41.1    MCV 94.3  94.7    MCH 30.3  32.3    MCHC 32.2  34.1    RDW 12.6  13.2    Platelets 240  226      Lipid Panel          10/1/2024    14:25   Lipid Panel   Total Cholesterol 123    Triglycerides 55    HDL Cholesterol 48    VLDL Cholesterol 12    LDL Cholesterol  63    LDL/HDL Ratio 1.33               Most recent echocardiogram  Results for orders placed during the hospital encounter of 01/23/23    Adult Transthoracic Echo Complete W/ Cont if Necessary Per Protocol    Interpretation Summary    Left ventricular systolic function is normal. Left ventricular ejection fraction appears to be 51 - 55%.    Estimated right ventricular systolic pressure from tricuspid regurgitation is mildly elevated (35-45 mmHg).    Mild pulmonary hypertension is present.    There is no evidence of pericardial effusion      Most recent Stress Test  Results for orders placed during the hospital encounter of 07/26/21    Stress Test With Myocardial Perfusion (1 Day)    Interpretation Summary  · Myocardial perfusion imaging indicates a normal myocardial perfusion study with no evidence of ischemia.  · Diaphragmatic attenuation artifact is present.  · Left ventricular  ejection fraction is normal. (Calculated EF = 57%).  · Findings consistent with a normal ECG stress test.  · Impressions are consistent with a low risk study.     Current Outpatient Medications   Medication Sig Dispense Refill    alendronate (Fosamax) 70 MG tablet Take 1 tablet by mouth Every 7 (Seven) Days. 12 tablet 2    amitriptyline (ELAVIL) 10 MG tablet Take 1 tablet by mouth Every Night. 90 tablet 2    atorvastatin (LIPITOR) 10 MG tablet Take 1 tablet by mouth Every Night. 90 tablet 2    dexlansoprazole (DEXILANT) 60 MG capsule Take 1 capsule by mouth Daily. 30 capsule 5    fluticasone (FLONASE) 50 MCG/ACT nasal spray Administer 2 sprays into the nostril(s) as directed by provider Daily.      furosemide (LASIX) 20 MG tablet Take 1 tablet by mouth As Needed (lower exrremity swelling). 30 tablet 5    lactulose (CHRONULAC) 10 GM/15ML solution Take 30 mL by mouth 2 (Two) Times a Day As Needed (constipation). 946 mL 11    levothyroxine (SYNTHROID, LEVOTHROID) 50 MCG tablet Take 1 tablet by mouth Daily. 90 tablet 3    metoprolol tartrate (LOPRESSOR) 25 MG tablet Take 1.5 tablets by mouth 2 (Two) Times a Day. 270 tablet 3    nitroglycerin (NITROSTAT) 0.4 MG SL tablet 1 under the tongue as needed for angina, may repeat q5mins for up three doses 30 tablet 11    sertraline (Zoloft) 50 MG tablet Take 1 tablet by mouth Daily. 90 tablet 1    Tenapanor HCl (Ibsrela) 50 MG tablet Take 1 tablet by mouth 2 (Two) Times a Day. 60 tablet 11    valACYclovir (VALTREX) 500 MG tablet Take 1 tablet by mouth Daily.       No current facility-administered medications for this visit.               Problem List Items Addressed This Visit          Cardiac and Vasculature    Essential hypertension (Chronic)    Overview   Hypertension is Controlled  Goals of Care:Systolic blood pressure <130 and Diastolic blood pressure  <80  Plan:  Continue current medications  Follow up:4 months           Permanent atrial fibrillation - Primary (Chronic)     Overview   CHADsvasc= 4   Echo, 7/26/21, EF 51-55%, Mild LVH, LA mildly increased, RA mildly dilated. Trace TR. Mild pulmonary hypertension.   14 day Prevent[employer], 4/20/22-5/3/22, Avg hr 78, min hr 52, Max hr 152 bpm. 88% AF burden. 9 sinus pauses >3 seconds.   Medtronic VVIR leadless pacemaker and AV node ablation         Dyslipidemia (Chronic)    Overview   1/27/2024 total cholesterol 106, triglycerides 49, HDL 42, and LDL 50  10/1/24  Total cholesterol 123, triglycerides 55, HDL 48 and LDL 63         Current Assessment & Plan   Dyslipidemia is Controlled  Goals of care: Medication compliance and tolerance, Control progression of disease, and LDL less than 100  Plan: Continue current medications  Follow up: Follow-up in 4 months                  Assessment & Plan    Permanent atrial fibrillation, status post permanent pacemaker placement,  - Followed with Dr. Martinez for PPM interrogation  - Patient reports frequent falls.  [id number] is at least 4 for hypertension age and gender with a 4% annual risk of thromboembolism.  HAS BLED is 3 with a 5.8% annual risk of bleeding.  In light of patient's report of frequent falls I have discussed discontinuation of anticoagulation.  Through shared decision making, given the risk of bleeding, patient has elected to discontinue Eliquis.    Hypertension  -Stable  - Decrease metoprolol to 25 mg twice daily    Dyslipidemia  - Continue atorvastatin  - Cholesterol is controlled.      Follow Up     Return in about 4 months (around 10/26/2025).    I have assumed longitudinal care for complex medical condition(s) that require long-term management involving monitoring and adjustments to medications.  Goals of care, history of important events, and historical results can be found with each problem.  Episodic Plan of Care can be found  in the order section.    Patient was given instructions and counseling regarding her condition or for health maintenance advice. Please see specific  information pulled into the AVS if appropriate.     Patient or patient representative verbalized consent for the use of Ambient Listening during the visit with  JUNIE Gramajo for chart documentation. 6/26/2025  13:39 EDT

## 2025-06-27 NOTE — ASSESSMENT & PLAN NOTE
Dyslipidemia is Controlled  Goals of care: Medication compliance and tolerance, Control progression of disease, and LDL less than 100  Plan: Continue current medications  Follow up: Follow-up in 4 months

## 2025-07-02 ENCOUNTER — OFFICE VISIT (OUTPATIENT)
Dept: FAMILY MEDICINE CLINIC | Facility: CLINIC | Age: 85
End: 2025-07-02
Payer: MEDICARE

## 2025-07-02 ENCOUNTER — LAB (OUTPATIENT)
Dept: FAMILY MEDICINE CLINIC | Facility: CLINIC | Age: 85
End: 2025-07-02
Payer: MEDICARE

## 2025-07-02 VITALS
HEIGHT: 63 IN | DIASTOLIC BLOOD PRESSURE: 64 MMHG | SYSTOLIC BLOOD PRESSURE: 92 MMHG | BODY MASS INDEX: 20.02 KG/M2 | TEMPERATURE: 97.5 F | HEART RATE: 78 BPM | WEIGHT: 113 LBS | OXYGEN SATURATION: 95 %

## 2025-07-02 DIAGNOSIS — J41.0 SIMPLE CHRONIC BRONCHITIS: Primary | ICD-10-CM

## 2025-07-02 DIAGNOSIS — R53.83 OTHER FATIGUE: ICD-10-CM

## 2025-07-02 DIAGNOSIS — R53.81 DEBILITY: ICD-10-CM

## 2025-07-02 PROCEDURE — 36415 COLL VENOUS BLD VENIPUNCTURE: CPT

## 2025-07-02 PROCEDURE — 84443 ASSAY THYROID STIM HORMONE: CPT | Performed by: STUDENT IN AN ORGANIZED HEALTH CARE EDUCATION/TRAINING PROGRAM

## 2025-07-02 PROCEDURE — 85027 COMPLETE CBC AUTOMATED: CPT | Performed by: STUDENT IN AN ORGANIZED HEALTH CARE EDUCATION/TRAINING PROGRAM

## 2025-07-02 PROCEDURE — 80053 COMPREHEN METABOLIC PANEL: CPT | Performed by: STUDENT IN AN ORGANIZED HEALTH CARE EDUCATION/TRAINING PROGRAM

## 2025-07-02 RX ORDER — DUPILUMAB 300 MG/2ML
2 INJECTION, SOLUTION SUBCUTANEOUS
COMMUNITY
Start: 2025-04-09

## 2025-07-02 NOTE — PROGRESS NOTES
Chief Complaint  Hashimoto's Thyroiditis    HPI:   Hashimoto's Thyroiditis       Anette Dunham is a 85 y.o. female who presents today to [facility] for Hashimoto's Thyroiditis.  History of Present Illness  The patient presents for evaluation of weight loss, shortness of breath, and falls.    She reports a significant weight loss, the cause of which is unknown to her. Despite maintaining her appetite, she experiences difficulty in breathing. A previous consultation with a pulmonologist revealed a lung abnormality, leading to a referral for a PET scan scheduled for 08/02/2025. She has been diagnosed with emphysema and was informed that the results of the PET scan would determine the need for further nuclear or regular imaging. She also notes a change in her voice, describing it as weak. She quit smoking in 04/2025.    She experiences daily falls, sometimes up to three or four times a day, the cause of which remains unclear to her. Due to her frequent falls, her Eliquis medication was discontinued.    SOCIAL HISTORY  The patient used to smoke and quit in April 2025.       Previous History:   Past Medical History:   Diagnosis Date    Anxiety     Arrhythmia     Atrial fibrillation     Coronary artery disease     Disease of thyroid gland     Dysfunctional gallbladder     Fracture of two ribs     GERD (gastroesophageal reflux disease)     Hypertension     Lumbar compression fracture 07/2022    Osteoporosis       Past Surgical History:   Procedure Laterality Date    ABDOMINAL SURGERY      CARDIAC ELECTROPHYSIOLOGY PROCEDURE N/A 09/23/2022    Procedure: Device Implant. Leadless PPM. Hold eliquis x1 day prior to procedure.;  Surgeon: Jefe Zhao MD;  Location: [facility] INVASIVE LOCATION;  Service: Cardiology;  Laterality: N/A;    CARDIAC ELECTROPHYSIOLOGY PROCEDURE N/A 09/23/2022    Procedure: AV Node;  Surgeon: Jefe Zhao MD;  Location: [facility] INVASIVE LOCATION;   Service: Cardiovascular;  Laterality: N/A;    CATARACT EXTRACTION      ENDOSCOPY N/A 4/18/2025    Procedure: ESOPHAGOGASTRODUODENOSCOPY WITH DILATATION;  Surgeon: Homero Cruz MD;  Location: [facility] OR;  Service: Gastroenterology;  Laterality: N/A;    GALLBLADDER SURGERY      HIP HEMIARTHROPLASTY Left 05/29/2018    Procedure: LEFT HIP BIPOLAR HEMIARTHROPLASTY;  Surgeon: Trevon Arriaga MD;  Location:  [facility] OR;  Service: Orthopedics    PACEMAKER IMPLANTATION        Social History     Socioeconomic History    Marital status:    Tobacco Use    Smoking status: Former     Current packs/day: 0.00     Types: Cigarettes     Start date: 3/10/2007     Quit date: 3/10/2022     Years since quitting: 3.3     Passive exposure: Past    Smokeless tobacco: Current     Types: Snuff, Chew   Vaping Use    Vaping status: Never Used   Substance and Sexual Activity    Alcohol use: No    Drug use: No    Sexual activity: Defer      Health Maintenance Due   Topic Date Due    ZOSTER VACCINE (1 of 2) Never done    RSV Vaccine - Adults (1 - 1-dose 75+ series) Never done    COVID-19 Vaccine (1 - 2024-25 season) Never done        Current Medications:  Current Outpatient Medications   Medication Sig Dispense Refill    alendronate (Fosamax) 70 MG tablet Take 1 tablet by mouth Every 7 (Seven) Days. 12 tablet 2    amitriptyline (ELAVIL) 10 MG tablet Take 1 tablet by mouth Every Night. 90 tablet 2    atorvastatin (LIPITOR) 10 MG tablet Take 1 tablet by mouth Every Night. 90 tablet 2    dexlansoprazole (DEXILANT) 60 MG capsule Take 1 capsule by mouth Daily. 30 capsule 5    Dupilumab (Dupixent) 300 MG/2ML solution auto-injector injection Inject 2 mL under the skin into the appropriate area as directed Every 14 (Fourteen) Days.      fluticasone (FLONASE) 50 MCG/ACT nasal spray Administer 2 sprays into the nostril(s) as directed by provider Daily.      furosemide (LASIX) 20 MG tablet Take 1 tablet by mouth As Needed (lower exrremity swelling).  "30 tablet 5    lactulose (CHRONULAC) 10 GM/15ML solution Take 30 mL by mouth 2 (Two) Times a Day As Needed (constipation). 946 mL 11    levothyroxine (SYNTHROID, LEVOTHROID) 50 MCG tablet Take 1 tablet by mouth Daily. 90 tablet 3    metoprolol tartrate (LOPRESSOR) 25 MG tablet Take 1 tablet by mouth 2 (Two) Times a Day. 180 tablet 3    nitroglycerin (NITROSTAT) 0.4 MG SL tablet 1 under the tongue as needed for angina, may repeat q5mins for up three doses 30 tablet 11    sertraline (Zoloft) 50 MG tablet Take 1 tablet by mouth Daily. 90 tablet 1    Tenapanor HCl (Ibsrela) 50 MG tablet Take 1 tablet by mouth 2 (Two) Times a Day. 60 tablet 11    valACYclovir (VALTREX) 500 MG tablet Take 1 tablet by mouth Daily.       No current facility-administered medications for this visit.       Allergies:   Allergies   Allergen Reactions    Dexamethasone Other (See Comments)     dexamethasone       Vitals:   BP 92/64 (BP Location: Right arm, Patient Position: Sitting, Cuff Size: Adult)   Pulse 78   Temp 97.5 °F (36.4 °C) (Temporal)   Ht 160 cm (63\")   Wt 51.3 kg (113 lb)   SpO2 95%   BMI 20.02 kg/m²     Estimated body mass index is 20.02 kg/m² as calculated from the following:    Height as of this encounter: 160 cm (63\").    Weight as of this encounter: 51.3 kg (113 lb).    Anette Dunham  reports that she quit smoking about 3 years ago. Her smoking use included cigarettes. She started smoking about 18 years ago. She has been exposed to tobacco smoke. Her smokeless tobacco use includes snuff and chew.           Physical Exam:   Physical Exam  Constitutional:       Appearance: Normal appearance.   HENT:      Mouth/Throat:      Mouth: Mucous membranes are moist.   Cardiovascular:      Rate and Rhythm: Normal rate and regular rhythm.   Pulmonary:      Effort: Pulmonary effort is normal.      Breath sounds: Normal breath sounds. No wheezing or rhonchi.   Musculoskeletal:         General: Normal range of motion.   Skin:     " "General: Skin is warm and dry.   Neurological:      Mental Status: She is alert.   Psychiatric:         Mood and Affect: Mood normal.          Lab Results:   Hospital Outpatient Visit on 2025   Component Date Value Ref Range Status    Creatinine 2025 1.10  0.60 - 1.30 mg/dL Final    Serial Number: [id number]:  [id number]   Admission on 2025, Discharged on 2025   Component Date Value Ref Range Status    Reference Lab Report 2025    Final                    Value:[facility]  [address] [place] [address]  Phone: [phone] or [phone]  Fax: [phone]  Yusuf Fitzgerald M.D., Medical Director    PATIENT NAME                                     LABORATORY NO.  [id number]   CLAUDETTE DE SANTIAGO                                [id number]  [id number]                                 AGE                    SEX   SSN              CLIENT REF #  [facility]                      85        1940      F     [id number]      [id number]    [address]                             REQUESTING M.D.           [place] [address]                           MONAE PEREZ  DATE COLLECTED            DATE RECEIVED          DATE REPORTED  2025    DIAGNOSIS:  GASTRIC ANTRUM, BIOPSY:  Gastric antral type mucosa with reactive gastropathy  Negative for H. pylori organisms on routine stain  Negative for intestinal metaplasia, dysplasia,                           or malignancy    AV    CLINICAL HISTORY:  Dysphagia, unspecified type    SPECIMENS RECEIVED:  GASTRIC ANTRUM, BIOPSY    MICROSCOPIC DESCRIPTION:  Tissue blocks are prepared and slides are examined microscopically on all  specimens. See diagnosis for details.    Professional interpretation rendered by Shandra Sumner D.O. at [facility], [address] [place] [address].    GROSS DESCRIPTION:  Labeled \"gastric, " "antrum\", consisting of 1 piece of tan soft tissue measuring 0.4  x 0.4 x 0.2 cm, submitted entirely 1 cassette.  SOG    REVIEWED, DIAGNOSED AND ELECTRONICALLY  SIGNED BY:    Shandra Sumner D.O.  CPT CODES:  62400     Lab on 03/03/2025   Component Date Value Ref Range Status    WBC 03/03/2025 7.21  3.40 - 10.80 10*3/mm3 Final    RBC 03/03/2025 4.34  3.77 - 5.28 10*6/mm3 Final    Hemoglobin 03/03/2025 14.0  12.0 - 15.9 g/dL Final    Hematocrit 03/03/2025 41.1  34.0 - 46.6 % Final    MCV 03/03/2025 94.7  79.0 - 97.0 fL Final    MCH 03/03/2025 32.3  26.6 - 33.0 pg Final    MCHC 03/03/2025 34.1  31.5 - 35.7 g/dL Final    RDW 03/03/2025 13.2  12.3 - 15.4 % Final    RDW-SD 03/03/2025 46.3  37.0 - 54.0 fl Final    MPV 03/03/2025 12.1 (H)  6.0 - 12.0 fL Final    Platelets 03/03/2025 226  140 - 450 10*3/mm3 Final    Glucose 03/03/2025 104 (H)  65 - 99 mg/dL Final    BUN 03/03/2025 21  8 - 23 mg/dL Final    Creatinine 03/03/2025 1.14 (H)  0.57 - 1.00 mg/dL Final    Sodium 03/03/2025 140  136 - 145 mmol/L Final    Potassium 03/03/2025 3.3 (L)  3.5 - 5.2 mmol/L Final    Chloride 03/03/2025 106  98 - 107 mmol/L Final    CO2 03/03/2025 20.5 (L)  22.0 - 29.0 mmol/L Final    Calcium 03/03/2025 9.2  8.6 - 10.5 mg/dL Final    Total Protein 03/03/2025 8.0  6.0 - 8.5 g/dL Final    Albumin 03/03/2025 3.9  3.5 - 5.2 g/dL Final    ALT (SGPT) 03/03/2025 8  1 - 33 U/L Final    AST (SGOT) 03/03/2025 17  1 - 32 U/L Final    Alkaline Phosphatase 03/03/2025 63  39 - 117 U/L Final    Total Bilirubin 03/03/2025 0.5  0.0 - 1.2 mg/dL Final    Globulin 03/03/2025 4.1  gm/dL Final    A/G Ratio 03/03/2025 1.0  g/dL Final    BUN/Creatinine Ratio 03/03/2025 18.4  7.0 - 25.0 Final    Anion Gap 03/03/2025 13.5  5.0 - 15.0 mmol/L Final    eGFR 03/03/2025 47.3 (L)  >60.0 mL/min/1.73 Final    C-Reactive Protein 03/03/2025 0.34  0.00 - 0.50 mg/dL Final    TSH 03/03/2025 0.667  0.270 - 4.200 uIU/mL Final       Assessment and Plan  Diagnoses and all orders " for this visit:    1. Other fatigue (Primary)  -     CBC No Differential; Future  -     Comprehensive metabolic panel; Future  -     TSH Rfx On Abnormal To Free T4; Future    2. Debility  -     Cancel: Ambulatory Referral to Physical Therapy for Evaluation & Treatment  -     Ambulatory Referral to Physical Therapy for Evaluation & Treatment      Assessment & Plan  1. Chronic Obstructive Pulmonary Disease (COPD).  - Shortness of breath and decreased appetite likely related to a lung mass incidentally found on an esophagram.  - CT scan revealed significant COPD; definitive diagnosis requires a breathing test, but patient is currently too weak.  - Discussed possibility of cancer causing weight loss and general malaise; PET scan scheduled for 08/02/2025.  - Trial of an inhaler initiated with samples provided for once-daily use; laboratory tests ordered to further investigate condition.    2. Falls.  - Reports frequent falls, sometimes three or four times a day.  - Physical exam findings indicate weakness contributing to falls.  - Discussed physical therapy to help strengthen and prevent further falls; outpatient physical therapy preferred over home health services.  - Outpatient physical therapy referral ordered.    Follow-up  - Follow-up appointment scheduled for 08/2025.       BMI is within normal parameters. No other follow-up for BMI required.       New Medications:   No orders of the defined types were placed in this encounter.      Discontinued Medications:   There are no discontinued medications.              Follow Up:   Return in about 4 weeks (around 7/30/2025).    Patient was given instructions and counseling regarding her condition or for health maintenance advice. Please see specific information pulled into the AVS if appropriate.     Patient or patient representative verbalized consent for the use of Ambient Listening during the visit with  Isac Bang DO for chart documentation. 7/2/2025  16:04 EDT        This document has been electronically signed by Isac Bang DO   July 2, 2025 16:03 EDT      Dictated Utilizing Dragon Dictation: Part of this note may be an electronic transcription/translation of spoken language to printed text using the Dragon Dictation System.

## 2025-07-03 LAB
ALBUMIN SERPL-MCNC: 4.3 G/DL (ref 3.5–5.2)
ALBUMIN/GLOB SERPL: 1 G/DL
ALP SERPL-CCNC: 66 U/L (ref 39–117)
ALT SERPL W P-5'-P-CCNC: 6 U/L (ref 1–33)
ANION GAP SERPL CALCULATED.3IONS-SCNC: 14 MMOL/L (ref 5–15)
AST SERPL-CCNC: 19 U/L (ref 1–32)
BILIRUB SERPL-MCNC: 0.5 MG/DL (ref 0–1.2)
BUN SERPL-MCNC: 35 MG/DL (ref 8–23)
BUN/CREAT SERPL: 21.7 (ref 7–25)
CALCIUM SPEC-SCNC: 9.3 MG/DL (ref 8.6–10.5)
CHLORIDE SERPL-SCNC: 107 MMOL/L (ref 98–107)
CO2 SERPL-SCNC: 14 MMOL/L (ref 22–29)
CREAT SERPL-MCNC: 1.61 MG/DL (ref 0.57–1)
DEPRECATED RDW RBC AUTO: 50.1 FL (ref 37–54)
EGFRCR SERPLBLD CKD-EPI 2021: 31 ML/MIN/1.73
ERYTHROCYTE [DISTWIDTH] IN BLOOD BY AUTOMATED COUNT: 14.3 % (ref 12.3–15.4)
GLOBULIN UR ELPH-MCNC: 4.5 GM/DL
GLUCOSE SERPL-MCNC: 91 MG/DL (ref 65–99)
HCT VFR BLD AUTO: 43.7 % (ref 34–46.6)
HGB BLD-MCNC: 14 G/DL (ref 12–15.9)
MCH RBC QN AUTO: 31 PG (ref 26.6–33)
MCHC RBC AUTO-ENTMCNC: 32 G/DL (ref 31.5–35.7)
MCV RBC AUTO: 96.7 FL (ref 79–97)
PLATELET # BLD AUTO: 245 10*3/MM3 (ref 140–450)
PMV BLD AUTO: 11.3 FL (ref 6–12)
POTASSIUM SERPL-SCNC: 3 MMOL/L (ref 3.5–5.2)
PROT SERPL-MCNC: 8.8 G/DL (ref 6–8.5)
RBC # BLD AUTO: 4.52 10*6/MM3 (ref 3.77–5.28)
SODIUM SERPL-SCNC: 135 MMOL/L (ref 136–145)
TSH SERPL DL<=0.05 MIU/L-ACNC: 1.02 UIU/ML (ref 0.27–4.2)
WBC NRBC COR # BLD AUTO: 8.15 10*3/MM3 (ref 3.4–10.8)

## 2025-07-10 DIAGNOSIS — E87.6 HYPOKALEMIA: Primary | ICD-10-CM

## 2025-07-10 RX ORDER — POTASSIUM CHLORIDE 1500 MG/1
40 TABLET, EXTENDED RELEASE ORAL DAILY
Qty: 10 TABLET | Refills: 0 | Status: SHIPPED | OUTPATIENT
Start: 2025-07-10 | End: 2025-07-15

## 2025-07-11 ENCOUNTER — APPOINTMENT (OUTPATIENT)
Dept: GENERAL RADIOLOGY | Facility: HOSPITAL | Age: 85
End: 2025-07-11
Payer: MEDICARE

## 2025-07-11 ENCOUNTER — APPOINTMENT (OUTPATIENT)
Dept: CT IMAGING | Facility: HOSPITAL | Age: 85
End: 2025-07-11
Payer: MEDICARE

## 2025-07-11 ENCOUNTER — HOSPITAL ENCOUNTER (EMERGENCY)
Facility: HOSPITAL | Age: 85
Discharge: HOME | End: 2025-07-11
Attending: STUDENT IN AN ORGANIZED HEALTH CARE EDUCATION/TRAINING PROGRAM
Payer: MEDICARE

## 2025-07-11 VITALS
DIASTOLIC BLOOD PRESSURE: 53 MMHG | HEART RATE: 70 BPM | SYSTOLIC BLOOD PRESSURE: 102 MMHG | TEMPERATURE: 97.3 F | RESPIRATION RATE: 12 BRPM | BODY MASS INDEX: 19.29 KG/M2 | WEIGHT: 113 LBS | HEIGHT: 64 IN | OXYGEN SATURATION: 98 %

## 2025-07-11 DIAGNOSIS — R91.8 LUNG MASS: Primary | ICD-10-CM

## 2025-07-11 LAB
ALBUMIN SERPL-MCNC: 3.6 G/DL (ref 3.5–5.2)
ALBUMIN/GLOB SERPL: 1 G/DL
ALP SERPL-CCNC: 62 U/L (ref 39–117)
ALT SERPL W P-5'-P-CCNC: 9 U/L (ref 1–33)
ANION GAP SERPL CALCULATED.3IONS-SCNC: 11.7 MMOL/L (ref 5–15)
AST SERPL-CCNC: 31 U/L (ref 1–32)
BASOPHILS # BLD AUTO: 0.09 10*3/MM3 (ref 0–0.2)
BASOPHILS NFR BLD AUTO: 1.5 % (ref 0–1.5)
BILIRUB SERPL-MCNC: 0.2 MG/DL (ref 0–1.2)
BUN SERPL-MCNC: 34.6 MG/DL (ref 8–23)
BUN/CREAT SERPL: 24 (ref 7–25)
CALCIUM SPEC-SCNC: 8.5 MG/DL (ref 8.6–10.5)
CHLORIDE SERPL-SCNC: 111 MMOL/L (ref 98–107)
CO2 SERPL-SCNC: 12.3 MMOL/L (ref 22–29)
CREAT SERPL-MCNC: 1.44 MG/DL (ref 0.57–1)
CRP SERPL-MCNC: 1.24 MG/DL (ref 0–0.5)
D-LACTATE SERPL-SCNC: 0.8 MMOL/L (ref 0.5–2)
DEPRECATED RDW RBC AUTO: 55.9 FL (ref 37–54)
EGFRCR SERPLBLD CKD-EPI 2021: 35.5 ML/MIN/1.73
EOSINOPHIL # BLD AUTO: 0.23 10*3/MM3 (ref 0–0.4)
EOSINOPHIL NFR BLD AUTO: 3.8 % (ref 0.3–6.2)
ERYTHROCYTE [DISTWIDTH] IN BLOOD BY AUTOMATED COUNT: 16.3 % (ref 12.3–15.4)
GEN 5 1HR TROPONIN T REFLEX: 29 NG/L
GLOBULIN UR ELPH-MCNC: 3.7 GM/DL
GLUCOSE SERPL-MCNC: 92 MG/DL (ref 65–99)
HCT VFR BLD AUTO: 41.3 % (ref 34–46.6)
HGB BLD-MCNC: 13.8 G/DL (ref 12–15.9)
HOLD SPECIMEN: NORMAL
HOLD SPECIMEN: NORMAL
IMM GRANULOCYTES # BLD AUTO: 0.05 10*3/MM3 (ref 0–0.05)
IMM GRANULOCYTES NFR BLD AUTO: 0.8 % (ref 0–0.5)
LIPASE SERPL-CCNC: 223 U/L (ref 13–60)
LYMPHOCYTES # BLD AUTO: 1.2 10*3/MM3 (ref 0.7–3.1)
LYMPHOCYTES NFR BLD AUTO: 19.9 % (ref 19.6–45.3)
MCH RBC QN AUTO: 31.2 PG (ref 26.6–33)
MCHC RBC AUTO-ENTMCNC: 33.4 G/DL (ref 31.5–35.7)
MCV RBC AUTO: 93.2 FL (ref 79–97)
MONOCYTES # BLD AUTO: 0.57 10*3/MM3 (ref 0.1–0.9)
MONOCYTES NFR BLD AUTO: 9.4 % (ref 5–12)
NEUTROPHILS NFR BLD AUTO: 3.9 10*3/MM3 (ref 1.7–7)
NEUTROPHILS NFR BLD AUTO: 64.6 % (ref 42.7–76)
NRBC BLD AUTO-RTO: 0 /100 WBC (ref 0–0.2)
PLATELET # BLD AUTO: 215 10*3/MM3 (ref 140–450)
PMV BLD AUTO: 9.7 FL (ref 6–12)
POTASSIUM SERPL-SCNC: 5.1 MMOL/L (ref 3.5–5.2)
PROCALCITONIN SERPL-MCNC: 0.15 NG/ML (ref 0–0.25)
PROT SERPL-MCNC: 7.3 G/DL (ref 6–8.5)
RBC # BLD AUTO: 4.43 10*6/MM3 (ref 3.77–5.28)
SODIUM SERPL-SCNC: 135 MMOL/L (ref 136–145)
TROPONIN T % DELTA: -6
TROPONIN T NUMERIC DELTA: -2 NG/L
TROPONIN T SERPL HS-MCNC: 31 NG/L
WBC NRBC COR # BLD AUTO: 6.04 10*3/MM3 (ref 3.4–10.8)
WHOLE BLOOD HOLD COAG: NORMAL
WHOLE BLOOD HOLD SPECIMEN: NORMAL

## 2025-07-11 PROCEDURE — 74176 CT ABD & PELVIS W/O CONTRAST: CPT

## 2025-07-11 PROCEDURE — 93010 ELECTROCARDIOGRAM REPORT: CPT | Performed by: INTERNAL MEDICINE

## 2025-07-11 PROCEDURE — 83690 ASSAY OF LIPASE: CPT | Performed by: PHYSICIAN ASSISTANT

## 2025-07-11 PROCEDURE — 74176 CT ABD & PELVIS W/O CONTRAST: CPT | Performed by: RADIOLOGY

## 2025-07-11 PROCEDURE — 25810000003 SODIUM CHLORIDE 0.9 % SOLUTION: Performed by: EMERGENCY MEDICINE

## 2025-07-11 PROCEDURE — 84145 PROCALCITONIN (PCT): CPT | Performed by: PHYSICIAN ASSISTANT

## 2025-07-11 PROCEDURE — 71045 X-RAY EXAM CHEST 1 VIEW: CPT | Performed by: RADIOLOGY

## 2025-07-11 PROCEDURE — 85025 COMPLETE CBC W/AUTO DIFF WBC: CPT | Performed by: PHYSICIAN ASSISTANT

## 2025-07-11 PROCEDURE — 86140 C-REACTIVE PROTEIN: CPT | Performed by: PHYSICIAN ASSISTANT

## 2025-07-11 PROCEDURE — 80053 COMPREHEN METABOLIC PANEL: CPT | Performed by: PHYSICIAN ASSISTANT

## 2025-07-11 PROCEDURE — 99284 EMERGENCY DEPT VISIT MOD MDM: CPT

## 2025-07-11 PROCEDURE — 84484 ASSAY OF TROPONIN QUANT: CPT | Performed by: PHYSICIAN ASSISTANT

## 2025-07-11 PROCEDURE — 93005 ELECTROCARDIOGRAM TRACING: CPT | Performed by: PHYSICIAN ASSISTANT

## 2025-07-11 PROCEDURE — 71045 X-RAY EXAM CHEST 1 VIEW: CPT

## 2025-07-11 PROCEDURE — 71250 CT THORAX DX C-: CPT | Performed by: RADIOLOGY

## 2025-07-11 PROCEDURE — 71250 CT THORAX DX C-: CPT

## 2025-07-11 PROCEDURE — 83605 ASSAY OF LACTIC ACID: CPT | Performed by: PHYSICIAN ASSISTANT

## 2025-07-11 PROCEDURE — 36415 COLL VENOUS BLD VENIPUNCTURE: CPT

## 2025-07-11 RX ADMIN — SODIUM CHLORIDE 500 ML: 9 INJECTION, SOLUTION INTRAVENOUS at 15:55

## 2025-07-11 NOTE — ED NOTES
MEDICAL SCREENING:    Reason for Visit: generalized weakness x 2-3 weeks with accompanied abdominal pain. Sent by PCP Isac Bang for abnormal lab work.    Patient initially seen in triage.  The patient was advised further evaluation and diagnostic testing will be needed, some of the treatment and testing will be initiated in the lobby in order to begin the process.  The patient will be returned to the waiting area for the time being and possibly be re-assessed by a subsequent ED provider.  The patient will be brought back to the treatment area in as timely manner as possible.       Juliet Tripp PA-C  07/11/25 1225       Juliet Tripp PA-C  07/11/25 1234

## 2025-07-11 NOTE — ED PROVIDER NOTES
Subjective   History of Present Illness  85-year-old female patient with a past medical history of A-fib, coronary artery disease, thyroid disease, GERD, and hypertension presents to the emergency room today with complaints of generalized weakness.Pt states she has been feeling sick and weak x 2 weeks, had blood work done this week and her doctor told her it was all bad and if she wasn't feeling better in a couple days to come here.  Patient states she is also having abdominal pain.  She states she has had diarrhea but no vomiting.  Denies any fevers.  She states she has had shortness of breath at times but denies any chest pain.    History provided by:  Patient   used: No        Review of Systems   Constitutional:  Positive for fatigue.   HENT: Negative.     Eyes: Negative.    Respiratory:  Positive for shortness of breath.    Cardiovascular: Negative.    Gastrointestinal:  Positive for abdominal pain and diarrhea.   Endocrine: Negative.    Genitourinary: Negative.    Musculoskeletal: Negative.    Skin: Negative.    Allergic/Immunologic: Negative.    Neurological:  Positive for weakness.   Hematological: Negative.    Psychiatric/Behavioral: Negative.     All other systems reviewed and are negative.      Past Medical History:   Diagnosis Date    Anxiety     Arrhythmia     Atrial fibrillation     Coronary artery disease     Disease of thyroid gland     Dysfunctional gallbladder     Fracture of two ribs     GERD (gastroesophageal reflux disease)     Hypertension     Lumbar compression fracture 07/2022    Osteoporosis        Allergies   Allergen Reactions    Dexamethasone Other (See Comments)     dexamethasone       Past Surgical History:   Procedure Laterality Date    ABDOMINAL SURGERY      CARDIAC ELECTROPHYSIOLOGY PROCEDURE N/A 09/23/2022    Procedure: Device Implant. Leadless PPM. Hold eliquis x1 day prior to procedure.;  Surgeon: Jefe Zhao MD;  Location: [facility] INVASIVE LOCATION;   Service: Cardiology;  Laterality: N/A;    CARDIAC ELECTROPHYSIOLOGY PROCEDURE N/A 09/23/2022    Procedure: AV Node;  Surgeon: Jefe Zhao MD;  Location: [facility] INVASIVE LOCATION;  Service: Cardiovascular;  Laterality: N/A;    CATARACT EXTRACTION      ENDOSCOPY N/A 4/18/2025    Procedure: ESOPHAGOGASTRODUODENOSCOPY WITH DILATATION;  Surgeon: Homero Cruz MD;  Location:  [facility];  Service: Gastroenterology;  Laterality: N/A;    GALLBLADDER SURGERY      HIP HEMIARTHROPLASTY Left 05/29/2018    Procedure: LEFT HIP BIPOLAR HEMIARTHROPLASTY;  Surgeon: Trevon Arriaga MD;  Location:  [facility];  Service: Orthopedics    PACEMAKER IMPLANTATION         Family History   Problem Relation Age of Onset    Dementia Mother     Diabetes Father     Heart disease Father        Social History     Socioeconomic History    Marital status:    Tobacco Use    Smoking status: Former     Current packs/day: 0.00     Types: Cigarettes     Start date: 3/10/2007     Quit date: 3/10/2022     Years since quitting: 3.3     Passive exposure: Past    Smokeless tobacco: Current     Types: Snuff, Chew   Vaping Use    Vaping status: Never Used   Substance and Sexual Activity    Alcohol use: No    Drug use: No    Sexual activity: Defer           Objective   Physical Exam  Vitals and nursing note reviewed.   Constitutional:       Appearance: Normal appearance. She is normal weight.   HENT:      Head: Normocephalic and atraumatic.      Right Ear: External ear normal.      Left Ear: External ear normal.      Nose: Nose normal.      Mouth/Throat:      Mouth: Mucous membranes are moist.      Pharynx: Oropharynx is clear.   Eyes:      Extraocular Movements: Extraocular movements intact.      Conjunctiva/sclera: Conjunctivae normal.      Pupils: Pupils are equal, round, and reactive to light.   Cardiovascular:      Rate and Rhythm: Normal rate and regular rhythm.      Pulses: Normal pulses.      Heart sounds: Normal heart sounds.   Pulmonary:       Effort: Pulmonary effort is normal.      Breath sounds: Normal breath sounds.   Abdominal:      General: Abdomen is flat. Bowel sounds are normal.      Palpations: Abdomen is soft.   Musculoskeletal:         General: Normal range of motion.      Cervical back: Normal range of motion and neck supple.   Skin:     General: Skin is warm and dry.      Capillary Refill: Capillary refill takes less than 2 seconds.   Neurological:      General: No focal deficit present.      Mental Status: She is alert and oriented to person, place, and time. Mental status is at baseline.   Psychiatric:         Mood and Affect: Mood normal.         Behavior: Behavior normal.         Thought Content: Thought content normal.         Judgment: Judgment normal.         Procedures           ED Course  ED Course as of 07/11/25 1651 Fri Jul 11, 2025   1352 Sign out to Dr. Diaz  [ML]      ED Course User Index  [ML] Bell Murcia PA                                                       Medical Decision Making  Problems Addressed:  Lung mass: complicated acute illness or injury    Amount and/or Complexity of Data Reviewed  Labs: ordered.  Radiology: ordered.  ECG/medicine tests: ordered.        Final diagnoses:   Lung mass       ED Disposition  ED Disposition       ED Disposition   Discharge    Condition   Stable    Comment   --               Isac Bang, DO  [address] [id number] [phone]      As needed         Medication List      No changes were made to your prescriptions during this visit.            Jorge Diaz MD  07/11/25 1651

## 2025-07-13 LAB
QT INTERVAL: 418 MS
QTC INTERVAL: 454 MS

## 2025-07-30 ENCOUNTER — OFFICE VISIT (OUTPATIENT)
Dept: FAMILY MEDICINE CLINIC | Facility: CLINIC | Age: 85
End: 2025-07-30
Payer: MEDICARE

## 2025-07-30 VITALS
HEART RATE: 57 BPM | TEMPERATURE: 97.8 F | WEIGHT: 111 LBS | HEIGHT: 64 IN | BODY MASS INDEX: 18.95 KG/M2 | OXYGEN SATURATION: 100 % | DIASTOLIC BLOOD PRESSURE: 56 MMHG | SYSTOLIC BLOOD PRESSURE: 96 MMHG

## 2025-07-30 DIAGNOSIS — I71.40 ABDOMINAL AORTIC ANEURYSM (AAA) WITHOUT RUPTURE, UNSPECIFIED PART: Primary | ICD-10-CM

## 2025-07-30 DIAGNOSIS — R91.8 LUNG MASS: ICD-10-CM

## 2025-07-30 DIAGNOSIS — Z91.81 AT HIGH RISK FOR FALLS: ICD-10-CM

## 2025-07-30 DIAGNOSIS — K64.9 HEMORRHOIDS, UNSPECIFIED HEMORRHOID TYPE: ICD-10-CM

## 2025-07-30 PROCEDURE — 1125F AMNT PAIN NOTED PAIN PRSNT: CPT | Performed by: NURSE PRACTITIONER

## 2025-07-30 PROCEDURE — 99214 OFFICE O/P EST MOD 30 MIN: CPT | Performed by: NURSE PRACTITIONER

## 2025-07-30 PROCEDURE — 1159F MED LIST DOCD IN RCRD: CPT | Performed by: NURSE PRACTITIONER

## 2025-07-30 PROCEDURE — 1160F RVW MEDS BY RX/DR IN RCRD: CPT | Performed by: NURSE PRACTITIONER

## 2025-07-30 RX ORDER — HYDROCORTISONE 25 MG/G
CREAM TOPICAL
Qty: 28 G | Refills: 0 | Status: SHIPPED | OUTPATIENT
Start: 2025-07-30

## 2025-07-30 NOTE — PROGRESS NOTES
[facility]     Chief Complaint  Fatigue and Tremors    Anette Dunham is a 85 y.o. female who presents today to [facility] for Fatigue and Tremors.    HPI:   HPI   History of Present Illness  The patient presents with constipation, hemorrhoids, atrial fibrillation, abdominal aortic aneurysm, and a left lung mass. She is accompanied by her granddaughter.    The patient reports malaise and has experienced an unintentional weight loss of 2 pounds since her last visit. Her diet has been modified to include strawberry milkshakes to increase caloric intake. Constipation has been a persistent issue, with defecation occurring only every 2 to 3 days when medication is administered. She has an upcoming appointment with a gastroenterology specialist. MiraLAX has been trialed without efficacy. Over-the-counter bisacodyl has also been utilized without improvement. Lactulose is taken as needed, and Estarylla is taken daily.    Hemorrhoids have been causing discomfort, which she attributes to straining during defecation. She has not applied any topical treatments for her hemorrhoids.    A history of atrial fibrillation is managed with a pacemaker, under the supervision of her cardiologist, Dr. Swann.    An abdominal aortic aneurysm is currently under surveillance.    Dysphagia has been an ongoing issue. A previous imaging study revealed a small narrowing near her stomach, but the primary concern was a pulmonary nodule. Another imaging study is scheduled for August 12, 2025.    The patient reports increased fatigue. Potassium supplements were prescribed for 4 days following a visit to the emergency department.    Social History:  Diet: Strawberry milkshakes to increase caloric intake  Coffee/Tea/Caffeine-containing Drinks: Drinks black coffee with milk  Living Condition: Resides with her brother    Previous History:   Past Medical History:   Diagnosis Date    Anxiety     Arrhythmia      Atrial fibrillation     Coronary artery disease     Disease of thyroid gland     Dysfunctional gallbladder     Fracture of two ribs     GERD (gastroesophageal reflux disease)     Hypertension     Lumbar compression fracture 07/2022    Osteoporosis       Past Surgical History:   Procedure Laterality Date    ABDOMINAL SURGERY      CARDIAC ELECTROPHYSIOLOGY PROCEDURE N/A 09/23/2022    Procedure: Device Implant. Leadless PPM. Hold eliquis x1 day prior to procedure.;  Surgeon: Jefe Zhao MD;  Location:  [facility] INVASIVE LOCATION;  Service: Cardiology;  Laterality: N/A;    CARDIAC ELECTROPHYSIOLOGY PROCEDURE N/A 09/23/2022    Procedure: AV Node;  Surgeon: Jefe Zhao MD;  Location:  [facility] INVAS[facility] LOCATION;  Service: Cardiovascular;  Laterality: N/A;    CATARACT EXTRACTION      ENDOSCOPY N/A 4/18/2025    Procedure: ESOPHAGOGASTRODUODENOSCOPY WITH DILATATION;  Surgeon: Homero Cruz MD;  Location:  [facility];  Service: Gastroenterology;  Laterality: N/A;    GALLBLADDER SURGERY      HIP HEMIARTHROPLASTY Left 05/29/2018    Procedure: LEFT HIP BIPOLAR HEMIARTHROPLASTY;  Surgeon: Trevon Arriaga MD;  Location:  [facility];  Service: Orthopedics    PACEMAKER IMPLANTATION        Social History     Socioeconomic History    Marital status:    Tobacco Use    Smoking status: Former     Current packs/day: 0.00     Average packs/day: 1 pack/day for 15.0 years (15.0 ttl pk-yrs)     Types: Cigarettes     Start date: 3/10/2007     Quit date: 3/10/2022     Years since quitting: 3.4     Passive exposure: Past    Smokeless tobacco: Current     Types: Snuff, Chew   Vaping Use    Vaping status: Never Used   Substance and Sexual Activity    Alcohol use: No    Drug use: No    Sexual activity: Defer      Health Maintenance Due   Topic Date Due    ZOSTER VACCINE (1 of 2) Never done    RSV Vaccine - Adults (1 - 1-dose 75+ series) Never done    COVID-19 Vaccine (1 - 2024-25 season) Never done        Current  Medications:  Current Outpatient Medications   Medication Sig Dispense Refill    alendronate (Fosamax) 70 MG tablet Take 1 tablet by mouth Every 7 (Seven) Days. 12 tablet 2    amitriptyline (ELAVIL) 10 MG tablet Take 1 tablet by mouth Every Night. 90 tablet 2    atorvastatin (LIPITOR) 10 MG tablet Take 1 tablet by mouth Every Night. 90 tablet 2    dexlansoprazole (DEXILANT) 60 MG capsule Take 1 capsule by mouth Daily. 30 capsule 5    Dupilumab (Dupixent) 300 MG/2ML solution auto-injector injection Inject 2 mL under the skin into the appropriate area as directed Every 14 (Fourteen) Days.      fluticasone (FLONASE) 50 MCG/ACT nasal spray Administer 2 sprays into the nostril(s) as directed by provider Daily.      furosemide (LASIX) 20 MG tablet Take 1 tablet by mouth As Needed (lower exrremity swelling). 30 tablet 5    levothyroxine (SYNTHROID, LEVOTHROID) 50 MCG tablet Take 1 tablet by mouth Daily. 90 tablet 3    metoprolol tartrate (LOPRESSOR) 25 MG tablet Take 1 tablet by mouth 2 (Two) Times a Day. 180 tablet 3    nitroglycerin (NITROSTAT) 0.4 MG SL tablet 1 under the tongue as needed for angina, may repeat q5mins for up three doses 30 tablet 11    sertraline (Zoloft) 50 MG tablet Take 1 tablet by mouth Daily. 90 tablet 1    Tenapanor HCl (Ibsrela) 50 MG tablet Take 1 tablet by mouth 2 (Two) Times a Day. 60 tablet 11    valACYclovir (VALTREX) 500 MG tablet Take 1 tablet by mouth Daily.      Fluticasone-Umeclidin-Vilant (TRELEGY) 100-62.5-25 MCG/ACT inhaler Inhale 1 puff Daily. (Patient not taking: Reported on 7/30/2025) 3 each 0    Hydrocortisone, Perianal, (ANUSOL-HC) 2.5 % rectal cream Rectal cream: Apply sparingly to rectal area as needed up to two times per day up to 7 days 28 g 0    lactulose (CHRONULAC) 10 GM/15ML solution take 30ml BY MOUTH TWICE DAILY AS NEEDED for constipation 473 mL 0     No current facility-administered medications for this visit.       Allergies:   No Active Allergies      Vitals:   BP  "96/56 (BP Location: Left arm, Patient Position: Sitting)   Pulse 57   Temp 97.8 °F (36.6 °C)   Ht 161.3 cm (63.5\")   Wt 50.3 kg (111 lb)   SpO2 100%   BMI 19.35 kg/m²   Estimated body mass index is 19.35 kg/m² as calculated from the following:    Height as of this encounter: 161.3 cm (63.5\").    Weight as of this encounter: 50.3 kg (111 lb).               Physical Exam:   Physical Exam  Vitals reviewed.   Constitutional:       Appearance: Normal appearance.   HENT:      Head: Normocephalic.   Eyes:      Extraocular Movements: Extraocular movements intact.      Conjunctiva/sclera: Conjunctivae normal.   Cardiovascular:      Rate and Rhythm: Normal rate.      Heart sounds: Normal heart sounds.   Pulmonary:      Effort: Pulmonary effort is normal.      Breath sounds: Normal breath sounds.   Musculoskeletal:      Comments: Patient sitting in wheelchair   Skin:     General: Skin is warm and dry.   Neurological:      Mental Status: She is alert. Mental status is at baseline.   Psychiatric:         Mood and Affect: Mood normal.        Physical Exam         Lab Results:   Admission on 07/11/2025, Discharged on 07/11/2025   Component Date Value Ref Range Status    Glucose 07/11/2025 92  65 - 99 mg/dL Final    BUN 07/11/2025 34.6 (H)  8.0 - 23.0 mg/dL Final    Creatinine 07/11/2025 1.44 (H)  0.57 - 1.00 mg/dL Final    Sodium 07/11/2025 135 (L)  136 - 145 mmol/L Final    Potassium 07/11/2025 5.1  3.5 - 5.2 mmol/L Final    Specimen hemolyzed.  Result may be falsely elevated.    Chloride 07/11/2025 111 (H)  98 - 107 mmol/L Final    CO2 07/11/2025 12.3 (L)  22.0 - 29.0 mmol/L Final    Calcium 07/11/2025 8.5 (L)  8.6 - 10.5 mg/dL Final    Total Protein 07/11/2025 7.3  6.0 - 8.5 g/dL Final    Albumin 07/11/2025 3.6  3.5 - 5.2 g/dL Final    ALT (SGPT) 07/11/2025 9  1 - 33 U/L Final    AST (SGOT) 07/11/2025 31  1 - 32 U/L Final    Specimen hemolyzed.  Result may be falsely elevated.    Alkaline Phosphatase 07/11/2025 62  39 - " 117 U/L Final    Total Bilirubin 07/11/2025 0.2  0.0 - 1.2 mg/dL Final    Globulin 07/11/2025 3.7  gm/dL Final    A/G Ratio 07/11/2025 1.0  g/dL Final    BUN/Creatinine Ratio 07/11/2025 24.0  7.0 - 25.0 Final    Anion Gap 07/11/2025 11.7  5.0 - 15.0 mmol/L Final    eGFR 07/11/2025 35.5 (L)  >60.0 mL/min/1.73 Final    C-Reactive Protein 07/11/2025 1.24 (H)  0.00 - 0.50 mg/dL Final    Lipase 07/11/2025 223 (H)  13 - 60 U/L Final    Lactate 07/11/2025 0.8  0.5 - 2.0 mmol/L Final    Procalcitonin 07/11/2025 0.15  0.00 - 0.25 ng/mL Final    HS Troponin T 07/11/2025 31 (H)  <14 ng/L Final    QT Interval 07/11/2025 418  ms Final    QTC Interval 07/11/2025 454  ms Final    Extra Tube 07/11/2025 Hold for add-ons.   Final    Auto resulted.    Extra Tube 07/11/2025 hold for add-on   Final    Auto resulted    Extra Tube 07/11/2025 Hold for add-ons.   Final    Auto resulted.    Extra Tube 07/11/2025 Hold for add-ons.   Final    Auto resulted    WBC 07/11/2025 6.04  3.40 - 10.80 10*3/mm3 Final    RBC 07/11/2025 4.43  3.77 - 5.28 10*6/mm3 Final    Hemoglobin 07/11/2025 13.8  12.0 - 15.9 g/dL Final    Hematocrit 07/11/2025 41.3  34.0 - 46.6 % Final    MCV 07/11/2025 93.2  79.0 - 97.0 fL Final    MCH 07/11/2025 31.2  26.6 - 33.0 pg Final    MCHC 07/11/2025 33.4  31.5 - 35.7 g/dL Final    RDW 07/11/2025 16.3 (H)  12.3 - 15.4 % Final    RDW-SD 07/11/2025 55.9 (H)  37.0 - 54.0 fl Final    MPV 07/11/2025 9.7  6.0 - 12.0 fL Final    Platelets 07/11/2025 215  140 - 450 10*3/mm3 Final    Neutrophil % 07/11/2025 64.6  42.7 - 76.0 % Final    Lymphocyte % 07/11/2025 19.9  19.6 - 45.3 % Final    Monocyte % 07/11/2025 9.4  5.0 - 12.0 % Final    Eosinophil % 07/11/2025 3.8  0.3 - 6.2 % Final    Basophil % 07/11/2025 1.5  0.0 - 1.5 % Final    Immature Grans % 07/11/2025 0.8 (H)  0.0 - 0.5 % Final    Neutrophils, Absolute 07/11/2025 3.90  1.70 - 7.00 10*3/mm3 Final    Lymphocytes, Absolute 07/11/2025 1.20  0.70 - 3.10 10*3/mm3 Final    Monocytes,  Absolute 07/11/2025 0.57  0.10 - 0.90 10*3/mm3 Final    Eosinophils, Absolute 07/11/2025 0.23  0.00 - 0.40 10*3/mm3 Final    Basophils, Absolute 07/11/2025 0.09  0.00 - 0.20 10*3/mm3 Final    Immature Grans, Absolute 07/11/2025 0.05  0.00 - 0.05 10*3/mm3 Final    nRBC 07/11/2025 0.0  0.0 - 0.2 /100 WBC Final    HS Troponin T 07/11/2025 29 (H)  <14 ng/L Final    Troponin T Numeric Delta 07/11/2025 -2  ng/L Final    Troponin T % Delta 07/11/2025 -6  Abnormal if >/= 20% Final   Lab on 07/02/2025   Component Date Value Ref Range Status    WBC 07/02/2025 8.15  3.40 - 10.80 10*3/mm3 Final    RBC 07/02/2025 4.52  3.77 - 5.28 10*6/mm3 Final    Hemoglobin 07/02/2025 14.0  12.0 - 15.9 g/dL Final    Hematocrit 07/02/2025 43.7  34.0 - 46.6 % Final    MCV 07/02/2025 96.7  79.0 - 97.0 fL Final    MCH 07/02/2025 31.0  26.6 - 33.0 pg Final    MCHC 07/02/2025 32.0  31.5 - 35.7 g/dL Final    RDW 07/02/2025 14.3  12.3 - 15.4 % Final    RDW-SD 07/02/2025 50.1  37.0 - 54.0 fl Final    MPV 07/02/2025 11.3  6.0 - 12.0 fL Final    Platelets 07/02/2025 245  140 - 450 10*3/mm3 Final    Glucose 07/02/2025 91  65 - 99 mg/dL Final    BUN 07/02/2025 35.0 (H)  8.0 - 23.0 mg/dL Final    Creatinine 07/02/2025 1.61 (H)  0.57 - 1.00 mg/dL Final    Sodium 07/02/2025 135 (L)  136 - 145 mmol/L Final    Potassium 07/02/2025 3.0 (L)  3.5 - 5.2 mmol/L Final    Chloride 07/02/2025 107  98 - 107 mmol/L Final    CO2 07/02/2025 14.0 (L)  22.0 - 29.0 mmol/L Final    Calcium 07/02/2025 9.3  8.6 - 10.5 mg/dL Final    Total Protein 07/02/2025 8.8 (H)  6.0 - 8.5 g/dL Final    Albumin 07/02/2025 4.3  3.5 - 5.2 g/dL Final    ALT (SGPT) 07/02/2025 6  1 - 33 U/L Final    AST (SGOT) 07/02/2025 19  1 - 32 U/L Final    Alkaline Phosphatase 07/02/2025 66  39 - 117 U/L Final    Total Bilirubin 07/02/2025 0.5  0.0 - 1.2 mg/dL Final    Globulin 07/02/2025 4.5  gm/dL Final    A/G Ratio 07/02/2025 1.0  g/dL Final    BUN/Creatinine Ratio 07/02/2025 21.7  7.0 - 25.0 Final     Anion Gap 07/02/2025 14.0  5.0 - 15.0 mmol/L Final    eGFR 07/02/2025 31.0 (L)  >60.0 mL/min/1.73 Final    TSH 07/02/2025 1.020  0.270 - 4.200 uIU/mL Final   Hospital Outpatient Visit on 05/05/2025   Component Date Value Ref Range Status    Creatinine 05/05/2025 1.10  0.60 - 1.30 mg/dL Final    Serial Number: [id number]:  [id number]     Results  Labs   - Lipase: Elevated   - Sodium: Low   - Potassium: Low but returned to normal after treatment   - Kidney function: Slightly decreased    Imaging   - CT scan of the left lung: Mass that had grown   - CT scan of the pancreas: Pancreas looked good   - CT scan of the abdomen: 2022, Abdominal aortic aneurysm was 3 cm    Results review: During today's encounter, all relevant clinical data has been reviewed.      Assessment and Plan  Diagnoses and all orders for this visit:    1. Abdominal aortic aneurysm (AAA) without rupture, unspecified part (Primary)    2. Hemorrhoids, unspecified hemorrhoid type  -     Hydrocortisone, Perianal, (ANUSOL-HC) 2.5 % rectal cream; Rectal cream: Apply sparingly to rectal area as needed up to two times per day up to 7 days  Dispense: 28 g; Refill: 0    3. At high risk for falls    4. Lung mass      Assessment & Plan  Constipation.  - Reports persistent constipation despite taking lactulose as needed.  - Advised to try prunes and apples to help with bowel movements.  - Should contact GI doctor to discuss ongoing constipation issues and potential changes in medication as she is under there management.  - If symptoms worsen before her next appointment with Dr. Bang or GI, she should inform the clinic.    Hemorrhoids.  - Reports soreness and discomfort from hemorrhoids, likely exacerbated by constipation.  - Prescription for Anusol cream will be provided, to be applied up to twice daily.  - Advised to avoid prolonged sitting on the toilet and to take warm baths to alleviate symptoms.  - Should inform GI doctor about hemorrhoids during the next  visit.    Atrial Fibrillation.  - Has a history of atrial fibrillation and a pacemaker to regulate heart rhythm.  - No new symptoms reported.  - Should continue follow-up with cardiologist.    Abdominal Aortic Aneurysm.  - History of an abdominal aortic aneurysm, which was 3 cm in 2022.  - Currently being monitored, no immediate intervention required.  - Should follow up with healthcare provider as scheduled to monitor the aneurysm's size and growth.    Left Lung Mass.  - Mass on left lung noted to have grown since last CT scan.  - Follow-up appointment with Dr. Bang will be scheduled to discuss findings and determine the appropriate treatment plan.  -Keep f/u with Pulmonology     BMI is within normal parameters. No other follow-up for BMI required.       New Medications:   New Medications Ordered This Visit   Medications    Hydrocortisone, Perianal, (ANUSOL-HC) 2.5 % rectal cream     Sig: Rectal cream: Apply sparingly to rectal area as needed up to two times per day up to 7 days     Dispense:  28 g     Refill:  0       Discontinued Medications:   There are no discontinued medications.           Visit Diagnoses:    ICD-10-CM ICD-9-CM   1. Abdominal aortic aneurysm (AAA) without rupture, unspecified part  I71.40 441.4   2. Hemorrhoids, unspecified hemorrhoid type  K64.9 455.6   3. At high risk for falls  Z91.81 V15.88   4. Lung mass  R91.8 786.6            Follow Up:   Return in about 3 weeks (around 8/20/2025) for follow up with pcp after PET scan .    Patient was given instructions and counseling regarding her condition or for health maintenance advice. Please see specific information pulled into the AVS if appropriate.     Patient or patient representative verbalized consent for the use of Ambient Listening during the visit with  JUNIE Hogan for chart documentation. 8/2/2025  21:58 EDT      This document has been electronically signed by JUNIE Hogan   August 2, 2025 21:58 EDT    Dictated Utilizing  Dragon Dictation: Part of this note may be an electronic transcription/translation of spoken language to printed text using the Dragon Dictation System.

## 2025-08-01 RX ORDER — LACTULOSE 10 G/15ML
SOLUTION ORAL
Qty: 473 ML | Refills: 0 | Status: SHIPPED | OUTPATIENT
Start: 2025-08-01

## 2025-08-02 PROBLEM — I71.40 ABDOMINAL AORTIC ANEURYSM (AAA) WITHOUT RUPTURE: Status: ACTIVE | Noted: 2025-08-02

## 2025-08-02 PROBLEM — K64.9 HEMORRHOIDS: Status: ACTIVE | Noted: 2025-08-02

## 2025-08-12 ENCOUNTER — OFFICE VISIT (OUTPATIENT)
Dept: FAMILY MEDICINE CLINIC | Facility: CLINIC | Age: 85
End: 2025-08-12
Payer: MEDICARE

## 2025-08-12 VITALS
DIASTOLIC BLOOD PRESSURE: 58 MMHG | BODY MASS INDEX: 19.74 KG/M2 | HEIGHT: 64 IN | OXYGEN SATURATION: 97 % | WEIGHT: 115.6 LBS | HEART RATE: 73 BPM | SYSTOLIC BLOOD PRESSURE: 100 MMHG | TEMPERATURE: 97.8 F

## 2025-08-12 DIAGNOSIS — K64.9 HEMORRHOIDS, UNSPECIFIED HEMORRHOID TYPE: Primary | ICD-10-CM

## 2025-08-12 PROCEDURE — 1160F RVW MEDS BY RX/DR IN RCRD: CPT | Performed by: FAMILY MEDICINE

## 2025-08-12 PROCEDURE — 1125F AMNT PAIN NOTED PAIN PRSNT: CPT | Performed by: FAMILY MEDICINE

## 2025-08-12 PROCEDURE — 99213 OFFICE O/P EST LOW 20 MIN: CPT | Performed by: FAMILY MEDICINE

## 2025-08-12 PROCEDURE — 1159F MED LIST DOCD IN RCRD: CPT | Performed by: FAMILY MEDICINE

## 2025-08-12 RX ORDER — HYDROCORTISONE ACETATE 25 MG/1
25 SUPPOSITORY RECTAL 2 TIMES DAILY
Qty: 24 EACH | Refills: 0 | Status: SHIPPED | OUTPATIENT
Start: 2025-08-12

## 2025-08-14 ENCOUNTER — HOSPITAL ENCOUNTER (OUTPATIENT)
Dept: PET IMAGING | Facility: HOSPITAL | Age: 85
Discharge: HOME | End: 2025-08-14
Payer: MEDICARE

## 2025-08-14 DIAGNOSIS — Z72.0 TOBACCO ABUSE: ICD-10-CM

## 2025-08-14 DIAGNOSIS — K22.2 ESOPHAGEAL STRICTURE: ICD-10-CM

## 2025-08-14 DIAGNOSIS — R13.14 PHARYNGOESOPHAGEAL DYSPHAGIA: ICD-10-CM

## 2025-08-14 DIAGNOSIS — R91.8 LUNG MASS: ICD-10-CM

## 2025-08-14 PROCEDURE — 34310000005 FLUDEOXYGLUCOSE F18 SOLUTION: Performed by: INTERNAL MEDICINE

## 2025-08-14 PROCEDURE — 78815 PET IMAGE W/CT SKULL-THIGH: CPT

## 2025-08-14 PROCEDURE — A9552 F18 FDG: HCPCS | Performed by: INTERNAL MEDICINE

## 2025-08-14 RX ADMIN — FLUDEOXYGLUCOSE F 18 1 DOSE: 200 INJECTION, SOLUTION INTRAVENOUS at 09:23

## 2025-08-15 ENCOUNTER — OFFICE VISIT (OUTPATIENT)
Dept: PULMONOLOGY | Facility: CLINIC | Age: 85
End: 2025-08-15
Payer: MEDICARE

## 2025-08-15 VITALS
OXYGEN SATURATION: 85 % | SYSTOLIC BLOOD PRESSURE: 120 MMHG | WEIGHT: 115 LBS | DIASTOLIC BLOOD PRESSURE: 68 MMHG | BODY MASS INDEX: 19.63 KG/M2 | HEIGHT: 64 IN | HEART RATE: 78 BPM | TEMPERATURE: 96.9 F

## 2025-08-15 DIAGNOSIS — R91.8 LUNG MASS: Primary | ICD-10-CM

## 2025-08-15 DIAGNOSIS — Z72.0 TOBACCO ABUSE: ICD-10-CM

## 2025-08-22 ENCOUNTER — TELEPHONE (OUTPATIENT)
Dept: CARDIOLOGY | Facility: CLINIC | Age: 85
End: 2025-08-22
Payer: MEDICARE

## 2025-08-26 ENCOUNTER — OFFICE VISIT (OUTPATIENT)
Dept: FAMILY MEDICINE CLINIC | Facility: CLINIC | Age: 85
End: 2025-08-26
Payer: MEDICARE

## 2025-08-26 VITALS
TEMPERATURE: 98 F | HEART RATE: 72 BPM | DIASTOLIC BLOOD PRESSURE: 64 MMHG | HEIGHT: 64 IN | SYSTOLIC BLOOD PRESSURE: 116 MMHG | OXYGEN SATURATION: 97 % | BODY MASS INDEX: 19.56 KG/M2 | WEIGHT: 114.6 LBS

## 2025-08-26 DIAGNOSIS — G60.9 IDIOPATHIC PERIPHERAL NEUROPATHY: ICD-10-CM

## 2025-08-26 DIAGNOSIS — M85.851 OSTEOPENIA OF BOTH HIPS: ICD-10-CM

## 2025-08-26 DIAGNOSIS — K21.9 GASTROESOPHAGEAL REFLUX DISEASE WITHOUT ESOPHAGITIS: ICD-10-CM

## 2025-08-26 DIAGNOSIS — K64.9 HEMORRHOIDS, UNSPECIFIED HEMORRHOID TYPE: ICD-10-CM

## 2025-08-26 DIAGNOSIS — E06.3 HASHIMOTO'S THYROIDITIS: ICD-10-CM

## 2025-08-26 DIAGNOSIS — M85.852 OSTEOPENIA OF BOTH HIPS: ICD-10-CM

## 2025-08-26 DIAGNOSIS — J41.0 SIMPLE CHRONIC BRONCHITIS: ICD-10-CM

## 2025-08-26 DIAGNOSIS — F41.1 GENERALIZED ANXIETY DISORDER: ICD-10-CM

## 2025-08-26 DIAGNOSIS — E78.5 DYSLIPIDEMIA: Chronic | ICD-10-CM

## 2025-08-26 RX ORDER — ATORVASTATIN CALCIUM 10 MG/1
10 TABLET, FILM COATED ORAL NIGHTLY
Qty: 90 TABLET | Refills: 2 | Status: SHIPPED | OUTPATIENT
Start: 2025-08-26

## 2025-08-26 RX ORDER — DOCUSATE SODIUM 100 MG/1
100 CAPSULE, LIQUID FILLED ORAL 2 TIMES DAILY
Qty: 60 CAPSULE | Refills: 2 | Status: SHIPPED | OUTPATIENT
Start: 2025-08-26

## 2025-08-26 RX ORDER — AMITRIPTYLINE HYDROCHLORIDE 10 MG/1
10 TABLET ORAL NIGHTLY
Qty: 90 TABLET | Refills: 2 | Status: SHIPPED | OUTPATIENT
Start: 2025-08-26

## 2025-08-26 RX ORDER — TRIAMCINOLONE ACETONIDE 1 MG/G
1 CREAM TOPICAL 2 TIMES DAILY
Qty: 45 G | Refills: 0 | Status: SHIPPED | OUTPATIENT
Start: 2025-08-26

## 2025-08-26 RX ORDER — DEXLANSOPRAZOLE 60 MG/1
60 CAPSULE, DELAYED RELEASE ORAL DAILY
Qty: 30 CAPSULE | Refills: 5 | Status: SHIPPED | OUTPATIENT
Start: 2025-08-26

## 2025-08-26 RX ORDER — ALENDRONATE SODIUM 70 MG/1
70 TABLET ORAL
Qty: 12 TABLET | Refills: 2 | Status: SHIPPED | OUTPATIENT
Start: 2025-08-26

## 2025-08-26 RX ORDER — HYDROCORTISONE ACETATE 25 MG/1
25 SUPPOSITORY RECTAL 2 TIMES DAILY
Qty: 60 EACH | Refills: 2 | Status: SHIPPED | OUTPATIENT
Start: 2025-08-26

## 2025-08-26 RX ORDER — LEVOTHYROXINE SODIUM 50 UG/1
50 TABLET ORAL DAILY
Qty: 90 TABLET | Refills: 3 | Status: SHIPPED | OUTPATIENT
Start: 2025-08-26

## 2025-08-28 LAB
MDC_IDC_MSMT_BATTERY_REMAINING_LONGEVITY: 96 MO
MDC_IDC_MSMT_BATTERY_RRT_TRIGGER: 2.56
MDC_IDC_MSMT_BATTERY_STATUS: NORMAL
MDC_IDC_MSMT_BATTERY_VOLTAGE: 2.99
MDC_IDC_MSMT_LEADCHNL_RV_DTM: NORMAL
MDC_IDC_MSMT_LEADCHNL_RV_IMPEDANCE_VALUE: 590
MDC_IDC_MSMT_LEADCHNL_RV_PACING_THRESHOLD_AMPLITUDE: 0.38
MDC_IDC_MSMT_LEADCHNL_RV_PACING_THRESHOLD_POLARITY: NORMAL
MDC_IDC_MSMT_LEADCHNL_RV_PACING_THRESHOLD_PULSEWIDTH: 0.2
MDC_IDC_MSMT_LEADCHNL_RV_SENSING_INTR_AMPL: 13.95
MDC_IDC_PG_IMPLANT_DTM: NORMAL
MDC_IDC_PG_MFG: NORMAL
MDC_IDC_PG_MODEL: NORMAL
MDC_IDC_PG_SERIAL: NORMAL
MDC_IDC_PG_TYPE: NORMAL
MDC_IDC_SESS_DTM: NORMAL
MDC_IDC_SESS_TYPE: NORMAL
MDC_IDC_SET_BRADY_LOWRATE: 70
MDC_IDC_SET_BRADY_MAX_SENSOR_RATE: 110
MDC_IDC_SET_BRADY_MODE: NORMAL
MDC_IDC_SET_LEADCHNL_RV_PACING_AMPLITUDE: 0.88
MDC_IDC_SET_LEADCHNL_RV_PACING_POLARITY: NORMAL
MDC_IDC_SET_LEADCHNL_RV_PACING_PULSEWIDTH: 0.2
MDC_IDC_SET_LEADCHNL_RV_SENSING_POLARITY: NORMAL
MDC_IDC_SET_LEADCHNL_RV_SENSING_SENSITIVITY: 1.5

## (undated) DEVICE — HANDPIECE SET WITH COAXIAL HIGH FLOW TIP AND SUCTION TUBE: Brand: INTERPULSE

## (undated) DEVICE — CANN NASL CO2 DIVIDED A/

## (undated) DEVICE — Device

## (undated) DEVICE — ADULT, W/LG. BACK PAD, RADIOTRANSPARENT ELEMENT AND LEAD WIRE: Brand: DEFIBRILLATION ELECTRODES

## (undated) DEVICE — SPNG GZ WOVN 4X4IN 12PLY 10/BX STRL

## (undated) DEVICE — SYS SKIN CLS DERMABOND PRINEO W/22CM MESH TP

## (undated) DEVICE — Device: Brand: MEDEX

## (undated) DEVICE — ANTIBACTERIAL UNDYED BRAIDED (POLYGLACTIN 910), SYNTHETIC ABSORBABLE SUTURE: Brand: COATED VICRYL

## (undated) DEVICE — DEFENDO AIR WATER SUCTION AND BIOPSY VALVE KIT FOR  OLYMPUS: Brand: DEFENDO AIR/WATER/SUCTION AND BIOPSY VALVE

## (undated) DEVICE — GLV SURG SENSICARE W/ALOE PF LF 8.5 STRL

## (undated) DEVICE — LEX ELECTRO PHYSIOLOGY: Brand: MEDLINE INDUSTRIES, INC.

## (undated) DEVICE — NDL MAYO CATGUT 1/2 CIR 18244D PK/2

## (undated) DEVICE — SUT VIC 2/0 TIES 18IN J111T

## (undated) DEVICE — DECANT BG O JET

## (undated) DEVICE — GW XCHG AMPLTZ XSTIF PTFE CRV .035IN 3X180CM

## (undated) DEVICE — ST EXT IV SMARTSITE 2VLV SP M LL 5ML IV1

## (undated) DEVICE — SYR LL TP 10ML STRL

## (undated) DEVICE — THE BITE BLOCK MAXI, LATEX FREE STRAP IS USED TO PROTECT THE ENDOSCOPE INSERTION TUBE FROM BEING BITTEN BY THE PATIENT.

## (undated) DEVICE — FRCP BX RADJAW4 NDL 2.8 240CM LG OG BX40

## (undated) DEVICE — 2108 SERIES SAGITTAL BLADE (18.6 X 0.8 X 73.8MM)

## (undated) DEVICE — HOLDER: Brand: DEROYAL

## (undated) DEVICE — SHEATH INTRO MICRA HC 23F 55.7CM

## (undated) DEVICE — DRSNG TELFA PAD NONADH STR 1S 3X8IN

## (undated) DEVICE — Device: Brand: EZ STEER

## (undated) DEVICE — PK PRSTH HIP 70

## (undated) DEVICE — ST INF PRI SMRTSTE 20DRP 2VLV 24ML 117

## (undated) DEVICE — SUT MNCRYL PLS ANTIB UD 2/0 CP1 27IN

## (undated) DEVICE — LIMB HOLDER, WRIST/ANKLE: Brand: DEROYAL

## (undated) DEVICE — PAD,ABDOMINAL,8"X10",ST,LF: Brand: MEDLINE

## (undated) DEVICE — APPL CHLORAPREP W/TINT 26ML ORNG

## (undated) DEVICE — INTRO SHEATH ENGAGE W/50 GW .038IN 8F25